# Patient Record
Sex: MALE | Race: WHITE | Employment: OTHER | URBAN - METROPOLITAN AREA
[De-identification: names, ages, dates, MRNs, and addresses within clinical notes are randomized per-mention and may not be internally consistent; named-entity substitution may affect disease eponyms.]

---

## 2017-03-27 ENCOUNTER — RECORDS - HEALTHEAST (OUTPATIENT)
Dept: LAB | Facility: CLINIC | Age: 82
End: 2017-03-27

## 2017-03-27 LAB — HBA1C MFR BLD: 7.9 % (ref 4.2–6.1)

## 2017-04-11 ENCOUNTER — RECORDS - HEALTHEAST (OUTPATIENT)
Dept: LAB | Facility: CLINIC | Age: 82
End: 2017-04-11

## 2017-04-11 LAB
CHOLEST SERPL-MCNC: 119 MG/DL
FASTING STATUS PATIENT QL REPORTED: ABNORMAL
HBA1C MFR BLD: 8.1 % (ref 4.2–6.1)
HDLC SERPL-MCNC: 29 MG/DL
LDLC SERPL CALC-MCNC: 54 MG/DL
TRIGL SERPL-MCNC: 179 MG/DL

## 2017-05-13 ENCOUNTER — APPOINTMENT (OUTPATIENT)
Dept: CT IMAGING | Facility: CLINIC | Age: 82
DRG: 357 | End: 2017-05-13
Attending: EMERGENCY MEDICINE
Payer: MEDICARE

## 2017-05-13 ENCOUNTER — HOSPITAL ENCOUNTER (INPATIENT)
Facility: CLINIC | Age: 82
LOS: 15 days | Discharge: NURSING, REHAB OR LONG-TERM CARE | DRG: 357 | End: 2017-05-28
Attending: EMERGENCY MEDICINE | Admitting: HOSPITALIST
Payer: MEDICARE

## 2017-05-13 DIAGNOSIS — K59.00 CONSTIPATION, UNSPECIFIED CONSTIPATION TYPE: ICD-10-CM

## 2017-05-13 DIAGNOSIS — I48.91 ATRIAL FIBRILLATION, UNSPECIFIED TYPE (H): ICD-10-CM

## 2017-05-13 DIAGNOSIS — E03.9 HYPOTHYROIDISM, UNSPECIFIED TYPE: ICD-10-CM

## 2017-05-13 DIAGNOSIS — L30.9 ECZEMA, UNSPECIFIED TYPE: ICD-10-CM

## 2017-05-13 DIAGNOSIS — E87.1 HYPONATREMIA: ICD-10-CM

## 2017-05-13 DIAGNOSIS — I89.0 LYMPHEDEMA: ICD-10-CM

## 2017-05-13 DIAGNOSIS — R10.84 ABDOMINAL PAIN, GENERALIZED: Primary | ICD-10-CM

## 2017-05-13 DIAGNOSIS — I25.10 CORONARY ARTERY DISEASE INVOLVING NATIVE HEART WITHOUT ANGINA PECTORIS, UNSPECIFIED VESSEL OR LESION TYPE: ICD-10-CM

## 2017-05-13 DIAGNOSIS — R33.9 URINARY RETENTION: ICD-10-CM

## 2017-05-13 DIAGNOSIS — K21.9 GASTROESOPHAGEAL REFLUX DISEASE WITHOUT ESOPHAGITIS: ICD-10-CM

## 2017-05-13 DIAGNOSIS — I87.8 VENOUS STASIS: ICD-10-CM

## 2017-05-13 DIAGNOSIS — E11.8 TYPE 2 DIABETES MELLITUS WITH COMPLICATION, WITHOUT LONG-TERM CURRENT USE OF INSULIN (H): ICD-10-CM

## 2017-05-13 DIAGNOSIS — E55.9 VITAMIN D DEFICIENCY: ICD-10-CM

## 2017-05-13 DIAGNOSIS — I10 ESSENTIAL HYPERTENSION: ICD-10-CM

## 2017-05-13 DIAGNOSIS — K56.609 SMALL BOWEL OBSTRUCTION (H): ICD-10-CM

## 2017-05-13 DIAGNOSIS — D50.9 IRON DEFICIENCY ANEMIA, UNSPECIFIED IRON DEFICIENCY ANEMIA TYPE: ICD-10-CM

## 2017-05-13 LAB
ALBUMIN SERPL-MCNC: 3.7 G/DL (ref 3.4–5)
ALBUMIN UR-MCNC: NEGATIVE MG/DL
ALP SERPL-CCNC: 78 U/L (ref 40–150)
ALT SERPL W P-5'-P-CCNC: 28 U/L (ref 0–70)
ANION GAP SERPL CALCULATED.3IONS-SCNC: 10 MMOL/L (ref 3–14)
APPEARANCE UR: ABNORMAL
AST SERPL W P-5'-P-CCNC: 24 U/L (ref 0–45)
BACTERIA #/AREA URNS HPF: ABNORMAL /HPF
BASOPHILS # BLD AUTO: 0 10E9/L (ref 0–0.2)
BASOPHILS NFR BLD AUTO: 0.1 %
BILIRUB SERPL-MCNC: 1.2 MG/DL (ref 0.2–1.3)
BILIRUB UR QL STRIP: NEGATIVE
BUN SERPL-MCNC: 36 MG/DL (ref 7–30)
CALCIUM SERPL-MCNC: 9.1 MG/DL (ref 8.5–10.1)
CHLORIDE SERPL-SCNC: 93 MMOL/L (ref 94–109)
CO2 SERPL-SCNC: 29 MMOL/L (ref 20–32)
COLOR UR AUTO: YELLOW
CREAT SERPL-MCNC: 1.54 MG/DL (ref 0.66–1.25)
DIFFERENTIAL METHOD BLD: ABNORMAL
EOSINOPHIL # BLD AUTO: 0 10E9/L (ref 0–0.7)
EOSINOPHIL NFR BLD AUTO: 0 %
ERYTHROCYTE [DISTWIDTH] IN BLOOD BY AUTOMATED COUNT: 17.2 % (ref 10–15)
GFR SERPL CREATININE-BSD FRML MDRD: 43 ML/MIN/1.7M2
GLUCOSE BLDC GLUCOMTR-MCNC: 95 MG/DL (ref 70–99)
GLUCOSE SERPL-MCNC: 177 MG/DL (ref 70–99)
GLUCOSE UR STRIP-MCNC: NEGATIVE MG/DL
HCT VFR BLD AUTO: 35.8 % (ref 40–53)
HGB BLD-MCNC: 10.5 G/DL (ref 13.3–17.7)
HGB UR QL STRIP: NEGATIVE
HYALINE CASTS #/AREA URNS LPF: 4 /LPF (ref 0–2)
IMM GRANULOCYTES # BLD: 0 10E9/L (ref 0–0.4)
IMM GRANULOCYTES NFR BLD: 0.3 %
INR PPP: 2.53 (ref 0.86–1.14)
KETONES UR STRIP-MCNC: NEGATIVE MG/DL
LACTATE BLD-SCNC: 3 MMOL/L (ref 0.7–2.1)
LACTATE SERPL-SCNC: 2.3 MMOL/L (ref 0.4–2)
LEUKOCYTE ESTERASE UR QL STRIP: ABNORMAL
LIPASE SERPL-CCNC: 358 U/L (ref 73–393)
LYMPHOCYTES # BLD AUTO: 0.6 10E9/L (ref 0.8–5.3)
LYMPHOCYTES NFR BLD AUTO: 8.9 %
MCH RBC QN AUTO: 23.3 PG (ref 26.5–33)
MCHC RBC AUTO-ENTMCNC: 29.3 G/DL (ref 31.5–36.5)
MCV RBC AUTO: 79 FL (ref 78–100)
MONOCYTES # BLD AUTO: 0.9 10E9/L (ref 0–1.3)
MONOCYTES NFR BLD AUTO: 12.7 %
MUCOUS THREADS #/AREA URNS LPF: PRESENT /LPF
NEUTROPHILS # BLD AUTO: 5.5 10E9/L (ref 1.6–8.3)
NEUTROPHILS NFR BLD AUTO: 78 %
NITRATE UR QL: NEGATIVE
NRBC # BLD AUTO: 0 10*3/UL
NRBC BLD AUTO-RTO: 0 /100
PH UR STRIP: 5 PH (ref 5–7)
PLATELET # BLD AUTO: 294 10E9/L (ref 150–450)
POTASSIUM SERPL-SCNC: 3.5 MMOL/L (ref 3.4–5.3)
PROT SERPL-MCNC: 7.7 G/DL (ref 6.8–8.8)
RBC # BLD AUTO: 4.51 10E12/L (ref 4.4–5.9)
RBC #/AREA URNS AUTO: 1 /HPF (ref 0–2)
SODIUM SERPL-SCNC: 132 MMOL/L (ref 133–144)
SP GR UR STRIP: 1.02 (ref 1–1.03)
URN SPEC COLLECT METH UR: ABNORMAL
UROBILINOGEN UR STRIP-MCNC: 0 MG/DL (ref 0–2)
WBC # BLD AUTO: 7 10E9/L (ref 4–11)
WBC #/AREA URNS AUTO: 15 /HPF (ref 0–2)

## 2017-05-13 PROCEDURE — 25500064 ZZH RX 255 OP 636: Performed by: EMERGENCY MEDICINE

## 2017-05-13 PROCEDURE — A9270 NON-COVERED ITEM OR SERVICE: HCPCS | Mod: GY | Performed by: HOSPITALIST

## 2017-05-13 PROCEDURE — 99223 1ST HOSP IP/OBS HIGH 75: CPT | Mod: AI | Performed by: HOSPITALIST

## 2017-05-13 PROCEDURE — 25800025 ZZH RX 258: Performed by: EMERGENCY MEDICINE

## 2017-05-13 PROCEDURE — 25000128 H RX IP 250 OP 636: Performed by: EMERGENCY MEDICINE

## 2017-05-13 PROCEDURE — 25000132 ZZH RX MED GY IP 250 OP 250 PS 637: Mod: GY | Performed by: HOSPITALIST

## 2017-05-13 PROCEDURE — 25000125 ZZHC RX 250: Performed by: EMERGENCY MEDICINE

## 2017-05-13 PROCEDURE — 96375 TX/PRO/DX INJ NEW DRUG ADDON: CPT

## 2017-05-13 PROCEDURE — 96367 TX/PROPH/DG ADDL SEQ IV INF: CPT

## 2017-05-13 PROCEDURE — 36415 COLL VENOUS BLD VENIPUNCTURE: CPT | Performed by: EMERGENCY MEDICINE

## 2017-05-13 PROCEDURE — 00000146 ZZHCL STATISTIC GLUCOSE BY METER IP

## 2017-05-13 PROCEDURE — 99285 EMERGENCY DEPT VISIT HI MDM: CPT | Mod: 25

## 2017-05-13 PROCEDURE — 85025 COMPLETE CBC W/AUTO DIFF WBC: CPT | Performed by: EMERGENCY MEDICINE

## 2017-05-13 PROCEDURE — 93005 ELECTROCARDIOGRAM TRACING: CPT

## 2017-05-13 PROCEDURE — 87086 URINE CULTURE/COLONY COUNT: CPT | Performed by: EMERGENCY MEDICINE

## 2017-05-13 PROCEDURE — 25000128 H RX IP 250 OP 636: Performed by: PHYSICIAN ASSISTANT

## 2017-05-13 PROCEDURE — 96366 THER/PROPH/DIAG IV INF ADDON: CPT

## 2017-05-13 PROCEDURE — 85610 PROTHROMBIN TIME: CPT | Performed by: EMERGENCY MEDICINE

## 2017-05-13 PROCEDURE — 81001 URINALYSIS AUTO W/SCOPE: CPT | Performed by: EMERGENCY MEDICINE

## 2017-05-13 PROCEDURE — 83690 ASSAY OF LIPASE: CPT | Performed by: EMERGENCY MEDICINE

## 2017-05-13 PROCEDURE — 96365 THER/PROPH/DIAG IV INF INIT: CPT

## 2017-05-13 PROCEDURE — 12000000 ZZH R&B MED SURG/OB

## 2017-05-13 PROCEDURE — 74177 CT ABD & PELVIS W/CONTRAST: CPT

## 2017-05-13 PROCEDURE — 80053 COMPREHEN METABOLIC PANEL: CPT | Performed by: EMERGENCY MEDICINE

## 2017-05-13 PROCEDURE — 83605 ASSAY OF LACTIC ACID: CPT | Performed by: EMERGENCY MEDICINE

## 2017-05-13 RX ORDER — MORPHINE SULFATE 4 MG/ML
4 INJECTION, SOLUTION INTRAMUSCULAR; INTRAVENOUS ONCE
Status: COMPLETED | OUTPATIENT
Start: 2017-05-13 | End: 2017-05-13

## 2017-05-13 RX ORDER — ONDANSETRON 2 MG/ML
4 INJECTION INTRAMUSCULAR; INTRAVENOUS EVERY 6 HOURS PRN
Status: DISCONTINUED | OUTPATIENT
Start: 2017-05-13 | End: 2017-05-28 | Stop reason: HOSPADM

## 2017-05-13 RX ORDER — NICOTINE POLACRILEX 4 MG
15-30 LOZENGE BUCCAL
Status: DISCONTINUED | OUTPATIENT
Start: 2017-05-13 | End: 2017-05-28 | Stop reason: HOSPADM

## 2017-05-13 RX ORDER — POTASSIUM CHLORIDE 7.45 MG/ML
10 INJECTION INTRAVENOUS
Status: DISCONTINUED | OUTPATIENT
Start: 2017-05-13 | End: 2017-05-28 | Stop reason: HOSPADM

## 2017-05-13 RX ORDER — POTASSIUM CHLORIDE 29.8 MG/ML
20 INJECTION INTRAVENOUS
Status: DISCONTINUED | OUTPATIENT
Start: 2017-05-13 | End: 2017-05-28 | Stop reason: HOSPADM

## 2017-05-13 RX ORDER — POTASSIUM CHLORIDE 1500 MG/1
20-40 TABLET, EXTENDED RELEASE ORAL
Status: DISCONTINUED | OUTPATIENT
Start: 2017-05-13 | End: 2017-05-28 | Stop reason: HOSPADM

## 2017-05-13 RX ORDER — DEXTROSE MONOHYDRATE 25 G/50ML
25-50 INJECTION, SOLUTION INTRAVENOUS
Status: DISCONTINUED | OUTPATIENT
Start: 2017-05-13 | End: 2017-05-28 | Stop reason: HOSPADM

## 2017-05-13 RX ORDER — PROCHLORPERAZINE MALEATE 5 MG
5 TABLET ORAL EVERY 6 HOURS PRN
Status: DISCONTINUED | OUTPATIENT
Start: 2017-05-13 | End: 2017-05-28 | Stop reason: HOSPADM

## 2017-05-13 RX ORDER — SODIUM CHLORIDE 9 MG/ML
INJECTION, SOLUTION INTRAVENOUS CONTINUOUS
Status: DISCONTINUED | OUTPATIENT
Start: 2017-05-13 | End: 2017-05-13

## 2017-05-13 RX ORDER — METOPROLOL TARTRATE 1 MG/ML
5 INJECTION, SOLUTION INTRAVENOUS EVERY 6 HOURS
Status: DISCONTINUED | OUTPATIENT
Start: 2017-05-13 | End: 2017-05-22

## 2017-05-13 RX ORDER — PROCHLORPERAZINE 25 MG
12.5 SUPPOSITORY, RECTAL RECTAL EVERY 12 HOURS PRN
Status: DISCONTINUED | OUTPATIENT
Start: 2017-05-13 | End: 2017-05-28 | Stop reason: HOSPADM

## 2017-05-13 RX ORDER — IOPAMIDOL 755 MG/ML
500 INJECTION, SOLUTION INTRAVASCULAR ONCE
Status: COMPLETED | OUTPATIENT
Start: 2017-05-13 | End: 2017-05-13

## 2017-05-13 RX ORDER — NALOXONE HYDROCHLORIDE 0.4 MG/ML
.1-.4 INJECTION, SOLUTION INTRAMUSCULAR; INTRAVENOUS; SUBCUTANEOUS
Status: DISCONTINUED | OUTPATIENT
Start: 2017-05-13 | End: 2017-05-28 | Stop reason: HOSPADM

## 2017-05-13 RX ORDER — HYDROMORPHONE HYDROCHLORIDE 1 MG/ML
.3-.5 INJECTION, SOLUTION INTRAMUSCULAR; INTRAVENOUS; SUBCUTANEOUS
Status: DISCONTINUED | OUTPATIENT
Start: 2017-05-13 | End: 2017-05-16

## 2017-05-13 RX ORDER — POTASSIUM CL/LIDO/0.9 % NACL 10MEQ/0.1L
10 INTRAVENOUS SOLUTION, PIGGYBACK (ML) INTRAVENOUS
Status: DISCONTINUED | OUTPATIENT
Start: 2017-05-13 | End: 2017-05-28 | Stop reason: HOSPADM

## 2017-05-13 RX ORDER — MAGNESIUM SULFATE HEPTAHYDRATE 40 MG/ML
4 INJECTION, SOLUTION INTRAVENOUS EVERY 4 HOURS PRN
Status: DISCONTINUED | OUTPATIENT
Start: 2017-05-13 | End: 2017-05-28 | Stop reason: HOSPADM

## 2017-05-13 RX ORDER — HEPARIN SODIUM 5000 [USP'U]/.5ML
5000 INJECTION, SOLUTION INTRAVENOUS; SUBCUTANEOUS EVERY 12 HOURS
Status: DISCONTINUED | OUTPATIENT
Start: 2017-05-14 | End: 2017-05-14

## 2017-05-13 RX ORDER — ONDANSETRON 4 MG/1
4 TABLET, ORALLY DISINTEGRATING ORAL EVERY 6 HOURS PRN
Status: DISCONTINUED | OUTPATIENT
Start: 2017-05-13 | End: 2017-05-28 | Stop reason: HOSPADM

## 2017-05-13 RX ORDER — POTASSIUM CHLORIDE 1.5 G/1.58G
20-40 POWDER, FOR SOLUTION ORAL
Status: DISCONTINUED | OUTPATIENT
Start: 2017-05-13 | End: 2017-05-28 | Stop reason: HOSPADM

## 2017-05-13 RX ORDER — HYDROMORPHONE HYDROCHLORIDE 1 MG/ML
0.5 SOLUTION ORAL ONCE
Status: COMPLETED | OUTPATIENT
Start: 2017-05-13 | End: 2017-05-13

## 2017-05-13 RX ADMIN — PHYTONADIONE 5 MG: 10 INJECTION, EMULSION INTRAMUSCULAR; INTRAVENOUS; SUBCUTANEOUS at 21:49

## 2017-05-13 RX ADMIN — SODIUM CHLORIDE, POTASSIUM CHLORIDE, SODIUM LACTATE AND CALCIUM CHLORIDE 1000 ML: 600; 310; 30; 20 INJECTION, SOLUTION INTRAVENOUS at 21:19

## 2017-05-13 RX ADMIN — MORPHINE SULFATE 4 MG: 4 INJECTION, SOLUTION INTRAMUSCULAR; INTRAVENOUS at 20:08

## 2017-05-13 RX ADMIN — SODIUM CHLORIDE 65 ML: 9 INJECTION, SOLUTION INTRAVENOUS at 20:24

## 2017-05-13 RX ADMIN — MORPHINE SULFATE 4 MG: 4 INJECTION, SOLUTION INTRAMUSCULAR; INTRAVENOUS at 21:52

## 2017-05-13 RX ADMIN — HYDROMORPHONE HYDROCHLORIDE 0.5 MG: 1 SOLUTION ORAL at 23:35

## 2017-05-13 RX ADMIN — SODIUM CHLORIDE, POTASSIUM CHLORIDE, SODIUM LACTATE AND CALCIUM CHLORIDE 1000 ML: 600; 310; 30; 20 INJECTION, SOLUTION INTRAVENOUS at 20:49

## 2017-05-13 RX ADMIN — TAZOBACTAM SODIUM AND PIPERACILLIN SODIUM 3.38 G: 375; 3 INJECTION, SOLUTION INTRAVENOUS at 21:18

## 2017-05-13 RX ADMIN — SODIUM CHLORIDE 500 ML: 9 INJECTION, SOLUTION INTRAVENOUS at 19:19

## 2017-05-13 RX ADMIN — IOPAMIDOL 100 ML: 755 INJECTION, SOLUTION INTRAVENOUS at 20:23

## 2017-05-13 RX ADMIN — Medication 0.5 MG: at 23:51

## 2017-05-13 ASSESSMENT — ENCOUNTER SYMPTOMS
VOMITING: 0
ABDOMINAL PAIN: 1
NAUSEA: 0

## 2017-05-13 NOTE — IP AVS SNAPSHOT
` `     [place] [address] MEDICAL SURGICAL: [phone]                 INTERAGENCY TRANSFER FORM - NOTES (H&P, Discharge Summary, Consults, Procedures, Therapies)   2017                    Hospital Admission Date: 2017  BRIANA HERNÁNDEZ   : 1927  Sex: Male        Patient PCP Information     Provider PCP Type    Jose Shell [facility]         History & Physicals      H&P by Micki Ackerman PA-C at 2017  9:47 PM     Author:  Micki Ackerman PA-C Service:  Hospitalist Author Type:  Physician Assistant - C    Filed:  2017 11:11 PM Date of Service:  2017  9:47 PM Creation Time:  2017  9:47 PM    Status:  Attested :  Micki Ackerman PA-C (Physician Assistant - C)    Cosigner:  Jenny Gonsalez MD at 2017 11:43 PM        Attestation signed by Jenny Gonsalez MD at 2017 11:43 PM        Physician Attestation   IJenny, saw and evaluated Briana Hernández as part of a shared visit.  I have reviewed and discussed with the advanced practice provider their history, physical and plan.    I personally reviewed the vital signs, medications, labs and imaging.    My key history or physical exam findings: Pt is a 89 yr old male with h/o A fib on coumadin, MI, HTN, dementia who presented with abd pain and was found to have SBO. He has significant pain on exam and absent bowel sound but no rebound tenderness. Surgery consulted from ER     Key management decisions made by me: will place NG for decompression, IV pain meds, prophylactic abx given elevated WBC and lactic acid, surgery consult in the AM    Jenny Gonsalez  Date of Service (when I saw the patient): 17                               History and Physical     Briana Hernández MRN# [id number]   YOB: 1927 Age: 89 year old      Date of Admission:  2017    Primary care provider: Jose Shell           Assessment and Plan:   Ton Bagley is a 89 year old male with a PMH significant for atrial fibrillation on warfarin, MI, HTN, memory loss and HLP who presents with abdominal pain.    ED sign out by Dr. Pepper and chart review performed: Vitals show temp of 97.8, pulse 92, and pressure 150/80 with spontaneous respirations and no hypoxia. Labs remarkable for Creatinine 1.54 (slightly above baseline of, abnormal electrolytes with sodium 132, initial lactic acid 3.0 and repeat 2.3, glucose 177, lipase 358. WBC 7.0, Hgb 10.5. INR 2.5. UA shows 15 WBC/moderate LE/negative nitrites. CT scan showed a distended small bowel consistent with obstruction with concern of closed loop obstruction. This was reviewed by Dr. Dias who does not think there is a closed loop obstruction and would like supportive care for now. He did request reversal of INR though so Vitamin K 5 mg IV was given in ED. Patient not nauseated or vomiting so NG tube was not placed.[NJ1.1]     1. Bowel obstruction  Describes abdominal pain that began 3 days ago. No passing gas or stool. No hx of bowel obstruction but does have hx of gallbladder surgery and prostate cancer. No recent colonoscopy. Concern on CT for closed loop obstruction but after review by Dr. Dias he does not agree and recommends conservative mgmt for now. NG tube not placed because patient does not have significant nausea or vomiting.  -NPO  -Fluid support  -IV pain and nausea meds  -Surgical consult  -Recheck lactic acid in AM, improved from initial presentation    2. Possible UTI  Patient denies urinary symptoms, but has elevated WBC and urine appears infected. Started on Zosyn in ED which is reasonable considering abdominal process going on. Will continue until culture is back.  -Continue Zosyn    3. Atrial fibrillation on warfarin  Pressure are soft and he takes Atenolol at home which we will be holding.  -Metoprolol 5 mg IV q 6 hrs with parameters  -Holding warfarin and  given Vit K in ED recheck INR in AM.    4. Hx of CAD   Hx of bypass grafting in 1994, no hx of heart failure but does have lower extremity edema. Last seen by cardiology in 10/2014 and was discharged from clinic because he was doing well.     5. DM II  -Added sliding scale for NPO status    6. HTN  Holding Lisinopril, Lasix and HCTZ. Will give Metoprolol IV as above    7. CKD  Creatinine slightly above baseline and received contrast in CT     8. Dementia  Holding meds    9. HLP  Holding statin    10. Hypothyroidism  Holding levothyroxine    11. Lower leg swelling  Chronic and uses compression stockings with Lasix.   -Holding lasix for now and suspect this may get worse.    12. Mild hyponatremia  Normal saline overnight and will recheck in AM.    Med rec: still needs to be completed[NJ1.2]  Social:[NJ1.1] Lives with walk, ambulates with cane[NJ1.3]  Code:[NJ1.1] DNR/DNI confirmed by POLST and patient[NJ1.2]  VTE prophylaxis:[NJ1.1] INR 2.5[NJ1.3] and starting reversal so placing on heparin SQ[NJ1.2]  Disposition:[NJ1.1] Inpatient[NJ1.3]              Chief Complaint:[NJ1.1]   Abdominal pain and found to have bowel obstruction[NJ1.3]         History of Present Illness:[NJ1.1]   History limited due to memory loss but son is present who give majority of hx.[NJ1.3]     Ton Bagley is a 89 year old male who presents with[NJ1.1] lower stomach pain x 3 days. He denies significant nausea and vomiting but hasn't eaten anything today. He denies passing gas[NJ1.3] and previous blood in stool[NJ1.2]. His son states he has never had a bowel obstruction before, has hx of gallbladder removal and has not had a colonoscopy in over 10 years. He has hx of prostate cancer. He denies urinary symptoms, chest pain, shortness of breath[NJ1.3], and cough. He has not taken his meds today. He drinks alcohol occasionally and denies smoking/illegal drug use. No other complaints at this time.[NJ1.2]              Past Medical History:      Past Medical History:   Diagnosis Date     Atrial fibrillation (H)     cardioversion 2007     CAD (coronary artery disease)     CABG, stent x2 2004     HTN (hypertension)      Hyperlipidemia                Past Surgical History:     Past Surgical History:   Procedure Laterality Date     BYPASS GRAFT ARTERY CORONARY  1994    LIMA to LAD and saphenous vein grafts to Diag 1 OM 1 PDA     CARDIOVERSION  2007     STENT, CORONARY, S660 15/18  2004    stent placement of SVG to RCA and OM2               Social History:     Social History     Social History     Marital status:      Spouse name: N/A     Number of children: N/A     Years of education: N/A     Occupational History     Not on file.     Social History Main Topics     Smoking status: Former Smoker     Smokeless tobacco: Not on file      Comment: 1973     Alcohol use Yes      Comment: wine daily     Drug use: Not on file     Sexual activity: Not on file     Other Topics Concern     Sleep Concern No     Special Diet No     Exercise No     Social History Narrative               Family History:[NJ1.1]   Unable to review due to memory issues[NJ1.2]         Allergies:    No Known Allergies            Medications:     Prior to Admission medications    Medication Sig Last Dose Taking? Auth Provider   gabapentin (NEURONTIN) 100 MG capsule Take 100 mg by mouth 2 times daily   Reported, Patient   memantine XR (NAMENDA XR) 28 MG capsule Take 28 mg by mouth daily   Reported, Patient   donepezil (ARICEPT) 10 MG tablet Take 10 mg by mouth At Bedtime   Reported, Patient   levothyroxine (SYNTHROID,LEVOTHROID) 100 MCG tablet Take 100 mcg by mouth daily   Reported, Patient   furosemide (LASIX) 20 MG tablet Take 20 mg by mouth daily   Reported, Patient   aspirin 81 MG tablet Take 81 mg by mouth daily   Reported, Patient   tamsulosin (FLOMAX) 0.4 MG 24 hr capsule Take 0.4 mg by mouth daily   Reported, Patient   glipiZIDE (GLUCOTROL) 5 MG tablet 2.5 mh tablet twice daily    Reported, Patient   alendronate (FOSAMAX) 70 MG tablet Take 70 mg by mouth every 7 days   Reported, Patient   Calcium Carb-Cholecalciferol (CALCIUM 600 + D PO)    Reported, Patient   Omega-3 Fatty Acids (OMEGA-3 FISH OIL PO) Take 1 g by mouth   Reported, Patient   Multiple Vitamins-Minerals (CENTRUM SILVER ADULT 50+ PO)    Reported, Patient   nitroglycerin (NITROSTAT) 0.4 MG SL tablet Place under the tongue every 5 minutes as needed for chest pain   Reported, Patient   ascorbic acid (VITAMIN C) 500 MG tablet Take 500 mg by mouth as needed    Reported, Patient   warfarin (COUMADIN) 2 MG tablet Take by mouth daily Take as directed   Reported, Patient   atenolol (TENORMIN) 50 MG tablet Take 1 tablet (50 mg) by mouth daily   Julio Conde MD   lisinopril (PRINIVIL,ZESTRIL) 20 MG tablet Take 1 tablet (20 mg) by mouth daily   Julio Conde MD   hydrochlorothiazide (HYDRODIURIL) 25 MG tablet Take 0.5 tablets (12.5 mg) by mouth daily   Julio Conde MD   simvastatin (ZOCOR) 20 MG tablet Take 1 tablet (20 mg) by mouth daily   Julio Conde MD              Review of Systems:   A Comprehensive greater than 10 system review of systems was carried out.  Pertinent positives and negatives are noted above.  Otherwise negative for contributory information.            Physical Exam:   Blood pressure 135/78, pulse 92, temperature 97.8  F (36.6  C), temperature source Oral, resp. rate 16, weight 104.3 kg (230 lb), SpO2 99 %.  Exam:  GENERAL:  Comfortable.  PSYCH: pleasant,[NJ1.1]  Not[NJ1.2] oriented, No acute distress.  HEENT:  PERRLA. Normal conjunctiva, normal hearing, nasal mucosa and Oropharynx are normal.  NECK:  Supple, no neck vein distention, adenopathy or bruits, normal thyroid.  HEART:  Normal S1, S2 with murmur[NJ1.1] heard on exam[NJ1.2], no pericardial rub, gallops or S3 or S4.  LUNGS:  Clear to auscultation, normal Respiratory effort. No wheezing, rales or ronchi.  ABDOMEN:[NJ1.1]  Abdomen appears distended,  inactive bowel sounds and tender lower quadrants.[NJ1.2]   EXTREMITIES:[NJ1.1]  1+ pitting edema of lower legs[NJ1.2], +2 pulses bilateral and equal.  SKIN:  Dry to touch, No rash, wound or ulcerations.  NEUROLOGIC:  CN 2-12[NJ1.1] grossly[NJ1.2] intact, sensation is intact with no focal deficits.               Data:[NJ1.1]       Recent Labs  Lab 05/13/17  1835   WBC 7.0   HGB 10.5*   HCT 35.8*   MCV 79          Recent Labs  Lab 05/13/17  1835   *   POTASSIUM 3.5   CHLORIDE 93*   CO2 29   ANIONGAP 10   *   BUN 36*   CR 1.54*   GFRESTIMATED 43*   GFRESTBLACK 52*   JANN 9.1   PROTTOTAL 7.7   ALBUMIN 3.7   BILITOTAL 1.2   ALKPHOS 78   AST 24   ALT 28       Recent Labs  Lab 05/13/17 2052 05/13/17  1835   LACT 2.3* 3.0*       Recent Labs  Lab 05/13/17 2043   COLOR Yellow   APPEARANCE Slightly Cloudy   URINEGLC Negative   URINEBILI Negative   URINEKETONE Negative   SG 1.020   UBLD Negative   URINEPH 5.0   PROTEIN Negative   NITRITE Negative   LEUKEST Moderate*   RBCU 1   WBCU 15*         Recent Results (from the past 24 hour(s))   CT Abdomen Pelvis w Contrast    Narrative    CT ABDOMEN PELVIS WITH CONTRAST 5/13/2017 8:27 PM    TECHNIQUE: Images from diaphragm to pubic symphysis. 100 mL Isovue-370  IV contrast. Radiation dose for this scan was reduced using automated  exposure control, adjustment of the mA and/or kV according to patient  size, or iterative reconstruction technique.    HISTORY: RLQ abdominal pain.    COMPARISON: None.    FINDINGS:   Abdomen and Pelvis: Multiple distended fluid-filled small bowel loops  compatible with obstruction. There are several centimeters of  decompressed distal ileum. Mesenteric edema with small amount of fluid  and fat stranding identified. The ascending colon is also mildly  distended and fluid-filled, gas within transverse colon with  transition at the splenic flexure to decompressed descending colon.  The descending and rectosigmoid colon are decompressed.  Small free  fluid. There is some swirling of mesenteric vessels and fat in the  midabdomen. Appendix not identified, clips near the cecum suggests  prior appendectomy.    Lung bases: Calcified right lower lobe granuloma, sternotomy wires,  coronary artery calcifications.     Cholecystectomy clips. Fatty liver. Indeterminate oval hypodense  lesion in the liver just superior to the cholecystectomy clips 2.2 cm  in diameter on image 23.    Normal-appearing spleen, pancreas, and adrenal glands. Multiple  bilateral renal cysts including 6.7 cm cyst upper left kidney.  Nonobstructing 0.4 cm left kidney stone.    Extensive vascular calcification. No periaortic or pelvic adenopathy  or free fluid.      Impression    IMPRESSION:  1.  Distended small bowel consistent with obstruction, several  centimeters of decompressed distal and terminal ileum. The ascending  colon is also fluid-filled with gas filled transverse colon. Question  of closed loop obstruction is raised. Discussed with Dr. Pepper.  2. Indeterminate 2.2 cm oval hypodense lesion in the liver just  cephalad to the cholecystectomy clips.  3. Small free fluid. Mesenteric stranding and fluid adjacent to the  distended bowel loops.    JAIR SOUZA MD[NJ1.3]         Micki Ackerman PA-C    This patient was seen and discussed with Dr. Gonsalez who agrees with the current plans as outlined above.[NJ1.1]        Revision History        User Key Date/Time User Provider Type Action    > NJ1.2 5/13/2017 11:11 PM Micki Ackerman PA-C Physician Assistant - C Sign     NJ1.3 5/13/2017 10:19 PM Micki Ackerman PA-C Physician Assistant Tobin GUERRIER      NJ1.1 5/13/2017  9:47 PM Micki Ackerman PA-C Physician Assistant Tobin GUERRIER                   Discharge Summaries     No notes of this type exist for this encounter.         Consult Notes      Consults by Geri Vital RN at 5/22/2017  1:51 PM     Author:  Geri Vital RN Service:  (none) Author Type:  Case  Manager    Filed:  5/22/2017  1:52 PM Date of Service:  5/22/2017  1:51 PM Creation Time:  5/22/2017  1:50 PM    Status:  Signed :  Geri Vital RN ()         I met with pt and spouse back on 5/15/17.  Therapies are currently recommending TCU.  Pt said he didn't know. His wife was already here today.   I did call pt's wife[RB1.1], but it just kept ringing.  Will try to connect with her tomorrow if she visits.  Ciera WASHINGTON CTS [id number][RB1.2]     Revision History        User Key Date/Time User Provider Type Action    > RB1.2 5/22/2017  1:52 PM Geri Vital RN Case Manager Sign     RB1.1 5/22/2017  1:50 PM Geri Vital RN Case Manager             Consults by Britni Perdomo RD, LD at 5/21/2017  8:40 AM     Author:  Britni Perdomo RD, LD Service:  Nutrition Author Type:  Registered Dietitian    Filed:  5/21/2017  8:40 AM Date of Service:  5/21/2017  8:40 AM Creation Time:  5/21/2017  8:10 AM    Status:  Signed :  Britni Perdomo RD, LD (Registered Dietitian)     Consult Orders:    1. Nutrition Services Adult IP Consult [[id number]] ordered by Shawn Garcia MD at 05/20/17 1601                CLINICAL NUTRITION SERVICES BRIEF NOTE    Consult received for Pharmacy/Nutrition to start & manage TPN and PPN.     See RD note from 5/19 for full nutrition assessment.     ASSESSED NUTRITION NEEDS (PER APPROVED PRACTICE GUIDELINES, Dosing weight: 76.5 kg AdjBW):  Estimated Energy Needs: 8608-7340 kcals (25-30 Kcal/Kg)  Justification: obese  Estimated Protein Needs:  grams protein (1.2-1.5 g pro/Kg)  Justification: post-op  Estimated Fluid Needs: >1 mL/Kcal  Justification: maintenance    New Findings:  - Ileus, NGT removed 5/18. Had large BM yesterday.  - Peripheral access available for PPN, started last evening Clinimix 4.25/5 @ 50 ml/hr w/ no lipids. Provides 413 kcal/day, 60 g pro (0.8 g/kg), 51 g CHO, 0% kcal from lipid.   - BL edema increasing. Wt 105.7 kg today.    Meds -   D5 IVF  off    PPN tolerance -   Phos 2.2 (L) - replacement protocol ordered for this level  K 4.0, Mg 2.1 - NL  BGs <150  Large BM yesterday, x1 this am    Recommendations/Interventions:  Next Bag: Increase PPN D4.25 AA5 to 125 mL/hr  (3000 mL total) + 250 mL 20% lipids daily  - This provides 1520 kcal/day, 128 g protein (1.7 g per kg), 150 g CHO and 33% kcal from fat.    Recommend obtain central access in next 1-2 days for ability to meet full nutrition needs.     RD off-site today, please page if needed. Progress towards goals will be monitored and evaluated per protocol and Practice Guidelines    Britni Perdomo RD, LD[AK1.1]             Revision History        User Key Date/Time User Provider Type Action    > AK1.1 5/21/2017  8:40 AM Britni Perdomo RD, AYALA Registered Dietitian Sign            Consults by Geri Vital RN at 5/15/2017 11:53 AM     Author:  Geri Vital RN Service:  (none) Author Type:      Filed:  5/16/2017 11:02 AM Date of Service:  5/15/2017 11:53 AM Creation Time:  5/15/2017 11:52 AM    Status:  Addendum :  Geri Vital RN ()     Consult Orders:    1. Care Coordinator IP Consult [[id number]] ordered by Rick Dempsey DO at 05/15/17 1149     2. Social Work IP Consult [[id number]] ordered by Micki Ackerman PA-C at 05/13/17 2304                Care Transition Initial Assessment -   Reason For Consult: discharge planning  Met with: Patient and wife.      Active Problems:    Small bowel obstruction (H)         DATA  Lives With: spouse  Living Arrangements: assisted living  Description of Support System: Supportive  Who is your support system?: Wife     Identified issues/concerns regarding health management: Pt lives at [facility] in [place][RB1.1] with his wife.  She is independent.  However, pt does receive help with his lymphedema wraps, blood pressure checks, medications and 3 meals a day.  H[RB1.2]owever, he does have a difficult time  getting down to the dining room d/t fatigue.  He is up independently with his cane.  They do have interim home care available in the building if needed.  I requested PT consult from MD.  Pt and wife are agreeable to home care PT support if recommended through interim.  [facility] Director Leticia [phone] would like dc orders faxed to [phone] and to be notified of dc.[RB1.3]  Return on the weekend may be difficult.  Call [phone] personal cell. Because they have limited coverage for RN support.[RB1.4]        ASSESSMENT  Cognitive Status:[RB1.1]  awake and alert[RB1.3]     PLAN  Patient/family is agreeable to the plan?[RB1.1]  Yes:[RB1.3]   Patient Goals and Preferences:[RB1.1] YES[RB1.3] .  Patient anticipates discharging to:[RB1.1]  Home to [facility] [place] with [facility] PT support if recommended[RB1.3] .[RB1.1]    Ciera WASHINGTON CTS [id number][RB1.3]     Revision History        User Key Date/Time User Provider Type Action    > RB1.4 5/16/2017 11:02 AM Geri Vital RN Case Manager Addend     RB1.3 5/15/2017 12:00 PM Geri Vital RN Case Manager Sign     RB1.2 5/15/2017 11:54 AM Geri Vital RN Case Manager      RB1.1 5/15/2017 11:52 AM Geri Vital RN Case Manager             Consults signed by Jorge Dias MD at 5/14/2017  4:54 PM      Author:  Jorge Dias MD Service:  Surgery Author Type:  Physician    Filed:  5/14/2017  4:54 PM Date of Service:  5/14/2017  8:16 AM Creation Time:  5/14/2017  9:54 AM    Status:  Signed :  Jorge Dias MD (Physician)         SURGERY CONSULTATION       PRIMARY CARE PHYSICIAN:  [facility].       REASON FOR CONSULTATION:  Small-bowel obstruction, question of closed loop.      HISTORY OF PRESENT ILLNESS:  Mr. Ton Bagley is an 89-year-old gentleman who has had a previous laparoscopic cholecystectomy and possibly an appendectomy, although this was not clear from speaking with him,  nor the records, who presented to the ER last evening with abdominal pain.  This has been ongoing for the preceding 3 days or so and because of its persistence and slight worsening, the patient decided to seek evaluation in the ER.  Here, he was found to have a mild elevation of his lactate which subsequently normalized, normal white blood cell count and subsequent CT which showed mechanical bowel obstruction with a possible closed loop.  He had an NG placed overnight with 400 mL of return; he pulled this out during some confusion this morning.  Today, he states he actually feels better and has no pain at rest.  He is uncertain as to whether he is passing flatus, according to the nursing notes he is, but he is not certain.  He denies any nausea or bloating this morning.      PAST MEDICAL AND SURGICAL HISTORY:  Notable for atrial fibrillation for which he is anticoagulated, he has history of coronary artery disease and has had previous CABG.  He has a history of hypertension, hyperlipidemia, previous cholecystectomy, possible appendectomy by radiographic reports, and previous tonsillectomy.  He has obstructive sleep apnea and wears CPAP at night.      CURRENT MEDICATIONS:  This includes gabapentin, Namenda, donepezil, levothyroxine, Lasix, aspirin, Flomax, glipizide, Fosamax, calcium and vitamin D, omega 3 fatty acids, Centrum Silver, nitroglycerin, vitamin C, Coumadin, atenolol, lisinopril, hydrochlorothiazide and simvastatin.      ALLERGIES:  The patient has no recognized drug allergies.      SOCIAL HISTORY:  The patient is  and lives nearby with his wife.  He quit smoking in the early 1970s.      PHYSICAL EXAMINATION:   VITAL SIGNS:  Mr. Bagley is afebrile, temperature this morning is 98.5, pulse is 89 with a blood pressure of 109/70, respiratory rate is 18 with 93% saturations on 2 liters.   IN GENERAL:  He is alert and nontoxic; he is not completely oriented to his situation and does repeat his  questions, though answered on several occasions.   HEART:  Sounds are irregularly irregular.   RESPIRATORY:  Breath sounds are without wheezes or crackles.   ABDOMEN:  Soft with mild distention, he has well-healed trocar sites consistent with a cholecystectomy.  He has some tenderness in the mid abdomen around the periumbilical region with some voluntary guarding.  His bowel tones are present, especially in the upper abdomen.      LABORATORY EXAMS:  Notable for white blood cell count of 7.0 thousand on admission, currently 9.3, hemoglobin was 10.3.  Lactate was 2.3 on admission, currently 1.8 with hydration.  Creatinine is 1.67.  His INR is now 1.6 after having vitamin K last evening.      IMAGING:  I reviewed the patient's CT in detail last evening and again this morning.  There is dilated small bowel proximally down to the level of the mid small bowel where there is decompressed ileum thereafter.  There is some edema in the mesentery off to the patient's right.  There is gas in the colon beyond this.  There does not seem to be 2 transition points, which would be the harbinger of a typical closed loop obstruction.  There are no other signs of bowel ischemia.  There are clips in the right upper quadrant and the gallbladder fossa as well as in the right lower quadrant at about the location of the anticipated mesoappendix, the appendix is not identified.      IMPRESSION AND RECOMMENDATIONS:  This is an 89-year-old gentleman with a cholecystectomy (laparoscopic and possible appendectomy, although there are no scars or harbingers of this otherwise and the patient was unclear as to whether this has occurred or nor) who presents with signs and symptoms consistent with a mechanical bowel obstruction.  Whether this is truly a closed loop phenomenon, this does not seem to be clearly supported.  He has had some improvement overnight, per his own report, although he does seem to be somewhat confused about his situation and I  am not certain how accurate this truly is.  He is tender in the mid abdomen at about the point where the abnormality is noted on computerized tomography.  Nevertheless, I see no acute indication for surgery at this juncture and have asked that the nasogastric tube be replaced and then he will remain n.p.o.  I will check a new set of plain films to see if the bowel obstruction seems persistent.  I suspect he has some degree of aerophagia given his need for continuous positive airway pressure and this may be contributing somewhat to his distention.        Thank you very much for this consultation.  We will follow along with you during the course of his hospitalization and anticipated recovery.  And again, we will reevaluate the patient today for improvement or lack thereof.         RAMA DIAS MD             D: 2017 08:16   T: 2017 09:54   MT: [id number]      Name:     BRIANA HERNÁNDEZ   MRN:      [id number]        Account:       [id number]   :      1927           Consult Date:  2017      Document: [id number]       cc: [facility]    [RO1.1]   Revision History        User Key Date/Time User Provider Type Action    > RO1.1 2017  4:54 PM Rama Dias MD Physician Sign                     Progress Notes - Physician (Notes from 17 through 17)      Progress Notes by Anna Chavez at 2017  9:52 AM     Author:  Anna Chavez Service:  (none) Author Type:      Filed:  2017  9:54 AM Date of Service:  2017  9:52 AM Creation Time:  2017  9:52 AM    Status:  Signed :  Anna Chavez ()         SWS:    Pt d/c today to [facility] with HE to transport by .  Writer will send O2 tank with pt for transportation.  HE will be here at 1:45.  [facility] to fax d/c orders.  [facility].[JD1.1]      Revision History        User Key Date/Time User Provider Type Action    > JD1.1 2017  9:54 AM Kathy  Anna  Sign            Progress Notes by Nirmal Serrano MD at 5/27/2017  2:30 PM     Author:  Nirmal Serrano MD Service:  Hospitalist Author Type:  Physician    Filed:  5/27/2017  2:40 PM Date of Service:  5/27/2017  2:30 PM Creation Time:  5/27/2017  2:30 PM    Status:  Signed :  Nirmal Serrano MD (Physician)         [facility]  Hospitalist Progress Note  Nirmal Serrano MD 05/27/17    Reason for Stay (Diagnosis): ileus         Assessment and Plan:      Summary of Stay: Ton Bagley is a 89 year old male w/ hx of HTN, HLD, DM2, afib, cad, hypothyroidism, and dementia admitted on 5/13/2017 with abd pain and CT findings concerning for possible SBO.  Underwent ex-lap on 5/14 and no SBO was found.  Now with persistent ileus that is slow to resolve.  Was on PPN, but weaned off as oral nutrition improving.  Has had some BMs.  Advanced to low fiber diet per surgery service, however his intake has not been very much.  His warfarin and other home medications have been resumed.  His hemoglobin is slowly drifting, but no sign of acute bleeding.  Added oral iron.  Added back glipizide as blood sugars rising.  Has been seen by PT and OT and he will need TCU upon discharge.  He is very edematous so providing IV diuresis, but bicarb going up so slowing down.  Off PPN now and tolerating low fiber diet.  Kelly now out and voiding.  Plan to d/c to TCU tomorrow morning.    Problem List/Assessment and Plan:   Ileus: S/P exploratory laparotomy by General Surgery on 5/14 for initial concerns about possible SBO; found to have ileus. NGT removed 5/18. Continues to have mild abd distention.  He is having BMs. Mild abdominal discomfort after eating 100% of his low fiber meal.  Stopped narcotics (had been receiving MSO4) to minimize worsening ileus. cdiff neg. Staples removed.  No specific follow up need for this per surgery service.    Type II DM:  Resumed glipizide at 5mg bid as  po intake has improved. Novolog SSI.    Chronic lymphedema:  3+ bilateral LE edema.  Chronically on lasix 60mg am and 30mg pm.  -transition to po lasix 60mg bid to help with significant LE edema.  Will monitor UOP closely to see if this is an adequate discharge dose  -strict I/O  -daily weight  -bmp in am    HTN:  Pta on atenolol 50mg daily, lasix 60mg am/ 30mg pm, lisinopril 10mg daily.  -changed metoprolol 50 mg p.o. Bid  -may restart lisinopril if not improving with diuresis and creatinine stable, however  now    Hyperlipidemia:  Cont Zocor    Hypothyroidism: Continue po synthroid    A fib:  Rate controlled pta on atenolol.  Changed to metoprolol here for improved BP control.  Resumed Coumadin per pharmacy with goal INR of 2-3. Will need to determine d/c dose for tomorrow.    CAD:  S/p cabg and stent x 2. No chest pain currently.  pta on warfarin, statin, ACEI, and BB.    Dementia: holding Namenda and Aricept for now until ileus improves, can resume on discharge    PRADEEP:  Present on admission, suspect pre-renal hypovolemia due to SBO.  Creatinine down to likely baseline of 0.8-0.9.    Bronchospasm:  Resolved. On Duonebs QID. Albuterol PRN.    Urinary retention: failed voiding trial 5/20 so kiran replaced due to retention.  Having some bladder spasms and mild blood from tip of penis so kiran removed 5/26.  Now voiding with only small PVR.  Passing a couple of clots.  -continue with bladder scan during day and PVR measurement    Acute on chronic anemia: previous hg in this system was 10 in 2016.  Presented with hg 10 here that has slowly drifted to 8.7 following surgery.  Likely nutrition component as well.  Borderline microcytic so check iron studies and he does appear to have some iron deficiency.   No sign of bleeding now.  Will need to follow Hg as he is on warfarin, no indication for transfusion now.  Optimize nutrition as able.  -started oral iron daily    DVT Prophylaxis: Warfarin.  Stop lovenox when  "therapeutic INR  Code Status: DNR / DNI  FEN:   low fiber diet  Discharge Dispo:  TCU  Estimated Disch Date / # of Days until Disch: plan to d/c to TCU tomorrow morning          Interval History (Subjective):      Suprapubic pain better after kiran removal yesterday.  Few clots passing in urine, but PVR small.  No fevers.  Ate 100% of low fiber diet and has a little non-specific abdominal discomfort after this.                  Physical Exam:      Last Vital Signs:  /67 (BP Location: Right arm)  Pulse 80  Temp 97  F (36.1  C) (Axillary)  Resp 16  Ht 1.702 m (5' 7.01\")  Wt 104.7 kg (230 lb 12.8 oz)  SpO2 95%  BMI 36.14 kg/m2    Intake/Output Summary (Last 24 hours) at 05/27/17 1430  Last data filed at 05/27/17 1230   Gross per 24 hour   Intake              840 ml   Output             1400 ml   Net             -560 ml     Constitutional: Awake, NAD  Eyes: sclera white   HEENT:  MMM  Respiratory:   lungs cta bilaterally, no crackles or wheeze  Cardiovascular: RRR.  Systolic murmur 1/6  GI: mildly distended, not really tender to palpation.  BS present.  Midline incision c/d/i  Genitourinary: kiran draining yellow urine  Skin: chronic venous stasis changes to bilateral LE's  Musculoskeletal/extremities: 3+ bilateral LE pitting edema, looser skin today  Neurologic: alert, moves all extremities  Psychiatric: calm          Medications:      All current medications were reviewed with changes reflected in problem list.         Data:      All new lab and imaging data was reviewed.   Labs:      Recent Labs  Lab 05/27/17 0659 05/26/17  0715 05/25/17  0815    135 137   POTASSIUM 4.4 4.4 4.5   CHLORIDE 97 95 97   CO2 36* 41* 36*   ANIONGAP 4 <1* 4   * 108* 181*   BUN 17 20 17   CR 0.85 0.97 0.80   GFRESTIMATED 85 73 >90Non  GFR Calc   GFRESTBLACK >90African American GFR Calc 88 >90African American GFR Calc   JANN 8.9 8.5 8.9       Recent Labs  Lab 05/27/17  0659 05/26/17  0715 " 05/25/17  0815   WBC 6.2 5.9 6.9   HGB 8.3* 8.0* 8.2*   HCT 29.6* 28.3* 30.1*   MCV 84 83 84    249 248       Recent Labs  Lab 05/27/17  0659 05/26/17  0715 05/25/17  0815   INR 1.81* 1.47* 1.35*      Imaging:   Recent Results (from the past 24 hour(s))   XR Chest Port 1 View    Narrative    XR CHEST PORT 1 VW 5/27/2017 9:27 AM    COMPARISON: 5/15/2017    HISTORY: Persistent hypoxia.      Impression    IMPRESSION: Enlarged cardiac silhouette is again seen and unchanged.  Median sternotomy wires appear intact. Minimal by basilar atelectasis.  No appreciable pulmonary edema. No pneumothorax.    TG Serrano MD[DE1.1]           Revision History        User Key Date/Time User Provider Type Action    > DE1.1 5/27/2017  2:40 PM Nirmal Serrano MD Physician Sign            Progress Notes by Anna Chavez at 5/27/2017  2:18 PM     Author:  Anna Chavez Service:  (none) Author Type:      Filed:  5/27/2017  2:39 PM Date of Service:  5/27/2017  2:18 PM Creation Time:  5/27/2017  2:18 PM    Status:  Signed :  Anna Chavez ()         SWS:    D:  Continue to assist with d/c planning    I/A:  Chart reviewed. Spoke with pt and his wife regarding the co-pay of 140.00 per day after 20 days of TCU.  Pt's wife is hoping no more than 20 days is needed.  Pt will also need transportation at time of d/c and is aware of the additional cost.      P:  [facility] with HE to transport    PAS completed  May 27th, 2017 at 02:36:13 PM CDT. The confirmation number is [id number][JD1.1]        Revision History        User Key Date/Time User Provider Type Action    > JD1.1 5/27/2017  2:39 PM Anna Chavez  Sign            Progress Notes by Kayode Christianson MD at 5/27/2017 10:33 AM     Author:  Kayode Christianson MD Service:  Surgery Author Type:  Physician    Filed:  5/27/2017 10:56 AM Date of Service:  5/27/2017 10:33 AM  "Creation Time:  5/27/2017 10:33 AM    Status:  Addendum :  Kayode Christianson MD (Physician)         [facility]   General Surgery Progress Note         Assessment and Plan:   Assessment:   POD#13 s/p ex lap for apparent closed loop SBO, negative findings.  H/o Afib  Ileus as expected-slowly resolving      Plan:   -Diet: low fiber diet   -Pain control:  PO Tylenol.  Add Simethicone for gas pains.  -GI prophylaxis:  Protonix    -VTE prophylaxis: PCDs, coumadin  -doing well from surgical perspective.  DC to TCU when ok with hospitalist.[JK1.1]  -no f/u required with surgery[JK1.2]         Interval History:   Up in chair, c/o gas pains. Tolerating diet, voiding, +BM X 2 yesterday per notes.         Physical Exam:   Blood pressure 122/61, pulse 80, temperature 97  F (36.1  C), temperature source Axillary, resp. rate 16, height 1.702 m (5' 7.01\"), weight 104.7 kg (230 lb 12.8 oz), SpO2 98 %.    I/O last 3 completed shifts:  In: 840 [P.O.:840]  Out: 2700 [Urine:2700]    Abdomen:   Soft, obese/distended, non-tender, + bowel sounds   Inc(s) - clean, dry, intact.  Staples removed and steri strips placed.          Data:       Recent Labs  Lab 05/27/17  0659 05/26/17  0715 05/25/17  0815   WBC 6.2 5.9 6.9   HGB 8.3* 8.0* 8.2*   HCT 29.6* 28.3* 30.1*   MCV 84 83 84    249 248         Adri Thrasher PA-C[JK1.1]     Seen and agree,    Kayode Christianson MD  [facility][DB1.1]       Revision History        User Key Date/Time User Provider Type Action    > DB1.1 5/27/2017 10:56 AM Kayode Christianson MD Physician Addend     [N/A] 5/27/2017 10:37 AM Adri Thrasher PA-C Physician Assistant Addend     JK1.2 5/27/2017 10:37 AM Adri Thrasher PA-C Physician Assistant Sign     JK1.1 5/27/2017 10:33 AM Adri Thrasher PA-C Physician Assistant             Progress Notes by White, Corinne C, LSW at 5/26/2017  1:45 PM     Author:  White, Corinne C, LSW Service:  (none) Author Type:  "     Filed:  5/26/2017  4:22 PM Date of Service:  5/26/2017  1:45 PM Creation Time:  5/26/2017  1:45 PM    Status:  Addendum :  White, Corinne C, LSW ()         CM: Called [facility] to update that pt should be ready to dc over the weekend  P: They will review him again today and contact sws about possible bed[CW1.1]    CM: PT has been accepted for TCU for the weekend.  Called wife to update and to let her know that after day 20 his will have a co-pay of $140 per day.[CW1.2] Was not able to leave a VM message[CW1.3] TCU facility does not accept his secondary insurance.[CW1.2]      Revision History        User Key Date/Time User Provider Type Action    > CW1.3 5/26/2017  4:22 PM White, Corinne C, LSW  Addend     CW1.2 5/26/2017  4:21 PM White, Corinne C, LSW  Addend     CW1.1 5/26/2017  1:48 PM White, Corinne C, LSW  Sign            Progress Notes by Nirmal Serrano MD at 5/26/2017  3:38 PM     Author:  Nirmal Serrano MD Service:  Hospitalist Author Type:  Physician    Filed:  5/26/2017  3:42 PM Date of Service:  5/26/2017  3:38 PM Creation Time:  5/26/2017  3:38 PM    Status:  Signed :  Nirmal Serrano MD (Physician)         [facility]  Hospitalist Progress Note  Nirmal Serrano MD 05/26/17    Reason for Stay (Diagnosis): ileus         Assessment and Plan:      Summary of Stay: Ton Bagley is a 89 year old male w/ hx of HTN, HLD, DM2, afib, cad, hypothyroidism, and dementia admitted on 5/13/2017 with abd pain and CT findings concerning for possible SBO.  Underwent ex-lap on 5/14 and no SBO was found.  Now with persistent ileus that is slow to resolve.  Was on PPN, but weaned off as oral nutrition improving.  Has had some BMs.  Advanced to low fiber diet per surgery service, however his intake has not been very much.  His warfarin and other home medications have been resumed.  His hemoglobin is slowly  drifting, but no sign of acute bleeding.  Added oral iron.  Added back glipizide as blood sugars rising.  Has been seen by PT and OT and he will need TCU upon discharge.  He is very edematous so providing IV diuresis, but bicarb going up so slowing down.  Off PPN now.  Suspect we can discharge to TCU tomorrow or next day pending labs.    Problem List/Assessment and Plan:   Ileus: S/P exploratory laparotomy by General Surgery on 5/14 for initial concerns about possible SBO; found to have ileus. NGT removed 5/18. Continues to have mild abd distention and pain with PO intake minimal on full liquid diet, but does seem to be slowly improving and he is having BMs. Stopped narcotics (had been receiving MSO4) to minimize worsening ileus. cdiff neg.  Try to have patient ambulate with PT.  Diet per surgery services. Advanced to low fiber diet.    Type II DM:  Resuming glipizide at 5mg bid as po intake has improved. Novolog SSI.    Chronic lymphedema:  3+ bilateral LE edema.  Chronically on lasix 60mg am and 30mg pm that has been on hold here.  Bicarb up again today so will slo diuresis.  -decreased to just lasix 40mg once today and monitor I/O closely as has kiran in place and get daily weights  -bmp in am    HTN:  more hypertensive past couple of days.  pta on atenolol 50mg dialy, lasix 60mg am/ 30mg pm, lisinopril 10mg daily.  -Increased p.o. metoprolol 50 mg p.o. Bid  -may restart lisinopril if not improving with diuresis and creatinine stable    Hyperlipidemia:  Cont Zocor    Hypothyroidism: Continue po synthroid    A fib:  Rate controlled pta on atenolol.  Changed to metoprolol here for improved BP control.  Resumed Coumadin per pharmacy with goal INR of 2-3.     CAD:  S/p cabg and stent x 2. No chest pain currently.  pta on warfarin, statin, ACEI, and BB.    Dementia: holding Namenda and Aricept for now until ileus improves, can resume on discharge    PRADEEP:  Present on admission, suspect pre-renal hypovolemia due to SBO.  " Creatinine down to likely baseline of 0.8-0.9.    Bronchospasm:  Resolved. On Duonebs QID. Albuterol PRN.    Urinary retention: failed voiding trial 5/20 so kiran replaced due to retention.  Having some bladder spasms.    -B&O suppository prn, seems to help  -continue kiran    Acute on chronic anemia: previous hg in this system was 10 in 2016.  Presented with hg 10 here that has slowly drifted to 8.7 following surgery.  Likely nutrition component as well.  Borderline microcytic so check iron studies and he does appear to have some iron deficiency.   No sign of bleeding now.  Will need to follow Hg as he is on warfarin, no indication for transfusion now.  Optimize nutrition as able.  -started oral iron daily    DVT Prophylaxis: Warfarin.  Stop lovenox when therapeutic INR  Code Status: DNR / DNI  FEN:   low fiber diet  Discharge Dispo:  TCU  Estimated Disch Date / # of Days until Disch: likely ready for discharge tomorrow or next day pending labs    Discussed care with spouse at bedside        Interval History (Subjective):      Ongoing intermittent suprapubic pain.  Tylenol an B&O suppository somewhat helpful.  No fevers.  Good UOP.                  Physical Exam:      Last Vital Signs:  /85 (BP Location: Right arm)  Pulse 92  Temp 97.6  F (36.4  C) (Oral)  Resp 16  Ht 1.702 m (5' 7.01\")  Wt 104.6 kg (230 lb 8 oz)  SpO2 97%  BMI 36.09 kg/m2    Intake/Output Summary (Last 24 hours) at 05/26/17 1538  Last data filed at 05/26/17 1356   Gross per 24 hour   Intake             1210 ml   Output             2950 ml   Net            -1740 ml       Constitutional: Awake, NAD  Eyes: sclera white   HEENT:  MMM  Respiratory:   lungs cta bilaterally, no crackles or wheeze  Cardiovascular: RRR.  Systolic murmur 1/6  GI: moderately distended, mild tenderness suprapubic tenderness, BS present  Genitourinary: kiran draining yellow urine  Skin: chronic venous stasis changes to bilateral LE's  Musculoskeletal/extremities: " 3+ bilateral LE pitting edema, looser skin today  Neurologic: alert, moves all extremities  Psychiatric: calm          Medications:      All current medications were reviewed with changes reflected in problem list.         Data:      All new lab and imaging data was reviewed.   Labs:      Recent Labs  Lab 05/26/17  0715 05/25/17  0815 05/24/17  0738    137 135   POTASSIUM 4.4 4.5 4.5   CHLORIDE 95 97 99   CO2 41* 36* 31   ANIONGAP <1* 4 5   * 181* 177*   BUN 20 17 13   CR 0.97 0.80 0.67   GFRESTIMATED 73 >90Non  GFR Calc >90Non  GFR Calc   GFRESTBLACK 88 >90African American GFR Calc >90African American GFR Calc   JANN 8.5 8.9 8.6       Recent Labs  Lab 05/26/17  0715 05/25/17  0815 05/24/17  0738   WBC 5.9 6.9 11.3*   HGB 8.0* 8.2* 8.7*   HCT 28.3* 30.1* 31.9*   MCV 83 84 84    248 268       Recent Labs  Lab 05/26/17  0715 05/25/17  0815 05/24/17  0738   INR 1.47* 1.35* 1.41*      Imaging:   None today      Nirmal Serrano MD[DE1.1]           Revision History        User Key Date/Time User Provider Type Action    > DE1.1 5/26/2017  3:42 PM Nirmal Serrano MD Physician Sign            Progress Notes by Jorge Dias MD at 5/26/2017  8:18 AM     Author:  Jorge Dias MD Service:  Surgery Author Type:  Physician    Filed:  5/26/2017 10:11 AM Date of Service:  5/26/2017  8:18 AM Creation Time:  5/26/2017  8:18 AM    Status:  Addendum :  Jorge Dias MD (Physician)         [facility]   General Surgery Progress Note         Assessment and Plan:   Assessment:   POD#12 s/p ex lap for apparent closed loop SBO, negative findings.  H/o Afib  Ileus as expected-slowly resolving      Plan:   -Diet: low fiber diet   -Pain control:  PO Tylenol  -GI prophylaxis:  Protonix    -VTE prophylaxis: PCDs, coumadin  -Continue diuresis and kiran per hospitalist. Kiran could be contributing to some of his lower abdominal  "discomfort.  -Staples can be removed soon         Interval History:   Up in chair, eating breakfast. Complains of low abdomen pain, states it is \"behind my penis.\"  Up walking with assistance.  Last BM was yesterday.  +kiran. Patient did not want staples out as he was currently eating.         Physical Exam:   Blood pressure 157/85, pulse 92, temperature 97.6  F (36.4  C), temperature source Oral, resp. rate 16, height 1.702 m (5' 7.01\"), weight 104.6 kg (230 lb 8 oz), SpO2 97 %.    I/O last 3 completed shifts:  In: 2121 [P.O.:1690]  Out: 3375 [Urine:3375]    Abdomen:   Soft, ?distended, +tenderness near incision, + bowel sounds   Inc(s) - clean, dry, intact.  + staples, no erythema          Data:     Recent Labs  Lab 05/26/17  0715 05/25/17  0815 05/24/17  0738   WBC 5.9 6.9 11.3*   HGB 8.0* 8.2* 8.7*   HCT 28.3* 30.1* 31.9*   MCV 83 84 84    248 268         Felix Suarez PA-C[ML1.1]     Seen and examined, agree with above  Appropriate for d/c from our perspective when otherwise medically appropriate.[RO1.1]     Revision History        User Key Date/Time User Provider Type Action    > RO1.1 5/26/2017 10:11 AM Jorge Dias MD Physician Addend     ML1.1 5/26/2017  8:28 AM Felix Suarez PA-C Physician Assistant Sign            Progress Notes by Nirmal Serrano MD at 5/25/2017  4:50 PM     Author:  Nirmal Serrano MD Service:  Hospitalist Author Type:  Physician    Filed:  5/25/2017  5:00 PM Date of Service:  5/25/2017  4:50 PM Creation Time:  5/25/2017  4:50 PM    Status:  Signed :  Nirmal Serrano MD (Physician)         [facility]  Hospitalist Progress Note  Nirmal Serrano MD 05/25/17    Reason for Stay (Diagnosis): ileus         Assessment and Plan:      Summary of Stay: Ton Bagley is a 89 year old male w/ hx of HTN, HLD, DM2, afib, cad, hypothyroidism, and dementia admitted on 5/13/2017 with abd pain and CT findings concerning " for possible SBO.  Underwent ex-lap on 5/14 and no SBO was found.  Now with persistent ileus that is slow to resolve.  Currently on PPN, but weaning this to off as oral nutrition improving.  Has had some BMs.  Advanced to low fiber diet per surgery service, however his intake has not been very much.  His warfarin and other home medications have been resumed.  His hemoglobin is slowly drifting, but no sign of acute bleeding.  Added oral iron.  Added back glipizide as blood sugars rising.  Has been seen by PT and OT and he will need TCU upon discharge.  He is very edematous so providing IV diuresis.    Problem List/Assessment and Plan:   Ileus: S/P exploratory laparotomy by General Surgery on 5/14 for initial concerns about possible SBO; found to have ileus. NGT removed 5/18. Continues to have mild abd distention and pain with PO intake minimal on full liquid diet, but does seem to be slowly improving and he is having BMs. Stopped narcotics (had been receiving MSO4) to minimize worsening ileus. cdiff neg.  Try to have patient ambulate with PT.  Diet per surgery services. Advanced to low fiber diet.    Type II DM:  Resuming glipizide at 5mg bid as po intake has improved. Novolog SSI.    Chronic lymphedema:  3+ bilateral LE edema.  Chronically on lasix 60mg am and 30mg pm that has been on hold here.  Currently only fluids are through PPN and what he is taking orally.  Bicarb up a little bit today.  -continue with lasix 40mg IV bid today and monitor I/O closely as has kiran in place and get daily weights  -bmp in am    HTN:  more hypertensive past couple of days.  pta on atenolol 50mg dialy, lasix 60mg am/ 30mg pm, lisinopril 10mg daily.  -Increased p.o. metoprolol 50 mg p.o. Bid  -continue lasix 40mg IV bid  -may restart lisinopril if not improving with diuresis and creatinine stable    Hyperlipidemia:  Cont Zocor    Hypothyroidism: Continue po synthroid    A fib:  Rate controlled pta on atenolol.  Changed to metoprolol  "here for improved BP control.  Resumed Coumadin per pharmacy with goal INR of 2-3.     CAD:  S/p cabg and stent x 2. No chest pain currently.  pta on warfarin, statin, ACEI, and BB.    Dementia: holding Namenda and Aricept for now until ileus improves    PRADEEP:  Present on admission, suspect pre-renal hypovolemia due to SBO.  Creatinine down to likely baseline of 0.8-0.9.    Bronchospasm:  Resolved. On Duonebs QID. Albuterol PRN.    Urinary retention: failed voiding trial 5/20 so kiran replaced due to retention.  Having some bladder spasms.    -B&O suppository prn, seems to help  -continue kiran    Acute on chronic anemia: previous hg in this system was 10 in 2016.  Presented with hg 10 here that has slowly drifted to 8.7 following surgery.  Likely nutrition component as well.  Borderline microcytic so check iron studies and he does appear to have some iron deficiency.   No sign of bleeding now.  Will need to follow Hg as he is on warfarin, no indication for transfusion now.  Optimize nutrition as able.  -start oral iron daily    DVT Prophylaxis: Warfarin.  Stop lovenox when therapeutic INR  Code Status: DNR / DNI  FEN: advanced to low fiber diet, weaning PPN per dietician, stopping this today    Discharge Dispo: suspect will need TCU  Estimated Disch Date / # of Days until Disch: unclear.  If getting adequate po nutrition may be able to discharge to TCU in 2-3 days    Discussed care with his daughter Felecia green phone today        Interval History (Subjective):      More agitated and did not sleep as well overnight requiring some haldol.  Now more sleepy this morning.  Having some intermittent lower abdominal pain.  Improved some with B&O suppository.  No fevers.                  Physical Exam:      Last Vital Signs:  /78 (BP Location: Right arm)  Pulse 106  Temp 96.8  F (36  C) (Axillary)  Resp 20  Ht 1.702 m (5' 7.01\")  Wt 105.9 kg (233 lb 8 oz)  SpO2 96%  BMI 36.56 kg/m2    Intake/Output Summary (Last " 24 hours) at 05/25/17 1650  Last data filed at 05/25/17 1456   Gross per 24 hour   Intake             2712 ml   Output             4550 ml   Net            -1838 ml     Constitutional: Awake, NAD  Eyes: sclera white   HEENT:  MMM  Respiratory: prolonged expiratory phase.  lungs cta bilaterally, no crackles or wheeze  Cardiovascular: RRR.  Systolic murmur 1/6  GI: moderately distended, mild tenderness suprapubic tenderness, BS present  Genitourinary: kiran draining yellow urine  Skin: chronic venous stasis changes to bilateral LE's  Musculoskeletal/extremities: 3+ bilateral LE pitting edema  Neurologic: more somnolent during my visit, not oriented to date or location  Psychiatric: calm          Medications:      All current medications were reviewed with changes reflected in problem list.         Data:      All new lab and imaging data was reviewed.   Labs:      Recent Labs  Lab 05/25/17  0815 05/24/17  0738 05/23/17  0628    135 136   POTASSIUM 4.5 4.5 3.8   CHLORIDE 97 99 103   CO2 36* 31 30   ANIONGAP 4 5 3   * 177* 185*   BUN 17 13 13   CR 0.80 0.67 0.73   GFRESTIMATED >90Non  GFR Calc >90Non  GFR Calc >90Non  GFR Calc   GFRESTBLACK >90African American GFR Calc >90African American GFR Calc >90African American GFR Calc   JANN 8.9 8.6 8.3*       Recent Labs  Lab 05/25/17  0815 05/24/17  0738 05/22/17  0646   WBC 6.9 11.3*  --    HGB 8.2* 8.7*  --    HCT 30.1* 31.9*  --    MCV 84 84  --     268 267       Recent Labs  Lab 05/25/17  0815 05/24/17  0738 05/23/17  0628   INR 1.35* 1.41* 1.56*      Imaging:   None today      Nirmal Serrano MD[DE1.1]           Revision History        User Key Date/Time User Provider Type Action    > DE1.1 5/25/2017  5:00 PM Nirmal Serrano MD Physician Sign            Progress Notes by Jroge Dias MD at 5/25/2017 10:18 AM     Author:  Jorge Dias MD Service:  Surgery Author Type:  Physician  "   Filed:  5/25/2017 11:59 AM Date of Service:  5/25/2017 10:18 AM Creation Time:  5/25/2017 10:18 AM    Status:  Addendum :  Jorge Dias MD (Physician)         [facility]   General Surgery Progress Note           Assessment and Plan:   Assessment:   POD#11 s/p ex lap for apparent closed loop SBO, negative findings.  H/o Afib  Ileus as expected  Afebrile      Plan:   -Diet:  wean PPN today per RD.  Advance to low fiber diet as tolerated  -Pain control:  PO Tylenol  -GI prophylaxis:  Protonix    -VTE prophylaxis: PCDs, coumadin  -continue diuresis and kiran per hospitalist         Interval History:   Sleepy in chair.  Still c/o low abdomen pain. Up walking with assistance.  Tolerating full liquid diet so far, with improved appetite.  Last BM was yesterday.  +kiran.         Physical Exam:   Blood pressure 164/76, pulse 106, temperature 97.2  F (36.2  C), temperature source Oral, resp. rate 20, height 1.702 m (5' 7.01\"), weight 105.9 kg (233 lb 8 oz), SpO2 90 %.    I/O last 3 completed shifts:  In: 2360 [P.O.:400]  Out: 4875 [Urine:4875]    Abdomen:   Firm,[JK1.1] obese +[JK1.2] distended, denies tenderness, hypoactive bowel sounds   Inc(s) - clean, dry, intact.  + staples.           Data:       Recent Labs  Lab 05/25/17  0815 05/24/17  0738 05/22/17  0646   WBC 6.9 11.3*  --    HGB 8.2* 8.7*  --    HCT 30.1* 31.9*  --    MCV 84 84  --     268 267       Recent Labs  Lab 05/25/17  0815 05/24/17  0738   HGB 8.2* 8.7*         Adri Thrasher PA-C[JK1.1]     Pt seen and examined, agree with above.  Suspect diuresis has helped with bowel edema[RO1.1]       Revision History        User Key Date/Time User Provider Type Action    > RO1.1 5/25/2017 11:59 AM Jorge Dias MD Physician Addend     JK1.2 5/25/2017 10:23 AM Adri Thrasher PA-C Physician Assistant Addend     JK1.1 5/25/2017 10:22 AM Adri Thrasher PA-C Physician Assistant Sign            Progress " Notes by Kenya Dominique RD, LD at 5/25/2017  7:56 AM     Author:  Kenya Dominique RD, LD Service:  Nutrition Author Type:  Registered Dietitian    Filed:  5/25/2017 10:03 AM Date of Service:  5/25/2017  7:56 AM Creation Time:  5/25/2017  7:56 AM    Status:  Signed :  Kenya Dominique RD, LD (Registered Dietitian)         CLINICAL NUTRITION SERVICES - REASSESSMENT NOTE      EVALUATION OF PROGRESS TOWARD GOALS   Diet Order, Food intake and Liquid meal replacement or supplement - Advancement and adequacy.   Update/Evaluation:   - Diet advanced to fulls 5/22 with further advancement to low-fiber yesterday afternoon.    - Continuation of Ensure supplement BID between meals and offerings of Magic Cup supplement with meals.    -[MS1.1] Per discussion with RN/LPN this AM, good appetite/intakes, consumed large breakfast tray and part of Ensure supplement.   - Remains on PPN regimen of the following since 5/22.:[MS1.2]    - Clinimix (D4.25 + AA5) @ goal rate of 90 ml/hr x 24 hrs (2160 ml)  - To provide 745 kcal (39% of needs), 92 g protein (1.2 g/kg - 100% of needs), 92 g CHO.   - Total IVFs = 125 ml/hr  - 76.5 kg DW (adjusted body weight)          ASSESSED NUTRITION NEEDS (PER APPROVED PRACTICE GUIDELINES, Dosing weight: 76.5 kg AdjBW):  Estimated Energy Needs: 6486-7344 kcals (25-30 Kcal/Kg)  Justification: obese  Estimated Protein Needs:  grams protein (1.2-1.5 g pro/Kg)  Justification: post-op  Estimated Fluid Needs: >1 mL/Kcal  Justification: maintenance      NEW FINDINGS:   - BM yesterday.  - Medications reviewed:    Lasix added   SSI  - Biochemical review:   BG ranging from 125-215   Na, K, mag and phos from yesterday WNL  -Current wt relatively consistent with admit wt, slight diuresis of 5# (2.1%) d/t diuretic as above:[MS1.1]  Vitals:    05/22/17 0549 05/23/17 0559 05/23/17 0638 05/24/17 0500   Weight: 106.7 kg (235 lb 4.8 oz) 107.2 kg (236 lb 4.8 oz) 107 kg (235 lb 14.4 oz) 108.1 kg (238  lb 4.8 oz)    05/25/17 0058   Weight: 105.9 kg (233 lb 8 oz)[MS1.3]       Previous Goals:   Diet advancement past fulls w/in 48 hrs.   Evaluation: Met  Patient to consume at least 50% of meals TID + 1 supplement daily.  Evaluation:[MS1.1] Met[MS1.2]    Previous Nutrition Diagnosis:   Inadequate oral intake related to altered GI function with slow advancing diet order as evidenced by meeting <25-50% needs orally x 9 days since admission.  Evaluation:[MS1.1] Completed, changed below[MS1.2]      MALNUTRITION: (5/19/2017)  % Weight Loss:Weight loss does not meet criteria for malnutrition   % Intake:</= 50% for >/= 5 days (severe malnutrition)  Subcutaneous Fat Loss:None observed  Muscle Loss: Mild, as noted above  Fluid Retention:Mild       Malnutrition Diagnosis: Non-Severe malnutrition  In Context of: Acute illness or injury at high risk for further decline    CURRENT NUTRITION DIAGNOSIS[MS1.1]  Predicted suboptimal nutrient intake (energy/protein)[MS1.2] related to[MS1.1] progressively improving appetite with potential for decline given confusion 2/2 dementia, abdominal distention with need for diuresis, and slow return of GI function post-op.[MS1.4]     INTERVENTIONS  Recommendations / Nutrition Prescription[MS1.1]  Continue diet per Surgery, currently low-fiber.     Continue supplements between meals and with meals.     D/c PPN.[MS1.2]      Implementation[MS1.1]  Collaboration and Referral of Nutrition care: Discussed POC with team during rounds.  Discussed d/c of PPN with PharmD who prefers wean at half rate for at least 1 hr.  Discussed with RN.[MS1.2]      Goals[MS1.1]  Pt to consume at least[MS1.2] 50-75% of meals TI D+ 1-2 supplements daily.[MS1.4]       MONITORING AND EVALUATION:[MS1.1]  Progress towards goals will be monitored and evaluated per protocol and Practice Guidelines[MS1.4]      Kenya Dominique RD, LD  Clinical Dietitian  3rd floor/ICU: [phone]  All other floors:  [phone]  Weekend/holiday: [phone][MS1.1]     Revision History        User Key Date/Time User Provider Type Action    > MS1.4 5/25/2017 10:03 AM Kenya Dominique RD, LD Registered Dietitian Sign     MS1.2 5/25/2017  9:50 AM Kenya Dominique RD, LD Registered Dietitian      MS1.3 5/25/2017  8:02 AM Kenya Dominique RD, LD Registered Dietitian      MS1.1 5/25/2017  7:56 AM Kenya Dominique RD, LD Registered Dietitian                   Procedure Notes     No notes of this type exist for this encounter.      Progress Notes - Therapies (Notes from 05/25/17 through 05/28/17)     No notes of this type exist for this encounter.

## 2017-05-13 NOTE — IP AVS SNAPSHOT
"    [place] [address] MEDICAL SURGICAL: [phone]                                              INTERAGENCY TRANSFER FORM - LAB / IMAGING / EKG / EMG RESULTS   2017                    Hospital Admission Date: 2017  BRIANA HERNÁNDEZ   : 1927  Sex: Male        Attending Provider: Jenny Gonsalez MD     Allergies:  No Known Allergies    Infection:  None   Service:  GENERAL MEDI    Ht:  1.702 m (5' 7.01\")   Wt:  99.3 kg (218 lb 14.4 oz)   Admission Wt:  104.3 kg (230 lb)    BMI:  34.28 kg/m 2   BSA:  2.17 m 2            Patient PCP Information     Provider PCP Type    [facility]         Lab Results - 3 Days      Basic metabolic panel [[id number]] (Abnormal)  Resulted: 17, Result status: Final result    Ordering provider: Nirmal Serrano MD  17 0000 Resulting lab: [facility] [place]    Specimen Information    Type Source Collected On   Blood  17          Components       Value Reference Range Flag Lab   Sodium 136 133 - 144 mmol/L  FrRdHs   Potassium 4.2 3.4 - 5.3 mmol/L  FrRdHs   Chloride 95 94 - 109 mmol/L  FrRdHs   Carbon Dioxide 37 20 - 32 mmol/L H FrRdHs   Anion Gap 4 3 - 14 mmol/L  FrRdHs   Glucose 128 70 - 99 mg/dL H FrRdHs   Urea Nitrogen 19 7 - 30 mg/dL  FrRdHs   Creatinine 0.98 0.66 - 1.25 mg/dL  FrRdHs   GFR Estimate 72 >60 mL/min/1.7m2  FrRdHs   Comment:  Non  GFR Calc   GFR Estimate If Black 87 >60 mL/min/1.7m2  FrRdHs   Comment:  African American GFR Calc   Calcium 8.7 8.5 - 10.1 mg/dL  FrRdHs            Magnesium [[id number]]  Resulted: 17, Result status: Final result    Ordering provider: Nrimal Serrano MD  17 0000 Resulting lab: [facility] [place]    Specimen Information    Type Source Collected On   Blood  17          Components       Value Reference Range Flag Lab   Magnesium 2.3 1.6 - 2.3 mg/dL  FrRdHs            INR [[id number]] (Abnormal)  Resulted: " 05/28/17 0726, Result status: Final result    Ordering provider: Lindsey Dao MD  05/28/17 0000 Resulting lab: [facility]    Specimen Information    Type Source Collected On   Blood  05/28/17 0633          Components       Value Reference Range Flag Lab   INR 2.01 0.86 - 1.14 H FrRd            Platelet count [[id number]]  Resulted: 05/28/17 0713, Result status: Final result    Ordering provider: Jenny Gonsalez MD  05/28/17 0000 Resulting lab: [facility]    Specimen Information    Type Source Collected On   Blood  05/28/17 0633          Components       Value Reference Range Flag Lab   Platelet Count 253 150 - 450 10e9/L  FrRd            Glucose by meter [[id number]] (Abnormal)  Resulted: 05/28/17 0211, Result status: Final result    Ordering provider: Jenny Gonsalez MD  05/28/17 0148 Resulting lab: [facility]ARE [facility], GLUCOSE    Specimen Information    Type Source Collected On     05/28/17 0148          Components       Value Reference Range Flag Lab   Glucose 210 70 - 99 mg/dL H 170            Glucose by meter [[id number]]  Resulted: 05/27/17 2240, Result status: Final result    Ordering provider: Jenny Gonsalez MD  05/27/17 2158 Resulting lab: [facility] CARE TEST, GLUCOSE    Specimen Information    Type Source Collected On     05/27/17 2158          Components       Value Reference Range Flag Lab   Glucose 96 70 - 99 mg/dL  170            Glucose by meter [[id number]] (Abnormal)  Resulted: 05/27/17 1920, Result status: Final result    Ordering provider: Jenny Gonsalez MD  05/27/17 1715 Resulting lab: [facility] CARE TEST, GLUCOSE    Specimen Information    Type Source Collected On     05/27/17 1715          Components       Value Reference Range Flag Lab   Glucose 108 70 - 99 mg/dL H 170            Glucose by meter [[id number]] (Abnormal)  Resulted: 05/27/17 1206, Result status: Final result    Ordering provider: Jenny Gonsalez MD  05/27/17 1151  Resulting lab: POINT OF CARE TEST, GLUCOSE    Specimen Information    Type Source Collected On     05/27/17 1151          Components       Value Reference Range Flag Lab   Glucose 157 70 - 99 mg/dL H 170            CBC with platelets [[id number]] (Abnormal)  Resulted: 05/27/17 0812, Result status: Final result    Ordering provider: Nirmal Serrano MD  05/27/17 0000 Resulting lab: [facility]    Specimen Information    Type Source Collected On   Blood  05/27/17 0659          Components       Value Reference Range Flag Lab   WBC 6.2 4.0 - 11.0 10e9/L  FrRdHs   RBC Count 3.53 4.4 - 5.9 10e12/L L FrRdHs   Hemoglobin 8.3 13.3 - 17.7 g/dL L FrRdHs   Hematocrit 29.6 40.0 - 53.0 % L FrRdHs   MCV 84 78 - 100 fl  FrRdHs   MCH 23.5 26.5 - 33.0 pg L FrRdHs   MCHC 28.0 31.5 - 36.5 g/dL L FrRdHs   RDW 18.3 10.0 - 15.0 % H FrRdHs   Platelet Count 283 150 - 450 10e9/L  FrRdHs            Glucose by meter [[id number]] (Abnormal)  Resulted: 05/27/17 0806, Result status: Final result    Ordering provider: Jenny Gonsalez MD  05/27/17 0734 Resulting lab: POINT OF CARE TEST, GLUCOSE    Specimen Information    Type Source Collected On     05/27/17 0734          Components       Value Reference Range Flag Lab   Glucose 105 70 - 99 mg/dL H 170            Basic metabolic panel [[id number]] (Abnormal)  Resulted: 05/27/17 0755, Result status: Final result    Ordering provider: Nirmal Serrano MD  05/27/17 0000 Resulting lab: [facility]    Specimen Information    Type Source Collected On   Blood  05/27/17 0659          Components       Value Reference Range Flag Lab   Sodium 137 133 - 144 mmol/L  FrRdHs   Potassium 4.4 3.4 - 5.3 mmol/L  FrRdHs   Chloride 97 94 - 109 mmol/L  FrRdHs   Carbon Dioxide 36 20 - 32 mmol/L H FrRdHs   Anion Gap 4 3 - 14 mmol/L  FrRdHs   Glucose 103 70 - 99 mg/dL H FrRdHs   Urea Nitrogen 17 7 - 30 mg/dL  FrRdHs   Creatinine 0.85 0.66 - 1.25 mg/dL  [facility]   GFR Estimate 85 >60  mL/min/1.7m2  [facility]   Comment:  Non  GFR Calc   GFR Estimate If Black -- >60 mL/min/1.7m2  Rd   Result:         >90   GFR Calc     Calcium 8.9 8.5 - 10.1 mg/dL  Rd   Result:              INR [[id number]] (Abnormal)  Resulted: 05/27/17 0750, Result status: Final result    Ordering provider: Lindsey Dao MD  05/27/17 0000 Resulting lab: [facility]    Specimen Information    Type Source Collected On   Blood  05/27/17 0659          Components       Value Reference Range Flag Lab   INR 1.81 0.86 - 1.14 H Rd            Glucose by meter [[id number]] (Abnormal)  Resulted: 05/27/17 0140, Result status: Final result    Ordering provider: Jenny Gonsalez MD  05/27/17 0129 Resulting lab: [facility] CARE TEST, GLUCOSE    Specimen Information    Type Source Collected On     05/27/17 0129          Components       Value Reference Range Flag Lab   Glucose 114 70 - 99 mg/dL H 170            Glucose by meter [[id number]] (Abnormal)  Resulted: 05/26/17 2111, Result status: Final result    Ordering provider: Jenny Gonsalez MD  05/26/17 2104 Resulting lab: [facility] CARE TEST, GLUCOSE    Specimen Information    Type Source Collected On     05/26/17 2104          Components       Value Reference Range Flag Lab   Glucose 143 70 - 99 mg/dL H 170            Glucose by meter [[id number]]  Resulted: 05/26/17 1731, Result status: Final result    Ordering provider: Jenny Gonsalez MD  05/26/17 1714 Resulting lab: [facility] CARE [facility], GLUCOSE    Specimen Information    Type Source Collected On     05/26/17 1714          Components       Value Reference Range Flag Lab   Glucose 96 70 - 99 mg/dL  170            Glucose by meter [[id number]] (Abnormal)  Resulted: 05/26/17 1206, Result status: Final result    Ordering provider: Jenny Gonsalez MD  05/26/17 1156 Resulting lab: PO[facility] CARE TEST, GLUCOSE    Specimen Information    Type Source Collected On     05/26/17  1156          Components       Value Reference Range Flag Lab   Glucose 136 70 - 99 mg/dL H 170            Glucose by meter [[id number]] (Abnormal)  Resulted: 05/26/17 0811, Result status: Final result    Ordering provider: Jenny Gonsalez MD  05/26/17 0743 Resulting lab: [facility] CARE TEST, GLUCOSE    Specimen Information    Type Source Collected On     05/26/17 0743          Components       Value Reference Range Flag Lab   Glucose 110 70 - 99 mg/dL H 170            CBC with platelets [[id number]] (Abnormal)  Resulted: 05/26/17 0757, Result status: Final result    Ordering provider: Nirmal Serrano MD  05/26/17 0000 Resulting lab: [facility]    Specimen Information    Type Source Collected On   Blood  05/26/17 0715          Components       Value Reference Range Flag Lab   WBC 5.9 4.0 - 11.0 10e9/L  FrRdHs   RBC Count 3.40 4.4 - 5.9 10e12/L L FrRdHs   Hemoglobin 8.0 13.3 - 17.7 g/dL L FrRdHs   Hematocrit 28.3 40.0 - 53.0 % L FrRdHs   MCV 83 78 - 100 fl  FrRdHs   MCH 23.5 26.5 - 33.0 pg L FrRdHs   MCHC 28.3 31.5 - 36.5 g/dL L FrRdHs   RDW 18.2 10.0 - 15.0 % H FrRdHs   Platelet Count 249 150 - 450 10e9/L  FrRdHs            Basic metabolic panel [[id number]] (Abnormal)  Resulted: 05/26/17 0756, Result status: Final result    Ordering provider: Shawn Garcia MD  05/26/17 0000 Resulting lab: [facility]    Specimen Information    Type Source Collected On   Blood  05/26/17 0715          Components       Value Reference Range Flag Lab   Sodium 135 133 - 144 mmol/L  FrRdHs   Potassium 4.4 3.4 - 5.3 mmol/L  FrRdHs   Chloride 95 94 - 109 mmol/L  FrRdHs   Carbon Dioxide 41 20 - 32 mmol/L H FrRdHs   Anion Gap <1 3 - 14 mmol/L L FrRdHs   Glucose 108 70 - 99 mg/dL H FrRdHs   Urea Nitrogen 20 7 - 30 mg/dL  FrRdHs   Creatinine 0.97 0.66 - 1.25 mg/dL  FrRdHs   GFR Estimate 73 >60 mL/min/1.7m2  [facility]   Comment:  Non  GFR Calc   GFR Estimate If Black 88 >60 mL/min/1.7m2   [facility]   Comment:  African American GFR Calc   Calcium 8.5 8.5 - 10.1 mg/dL  FrRdHs            Magnesium [[id number]] (Abnormal)  Resulted: 05/26/17 0756, Result status: Final result    Ordering provider: Shawn Garcia MD  05/26/17 0000 Resulting lab: [facility] [place]    Specimen Information    Type Source Collected On   Blood  05/26/17 0715          Components       Value Reference Range Flag Lab   Magnesium 2.4 1.6 - 2.3 mg/dL H FrRdHs            Phosphorus [[id number]]  Resulted: 05/26/17 0756, Result status: Final result    Ordering provider: Shawn Garcia MD  05/26/17 0000 Resulting lab: [facility] [place]    Specimen Information    Type Source Collected On   Blood  05/26/17 0715          Components       Value Reference Range Flag Lab   Phosphorus 3.9 2.5 - 4.5 mg/dL  FrRdHs            INR [[id number]] (Abnormal)  Resulted: 05/26/17 0752, Result status: Final result    Ordering provider: Rick Dempsey DO  05/26/17 0000 Resulting lab: [facility] [place]    Specimen Information    Type Source Collected On   Blood  05/26/17 0715          Components       Value Reference Range Flag Lab   INR 1.47 0.86 - 1.14 H FrRdHs            Glucose by meter [[id number]]  Resulted: 05/26/17 0115, Result status: Final result    Ordering provider: Jenny Gonsalez MD  05/26/17 0103 Resulting lab: [facility]ARE [facility], GLUCOSE    Specimen Information    Type Source Collected On     05/26/17 0103          Components       Value Reference Range Flag Lab   Glucose 99 70 - 99 mg/dL  170            Glucose by meter [[id number]] (Abnormal)  Resulted: 05/25/17 2111, Result status: Final result    Ordering provider: Jenny Gonsalez MD  05/25/17 2106 Resulting lab: [facility] CARE TEST, GLUCOSE    Specimen Information    Type Source Collected On     05/25/17 2106          Components       Value Reference Range Flag Lab   Glucose 125 70 - 99 mg/dL H 170            Glucose by meter  [[id number]] (Abnormal)  Resulted: 05/25/17 1826, Result status: Final result    Ordering provider: Jenny Gonsalez MD  05/25/17 1820 Resulting lab: POINT OF CARE TEST, GLUCOSE    Specimen Information    Type Source Collected On     05/25/17 1820          Components       Value Reference Range Flag Lab   Glucose 139 70 - 99 mg/dL H 170            Transferrin [[id number]]  Resulted: 05/25/17 1313, Result status: Final result    Ordering provider: Nirmal Serrano MD  05/25/17 0000 Resulting lab: [facility]    Specimen Information    Type Source Collected On   Blood  05/25/17 0815          Components       Value Reference Range Flag Lab   Transferrin 271 210 - 360 mg/dL  51            Glucose by meter [[id number]] (Abnormal)  Resulted: 05/25/17 1201, Result status: Final result    Ordering provider: Jenny Gonsalez MD  05/25/17 1146 Resulting lab: POINT OF CARE TEST, GLUCOSE    Specimen Information    Type Source Collected On     05/25/17 1146          Components       Value Reference Range Flag Lab   Glucose 204 70 - 99 mg/dL H 170            Ferritin [[id number]]  Resulted: 05/25/17 0959, Result status: Final result    Ordering provider: Nirmal Serrano MD  05/25/17 0000 Resulting lab: [facility]    Specimen Information    Type Source Collected On   Blood  05/25/17 0815          Components       Value Reference Range Flag Lab   Ferritin 40 26 - 388 ng/mL  FrRdHs            Iron [[id number]] (Abnormal)  Resulted: 05/25/17 0959, Result status: Final result    Ordering provider: Nirmal Serrano MD  05/25/17 0000 Resulting lab: [facility]    Specimen Information    Type Source Collected On   Blood  05/25/17 0815          Components       Value Reference Range Flag Lab   Iron 19 35 - 180 ug/dL L FrRdHs            Phosphorus [[id number]]  Resulted: 05/25/17 0917, Result status: Final result    Ordering provider: Shawn Garcia  MD Marcus  05/25/17 0000 Resulting lab: [facility]    Specimen Information    Type Source Collected On   Blood  05/25/17 0815          Components       Value Reference Range Flag Lab   Phosphorus 3.5 2.5 - 4.5 mg/dL  FrRdHs            CBC with platelets [[id number]] (Abnormal)  Resulted: 05/25/17 0917, Result status: Final result    Ordering provider: Nirmal Serrano MD  05/25/17 0000 Resulting lab: [facility]    Specimen Information    Type Source Collected On   Blood  05/25/17 0815          Components       Value Reference Range Flag Lab   WBC 6.9 4.0 - 11.0 10e9/L  FrRdHs   RBC Count 3.59 4.4 - 5.9 10e12/L L FrRdHs   Hemoglobin 8.2 13.3 - 17.7 g/dL L FrRdHs   Hematocrit 30.1 40.0 - 53.0 % L FrRdHs   MCV 84 78 - 100 fl  FrRdHs   MCH 22.8 26.5 - 33.0 pg L FrRdHs   MCHC 27.2 31.5 - 36.5 g/dL L FrRdHs   RDW 18.3 10.0 - 15.0 % H FrRdHs   Platelet Count 248 150 - 450 10e9/L  FrRdHs            Basic metabolic panel [[id number]] (Abnormal)  Resulted: 05/25/17 0916, Result status: Final result    Ordering provider: Shawn Garcia MD  05/25/17 0000 Resulting lab: [facility]    Specimen Information    Type Source Collected On   Blood  05/25/17 0815          Components       Value Reference Range Flag Lab   Sodium 137 133 - 144 mmol/L  FrRdHs   Potassium 4.5 3.4 - 5.3 mmol/L  FrRdHs   Chloride 97 94 - 109 mmol/L  FrRdHs   Carbon Dioxide 36 20 - 32 mmol/L H FrRdHs   Anion Gap 4 3 - 14 mmol/L  FrRdHs   Glucose 181 70 - 99 mg/dL H FrRdHs   Urea Nitrogen 17 7 - 30 mg/dL  FrRdHs   Creatinine 0.80 0.66 - 1.25 mg/dL  FrRdHs   GFR Estimate -- >60 mL/min/1.7m2  FrRdHs   Result:         >90  Non  GFR Calc     GFR Estimate If Black -- >60 mL/min/1.7m2  FrRdHs   Result:         >90   GFR Calc     Calcium 8.9 8.5 - 10.1 mg/dL  [facility]   Result:              Magnesium [[id number]] (Abnormal)  Resulted: 05/25/17 0916, Result status: Final result    Ordering  provider: Shawn Garcia MD  05/25/17 0000 Resulting lab: [facility]    Specimen Information    Type Source Collected On   Blood  05/25/17 0815          Components       Value Reference Range Flag Lab   Magnesium 2.6 1.6 - 2.3 mg/dL H [facility]            INR [[id number]] (Abnormal)  Resulted: 05/25/17 0908, Result status: Final result    Ordering provider: Rick Dempsey DO  05/25/17 0000 Resulting lab: [facility]    Specimen Information    Type Source Collected On   Blood  05/25/17 0815          Components       Value Reference Range Flag Lab   INR 1.35 0.86 - 1.14 H [facility]            Glucose by meter [[id number]] (Abnormal)  Resulted: 05/25/17 0851, Result status: Final result    Ordering provider: Jenny Gonsalez MD  05/25/17 0834 Resulting lab: POINT [facility] CARE TEST, GLUCOSE    Specimen Information    Type Source Collected On     05/25/17 0834          Components       Value Reference Range Flag Lab   Glucose 192 70 - 99 mg/dL H 170            Glucose by meter [[id number]] (Abnormal)  Resulted: 05/25/17 0111, Result status: Final result    Ordering provider: Jenny Gonsalez MD  05/25/17 0101 Resulting lab: POINT OF CARE TEST, GLUCOSE    Specimen Information    Type Source Collected On     05/25/17 0101          Components       Value Reference Range Flag Lab   Glucose 152 70 - 99 mg/dL H 170            Glucose by meter [[id number]] (Abnormal)  Resulted: 05/24/17 2356, Result status: Final result    Ordering provider: Jenny Gonsalez MD  05/24/17 2208 Resulting lab: POINT OF CARE TEST, GLUCOSE    Specimen Information    Type Source Collected On     05/24/17 2208          Components       Value Reference Range Flag Lab   Glucose 171 70 - 99 mg/dL H 170            Testing Performed By     Lab - Abbreviation Name Director Address Valid Date Range    12 - [facility] Unknown [address] [facility] [address] 05/08/15 1057 - Present     "51 - Unknown [facility] Unknown [address] [facility] [address] 12/31/14 1010 - Present    170 - Unknown POINT OF CARE TEST, GLUCOSE Unknown Unknown 10/31/11 1114 - Present            Unresulted Labs (24h ago through future)    Start       Ordered    05/24/17 0600  Magnesium  (EVERY WED/FRI  - AM Draw)  EVERY W,FR,   Routine      05/20/17 1720    05/24/17 0600  Phosphorus  (EVERY WED/FRI  - AM Draw)  EVERY W,FR,   Routine      05/20/17 1720    05/23/17 0600  INR  (warfarin (COUMADIN) Pharmacy Consult-INITIAL ORDER)  DAILY,   Routine      05/22/17 0918    05/22/17 0600  Magnesium  (Every Monday)  EVERY MONDAY,   Routine      05/20/17 1720    05/22/17 0600  Phosphorus  (Every Monday)  EVERY MONDAY,   Routine      05/20/17 1720    05/22/17 0600  Magnesium  (Every Monday)  EVERY MONDAY,   Routine      05/21/17 0841    05/22/17 0600  Phosphorus  (Every Monday)  EVERY MONDAY,   Routine      05/21/17 0841    05/19/17 0600  Platelet count  EVERY 72 HOURS,   Timed     Comments:  If no result is listed, this lab has not been done the past 365 days. LATEST LAB RESULT: Platelet Count (10e9/L)       Date                     Value                 05/16/2017               209              ----------    05/17/17 1554    Unscheduled  Potassium  (Potassium Replacement - \"Standard\" - For K levels less than 3.4 mmol/L - UU,UR,UA,RH,SH,PH,WY )  CONDITIONAL (SPECIFY),   Routine     Comments:  Obtain Potassium Level for these conditions:  *IF no potassium result within 24 hours before initiation of order set, draw potassium level with next lab collect.    *2 HOURS AFTER last IV potassium replacement dose and 4 hours after an oral replacement dose.  *Next morning after potassium dose.     Repeat Potassium Replacement if necessary.    05/13/17 2304    Unscheduled  Magnesium  (Magnesium Replacement -  Adult - \"Standard\" - Replacement for all levels less than 1.6 mg/dL )  CONDITIONAL (SPECIFY),   Routine  " "   Comments:  Obtain Magnesium Level for these conditions:  *IF no magnesium result within 24 hrs before initiation of order set, draw magnesium level with next lab collect.    *2 HOURS AFTER last magnesium replacement dose when magnesium replacement given for level less than 1.6   *Next morning after magnesium dose.     Repeat Magnesium Replacement if necessary.    05/13/17 2304    Unscheduled  Phosphorus  (or    SODIUM Phosphate - \"Standard\" - Replacement for levels less than or equal to 2.4 mg/dL )  CONDITIONAL (SPECIFY),   Routine     Comments:  *IF no phosphorus result within 24 hours before initiation of order set, draw phosphorus level with next lab collect.    *2 HOURS AFTER last phosphorus replacement dose for levels less than 2.0.  *Next morning after phosphorus dose.     Repeat Phosphorus Replacement if necessary.    05/20/17 1716         Imaging Results - 3 Days      XR Chest Port 1 View [[id number]]  Resulted: 05/27/17 0930, Result status: Final result    Ordering provider: Nirmal Serrano MD  05/27/17 0742 Resulted by: Riki Pitts MD    Performed: 05/27/17 0901 - 05/27/17 0927 Resulting lab: RADIOLOGY RESULTS    Narrative:       XR CHEST PORT 1 VW 5/27/2017 9:27 AM    COMPARISON: 5/15/2017    HISTORY: Persistent hypoxia.      Impression:       IMPRESSION: Enlarged cardiac silhouette is again seen and unchanged.  Median sternotomy wires appear intact. Minimal by basilar atelectasis.  No appreciable pulmonary edema. No pneumothorax.    RIKI PITTS      Testing Performed By     Lab - Abbreviation Name Director Address Valid Date Range    104 - [facility] RADIOLOGY RESULTS Unknown Unknown 02/16/05 1553 - Present            Encounter-Level Documents:     There are no encounter-level documents.      Order-Level Documents:     There are no order-level documents.      "

## 2017-05-13 NOTE — IP AVS SNAPSHOT
MRN:[id number]                      After Visit Summary   5/13/2017    Ton Bagley    MRN: [id number]           Thank you!     Thank you for choosing [facility] for your care. Our goal is always to provide you with excellent care. Hearing back from our patients is one way we can continue to improve our services. Please take a few minutes to complete the written survey that you may receive in the mail after you visit. If you would like to speak to someone directly about your visit please contact Patient Relations at [phone]. Thank you!          Patient Information     Date Of Birth          7/18/1927        Designated Caregiver       Most Recent Value    Caregiver    Will someone help with your care after discharge? yes    Name of designated caregiver Felecia Johnson [Daughter]    Phone number of caregiver [phone]    Caregiver address unknown      About your hospital stay     You were admitted on:  May 13, 2017 You last received care in the:  [place] [id number] [facility]    You were discharged on:  May 28, 2017        Reason for your hospital stay       You were hospitalized for a bowel obstruction and had an exploratory laparotomy.  No bowel needed to be removed.  After this you had an ileus where the gut was slow to move.  This has since resolved and you are tolerating a low fiber diet.  We also worked on fluid removal from your legs with lasix.  You will need on going PT and OT at the rehab facility.                  Who to Call     For medical emergencies, please call 911.  For non-urgent questions about your medical care, please call your primary care provider or clinic, [phone]  For questions related to your surgery, please call your surgery clinic        Attending Provider     Provider Mariia Brock MD Emergency Medicine    [facility] [place] [place] Internal Medicine       Primary Care Provider Office Phone # Fax #    [employer]  [facility] [phone]       [address] [place] [address], [address]  [facility] [address]        After Care Instructions     Activity - Up with nursing assistance           Advance Diet as Tolerated       Follow this diet upon discharge: Orders Placed This Encounter      Snacks/Supplements Adult: Ensure Plus (Adult); Between Meals      Room Service      Snacks/Supplements Adult: Magic Cup; With Meals      Low Fiber Diet            Bladder scan       Bid prn if not able to void            Daily weights       Call Provider for weight gain of more than 2 pounds per day or 5 pounds per week.            Fall precautions           General info for SNF       Length of Stay Estimate: Short Term Care: Estimated # of Days 31-90  Condition at Discharge: Stable  Level of care:skilled   Rehabilitation Potential: Fair  Admission H&P remains valid and up-to-date: Yes  Recent Chemotherapy: N/A  Use Nursing Home Standing Orders: Yes            Glucose monitor nursing POCT       Before meals and at bedtime            Incentive spirometry nursing       Continue incentive spirometry at TCU for atelectasis with goal to wean O2 to off            Intake and output       Every shift            Mantoux instructions       Give two-step Mantoux (PPD) Per Facility Policy Yes            Oxygen - Nasal cannula       1-2 Lpm by nasal cannula to keep O2 sats 92% or greater.  Wean as able.                  Follow-up Appointments     Follow Up and recommended labs and tests       Follow up with residential physician.  The following labs/tests are recommended: INR and a bmp in 2 days.                  Additional Services     Nutrition Services Adult IP Consult       Reason:  Recent SBO s/p ex lap.  Was on PPN after this, but now low fiber diet.  Monitor caloric intake to make sure meeting needs            Occupational Therapy Adult Consult       Evaluate and treat as clinically indicated.    Reason:  Deconditioning and dementia            Physical  Therapy Adult Consult       Evaluate and treat as clinically indicated.    Reason:  Deconditioning and dementia                  Further instructions from your care team       HOME CARE FOLLOWING ABDOMINAL SURGERY  LISANDRO Goode, MEGHA Mendez, LISANDRO Matthew, PETRA Douglas   Special instructions for Ton Bagley:  --no follow up required.  Call [phone] if questions/concerns.         INCISIONAL CARE:  Replace the bandage over your incision (or incisions) until all drainage stops, or if more comfortable to have in place.  If present, leave the steri-strips (white paper tapes) in place for 14 days after surgery.  If you have staples in your incision at the time of discharge, they will be removed at your follow-up appointment.  If Dermabond (a type of skin glue) is present, leave in place until it wears/flakes off.     BATHING:  Avoid baths for 1 week after surgery.  Showers are okay.  You may wash your hair at any time.  Gently pat your incision dry after bathing.    ACTIVITY:  Light Activity -- you may immediately be up and about as tolerated.  Driving -- you may drive when comfortable and off narcotic pain medications.  Light Work -- resume when comfortable off pain medications.  (If you can drive, you probably can work.)  Strenuous Work/Activity -- limit lifting to 20 pounds for 4 weeks.  Then, progressively increase with time.  Active Sports (running, biking, etc.) -- cautiously resume after 6 weeks.    DISCOMFORT:  Use pain medications as prescribed by your surgeon.  Take the pain medication with some food, when possible, to minimize side effects.  Expect gradual improvement.    DIET:  Return to diet you were on before surgery, unless you are given specific diet instructions.  Drink plenty of fluids.  While taking pain medications, increase dietary fiber or add a fiber supplementation like Metamucil or Citrucel to help prevent constipation - a possible side effect of pain  medications.    NAUSEA:  If nauseated from the anesthetic/pain meds; rest in bed, get up cautiously with assistance, and drink clear liquids (juice, tea, broth).       CONTACT US IF THE FOLLOWING DEVELOPS:   1. A fever that is above 101     2. If there is a large amount of drainage, bleeding, or swelling.   3. Severe pain that is not relieved by your prescription.   4. Drainage that is thick, cloudy, yellow, green or white.   5. Any other questions not answered by  Frequently Asked Questions  sheet.      FREQUENTLY ASKED QUESTIONS:    Q:  How should my incision look?    A:  Normally your incision will appear slightly swollen with light redness directly along the incision itself as it heals.  It may feel like a bump or ridge as the healing/scarring happens, and over time (3-4 months) this bump or ridge feeling should slowly go away.  In general, clear or pink watery drainage can be normal at first as your incision heals, but should decrease over time.    Q:  How do I know if my incision is infected?  A:  Look at your incision for signs of infection, like redness around the incision spreading to surrounding skin, or drainage of cloudy or foul-smelling drainage.  If you feel warm, check your temperature to see if you are running a fever.    **If any of these things occur, please notify the nurse at our office.  We may need you to come into the office for an incision check.      Q:  How do I take care of my incision?  A:  If you have a dressing in place - Starting the day after surgery, replace the dressing 1-2 times a day until there is no further drainage from the incision.  At that time, a dressing is no longer needed.  Try to minimize tape on the skin if irritation is occurring at the tape sites.  If you have significant irritation from tape on the skin, please call the office to discuss other method of dressing your incision.    Small pieces of tape called  steri-strips  may be present directly overlying your  incision; these may be removed 10 days after surgery unless otherwise specified by your surgeon.  If these tapes start to loosen at the ends, you may trim them back until they fall off or are removed.    A:  If you had  Dermabond  tissue glue used as a dressing (this causes your incision to look shiny with a clear covering over it) - This type of dressing wears off with time and does not require more dressings over the top unless it is draining around the glue as it wears off.  Do not apply ointments or lotions over the incisions until the glue has completely worn off.    Q:  There is a piece of tape or a sticky  lead  still on my skin.  Can I remove this?  A:  Sometimes the sticky  leads  used for monitoring during surgery or for evaluation in the emergency department are not all removed while you are in the hospital.  These sometimes have a tab or metal dot on them.  You can easily remove these on your own, like taking off a band-aid.  If there is a gel substance under the  lead , simply wipe/clean it off with a washcloth or paper towel.      Q:  What can I do to minimize constipation (very hard stools, or lack of stools)?  A:  Stay well hydrated.  Increase your dietary fiber intake or take a fiber supplement -with plenty of water.  Walk around frequently.  You may consider an over-the-counter stool-softener.  Your Pharmacist can assist you with choosing one that is stocked at your pharmacy.  Constipation is also one of the most common side effects of pain medication.  If you are using pain medication, be pro-active and try to PREVENT problems with constipation by taking the steps above BEFORE constipation becomes a problem.    Q:  What do I do if I need more pain medications?  A:  Call the office to receive refills.  Be aware that certain pain meds cannot be called into a pharmacy and actually require a paper prescription.  A change may be made in your pain med as you progress thru your recovery period or if you  have side effects to certain meds.    --Pain meds are NOT refilled after 5pm on weekdays, and NOT AT ALL on the weekends, so please look ahead to prevent problems.      Q:  Why am I having a hard time sleeping now that I am at home?  A:  Many medications you receive while you are in the hospital can impact your sleep for a number of days after your surgery/hospitalization.  Decreased level of activity and naps during the day may also make sleeping at night difficult.  Try to minimize day-time naps, and get up frequently during the day to walk around your home during your recovery time.  Sleep aides may be of some help, but are not recommended for long-term use.      Q:  I am having some back discomfort.  What should I do?  A:  This may be related to certain positioning that was required for your surgery, extended periods of time in bed, or other changes in your overall activity level.  You may try ice, heat, acetaminophen, or ibuprofen to treat this temporarily.  Note that many pain medications have acetaminophen in them and would state this on the prescription bottle.  Be sure not to exceed the maximum of 4000mg per day of acetaminophen.     **If the pain you are having does not resolve, is severe, or is a flare of back pain you have had on other occasions prior to surgery, please contact your primary physician for further recommendations or for an appointment to be examined at their office.    Q:  Why am I having headaches?  A:  Headaches can be caused by many things:  caffeine withdrawal, use of pain meds, dehydration, high blood pressure, lack of sleep, over-activity/exhaustion, flare-up of usual migraine headaches.  If you feel this is related to muscle tension (a band-like feeling around the head, or a pressure at the low-back of the head) you may try ice or heat to this area.  You may need to drink more fluids (try electrolyte drink like Gatorade), rest, or take your usual migraine medications.   **If your  headaches do not resolve, worsen, are accompanied by other symptoms, or if your blood pressure is high, please call your primary physician for recommendation and/or examination.    Q:  I am unable to urinate.  What do I do?  A:  A small percentage of people can have difficulty urinating initially after surgery.  This includes being able to urinate only a very small amount at a time and feeling discomfort or pressure in the very low abdomen.  This is called  urinary retention , and is actually an urgent situation.  Proceed to your nearest Emergency department for evaluation (not an Urgent Care Center).  Sometimes the bladder does not work correctly after certain medications you receive during surgery, or related to certain procedures.  You may need to have a catheter placed until your bladder recovers.  When planning to go to an Emergency department, it may help to call the ER to let them know you are coming in for this problem after a surgery.  This may help you get in quicker to be evaluated.  **If you have symptoms of a urinary tract infection, please contact your primary physician for the proper evaluation and treatment.          If you have other questions, please call the office Monday thru Friday between 8am and 5pm to discuss with the nurse or physician assistant.  #[phone]    There is a surgeon ON CALL on weekday evenings and over the weekend in case of urgent need only, and may be contacted at the same number.    If you are having an emergency, call 911 or proceed to your nearest emergency department.        Warfarin Instruction     You have started taking a medicine called warfarin. This is a blood-thinning medicine (anticoagulant). It helps prevent and treat blood clots.      Before leaving the hospital, make sure you know how much to take and how long to take it.      You will need regular blood tests to make sure your blood is clotting safely. It is very important to see your doctor for regular  "blood tests.    Talk to your doctor before taking any new medicine (this includes over-the-counter drugs and herbal products). Many medicines can interact with warfarin. This may cause more bleeding or too much clotting.     Eating a lot of vitamin K--found in green, leafy vegetables--can change the way warfarin works in your body. Do NOT avoid these foods. Instead, try to eat the same amount each day.     Bleeding is the most common side-effect of warfarin. You may notice bleeding gums, a bloody nose, bruises and bleeding longer when you cut yourself. See a doctor at once if:   o You cough up blood  o You find blood in your stool (poop)  o You have a deep cut, or a cut that bleeds longer than 10 minutes   o You have a bad cut, hard fall, accident or hit your head (go to urgent care or the emergency room).    For women who can get pregnant: This medicine can harm an unborn baby. Be very careful not to get pregnant while taking this medicine. If you think you might be pregnant, call your doctor right away.    For more information, read \"Guide to Warfarin Therapy,  the booklet you received in the hospital.        Pending Results     No orders found from 5/11/2017 to 5/14/2017.            Statement of Approval     Ordered          05/28/17 0820  I have reviewed and agree with all the recommendations and orders detailed in this document.  EFFECTIVE NOW     Approved and electronically signed by:  Nirmal Serrano MD             Admission Information     Date & Time Provider Department Dept. Phone    5/13/2017 Jenny Gonsalez MD [place] [address] [facility] [phone]      Your Vitals Were     Blood Pressure Pulse Temperature Respirations Height Weight    113/69 (BP Location: Right arm) 79 97.7  F (36.5  C) (Oral) 20 1.702 m (5' 7.01\") 99.3 kg (218 lb 14.4 oz)    Pulse Oximetry BMI (Body Mass Index)                96% 34.28 kg/m2          MyChart Information     PopUp lets you send messages to your " "doctor, view your test results, renew your prescriptions, schedule appointments and more. To sign up, go to [email or web address] . Click on \"Log in\" on the left side of the screen, which will take you to the Welcome page. Then click on \"Sign up Now\" on the right side of the page.     You will be asked to enter the access code listed below, as well as some personal information. Please follow the directions to create your username and password.     Your access code is: [email or web address]  Expires: 2017  8:57 AM     Your access code will  in 90 days. If you need help or a new code, please call your [facility] clinic or [phone].        Care EveryWhere ID     This is your Care EveryWhere ID. This could be used by other organizations to access your [facility] medical records  [id number]           Review of your medicines      START taking        Dose / Directions    acetaminophen 500 MG tablet   Commonly known as:  TYLENOL   Used for:  Abdominal pain, generalized        Dose:  1000 mg   Take 2 tablets (1,000 mg) by mouth every 6 hours as needed for mild pain or fever   Refills:  0       ferrous gluconate 324 (38 FE) MG tablet   Commonly known as:  FERGON   Used for:  Iron deficiency anemia, unspecified iron deficiency anemia type        Dose:  324 mg   Take 1 tablet (324 mg) by mouth daily (with breakfast)   Quantity:  100 tablet   Refills:  0       hydrocortisone 1 % cream   Commonly known as:  CORTAID   Used for:  Eczema, unspecified type        Apply to rash on back twice daily until resolved   Refills:  0       metoprolol 50 MG tablet   Commonly known as:  LOPRESSOR   Used for:  Essential hypertension        Dose:  50 mg   Take 1 tablet (50 mg) by mouth 2 times daily   Quantity:  60 tablet   Refills:  0       simethicone 80 MG chewable tablet   Commonly known as:  MYLICON   Used for:  Abdominal pain, generalized        Dose:  160 mg   Take 2 tablets (160 mg) by mouth every 6 hours as needed for flatulence " or cramping   Quantity:  180 tablet   Refills:  0         CONTINUE these medicines which may have CHANGED, or have new prescriptions. If we are uncertain of the size of tablets/capsules you have at home, strength may be listed as something that might have changed.        Dose / Directions    glipiZIDE 5 MG tablet   Commonly known as:  GLUCOTROL   This may have changed:  how much to take   Used for:  Type 2 diabetes mellitus with complication, without long-term current use of insulin (H)        Dose:  5 mg   Take 1 tablet (5 mg) by mouth 2 times daily (before meals)   Quantity:  30 tablet   Refills:  0       lisinopril 5 MG tablet   Commonly known as:  PRINIVIL/ZESTRIL   This may have changed:    - medication strength  - how much to take   Used for:  Essential hypertension        Dose:  5 mg   Take 1 tablet (5 mg) by mouth daily   Refills:  0       polyethylene glycol powder   Commonly known as:  MIRALAX/GLYCOLAX   This may have changed:    - when to take this  - reasons to take this   Used for:  Constipation, unspecified constipation type        Dose:  1 capful   Take 17 g (1 capful) by mouth daily as needed for constipation   Quantity:  119 g   Refills:  0       warfarin 4 MG tablet   Commonly known as:  COUMADIN   This may have changed:    - how much to take  - how to take this  - when to take this  - additional instructions   Used for:  Atrial fibrillation, unspecified type (H)        Alternate 4mg and 6mg daily starting with 4mg tonight, then 6mg tomorrow night   Quantity:  30 tablet   Refills:  0         CONTINUE these medicines which have NOT CHANGED        Dose / Directions    CALCIUM 500+D 500-200 MG-UNIT Tabs   Generic drug:  Calcium Carb-Cholecalciferol        Dose:  1 tablet   Take 1 tablet by mouth 2 times daily   Refills:  0       camphor-menthol 0.5-0.5 % Lotn   Commonly known as:  DERMASARRA        Apply topically daily To affected areas on both legs   Refills:  0       * furosemide 20 MG tablet    Commonly known as:  LASIX        Dose:  60 mg   Take 60 mg by mouth every morning   Refills:  0       * furosemide 20 MG tablet   Commonly known as:  LASIX        Dose:  30 mg   Take 30 mg by mouth daily (with lunch)   Refills:  0       gabapentin 100 MG capsule   Commonly known as:  NEURONTIN        Dose:  200 mg   Take 200 mg by mouth At Bedtime   Refills:  0       levothyroxine 100 MCG tablet   Commonly known as:  SYNTHROID/LEVOTHROID        Dose:  100 mcg   Take 100 mcg by mouth daily   Refills:  0       multivitamin Tabs per tablet        Dose:  1 tablet   Take 1 tablet by mouth daily   Refills:  0       nitroglycerin 0.4 MG sublingual tablet   Commonly known as:  NITROSTAT        Place under the tongue every 5 minutes as needed for chest pain   Refills:  0       omeprazole 20 MG CR capsule   Commonly known as:  priLOSEC        Dose:  20 mg   Take 20 mg by mouth daily   Refills:  0       simvastatin 10 MG tablet   Commonly known as:  ZOCOR        Dose:  10 mg   Take 10 mg by mouth At Bedtime   Refills:  0       tamsulosin 0.4 MG capsule   Commonly known as:  FLOMAX        Dose:  0.4 mg   Take 0.4 mg by mouth every evening   Refills:  0       * Notice:  This list has 2 medication(s) that are the same as other medications prescribed for you. Read the directions carefully, and ask your doctor or other care provider to review them with you.      STOP taking     atenolol 50 MG tablet   Commonly known as:  TENORMIN           senna-docusate 8.6-50 MG per tablet   Commonly known as:  SENOKOT-S;PERICOLACE                Where to get your medicines      Some of these will need a paper prescription and others can be bought over the counter. Ask your nurse if you have questions.     You don't need a prescription for these medications     acetaminophen 500 MG tablet    ferrous gluconate 324 (38 FE) MG tablet    glipiZIDE 5 MG tablet    hydrocortisone 1 % cream    lisinopril 5 MG tablet    metoprolol 50 MG tablet     polyethylene glycol powder    simethicone 80 MG chewable tablet    warfarin 4 MG tablet                Protect others around you: Learn how to safely use, store and throw away your medicines at www.disposemymeds.org.             Medication List: This is a list of all your medications and when to take them. Check marks below indicate your daily home schedule. Keep this list as a reference.      Medications           Morning Afternoon Evening Bedtime As Needed    acetaminophen 500 MG tablet   Commonly known as:  TYLENOL   Take 2 tablets (1,000 mg) by mouth every 6 hours as needed for mild pain or fever   Last time this was given:  1,000 mg on 5/27/2017 10:15 PM                                CALCIUM 500+D 500-200 MG-UNIT Tabs   Take 1 tablet by mouth 2 times daily   Generic drug:  Calcium Carb-Cholecalciferol                                camphor-menthol 0.5-0.5 % Lotn   Commonly known as:  DERMASARRA   Apply topically daily To affected areas on both legs                                ferrous gluconate 324 (38 FE) MG tablet   Commonly known as:  FERGON   Take 1 tablet (324 mg) by mouth daily (with breakfast)   Last time this was given:  324 mg on 5/28/2017  8:23 AM                                * furosemide 20 MG tablet   Commonly known as:  LASIX   Take 60 mg by mouth every morning   Last time this was given:  60 mg on 5/27/2017  3:05 PM                                * furosemide 20 MG tablet   Commonly known as:  LASIX   Take 30 mg by mouth daily (with lunch)   Last time this was given:  60 mg on 5/27/2017  3:05 PM                                gabapentin 100 MG capsule   Commonly known as:  NEURONTIN   Take 200 mg by mouth At Bedtime                                glipiZIDE 5 MG tablet   Commonly known as:  GLUCOTROL   Take 1 tablet (5 mg) by mouth 2 times daily (before meals)   Last time this was given:  5 mg on 5/28/2017  8:22 AM                                hydrocortisone 1 % cream   Commonly known as:   CORTAID   Apply to rash on back twice daily until resolved   Last time this was given:  5/28/2017  9:17 AM                                levothyroxine 100 MCG tablet   Commonly known as:  SYNTHROID/LEVOTHROID   Take 100 mcg by mouth daily   Last time this was given:  100 mcg on 5/28/2017  6:46 AM                                lisinopril 5 MG tablet   Commonly known as:  PRINIVIL/ZESTRIL   Take 1 tablet (5 mg) by mouth daily                                metoprolol 50 MG tablet   Commonly known as:  LOPRESSOR   Take 1 tablet (50 mg) by mouth 2 times daily   Last time this was given:  50 mg on 5/28/2017  8:24 AM                                multivitamin Tabs per tablet   Take 1 tablet by mouth daily                                nitroglycerin 0.4 MG sublingual tablet   Commonly known as:  NITROSTAT   Place under the tongue every 5 minutes as needed for chest pain                                omeprazole 20 MG CR capsule   Commonly known as:  priLOSEC   Take 20 mg by mouth daily                                polyethylene glycol powder   Commonly known as:  MIRALAX/GLYCOLAX   Take 17 g (1 capful) by mouth daily as needed for constipation                                simethicone 80 MG chewable tablet   Commonly known as:  MYLICON   Take 2 tablets (160 mg) by mouth every 6 hours as needed for flatulence or cramping   Last time this was given:  160 mg on 5/28/2017  8:22 AM                                simvastatin 10 MG tablet   Commonly known as:  ZOCOR   Take 10 mg by mouth At Bedtime   Last time this was given:  10 mg on 5/27/2017 10:02 PM                                tamsulosin 0.4 MG capsule   Commonly known as:  FLOMAX   Take 0.4 mg by mouth every evening   Last time this was given:  0.4 mg on 5/27/2017  8:19 PM                                warfarin 4 MG tablet   Commonly known as:  COUMADIN   Alternate 4mg and 6mg daily starting with 4mg tonight, then 6mg tomorrow night   Last time this was given:  6  mg on 5/27/2017  6:19 PM                                * Notice:  This list has 2 medication(s) that are the same as other medications prescribed for you. Read the directions carefully, and ask your doctor or other care provider to review them with you.

## 2017-05-13 NOTE — IP AVS SNAPSHOT
"` `           [place] [address] MEDICAL SURGICAL: [phone]                                              INTERAGENCY TRANSFER FORM - NURSING   2017                    Hospital Admission Date: 2017  BRIANA HERNÁNDEZ   : 1927  Sex: Male        Attending Provider: Jenny Gonsalez MD     Allergies:  No Known Allergies    Infection:  None   Service:  GENERAL MEDI    Ht:  1.702 m (5' 7.01\")   Wt:  99.3 kg (218 lb 14.4 oz)   Admission Wt:  104.3 kg (230 lb)    BMI:  34.28 kg/m 2   BSA:  2.17 m 2            Patient PCP Information     Provider PCP Type    [facility] General      Current Code Status     Date Active Code Status Order ID Comments User Context       Prior      Code Status History     Date Active Date Inactive Code Status Order ID Comments User Context    2017  8:20 AM  DNR/DNI [id number]  Nirmal Serrano MD Outpatient    2017 11:04 PM 2017  8:20 AM DNR/DNI [id number]  iMcki Ackerman PA-C Inpatient      Advance Directives        Does patient have a scanned Advance Directive/ACP document in EPIC?           No        Hospital Problems as of 2017              Priority Class Noted POA    Small bowel obstruction (H) Medium  2017 Yes      Non-Hospital Problems as of 2017              Priority Class Noted    Cortical senile cataract Medium  2009    Excess skin of eyelid Medium  2009    HTN (hypertension)   Unknown    CAD (coronary artery disease)   Unknown    Atrial fibrillation (H)   Unknown    Hyperlipidemia   Unknown    S/P CABG (coronary artery bypass graft)   10/23/2014      Immunizations     None         END      ASSESSMENT     Discharge Profile Flowsheet     EXPECTED DISCHARGE     FINAL RESOURCES      Expected Discharge Date  17 (OVER DRG 3.6> admit > wife [facility]) 17 1057   Resources List  Skilled Nursing Facility 17 1624    DISCHARGE NEEDS ASSESSMENT     Skilled Nursing " "Facility  [facility] [phone], Fax: [phone] 05/26/17 1624    Equipment Currently Used at Home  cane, straight 05/15/17 1507   SKIN      Transportation Available  family or friend will provide 05/15/17 1507   Inspection  Full 05/28/17 0830    # of Referrals Placed by CTS  Post Acute Facilities 05/23/17 0836   Procedural focused assessment (identify areas inspected)   -- (abdominal area, pelvis area, shin area) 05/14/17 1046    GASTROINTESTINAL (ADULT,PEDIATRIC,OB)     Skin WDL  ex 05/28/17 0830    GI WDL  ex 05/28/17 0830   Skin Color/Characteristics  bruised (ecchymotic) 05/28/17 0830    Abdominal Appearance  rounded 05/28/17 0830   Skin Temperature  warm 05/28/17 0830    All Quadrants Bowel Sounds  audible and active in all quadrants 05/28/17 0830   Skin Moisture  dry 05/28/17 0830    All Quadrants Palpation  firm 05/15/17 0840   Skin Elasticity  slow return to original state 05/27/17 0755    Last Bowel Movement  05/27/17 05/28/17 0830   Skin Integrity  bruise(s);incision(s) 05/28/17 0830    GI Signs/Symptoms  abdominal discomfort;abdominal pain 05/27/17 0021   SAFETY      Passing flatus  yes 05/28/17 0830   Safety WDL  WDL 05/28/17 0832    COMMUNICATION ASSESSMENT     Safety Factors  bed in low position;wheels locked;call light in reach;upper side rails raised x 2;ID band on 05/28/17 0832    Patient's communication style  spoken language (English or Bilingual) 05/19/17 2049                      Assessment WDL (Within Defined Limits) Definitions           Safety WDL     Effective: 09/28/15    Row Information: <b>WDL Definition:</b> Bed in low position, wheels locked; call light in reach; upper side rails up x 2; ID band on<br> <font color=\"gray\"><i>Item=AS safety wdl>>List=AS safety wdl>>Version=F14</i></font>      Skin WDL     Effective: 09/28/15    Row Information: <b>WDL Definition:</b> Warm; dry; intact; elastic; without discoloration; pressure points without " "redness<br> <font color=\"gray\"><i>Item=AS skin wdl>>List=AS skin wdl>>Version=F14</i></font>      Vitals     Vital Signs Flowsheet     QUICK ADDS     PAINAD Facial Expression  2-->facial grimacing 05/26/17 0405    Quick Adds  Comments 05/13/17 2249   PAINAD Body Language  1-->tense: distressed pacing, fidgeting 05/26/17 0405    COMMENTS     PAINAD Consolability  2-->unable to console, distract or reassure 05/26/17 0405    Comments  arrived to room from ER 05/13/17 2249   PAINAD Score  7 05/26/17 0405    VITAL SIGNS     Pain Location  Back 05/26/17 1554    Temp  97.7  F (36.5  C) 05/28/17 0813   Pain Orientation  Lower 05/26/17 0915    Temp src  Oral 05/28/17 0813   Pain Descriptors  Aching 05/26/17 0915    Resp  20 05/28/17 0813   Pain Management Interventions  analgesia administered 05/26/17 0405    Pulse  79 05/27/17 2333   Pain Intervention(s)  Cold applied;Medication (See eMAR) 05/27/17 0017    Heart Rate  91 05/28/17 0813   Response to Interventions  Decrease in pain 05/25/17 1629    Pulse/Heart Rate Source  Monitor 05/28/17 0813   ANALGESIA SIDE EFFECTS MONITORING      BP  113/69 05/28/17 0813   Side Effects Monitoring: Respiratory Quality  R 05/28/17 0043    BP Location  Right arm 05/28/17 0813   Side Effects Monitoring: Respiratory Depth  N 05/28/17 0043    POSITIONING     Side Effects Monitoring: Sedation Level  1 05/28/17 0043    Body Position  independently positioning 05/28/17 0832   HEIGHT AND WEIGHT      Head of Bed (HOB)  HOB at 30-45 degrees 05/28/17 0051   Height  1.702 m (5' 7.01\") 05/16/17 1901    Chair  Upright in chair 05/28/17 0832   Height Method  Stated 05/13/17 2139    Positioning/Transfer Devices  pillows 05/27/17 0740   Weight  99.3 kg (218 lb 14.4 oz) 05/28/17 0507    OXYGEN THERAPY     BSA (Calculated - sq m)  2.21 05/13/17 2303    SpO2  96 % 05/28/17 0813   BMI (Calculated)  35.91 05/13/17 2303    O2 Device  Nasal cannula with humidification 05/28/17 0813   DAILY CARE      FiO2 (%)  100 " % 05/14/17 1254   Activity Type  activity adjusted per tolerance;ambulated to bathroom;ambulated in room;ambulated in way 05/28/17 0832    Oxygen Delivery  1 LPM 05/28/17 0813   Activity Level of Assistance  assistance, 1 person 05/28/17 0832    PAIN/COMFORT     Activity Assistive Device  walker;gait belt 05/28/17 0832    Patient Currently in Pain  denies 05/28/17 0813   Additional Documentation  Activity Device Assistance (Row) 05/28/17 0832    Preferred Pain Scale  number (Numeric Rating Pain Scale) 05/27/17 1553   ECG      Patient's Stated Pain Goal  No pain 05/27/17 1553   ECG Rhythm  Sinus rhythm 05/14/17 1254    0-10 Pain Scale  6 05/27/17 0017   POINT OF CARE TESTING      Word Pain Scale  8 05/26/17 0405   Puncture Site  fingertip 05/27/17 2201    PAINAD Breathing  0-->normal 05/26/17 0405   Bedside Glucose (mg/dl )   96 mg/dl 05/27/17 2201    PAINAD Negative Vocalization  2-->repeated troubled calling out: loud moaning/groaning, crying 05/26/17 0405                 Patient Lines/Drains/Airways Status    Active LINES/DRAINS/AIRWAYS     Name: Placement date: Placement time: Site: Days: Last dressing change:    Peripheral IV 05/24/17 Right Hand 05/24/17   1744   Hand   3     Wound 05/14/17 Bilateral Groin Other (comment) RASH 05/14/17   0800   Groin   14     Wound 05/21/17 Penis Blister to underside of penis 05/21/17   1800   Penis   6     Rash 05/19/17 0000 back papule 05/19/17   0000    9     Incision/Surgical Site 05/14/17 Abdomen 05/14/17   1118    13             Patient Lines/Drains/Airways Status    Active PICC/CVC     None            Intake/Output Detail Report     Date Intake         Output       Net    Shift P.O. I.V. NG/GT IV Piggyback TPN Total Urine Emesis/NG output Other Blood Total       Noc 05/26/17 2300 - 05/27/17 0659 480 -- -- -- -- 480 600 -- -- -- 600 -120    Day 05/27/17 0700 - 05/27/17 1459 480 -- -- -- -- 480 450 -- -- -- 450 30    Tiffanie 05/27/17 1500 - 05/27/17 4474 400 -- -- -- --  400 614 -- -- -- 614 -214    Noc 05/27/17 2300 - 05/28/17 0659 -- -- -- -- -- -- 550 -- -- -- 550 -550    Day 05/28/17 0700 - 05/28/17 1459 -- -- -- -- -- -- -- -- -- -- -- 0      Last Void/BM       Most Recent Value    Urine Occurrence 1 at 05/27/2017 2202    Stool Occurrence 1 at 05/28/2017 0650      Case Management/Discharge Planning     Case Management/Discharge Planning Flowsheet     REFERRAL INFORMATION     Who is your support system?  Wife 05/15/17 1152    Did the Initial Social Work Assessment result in a Social Work Case?  Yes 05/15/17 1152   Description of Support System  Supportive 05/15/17 1152    Admission Type  inpatient 05/15/17 1152   COPING/STRESS      Arrived From  home or self-care 05/15/17 1152   Major Change/Loss/Stressor  none 05/22/17 1249    Referral Source  case finding 05/15/17 1152   EXPECTED DISCHARGE      New Steerage to  Clinics?  No 05/15/17 1152   Expected Discharge Date  05/29/17 (OVER DRG 3.6> admit 5/13> [facility]) 05/26/17 1057    # of Referrals Placed by CTS  Post Acute Facilities 05/23/17 0836   DISCHARGE PLANNING      Reason For Consult  discharge planning 05/15/17 1152   Transportation Available  family or friend will provide 05/15/17 1507    Record Reviewed  clinical discipline documentation;history and physical;medical record;patient profile;plan of care 05/15/17 1152   FINAL RESOURCES       Assigned to Miguelito Vang RN 05/15/17 1152   Equipment Currently Used at Home  canondina straight 05/15/17 1507    Primary Care Clinic Name  [facility] 05/15/17 1152   Resources List  Skilled Nursing Facility 05/26/17 1624    LIVING ENVIRONMENT     Skilled Nursing Facility  [facility]) [phone], Fax: [phone] 05/26/17 1622    Lives With  spouse 05/15/17 1507   ABUSE RISK SCREEN      Living Arrangements  assisted living 05/15/17 1507   QUESTION TO PATIENT:  Has a member of your family or a partner(now or in the past)  intimidated, hurt, manipulated, or controlled you in any way?  no 05/22/17 1244    Primary Care Provided By  spouse/significant other;other (see comments) ([facility]) 05/15/17 1152   QUESTION TO PATIENT: Do you feel safe going back to the place where you are living?  yes 05/22/17 1244    Able to Return to Prior Living Arrangements  yes 05/15/17 1152   OBSERVATION: Is there reason to believe there has been maltreatment of a vulnerable adult (ie. Physical/Sexual/Emotional abuse, self neglect, lack of adequate food, shelter, medical care, or financial exploitation)?  no 05/22/17 1244    ASSESSMENT OF FAMILY/SOCIAL SUPPORT     (R) MENTAL HEALTH SUICIDE RISK      Marital Status   05/15/17 1152   Are you depressed or being treated for depression?  No 05/22/17 1246

## 2017-05-13 NOTE — IP AVS SNAPSHOT
` ` Patient Information     Patient Name Sex     Ton Bagley ([id number]) Male 1927       Room Bed    [facility] 0528-01      Patient Demographics     Address Phone    [address]   [place] [address] [phone] (Home)  none (Work)  [phone] (Mobile)      Patient Ethnicity & Race     Ethnic Group Patient Race     White      Emergency Contact(s)     Name Relation Home Work Mobile    Kelli Bagley Spouse [phone]    KneerFelecia Daughter [phone] none [phone]    Luciano Bagley Son [phone] none [phone]      Documents on File        Status Date Received Description       Documents for the Patient    Privacy Notice - [place] Received 12     Insurance Card Received 12     External Medication Information Consent Accepted 10/28/14     Patient ID Received () 12     Consent for Services - Hospital/Clinic Received () 12     Consent for EHR Access  13 Copied from existing Consent for services - C/HOD collected on 2012    [facility] Specified Other       Patient ID Received 17 [place] Expires LIfetime    Insurance Card Received 10/28/14 HP    Consent to Communicate Received 10/28/14     Consent for Services - Hospital/Clinic Received () 10/28/14     Consent for Services/Privacy Notice - Hospital/Clinic Received 17     Insurance Card Received 17     Insurance Card  (Deleted)         Documents for the Encounter    CMS IM for Patient Signature Received 17 spoke with wife    Discharge Instructions  17 CONTRAST DISCHARGE INSTRUCTIONS    Assessment/Questionnaire  17 CONTRAST QUESTIONNAIRE    CMS IM for Patient Signature Received 17     CE Point of Care Auth Received        Admission Information     Attending Provider Admitting Provider Admission Type Admission Date/Time    Jenny Gonsalez MD Sajwani, Rubina Abuali, MD Emergency 17  4100     Discharge Date Hospital Service Auth/Cert Status Service Area     General Medicine Incomplete [facility]    Unit Room/Bed Admission Status        5 MEDICAL SURGICAL 0528/0528-01 Admission (Confirmed)       Admission     Complaint    Small bowel obstruction (H), bowel obstruction      Hospital Account     Name Acct ID Class Status Primary Coverage    Ton Bagley [id number] Inpatient Open MEDICARE - MEDICARE FOR HB SUPPLEMENT            Guarantor Account (for Hospital Account #[id number])     Name Relation to Pt Service Area Active? Acct Type    Ton Bagley  FCS Yes Personal/Family    Address Phone          [address] [place] [address] [phone](H)  none(O)              Coverage Information (for Hospital Account #[id number])     1. MEDICARE/MEDICARE FOR HB SUPPLEMENT     F/O Payor/Plan Precert #    MEDICARE/MEDICARE FOR HB SUPPLEMENT     Subscriber Subscriber #    Ton Bagley [id number]    Address Phone    [employer] CLAIM[employer]  [address]  [place] [address] [phone]          2. HE[employer] PART[employer]ERS/[employer] PLAN     F/O Payor/Plan Precert #    HE[employer] PARTNERS/[employer]ED[employer] PLAN     Subscriber Subscriber #    Ton Bagley [id number]    Address Phone    [address]  [place] [address] [phone]

## 2017-05-13 NOTE — IP AVS SNAPSHOT
` `     [place] [id number] MEDICAL SURGICAL: [phone]            Medication Administration Report for Ton Bagley as of 05/28/17 1351   Legend:    Given Hold Not Given Due Canceled Entry Other Actions    Time Time (Time) Time  Time-Action       Inactive    Active    Linked        Medications 05/22/17 05/23/17 05/24/17 05/25/17 05/26/17 05/27/17 05/28/17    acetaminophen (TYLENOL) tablet 1,000 mg  Dose: 1,000 mg Freq: EVERY 6 HOURS PRN Route: PO  PRN Reasons: mild pain,fever  Start: 05/22/17 1100   Admin Instructions: Use in place of IV if able to take oral  Maximum acetaminophen dose from all sources = 75 mg/kg/day not to exceed 4 gram     1911 (1,000 mg)-Given        0331 (1,000 mg)-Given       0926 (1,000 mg)-Given       1941 (1,000 mg)-Given        1137 (1,000 mg)-Given       1906 (1,000 mg)-Given        0056 (1,000 mg)-Given       0633 (1,000 mg)-Given       1443 (1,000 mg)-Given       2100 (1,000 mg)-Given        0328 (1,000 mg)-Given       0913 (1,000 mg)-Given       1552 (1,000 mg)-Given        0010 (1,000 mg)-Given       0611 (1,000 mg)-Given       2215 (1,000 mg)-Given            albuterol neb solution 2.5 mg  Dose: 2.5 mg Freq: EVERY 2 HOURS PRN Route: NEBULIZATION  PRN Reason: other  PRN Comment: dyspnea  Start: 05/16/17 1428              alum & mag hydroxide-simethicone (MYLANTA ES/MAALOX  ES) suspension 30 mL  Dose: 30 mL Freq: EVERY 4 HOURS PRN Route: PO  PRN Reason: indigestion  Start: 05/18/17 2016   Admin Instructions: Shake well.       0031 (30 mL)-Given       1023 (30 mL)-Given          2252 (30 mL)-Given            artificial saliva (BIOTENE MT) spray 1 spray  Dose: 1 spray Freq: EVERY 6 HOURS PRN Route: MT  PRN Reason: dry mouth  Start: 05/25/17 2257              dextrose 10 % 1,000 mL infusion  Freq: CONTINUOUS PRN Route: IV  PRN Comment:    Start: 05/20/17 0421   Admin Instructions: For Hypoglycemia Prevention for patients on long-acting subcutaneous basal insulin (Glargine, Detemir,  NPH) or continuous insulin infusion. Whenever nutrition support is held or interrupted:  1) Infuse IV D10W at nutrition support rate   2) Notify provider for further instructions               diphenhydrAMINE (BENADRYL) capsule 25 mg  Dose: 25 mg Freq: EVERY 6 HOURS PRN Route: PO  PRN Reason: itching  Start: 05/18/17 1537           0716 (25 mg)-Given       2147 (25 mg)-Given        0333 (25 mg)-Given        2155 (25 mg)-Given        0633 (25 mg)-Given       1642 (25 mg)-Given       2250 (25 mg)-Given        0651 (25 mg)-Given       1720 (25 mg)-Given        0010 (25 mg)-Given       0611 (25 mg)-Given       2215 (25 mg)-Given           Or  diphenhydrAMINE (BENADRYL) injection 25 mg  Dose: 25 mg Freq: EVERY 6 HOURS PRN Route: IV  PRN Reason: itching  Start: 05/18/17 1537    0002 (25 mg)-Given                                                                                                     enoxaparin (LOVENOX) injection 100 mg  Dose: 100 mg Freq: EVERY 12 HOURS Route: SC  Start: 05/23/17 1215   Admin Instructions: Ok to dose per pharmacy      1329 (100 mg)-Given        0045 (100 mg)-Given       1137 (100 mg)-Given        0056 (100 mg)-Given       1147 (100 mg)-Given        0102 (100 mg)-Given       1202 (100 mg)-Given        0011 (100 mg)-Given       1156 (100 mg)-Given       2331 (100 mg)-Given        1349 (100 mg)-Given           ferrous gluconate (FERGON) tablet 324 mg  Dose: 324 mg Freq: DAILY WITH BREAKFAST Route: PO  Start: 05/25/17 1015   Admin Instructions: DO NOT CRUSH. Absorbed best on an empty stomach. If stomach upset occurs, can take with meals.        1403 (324 mg)-Given        0800 (324 mg)-Given        0800 (324 mg)-Given        0823 (324 mg)-Given           glipiZIDE (GLUCOTROL) tablet 5 mg  Dose: 5 mg Freq: 2 TIMES DAILY BEFORE MEALS Route: PO  Start: 05/25/17 1130   Admin Instructions: Take before or with meals.        1403 (5 mg)-Given               0757 (5 mg)-Given       1720 (5 mg)-Given         0745 (5 mg)-Given       1554 (5 mg)-Given        0822 (5 mg)-Given       [ ] 1630           glucose 40 % gel 15-30 g  Dose: 15-30 g Freq: EVERY 15 MIN PRN Route: PO  PRN Reason: low blood sugar  Start: 05/13/17 2301   Admin Instructions: Give 15 g for BG 51 to 69 mg/dL IF patient is conscious and able to swallow. Give 30 g for BG less than or equal to 50 mg/dL IF patient is conscious and able to swallow. Do NOT give glucose gel via enteral tube.  IF patient has enteral tube: give apple juice 120 mL (4 oz or 15 g of CHO) via enteral tube for BG 51 to 69 mg/dL.  Give apple juice 240 mL (8 oz or 30 g of CHO) via enteral tube for BG less than or equal to 50 mg/dL.              Or  dextrose 50 % injection 25-50 mL  Dose: 25-50 mL Freq: EVERY 15 MIN PRN Route: IV  PRN Reason: low blood sugar  Start: 05/13/17 2301   Admin Instructions: Use if have IV access, BG less than 70 mg/dL and meet dose criteria below:  Dose if conscious and alert (or disorientated) and NPO = 25 mL  Dose if unconscious / not alert = 50 mL  Vesicant.              Or  glucagon injection 1 mg  Dose: 1 mg Freq: EVERY 15 MIN PRN Route: SC  PRN Reason: low blood sugar  PRN Comment: May repeat x 1 only  Start: 05/13/17 2301   Admin Instructions: May give SQ or IM. IF BG less than or equal to 50 mg/dL and no IV access.  ONLY use glucagon IF patient has NO IV access AND is UNABLE to swallow.               haloperidol lactate (HALDOL) injection 2 mg  Dose: 2 mg Freq: EVERY 6 HOURS PRN Route: IV/IM  PRN Reason: agitation  Start: 05/25/17 0324       0333 (2 mg)-Given              hydrocortisone (CORTAID) 1 % cream  Freq: 2 TIMES DAILY Route: Top  Start: 05/24/17 2100   Admin Instructions: Apply to heat rash on back       2201 ( )-Given        1116 ( )-Given       2015 ( )-Given        0942 ( )-Given       2025 ( )-Given        0800 ( )-Given       2018 ( )-Given        0917 ( )-Given       [ ] 2100           insulin aspart (NovoLOG) inj (RAPID ACTING)  Dose:  1-5 Units Freq: AT BEDTIME Route: SC  Start: 05/22/17 2200   Admin Instructions: MEDIUM INSULIN RESISTANCE DOSING    Do Not give Bedtime Correction Insulin if BG less than  200.   For  - 249 give 1 units.   For  - 299 give 2 units.   For  - 349 give 3 units.   For  -399 give 4 units.   For BG greater than or equal to 400 give 5 units.  Notify provider if glucose greater than or equal to 350 mg/dL after administration of correction dose.  If given at mealtime, must be administered 5 min before meal or immediately after.     (2149)-Not Given        (2108)-Not Given [C]        (2209)-Not Given [C]        (2107)-Not Given [C]        (2106)-Not Given        (2201)-Not Given [C]        [ ] 2200           insulin aspart (NovoLOG) inj (RAPID ACTING)  Dose: 1-7 Units Freq: 3 TIMES DAILY BEFORE MEALS Route: SC  Start: 05/22/17 0930   Admin Instructions: Correction Scale - MEDIUM INSULIN RESISTANCE DOSING     Do Not give Correction Insulin if Pre-Meal BG less than 140.   For Pre-Meal  - 189 give 1 unit.   For Pre-Meal  - 239 give 2 units.   For Pre-Meal  - 289 give 3 units.   For Pre-Meal  - 339 give 4 units.   For Pre-Meal - 399 give 5 units.   For Pre-Meal -449 give 6 units  For Pre-Meal BG greater than or equal to 450 give 7 units.   To be given with prandial insulin, and based on pre-meal blood glucose.    Notify provider if glucose greater than or equal to 350 mg/dL after administration of correction dose.  If given at mealtime, must be administered 5 min before meal or immediately after.     (1018)-Not Given [C]       (1152)-Not Given [C]       1749 (1 Units)-Given [C]        0947 (1 Units)-Given       1209 (2 Units)-Given       1714 (1 Units)-Given [C]        0834 (1 Units)-Given       1220 (2 Units)-Given [C]       (1725)-Not Given [C]        0843 (2 Units)-Given       1147 (2 Units)-Given [C]       (1822)-Not Given [C]        (0756)-Not Given [C]        (1158)-Not Given [C]       (1715)-Not Given        (0736)-Not Given [C]       1236 (1 Units)-Given [C]       (1717)-Not Given [C]        (0806)-Not Given [C]       1350 (1 Units)-Given       [ ] 1700           ipratropium - albuterol 0.5 mg/2.5 mg/3 mL (DUONEB) neb solution 3 mL  Dose: 3 mL Freq: EVERY 4 HOURS PRN Route: NEBULIZATION  PRN Reasons: wheezing,shortness of breath / dyspnea  Start: 05/24/17 1130              labetalol (NORMODYNE/TRANDATE) injection 10 mg  Dose: 10 mg Freq: EVERY 4 HOURS PRN Route: IV  PRN Reason: high blood pressure  PRN Comment: SBP > 180 or DBP > 105  Start: 05/23/17 2237              levothyroxine (SYNTHROID/LEVOTHROID) tablet 100 mcg  Dose: 100 mcg Freq: EVERY MORNING BEFORE BREAKFAST Route: PO  Start: 05/22/17 0930            0636 (100 mcg)-Given        0651 (100 mcg)-Given        0633 (100 mcg)-Given        0651 (100 mcg)-Given        0611 (100 mcg)-Given        0646 (100 mcg)-Given           magnesium sulfate 4 g in 100 mL sterile water (premade)  Dose: 4 g Freq: EVERY 4 HOURS PRN Route: IV  PRN Reason: magnesium supplementation  Start: 05/13/17 2301   Admin Instructions: For serum Mg++ less than 1.6 mg/dL  Give 4 g and recheck magnesium level 2 hours after dose, and next AM.               metoprolol (LOPRESSOR) tablet 50 mg  Dose: 50 mg Freq: 2 TIMES DAILY Route: PO  Start: 05/23/17 2100   Admin Instructions: Hold for sbp<110 or hr<55      2056 (50 mg)-Given        0837 (50 mg)-Given       2155 (50 mg)-Given        0855 (50 mg)-Given       2018 (50 mg)-Given        0800 (50 mg)-Given       2024 (50 mg)-Given        0800 (50 mg)-Given       2024 (50 mg)-Given        0824 (50 mg)-Given       [ ] 2100           miconazole (MICATIN; MICRO GUARD) 2 % powder  Freq: EVERY 1 HOUR PRN Route: Top  PRN Reason: other  PRN Comment: topical candidiasis  Start: 05/14/17 9248   Admin Instructions: Apply to affected area.                 naloxone (NARCAN) injection 0.1-0.4 mg  Dose: 0.1-0.4 mg  Freq: EVERY 2 MIN PRN Route: IV  PRN Reason: opioid reversal  Start: 05/13/17 2259   Admin Instructions: For respiratory rate LESS than or EQUAL to 8.  Partial reversal dose:  0.1 mg titrated q 2 minutes for Analgesia Side Effects Monitoring Sedation Level of 3 (frequently drowsy, arousable, drifts to sleep during conversation).Full reversal dose:  0.4 mg bolus for Analgesia Side Effects Monitoring Sedation Level of 4 (somnolent, minimal or no response to stimulation).               ondansetron (ZOFRAN-ODT) ODT tab 4 mg  Dose: 4 mg Freq: EVERY 6 HOURS PRN Route: PO  PRN Reason: nausea  Start: 05/13/17 2301   Admin Instructions: This is Step 1 of nausea and vomiting management.  If nausea not resolved in 15 minutes, go to Step 2 prochlorperazine (COMPAZINE). Do not push through foil backing. Peel back foil and gently remove. Place on tongue immediately. Administration with liquid unnecessary     1354 (4 mg)-Given         0030 (4 mg)-Given              Or  ondansetron (ZOFRAN) injection 4 mg  Dose: 4 mg Freq: EVERY 6 HOURS PRN Route: IV  PRN Reasons: nausea,vomiting  Start: 05/13/17 2301   Admin Instructions: This is Step 1 of nausea and vomiting management.  If nausea not resolved in 15 minutes, go to Step 2 prochlorperazine (COMPAZINE).  Irritant.                             opium-belladonna (B&O SUPPRETTES) 30-16.2 MG per suppository 1 suppository  Dose: 30 mg Freq: EVERY 8 HOURS PRN Route: RE  PRN Reasons: moderate pain,bladder spasms  Start: 05/23/17 1154     1329 (1 suppository)-Given         1116 (1 suppository)-Given       1858 (1 suppository)-Given        0328 (1 suppository)-Given        0132 (1 suppository)-Given            pantoprazole (PROTONIX) EC tablet 40 mg  Dose: 40 mg Freq: EVERY MORNING Route: PO  Start: 05/23/17 0900   Admin Instructions: DO NOT CRUSH.      0926 (40 mg)-Given        0837 (40 mg)-Given        0855 (40 mg)-Given        0800 (40 mg)-Given        0800 (40 mg)-Given        0823 (40  mg)-Given           phenol (CHLORASEPTIC) 1.4 % spray 1 mL  Dose: 1 spray Freq: EVERY 1 HOUR PRN Route: MT  PRN Reason: sore throat  Start: 05/14/17 0241              potassium chloride (KLOR-CON) Packet 20-40 mEq  Dose: 20-40 mEq Freq: EVERY 2 HOURS PRN Route: ORAL OR FEED  PRN Reason: potassium supplementation  Start: 05/13/17 2301   Admin Instructions: Use if unable to tolerate tablets.  If Serum K+ 3.0-3.3, dose = 60 mEq po total dose (40 mEq x1 followed in 2 hours by 20 mEq x1). Recheck K+ level 4 hours after dose and the next AM.  If Serum K+ 2.5-2.9, dose = 80 mEq po total dose (40 mEq Q2H x2). Recheck K+ level 4 hours after dose and the next AM.  If Serum K+ less than 2.5, See IV order.  Dissolve packet contents in 4-8 ounces of cold water or juice.               potassium chloride 10 mEq in 100 mL intermittent infusion  Dose: 10 mEq Freq: EVERY 1 HOUR PRN Route: IV  PRN Reason: potassium supplementation  Start: 05/13/17 2301   Admin Instructions: Infuse via PERIPHERAL LINE or CENTRAL LINE. Use for central line replacement if patient weight less than 65 kg, if patient is on TPN with high potassium content or if unit does not stock 20 mEq bags.   If Serum K+ 3.0-3.3, dose = 10 mEq/hr x4 doses (40 mEq IV total dose). Recheck K+ level 2 hours after dose and the next AM.   If Serum K+ less than 3.0, dose = 10 mEq/hr x6 doses (60 mEq IV total dose). Recheck K+ level 2 hours after dose and the next AM.               potassium chloride 10 mEq in 100 mL intermittent infusion with 10 mg lidocaine  Dose: 10 mEq Freq: EVERY 1 HOUR PRN Route: IV  PRN Reason: potassium supplementation  Start: 05/13/17 2301   Admin Instructions: Infuse via PERIPHERAL LINE. Use potassium with lidocaine for pain with peripheral administration.  If Serum K+ 3.0-3.3, dose = 10 mEq/hr x4 doses (40 mEq IV total dose). Recheck K+ level 2 hours after dose and the next AM.  If Serum K+ less than 3.0, dose = 10 mEq/hr x6 doses (60 mEq IV total  dose). Recheck K+ level 2 hours after dose and the next AM.               potassium chloride 20 mEq in 50 mL intermittent infusion  Dose: 20 mEq Freq: EVERY 1 HOUR PRN Route: IV  PRN Reason: potassium supplementation  Start: 05/13/17 2301   Admin Instructions: Infuse via CENTRAL LINE Only. May need EKG if less than 65 kg or on TPN - Max rate is 0.3 mEq/kg/hr for patients not on EKG monitoring.   If Serum K+ 3.0-3.3, dose = 20 mEq/hr x2 doses (40 mEq IV total dose). Recheck K+ level 2 hours after dose and the next AM.  If Serum K+ less than 3.0, dose = 20 mEq/hr x3 doses (60 mEq IV total dose). Recheck K+ level 2 hours after dose and the next AM.               potassium chloride SA (K-DUR/KLOR-CON M) CR tablet 20-40 mEq  Dose: 20-40 mEq Freq: EVERY 2 HOURS PRN Route: PO  PRN Reason: potassium supplementation  Start: 05/13/17 2301   Admin Instructions: Use if able to take PO.   If Serum K+ 3.0-3.3, dose = 60 mEq po total dose (40 mEq x1 followed in 2 hours by 20 mEq x1). Recheck K+ level 4 hours after dose and the next AM.  If Serum K+ 2.5-2.9, dose = 80 mEq po total dose (40 mEq Q2H x2). Recheck K+ level 4 hours after dose and the next AM.  If Serum K+ less than 2.5, See IV order.  DO NOT CRUSH     0849 (40 mEq)-Given [C]       1122 (20 mEq)-Given [C]                 prochlorperazine (COMPAZINE) injection 5 mg  Dose: 5 mg Freq: EVERY 6 HOURS PRN Route: IV  PRN Reasons: nausea,vomiting  Start: 05/13/17 2301   Admin Instructions: This is Step 2 of nausea and vomiting management.   If nausea not resolved in 15 minutes, give metoclopramide (REGLAN) if ordered (step 3 of nausea and vomiting management)              Or  prochlorperazine (COMPAZINE) tablet 5 mg  Dose: 5 mg Freq: EVERY 6 HOURS PRN Route: PO  PRN Reason: vomiting  Start: 05/13/17 2301   Admin Instructions: This is Step 2 of nausea and vomiting management.   If nausea not resolved in 15 minutes, give metoclopramide (REGLAN) if ordered (step 3 of nausea and  vomiting management)              Or  prochlorperazine (COMPAZINE) Suppository 12.5 mg  Dose: 12.5 mg Freq: EVERY 12 HOURS PRN Route: RE  PRN Reasons: nausea,vomiting  Start: 05/13/17 2301   Admin Instructions: This is Step 2 of nausea and vomiting management.   If nausea not resolved in 15 minutes, give metoclopramide (REGLAN) if ordered (step 3 of nausea and vomiting management)               simethicone (MYLICON) chewable tablet 160 mg  Dose: 160 mg Freq: 3 TIMES DAILY Route: PO  Start: 05/27/17 1115         1156 (160 mg)-Given       1554 (160 mg)-Given       2202 (160 mg)-Given        0822 (160 mg)-Given       [ ] 1600       [ ] 2200           simvastatin (ZOCOR) tablet 10 mg  Dose: 10 mg Freq: AT BEDTIME Route: PO  Start: 05/22/17 2200    2147 (10 mg)-Given        2107 (10 mg)-Given        2155 (10 mg)-Given        2100 (10 mg)-Given        2105 (10 mg)-Given        2202 (10 mg)-Given        [ ] 2200           sodium chloride (PF) 0.9% PF flush 3 mL  Dose: 3 mL Freq: EVERY 8 HOURS Route: IK  Start: 05/13/17 1827   Admin Instructions: And Q1H PRN, to lock peripheral IV dormant line.     (0706)-Not Given       (1706)-Not Given        (0029)-Not Given       0926 (3 mL)-Given       (1614)-Not Given       (2332)-Not Given        (0835)-Not Given       (1626)-Not Given        (0035)-Not Given       0856 (3 mL)-Given       1618 (3 mL)-Given        0102 (3 mL)-Given       0757 (3 mL)-Given       1552 (3 mL)-Given        0011 (3 mL)-Given       0746 (3 mL)-Given       1554 (3 mL)-Given       2331 (3 mL)-Given        0825 (3 mL)-Given       [ ] 1600           sodium chloride (PF) 0.9% PF flush 3 mL  Dose: 3 mL Freq: EVERY 1 HOUR PRN Route: IK  PRN Reason: line flush  PRN Comment: for peripheral IV flush post IV meds  Start: 05/13/17 1825              sodium phosphate 15 mmol in D5W intermittent infusion  Dose: 15 mmol Freq: DAILY PRN Route: IV  PRN Reason: phosphorous supplementation  Last Dose: 15 mmol (05/20/17  1934)  Start: 05/20/17 1716   Admin Instructions: For serum phosphorus level 2-2.4  Do not infuse Phosphorus in the same line as TPN.   Give 15 mmol and recheck phosphorus level next AM.     1200 (15 mmol)-New Bag                 sodium phosphate 20 mmol in D5W intermittent infusion  Dose: 20 mmol Freq: EVERY 6 HOURS PRN Route: IV  PRN Reason: phosphorous supplementation  Start: 05/20/17 1716   Admin Instructions: For serum phosphorus level 1.1-1.9  Do not infuse Phosphorus in the same line as TPN.   Give 20 mmol and recheck phosphorus level 2 hours after last dose and next AM.               sodium phosphate 25 mmol in D5W intermittent infusion  Dose: 25 mmol Freq: EVERY 8 HOURS PRN Route: IV  PRN Reason: phosphorous supplementation  Start: 05/20/17 1716   Admin Instructions: For serum phosphorus level less than 1.1  Do not infuse Phosphorus in the same line as TPN.   Give 25 mmol and recheck phosphorus level 2 hours after last dose and next AM.               tamsulosin (FLOMAX) capsule 0.4 mg  Dose: 0.4 mg Freq: EVERY EVENING Route: PO  Start: 05/22/17 2000   Admin Instructions: Administer 30 minutes after the same meal each day.  Capsules should be swallowed whole; do not crush chew or open.     1911 (0.4 mg)-Given        1941 (0.4 mg)-Given        1906 (0.4 mg)-Given        2018 (0.4 mg)-Given        2024 (0.4 mg)-Given        2019 (0.4 mg)-Given        [ ] 2000           Warfarin Therapy Reminder (Check START DATE - warfarin may be starting in the FUTURE)  Freq: CONTINUOUS PRN Route: XX  Start: 05/22/17 0918   Admin Instructions: *Note to reorder warfarin daily*  Pharmacy Warfarin Dosing Service  Patient is on Warfarin Therapy - check for daily order              Completed Medications  Medications 05/22/17 05/23/17 05/24/17 05/25/17 05/26/17 05/27/17 05/28/17         Dose: 6 mg Freq: ONCE AT 6PM Route: PO  Start: 05/27/17 1800   End: 05/27/17 1819         1819 (6 mg)-Given              Dose: 6 mg Freq: ONCE AT  6PM Route: PO  Start: 05/26/17 1800   End: 05/26/17 1720        1720 (6 mg)-Given               Dose: 6 mg Freq: ONCE AT 6PM Route: PO  Start: 05/25/17 1800   End: 05/25/17 1832       1832 (6 mg)-Given             Discontinued Medications  Medications 05/22/17 05/23/17 05/24/17 05/25/17 05/26/17 05/27/17 05/28/17         Dose: 40 mg Freq: 2 TIMES DAILY. Route: IV  Start: 05/24/17 1000   End: 05/26/17 0805      1007 (40 mg)-Given       1636 (40 mg)-Given        0855 (40 mg)-Given       1624 (40 mg)-Given        0753 (40 mg)-Given       0805-Med Discontinued           Dose: 40 mg Freq: DAILY Route: PO  Start: 05/26/17 0900   End: 05/27/17 0728        (0811)-Not Given [C]        0728-Med Discontinued          Dose: 60 mg Freq: EVERY 24 HOURS Route: PO  Start: 05/27/17 1500   End: 05/28/17 0708         1505 (60 mg)-Given        0708-Med Discontinued         Dose: 60 mg Freq: DAILY Route: PO  Start: 05/27/17 0900   End: 05/28/17 0708         0933 (60 mg)-Given        0708-Med Discontinued

## 2017-05-13 NOTE — ED NOTES
Pt c/o lower right quadrant abdominal pain that radiates across abdomen and into back and up into chest. Pt alert, has history of dementia. Denies SOB. ABCs intact.

## 2017-05-13 NOTE — IP AVS SNAPSHOT
"[place] [address] MEDICAL SURGICAL: [phone]                                              INTERAGENCY TRANSFER FORM - PHYSICIAN ORDERS   2017                    Hospital Admission Date: 2017  BRIANA HERNÁNDEZ   : 1927  Sex: Male        Attending Provider: (none)    Allergies:  No Known Allergies    Infection:  None   Service:  GENERAL MEDI    Ht:  1.702 m (5' 7.01\")   Wt:  99.3 kg (218 lb 14.4 oz)   Admission Wt:  104.3 kg (230 lb)    BMI:  34.28 kg/m 2   BSA:  2.17 m 2            Patient PCP Information     Provider PCP Type    [facility]      ED Clinical Impression     Diagnosis Description Comment Added By Time Added    Small bowel obstruction (H) [K56.69] Small bowel obstruction (H) [K56.69]  Mariia Navarrete MD 2017  9:16 PM    Hyponatremia [E87.1] Hyponatremia [E87.1]  Mariia Navarrete MD 2017  3:53 PM      Hospital Problems as of 2017              Priority Class Noted POA    Small bowel obstruction (H) Medium  2017 Yes      Non-Hospital Problems as of 2017              Priority Class Noted    Cortical senile cataract Medium  2009    Excess skin of eyelid Medium  2009    HTN (hypertension)   Unknown    CAD (coronary artery disease)   Unknown    Atrial fibrillation (H)   Unknown    Hyperlipidemia   Unknown    S/P CABG (coronary artery bypass graft)   10/23/2014      Code Status History     Date Active Date Inactive Code Status Order ID Comments User Context    2017  8:20 AM  DNR/DNI [id number]  Nirmal Serrano MD Outpatient    2017 11:04 PM 2017  8:20 AM DNR/DNI [id number]  Micki Ackerman PA-C Inpatient         Medication Review      START taking        Dose / Directions Comments    acetaminophen 500 MG tablet   Commonly known as:  TYLENOL   Used for:  Abdominal pain, generalized        Dose:  1000 mg   Take 2 tablets (1,000 mg) by mouth every 6 hours as needed for mild pain or fever "   Refills:  0        ferrous gluconate 324 (38 FE) MG tablet   Commonly known as:  FERGON   Used for:  Iron deficiency anemia, unspecified iron deficiency anemia type        Dose:  324 mg   Take 1 tablet (324 mg) by mouth daily (with breakfast)   Quantity:  100 tablet   Refills:  0        hydrocortisone 1 % cream   Commonly known as:  CORTAID   Used for:  Eczema, unspecified type        Apply to rash on back twice daily until resolved   Refills:  0        metoprolol 50 MG tablet   Commonly known as:  LOPRESSOR   Used for:  Essential hypertension        Dose:  50 mg   Take 1 tablet (50 mg) by mouth 2 times daily   Quantity:  60 tablet   Refills:  0        simethicone 80 MG chewable tablet   Commonly known as:  MYLICON   Used for:  Abdominal pain, generalized        Dose:  160 mg   Take 2 tablets (160 mg) by mouth every 6 hours as needed for flatulence or cramping   Quantity:  180 tablet   Refills:  0          CONTINUE these medications which may have CHANGED, or have new prescriptions. If we are uncertain of the size of tablets/capsules you have at home, strength may be listed as something that might have changed.        Dose / Directions Comments    camphor-menthol 0.5-0.5 % Lotn   Commonly known as:  DERMASARRA   This may have changed:    - how to take this  - when to take this  - additional instructions   Used for:  Venous stasis        To affected areas on both legs Apply topically daily To affected areas on both legs   Refills:  0        glipiZIDE 5 MG tablet   Commonly known as:  GLUCOTROL   This may have changed:  how much to take   Used for:  Type 2 diabetes mellitus with complication, without long-term current use of insulin (H)        Dose:  5 mg   Take 1 tablet (5 mg) by mouth 2 times daily (before meals)   Quantity:  30 tablet   Refills:  0        lisinopril 5 MG tablet   Commonly known as:  PRINIVIL/ZESTRIL   This may have changed:    - medication strength  - how much to take   Used for:  Essential  hypertension        Dose:  5 mg   Take 1 tablet (5 mg) by mouth daily   Refills:  0        nitroglycerin 0.4 MG sublingual tablet   Commonly known as:  NITROSTAT   This may have changed:  how much to take   Used for:  Coronary artery disease involving native heart without angina pectoris, unspecified vessel or lesion type        Dose:  0.4 mg   Place 1 tablet (0.4 mg) under the tongue every 5 minutes as needed for chest pain   Quantity:  25 tablet   Refills:  0        polyethylene glycol powder   Commonly known as:  MIRALAX/GLYCOLAX   This may have changed:    - when to take this  - reasons to take this   Used for:  Constipation, unspecified constipation type        Dose:  1 capful   Take 17 g (1 capful) by mouth daily as needed for constipation   Quantity:  119 g   Refills:  0        warfarin 4 MG tablet   Commonly known as:  COUMADIN   This may have changed:    - how much to take  - how to take this  - when to take this  - additional instructions   Used for:  Atrial fibrillation, unspecified type (H)        Alternate 4mg and 6mg daily starting with 4mg tonight, then 6mg tomorrow night   Quantity:  30 tablet   Refills:  0          CONTINUE these medications which have NOT CHANGED        Dose / Directions Comments    Calcium Carb-Cholecalciferol 500-200 MG-UNIT Tabs   Commonly known as:  CALCIUM 500+D   Used for:  Vitamin D deficiency        Dose:  1 tablet   Take 1 tablet by mouth 2 times daily   Refills:  0        * furosemide 20 MG tablet   Commonly known as:  LASIX   Used for:  Lymphedema        Dose:  60 mg   Take 3 tablets (60 mg) by mouth every morning   Quantity:  30 tablet   Refills:  0        * furosemide 20 MG tablet   Commonly known as:  LASIX   Used for:  Lymphedema        Dose:  30 mg   Take 1.5 tablets (30 mg) by mouth daily (with lunch)   Quantity:  30 tablet   Refills:  0        gabapentin 100 MG capsule   Commonly known as:  NEURONTIN   Used for:  Type 2 diabetes mellitus with complication, without  long-term current use of insulin (H)        Dose:  200 mg   Take 2 capsules (200 mg) by mouth At Bedtime   Quantity:  90 capsule   Refills:  0        levothyroxine 100 MCG tablet   Commonly known as:  SYNTHROID/LEVOTHROID   Used for:  Hypothyroidism, unspecified type        Dose:  100 mcg   Take 1 tablet (100 mcg) by mouth daily   Quantity:  30 tablet   Refills:  0        multivitamin Tabs per tablet   Used for:  Small bowel obstruction (H)        Dose:  1 tablet   Take 1 tablet by mouth daily   Quantity:  30 tablet   Refills:  0        omeprazole 20 MG CR capsule   Commonly known as:  priLOSEC   Used for:  Gastroesophageal reflux disease without esophagitis        Dose:  20 mg   Take 1 capsule (20 mg) by mouth daily   Quantity:  30 capsule   Refills:  0        simvastatin 10 MG tablet   Commonly known as:  ZOCOR   Used for:  Coronary artery disease involving native heart without angina pectoris, unspecified vessel or lesion type        Dose:  10 mg   Take 1 tablet (10 mg) by mouth At Bedtime   Quantity:  30 tablet   Refills:  0        tamsulosin 0.4 MG capsule   Commonly known as:  FLOMAX   Used for:  Urinary retention        Dose:  0.4 mg   Take 1 capsule (0.4 mg) by mouth every evening   Quantity:  60 capsule   Refills:  0        * Notice:  This list has 2 medication(s) that are the same as other medications prescribed for you. Read the directions carefully, and ask your doctor or other care provider to review them with you.      STOP taking     atenolol 50 MG tablet   Commonly known as:  TENORMIN           senna-docusate 8.6-50 MG per tablet   Commonly known as:  SENOKOT-S;PERICOLACE                     Further instructions from your care team       HOME CARE FOLLOWING ABDOMINAL SURGERY  LISANDRO Goode E. Gavin, N. Guttormson, D. Maurer, R. O Donnell, L. Thomas   Special instructions for Ton Bagley:  --no follow up required.  Call [phone] if questions/concerns.         INCISIONAL  CARE:  Replace the bandage over your incision (or incisions) until all drainage stops, or if more comfortable to have in place.  If present, leave the steri-strips (white paper tapes) in place for 14 days after surgery.  If you have staples in your incision at the time of discharge, they will be removed at your follow-up appointment.  If Dermabond (a type of skin glue) is present, leave in place until it wears/flakes off.     BATHING:  Avoid baths for 1 week after surgery.  Showers are okay.  You may wash your hair at any time.  Gently pat your incision dry after bathing.    ACTIVITY:  Light Activity -- you may immediately be up and about as tolerated.  Driving -- you may drive when comfortable and off narcotic pain medications.  Light Work -- resume when comfortable off pain medications.  (If you can drive, you probably can work.)  Strenuous Work/Activity -- limit lifting to 20 pounds for 4 weeks.  Then, progressively increase with time.  Active Sports (running, biking, etc.) -- cautiously resume after 6 weeks.    DISCOMFORT:  Use pain medications as prescribed by your surgeon.  Take the pain medication with some food, when possible, to minimize side effects.  Expect gradual improvement.    DIET:  Return to diet you were on before surgery, unless you are given specific diet instructions.  Drink plenty of fluids.  While taking pain medications, increase dietary fiber or add a fiber supplementation like Metamucil or Citrucel to help prevent constipation - a possible side effect of pain medications.    NAUSEA:  If nauseated from the anesthetic/pain meds; rest in bed, get up cautiously with assistance, and drink clear liquids (juice, tea, broth).       CONTACT US IF THE FOLLOWING DEVELOPS:   1. A fever that is above 101     2. If there is a large amount of drainage, bleeding, or swelling.   3. Severe pain that is not relieved by your prescription.   4. Drainage that is thick, cloudy, yellow, green or white.   5. Any  other questions not answered by  Frequently Asked Questions  sheet.      FREQUENTLY ASKED QUESTIONS:    Q:  How should my incision look?    A:  Normally your incision will appear slightly swollen with light redness directly along the incision itself as it heals.  It may feel like a bump or ridge as the healing/scarring happens, and over time (3-4 months) this bump or ridge feeling should slowly go away.  In general, clear or pink watery drainage can be normal at first as your incision heals, but should decrease over time.    Q:  How do I know if my incision is infected?  A:  Look at your incision for signs of infection, like redness around the incision spreading to surrounding skin, or drainage of cloudy or foul-smelling drainage.  If you feel warm, check your temperature to see if you are running a fever.    **If any of these things occur, please notify the nurse at our office.  We may need you to come into the office for an incision check.      Q:  How do I take care of my incision?  A:  If you have a dressing in place - Starting the day after surgery, replace the dressing 1-2 times a day until there is no further drainage from the incision.  At that time, a dressing is no longer needed.  Try to minimize tape on the skin if irritation is occurring at the tape sites.  If you have significant irritation from tape on the skin, please call the office to discuss other method of dressing your incision.    Small pieces of tape called  steri-strips  may be present directly overlying your incision; these may be removed 10 days after surgery unless otherwise specified by your surgeon.  If these tapes start to loosen at the ends, you may trim them back until they fall off or are removed.    A:  If you had  Dermabond  tissue glue used as a dressing (this causes your incision to look shiny with a clear covering over it) - This type of dressing wears off with time and does not require more dressings over the top unless it is  draining around the glue as it wears off.  Do not apply ointments or lotions over the incisions until the glue has completely worn off.    Q:  There is a piece of tape or a sticky  lead  still on my skin.  Can I remove this?  A:  Sometimes the sticky  leads  used for monitoring during surgery or for evaluation in the emergency department are not all removed while you are in the hospital.  These sometimes have a tab or metal dot on them.  You can easily remove these on your own, like taking off a band-aid.  If there is a gel substance under the  lead , simply wipe/clean it off with a washcloth or paper towel.      Q:  What can I do to minimize constipation (very hard stools, or lack of stools)?  A:  Stay well hydrated.  Increase your dietary fiber intake or take a fiber supplement -with plenty of water.  Walk around frequently.  You may consider an over-the-counter stool-softener.  Your Pharmacist can assist you with choosing one that is stocked at your pharmacy.  Constipation is also one of the most common side effects of pain medication.  If you are using pain medication, be pro-active and try to PREVENT problems with constipation by taking the steps above BEFORE constipation becomes a problem.    Q:  What do I do if I need more pain medications?  A:  Call the office to receive refills.  Be aware that certain pain meds cannot be called into a pharmacy and actually require a paper prescription.  A change may be made in your pain med as you progress thru your recovery period or if you have side effects to certain meds.    --Pain meds are NOT refilled after 5pm on weekdays, and NOT AT ALL on the weekends, so please look ahead to prevent problems.      Q:  Why am I having a hard time sleeping now that I am at home?  A:  Many medications you receive while you are in the hospital can impact your sleep for a number of days after your surgery/hospitalization.  Decreased level of activity and naps during the day may also  make sleeping at night difficult.  Try to minimize day-time naps, and get up frequently during the day to walk around your home during your recovery time.  Sleep aides may be of some help, but are not recommended for long-term use.      Q:  I am having some back discomfort.  What should I do?  A:  This may be related to certain positioning that was required for your surgery, extended periods of time in bed, or other changes in your overall activity level.  You may try ice, heat, acetaminophen, or ibuprofen to treat this temporarily.  Note that many pain medications have acetaminophen in them and would state this on the prescription bottle.  Be sure not to exceed the maximum of 4000mg per day of acetaminophen.     **If the pain you are having does not resolve, is severe, or is a flare of back pain you have had on other occasions prior to surgery, please contact your primary physician for further recommendations or for an appointment to be examined at their office.    Q:  Why am I having headaches?  A:  Headaches can be caused by many things:  caffeine withdrawal, use of pain meds, dehydration, high blood pressure, lack of sleep, over-activity/exhaustion, flare-up of usual migraine headaches.  If you feel this is related to muscle tension (a band-like feeling around the head, or a pressure at the low-back of the head) you may try ice or heat to this area.  You may need to drink more fluids (try electrolyte drink like Gatorade), rest, or take your usual migraine medications.   **If your headaches do not resolve, worsen, are accompanied by other symptoms, or if your blood pressure is high, please call your primary physician for recommendation and/or examination.    Q:  I am unable to urinate.  What do I do?  A:  A small percentage of people can have difficulty urinating initially after surgery.  This includes being able to urinate only a very small amount at a time and feeling discomfort or pressure in the very low  abdomen.  This is called  urinary retention , and is actually an urgent situation.  Proceed to your nearest Emergency department for evaluation (not an Urgent Care Center).  Sometimes the bladder does not work correctly after certain medications you receive during surgery, or related to certain procedures.  You may need to have a catheter placed until your bladder recovers.  When planning to go to an Emergency department, it may help to call the ER to let them know you are coming in for this problem after a surgery.  This may help you get in quicker to be evaluated.  **If you have symptoms of a urinary tract infection, please contact your primary physician for the proper evaluation and treatment.          If you have other questions, please call the office Monday thru Friday between 8am and 5pm to discuss with the nurse or physician assistant.  #[phone]    There is a surgeon ON CALL on weekday evenings and over the weekend in case of urgent need only, and may be contacted at the same number.    If you are having an emergency, call 911 or proceed to your nearest emergency department.        Summary of Visit     Reason for your hospital stay       You were hospitalized for a bowel obstruction and had an exploratory laparotomy.  No bowel needed to be removed.  After this you had an ileus where the gut was slow to move.  This has since resolved and you are tolerating a low fiber diet.  We also worked on fluid removal from your legs with lasix.  You will need on going PT and OT at the rehab facility.             After Care     Activity - Up with nursing assistance           Advance Diet as Tolerated       Follow this diet upon discharge: Orders Placed This Encounter      Snacks/Supplements Adult: Ensure Plus (Adult); Between Meals      Room Service      Snacks/Supplements Adult: Magic Cup; With Meals      Low Fiber Diet       Bladder scan       Bid prn if not able to void       Daily weights       Call Provider  for weight gain of more than 2 pounds per day or 5 pounds per week.       Fall precautions           General info for SNF       Length of Stay Estimate: Short Term Care: Estimated # of Days 31-90  Condition at Discharge: Stable  Level of care:skilled   Rehabilitation Potential: Fair  Admission H&P remains valid and up-to-date: Yes  Recent Chemotherapy: N/A  Use Nursing Home Standing Orders: Yes       Glucose monitor nursing POCT       Before meals and at bedtime       Incentive spirometry nursing       Continue incentive spirometry at TCU for atelectasis with goal to wean O2 to off       Intake and output       Every shift       Mantoux instructions       Give two-step Mantoux (PPD) Per Facility Policy Yes             Procedures     Oxygen - Nasal cannula       1-2 Lpm by nasal cannula to keep O2 sats 92% or greater.  Wean as able.             Referrals     Nutrition Services Adult IP Consult       Reason:  Recent SBO s/p ex lap.  Was on PPN after this, but now low fiber diet.  Monitor caloric intake to make sure meeting needs       Occupational Therapy Adult Consult       Evaluate and treat as clinically indicated.    Reason:  Deconditioning and dementia       Physical Therapy Adult Consult       Evaluate and treat as clinically indicated.    Reason:  Deconditioning and dementia             Follow-Up Appointment Instructions     Future Labs/Procedures    Follow Up and recommended labs and tests     Comments:    Follow up with assisted physician.  The following labs/tests are recommended: INR and a bmp in 2 days.      Follow-Up Appointment Instructions     Follow Up and recommended labs and tests       Follow up with assisted physician.  The following labs/tests are recommended: INR and a bmp in 2 days.             Statement of Approval     Ordered          05/28/17 0820  I have reviewed and agree with all the recommendations and orders detailed in this document.  EFFECTIVE NOW     Approved and  electronically signed by:  Nirmal Serrano MD

## 2017-05-13 NOTE — ED PROVIDER NOTES
History     Chief Complaint:  Abdominal pain    HPI   Ton Bagley is a 89 year old male with a complex medical history including atrial fibrillation on coumadin who presents with abdominal pain. The patient reports 3 days ago he developed upper abdominal pain which worsened acutely today. The pain seems to come and go, and is cramping. The patient feels his pain is similar to the pain he had prior to his cholecystectomy. Appendix still present. The patient denies nausea, vomiting, fever, or any associated symptoms. The patient believes he has been having normal BMs.    Allergies:  The patient has no known allergies to medications.      Medications:    Gabapentin (neurontin) 100 mg capsule  Memantine xr (namenda xr) 28 mg capsule  Donepezil (aricept) 10 mg tablet  Levothyroxine (synthroid,levothroid) 100 mcg tablet  Furosemide (lasix) 20 mg tablet  Aspirin 81 mg tablet  Tamsulosin (flomax) 0.4 mg 24 hr capsule  Glipizide (glucotrol) 5 mg tablet  Alendronate (fosamax) 70 mg tablet  Calcium carb-cholecalciferol (calcium 600 + d po)  Omega-3 fatty acids (omega-3 fish oil po)  Multiple vitamins-minerals (centrum silver adult 50+ po)  Nitroglycerin (nitrostat) 0.4 mg sl tablet  Ascorbic acid (vitamin c) 500 mg tablet  Warfarin (coumadin) 2 mg tablet  Atenolol (tenormin) 50 mg tablet  Lisinopril (prinivil,zestril) 20 mg tablet  Hydrochlorothiazide (hydrodiuril) 25 mg tablet  Simvastatin (zocor) 20 mg tablet    Past Medical History:    Atrial fibrillation (H)   CAD (coronary artery disease)   HTN (hypertension)   Hyperlipidemia     Past Surgical History:    Bypass, MONTALVO to LAD  Stent, SVG ro TCA and OM2  Cholecystectomy  Tonsillectomy    Family History:    The patient has no pertinent family history.,    Social History:  .  The patient is a former smoker, quit 1973.  The patient consumes alcohol, wine daily.     Review of Systems   Gastrointestinal: Positive for abdominal pain. Negative for nausea and  vomiting.   All other systems reviewed and are negative.    Physical Exam   First Vitals:  BP: 150/80  Pulse: 92  Heart Rate: 92  Temp: 97.8  F (36.6  C)  Resp: 16  SpO2: 94 %    Physical Exam   Gen: Pleasant, appears stated age.    Eye:   Pupils are equal, round, and reactive.     Sclera non-injected.    ENT:   Moist mucus membranes.     Normal tongue.    Oropharynx without lesions.    Cardiac:     Irregularly irregular, systolic ejection murmur 2/6   No gallops, or rubs.    Pulmonary:     Clear to auscultation bilaterally.    No wheezes, rales, or rhonchi.    Abdomen:     Normal active bowel sounds.     Abdomen is soft and distended.   Right upper quadrant and suprapubic tenderness    :    Penis: no lesions, no pus from urethral meatus   Scrotum: No hernia. No lesions. No scrotal tenderness to palpation.  Normal testicular lie. No erythema.    Lower abdomen: No hernias noted.     Musculoskeletal:     Normal movement of all extremities without evidence for deficit. Legs wrapped.    Extremities:    No edema.    Skin:   Warm and dry.    Neurologic:    Non-focal exam without asymmetric weakness or numbness.    Normal tone    Psychiatric:     Normal affect with appropriate interaction with examiner.      Emergency Department Course   ECG:  Completed at 1827.  Read at 1837.   Rate 87 bpm. MS interval *. QRS duration 88. QT/QTc 378/452. P-R-T axes * -4 108. atrial fibrillation, nonspecific ST and & wave abnormality, abnormal ECG     Imaging:  Radiographic findings were communicated with the patient who voiced understanding of the findings.    CT Abdomen Pelvis w Contrast:  1.  Distended small bowel consistent with obstruction, several  centimeters of decompressed distal and terminal ileum. The ascending  colon is also fluid-filled with gas filled transverse colon. Question  of closed loop obstruction is raised. Discussed with Dr. Pepper.  2. Indeterminate 2.2 cm oval hypodense lesion in the liver just  cephalad to the  cholecystectomy clips.  3. Small free fluid. Mesenteric stranding and fluid adjacent to the  distended bowel loops.  Results per radiology.     Laboratory:  CBC: HGB 10.5 (L), ow wnl (WBC 7.0, )  CMP:  (L), CL 93 (L),  (H), BUN 36 (H), Creat 1.54 (H), ow wnl   Lipase: 358  Lactic acid: 3.0 (H)   2052 Lactic acid: 2.3 (H)   INR: 2.53 (H)     Interventions:  lactated ringers BOLUS 1,000 mL (2,000 mLs Intravenous New Bag 5/13/17 2119)   piperacillin-tazobactam (ZOSYN) infusion 3.375 g (3.375 g Intravenous New Bag 5/13/17 2118)   phytonadione (AQUA-MEPHYTON) 5 mg in NaCl 0.9 % 50 mL intermittent infusion (not administered)   0.9% sodium chloride BOLUS (500 mLs Intravenous Stopped 5/13/17 2048)   morphine (PF) injection 4 mg (4 mg Intravenous Given 5/13/17 2008)      Emergency Department Course:  Nursing notes and vitals reviewed.  I performed an exam of the patient as documented above.     The work up above was undertaken.     The patient received the intervention(s) above.     2052: discussed the imaging with Dr. Franz of radiology who has concerns for closed loop bowel obstruction.     2058: Updated patient on result.  He appears comfortable, in no acute distress.    2117: Discussed the case with Dr. Keith of general surgery, who reviewed the CT.  He does not think the images or clinical presentation seem consistent with SBO.  He does recommend reversing the INR with vitamin K tonight.  Given patient seems comfortable, we agree that medical management for tonight is reasonable.     Findings and plan explained to the Patient who consents to admission. Discussed the patient with KIRA Clay, who will admit the patient for further monitoring, evaluation, and treatment.l    Impression & Plan    Medical Decision Making:  Ton Bagley is a 89 year old male with a history of atrial fibrillation currently on coumadin as well as previous cholecystectomy who presents today with abdominal  pain. On exam, the patient has a soft abdomen without obvious hernias. Labs were notable for mild hyponatremia which is new. He also has an elevated lactate. This is improved with fluids. He is appropriately anticoagulated. UA is negative for signs of infection. CT demonstrates multiple distended fluid filled loops of bowel. I reviewed findings both with the radiology and Dr. Keith. Dr. Keith is less convinced that the images demonstrate a closed loop bowel obstruction. Overall, the patient's clinical status is not consistent with this either. He has fairly mild pain and no peritoneal sign. He is making jokes in the room. At this point, we plan to treat him medically at least throughout the night. He would be a high risk surgical candidate given age, co morbidities, and anticoagulation. Family and patient were updated and agree with this plan.     Diagnosis:    ICD-10-CM    1. Small bowel obstruction (H) K56.69        Disposition:  Admitted.    Corey HEATON, am serving as a scribe at 8:41 PM on 5/13/2017 to document services personally performed by Dr. Navarrete, based on my observations and the provider's statements to me.    [facility] EMERGENCY DEPARTMENT       Mariia Navarrete MD  05/14/17 0040

## 2017-05-14 ENCOUNTER — ANESTHESIA EVENT (OUTPATIENT)
Dept: SURGERY | Facility: CLINIC | Age: 82
DRG: 357 | End: 2017-05-14
Payer: MEDICARE

## 2017-05-14 ENCOUNTER — ANESTHESIA (OUTPATIENT)
Dept: SURGERY | Facility: CLINIC | Age: 82
DRG: 357 | End: 2017-05-14
Payer: MEDICARE

## 2017-05-14 ENCOUNTER — APPOINTMENT (OUTPATIENT)
Dept: GENERAL RADIOLOGY | Facility: CLINIC | Age: 82
DRG: 357 | End: 2017-05-14
Attending: SURGERY
Payer: MEDICARE

## 2017-05-14 LAB
ANION GAP SERPL CALCULATED.3IONS-SCNC: 6 MMOL/L (ref 3–14)
BACTERIA SPEC CULT: NORMAL
BUN SERPL-MCNC: 33 MG/DL (ref 7–30)
CALCIUM SERPL-MCNC: 8.8 MG/DL (ref 8.5–10.1)
CHLORIDE SERPL-SCNC: 96 MMOL/L (ref 94–109)
CO2 SERPL-SCNC: 31 MMOL/L (ref 20–32)
CREAT SERPL-MCNC: 1.67 MG/DL (ref 0.66–1.25)
ERYTHROCYTE [DISTWIDTH] IN BLOOD BY AUTOMATED COUNT: 17.2 % (ref 10–15)
GFR SERPL CREATININE-BSD FRML MDRD: 39 ML/MIN/1.7M2
GLUCOSE BLDC GLUCOMTR-MCNC: 121 MG/DL (ref 70–99)
GLUCOSE BLDC GLUCOMTR-MCNC: 130 MG/DL (ref 70–99)
GLUCOSE BLDC GLUCOMTR-MCNC: 153 MG/DL (ref 70–99)
GLUCOSE BLDC GLUCOMTR-MCNC: 171 MG/DL (ref 70–99)
GLUCOSE BLDC GLUCOMTR-MCNC: 177 MG/DL (ref 70–99)
GLUCOSE SERPL-MCNC: 161 MG/DL (ref 70–99)
GRAM STN SPEC: NORMAL
HCT VFR BLD AUTO: 35.8 % (ref 40–53)
HGB BLD-MCNC: 10.3 G/DL (ref 13.3–17.7)
INR PPP: 1.63 (ref 0.86–1.14)
INTERPRETATION ECG - MUSE: NORMAL
LACTATE BLD-SCNC: 1.8 MMOL/L (ref 0.7–2.1)
Lab: NORMAL
MCH RBC QN AUTO: 23.5 PG (ref 26.5–33)
MCHC RBC AUTO-ENTMCNC: 28.8 G/DL (ref 31.5–36.5)
MCV RBC AUTO: 82 FL (ref 78–100)
MICRO REPORT STATUS: NORMAL
MICRO REPORT STATUS: NORMAL
PLATELET # BLD AUTO: 317 10E9/L (ref 150–450)
POTASSIUM SERPL-SCNC: 3.5 MMOL/L (ref 3.4–5.3)
RBC # BLD AUTO: 4.39 10E12/L (ref 4.4–5.9)
SODIUM SERPL-SCNC: 133 MMOL/L (ref 133–144)
SPECIMEN SOURCE: NORMAL
SPECIMEN SOURCE: NORMAL
WBC # BLD AUTO: 9.3 10E9/L (ref 4–11)

## 2017-05-14 PROCEDURE — 40000275 ZZH STATISTIC RCP TIME EA 10 MIN

## 2017-05-14 PROCEDURE — 49000 EXPLORATION OF ABDOMEN: CPT | Mod: AS | Performed by: PHYSICIAN ASSISTANT

## 2017-05-14 PROCEDURE — 25000566 ZZH SEVOFLURANE, EA 15 MIN: Performed by: SURGERY

## 2017-05-14 PROCEDURE — 25800025 ZZH RX 258: Performed by: ANESTHESIOLOGY

## 2017-05-14 PROCEDURE — 40000306 ZZH STATISTIC PRE PROC ASSESS II: Performed by: SURGERY

## 2017-05-14 PROCEDURE — 99221 1ST HOSP IP/OBS SF/LOW 40: CPT | Mod: 57 | Performed by: SURGERY

## 2017-05-14 PROCEDURE — 25000128 H RX IP 250 OP 636: Performed by: INTERNAL MEDICINE

## 2017-05-14 PROCEDURE — 85027 COMPLETE CBC AUTOMATED: CPT | Performed by: PHYSICIAN ASSISTANT

## 2017-05-14 PROCEDURE — 99232 SBSQ HOSP IP/OBS MODERATE 35: CPT | Performed by: INTERNAL MEDICINE

## 2017-05-14 PROCEDURE — 25800025 ZZH RX 258: Performed by: HOSPITALIST

## 2017-05-14 PROCEDURE — 3E1M38Z IRRIGATION OF PERITONEAL CAVITY USING IRRIGATING SUBSTANCE, PERCUTANEOUS APPROACH: ICD-10-PCS | Performed by: SURGERY

## 2017-05-14 PROCEDURE — 36415 COLL VENOUS BLD VENIPUNCTURE: CPT | Performed by: PHYSICIAN ASSISTANT

## 2017-05-14 PROCEDURE — 94660 CPAP INITIATION&MGMT: CPT

## 2017-05-14 PROCEDURE — 00000146 ZZHCL STATISTIC GLUCOSE BY METER IP

## 2017-05-14 PROCEDURE — 85610 PROTHROMBIN TIME: CPT | Performed by: PHYSICIAN ASSISTANT

## 2017-05-14 PROCEDURE — 25000125 ZZHC RX 250: Performed by: NURSE ANESTHETIST, CERTIFIED REGISTERED

## 2017-05-14 PROCEDURE — 25000125 ZZHC RX 250: Performed by: ANESTHESIOLOGY

## 2017-05-14 PROCEDURE — 37000009 ZZH ANESTHESIA TECHNICAL FEE, EACH ADDTL 15 MIN: Performed by: SURGERY

## 2017-05-14 PROCEDURE — 37000008 ZZH ANESTHESIA TECHNICAL FEE, 1ST 30 MIN: Performed by: SURGERY

## 2017-05-14 PROCEDURE — 49000 EXPLORATION OF ABDOMEN: CPT | Performed by: SURGERY

## 2017-05-14 PROCEDURE — 25000125 ZZHC RX 250: Performed by: SURGERY

## 2017-05-14 PROCEDURE — 71000015 ZZH RECOVERY PHASE 1 LEVEL 2 EA ADDTL HR: Performed by: SURGERY

## 2017-05-14 PROCEDURE — 0WJG0ZZ INSPECTION OF PERITONEAL CAVITY, OPEN APPROACH: ICD-10-PCS | Performed by: SURGERY

## 2017-05-14 PROCEDURE — 25000125 ZZHC RX 250: Performed by: PHYSICIAN ASSISTANT

## 2017-05-14 PROCEDURE — 25000132 ZZH RX MED GY IP 250 OP 250 PS 637: Mod: GY | Performed by: HOSPITALIST

## 2017-05-14 PROCEDURE — 40000915 ZZH STATISTIC SITTER, EVENING HOURS

## 2017-05-14 PROCEDURE — 25000132 ZZH RX MED GY IP 250 OP 250 PS 637: Mod: GY | Performed by: SURGERY

## 2017-05-14 PROCEDURE — S5010 5% DEXTROSE AND 0.45% SALINE: HCPCS | Performed by: HOSPITALIST

## 2017-05-14 PROCEDURE — 87205 SMEAR GRAM STAIN: CPT | Performed by: SURGERY

## 2017-05-14 PROCEDURE — A9270 NON-COVERED ITEM OR SERVICE: HCPCS | Mod: GY | Performed by: SURGERY

## 2017-05-14 PROCEDURE — 25000128 H RX IP 250 OP 636: Performed by: NURSE ANESTHETIST, CERTIFIED REGISTERED

## 2017-05-14 PROCEDURE — 27210995 ZZH RX 272: Performed by: SURGERY

## 2017-05-14 PROCEDURE — 25000128 H RX IP 250 OP 636: Performed by: SURGERY

## 2017-05-14 PROCEDURE — 25000125 ZZHC RX 250: Performed by: INTERNAL MEDICINE

## 2017-05-14 PROCEDURE — 87075 CULTR BACTERIA EXCEPT BLOOD: CPT | Performed by: SURGERY

## 2017-05-14 PROCEDURE — 80048 BASIC METABOLIC PNL TOTAL CA: CPT | Performed by: PHYSICIAN ASSISTANT

## 2017-05-14 PROCEDURE — 83605 ASSAY OF LACTIC ACID: CPT | Performed by: PHYSICIAN ASSISTANT

## 2017-05-14 PROCEDURE — 36000058 ZZH SURGERY LEVEL 3 EA 15 ADDTL MIN: Performed by: SURGERY

## 2017-05-14 PROCEDURE — 36000056 ZZH SURGERY LEVEL 3 1ST 30 MIN: Performed by: SURGERY

## 2017-05-14 PROCEDURE — 27210794 ZZH OR GENERAL SUPPLY STERILE: Performed by: SURGERY

## 2017-05-14 PROCEDURE — 40000914 ZZH STATISTIC SITTER, DAY HOURS

## 2017-05-14 PROCEDURE — 87070 CULTURE OTHR SPECIMN AEROBIC: CPT | Performed by: SURGERY

## 2017-05-14 PROCEDURE — 0WJP0ZZ INSPECTION OF GASTROINTESTINAL TRACT, OPEN APPROACH: ICD-10-PCS | Performed by: SURGERY

## 2017-05-14 PROCEDURE — 74020 XR ABDOMEN 2 VW: CPT

## 2017-05-14 PROCEDURE — 25000131 ZZH RX MED GY IP 250 OP 636 PS 637: Mod: GY | Performed by: PHYSICIAN ASSISTANT

## 2017-05-14 PROCEDURE — 71000014 ZZH RECOVERY PHASE 1 LEVEL 2 FIRST HR: Performed by: SURGERY

## 2017-05-14 PROCEDURE — S0020 INJECTION, BUPIVICAINE HYDRO: HCPCS | Performed by: SURGERY

## 2017-05-14 PROCEDURE — 25000128 H RX IP 250 OP 636: Performed by: PHYSICIAN ASSISTANT

## 2017-05-14 PROCEDURE — 12000007 ZZH R&B INTERMEDIATE

## 2017-05-14 RX ORDER — ONDANSETRON 2 MG/ML
INJECTION INTRAMUSCULAR; INTRAVENOUS PRN
Status: DISCONTINUED | OUTPATIENT
Start: 2017-05-14 | End: 2017-05-14

## 2017-05-14 RX ORDER — FENTANYL CITRATE 50 UG/ML
25-50 INJECTION, SOLUTION INTRAMUSCULAR; INTRAVENOUS
Status: DISCONTINUED | OUTPATIENT
Start: 2017-05-14 | End: 2017-05-14 | Stop reason: HOSPADM

## 2017-05-14 RX ORDER — HYDROMORPHONE HYDROCHLORIDE 1 MG/ML
0.5 INJECTION, SOLUTION INTRAMUSCULAR; INTRAVENOUS; SUBCUTANEOUS ONCE
Status: COMPLETED | OUTPATIENT
Start: 2017-05-14 | End: 2017-05-14

## 2017-05-14 RX ORDER — LABETALOL HYDROCHLORIDE 5 MG/ML
10 INJECTION, SOLUTION INTRAVENOUS
Status: DISCONTINUED | OUTPATIENT
Start: 2017-05-14 | End: 2017-05-14 | Stop reason: HOSPADM

## 2017-05-14 RX ORDER — FENTANYL CITRATE 50 UG/ML
INJECTION, SOLUTION INTRAMUSCULAR; INTRAVENOUS PRN
Status: DISCONTINUED | OUTPATIENT
Start: 2017-05-14 | End: 2017-05-14

## 2017-05-14 RX ORDER — GLYCOPYRROLATE 0.2 MG/ML
INJECTION, SOLUTION INTRAMUSCULAR; INTRAVENOUS PRN
Status: DISCONTINUED | OUTPATIENT
Start: 2017-05-14 | End: 2017-05-14

## 2017-05-14 RX ORDER — NICOTINE POLACRILEX 4 MG
15-30 LOZENGE BUCCAL
Status: DISCONTINUED | OUTPATIENT
Start: 2017-05-14 | End: 2017-05-14

## 2017-05-14 RX ORDER — DEXAMETHASONE SODIUM PHOSPHATE 4 MG/ML
INJECTION, SOLUTION INTRA-ARTICULAR; INTRALESIONAL; INTRAMUSCULAR; INTRAVENOUS; SOFT TISSUE PRN
Status: DISCONTINUED | OUTPATIENT
Start: 2017-05-14 | End: 2017-05-14

## 2017-05-14 RX ORDER — SODIUM CHLORIDE, SODIUM LACTATE, POTASSIUM CHLORIDE, CALCIUM CHLORIDE 600; 310; 30; 20 MG/100ML; MG/100ML; MG/100ML; MG/100ML
INJECTION, SOLUTION INTRAVENOUS CONTINUOUS
Status: DISCONTINUED | OUTPATIENT
Start: 2017-05-14 | End: 2017-05-14 | Stop reason: HOSPADM

## 2017-05-14 RX ORDER — HYDRALAZINE HYDROCHLORIDE 20 MG/ML
2.5-5 INJECTION INTRAMUSCULAR; INTRAVENOUS EVERY 10 MIN PRN
Status: DISCONTINUED | OUTPATIENT
Start: 2017-05-14 | End: 2017-05-14 | Stop reason: HOSPADM

## 2017-05-14 RX ORDER — MAGNESIUM HYDROXIDE 1200 MG/15ML
LIQUID ORAL PRN
Status: DISCONTINUED | OUTPATIENT
Start: 2017-05-14 | End: 2017-05-14

## 2017-05-14 RX ORDER — CEFTRIAXONE 1 G/1
1 INJECTION, POWDER, FOR SOLUTION INTRAMUSCULAR; INTRAVENOUS EVERY 24 HOURS
Status: DISCONTINUED | OUTPATIENT
Start: 2017-05-14 | End: 2017-05-15

## 2017-05-14 RX ORDER — BUPIVACAINE HYDROCHLORIDE 5 MG/ML
INJECTION, SOLUTION EPIDURAL; INTRACAUDAL PRN
Status: DISCONTINUED | OUTPATIENT
Start: 2017-05-14 | End: 2017-05-14

## 2017-05-14 RX ORDER — LIDOCAINE HYDROCHLORIDE 10 MG/ML
INJECTION, SOLUTION INFILTRATION; PERINEURAL PRN
Status: DISCONTINUED | OUTPATIENT
Start: 2017-05-14 | End: 2017-05-14

## 2017-05-14 RX ORDER — NEOSTIGMINE METHYLSULFATE 1 MG/ML
VIAL (ML) INJECTION PRN
Status: DISCONTINUED | OUTPATIENT
Start: 2017-05-14 | End: 2017-05-14

## 2017-05-14 RX ORDER — ONDANSETRON 4 MG/1
4 TABLET, ORALLY DISINTEGRATING ORAL EVERY 30 MIN PRN
Status: DISCONTINUED | OUTPATIENT
Start: 2017-05-14 | End: 2017-05-14 | Stop reason: HOSPADM

## 2017-05-14 RX ORDER — ACETAMINOPHEN 10 MG/ML
1000 INJECTION, SOLUTION INTRAVENOUS EVERY 6 HOURS PRN
Status: DISCONTINUED | OUTPATIENT
Start: 2017-05-14 | End: 2017-05-25

## 2017-05-14 RX ORDER — DEXTROSE MONOHYDRATE 25 G/50ML
25-50 INJECTION, SOLUTION INTRAVENOUS
Status: DISCONTINUED | OUTPATIENT
Start: 2017-05-14 | End: 2017-05-14

## 2017-05-14 RX ORDER — EPHEDRINE SULFATE 50 MG/ML
INJECTION, SOLUTION INTRAMUSCULAR; INTRAVENOUS; SUBCUTANEOUS PRN
Status: DISCONTINUED | OUTPATIENT
Start: 2017-05-14 | End: 2017-05-14

## 2017-05-14 RX ORDER — HYDROMORPHONE HYDROCHLORIDE 1 MG/ML
.3-.5 INJECTION, SOLUTION INTRAMUSCULAR; INTRAVENOUS; SUBCUTANEOUS EVERY 5 MIN PRN
Status: DISCONTINUED | OUTPATIENT
Start: 2017-05-14 | End: 2017-05-14 | Stop reason: HOSPADM

## 2017-05-14 RX ORDER — ONDANSETRON 2 MG/ML
4 INJECTION INTRAMUSCULAR; INTRAVENOUS EVERY 30 MIN PRN
Status: DISCONTINUED | OUTPATIENT
Start: 2017-05-14 | End: 2017-05-14 | Stop reason: HOSPADM

## 2017-05-14 RX ORDER — PROPOFOL 10 MG/ML
INJECTION, EMULSION INTRAVENOUS PRN
Status: DISCONTINUED | OUTPATIENT
Start: 2017-05-14 | End: 2017-05-14

## 2017-05-14 RX ORDER — PIPERACILLIN SODIUM, TAZOBACTAM SODIUM 3; .375 G/15ML; G/15ML
3.38 INJECTION, POWDER, LYOPHILIZED, FOR SOLUTION INTRAVENOUS
Status: COMPLETED | OUTPATIENT
Start: 2017-05-14 | End: 2017-05-14

## 2017-05-14 RX ADMIN — FENTANYL CITRATE 50 MCG: 50 INJECTION INTRAMUSCULAR; INTRAVENOUS at 12:27

## 2017-05-14 RX ADMIN — ROCURONIUM BROMIDE 20 MG: 10 INJECTION INTRAVENOUS at 10:36

## 2017-05-14 RX ADMIN — PHENYLEPHRINE HYDROCHLORIDE 150 MCG: 10 INJECTION, SOLUTION INTRAMUSCULAR; INTRAVENOUS; SUBCUTANEOUS at 10:59

## 2017-05-14 RX ADMIN — Medication 5 MG: at 11:15

## 2017-05-14 RX ADMIN — SUCCINYLCHOLINE CHLORIDE 100 MG: 20 INJECTION, SOLUTION INTRAMUSCULAR; INTRAVENOUS at 10:28

## 2017-05-14 RX ADMIN — TAZOBACTAM SODIUM AND PIPERACILLIN SODIUM 2.25 G: 250; 2 INJECTION, SOLUTION INTRAVENOUS at 03:06

## 2017-05-14 RX ADMIN — Medication 0.5 MG: at 08:07

## 2017-05-14 RX ADMIN — PHENOL 1 ML: 1.5 LIQUID ORAL at 20:39

## 2017-05-14 RX ADMIN — FENTANYL CITRATE 25 MCG: 50 INJECTION INTRAMUSCULAR; INTRAVENOUS at 12:02

## 2017-05-14 RX ADMIN — ROCURONIUM BROMIDE 10 MG: 10 INJECTION INTRAVENOUS at 10:28

## 2017-05-14 RX ADMIN — DEXAMETHASONE SODIUM PHOSPHATE 4 MG: 4 INJECTION, SOLUTION INTRA-ARTICULAR; INTRALESIONAL; INTRAMUSCULAR; INTRAVENOUS; SOFT TISSUE at 10:28

## 2017-05-14 RX ADMIN — PIPERACILLIN SODIUM AND TAZOBACTAM SODIUM 3.38 G: .375; 3 INJECTION, POWDER, LYOPHILIZED, FOR SOLUTION INTRAVENOUS at 10:14

## 2017-05-14 RX ADMIN — ACETAMINOPHEN 1000 MG: 10 INJECTION, SOLUTION INTRAVENOUS at 16:14

## 2017-05-14 RX ADMIN — HEPARIN SODIUM 5000 UNITS: 10000 INJECTION, SOLUTION INTRAVENOUS; SUBCUTANEOUS at 07:53

## 2017-05-14 RX ADMIN — PHENYLEPHRINE HYDROCHLORIDE 150 MCG: 10 INJECTION, SOLUTION INTRAMUSCULAR; INTRAVENOUS; SUBCUTANEOUS at 10:31

## 2017-05-14 RX ADMIN — METOPROLOL TARTRATE 5 MG: 5 INJECTION INTRAVENOUS at 00:53

## 2017-05-14 RX ADMIN — ONDANSETRON 4 MG: 2 INJECTION INTRAMUSCULAR; INTRAVENOUS at 11:00

## 2017-05-14 RX ADMIN — PROPOFOL 130 MG: 10 INJECTION, EMULSION INTRAVENOUS at 10:28

## 2017-05-14 RX ADMIN — SODIUM CHLORIDE, POTASSIUM CHLORIDE, SODIUM LACTATE AND CALCIUM CHLORIDE: 600; 310; 30; 20 INJECTION, SOLUTION INTRAVENOUS at 11:12

## 2017-05-14 RX ADMIN — Medication 0.5 MG: at 20:36

## 2017-05-14 RX ADMIN — ONDANSETRON 4 MG: 2 INJECTION INTRAMUSCULAR; INTRAVENOUS at 02:38

## 2017-05-14 RX ADMIN — PHENYLEPHRINE HYDROCHLORIDE 150 MCG: 10 INJECTION, SOLUTION INTRAMUSCULAR; INTRAVENOUS; SUBCUTANEOUS at 10:28

## 2017-05-14 RX ADMIN — PHENYLEPHRINE HYDROCHLORIDE 200 MCG: 10 INJECTION, SOLUTION INTRAMUSCULAR; INTRAVENOUS; SUBCUTANEOUS at 11:05

## 2017-05-14 RX ADMIN — PHENOL 1 ML: 1.5 LIQUID ORAL at 08:05

## 2017-05-14 RX ADMIN — PHENYLEPHRINE HYDROCHLORIDE 200 MCG: 10 INJECTION, SOLUTION INTRAMUSCULAR; INTRAVENOUS; SUBCUTANEOUS at 10:38

## 2017-05-14 RX ADMIN — LIDOCAINE HYDROCHLORIDE 25 MG: 10 INJECTION, SOLUTION INFILTRATION; PERINEURAL at 10:28

## 2017-05-14 RX ADMIN — Medication 3 MG: at 11:18

## 2017-05-14 RX ADMIN — Medication 0.5 MG: at 01:04

## 2017-05-14 RX ADMIN — GLYCOPYRROLATE 0.2 MG: 0.2 INJECTION, SOLUTION INTRAMUSCULAR; INTRAVENOUS at 10:28

## 2017-05-14 RX ADMIN — FENTANYL CITRATE 50 MCG: 50 INJECTION, SOLUTION INTRAMUSCULAR; INTRAVENOUS at 10:42

## 2017-05-14 RX ADMIN — GLYCOPYRROLATE 0.4 MG: 0.2 INJECTION, SOLUTION INTRAMUSCULAR; INTRAVENOUS at 11:18

## 2017-05-14 RX ADMIN — SODIUM CHLORIDE, POTASSIUM CHLORIDE, SODIUM LACTATE AND CALCIUM CHLORIDE: 600; 310; 30; 20 INJECTION, SOLUTION INTRAVENOUS at 10:14

## 2017-05-14 RX ADMIN — DEXTROSE AND SODIUM CHLORIDE: 5; 450 INJECTION, SOLUTION INTRAVENOUS at 14:40

## 2017-05-14 RX ADMIN — CEFTRIAXONE SODIUM 1 G: 1 INJECTION, POWDER, FOR SOLUTION INTRAMUSCULAR; INTRAVENOUS at 14:40

## 2017-05-14 RX ADMIN — PHENYLEPHRINE HYDROCHLORIDE 150 MCG: 10 INJECTION, SOLUTION INTRAMUSCULAR; INTRAVENOUS; SUBCUTANEOUS at 10:47

## 2017-05-14 RX ADMIN — DEXTROSE AND SODIUM CHLORIDE: 5; 450 INJECTION, SOLUTION INTRAVENOUS at 00:45

## 2017-05-14 RX ADMIN — INSULIN ASPART 1 UNITS: 100 INJECTION, SOLUTION INTRAVENOUS; SUBCUTANEOUS at 03:22

## 2017-05-14 RX ADMIN — METOPROLOL TARTRATE 5 MG: 5 INJECTION INTRAVENOUS at 06:01

## 2017-05-14 RX ADMIN — Medication 0.5 MG: at 13:50

## 2017-05-14 RX ADMIN — ATROPA BELLADONNA AND OPIUM 1 SUPPOSITORY: 16.2; 3 SUPPOSITORY RECTAL at 12:20

## 2017-05-14 RX ADMIN — METOPROLOL TARTRATE 5 MG: 5 INJECTION INTRAVENOUS at 22:04

## 2017-05-14 ASSESSMENT — PAIN DESCRIPTION - DESCRIPTORS
DESCRIPTORS: ACHING;CONSTANT
DESCRIPTORS: ACHING;CONSTANT

## 2017-05-14 ASSESSMENT — ENCOUNTER SYMPTOMS: DYSRHYTHMIAS: 1

## 2017-05-14 ASSESSMENT — LIFESTYLE VARIABLES: TOBACCO_USE: 1

## 2017-05-14 NOTE — CONSULTS
SURGERY CONSULTATION       PRIMARY CARE PHYSICIAN:  [facility].       REASON FOR CONSULTATION:  Small-bowel obstruction, question of closed loop.      HISTORY OF PRESENT ILLNESS:  Mr. oTn Bagley is an 89-year-old gentleman who has had a previous laparoscopic cholecystectomy and possibly an appendectomy, although this was not clear from speaking with him, nor the records, who presented to the ER last evening with abdominal pain.  This has been ongoing for the preceding 3 days or so and because of its persistence and slight worsening, the patient decided to seek evaluation in the ER.  Here, he was found to have a mild elevation of his lactate which subsequently normalized, normal white blood cell count and subsequent CT which showed mechanical bowel obstruction with a possible closed loop.  He had an NG placed overnight with 400 mL of return; he pulled this out during some confusion this morning.  Today, he states he actually feels better and has no pain at rest.  He is uncertain as to whether he is passing flatus, according to the nursing notes he is, but he is not certain.  He denies any nausea or bloating this morning.      PAST MEDICAL AND SURGICAL HISTORY:  Notable for atrial fibrillation for which he is anticoagulated, he has history of coronary artery disease and has had previous CABG.  He has a history of hypertension, hyperlipidemia, previous cholecystectomy, possible appendectomy by radiographic reports, and previous tonsillectomy.  He has obstructive sleep apnea and wears CPAP at night.      CURRENT MEDICATIONS:  This includes gabapentin, Namenda, donepezil, levothyroxine, Lasix, aspirin, Flomax, glipizide, Fosamax, calcium and vitamin D, omega 3 fatty acids, Centrum Silver, nitroglycerin, vitamin C, Coumadin, atenolol, lisinopril, hydrochlorothiazide and simvastatin.      ALLERGIES:  The patient has no recognized drug allergies.      SOCIAL HISTORY:  The patient is  and  lives nearby with his wife.  He quit smoking in the early 1970s.      PHYSICAL EXAMINATION:   VITAL SIGNS:  Mr. Bagley is afebrile, temperature this morning is 98.5, pulse is 89 with a blood pressure of 109/70, respiratory rate is 18 with 93% saturations on 2 liters.   IN GENERAL:  He is alert and nontoxic; he is not completely oriented to his situation and does repeat his questions, though answered on several occasions.   HEART:  Sounds are irregularly irregular.   RESPIRATORY:  Breath sounds are without wheezes or crackles.   ABDOMEN:  Soft with mild distention, he has well-healed trocar sites consistent with a cholecystectomy.  He has some tenderness in the mid abdomen around the periumbilical region with some voluntary guarding.  His bowel tones are present, especially in the upper abdomen.      LABORATORY EXAMS:  Notable for white blood cell count of 7.0 thousand on admission, currently 9.3, hemoglobin was 10.3.  Lactate was 2.3 on admission, currently 1.8 with hydration.  Creatinine is 1.67.  His INR is now 1.6 after having vitamin K last evening.      IMAGING:  I reviewed the patient's CT in detail last evening and again this morning.  There is dilated small bowel proximally down to the level of the mid small bowel where there is decompressed ileum thereafter.  There is some edema in the mesentery off to the patient's right.  There is gas in the colon beyond this.  There does not seem to be 2 transition points, which would be the harbinger of a typical closed loop obstruction.  There are no other signs of bowel ischemia.  There are clips in the right upper quadrant and the gallbladder fossa as well as in the right lower quadrant at about the location of the anticipated mesoappendix, the appendix is not identified.      IMPRESSION AND RECOMMENDATIONS:  This is an 89-year-old gentleman with a cholecystectomy (laparoscopic and possible appendectomy, although there are no scars or harbingers of this otherwise  and the patient was unclear as to whether this has occurred or nor) who presents with signs and symptoms consistent with a mechanical bowel obstruction.  Whether this is truly a closed loop phenomenon, this does not seem to be clearly supported.  He has had some improvement overnight, per his own report, although he does seem to be somewhat confused about his situation and I am not certain how accurate this truly is.  He is tender in the mid abdomen at about the point where the abnormality is noted on computerized tomography.  Nevertheless, I see no acute indication for surgery at this juncture and have asked that the nasogastric tube be replaced and then he will remain n.p.o.  I will check a new set of plain films to see if the bowel obstruction seems persistent.  I suspect he has some degree of aerophagia given his need for continuous positive airway pressure and this may be contributing somewhat to his distention.        Thank you very much for this consultation.  We will follow along with you during the course of his hospitalization and anticipated recovery.  And again, we will reevaluate the patient today for improvement or lack thereof.         RAMA MASTERSON MD             D: 2017 08:16   T: 2017 09:54   MT: [id number]      Name:     BRIANA HERNÁNDEZ   MRN:      [id number]        Account:       [id number]   :      1927           Consult Date:  2017      Document: [id number]       cc: [facility]

## 2017-05-14 NOTE — PLAN OF CARE
Problem: Goal Outcome Summary  Goal: Goal Outcome Summary  Outcome: No Change  From ED at 2235, confused but calm & cooperative.  NPO.  C/O RLQ abdominal pain, PRN dilaudid.  Up with A1 + cane to bathroom, voiding.  Oriented to room and call system, denies questions.  Bed alarm on, call light within reach, told pt not to get up without assistance.  Personal belongings in room.  Son (Didier) at bedside.

## 2017-05-14 NOTE — PROGRESS NOTES
"[facility]  Hospitalist Progress Note  Rick Dempsey, DO 05/14/2017    Reason for Stay (Diagnosis): SBO         Assessment and Plan:      Summary of Stay: Ton Bagley is a 89 year old male admitted on 5/13/2017 with SBO    Problem List:   1. Small bowel obstruction.  S/P exploratory laparotomy by General Surgery on 5/14.  Pain meds PRN.  NPO for now.  IV fluids.  Use incentive spirometry.  2. Possible UTI.  Stop Zosyn.  Start IV Ceftriaxone.  Await urine culture results.  3. Diabetes Mellitus.  Hold Glipizide while NPO.  Novolog SSI.  4. HTN.  IV Metoprolol while NPO.  5. Hyperlipidemia.  Hold Zocor while NPO.  6. Hypothyroidism.  Hold Synthroid while NPO.  7. A fib.  IV Metoprolol while NPO.  Warfarin on hold while NPO.  Restart Anticoagulation tomorrow if OK with Surgery.  8. CAD.  IV Metoprolol while NPO.  9. Dementia.  Hold Namenda and Aricept while NPO.  10. Acute on chronic kidney disease.  Avoid nephrotoxins as much as possible.  IV fluids.  DVT Prophylaxis: Pneumatic Compression Devices  Code Status: DNR / DNI  Discharge Dispo: TBD  Estimated Disch Date / # of Days until Disch: 3-5        Interval History (Subjective):      Having some abdominal pain.  No CP, SOB, F/C, N/V, or diarrhea.                  Physical Exam:      Last Vital Signs:  /79 (BP Location: Left arm)  Pulse 88  Temp 98.1  F (36.7  C) (Oral)  Resp 24  Ht 1.702 m (5' 7\")  Wt 103.8 kg (228 lb 12.8 oz)  SpO2 99%  BMI 35.84 kg/m2    Gen:  NAD, A&Ox1 to person.  Not oriented to place or time.  Eyes:  PERRL, sclera anicteric.  OP:  MMM, no lesions.  Neck:  Supple.  CV:  Irregular, +2/6 murmur.  Lung:  CTA b/l, normal effort.  Ab:  No BS at this time.  Skin:  Warm, dry to touch.  No rash.  Ext:  No pitting edema LE b/l.           Medications:      All current medications were reviewed with changes reflected in problem list.         Data:      All new lab and imaging data was reviewed.   Labs:    Recent " Labs  Lab 05/14/17  0633      POTASSIUM 3.5   CHLORIDE 96   CO2 31   ANIONGAP 6   *   BUN 33*   CR 1.67*   GFRESTIMATED 39*   GFRESTBLACK 47*   JANN 8.8       Recent Labs  Lab 05/14/17  0633   WBC 9.3   HGB 10.3*   HCT 35.8*   MCV 82         Imaging:   Recent Results (from the past 24 hour(s))   CT Abdomen Pelvis w Contrast    Narrative    CT ABDOMEN PELVIS WITH CONTRAST 5/13/2017 8:27 PM    TECHNIQUE: Images from diaphragm to pubic symphysis. 100 mL Isovue-370  IV contrast. Radiation dose for this scan was reduced using automated  exposure control, adjustment of the mA and/or kV according to patient  size, or iterative reconstruction technique.    HISTORY: RLQ abdominal pain.    COMPARISON: None.    FINDINGS:   Abdomen and Pelvis: Multiple distended fluid-filled small bowel loops  compatible with obstruction. There are several centimeters of  decompressed distal ileum. Mesenteric edema with small amount of fluid  and fat stranding identified. The ascending colon is also mildly  distended and fluid-filled, gas within transverse colon with  transition at the splenic flexure to decompressed descending colon.  The descending and rectosigmoid colon are decompressed. Small free  fluid. There is some swirling of mesenteric vessels and fat in the  midabdomen. Appendix not identified, clips near the cecum suggests  prior appendectomy.    Lung bases: Calcified right lower lobe granuloma, sternotomy wires,  coronary artery calcifications.     Cholecystectomy clips. Fatty liver. Indeterminate oval hypodense  lesion in the liver just superior to the cholecystectomy clips 2.2 cm  in diameter on image 23.    Normal-appearing spleen, pancreas, and adrenal glands. Multiple  bilateral renal cysts including 6.7 cm cyst upper left kidney.  Nonobstructing 0.4 cm left kidney stone.    Extensive vascular calcification. No periaortic or pelvic adenopathy  or free fluid.      Impression    IMPRESSION:  1.  Distended  small bowel consistent with obstruction, several  centimeters of decompressed distal and terminal ileum. The ascending  colon is also fluid-filled with gas filled transverse colon. Question  of closed loop obstruction is raised. Discussed with Dr. Pepper.  2. Indeterminate 2.2 cm oval hypodense lesion in the liver just  cephalad to the cholecystectomy clips.  3. Small free fluid. Mesenteric stranding and fluid adjacent to the  distended bowel loops.    JAIR SOUZA MD   XR Abdomen 2 Views    Narrative    ABDOMEN TWO VIEWS 5/14/2017 8:33 AM     HISTORY: Follow-up small bowel obstruction.    COMPARISON: CT scan yesterday.      Impression    IMPRESSION: There is marked dilatation of small bowel throughout the  abdomen and pelvis filled with gas. The degree of distention is  slightly worse than on the previous CT scan comparing with the  previous digital  radiograph. No free air. Nasogastric tube in  the stomach. Stool in the left colon. Contrast material in the urinary  bladder from the CT scan.    MARILUZ COUGHLIN MD

## 2017-05-14 NOTE — ANESTHESIA PREPROCEDURE EVALUATION
PAC NOTE:       ANESTHESIA PRE EVALUATION:  Anesthesia Evaluation     . Pt has had prior anesthetic.            ROS/MED HX    ENT/Pulmonary:     (+)tobacco use, Past use , . .    Neurologic:       Cardiovascular:     (+) Dyslipidemia, hypertension--CAD, -past MI,CABG-date: 1994, stent,2007  . : . . . :. dysrhythmias a-fib, .       METS/Exercise Tolerance:     Hematologic:         Musculoskeletal:         GI/Hepatic:         Renal/Genitourinary:         Endo:     (+) thyroid problem hypothyroidism, .      Psychiatric:         Infectious Disease:         Malignancy:         Other:                     Physical Exam  Normal systems: cardiovascular, pulmonary and dental    Airway   Mallampati: II  TM distance: >3 FB  Neck ROM: full    Dental     Cardiovascular       Pulmonary              Anesthesia Plan      History & Physical Review  History and physical reviewed and following examination; no interval change.    ASA Status:  3 .    NPO Status:  > 8 hours    Plan for General, RSI and ETT with Intravenous and Propofol induction. Maintenance will be Balanced.    PONV prophylaxis:  Ondansetron (or other 5HT-3) and Dexamethasone or Solumedrol  Pt is DNR/DNI. Discussed with wife and pt's son. They understand the need to intubate with GETA for the surgery, and that we will correct those sort of routine VS changes that occur with more routine anesthetic events. The say the pt would NOT want heroic measures should some untoward event occur perioperatively... i..e. No chest compressions, no shocking, no extraordinary medication and pt has expressed this to them before. The DNR will therefore stand during this periop period and we will not employ extraordinary means should some extreme event occur.      Postoperative Care  Postoperative pain management:  IV analgesics.      Consents  Anesthetic plan, risks, benefits and alternatives discussed with:  Patient.  Use of blood products discussed: Yes.   .                             .

## 2017-05-14 NOTE — PROVIDER NOTIFICATION
Page to MD- Pt noted to have fungal appearing rash to the bilat groin. Miconazole powder order set gives alert on interaction with Coumadin. Please advise

## 2017-05-14 NOTE — H&P
History and Physical     Ton Bagley MRN# [id number]   YOB: 1927 Age: 89 year old      Date of Admission:  5/13/2017    Primary care provider: Jose Shell          Assessment and Plan:   Ton Bagley is a 89 year old male with a PMH significant for atrial fibrillation on warfarin, MI, HTN, memory loss and HLP who presents with abdominal pain.    ED sign out by Dr. Pepper and chart review performed: Vitals show temp of 97.8, pulse 92, and pressure 150/80 with spontaneous respirations and no hypoxia. Labs remarkable for Creatinine 1.54 (slightly above baseline of, abnormal electrolytes with sodium 132, initial lactic acid 3.0 and repeat 2.3, glucose 177, lipase 358. WBC 7.0, Hgb 10.5. INR 2.5. UA shows 15 WBC/moderate LE/negative nitrites. CT scan showed a distended small bowel consistent with obstruction with concern of closed loop obstruction. This was reviewed by Dr. Dias who does not think there is a closed loop obstruction and would like supportive care for now. He did request reversal of INR though so Vitamin K 5 mg IV was given in ED. Patient not nauseated or vomiting so NG tube was not placed.     1. Bowel obstruction  Describes abdominal pain that began 3 days ago. No passing gas or stool. No hx of bowel obstruction but does have hx of gallbladder surgery and prostate cancer. No recent colonoscopy. Concern on CT for closed loop obstruction but after review by Dr. Dias he does not agree and recommends conservative mgmt for now. NG tube not placed because patient does not have significant nausea or vomiting.  -NPO  -Fluid support  -IV pain and nausea meds  -Surgical consult  -Recheck lactic acid in AM, improved from initial presentation    2. Possible UTI  Patient denies urinary symptoms, but has elevated WBC and urine appears infected. Started on Zosyn in ED which is reasonable considering abdominal process going on. Will continue until culture is  back.  -Continue Zosyn    3. Atrial fibrillation on warfarin  Pressure are soft and he takes Atenolol at home which we will be holding.  -Metoprolol 5 mg IV q 6 hrs with parameters  -Holding warfarin and given Vit K in ED recheck INR in AM.    4. Hx of CAD   Hx of bypass grafting in 1994, no hx of heart failure but does have lower extremity edema. Last seen by cardiology in 10/2014 and was discharged from clinic because he was doing well.     5. DM II  -Added sliding scale for NPO status    6. HTN  Holding Lisinopril, Lasix and HCTZ. Will give Metoprolol IV as above    7. CKD  Creatinine slightly above baseline and received contrast in CT     8. Dementia  Holding meds    9. HLP  Holding statin    10. Hypothyroidism  Holding levothyroxine    11. Lower leg swelling  Chronic and uses compression stockings with Lasix.   -Holding lasix for now and suspect this may get worse.    12. Mild hyponatremia  Normal saline overnight and will recheck in AM.    Med rec: still needs to be completed  Social: Lives with walk, ambulates with cane  Code: DNR/DNI confirmed by POLST and patient  VTE prophylaxis: INR 2.5 and starting reversal so placing on heparin SQ  Disposition: Inpatient              Chief Complaint:   Abdominal pain and found to have bowel obstruction         History of Present Illness:   History limited due to memory loss but son is present who give majority of hx.     Ton Bagley is a 89 year old male who presents with lower stomach pain x 3 days. He denies significant nausea and vomiting but hasn't eaten anything today. He denies passing gas and previous blood in stool. His son states he has never had a bowel obstruction before, has hx of gallbladder removal and has not had a colonoscopy in over 10 years. He has hx of prostate cancer. He denies urinary symptoms, chest pain, shortness of breath, and cough. He has not taken his meds today. He drinks alcohol occasionally and denies smoking/illegal drug use. No  other complaints at this time.              Past Medical History:     Past Medical History:   Diagnosis Date     Atrial fibrillation (H)     cardioversion 2007     CAD (coronary artery disease)     CABG, stent x2 2004     HTN (hypertension)      Hyperlipidemia                Past Surgical History:     Past Surgical History:   Procedure Laterality Date     BYPASS GRAFT ARTERY CORONARY  1994    LIMA to LAD and saphenous vein grafts to Diag 1 OM 1 PDA     CARDIOVERSION  2007     STENT, CORONARY, S660 15/18  2004    stent placement of SVG to RCA and OM2               Social History:     Social History     Social History     Marital status:      Spouse name: N/A     Number of children: N/A     Years of education: N/A     Occupational History     Not on file.     Social History Main Topics     Smoking status: Former Smoker     Smokeless tobacco: Not on file      Comment: 1973     Alcohol use Yes      Comment: wine daily     Drug use: Not on file     Sexual activity: Not on file     Other Topics Concern     Sleep Concern No     Special Diet No     Exercise No     Social History Narrative               Family History:   Unable to review due to memory issues         Allergies:    No Known Allergies            Medications:     Prior to Admission medications    Medication Sig Last Dose Taking? Auth Provider   gabapentin (NEURONTIN) 100 MG capsule Take 100 mg by mouth 2 times daily   Reported, Patient   memantine XR (NAMENDA XR) 28 MG capsule Take 28 mg by mouth daily   Reported, Patient   donepezil (ARICEPT) 10 MG tablet Take 10 mg by mouth At Bedtime   Reported, Patient   levothyroxine (SYNTHROID,LEVOTHROID) 100 MCG tablet Take 100 mcg by mouth daily   Reported, Patient   furosemide (LASIX) 20 MG tablet Take 20 mg by mouth daily   Reported, Patient   aspirin 81 MG tablet Take 81 mg by mouth daily   Reported, Patient   tamsulosin (FLOMAX) 0.4 MG 24 hr capsule Take 0.4 mg by mouth daily   Reported, Patient   glipiZIDE  (GLUCOTROL) 5 MG tablet 2.5 mh tablet twice daily   Reported, Patient   alendronate (FOSAMAX) 70 MG tablet Take 70 mg by mouth every 7 days   Reported, Patient   Calcium Carb-Cholecalciferol (CALCIUM 600 + D PO)    Reported, Patient   Omega-3 Fatty Acids (OMEGA-3 FISH OIL PO) Take 1 g by mouth   Reported, Patient   Multiple Vitamins-Minerals (CENTRUM SILVER ADULT 50+ PO)    Reported, Patient   nitroglycerin (NITROSTAT) 0.4 MG SL tablet Place under the tongue every 5 minutes as needed for chest pain   Reported, Patient   ascorbic acid (VITAMIN C) 500 MG tablet Take 500 mg by mouth as needed    Reported, Patient   warfarin (COUMADIN) 2 MG tablet Take by mouth daily Take as directed   Reported, Patient   atenolol (TENORMIN) 50 MG tablet Take 1 tablet (50 mg) by mouth daily   Julio Conde MD   lisinopril (PRINIVIL,ZESTRIL) 20 MG tablet Take 1 tablet (20 mg) by mouth daily   Julio Conde MD   hydrochlorothiazide (HYDRODIURIL) 25 MG tablet Take 0.5 tablets (12.5 mg) by mouth daily   Julio Conde MD   simvastatin (ZOCOR) 20 MG tablet Take 1 tablet (20 mg) by mouth daily   Julio Conde MD              Review of Systems:   A Comprehensive greater than 10 system review of systems was carried out.  Pertinent positives and negatives are noted above.  Otherwise negative for contributory information.            Physical Exam:   Blood pressure 135/78, pulse 92, temperature 97.8  F (36.6  C), temperature source Oral, resp. rate 16, weight 104.3 kg (230 lb), SpO2 99 %.  Exam:  GENERAL:  Comfortable.  PSYCH: pleasant,  Not oriented, No acute distress.  HEENT:  PERRLA. Normal conjunctiva, normal hearing, nasal mucosa and Oropharynx are normal.  NECK:  Supple, no neck vein distention, adenopathy or bruits, normal thyroid.  HEART:  Normal S1, S2 with murmur heard on exam, no pericardial rub, gallops or S3 or S4.  LUNGS:  Clear to auscultation, normal Respiratory effort. No wheezing, rales or ronchi.  ABDOMEN:  Abdomen  appears distended, inactive bowel sounds and tender lower quadrants.   EXTREMITIES:  1+ pitting edema of lower legs, +2 pulses bilateral and equal.  SKIN:  Dry to touch, No rash, wound or ulcerations.  NEUROLOGIC:  CN 2-12 grossly intact, sensation is intact with no focal deficits.               Data:       Recent Labs  Lab 05/13/17  1835   WBC 7.0   HGB 10.5*   HCT 35.8*   MCV 79          Recent Labs  Lab 05/13/17  1835   *   POTASSIUM 3.5   CHLORIDE 93*   CO2 29   ANIONGAP 10   *   BUN 36*   CR 1.54*   GFRESTIMATED 43*   GFRESTBLACK 52*   JANN 9.1   PROTTOTAL 7.7   ALBUMIN 3.7   BILITOTAL 1.2   ALKPHOS 78   AST 24   ALT 28       Recent Labs  Lab 05/13/17 2052 05/13/17  1835   LACT 2.3* 3.0*       Recent Labs  Lab 05/13/17 2043   COLOR Yellow   APPEARANCE Slightly Cloudy   URINEGLC Negative   URINEBILI Negative   URINEKETONE Negative   SG 1.020   UBLD Negative   URINEPH 5.0   PROTEIN Negative   NITRITE Negative   LEUKEST Moderate*   RBCU 1   WBCU 15*         Recent Results (from the past 24 hour(s))   CT Abdomen Pelvis w Contrast    Narrative    CT ABDOMEN PELVIS WITH CONTRAST 5/13/2017 8:27 PM    TECHNIQUE: Images from diaphragm to pubic symphysis. 100 mL Isovue-370  IV contrast. Radiation dose for this scan was reduced using automated  exposure control, adjustment of the mA and/or kV according to patient  size, or iterative reconstruction technique.    HISTORY: RLQ abdominal pain.    COMPARISON: None.    FINDINGS:   Abdomen and Pelvis: Multiple distended fluid-filled small bowel loops  compatible with obstruction. There are several centimeters of  decompressed distal ileum. Mesenteric edema with small amount of fluid  and fat stranding identified. The ascending colon is also mildly  distended and fluid-filled, gas within transverse colon with  transition at the splenic flexure to decompressed descending colon.  The descending and rectosigmoid colon are decompressed. Small free  fluid. There  is some swirling of mesenteric vessels and fat in the  midabdomen. Appendix not identified, clips near the cecum suggests  prior appendectomy.    Lung bases: Calcified right lower lobe granuloma, sternotomy wires,  coronary artery calcifications.     Cholecystectomy clips. Fatty liver. Indeterminate oval hypodense  lesion in the liver just superior to the cholecystectomy clips 2.2 cm  in diameter on image 23.    Normal-appearing spleen, pancreas, and adrenal glands. Multiple  bilateral renal cysts including 6.7 cm cyst upper left kidney.  Nonobstructing 0.4 cm left kidney stone.    Extensive vascular calcification. No periaortic or pelvic adenopathy  or free fluid.      Impression    IMPRESSION:  1.  Distended small bowel consistent with obstruction, several  centimeters of decompressed distal and terminal ileum. The ascending  colon is also fluid-filled with gas filled transverse colon. Question  of closed loop obstruction is raised. Discussed with Dr. Pepper.  2. Indeterminate 2.2 cm oval hypodense lesion in the liver just  cephalad to the cholecystectomy clips.  3. Small free fluid. Mesenteric stranding and fluid adjacent to the  distended bowel loops.    MD Micki SKINNER PA-C    This patient was seen and discussed with Dr. Gonsalez who agrees with the current plans as outlined above.

## 2017-05-14 NOTE — PROGRESS NOTES
X-cover    Called for hypoxia with sleeping-patient has DOE and uses CPAP-ordered CPAP and checked on patient afterwards and he is much more comfortable and no longer hypoxic

## 2017-05-14 NOTE — ANESTHESIA CARE TRANSFER NOTE
Patient: Ton Bagley    Procedure(s):  Exploratory laparotomy  - Wound Class: III-Contaminated    Diagnosis: bowel obstruction  Diagnosis Additional Information: No value filed.    Anesthesia Type:   General, RSI, ETT     Note:  Airway :Face Mask  Patient transferred to:PACU  Comments: VSS.      Vitals: (Last set prior to Anesthesia Care Transfer)    CRNA VITALS  5/14/2017 1101 - 5/14/2017 1137      5/14/2017             Pulse: 104    SpO2: 99 %    Resp Rate (observed): (!)  2                Electronically Signed By: MAYRA Barnes CRNA  May 14, 2017  11:37 AM

## 2017-05-14 NOTE — PROVIDER NOTIFICATION
"Provider paged following message. \"PT saturation as low as 74%. Put on 2 L NC. Still sat low 80s. States He wears \"mask\" at night. Pt forgetful and confused at baseline. Can you please come assess? Thank you.\"  "

## 2017-05-14 NOTE — ANESTHESIA POSTPROCEDURE EVALUATION
Patient: Ton Bagley    Procedure(s):  Exploratory laparotomy  - Wound Class: III-Contaminated    Diagnosis:bowel obstruction  Diagnosis Additional Information: Pre-operative diagnosis: bowel obstruction  Post-operative diagnosis Ileus  Procedure: Procedure(s):  Exploratory laparotomy - Wound Class: III-Contaminated  Surgeon(s): Surgeon(s) and Role:  * Jorge Dias MD - Primary  * Yusra Ricks Kaye, PA-C - Assisting  Estimated blood loss: 10 mL   Specimens:      ID Type Source Tests Collected by Time Destination  1 : peritoneum fluid                Anesthesia Type:  General, RSI, ETT    Note:  Anesthesia Post Evaluation    Patient location during evaluation: PACU  Patient participation: Able to fully participate in evaluation  Level of consciousness: awake  Pain management: adequate  Airway patency: patent  Cardiovascular status: acceptable  Respiratory status: acceptable  Hydration status: acceptable  PONV: none     Anesthetic complications: None          Last vitals:  Vitals:    05/14/17 0400 05/14/17 0405 05/14/17 0713   BP: 121/58  109/70   Pulse: 88     Resp: 16  18   Temp: 97.8  F (36.6  C)  98.5  F (36.9  C)   SpO2: (!) 78% 91% 93%         Electronically Signed By: Rob Pinto MD  May 14, 2017  11:58 AM

## 2017-05-14 NOTE — BRIEF OP NOTE
[facility] Brief Operative Note    Pre-operative diagnosis: bowel obstruction   Post-operative diagnosis Ileus   Procedure: Procedure(s):  Exploratory laparotomy  - Wound Class: III-Contaminated   Surgeon(s): Surgeon(s) and Role:     * Jorge Dias MD - Primary     * Yusra Ricks PA-C - Assisting   Estimated blood loss: 10 mL    Specimens:   ID Type Source Tests Collected by Time Destination   1 : peritoneum  fluid cultures Fluid Peritoneum ANAEROBIC BACTERIAL CULTURE, FLUID CULTURE AEROBIC BACTERIAL, GRAM STAIN Jorge Dias MD 5/14/2017 10:52 AM       Findings: Dilated small bowel without mechanical obstruction, some edema in mesentery and turbid ascites throughout.  Normal sigmoid colon and cecum, normal appendix.

## 2017-05-14 NOTE — PLAN OF CARE
Problem: Goal Outcome Summary  Goal: Goal Outcome Summary  Outcome: Improving  Orientation: Alert and oriented to person and place. Disoriented to time and situation. Forgetful. Repetitive conversations. Impulsive. Pulling at NG. VPM in place.   VSS. Initial 93% on RA. Recheck 78% on RA. CPAP ordered. 91% on CPAP. Afebrile. Tachycardic at times.   LS: coarse diminished lung sounds.   GI: Passing gas. No BM. Denies N/V. Zofran x1 with improvement in abdominal symptoms. NPO. NG tube to low intermittent suction. 400 ml clear brown.   : Adequate urine output. Clear yellow. Dribbling, frequency, hesitancy noted.   Skin: Old bruising noted throughout. Skin intact.   Activity: Assist of 1 with cane. Up to bathroom several times throughout night. Pt slept comfortably throughout shift.   Pain: 8/10 reported (5/10 FLACC) abdominal pain. Well controlled with IV medication. Dilaudid x3.   Plan: Continue with current cares.

## 2017-05-14 NOTE — ED NOTES
[facility]  ED Nurse Handoff Report    Ton Bagley is a 89 year old male   ED Chief complaint: No chief complaint on file.  . ED Diagnosis:   Final diagnoses:   Small bowel obstruction (H)     Allergies: No Known Allergies    Code Status: Full Code (Not on file)  Activity level - Baseline/Home:  Stand with Assist. Activity Level - Current:   Stand with Assist. Lift room needed: No. Bariatric: No   Needed: No   Isolation: No. Infection: Not Applicable.     Vital Signs:   Vitals:    05/13/17 1945 05/13/17 2000 05/13/17 2015 05/13/17 2045   BP: 130/69 145/80 134/66 131/73   Pulse:       Resp:       Temp:       TempSrc:       SpO2: 92% 94% 90% 95%     Cardiac Rhythm:  ,      Pain level:    Patient confused: Yes. Patient Falls Risk: Yes.   Elimination Status: Has voided   Patient Report - Initial Complaint: Pt c/o abdominal pain right lower quadrant that radiates across abdomen and into back, has been going on for past 3 days.  Focused Assessment: Bowel sounds present but hypoactive. Pt denies nausea/vomiting. Known history of atrial fibrillation, anticoagulated however will be administering phytonadione to lower INR prior to surgery. Pt history of dementia, is alert and oriented to person and place. Ambulates with assist of cane.   Tests Performed: Blood work, CT scan   Abnormal Results: Lactic 3.0, fluids given and second lactic down to 2.3.   Creat: 1.54.   Hgb: 10.4  Treatments provided: Morphine 4mg. NS bolus 500cc. LR bolus 2000cc. Zosyn. Will begin and administer as much phytonadione as possible before he leaves the ED.   Family Comments: Wife at bedside.   OBS brochure/video discussed/provided to patient:  N/A  ED Medications:   Medications   sodium chloride (PF) 0.9% PF flush 3 mL (not administered)   sodium chloride (PF) 0.9% PF flush 3 mL (not administered)   lactated ringers BOLUS 1,000 mL (1,000 mLs Intravenous New Bag 5/13/17 8079)   piperacillin-tazobactam (ZOSYN) infusion  3.375 g (3.375 g Intravenous New Bag 5/13/17 2118)   phytonadione (AQUA-MEPHYTON) 5 mg in NaCl 0.9 % 50 mL intermittent infusion (not administered)   0.9% sodium chloride BOLUS (0 mLs Intravenous Stopped 5/13/17 2048)   lactated ringers BOLUS 1,000 mL (0 mLs Intravenous Stopped 5/13/17 2119)   morphine (PF) injection 4 mg (4 mg Intravenous Given 5/13/17 2008)   0.9% sodium chloride BOLUS (0 mLs Intravenous Stopped 5/13/17 2026)   iopamidol (ISOVUE-370) solution 500 mL (100 mLs Intravenous Given 5/13/17 2023)     Drips infusing:  Yes     ED Nurse Name/Phone Number: Viviana Whitley,   9:25 PM  [facility]   RECEIVING UNIT ED HANDOFF REVIEW    Above ED Nurse Handoff Report was reviewed: YES  Reviewed by: Lis Khanna on May 13, 2017 at 10:28 PM

## 2017-05-14 NOTE — PROVIDER NOTIFICATION
On arrival to recovery, very restless and agitated.  C/O of bladder pain and irritation-kiran in place.  Attempting to get out of bed and pulling at Kiran and NGT.  Sitter called back to bedside.  Dr. Dias called, order received for B&O suppository to help with bladder irritation.  May d/c kiran if absolutly necessary, otherwise it should remain in place to avoid having to catheterize later today if unable to void post surgery.

## 2017-05-14 NOTE — PROVIDER NOTIFICATION
"MD paged following \"Pt is complaining of 8/10 abdominal pain. Received dilaudid 6118. can we get something else for pain. Thank you.\"  "

## 2017-05-15 ENCOUNTER — APPOINTMENT (OUTPATIENT)
Dept: PHYSICAL THERAPY | Facility: CLINIC | Age: 82
DRG: 357 | End: 2017-05-15
Attending: INTERNAL MEDICINE
Payer: MEDICARE

## 2017-05-15 ENCOUNTER — APPOINTMENT (OUTPATIENT)
Dept: GENERAL RADIOLOGY | Facility: CLINIC | Age: 82
DRG: 357 | End: 2017-05-15
Attending: INTERNAL MEDICINE
Payer: MEDICARE

## 2017-05-15 LAB
ANION GAP SERPL CALCULATED.3IONS-SCNC: 4 MMOL/L (ref 3–14)
BUN SERPL-MCNC: 23 MG/DL (ref 7–30)
CALCIUM SERPL-MCNC: 8.2 MG/DL (ref 8.5–10.1)
CHLORIDE SERPL-SCNC: 99 MMOL/L (ref 94–109)
CO2 SERPL-SCNC: 31 MMOL/L (ref 20–32)
CREAT SERPL-MCNC: 1.21 MG/DL (ref 0.66–1.25)
ERYTHROCYTE [DISTWIDTH] IN BLOOD BY AUTOMATED COUNT: 17 % (ref 10–15)
GFR SERPL CREATININE-BSD FRML MDRD: 56 ML/MIN/1.7M2
GLUCOSE BLDC GLUCOMTR-MCNC: 119 MG/DL (ref 70–99)
GLUCOSE BLDC GLUCOMTR-MCNC: 127 MG/DL (ref 70–99)
GLUCOSE BLDC GLUCOMTR-MCNC: 140 MG/DL (ref 70–99)
GLUCOSE BLDC GLUCOMTR-MCNC: 144 MG/DL (ref 70–99)
GLUCOSE BLDC GLUCOMTR-MCNC: 147 MG/DL (ref 70–99)
GLUCOSE BLDC GLUCOMTR-MCNC: 149 MG/DL (ref 70–99)
GLUCOSE SERPL-MCNC: 127 MG/DL (ref 70–99)
HCT VFR BLD AUTO: 32.3 % (ref 40–53)
HGB BLD-MCNC: 9.1 G/DL (ref 13.3–17.7)
INR PPP: 1.41 (ref 0.86–1.14)
MAGNESIUM SERPL-MCNC: 2 MG/DL (ref 1.6–2.3)
MCH RBC QN AUTO: 23.1 PG (ref 26.5–33)
MCHC RBC AUTO-ENTMCNC: 28.2 G/DL (ref 31.5–36.5)
MCV RBC AUTO: 82 FL (ref 78–100)
PLATELET # BLD AUTO: 255 10E9/L (ref 150–450)
POTASSIUM SERPL-SCNC: 3.5 MMOL/L (ref 3.4–5.3)
RBC # BLD AUTO: 3.94 10E12/L (ref 4.4–5.9)
SODIUM SERPL-SCNC: 134 MMOL/L (ref 133–144)
WBC # BLD AUTO: 6.9 10E9/L (ref 4–11)

## 2017-05-15 PROCEDURE — 25000128 H RX IP 250 OP 636: Performed by: PHYSICIAN ASSISTANT

## 2017-05-15 PROCEDURE — 85610 PROTHROMBIN TIME: CPT | Performed by: INTERNAL MEDICINE

## 2017-05-15 PROCEDURE — S5010 5% DEXTROSE AND 0.45% SALINE: HCPCS | Performed by: HOSPITALIST

## 2017-05-15 PROCEDURE — 25000125 ZZHC RX 250: Performed by: INTERNAL MEDICINE

## 2017-05-15 PROCEDURE — 80048 BASIC METABOLIC PNL TOTAL CA: CPT | Performed by: INTERNAL MEDICINE

## 2017-05-15 PROCEDURE — 85027 COMPLETE CBC AUTOMATED: CPT | Performed by: INTERNAL MEDICINE

## 2017-05-15 PROCEDURE — 97116 GAIT TRAINING THERAPY: CPT | Mod: GP | Performed by: PHYSICAL THERAPIST

## 2017-05-15 PROCEDURE — 40000916 ZZH STATISTIC SITTER, NIGHT HOURS

## 2017-05-15 PROCEDURE — 25800025 ZZH RX 258: Performed by: HOSPITALIST

## 2017-05-15 PROCEDURE — 25000125 ZZHC RX 250: Performed by: PHYSICIAN ASSISTANT

## 2017-05-15 PROCEDURE — 12000007 ZZH R&B INTERMEDIATE

## 2017-05-15 PROCEDURE — 83735 ASSAY OF MAGNESIUM: CPT | Performed by: INTERNAL MEDICINE

## 2017-05-15 PROCEDURE — 97530 THERAPEUTIC ACTIVITIES: CPT | Mod: GP | Performed by: PHYSICAL THERAPIST

## 2017-05-15 PROCEDURE — 97161 PT EVAL LOW COMPLEX 20 MIN: CPT | Mod: GP | Performed by: PHYSICAL THERAPIST

## 2017-05-15 PROCEDURE — 00000146 ZZHCL STATISTIC GLUCOSE BY METER IP

## 2017-05-15 PROCEDURE — 25000132 ZZH RX MED GY IP 250 OP 250 PS 637: Mod: GY

## 2017-05-15 PROCEDURE — 36415 COLL VENOUS BLD VENIPUNCTURE: CPT | Performed by: INTERNAL MEDICINE

## 2017-05-15 PROCEDURE — 25000132 ZZH RX MED GY IP 250 OP 250 PS 637: Mod: GY | Performed by: INTERNAL MEDICINE

## 2017-05-15 PROCEDURE — 71010 XR CHEST PORT 1 VW: CPT

## 2017-05-15 PROCEDURE — 99233 SBSQ HOSP IP/OBS HIGH 50: CPT | Performed by: INTERNAL MEDICINE

## 2017-05-15 PROCEDURE — 40000193 ZZH STATISTIC PT WARD VISIT: Performed by: PHYSICAL THERAPIST

## 2017-05-15 PROCEDURE — A9270 NON-COVERED ITEM OR SERVICE: HCPCS | Mod: GY

## 2017-05-15 PROCEDURE — 40000914 ZZH STATISTIC SITTER, DAY HOURS

## 2017-05-15 RX ORDER — SIMVASTATIN 10 MG
10 TABLET ORAL AT BEDTIME
COMMUNITY
End: 2017-05-28

## 2017-05-15 RX ORDER — GLIPIZIDE 5 MG/1
10 TABLET ORAL
Status: ON HOLD | COMMUNITY
End: 2017-05-28

## 2017-05-15 RX ORDER — LISINOPRIL 10 MG/1
10 TABLET ORAL DAILY
Status: ON HOLD | COMMUNITY
End: 2017-05-28

## 2017-05-15 RX ORDER — AMOXICILLIN 250 MG
2 CAPSULE ORAL 2 TIMES DAILY
Status: ON HOLD | COMMUNITY
End: 2017-05-28

## 2017-05-15 RX ORDER — FUROSEMIDE 20 MG
60 TABLET ORAL EVERY MORNING
COMMUNITY
End: 2017-05-28

## 2017-05-15 RX ORDER — I-VITE, TAB 1000-60-2MG (60/BT) 300MCG-200
1 TAB ORAL DAILY
COMMUNITY
End: 2017-05-28

## 2017-05-15 RX ORDER — FUROSEMIDE 20 MG
30 TABLET ORAL
COMMUNITY
End: 2017-05-28

## 2017-05-15 RX ORDER — WARFARIN SODIUM 3 MG/1
3 TABLET ORAL
Status: COMPLETED | OUTPATIENT
Start: 2017-05-15 | End: 2017-05-15

## 2017-05-15 RX ORDER — WARFARIN SODIUM 4 MG/1
4 TABLET ORAL EVERY EVENING
Status: ON HOLD | COMMUNITY
End: 2017-05-28

## 2017-05-15 RX ORDER — GABAPENTIN 100 MG/1
200 CAPSULE ORAL AT BEDTIME
COMMUNITY
End: 2017-05-28

## 2017-05-15 RX ORDER — POLYETHYLENE GLYCOL 3350 17 G/17G
1 POWDER, FOR SOLUTION ORAL DAILY
Status: ON HOLD | COMMUNITY
End: 2017-05-28

## 2017-05-15 RX ADMIN — METOPROLOL TARTRATE 5 MG: 5 INJECTION INTRAVENOUS at 22:49

## 2017-05-15 RX ADMIN — Medication 0.5 MG: at 11:37

## 2017-05-15 RX ADMIN — WARFARIN SODIUM 3 MG: 3 TABLET ORAL at 17:19

## 2017-05-15 RX ADMIN — Medication 0.5 MG: at 02:41

## 2017-05-15 RX ADMIN — PHENOL 1 ML: 1.5 LIQUID ORAL at 17:16

## 2017-05-15 RX ADMIN — PHENOL 1 ML: 1.5 LIQUID ORAL at 14:00

## 2017-05-15 RX ADMIN — PHENOL 1 ML: 1.5 LIQUID ORAL at 09:35

## 2017-05-15 RX ADMIN — ACETAMINOPHEN 1000 MG: 10 INJECTION, SOLUTION INTRAVENOUS at 14:21

## 2017-05-15 RX ADMIN — Medication 0.5 MG: at 08:22

## 2017-05-15 RX ADMIN — PHENOL 1 ML: 1.5 LIQUID ORAL at 04:07

## 2017-05-15 RX ADMIN — MICONAZOLE NITRATE: 2 POWDER TOPICAL at 18:00

## 2017-05-15 RX ADMIN — Medication 0.5 MG: at 17:29

## 2017-05-15 RX ADMIN — ACETAMINOPHEN 1000 MG: 10 INJECTION, SOLUTION INTRAVENOUS at 02:12

## 2017-05-15 RX ADMIN — METOPROLOL TARTRATE 5 MG: 5 INJECTION INTRAVENOUS at 11:36

## 2017-05-15 RX ADMIN — PHENOL 1 ML: 1.5 LIQUID ORAL at 02:22

## 2017-05-15 RX ADMIN — DEXTROSE AND SODIUM CHLORIDE: 5; 450 INJECTION, SOLUTION INTRAVENOUS at 11:24

## 2017-05-15 RX ADMIN — METOPROLOL TARTRATE 5 MG: 5 INJECTION INTRAVENOUS at 17:03

## 2017-05-15 ASSESSMENT — PAIN DESCRIPTION - DESCRIPTORS
DESCRIPTORS: SORE
DESCRIPTORS: SORE

## 2017-05-15 NOTE — PLAN OF CARE
Problem: Goal Outcome Summary  Goal: Goal Outcome Summary  PT: Orders received. Evaluation completed and treatment initiated. At baseline, pt lives in ROSA with spouse and receives assist for medication management, and lymphedema wrapping. Pt utilizes a cane, but is other indep with ADLs. Today, pt limited secondary to pain and deconditioning. Pt completes sit<>stand with Clarence and cues for safe hand placement. Pt ambulates short distance in room with minAx1 and use of cane and IV pole support. Issued walker to pt's room for use during stay. Rec pt discharge home with increased assist and HHPT versus TCU pending progress with PT.

## 2017-05-15 NOTE — PLAN OF CARE
Problem: Goal Outcome Summary  Goal: Goal Outcome Summary  Outcome: No Change  Pt remains hospitalized for: SBO   Ambulatory Status:  Pt up 2 assist.  Orientation: alert to self and place only   VS:  VSS on 2L O2.   Pain:  C/o abd pain and bladder pain, ofirmev and dilaudid given    Resp: LS clear.  GI:  denies nausea.  NPO. BS Hypo. Denies passing flatus.  NG with 0 output this shift.   :  Kelly patent.   Skin:  Bobby areas on favio lower ext.   Labs:   and 147            Consults:  Surgery, SW, PT   Disposition:  tbd

## 2017-05-15 NOTE — OP NOTE
DATE OF SERVICE:  05/14/2017      PREOPERATIVE DIAGNOSIS:  Small bowel obstruction.      POSTOPERATIVE DIAGNOSIS:  Ileus.      PROCEDURE:  Exploratory laparotomy with peritoneal washout.      ANESTHESIA:  General plus local.      SURGEON:  Jorge Keith MD      ASSISTANT:  Yusra Ricks PA-C, whose expertise in exposure, retraction, suturing, and identification of critical structures and decision making were paramount to the surgery.      SPECIMENS:  Peritoneal fluid submitted for routine culture and Gram stain.      COMPLICATIONS:  None.      INDICATIONS:  Mr. Ton Bagley is an 89-year-old gentleman with an abdominal surgical history significant for laparoscopic cholecystectomy, who came to the ER overnight with three days of worsening abdominal pain.  Findings at the time of his evaluation showed a normal white count, CT scan showed findings consistent with a possible closed loop obstruction, with swirling of the mesentery in the right julian-abdomen.  He stated he felt better this morning, but did have tenderness on exam, and had an abdominal plain film which showed worsening of his obstruction.  Because of his clinical course, I offered and recommended exploratory laparotomy today.  Risks of the procedure were discussed with his son in detail; these include infection, bleeding, harm to adjacent structures, need for bowel resection, possibility of anastomotic leak in that context, as well as the possibility of having postoperative medical complications such as myocardial infarction, pneumonia, ventilator dependence and the like.  The patient's son verbalized understanding of all the above, and gave consent on his behalf to proceed.      FINDINGS:  The small bowel was dilated from the ligament of Treitz to the ileocecal valve.  There was no site of obstruction mechanically.  There was some mild edema in the mesentery.  There was some turbid ascites present as well.  The visualized sigmoid colon and cecum as  well as appendix were all normal.      DESCRIPTION OF PROCEDURE:  With the patient under excellent general anesthesia in a supine position, Eklly catheter was placed, and the abdomen was clippered and prepped and draped out in the usual sterile surgical fashion.  A timeout was then performed, confirming the patient, procedure to be done, as well as drug allergies.  He did receive a dose of Invanz for prophylaxis in the event of a bowel resection.  We began by making an incision extending from just inferior to the umbilicus up to about 10 cm above the umbilicus.  Electrocautery dissection was carried out down to the level of the midline fascia, which was incised with electrocautery as well.  The peritoneum was identified and divided sharply with the Metzenbaum scissors.  The fascia and peritoneum were then opened the entire length of our skin incision with our fingers as a backstop.  We visualized some dilated small bowel deep to the incision immediately.  There was also some turbid ascites present; approximately 8 cc of this was aspirated and sent for routine culturing.  We placed an Sunny wound protector into the wound and eviscerated the small bowel.  It was moderately dilated with some lymphatic congestion on the serosa as well.  We ran the bowel in one direction which ended up being proximal, and we followed it up to the level of the ligament of Treitz without abnormality or transition point.  We followed the bowel in the opposite direction all the way to the ileocecal valve, and again though there was some decompression of the distal bowel, there was no abrupt transition point, nor evidence that there had been a pinch point in any of the observed bowel.  The cecum was grossly normal, and we were able to identify the appendix as well which was visually normal.  The sigmoid colon was palpated, visualized, and also found to be without inflammation.  We then ran the bowel from distal to proximal, this time milking  the fluid and gas back toward the NG tube with some moderate amount of returns.  We then copiously irrigated the peritoneum with more than 3 liters of warm saline until it was clear.  We removed the wound protector and closed the fascia with #1 PDS stitches x2.  We irrigated the subcutaneous space, and injected 30 cc of 0.5% Marcaine into the deeper tissues and subcutaneous space as well.  We closed the skin with interrupted staples, and applied a dry sterile dressing to this.  The patient tolerated the procedure well.  He was extubated and brought to recovery in excellent condition.  All sharps and sponge counts were correct at the conclusion of the case.         RAMA MASTERSON MD             D: 2017 11:36   T: 05/15/2017 06:13   MT: [id number]      Name:     BRIANA HERNÁNDEZ   MRN:      [id number]        Account:        [id number]   :      1927           Procedure Date: 2017      Document: [id number]       cc: [facility]

## 2017-05-15 NOTE — PROGRESS NOTES
"[facility]  Hospitalist Progress Note  Rick Dempsey, DO 05/15/2017    Reason for Stay (Diagnosis): SBO         Assessment and Plan:      Summary of Stay: Ton Bagley is a 89 year old male admitted on 5/13/2017 with SBO   Problem List:   1. Small bowel obstruction. S/P exploratory laparotomy by General Surgery on 5/14. Pain meds PRN. NPO for now. IV fluids. Use incentive spirometry.  Will discuss with surgery if patient needs continued antibiotics from their perspective.  2. Abnormal urine analysis. Urine culture with less than 10,000 colonies of urogenital conner.  Stop IV Ceftriaxone.   3. Diabetes Mellitus. Hold Glipizide while NPO. Novolog SSI.  4. HTN. IV Metoprolol while NPO.  5. Hyperlipidemia. Hold Zocor while NPO.  6. Hypothyroidism. Hold Synthroid while NPO.  7. A fib. IV Metoprolol while NPO. Restart Warfarin with Pharm D to dose.  8. CAD. IV Metoprolol while NPO.  9. Dementia. Hold Namenda and Aricept while NPO.  10. Acute on chronic kidney disease. Creatinine improved to 1.2.  Avoid nephrotoxins as much as possible. IV fluids.  DVT Prophylaxis: Pneumatic Compression Devices  Code Status: DNR / DNI  Discharge Dispo: TBD  Estimated Disch Date / # of Days until Disch: 3-4        Interval History (Subjective):      Having abdominal pain. Cough.  Denies CP, SOB, F/C, N/V, or diarrhea.                  Physical Exam:      Last Vital Signs:  /66 (BP Location: Left arm)  Pulse 88  Temp 98.2  F (36.8  C) (Axillary)  Resp 16  Ht 1.702 m (5' 7\")  Wt 103.8 kg (228 lb 12.8 oz)  SpO2 96%  BMI 35.84 kg/m2    Gen:  NAD, A&Ox1 to self.  Not oriented to place or time.  Eyes:  PERRL, sclera anicteric.  OP:  MMM, no lesions.  Neck:  Supple.  CV:  Mildly irregular, +2/6 murmur.  Lung:  CTA b/l, normal effort.  Ab:  No bowel sounds noted.  Skin:  Warm, dry to touch.  No rash.  Ext:  No pitting edema LE b/l.           Medications:      All current medications were reviewed with changes " reflected in problem list.         Data:      All new lab and imaging data was reviewed.   Labs:    Recent Labs  Lab 05/15/17  0722      POTASSIUM 3.5   CHLORIDE 99   CO2 31   ANIONGAP 4   *   BUN 23   CR 1.21   GFRESTIMATED 56*   GFRESTBLACK 68   JANN 8.2*       Recent Labs  Lab 05/15/17  0722   WBC 6.9   HGB 9.1*   HCT 32.3*   MCV 82         Imaging:   No results found for this or any previous visit (from the past 24 hour(s)).

## 2017-05-15 NOTE — PROGRESS NOTES
"[facility]   General Surgery Progress Note           Assessment and Plan:   Assessment:   POD#1 s/p Procedure(s):  Exploratory laparotomy with peritoneal washout.  - Wound Class: III-Contaminated  Ileus as expected      Plan:   -NPO/NGT  -pain control:  Ofirmev, Dilaudid  -prophylaxis:  Protonix, PCDs.  Hold anticoagulation today due to bleed risk   -increase activity, add abd binder           Interval History:   Pt with dementia, appears comfortable in bed but c/o abd pain.  Not OOB yet.  No flatus.  + kiran.  Tolerating NGT.         Physical Exam:   Blood pressure 124/62, pulse 88, temperature 97.8  F (36.6  C), temperature source Axillary, resp. rate 18, height 1.702 m (5' 7\"), weight 103.8 kg (228 lb 12.8 oz), SpO2 98 %.    I/O last 3 completed shifts:  In: 2203 [I.V.:2203]  Out: 1785 [Urine:1000; Emesis/NG output:575; Other:200; Blood:10]    Abdomen:   firm, distended, tenderness noted diffusely and absent bowel sounds   Inc(s) - clean, dry, intact.  + staples.  New dressing placed          Data:     Recent Labs   Lab Test  05/15/17   0722  05/14/17   0633  05/13/17   1835   HGB  9.1*  10.3*  10.5*   WBC  6.9  9.3  7.0       Adri Thrasher PA-C     Patient seen and examined. Agree with above. From my perspective may restart anti-coagulation today.    Hector Balderas MD  [phone] (c)  [phone] (p)     This note was created using voice recognition software. Undetected word substitutions or other errors may have occurred.    "

## 2017-05-15 NOTE — CONSULTS
Care Transition Initial Assessment -   Reason For Consult: discharge planning  Met with: Patient and wife.      Active Problems:    Small bowel obstruction (H)         DATA  Lives With: spouse  Living Arrangements: assisted living  Description of Support System: Supportive  Who is your support system?: Wife     Identified issues/concerns regarding health management: Pt lives at [facility] in [place] with his wife.  She is independent.  However, pt does receive help with his lymphedema wraps, blood pressure checks, medications and 3 meals a day.  However, he does have a difficult time getting down to the dining room d/t fatigue.  He is up independently with his cane.  They do have interim home care available in the building if needed.  I requested PT consult from MD.  Pt and wife are agreeable to home care PT support if recommended through interim.  [employer] Director Leticia [phone] would like dc orders faxed to [phone] and to be notified of dc.  Return on the weekend may be difficult.  Call [phone] personal cell. Because they have limited coverage for RN support.        ASSESSMENT  Cognitive Status:  awake and alert     PLAN  Patient/family is agreeable to the plan?  Yes:   Patient Goals and Preferences: YES .  Patient anticipates discharging to:  Home to [facility] with Inter[facility] PT support if recommended .    Ciera RN CTS [phone]

## 2017-05-15 NOTE — PLAN OF CARE
"Problem: Goal Outcome Summary  Goal: Goal Outcome Summary  Outcome: No Change  /61 (BP Location: Left arm)  Pulse 88  Temp 98.3  F (36.8  C) (Oral)  Resp 20  Ht 1.702 m (5' 7\")  Wt 103.8 kg (228 lb 12.8 oz)  SpO2 99%  BMI 35.84 kg/m2  Neuro: Alert to self and place, confused and forgetful, calm and lethargic this evening, able to redirect behaviors and confusion  Pain: c/o pain in stomach and discomfort from kiran, controlled with IV tylenol and IV dilaudid x1  Resp: LS diminished, on 2L O2 via oxymask, uses CPAP overnight  Cardiac: WDL, scheduled IV metoprolol,   GI/: bowel sounds hypoactive, no gas noted, kiran patent, denies nausea, NG tube on low-intermitant sxn, green/ brown drainage  Diet: NPO  Skin/mobility: brown discoloration to legs, redness to groin, miconazole powder applied, midline incision, some additional drainage noted on dressing. Tolerated standing with Ax1 and cane.  Tx:  D5 1/2 NS continuous, IV rocephin, NG sxn  Will continue to monitor and provide supportive care.             "

## 2017-05-15 NOTE — PROGRESS NOTES
05/15/17 1505   Quick Adds   Type of Visit Initial PT Evaluation   Living Environment   Lives With spouse   Living Arrangements assisted living   Home Accessibility no concerns   Number of Stairs to Enter Home 0   Number of Stairs Within Home 0   Stair Railings at Home none   Transportation Available family or friend will provide   Self-Care   Dominant Hand right   Usual Activity Tolerance good   Current Activity Tolerance fair   Regular Exercise no   Equipment Currently Used at Home cane, straight   Functional Level Prior   Ambulation 1-->assistive equipment   Transferring 1-->assistive equipment   Toileting 0-->independent   Bathing 0-->independent   Dressing 0-->independent   Eating 0-->independent   Communication 0-->understands/communicates without difficulty   Swallowing 0-->swallows foods/liquids without difficulty   Cognition 1 - attention or memory deficits   Fall history within last six months no   Which of the above functional risks had a recent onset or change? ambulation;transferring   Prior Functional Level Comment Pt receives assist for cleaning, medication management, lymphedema wraps   General Information   Onset of Illness/Injury or Date of Surgery - Date 05/13/17   Referring Physician Rick Dempsey, DO   Patient/Family Goals Statement None stated   Pertinent History of Current Problem (include personal factors and/or comorbidities that impact the POC) Ton Bagley is a 89 year old male admitted on 5/13/2017 with SBO. PMH significant for: CAD, HTN, AFib, and dementia   Precautions/Limitations abdominal precautions   Weight-Bearing Status - LLE full weight-bearing   Weight-Bearing Status - RLE full weight-bearing   General Observations Pt sitting in chair at bedside. Initially agreeable to session.    Cognitive Status Examination   Orientation person   Level of Consciousness alert   Follows Commands and Answers Questions 100% of the time   Personal Safety and Judgment intact   Memory  "impaired   Pain Assessment   Patient Currently in Pain Yes, see Vital Sign flowsheet   Integumentary/Edema   Integumentary/Edema no deficits were identifed   Posture    Posture Forward head position   Range of Motion (ROM)   ROM Comment WNL   Strength   Strength Comments Able to SLR B   Bed Mobility   Bed Mobility Comments Not assessed   Transfer Skills   Transfer Comments sit<>stand with Clarence   Gait   Gait Comments Ambulates with cane and Clarence   Sensory Examination   Sensory Perception no deficits were identified   Coordination   Coordination no deficits were identified   Muscle Tone   Muscle Tone no deficits were identified   General Therapy Interventions   Planned Therapy Interventions bed mobility training;gait training;ROM;strengthening;stretching;transfer training;home program guidelines;progressive activity/exercise   Clinical Impression   Criteria for Skilled Therapeutic Intervention yes, treatment indicated   PT Diagnosis impaired mobility   Influenced by the following impairments Pain, deconditioning   Functional limitations due to impairments Difficulty with bed mobility, transfers, ambulation   Clinical Presentation Stable/Uncomplicated   Clinical Presentation Rationale medically stable   Clinical Decision Making (Complexity) Low complexity   Therapy Frequency` daily   Predicted Duration of Therapy Intervention (days/wks) 5 days   Anticipated Equipment Needs at Discharge walker   Anticipated Discharge Disposition Home with Home Therapy;Transitional Care Facility   Risk & Benefits of therapy have been explained Yes   Patient, Family & other staff in agreement with plan of care Yes   [facility] mention TM \"6 Clicks\"   2016, Trustees of [facility], under license to [employer].  All rights reserved.   6 Clicks Short Forms Basic Mobility Inpatient Short Form   [facility] [employer]  \"6 Clicks\" V.2 Basic Mobility Inpatient Short Form   1. Turning from your back to your side while in a flat " bed without using bedrails? 3 - A Little   2. Moving from lying on your back to sitting on the side of a flat bed without using bedrails? 2 - A Lot   3. Moving to and from a bed to a chair (including a wheelchair)? 3 - A Little   4. Standing up from a chair using your arms (e.g., wheelchair, or bedside chair)? 3 - A Little   5. To walk in hospital room? 3 - A Little   6. Climbing 3-5 steps with a railing? 2 - A Lot   Basic Mobility Raw Score (Score out of 24.Lower scores equate to lower levels of function) 16   Total Evaluation Time   Total Evaluation Time (Minutes) 10

## 2017-05-15 NOTE — PLAN OF CARE
Problem: Goal Outcome Summary  Goal: Goal Outcome Summary  Outcome: No Change  Pain managed with IV tylenol and dilaudid, abd dressing with increased drainage, remarked. Kelly patent, NG draining yellow/brown drainage. Bowel sounds absent, no passing flatus.

## 2017-05-15 NOTE — PHARMACY-ADMISSION MEDICATION HISTORY
Admission medication history interview status for this patient is complete. See [facility] admission navigator for allergy information, prior to admission medications and immunization status.     Medication history interview source(s):Patient is a resident at [facility] (nursing staff administered medications)  Medication history resources (including written lists, pill bottles, clinic record): Med list from [facility], spoke w/ nurse  Primary pharmacy: n/a    Changes made to PTA medication list:  Added: sarna lotion, senna-docusate, PEG  Deleted: alendronate, ascorbic acid, aspirin, donepezil, hydrochlorothiazide, memantine, fish oil  Changed: added dose to calcium, furosemide 20mg daily --> 60mg qam & 30mg qnoon, gabapentin 100mg BID --> 200mg QHS, glipizide 2.5mg BID --> 10mg BID, lisinopril 20mg --> 10mg, MVI --> IVITE, simvastatin 20mg --> 10mg, warfarin dose    Actions taken by pharmacist (provider contacted, etc): Alerted MD to changes     Additional medication history information:    **Warfarin 4mg daily, next INR check was scheduled for 5/23      Medication reconciliation/reorder completed by provider prior to medication history? Yes        For patients on insulin therapy: No  Lantus/levemir/NPH/Mix 70/30 dose:  _____   in AM/PM  or twice daily   Sliding scale Novolog Y/N  If Yes, do you have a baseline novolog pre-meal dose:  ______units with meals   Patients eat three meals a day:   Y/N     Any Barriers to therapy:  cost of medications/comfortable with giving injections (if applicable)/ comfortable and confident with current diabetes regimen       Prior to Admission medications    Medication Sig Last Dose Taking? Auth Provider   furosemide (LASIX) 20 MG tablet Take 60 mg by mouth every morning Past Week at Unknown time Yes Unknown, Entered By History   furosemide (LASIX) 20 MG tablet Take 30 mg by mouth daily (with lunch) Past Week at Unknown time Yes Unknown, Entered By History   glipiZIDE (GLUCOTROL)  5 MG tablet Take 10 mg by mouth 2 times daily (before meals) Past Week at Unknown time Yes Unknown, Entered By History   multivitamin (I-REBEL) TABS per tablet Take 1 tablet by mouth daily Past Week at Unknown time Yes Unknown, Entered By History   omeprazole (PRILOSEC) 20 MG CR capsule Take 20 mg by mouth daily Past Week at Unknown time Yes Unknown, Entered By History   Calcium Carb-Cholecalciferol (CALCIUM 500+D) 500-200 MG-UNIT TABS Take 1 tablet by mouth 2 times daily Past Week at Unknown time Yes Unknown, Entered By History   polyethylene glycol (MIRALAX/GLYCOLAX) powder Take 1 capful by mouth daily Past Week at Unknown time Yes Unknown, Entered By History   camphor-menthol (DERMASARRA) 0.5-0.5 % LOTN Apply topically daily To affected areas on both legs Past Week at Unknown time Yes Unknown, Entered By History   senna-docusate (SENOKOT-S;PERICOLACE) 8.6-50 MG per tablet Take 2 tablets by mouth 2 times daily Past Week at Unknown time Yes Unknown, Entered By History   lisinopril (PRINIVIL/ZESTRIL) 10 MG tablet Take 10 mg by mouth daily Past Week at Unknown time Yes Unknown, Entered By History   gabapentin (NEURONTIN) 100 MG capsule Take 200 mg by mouth At Bedtime Past Week at Unknown time Yes Unknown, Entered By History   simvastatin (ZOCOR) 10 MG tablet Take 10 mg by mouth At Bedtime Past Week at Unknown time Yes Unknown, Entered By History   warfarin (COUMADIN) 4 MG tablet Take 4 mg by mouth every evening Past Week at Unknown time Yes Unknown, Entered By History   levothyroxine (SYNTHROID,LEVOTHROID) 100 MCG tablet Take 100 mcg by mouth daily Past Week at Unknown time Yes Reported, Patient   tamsulosin (FLOMAX) 0.4 MG 24 hr capsule Take 0.4 mg by mouth every evening  Past Week at Unknown time Yes Reported, Patient   atenolol (TENORMIN) 50 MG tablet Take 1 tablet (50 mg) by mouth daily Past Week at Unknown time Yes Julio Conde MD   nitroglycerin (NITROSTAT) 0.4 MG SL tablet Place under the tongue every 5  minutes as needed for chest pain Unknown at Unknown time  Reported, Patient

## 2017-05-16 ENCOUNTER — APPOINTMENT (OUTPATIENT)
Dept: PHYSICAL THERAPY | Facility: CLINIC | Age: 82
DRG: 357 | End: 2017-05-16
Attending: INTERNAL MEDICINE
Payer: MEDICARE

## 2017-05-16 LAB
ANION GAP SERPL CALCULATED.3IONS-SCNC: 5 MMOL/L (ref 3–14)
BUN SERPL-MCNC: 12 MG/DL (ref 7–30)
CALCIUM SERPL-MCNC: 8.3 MG/DL (ref 8.5–10.1)
CHLORIDE SERPL-SCNC: 100 MMOL/L (ref 94–109)
CO2 SERPL-SCNC: 31 MMOL/L (ref 20–32)
CREAT SERPL-MCNC: 0.95 MG/DL (ref 0.66–1.25)
GFR SERPL CREATININE-BSD FRML MDRD: 75 ML/MIN/1.7M2
GLUCOSE BLDC GLUCOMTR-MCNC: 123 MG/DL (ref 70–99)
GLUCOSE BLDC GLUCOMTR-MCNC: 124 MG/DL (ref 70–99)
GLUCOSE BLDC GLUCOMTR-MCNC: 133 MG/DL (ref 70–99)
GLUCOSE BLDC GLUCOMTR-MCNC: 134 MG/DL (ref 70–99)
GLUCOSE BLDC GLUCOMTR-MCNC: 146 MG/DL (ref 70–99)
GLUCOSE BLDC GLUCOMTR-MCNC: 148 MG/DL (ref 70–99)
GLUCOSE SERPL-MCNC: 127 MG/DL (ref 70–99)
INR PPP: 1.43 (ref 0.86–1.14)
PLATELET # BLD AUTO: 209 10E9/L (ref 150–450)
POTASSIUM SERPL-SCNC: 3.4 MMOL/L (ref 3.4–5.3)
SODIUM SERPL-SCNC: 136 MMOL/L (ref 133–144)

## 2017-05-16 PROCEDURE — 40000274 ZZH STATISTIC RCP CONSULT EA 30 MIN

## 2017-05-16 PROCEDURE — 94640 AIRWAY INHALATION TREATMENT: CPT

## 2017-05-16 PROCEDURE — 97530 THERAPEUTIC ACTIVITIES: CPT | Mod: GP | Performed by: PHYSICAL THERAPIST

## 2017-05-16 PROCEDURE — 40000915 ZZH STATISTIC SITTER, EVENING HOURS

## 2017-05-16 PROCEDURE — A9270 NON-COVERED ITEM OR SERVICE: HCPCS | Mod: GY | Performed by: HOSPITALIST

## 2017-05-16 PROCEDURE — 25800025 ZZH RX 258: Performed by: HOSPITALIST

## 2017-05-16 PROCEDURE — 12000007 ZZH R&B INTERMEDIATE

## 2017-05-16 PROCEDURE — 80048 BASIC METABOLIC PNL TOTAL CA: CPT | Performed by: SURGERY

## 2017-05-16 PROCEDURE — 00000146 ZZHCL STATISTIC GLUCOSE BY METER IP

## 2017-05-16 PROCEDURE — 40000914 ZZH STATISTIC SITTER, DAY HOURS

## 2017-05-16 PROCEDURE — 94640 AIRWAY INHALATION TREATMENT: CPT | Mod: 76

## 2017-05-16 PROCEDURE — S5010 5% DEXTROSE AND 0.45% SALINE: HCPCS | Performed by: HOSPITALIST

## 2017-05-16 PROCEDURE — 40000275 ZZH STATISTIC RCP TIME EA 10 MIN

## 2017-05-16 PROCEDURE — 97110 THERAPEUTIC EXERCISES: CPT | Mod: GP | Performed by: PHYSICAL THERAPIST

## 2017-05-16 PROCEDURE — 85049 AUTOMATED PLATELET COUNT: CPT | Performed by: SURGERY

## 2017-05-16 PROCEDURE — 25000132 ZZH RX MED GY IP 250 OP 250 PS 637: Mod: GY | Performed by: HOSPITALIST

## 2017-05-16 PROCEDURE — 25000125 ZZHC RX 250: Performed by: PHYSICIAN ASSISTANT

## 2017-05-16 PROCEDURE — 99233 SBSQ HOSP IP/OBS HIGH 50: CPT | Performed by: INTERNAL MEDICINE

## 2017-05-16 PROCEDURE — 40000193 ZZH STATISTIC PT WARD VISIT: Performed by: PHYSICAL THERAPIST

## 2017-05-16 PROCEDURE — 25000128 H RX IP 250 OP 636: Performed by: PHYSICIAN ASSISTANT

## 2017-05-16 PROCEDURE — 25000128 H RX IP 250 OP 636: Performed by: INTERNAL MEDICINE

## 2017-05-16 PROCEDURE — 25000125 ZZHC RX 250: Performed by: INTERNAL MEDICINE

## 2017-05-16 PROCEDURE — 85610 PROTHROMBIN TIME: CPT | Performed by: SURGERY

## 2017-05-16 PROCEDURE — 36415 COLL VENOUS BLD VENIPUNCTURE: CPT | Performed by: SURGERY

## 2017-05-16 RX ORDER — WARFARIN SODIUM 3 MG/1
3 TABLET ORAL
Status: COMPLETED | OUTPATIENT
Start: 2017-05-16 | End: 2017-05-16

## 2017-05-16 RX ORDER — MORPHINE SULFATE 2 MG/ML
2-4 INJECTION, SOLUTION INTRAMUSCULAR; INTRAVENOUS
Status: DISCONTINUED | OUTPATIENT
Start: 2017-05-16 | End: 2017-05-20

## 2017-05-16 RX ORDER — IPRATROPIUM BROMIDE AND ALBUTEROL SULFATE 2.5; .5 MG/3ML; MG/3ML
3 SOLUTION RESPIRATORY (INHALATION)
Status: DISCONTINUED | OUTPATIENT
Start: 2017-05-16 | End: 2017-05-24

## 2017-05-16 RX ORDER — ALBUTEROL SULFATE 0.83 MG/ML
2.5 SOLUTION RESPIRATORY (INHALATION)
Status: DISCONTINUED | OUTPATIENT
Start: 2017-05-16 | End: 2017-05-28 | Stop reason: HOSPADM

## 2017-05-16 RX ADMIN — METOPROLOL TARTRATE 5 MG: 5 INJECTION INTRAVENOUS at 10:49

## 2017-05-16 RX ADMIN — MORPHINE SULFATE 2 MG: 2 INJECTION, SOLUTION INTRAMUSCULAR; INTRAVENOUS at 05:32

## 2017-05-16 RX ADMIN — DEXTROSE AND SODIUM CHLORIDE: 5; 450 INJECTION, SOLUTION INTRAVENOUS at 00:02

## 2017-05-16 RX ADMIN — METOPROLOL TARTRATE 5 MG: 5 INJECTION INTRAVENOUS at 18:29

## 2017-05-16 RX ADMIN — ACETAMINOPHEN 1000 MG: 10 INJECTION, SOLUTION INTRAVENOUS at 05:01

## 2017-05-16 RX ADMIN — MICONAZOLE NITRATE: 2 POWDER TOPICAL at 11:01

## 2017-05-16 RX ADMIN — METOPROLOL TARTRATE 5 MG: 5 INJECTION INTRAVENOUS at 03:31

## 2017-05-16 RX ADMIN — PHENOL 1 ML: 1.5 LIQUID ORAL at 18:34

## 2017-05-16 RX ADMIN — PANTOPRAZOLE SODIUM 40 MG: 40 INJECTION, POWDER, FOR SOLUTION INTRAVENOUS at 06:19

## 2017-05-16 RX ADMIN — DEXTROSE AND SODIUM CHLORIDE: 5; 450 INJECTION, SOLUTION INTRAVENOUS at 14:22

## 2017-05-16 RX ADMIN — Medication 0.5 MG: at 00:02

## 2017-05-16 RX ADMIN — MORPHINE SULFATE 2 MG: 2 INJECTION, SOLUTION INTRAMUSCULAR; INTRAVENOUS at 00:55

## 2017-05-16 RX ADMIN — WARFARIN SODIUM 3 MG: 3 TABLET ORAL at 18:02

## 2017-05-16 RX ADMIN — ACETAMINOPHEN 1000 MG: 10 INJECTION, SOLUTION INTRAVENOUS at 22:42

## 2017-05-16 RX ADMIN — MORPHINE SULFATE 2 MG: 2 INJECTION, SOLUTION INTRAMUSCULAR; INTRAVENOUS at 03:20

## 2017-05-16 RX ADMIN — IPRATROPIUM BROMIDE AND ALBUTEROL SULFATE 3 ML: .5; 3 SOLUTION RESPIRATORY (INHALATION) at 19:14

## 2017-05-16 RX ADMIN — MICONAZOLE NITRATE: 2 POWDER TOPICAL at 18:35

## 2017-05-16 RX ADMIN — MORPHINE SULFATE 2 MG: 2 INJECTION, SOLUTION INTRAMUSCULAR; INTRAVENOUS at 08:31

## 2017-05-16 RX ADMIN — IPRATROPIUM BROMIDE AND ALBUTEROL SULFATE 3 ML: .5; 3 SOLUTION RESPIRATORY (INHALATION) at 16:33

## 2017-05-16 NOTE — PLAN OF CARE
Problem: Goal Outcome Summary  Goal: Goal Outcome Summary  PT: pt with increased pain and needing assist to stand from the recliner and to lean forward. Abdominal binder on, ambulated in room with walker, unsteady at times and leans to the L side, low endurance currently. Will see how pt progresses but as of today he would need physical assist for all mobility at the [facility], so would recommend TCU.

## 2017-05-16 NOTE — PROGRESS NOTES
"[facility]  Hospitalist Progress Note  Rick Dempsey,  05/16/2017    Reason for Stay (Diagnosis): SBO         Assessment and Plan:      Summary of Stay: Ton Bagley is a 89 year old male admitted on 5/13/2017 with SBO   Problem List:   1. Small bowel obstruction. S/P exploratory laparotomy by General Surgery on 5/14. Pain meds PRN. NPO for now. IV fluids. Use incentive spirometry.   2. Abnormal urine analysis. Urine culture with less than 10,000 colonies of urogenital conner.  IV Ceftriaxone discontinued 5/15.   3. Diabetes Mellitus. Hold Glipizide while NPO. Novolog SSI.  4. HTN. IV Metoprolol while NPO.  5. Hyperlipidemia. Hold Zocor while NPO.  6. Hypothyroidism. Hold Synthroid while NPO.  7. A fib. IV Metoprolol while NPO.  Warfarin with Pharm D to dose.  8. CAD. IV Metoprolol while NPO.  9. Dementia. Hold Namenda and Aricept while NPO.  10. Acute on chronic kidney disease. Creatinine improved to 0.95. Avoid nephrotoxins as much as possible. IV fluids.  11. Bronchospasm.  Start Duonebs QID.  Albuterol PRN.  DVT Prophylaxis: Pneumatic Compression Devices  Code Status: DNR / DNI  Discharge Dispo: TBD  Estimated Disch Date / # of Days until Disch: 2-3        Interval History (Subjective):      Having some abdominal pain.  Short of breath at times.  No CP, F/C, N/V, or diarrhea.                  Physical Exam:      Last Vital Signs:  /72  Pulse 91  Temp 97.3  F (36.3  C) (Oral)  Resp 16  Ht 1.702 m (5' 7\")  Wt 103.8 kg (228 lb 12.8 oz)  SpO2 (!) 88%  BMI 35.84 kg/m2    Gen:  NAD, A&Ox2 to person and place.  Trouble with time.  Eyes:  PERRL, sclera anicteric.  OP:  MMM, no lesions.  Neck:  Supple.  CV:  Regular, no murmurs.  Lung:  CTA b/l, normal effort.  Ab:  No bowel sounds noted.  Moderate distension.  Skin:  Warm, dry to touch.  No rash.  Ext:  No pitting edema LE b/l.           Medications:      All current medications were reviewed with changes reflected in problem " list.         Data:      All new lab and imaging data was reviewed.   Labs:    Recent Labs  Lab 05/16/17  0716      POTASSIUM 3.4   CHLORIDE 100   CO2 31   ANIONGAP 5   *   BUN 12   CR 0.95   GFRESTIMATED 75   GFRESTBLACK >90African American GFR Calc   JANN 8.3*       Recent Labs  Lab 05/16/17  0716 05/15/17  0722   WBC  --  6.9   HGB  --  9.1*   HCT  --  32.3*   MCV  --  82    255      Imaging:   No results found for this or any previous visit (from the past 24 hour(s)).

## 2017-05-16 NOTE — PLAN OF CARE
Problem: Goal Outcome Summary  Goal: Goal Outcome Summary  Outcome: No Change  Pt VSS  IV lopressor  Pain to the throat and nose, IV dilaudid and throat spray  NG tube in place with 150 out  Dressing intact to the abdomen with minimal drainage  Sitter at bedside  Walked in the halls  Kelly with adequate urine output  Complains of being hungry  Frequently re-orientation

## 2017-05-16 NOTE — PROGRESS NOTES
"[facility]   General Surgery Progress Note           Assessment and Plan:   Assessment:   POD#2 s/p Procedure(s):  Exploratory laparotomy with peritoneal washout.  - Wound Class: III-Contaminated  Ileus as expected  Afebrile      Plan:   -NPO/NGT, OK for ice chips in small amount  -Pain control:  Ofirmev, Morphine  -GI prophylaxis:  Protonix, PCDs.    -VTE prophylaxis: will discuss anticoagulation plan with Dr. Keith today  -Activity as tolerated, recommend abdominal binder when up out of bed.   -D/C kiran when able         Interval History:   Up seated in chair. Sitter in room, reports pain when transferring from bed to chair. Primary complaint is hunger. +kiran. -flatus, -BM.          Physical Exam:   Blood pressure 125/65, pulse 89, temperature 97.3  F (36.3  C), temperature source Oral, resp. rate 16, height 1.702 m (5' 7\"), weight 103.8 kg (228 lb 12.8 oz), SpO2 98 %.    I/O last 3 completed shifts:  In: 1339 [I.V.:1339]  Out: 1900 [Urine:1000; Emesis/NG output:150; Other:750]    Abdomen:   firm, distended, denies tenderness, absent bowel sounds   Inc(s) - clean, dry, intact.  + staples.  New island dressing placed          Data:     Recent Labs   Lab Test  05/15/17   0722  05/14/17   0633  05/13/17   1835   HGB  9.1*  10.3*  10.5*   WBC  6.9  9.3  7.0       Nae Barker PA-C     Pt seen and examined, agree with above.  Good bowel tones but no flatus yet and still quite distended: continue NG for now.  Okay to restart coumadin dosing at any point.  "

## 2017-05-16 NOTE — PLAN OF CARE
Problem: Goal Outcome Summary  Goal: Goal Outcome Summary  Outcome: No Change  Patient very restless this evening. Complaints of abdominal pain 10/10. Dilaudid was ineffective, morphine ordered with some relief. Ice applied and tylenol administered. Patient alert to self and place, but not time or situation. Abdominal incision is covered with dressing, CDI. NPO. NG with green/brown output. Vital signs stable. 1 LPM oxymask. IVF infusing. Blood glucose stable. Kelly is patent with clear yellow urine. Continue POC.

## 2017-05-17 LAB
GLUCOSE BLDC GLUCOMTR-MCNC: 124 MG/DL (ref 70–99)
GLUCOSE BLDC GLUCOMTR-MCNC: 126 MG/DL (ref 70–99)
GLUCOSE BLDC GLUCOMTR-MCNC: 131 MG/DL (ref 70–99)
GLUCOSE BLDC GLUCOMTR-MCNC: 139 MG/DL (ref 70–99)
GLUCOSE BLDC GLUCOMTR-MCNC: 141 MG/DL (ref 70–99)
GLUCOSE BLDC GLUCOMTR-MCNC: 150 MG/DL (ref 70–99)
INR PPP: 1.36 (ref 0.86–1.14)
MAGNESIUM SERPL-MCNC: 2.1 MG/DL (ref 1.6–2.3)
POTASSIUM SERPL-SCNC: 3.6 MMOL/L (ref 3.4–5.3)

## 2017-05-17 PROCEDURE — 83735 ASSAY OF MAGNESIUM: CPT | Performed by: INTERNAL MEDICINE

## 2017-05-17 PROCEDURE — 36415 COLL VENOUS BLD VENIPUNCTURE: CPT | Performed by: INTERNAL MEDICINE

## 2017-05-17 PROCEDURE — 84132 ASSAY OF SERUM POTASSIUM: CPT | Performed by: INTERNAL MEDICINE

## 2017-05-17 PROCEDURE — 00000146 ZZHCL STATISTIC GLUCOSE BY METER IP

## 2017-05-17 PROCEDURE — 94640 AIRWAY INHALATION TREATMENT: CPT | Mod: 76

## 2017-05-17 PROCEDURE — S5010 5% DEXTROSE AND 0.45% SALINE: HCPCS | Performed by: HOSPITALIST

## 2017-05-17 PROCEDURE — 40000141 ZZH STATISTIC PERIPHERAL IV START W/O US GUIDANCE

## 2017-05-17 PROCEDURE — 25000132 ZZH RX MED GY IP 250 OP 250 PS 637: Mod: GY | Performed by: HOSPITALIST

## 2017-05-17 PROCEDURE — 85610 PROTHROMBIN TIME: CPT | Performed by: INTERNAL MEDICINE

## 2017-05-17 PROCEDURE — 40000275 ZZH STATISTIC RCP TIME EA 10 MIN

## 2017-05-17 PROCEDURE — 94640 AIRWAY INHALATION TREATMENT: CPT

## 2017-05-17 PROCEDURE — 25000128 H RX IP 250 OP 636: Performed by: INTERNAL MEDICINE

## 2017-05-17 PROCEDURE — 25800025 ZZH RX 258: Performed by: HOSPITALIST

## 2017-05-17 PROCEDURE — 99232 SBSQ HOSP IP/OBS MODERATE 35: CPT | Performed by: INTERNAL MEDICINE

## 2017-05-17 PROCEDURE — 25000125 ZZHC RX 250: Performed by: PHYSICIAN ASSISTANT

## 2017-05-17 PROCEDURE — A9270 NON-COVERED ITEM OR SERVICE: HCPCS | Mod: GY | Performed by: HOSPITALIST

## 2017-05-17 PROCEDURE — 12000007 ZZH R&B INTERMEDIATE

## 2017-05-17 PROCEDURE — 25000125 ZZHC RX 250: Performed by: INTERNAL MEDICINE

## 2017-05-17 RX ORDER — WARFARIN SODIUM 5 MG/1
5 TABLET ORAL
Status: COMPLETED | OUTPATIENT
Start: 2017-05-17 | End: 2017-05-17

## 2017-05-17 RX ADMIN — IPRATROPIUM BROMIDE AND ALBUTEROL SULFATE 3 ML: .5; 3 SOLUTION RESPIRATORY (INHALATION) at 19:23

## 2017-05-17 RX ADMIN — METOPROLOL TARTRATE 5 MG: 5 INJECTION INTRAVENOUS at 16:26

## 2017-05-17 RX ADMIN — METOPROLOL TARTRATE 5 MG: 5 INJECTION INTRAVENOUS at 22:24

## 2017-05-17 RX ADMIN — ENOXAPARIN SODIUM 100 MG: 100 INJECTION SUBCUTANEOUS at 17:47

## 2017-05-17 RX ADMIN — ACETAMINOPHEN 1000 MG: 10 INJECTION, SOLUTION INTRAVENOUS at 06:04

## 2017-05-17 RX ADMIN — ACETAMINOPHEN 1000 MG: 10 INJECTION, SOLUTION INTRAVENOUS at 20:12

## 2017-05-17 RX ADMIN — IPRATROPIUM BROMIDE AND ALBUTEROL SULFATE 3 ML: .5; 3 SOLUTION RESPIRATORY (INHALATION) at 07:44

## 2017-05-17 RX ADMIN — IPRATROPIUM BROMIDE AND ALBUTEROL SULFATE 3 ML: .5; 3 SOLUTION RESPIRATORY (INHALATION) at 11:45

## 2017-05-17 RX ADMIN — METOPROLOL TARTRATE 5 MG: 5 INJECTION INTRAVENOUS at 12:47

## 2017-05-17 RX ADMIN — DEXTROSE AND SODIUM CHLORIDE: 5; 450 INJECTION, SOLUTION INTRAVENOUS at 12:47

## 2017-05-17 RX ADMIN — WARFARIN SODIUM 5 MG: 5 TABLET ORAL at 19:12

## 2017-05-17 RX ADMIN — MORPHINE SULFATE 2 MG: 2 INJECTION, SOLUTION INTRAMUSCULAR; INTRAVENOUS at 12:47

## 2017-05-17 RX ADMIN — MORPHINE SULFATE 2 MG: 2 INJECTION, SOLUTION INTRAMUSCULAR; INTRAVENOUS at 00:08

## 2017-05-17 RX ADMIN — MORPHINE SULFATE 2 MG: 2 INJECTION, SOLUTION INTRAMUSCULAR; INTRAVENOUS at 06:04

## 2017-05-17 RX ADMIN — MORPHINE SULFATE 2 MG: 2 INJECTION, SOLUTION INTRAMUSCULAR; INTRAVENOUS at 03:43

## 2017-05-17 RX ADMIN — PHENOL 1 ML: 1.5 LIQUID ORAL at 06:07

## 2017-05-17 RX ADMIN — IPRATROPIUM BROMIDE AND ALBUTEROL SULFATE 3 ML: .5; 3 SOLUTION RESPIRATORY (INHALATION) at 15:56

## 2017-05-17 RX ADMIN — MICONAZOLE NITRATE: 2 POWDER TOPICAL at 13:16

## 2017-05-17 RX ADMIN — PANTOPRAZOLE SODIUM 40 MG: 40 INJECTION, POWDER, FOR SOLUTION INTRAVENOUS at 08:57

## 2017-05-17 RX ADMIN — DEXTROSE AND SODIUM CHLORIDE: 5; 450 INJECTION, SOLUTION INTRAVENOUS at 03:43

## 2017-05-17 RX ADMIN — METOPROLOL TARTRATE 5 MG: 5 INJECTION INTRAVENOUS at 06:04

## 2017-05-17 RX ADMIN — METOPROLOL TARTRATE 5 MG: 5 INJECTION INTRAVENOUS at 00:09

## 2017-05-17 NOTE — PROGRESS NOTES
"[facility]   General Surgery Progress Note           Assessment and Plan:   Assessment:   POD#3 s/p Procedure(s):  Exploratory laparotomy with peritoneal washout.  - Wound Class: III-Contaminated   H/o Afib  Ileus as expected  Afebrile      Plan:   -NPO/NGT, OK for ice chips in small amounts  -Pain control:  Ofirmev, Morphine  -GI prophylaxis:  Protonix    -VTE prophylaxis: PCDs, coumadin  -Activity as tolerated, recommend abdominal binder when up out of bed.   -D/C kiran when able         Interval History:   Sleepy today.  Pain controlled.  Up walking with assistance.  Denies nausea.  + hungry.  No flatus yet.         Physical Exam:   Blood pressure 117/68, pulse 94, temperature 98  F (36.7  C), temperature source Oral, resp. rate 16, height 1.702 m (5' 7.01\"), weight 103.8 kg (228 lb 12.8 oz), SpO2 97 %.    I/O last 3 completed shifts:  In: 2502.5 [P.O.:30; I.V.:2472.5]  Out: 850 [Urine:600; Emesis/NG output:250]    Abdomen:   firm, distended, denies tenderness, hypoactive bowel sounds   Inc(s) - clean, dry, intact.  + staples.           Data:       Recent Labs  Lab 05/16/17  0716 05/15/17  0722 05/14/17  0633 05/13/17  1835   WBC  --  6.9 9.3 7.0   HGB  --  9.1* 10.3* 10.5*   HCT  --  32.3* 35.8* 35.8*   MCV  --  82 82 79    255 317 294       Recent Labs  Lab 05/15/17  0722 05/14/17  0633 05/13/17  1835   HGB 9.1* 10.3* 10.5*         Adri Thrasher PA-C     Pt sound asleep, did not wake  Still quite distended on exam, rare bowel tones.  Continue NG until ileus clinically resolving.  "

## 2017-05-17 NOTE — PLAN OF CARE
Problem: Goal Outcome Summary  Goal: Goal Outcome Summary  Outcome: No Change  Patient doing well. Minimal reports of pain. Throat irritation from NG. NG to LIS. Bowel sounds hypoactive. Negative flatus. Vital signs stable. 1 LPM oxymask. Blood glucose stable at 146. Continues NPO. Urinating without difficulty. Dressing has some marked drainage. Ambulated to bed with assist of 2 and a walker. IVF infusing. Continue POC.

## 2017-05-17 NOTE — PROGRESS NOTES
[facility]    Hospitalist Progress Note    Date of Service (when I saw the patient): 05/17/2017    Assessment & Plan   Ton Bagley is a 89 year old male who was admitted on 5/13/2017. He was admitted with abdominal pain and a CT and exam that looked like an obstruction, but no obstruction found    Summary:  Abdominal pain, maybe better.  Dementia, making the history difficult  Afib, was on warfarin, not now, will start Lovenox  Hypertension, treated in the past, NPO now.  History of TIIDM, glucose controlled so far  Renal function is normal  Hyperlipidemia, not eating  Hypothyroidism, no medicines since 4 days ago, will restart IV Levothyroxine  Chronic leg edema, likely due to his abdominal obesity  I'm concerned about his fluid status    What I did today: reviewed the chart, ordered IV levothyroxine and started Lovenox.  Did a history that is limited due to the dementia and an exam. Ordered a weight, BMP and BNP  -    -    -      DVT Prophylaxis: Pneumatic Compression Devices  Code Status: DNR/DNI    Disposition: Expected discharge in when his ileus resolves.    Sergio Rosales MD    Interval History   He says he has some abdominal pain but can't tell me if it is better or worse.  Says he doesn't feel well. Denies head or chest pain. Legs hurt, not clear if it is due to the pneumoboots or not.  Denies dyspnea.    -Data reviewed today: I reviewed all new labs and imaging results over the last 24 hours. I personally reviewed no images or EKG's today.    Physical Exam   Temp: 98  F (36.7  C) Temp src: Oral BP: 143/74 Pulse: 101 Heart Rate: 80 Resp: 20 SpO2: 99 % O2 Device: Nasal cannula Oxygen Delivery: 2 LPM  Vitals:    05/13/17 2138 05/13/17 2254   Weight: 104.3 kg (230 lb) 103.8 kg (228 lb 12.8 oz)     Vital Signs with Ranges  Temp:  [98  F (36.7  C)-99.5  F (37.5  C)] 98  F (36.7  C)  Pulse:  [] 101  Heart Rate:  [] 80  Resp:  [16-20] 20  BP: (117-172)/(61-88) 143/74  SpO2:  [88  %-99 %] 99 %  I/O last 3 completed shifts:  In: 2517.5 [P.O.:60; I.V.:2457.5]  Out: 600 [Urine:400; Emesis/NG output:200]    Constitutional: Lethargic, sleeping, awoken but history is not reliable  Respiratory: Hard to hear due to his obesity, poor air movement  Cardiovascular: irregularly irregular rhythm, variable S1 and normal S2, 2/6 midsystolic murmurs, trace edema and venous stasis changes  GI: rotund, good bowel sounds, not tender  Skin/Integumen: no rashes  Other:      Medications     Warfarin Therapy Reminder       dextrose 5% and 0.45% NaCl 75 mL/hr at 05/17/17 1247       warfarin  5 mg Oral ONCE at 18:00     enoxaparin  1 mg/kg Subcutaneous Q24H     ipratropium - albuterol 0.5 mg/2.5 mg/3 mL  3 mL Nebulization 4x daily     pantoprazole  40 mg Intravenous QAM AC     insulin aspart  1-4 Units Subcutaneous Q4H     sodium chloride (PF)  3 mL Intracatheter Q8H     metoprolol  5 mg Intravenous Q6H       Data     Recent Labs  Lab 05/17/17  0710 05/16/17  0716 05/15/17  0722 05/14/17  0633 05/13/17  1835   WBC  --   --  6.9 9.3 7.0   HGB  --   --  9.1* 10.3* 10.5*   MCV  --   --  82 82 79   PLT  --  209 255 317 294   INR 1.36* 1.43* 1.41* 1.63* 2.53*   NA  --  136 134 133 132*   POTASSIUM 3.6 3.4 3.5 3.5 3.5   CHLORIDE  --  100 99 96 93*   CO2  --  31 31 31 29   BUN  --  12 23 33* 36*   CR  --  0.95 1.21 1.67* 1.54*   ANIONGAP  --  5 4 6 10   JANN  --  8.3* 8.2* 8.8 9.1   GLC  --  127* 127* 161* 177*   ALBUMIN  --   --   --   --  3.7   PROTTOTAL  --   --   --   --  7.7   BILITOTAL  --   --   --   --  1.2   ALKPHOS  --   --   --   --  78   ALT  --   --   --   --  28   AST  --   --   --   --  24   LIPASE  --   --   --   --  358       Imaging:  No results found for this or any previous visit (from the past 24 hour(s)).

## 2017-05-17 NOTE — PLAN OF CARE
Problem: Goal Outcome Summary  Goal: Goal Outcome Summary  PT: Pt attempted for session this AM. Per nursing, Pt receiving IV at this time. Will check back this PM as time/schedule allows. Nursing in agreement.

## 2017-05-17 NOTE — PLAN OF CARE
Problem: Goal Outcome Summary  Goal: Goal Outcome Summary  PT: Pt approached for session this PM. Pt declining PT at this time. Nursing updated.

## 2017-05-17 NOTE — PLAN OF CARE
Problem: Perioperative Period (Adult)  Goal: Signs and Symptoms of Listed Potential Problems Will be Absent or Manageable (Perioperative Period)  Signs and symptoms of listed potential problems will be absent or manageable by discharge/transition of care (reference Perioperative Period (Adult) CPG).  Outcome: Improving  POD3. Pt reports moderate to severe pain, controlled with IV Morphine x3, IV Tylenol x1, and ice pack application. Midline drsg with dried drainage. NGT to LIS, scant output. NPO + ice chips, BS faint & hypoactive, pt denies passing gas. Oriented to self & place, no short term memory, requires frequent re-orientation. But calm & cooperative, not pulling on lines, sitter pulled. Lung sounds diminished with fine crackles LLL, on 1.5 L NC. Pt reports infrequent, congested productive cough. Pt up with assist of 1 & walker, alarm on,  voiding fine, PVR 31 ml.

## 2017-05-17 NOTE — PLAN OF CARE
Problem: Goal Outcome Summary  Goal: Goal Outcome Summary  Outcome: No Change  Orientation:Alert and oriented to self only.  Vss afebrile. Iv metoprolol given as ordered  02: 98% 2lnc.  LS:clear. occ cough  GI:bs+, no gas yet. ngtube with minimal output  : voided in toilet. prv 11-30cc on bladder scanner  Skin: drsg changed after pt took off drsg. [place] intact. Red  Periarea, powder applied.  Activity: up to chair, pt ambulated to br with assist of 1 and walker/gait belt  Pain: morphine given x1 pt c/o abd pain.    Plan: iv restarted after pt removed the first one. Ngt, ivf, await bowel function to return.

## 2017-05-18 ENCOUNTER — APPOINTMENT (OUTPATIENT)
Dept: PHYSICAL THERAPY | Facility: CLINIC | Age: 82
DRG: 357 | End: 2017-05-18
Payer: MEDICARE

## 2017-05-18 LAB
ANION GAP SERPL CALCULATED.3IONS-SCNC: 4 MMOL/L (ref 3–14)
BUN SERPL-MCNC: 6 MG/DL (ref 7–30)
CALCIUM SERPL-MCNC: 8.2 MG/DL (ref 8.5–10.1)
CHLORIDE SERPL-SCNC: 99 MMOL/L (ref 94–109)
CO2 SERPL-SCNC: 34 MMOL/L (ref 20–32)
CREAT SERPL-MCNC: 0.91 MG/DL (ref 0.66–1.25)
GFR SERPL CREATININE-BSD FRML MDRD: 79 ML/MIN/1.7M2
GLUCOSE BLDC GLUCOMTR-MCNC: 128 MG/DL (ref 70–99)
GLUCOSE BLDC GLUCOMTR-MCNC: 136 MG/DL (ref 70–99)
GLUCOSE BLDC GLUCOMTR-MCNC: 139 MG/DL (ref 70–99)
GLUCOSE BLDC GLUCOMTR-MCNC: 142 MG/DL (ref 70–99)
GLUCOSE BLDC GLUCOMTR-MCNC: 152 MG/DL (ref 70–99)
GLUCOSE SERPL-MCNC: 136 MG/DL (ref 70–99)
INR PPP: 1.16 (ref 0.86–1.14)
NT-PROBNP SERPL-MCNC: 2573 PG/ML (ref 0–1800)
POTASSIUM SERPL-SCNC: 3.4 MMOL/L (ref 3.4–5.3)
SODIUM SERPL-SCNC: 137 MMOL/L (ref 133–144)

## 2017-05-18 PROCEDURE — 25000132 ZZH RX MED GY IP 250 OP 250 PS 637: Mod: GY | Performed by: HOSPITALIST

## 2017-05-18 PROCEDURE — 25000125 ZZHC RX 250: Performed by: INTERNAL MEDICINE

## 2017-05-18 PROCEDURE — 25000128 H RX IP 250 OP 636: Performed by: INTERNAL MEDICINE

## 2017-05-18 PROCEDURE — 85610 PROTHROMBIN TIME: CPT | Performed by: INTERNAL MEDICINE

## 2017-05-18 PROCEDURE — 25800025 ZZH RX 258: Performed by: HOSPITALIST

## 2017-05-18 PROCEDURE — S5010 5% DEXTROSE AND 0.45% SALINE: HCPCS | Performed by: HOSPITALIST

## 2017-05-18 PROCEDURE — 40000275 ZZH STATISTIC RCP TIME EA 10 MIN

## 2017-05-18 PROCEDURE — 83880 ASSAY OF NATRIURETIC PEPTIDE: CPT | Performed by: INTERNAL MEDICINE

## 2017-05-18 PROCEDURE — 40000193 ZZH STATISTIC PT WARD VISIT: Performed by: PHYSICAL THERAPY ASSISTANT

## 2017-05-18 PROCEDURE — 94640 AIRWAY INHALATION TREATMENT: CPT | Mod: 76

## 2017-05-18 PROCEDURE — 97530 THERAPEUTIC ACTIVITIES: CPT | Mod: GP | Performed by: PHYSICAL THERAPY ASSISTANT

## 2017-05-18 PROCEDURE — 97116 GAIT TRAINING THERAPY: CPT | Mod: GP | Performed by: PHYSICAL THERAPY ASSISTANT

## 2017-05-18 PROCEDURE — A9270 NON-COVERED ITEM OR SERVICE: HCPCS | Mod: GY | Performed by: HOSPITALIST

## 2017-05-18 PROCEDURE — 00000146 ZZHCL STATISTIC GLUCOSE BY METER IP

## 2017-05-18 PROCEDURE — 36415 COLL VENOUS BLD VENIPUNCTURE: CPT | Performed by: INTERNAL MEDICINE

## 2017-05-18 PROCEDURE — 25000132 ZZH RX MED GY IP 250 OP 250 PS 637: Mod: GY | Performed by: INTERNAL MEDICINE

## 2017-05-18 PROCEDURE — 80048 BASIC METABOLIC PNL TOTAL CA: CPT | Performed by: INTERNAL MEDICINE

## 2017-05-18 PROCEDURE — A9270 NON-COVERED ITEM OR SERVICE: HCPCS | Mod: GY | Performed by: INTERNAL MEDICINE

## 2017-05-18 PROCEDURE — 12000000 ZZH R&B MED SURG/OB

## 2017-05-18 PROCEDURE — 99233 SBSQ HOSP IP/OBS HIGH 50: CPT | Performed by: INTERNAL MEDICINE

## 2017-05-18 PROCEDURE — 94640 AIRWAY INHALATION TREATMENT: CPT

## 2017-05-18 PROCEDURE — 25000125 ZZHC RX 250: Performed by: PHYSICIAN ASSISTANT

## 2017-05-18 RX ORDER — DIPHENHYDRAMINE HYDROCHLORIDE 50 MG/ML
25 INJECTION INTRAMUSCULAR; INTRAVENOUS EVERY 6 HOURS PRN
Status: DISCONTINUED | OUTPATIENT
Start: 2017-05-18 | End: 2017-05-28 | Stop reason: HOSPADM

## 2017-05-18 RX ORDER — ALUMINA, MAGNESIA, AND SIMETHICONE 2400; 2400; 240 MG/30ML; MG/30ML; MG/30ML
30 SUSPENSION ORAL EVERY 4 HOURS PRN
Status: DISCONTINUED | OUTPATIENT
Start: 2017-05-18 | End: 2017-05-28 | Stop reason: HOSPADM

## 2017-05-18 RX ORDER — DIPHENHYDRAMINE HCL 25 MG
25 CAPSULE ORAL EVERY 6 HOURS PRN
Status: DISCONTINUED | OUTPATIENT
Start: 2017-05-18 | End: 2017-05-28 | Stop reason: HOSPADM

## 2017-05-18 RX ORDER — WARFARIN SODIUM 5 MG/1
5 TABLET ORAL
Status: COMPLETED | OUTPATIENT
Start: 2017-05-18 | End: 2017-05-18

## 2017-05-18 RX ORDER — FUROSEMIDE 10 MG/ML
20 INJECTION INTRAMUSCULAR; INTRAVENOUS ONCE
Status: COMPLETED | OUTPATIENT
Start: 2017-05-18 | End: 2017-05-18

## 2017-05-18 RX ADMIN — MORPHINE SULFATE 4 MG: 2 INJECTION, SOLUTION INTRAMUSCULAR; INTRAVENOUS at 20:19

## 2017-05-18 RX ADMIN — WARFARIN SODIUM 5 MG: 5 TABLET ORAL at 17:43

## 2017-05-18 RX ADMIN — DEXTROSE AND SODIUM CHLORIDE: 5; 450 INJECTION, SOLUTION INTRAVENOUS at 03:31

## 2017-05-18 RX ADMIN — ACETAMINOPHEN 1000 MG: 10 INJECTION, SOLUTION INTRAVENOUS at 06:12

## 2017-05-18 RX ADMIN — PHENOL 1 ML: 1.5 LIQUID ORAL at 06:31

## 2017-05-18 RX ADMIN — FUROSEMIDE 20 MG: 10 INJECTION, SOLUTION INTRAVENOUS at 16:42

## 2017-05-18 RX ADMIN — IPRATROPIUM BROMIDE AND ALBUTEROL SULFATE 3 ML: .5; 3 SOLUTION RESPIRATORY (INHALATION) at 12:16

## 2017-05-18 RX ADMIN — ACETAMINOPHEN 1000 MG: 10 INJECTION, SOLUTION INTRAVENOUS at 22:19

## 2017-05-18 RX ADMIN — MICONAZOLE NITRATE: 2 POWDER TOPICAL at 06:31

## 2017-05-18 RX ADMIN — METOPROLOL TARTRATE 5 MG: 5 INJECTION INTRAVENOUS at 22:06

## 2017-05-18 RX ADMIN — IPRATROPIUM BROMIDE AND ALBUTEROL SULFATE 3 ML: .5; 3 SOLUTION RESPIRATORY (INHALATION) at 19:03

## 2017-05-18 RX ADMIN — PANTOPRAZOLE SODIUM 40 MG: 40 INJECTION, POWDER, FOR SOLUTION INTRAVENOUS at 08:06

## 2017-05-18 RX ADMIN — DEXTROSE AND SODIUM CHLORIDE: 5; 450 INJECTION, SOLUTION INTRAVENOUS at 17:39

## 2017-05-18 RX ADMIN — MORPHINE SULFATE 2 MG: 2 INJECTION, SOLUTION INTRAMUSCULAR; INTRAVENOUS at 09:54

## 2017-05-18 RX ADMIN — PHENOL 1 ML: 1.5 LIQUID ORAL at 00:45

## 2017-05-18 RX ADMIN — MORPHINE SULFATE 2 MG: 2 INJECTION, SOLUTION INTRAMUSCULAR; INTRAVENOUS at 08:25

## 2017-05-18 RX ADMIN — MORPHINE SULFATE 4 MG: 2 INJECTION, SOLUTION INTRAMUSCULAR; INTRAVENOUS at 15:16

## 2017-05-18 RX ADMIN — PHENOL 1 ML: 1.5 LIQUID ORAL at 16:42

## 2017-05-18 RX ADMIN — MORPHINE SULFATE 2 MG: 2 INJECTION, SOLUTION INTRAMUSCULAR; INTRAVENOUS at 00:45

## 2017-05-18 RX ADMIN — METOPROLOL TARTRATE 5 MG: 5 INJECTION INTRAVENOUS at 16:42

## 2017-05-18 RX ADMIN — METOPROLOL TARTRATE 5 MG: 5 INJECTION INTRAVENOUS at 09:54

## 2017-05-18 RX ADMIN — IPRATROPIUM BROMIDE AND ALBUTEROL SULFATE 3 ML: .5; 3 SOLUTION RESPIRATORY (INHALATION) at 07:57

## 2017-05-18 RX ADMIN — ACETAMINOPHEN 1000 MG: 10 INJECTION, SOLUTION INTRAVENOUS at 12:59

## 2017-05-18 RX ADMIN — IPRATROPIUM BROMIDE AND ALBUTEROL SULFATE 3 ML: .5; 3 SOLUTION RESPIRATORY (INHALATION) at 15:46

## 2017-05-18 RX ADMIN — METOPROLOL TARTRATE 5 MG: 5 INJECTION INTRAVENOUS at 03:43

## 2017-05-18 RX ADMIN — DIPHENHYDRAMINE HYDROCHLORIDE 25 MG: 50 INJECTION, SOLUTION INTRAMUSCULAR; INTRAVENOUS at 22:05

## 2017-05-18 RX ADMIN — ENOXAPARIN SODIUM 100 MG: 100 INJECTION SUBCUTANEOUS at 16:43

## 2017-05-18 RX ADMIN — PHENOL 1 ML: 1.5 LIQUID ORAL at 20:32

## 2017-05-18 RX ADMIN — ALUMINUM HYDROXIDE, MAGNESIUM HYDROXIDE, AND DIMETHICONE 30 ML: 400; 400; 40 SUSPENSION ORAL at 22:05

## 2017-05-18 NOTE — PROGRESS NOTES
"  [facility]  Hospitalist Progress Note  Shawn Garcia MD 05/18/2017    Reason for Stay (Diagnosis): ileus         Assessment and Plan:      Summary of Stay: Ton Bagley is a 89 year old male admitted on 5/13/2017 with abd pain and CT findings concerning for possible SBO; however exp lap demonstrated ileus with no obstruction present.    Problem List:   1. Ileus. S/P exploratory laparotomy by General Surgery on 5/14 for initial concerns about possible SBO; found to have ileus. NGT being removed today per surgery  2. Diabetes Mellitus. Hold Glipizide while NPO. Novolog SSI.  3. HTN. IV Metoprolol while NPO.  4. Hyperlipidemia. Hold Zocor while NPO.  5. Hypothyroidism.  If diet (and PO meds) not resumed soon would start IV synthroid  6. A fib. IV Metoprolol while NPO. Warfarin with Pharm D to dose when again able to take PO.  For now on Lovenox  7. CAD. IV Metoprolol while NPO.  8. Dementia. Hold Namenda and Aricept while NPO.  9. Acute on chronic kidney disease. resolved  10. Bronchospasm. Resolved.  On Duonebs QID. Albuterol PRN.    DVT Prophylaxis: Lovenox  Code Status: DNR / DNI  Discharge Dispo: TBD  Estimated Disch Date / # of Days until Disch: 2-3           Interval History (Subjective):      C/o abd pain.  Did not recall that he had surgery this admit and that he was having incisional pain.  Did not remember why he was in the hospital                  Physical Exam:      Last Vital Signs:  /68 (BP Location: Right arm)  Pulse 101  Temp 97  F (36.1  C) (Oral)  Resp 18  Ht 1.702 m (5' 7.01\")  Wt 105.7 kg (233 lb)  SpO2 92%  BMI 36.48 kg/m2      Intake/Output Summary (Last 24 hours) at 05/18/17 1246  Last data filed at 05/18/17 0830   Gross per 24 hour   Intake           2229.5 ml   Output              875 ml   Net           1354.5 ml       Constitutional: Awake, alert, cooperative, no apparent distress   Respiratory: Clear to auscultation bilaterally, no crackles or " wheezing   Cardiovascular: Regular rate and rhythm, normal S1 and S2, and no murmur noted   Abdomen: hypoactive bowel sounds, soft, non-distended, mild TTP   Skin: No rashes, no cyanosis, dry to touch   Neuro: Alert and confused c/w dementia hx, no weakness, numbness, ++ memory loss   Extremities: No edema, normal range of motion   Other(s):        All other systems: Negative          Medications:      All current medications were reviewed with changes reflected in problem list.         Data:      All new lab and imaging data was reviewed.   Labs:    Recent Labs  Lab 05/16/17  0716 05/15/17  0722 05/14/17  0633 05/13/17  1835   WBC  --  6.9 9.3 7.0   HGB  --  9.1* 10.3* 10.5*   HCT  --  32.3* 35.8* 35.8*   MCV  --  82 82 79    255 317 294      Imaging:   No results found for this or any previous visit (from the past 24 hour(s)).

## 2017-05-18 NOTE — PLAN OF CARE
Problem: Goal Outcome Summary  Goal: Goal Outcome Summary  Outcome: No Change  Pt oriented to self only. Needs frequent reminders and reassurance. NG taken out per surgeon order. Midline abdominal dressing WDL. Continues to alternate morphine and tylenol for abdominal pain, ice pack also used. Up with assist of one and walker ambulating in way x2 this shift, tolerated well. BG's 136 & 139. Pt continues with little urine output, 200cc measured output. 1 unrecorded void. Had shower. Using call light for any assistance.

## 2017-05-18 NOTE — PLAN OF CARE
Problem: Goal Outcome Summary  Goal: Goal Outcome Summary  Outcome: No Change  Pt remains hospitalized for SBO. POD3 exp lap w/ washout. Dressing small amount serosanguinous drainage-marked. Pt c/o abdominal pain-Ofirmev given x1. BP's elevated-scheduled IV metoprolol given. All other vitals stable. PIV-D5 1/2NS@75ml/hr infusing. Voiding w/o difficutly, incontinent urine x1. (+) BS (-) gas. NPO w/ice chips. LS coarse, L bases w/ crackles. Plan: TBD, will continue to monitor and provide supportive care.

## 2017-05-18 NOTE — PLAN OF CARE
Problem: Goal Outcome Summary  Goal: Goal Outcome Summary  PT- continue to plan for TCU unless current facility is able to meet his needs. Is 1 person assist for all transfers and gait with RW to 100 feet.

## 2017-05-18 NOTE — PLAN OF CARE
Problem: Perioperative Period (Adult)  Goal: Signs and Symptoms of Listed Potential Problems Will be Absent or Manageable (Perioperative Period)  Signs and symptoms of listed potential problems will be absent or manageable by discharge/transition of care (reference Perioperative Period (Adult) CPG).   Outcome: No Change  POD4. VSS. Pt reports moderate-severe back & abdominal pain, controlled with IV Morphine x1, IV Tylenol x1, ice pack application, & back rub. Midline drsg with dried drainage. NGT to LIS, 100 ml out. NPO + ice chips, BS active x4, pt reports more crampy-gas discomfort, but not passing gas yet. Oriented to self & place, no short term memory, requires frequent re-orientation. Lung sounds diminished, on 1.5 L NC, 84% ORA. Pt up with assist of 1 & walker, alarm on, spent 3 hrs in the chair, needs encouragement for activity. Adequate urine output, but some hesitancy & feeling full, bladder scanned for 280 PVR, but then subsequently was incontinent. Will eventually need TCU @ D/C.

## 2017-05-18 NOTE — PROGRESS NOTES
[facility]-[place]  POD#4 s/p ex lap and washout for ileus  Has incisional pain, no subjective bloating, no nausea, minimal from NG, not sure if having flatus, poor short term recollection  Afebrile/P 101  100 from NG last shift  NAD, up in bed  Abd soft, rotund, incision clean, bowel tones present throughout  Assessment somewhat difficult given poor memory, but with good bowel tones and minimal NG, will remove now and see how he does.  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

## 2017-05-19 ENCOUNTER — APPOINTMENT (OUTPATIENT)
Dept: PHYSICAL THERAPY | Facility: CLINIC | Age: 82
DRG: 357 | End: 2017-05-19
Payer: MEDICARE

## 2017-05-19 LAB
ANION GAP SERPL CALCULATED.3IONS-SCNC: 4 MMOL/L (ref 3–14)
BACTERIA SPEC CULT: NO GROWTH
BUN SERPL-MCNC: 8 MG/DL (ref 7–30)
CALCIUM SERPL-MCNC: 8.2 MG/DL (ref 8.5–10.1)
CHLORIDE SERPL-SCNC: 98 MMOL/L (ref 94–109)
CO2 SERPL-SCNC: 34 MMOL/L (ref 20–32)
CREAT SERPL-MCNC: 1 MG/DL (ref 0.66–1.25)
GFR SERPL CREATININE-BSD FRML MDRD: 71 ML/MIN/1.7M2
GLUCOSE BLDC GLUCOMTR-MCNC: 128 MG/DL (ref 70–99)
GLUCOSE BLDC GLUCOMTR-MCNC: 129 MG/DL (ref 70–99)
GLUCOSE BLDC GLUCOMTR-MCNC: 131 MG/DL (ref 70–99)
GLUCOSE BLDC GLUCOMTR-MCNC: 142 MG/DL (ref 70–99)
GLUCOSE BLDC GLUCOMTR-MCNC: 157 MG/DL (ref 70–99)
GLUCOSE BLDC GLUCOMTR-MCNC: 164 MG/DL (ref 70–99)
GLUCOSE SERPL-MCNC: 141 MG/DL (ref 70–99)
INR PPP: 1.39 (ref 0.86–1.14)
MICRO REPORT STATUS: NORMAL
PLATELET # BLD AUTO: 287 10E9/L (ref 150–450)
POTASSIUM SERPL-SCNC: 3.4 MMOL/L (ref 3.4–5.3)
SODIUM SERPL-SCNC: 136 MMOL/L (ref 133–144)
SPECIMEN SOURCE: NORMAL

## 2017-05-19 PROCEDURE — 99207 ZZC CDG-MDM COMPONENT: MEETS MODERATE - DOWN CODED: CPT | Performed by: INTERNAL MEDICINE

## 2017-05-19 PROCEDURE — 25000132 ZZH RX MED GY IP 250 OP 250 PS 637: Mod: GY | Performed by: HOSPITALIST

## 2017-05-19 PROCEDURE — S5010 5% DEXTROSE AND 0.45% SALINE: HCPCS | Performed by: HOSPITALIST

## 2017-05-19 PROCEDURE — 85610 PROTHROMBIN TIME: CPT | Performed by: INTERNAL MEDICINE

## 2017-05-19 PROCEDURE — 80048 BASIC METABOLIC PNL TOTAL CA: CPT | Performed by: INTERNAL MEDICINE

## 2017-05-19 PROCEDURE — 25800025 ZZH RX 258: Performed by: HOSPITALIST

## 2017-05-19 PROCEDURE — 25000125 ZZHC RX 250: Performed by: PHYSICIAN ASSISTANT

## 2017-05-19 PROCEDURE — 85049 AUTOMATED PLATELET COUNT: CPT | Performed by: INTERNAL MEDICINE

## 2017-05-19 PROCEDURE — 94640 AIRWAY INHALATION TREATMENT: CPT | Mod: 76

## 2017-05-19 PROCEDURE — 25000125 ZZHC RX 250: Performed by: INTERNAL MEDICINE

## 2017-05-19 PROCEDURE — 97116 GAIT TRAINING THERAPY: CPT | Mod: GP | Performed by: PHYSICAL THERAPY ASSISTANT

## 2017-05-19 PROCEDURE — 40000274 ZZH STATISTIC RCP CONSULT EA 30 MIN

## 2017-05-19 PROCEDURE — 94640 AIRWAY INHALATION TREATMENT: CPT

## 2017-05-19 PROCEDURE — 40000275 ZZH STATISTIC RCP TIME EA 10 MIN

## 2017-05-19 PROCEDURE — 25000128 H RX IP 250 OP 636: Performed by: PHYSICIAN ASSISTANT

## 2017-05-19 PROCEDURE — 12000000 ZZH R&B MED SURG/OB

## 2017-05-19 PROCEDURE — 36415 COLL VENOUS BLD VENIPUNCTURE: CPT | Performed by: INTERNAL MEDICINE

## 2017-05-19 PROCEDURE — 99232 SBSQ HOSP IP/OBS MODERATE 35: CPT | Performed by: INTERNAL MEDICINE

## 2017-05-19 PROCEDURE — 25000128 H RX IP 250 OP 636: Performed by: INTERNAL MEDICINE

## 2017-05-19 PROCEDURE — 40000193 ZZH STATISTIC PT WARD VISIT: Performed by: PHYSICAL THERAPY ASSISTANT

## 2017-05-19 PROCEDURE — 00000146 ZZHCL STATISTIC GLUCOSE BY METER IP

## 2017-05-19 PROCEDURE — A9270 NON-COVERED ITEM OR SERVICE: HCPCS | Mod: GY | Performed by: HOSPITALIST

## 2017-05-19 RX ORDER — WARFARIN SODIUM 4 MG/1
4 TABLET ORAL
Status: COMPLETED | OUTPATIENT
Start: 2017-05-19 | End: 2017-05-19

## 2017-05-19 RX ORDER — LEVOTHYROXINE SODIUM ANHYDROUS 100 UG/5ML
50 INJECTION, POWDER, LYOPHILIZED, FOR SOLUTION INTRAVENOUS DAILY
Status: DISCONTINUED | OUTPATIENT
Start: 2017-05-19 | End: 2017-05-22

## 2017-05-19 RX ADMIN — METOPROLOL TARTRATE 5 MG: 5 INJECTION INTRAVENOUS at 22:54

## 2017-05-19 RX ADMIN — IPRATROPIUM BROMIDE AND ALBUTEROL SULFATE 3 ML: .5; 3 SOLUTION RESPIRATORY (INHALATION) at 19:57

## 2017-05-19 RX ADMIN — ACETAMINOPHEN 1000 MG: 10 INJECTION, SOLUTION INTRAVENOUS at 19:16

## 2017-05-19 RX ADMIN — ENOXAPARIN SODIUM 100 MG: 100 INJECTION SUBCUTANEOUS at 16:35

## 2017-05-19 RX ADMIN — ACETAMINOPHEN 1000 MG: 10 INJECTION, SOLUTION INTRAVENOUS at 11:15

## 2017-05-19 RX ADMIN — MORPHINE SULFATE 2 MG: 2 INJECTION, SOLUTION INTRAMUSCULAR; INTRAVENOUS at 08:20

## 2017-05-19 RX ADMIN — WARFARIN SODIUM 4 MG: 4 TABLET ORAL at 17:48

## 2017-05-19 RX ADMIN — METOPROLOL TARTRATE 5 MG: 5 INJECTION INTRAVENOUS at 05:22

## 2017-05-19 RX ADMIN — MORPHINE SULFATE 2 MG: 2 INJECTION, SOLUTION INTRAMUSCULAR; INTRAVENOUS at 16:32

## 2017-05-19 RX ADMIN — DEXTROSE AND SODIUM CHLORIDE: 5; 450 INJECTION, SOLUTION INTRAVENOUS at 18:45

## 2017-05-19 RX ADMIN — MORPHINE SULFATE 2 MG: 2 INJECTION, SOLUTION INTRAMUSCULAR; INTRAVENOUS at 17:56

## 2017-05-19 RX ADMIN — PANTOPRAZOLE SODIUM 40 MG: 40 INJECTION, POWDER, FOR SOLUTION INTRAVENOUS at 08:12

## 2017-05-19 RX ADMIN — IPRATROPIUM BROMIDE AND ALBUTEROL SULFATE 3 ML: .5; 3 SOLUTION RESPIRATORY (INHALATION) at 11:39

## 2017-05-19 RX ADMIN — DIPHENHYDRAMINE HYDROCHLORIDE 25 MG: 50 INJECTION, SOLUTION INTRAMUSCULAR; INTRAVENOUS at 05:23

## 2017-05-19 RX ADMIN — LEVOTHYROXINE SODIUM ANHYDROUS 50 MCG: 100 INJECTION, POWDER, LYOPHILIZED, FOR SOLUTION INTRAVENOUS at 11:02

## 2017-05-19 RX ADMIN — IPRATROPIUM BROMIDE AND ALBUTEROL SULFATE 3 ML: .5; 3 SOLUTION RESPIRATORY (INHALATION) at 07:57

## 2017-05-19 RX ADMIN — MORPHINE SULFATE 2 MG: 2 INJECTION, SOLUTION INTRAMUSCULAR; INTRAVENOUS at 01:40

## 2017-05-19 RX ADMIN — MICONAZOLE NITRATE: 2 POWDER TOPICAL at 23:12

## 2017-05-19 RX ADMIN — METOPROLOL TARTRATE 5 MG: 5 INJECTION INTRAVENOUS at 16:34

## 2017-05-19 RX ADMIN — ONDANSETRON 4 MG: 2 INJECTION INTRAMUSCULAR; INTRAVENOUS at 17:56

## 2017-05-19 RX ADMIN — METOPROLOL TARTRATE 5 MG: 5 INJECTION INTRAVENOUS at 11:06

## 2017-05-19 RX ADMIN — IPRATROPIUM BROMIDE AND ALBUTEROL SULFATE 3 ML: .5; 3 SOLUTION RESPIRATORY (INHALATION) at 15:35

## 2017-05-19 ASSESSMENT — PAIN DESCRIPTION - DESCRIPTORS: DESCRIPTORS: CRAMPING

## 2017-05-19 NOTE — PROGRESS NOTES
"[facility] RCAT     Date: 05/19/17  Admission Dx: Abdominal Pain/SBO  Pulmonary History: None  Home Nebulizer/MDI Use: None  Home Oxygen: None  Acuity Level (RCAT flow sheet): 3  Aerosol Therapy initiated: Albuterol Q2 prn, Duoneb QID      Pulmonary Hygiene initiated: Coughing Techniques      Volume Expansion initiated: Incentive Spirometry      Current Oxygen Requirements: 2 LPM nasal cannula  Current SpO2: 99%    Re-evaluation date: 05/22/17    Patient Education: Discussed use of respiratory medications and oxygen use.      See \"RT Assessments\" flow sheet for patient assessment scoring and Acuity Level Details.             "

## 2017-05-19 NOTE — PROGRESS NOTES
"CLINICAL NUTRITION SERVICES  -  ASSESSMENT NOTE      Future/Additional Recommendations:  If no bowel function and diet advancement >/= full liquids in next 48-72 hours, consider parenteral nutrition. If no central access available, recommend bridging with PPN via peripheral to bridge for 2-3 days to determine if TPN via central line is required.   Malnutrition:     Non - Severe malnutrition at high risk for further decline     REASON FOR ASSESSMENT  Ton Bagley is a 89 year old male seen by the dietitian for LOS    NUTRITION HISTORY  - Information obtained from wife at bedside, patient with memory defecits/dementia  - Patient is on a regular diet at home - wife reports minimal PO intake x 2 days PTA with progressive decline of intake over last five months.  - Supplements at home: none  - Food allergies/intolerances: NKFA    CURRENT NUTRITION ORDERS  - Diet: NPO x 6 days  - Factors affecting nutrition intake include: post op ileus awaiting return of bowel function    PHYSICAL FINDINGS  Observed  Rounded abdomen, suspect low LBM with sarcopenic obesity. Mild hollowing of temporal region. Lower extremities with edema.  Obtained from Chart/Interdisciplinary Team  BM: 5/19; abdominal pain/cramping  Confused, required sitter previously  NG removed 5/18  Fluid status: +1-2 edema    ANTHROPOMETRICS  Height: 5' 7.008\"  Weight: 105.7 kg   Body mass index is 36.48 kg/(m^2).  Weight Status:  Obesity Grade II BMI 35-39.9  IBW: 66.8 kg +/- 10%  % IBW:  158%  Weight History:  Weight has trended up since admit by ~2 kg  Wt Readings from Last 10 Encounters:   05/17/17 105.7 kg (233 lb)   10/28/14 104.6 kg (230 lb 11.2 oz)       LABS  Labs reviewed  K and Mg WNL, no PO4 since admit  BG <150    MEDICATIONS  Medications reviewed, D5 IVF at 75 mL per hour provides 306 kcal, 90 gm CHO    PROCEDURES WITH NUTRITIONAL IMPLICATIONS  5/16: Exploratory laparotomy with peritoneal washout.     ASSESSED NUTRITION NEEDS (PER APPROVED " PRACTICE GUIDELINES, Dosing weight: 76.5 kg AdjBW):  Estimated Energy Needs: 3941-3024 kcals (25-30 Kcal/Kg)  Justification: obese  Estimated Protein Needs:  grams protein (1.2-1.5 g pro/Kg)  Justification: post-op  Estimated Fluid Needs: >1 mL/Kcal  Justification: maintenance    MALNUTRITION:  % Weight Loss:Weight loss does not meet criteria for malnutrition   % Intake:</= 50% for >/= 5 days (severe malnutrition)  Subcutaneous Fat Loss:None observed  Muscle Loss: Mild, as noted above  Fluid Retention:Mild     Malnutrition Diagnosis: Non-Severe malnutrition  In Context of:  Acute illness or injury at high risk for further decline    NUTRITION DIAGNOSIS:  Inadequate oral intake related to altered GI function as evidenced by post op ileus, NPO x 6 days meeting <25% of estimated needs    INTERVENTIONS  Recommendations / Nutrition Prescription  Diet advancement per MD discretion    If no bowel function and diet advancement >/= full liquids in next 48-72 hours, consider parenteral nutrition. If no central access available, recommend bridging with PPN via peripheral to bridge for 2-3 days to determine if TPN via central line is required.    Implementation  Nutrition education: Reviewed NPO with wife; typical diet progression at her request    Collaboration and Referral of care: Discussed patient during interdisciplinary care rounds this morning    Goals  Diet >/=FL within 48-72 hours versus need for nutrition support    MONITORING AND EVALUATION:  Diet order: advancement, POC  Progress towards goals will be monitored and evaluated per protocol and Practice Guidelines      YARELY Garza  3rd floor/ICU: [phone]  All other floors: [phone]  Weekend/holiday: [phone]  Office: [phone]

## 2017-05-19 NOTE — PROGRESS NOTES
"[facility]    Patient Name: Ton Bagley  [id number]  Services received on: 5/19/2017    MEDICAL MUSIC THERAPY PROGRESS NOTE  INITIAL ASSESSMENT    Referral made by: Ila Betts RN  Referred for: Reality orientation; increase comfort post surgery    Session interventions & outcomes: Pt was lying in bed in alert, anxious state when approached by therapist for music tx services. Pt reported dry mouth and requested for candy to have. Pt verbally accepted music tx at this time. Therapist provided pt preferred and familiar live music to improve reality orientation and increase comfortable state post surgery.  Pt demonstrated active participation and frequently engaged during music interventions AEB pt initially closed eyes during soft therapist singing/guitar playing and able to verbally identify the song therapist was singing, tapped hands along side on bed and moved toes/feet to the beat of the music, danced and moved shoulders and head to familiar music, and independently sung parts of \"[place] Zeke Zeke.\"  Pt frequently interacted with therapist during the session AEB pt shared preferred music (Caleb Odonnell, Tyra Brothers) and personal memories (e.g. Used to go dancing around [place] area when he was younger, has a son who plays music at hospitals). During and post session, pt appeared to transition into a positive affect with coherent and relevant verbalizations in regards to preferred music AEB pt smiled, laughed, danced, and independently conversed with therapist through  conversation while remaining on topic.    Post session, RN reported pt declined pain medication post music therapy intervention as per pt request due to no experience of pain at this time.    Follow up: Therapist will follow up with pt on Monday if pt remains hospitalized. Therapist onsite on [address] on Mondays ([phone]) and Fridays ([phone]).    Karely Khalil MA, [facility]  Medical Music Therapy " Services  [email or web address]

## 2017-05-19 NOTE — PROVIDER NOTIFICATION
MD paged re: pt c/o tenderness and discomfort over bladder/pelvis area. Void 200cc-PVR 288cc. Awaiting call back/order.

## 2017-05-19 NOTE — PROGRESS NOTES
"[facility] RCAT     Date: 05/16/17    Admission Dx: Small bowel obstruction    Pulmonary History: Previous smoker     Home Nebulizer/MDI Use: None    Home Oxygen: None    Acuity Level (RCAT flow sheet): Acuity level three    Aerosol Therapy initiated: Duoneb QID and Albuterol Q2 prn    Pulmonary Hygiene initiated: Cough and deep breathe TID    Volume Expansion initiated: IS TID    Current Oxygen Requirements: 1 LPM oxymask     Current SpO2: 97%    Re-evaluation date: 05/19/17     Patient Education: Education performed with patient in regards to indications/benefits and dosing of bronchodilators. Will continue to do education with patient.    See \"RT Assessments\" flow sheet for patient assessment scoring and Acuity Level Details.    Micki Marquez, RT  5/18/2017 10:48 PM                      "

## 2017-05-19 NOTE — PLAN OF CARE
Problem: Perioperative Period (Adult)  Goal: Signs and Symptoms of Listed Potential Problems Will be Absent or Manageable (Perioperative Period)  Signs and symptoms of listed potential problems will be absent or manageable by discharge/transition of care (reference Perioperative Period (Adult) CPG).   Outcome: No Change  BPmax 170/90, marino IV Lopressor x1. HR tachy & irregular at times. All other VSS. Pt reports moderate-severe abdominal pain, controlled with IV Morphine x1, IV Tylenol x1, & ice pack application. Midline inc, redness to the left, applied new drsg as pt picking at staples. Pt seems to be c/o more crampy gas pain, is passing gas though, BS hypoactive. Pt belching at times, but denies nausea. Oriented to self & place, no short term memory, requires frequent re-orientation. Pt reports WILLIAM, lung sounds coarse/diminished, on 1.5 L NC. Diffuse itchy rash to back, IV Benadryl x1. Pt up with assist of 1 & walker, alarm on, incontinent x2, 280 ml PVR.Will eventually need TCU @ D/C.

## 2017-05-19 NOTE — PROVIDER NOTIFICATION
MD paged re: pt c/o increase in pain, cramping. Requested intervention for cramping and gas pains. Pt passing flatus, no BM yet. Awaiting reply.

## 2017-05-19 NOTE — PLAN OF CARE
Problem: Goal Outcome Summary  Goal: Goal Outcome Summary  Outcome: No Change  Pt remains hospitalized for SBO. POD4 exp lap w/ peritoneal washout. Dressing small amount serosanguinous drainage-marked. Pt c/o abdominal pain-Ofirmev given x1. BP's elevated-scheduled IV metoprolol given. All other vitals stable. PIV-D5 1/2NS@75ml/hr infusing. IV Lasix x1, low UOP, bladder scanned fpr 288 PVR, pt c/o bladder being full, straight cathed for 325cc. (+)BS, passing flatus. NPO w/ice chips. LS coarse, L bases w/ crackles. Plan: 2-3 more days before d/c, plan is TCU. Will continue to monitor and provide supportive care.

## 2017-05-19 NOTE — PLAN OF CARE
Problem: Goal Outcome Summary  Goal: Goal Outcome Summary  Outcome: No Change  VSS.  Pt has dementia, poor short term memory.  C/o of abdominal pain such as cramping.  Abdomen distended, bowel sounds active.  Incision covered with dressing.  Belching and passing some gas, had small liquid stool.  Ambulated in way assist of one x2.  Given ofirmev and morphine for pain.  Gets some dyspnea with exertion, on 2 L oxygen.  Voiding small amount, bladder scanned and straight cath'd for 400 mls.  Rash to back improved.  Surg following.

## 2017-05-19 NOTE — PLAN OF CARE
Problem: Goal Outcome Summary  Goal: Goal Outcome Summary  PT- continue to plan for TCU if facility is not able to met his needs. Still needs 1 person assist for all mobility and transfers.

## 2017-05-19 NOTE — PROGRESS NOTES
"[facility]   General Surgery Progress Note           Assessment and Plan:   Assessment:   POD#5 s/p Ex lap and washout for ileus  H/o Afib  Ileus as expected  Afebrile      Plan:   -continue NPO except ice chips  -Pain control:  Ofirmev, Morphine  -GI prophylaxis:  Protonix    -VTE prophylaxis: PCDs, coumadin  -Activity as tolerated, recommend abdominal binder when up out of bed.          Interval History:   C/o abd cramping, denies flatus today.  + tiny BM this am.  + appetite.  Up walking with assistance.  Voiding ok          Physical Exam:   Blood pressure 164/86, pulse 97, temperature 97.4  F (36.3  C), temperature source Axillary, resp. rate 20, height 1.702 m (5' 7.01\"), weight 105.7 kg (233 lb), SpO2 90 %.    I/O last 3 completed shifts:  In: 1930 [P.O.:135; I.V.:1795]  Out: 725 [Urine:725]    Abdomen:   firm, distended, denies tenderness, hypoactive bowel sounds   Inc(s) - clean, dry, intact.  + staples.           Data:       Recent Labs  Lab 05/19/17  0700 05/16/17  0716 05/15/17  0722 05/14/17  0633 05/13/17  1835   WBC  --   --  6.9 9.3 7.0   HGB  --   --  9.1* 10.3* 10.5*   HCT  --   --  32.3* 35.8* 35.8*   MCV  --   --  82 82 79    209 255 317 294       Recent Labs  Lab 05/15/17  0722 05/14/17  0633 05/13/17  1835   HGB 9.1* 10.3* 10.5*         Adri Thrasher PA-C     Pt seen and examined  Remains somewhat difficult to assess due to poor short term memory.  Good bowel tones, no nausea, RN report him having flatus on walks.  Still remains distended.  Incision okay.  Will trial clears cautiously to see how he does.  "

## 2017-05-19 NOTE — PROGRESS NOTES
"    [facility]  Hospitalist Progress Note  Shawn Garcia MD 05/19/2017    Reason for Stay (Diagnosis): ileus         Assessment and Plan:      Summary of Stay: Ton Bagley is a 89 year old male admitted on 5/13/2017 with abd pain and CT findings concerning for possible SBO; however exp lap demonstrated ileus with no obstruction present.   Problem List:   1. Ileus. S/P exploratory laparotomy by General Surgery on 5/14 for initial concerns about possible SBO; found to have ileus. NGT removed 5/18.  abd appears more distended today; remains NPO.  Nurse reports flatus overnight but no BM.  Plan to ambulate today  2. Diabetes Mellitus. Hold Glipizide while NPO. Novolog SSI.  3. HTN. IV Metoprolol while NPO.  4. Hyperlipidemia. Hold Zocor while NPO.  5. Hypothyroidism. Will start IV synthroid  6. A fib. IV Metoprolol while NPO. Warfarin with Pharm D to dose when again able to take PO. For now on Lovenox  7. CAD. IV Metoprolol while NPO.  8. Dementia. Hold Namenda and Aricept while NPO.  9. Acute on chronic kidney disease. resolved  10. Bronchospasm. Resolved. On Duonebs QID. Albuterol PRN.  11. Nutrition:  If unable to take PO will need to consider TPN soon     DVT Prophylaxis: Lovenox  Code Status: DNR / DNI  Discharge Dispo: TBD  Estimated Disch Date / # of Days until Disch: unknown; await return of bowel ftn        Interval History (Subjective):      C/o abd pain                  Physical Exam:      Last Vital Signs:  /86 (BP Location: Right arm)  Pulse 97  Temp 97.4  F (36.3  C) (Axillary)  Resp 20  Ht 1.702 m (5' 7.01\")  Wt 105.7 kg (233 lb)  SpO2 90%  BMI 36.48 kg/m2      Intake/Output Summary (Last 24 hours) at 05/19/17 0891  Last data filed at 05/19/17 0540   Gross per 24 hour   Intake             1930 ml   Output              675 ml   Net             1255 ml       Constitutional: Awake, alert, cooperative, no apparent distress   Respiratory: Clear to auscultation " bilaterally, no crackles or wheezing   Cardiovascular: Regular rate and rhythm, normal S1 and S2, and no murmur noted   Abdomen: High pitched BS.  abd distended   Skin: No rashes, no cyanosis, dry to touch   Neuro: Alert and awake, no weakness, numbness, ++ memory loss   Extremities: No edema, normal range of motion   Other(s):        All other systems: Negative          Medications:      All current medications were reviewed with changes reflected in problem list.         Data:      All new lab and imaging data was reviewed.   Labs:    Recent Labs  Lab 05/19/17  0700  05/15/17  0722   WBC  --   --  6.9   HGB  --   --  9.1*   HCT  --   --  32.3*   MCV  --   --  82     < > 255   < > = values in this interval not displayed.   Imaging:   No results found for this or any previous visit (from the past 24 hour(s)).

## 2017-05-20 LAB
GLUCOSE BLDC GLUCOMTR-MCNC: 114 MG/DL (ref 70–99)
GLUCOSE BLDC GLUCOMTR-MCNC: 119 MG/DL (ref 70–99)
GLUCOSE BLDC GLUCOMTR-MCNC: 130 MG/DL (ref 70–99)
GLUCOSE BLDC GLUCOMTR-MCNC: 130 MG/DL (ref 70–99)
GLUCOSE BLDC GLUCOMTR-MCNC: 138 MG/DL (ref 70–99)
GLUCOSE BLDC GLUCOMTR-MCNC: 142 MG/DL (ref 70–99)
INR PPP: 2.51 (ref 0.86–1.14)
LACTATE BLD-SCNC: 0.9 MMOL/L (ref 0.7–2.1)
MAGNESIUM SERPL-MCNC: 2.1 MG/DL (ref 1.6–2.3)
PHOSPHATE SERPL-MCNC: 2.1 MG/DL (ref 2.5–4.5)
POTASSIUM SERPL-SCNC: 3.3 MMOL/L (ref 3.4–5.3)

## 2017-05-20 PROCEDURE — 84132 ASSAY OF SERUM POTASSIUM: CPT | Performed by: INTERNAL MEDICINE

## 2017-05-20 PROCEDURE — 36415 COLL VENOUS BLD VENIPUNCTURE: CPT | Performed by: INTERNAL MEDICINE

## 2017-05-20 PROCEDURE — 12000000 ZZH R&B MED SURG/OB

## 2017-05-20 PROCEDURE — 83605 ASSAY OF LACTIC ACID: CPT | Performed by: INTERNAL MEDICINE

## 2017-05-20 PROCEDURE — S5010 5% DEXTROSE AND 0.45% SALINE: HCPCS | Performed by: HOSPITALIST

## 2017-05-20 PROCEDURE — 85610 PROTHROMBIN TIME: CPT | Performed by: INTERNAL MEDICINE

## 2017-05-20 PROCEDURE — 25000125 ZZHC RX 250: Performed by: INTERNAL MEDICINE

## 2017-05-20 PROCEDURE — 00000146 ZZHCL STATISTIC GLUCOSE BY METER IP

## 2017-05-20 PROCEDURE — 84100 ASSAY OF PHOSPHORUS: CPT | Performed by: INTERNAL MEDICINE

## 2017-05-20 PROCEDURE — 40000275 ZZH STATISTIC RCP TIME EA 10 MIN

## 2017-05-20 PROCEDURE — 25000128 H RX IP 250 OP 636: Performed by: INTERNAL MEDICINE

## 2017-05-20 PROCEDURE — 99232 SBSQ HOSP IP/OBS MODERATE 35: CPT | Performed by: INTERNAL MEDICINE

## 2017-05-20 PROCEDURE — 25800025 ZZH RX 258: Performed by: HOSPITALIST

## 2017-05-20 PROCEDURE — 25000125 ZZHC RX 250: Performed by: PHYSICIAN ASSISTANT

## 2017-05-20 PROCEDURE — 99207 ZZC CDG-MDM COMPONENT: MEETS MODERATE - DOWN CODED: CPT | Performed by: INTERNAL MEDICINE

## 2017-05-20 PROCEDURE — 94640 AIRWAY INHALATION TREATMENT: CPT | Mod: 76

## 2017-05-20 PROCEDURE — 83735 ASSAY OF MAGNESIUM: CPT | Performed by: INTERNAL MEDICINE

## 2017-05-20 PROCEDURE — 94640 AIRWAY INHALATION TREATMENT: CPT

## 2017-05-20 PROCEDURE — 25000128 H RX IP 250 OP 636: Performed by: PHYSICIAN ASSISTANT

## 2017-05-20 PROCEDURE — 87493 C DIFF AMPLIFIED PROBE: CPT | Performed by: INTERNAL MEDICINE

## 2017-05-20 RX ORDER — WARFARIN SODIUM 3 MG/1
3 TABLET ORAL
Status: DISCONTINUED | OUTPATIENT
Start: 2017-05-20 | End: 2017-05-20

## 2017-05-20 RX ADMIN — POTASSIUM CHLORIDE 10 MEQ: 14.9 INJECTION, SOLUTION, CONCENTRATE PARENTERAL at 16:50

## 2017-05-20 RX ADMIN — METOPROLOL TARTRATE 5 MG: 5 INJECTION INTRAVENOUS at 06:04

## 2017-05-20 RX ADMIN — SODIUM PHOSPHATE, MONOBASIC, MONOHYDRATE AND SODIUM PHOSPHATE, DIBASIC, ANHYDROUS 15 MMOL: 276; 142 INJECTION, SOLUTION INTRAVENOUS at 19:34

## 2017-05-20 RX ADMIN — METOPROLOL TARTRATE 5 MG: 5 INJECTION INTRAVENOUS at 12:03

## 2017-05-20 RX ADMIN — IPRATROPIUM BROMIDE AND ALBUTEROL SULFATE 3 ML: .5; 3 SOLUTION RESPIRATORY (INHALATION) at 20:30

## 2017-05-20 RX ADMIN — ASCORBIC ACID, VITAMIN A PALMITATE, CHOLECALCIFEROL, THIAMINE HYDROCHLORIDE, RIBOFLAVIN-5 PHOSPHATE SODIUM, PYRIDOXINE HYDROCHLORIDE, NIACINAMIDE, DEXPANTHENOL, ALPHA-TOCOPHEROL ACETATE, VITAMIN K1, FOLIC ACID, BIOTIN, CYANOCOBALAMIN: 200; 3300; 200; 6; 3.6; 6; 40; 15; 10; 150; 600; 60; 5 INJECTION, SOLUTION INTRAVENOUS at 23:50

## 2017-05-20 RX ADMIN — POTASSIUM CHLORIDE 10 MEQ: 14.9 INJECTION, SOLUTION, CONCENTRATE PARENTERAL at 15:39

## 2017-05-20 RX ADMIN — MICONAZOLE NITRATE: 2 POWDER TOPICAL at 20:07

## 2017-05-20 RX ADMIN — DIPHENHYDRAMINE HYDROCHLORIDE 25 MG: 50 INJECTION, SOLUTION INTRAMUSCULAR; INTRAVENOUS at 03:07

## 2017-05-20 RX ADMIN — IPRATROPIUM BROMIDE AND ALBUTEROL SULFATE 3 ML: .5; 3 SOLUTION RESPIRATORY (INHALATION) at 07:18

## 2017-05-20 RX ADMIN — LEVOTHYROXINE SODIUM ANHYDROUS 50 MCG: 100 INJECTION, POWDER, LYOPHILIZED, FOR SOLUTION INTRAVENOUS at 10:22

## 2017-05-20 RX ADMIN — ACETAMINOPHEN 1000 MG: 10 INJECTION, SOLUTION INTRAVENOUS at 12:05

## 2017-05-20 RX ADMIN — DEXTROSE AND SODIUM CHLORIDE: 5; 450 INJECTION, SOLUTION INTRAVENOUS at 08:04

## 2017-05-20 RX ADMIN — POTASSIUM CHLORIDE 10 MEQ: 14.9 INJECTION, SOLUTION, CONCENTRATE PARENTERAL at 17:58

## 2017-05-20 RX ADMIN — ONDANSETRON 4 MG: 2 INJECTION INTRAMUSCULAR; INTRAVENOUS at 13:33

## 2017-05-20 RX ADMIN — METOPROLOL TARTRATE 5 MG: 5 INJECTION INTRAVENOUS at 18:08

## 2017-05-20 RX ADMIN — PROCHLORPERAZINE EDISYLATE 5 MG: 5 INJECTION INTRAMUSCULAR; INTRAVENOUS at 03:02

## 2017-05-20 RX ADMIN — PANTOPRAZOLE SODIUM 40 MG: 40 INJECTION, POWDER, FOR SOLUTION INTRAVENOUS at 08:07

## 2017-05-20 RX ADMIN — MORPHINE SULFATE 2 MG: 2 INJECTION, SOLUTION INTRAMUSCULAR; INTRAVENOUS at 08:05

## 2017-05-20 RX ADMIN — IPRATROPIUM BROMIDE AND ALBUTEROL SULFATE 3 ML: .5; 3 SOLUTION RESPIRATORY (INHALATION) at 11:48

## 2017-05-20 RX ADMIN — ONDANSETRON 4 MG: 2 INJECTION INTRAMUSCULAR; INTRAVENOUS at 01:16

## 2017-05-20 RX ADMIN — POTASSIUM CHLORIDE 10 MEQ: 14.9 INJECTION, SOLUTION, CONCENTRATE PARENTERAL at 14:18

## 2017-05-20 RX ADMIN — IPRATROPIUM BROMIDE AND ALBUTEROL SULFATE 3 ML: .5; 3 SOLUTION RESPIRATORY (INHALATION) at 15:29

## 2017-05-20 RX ADMIN — ACETAMINOPHEN 1000 MG: 10 INJECTION, SOLUTION INTRAVENOUS at 19:06

## 2017-05-20 RX ADMIN — MORPHINE SULFATE 4 MG: 2 INJECTION, SOLUTION INTRAMUSCULAR; INTRAVENOUS at 01:16

## 2017-05-20 ASSESSMENT — PAIN DESCRIPTION - DESCRIPTORS
DESCRIPTORS: DISCOMFORT
DESCRIPTORS: DISCOMFORT

## 2017-05-20 NOTE — PLAN OF CARE
Problem: Goal Outcome Summary  Goal: Goal Outcome Summary  Outcome: No Change  Pt up with two assist and gait belt. Refusing to walk in hallway tonight but did walk to bathroom and has been sitting up in chair for about an hour so far. Pt reporting significant abdominal pain tonight, morphine 2 mg administered x2 with little relief from pain. Seemed to have more pain relief from ofirmev. Belching infrequently. Bladder scanned at 1752 for 146 ml, will recheck shortly when pt lays back down in bed. Rash to back, area cleansed with soap and water and sween cream applied.  and 128.

## 2017-05-20 NOTE — PROGRESS NOTES
Patient having difficulty voiding tried standing at bedside as well as BSC. Will be 3rd straight cath so left Kelly in . Drained 400 cc upon insertion.

## 2017-05-20 NOTE — PROGRESS NOTES
"[facility]   General Surgery Progress Note           Assessment and Plan:   Assessment:   POD#6 s/p Procedure(s):  Exploratory laparotomy with peritoneal washout.  - Wound Class: III-Contaminated        Plan:   -IV fluids continued  -stress ulcer prophylaxis and prophylaxis against venous thromboembolism  -minimal clear liquids for oral comfort  ? Consider prokinetic medication         Interval History:   2 large BM s yesterday, urinary retention - kiran back in   Denies abdominal pain, no appetite         Physical Exam:   Blood pressure 143/74, pulse 97, temperature 98.4  F (36.9  C), temperature source Oral, resp. rate 18, height 1.702 m (5' 7.01\"), weight 105.7 kg (233 lb), SpO2 98 %.    I/O last 3 completed shifts:  In: 2069 [I.V.:2069]  Out: 750 [Urine:750]    Abdomen:   firm, distended,  Minimal tenderness noted at incision and no masses palpated   Inc(s) - clean, dry, intact                Data:     Recent Labs   Lab Test  05/15/17   0722  05/14/17   0633  05/13/17   1835   HGB  9.1*  10.3*  10.5*   WBC  6.9  9.3  7.0       Sergio Marcus MD     "

## 2017-05-20 NOTE — PLAN OF CARE
Problem: Goal Outcome Summary  Goal: Goal Outcome Summary  Outcome: No Change  Pt had very lrg liquid stool this am. Placed on enteric precautions but need stool spec . Still. Medicated x 1 with morphine, morphine dc'd IV tylenol x 1 and zofran. Very sleepy most of shift, confused and repeats questions about why he is here. Very poor intact. MD updated , possible midline iv placement and TPN. Up to chair with great encouragement.

## 2017-05-20 NOTE — PLAN OF CARE
Problem: Goal Outcome Summary  Goal: Goal Outcome Summary  Outcome: Improving  Complains of pain and nausea during night. Morphine, Zofran and Compazine given. Benadryl given for itching. Has rash on back. Patient unable to void. Dressing to abd CDI. Had large BM previous shift but none overnight. Passing flatus. Confused. Bed alarm on.

## 2017-05-20 NOTE — PROGRESS NOTES
Paged weekend dietician pager at 1620 to clarify if PPN can be started today, no response. Will encourage oral intake tonight and clarify with nutrition tomorrow.

## 2017-05-20 NOTE — PLAN OF CARE
Problem: Goal Outcome Summary  Goal: Goal Outcome Summary  PT - PT cx'd today d/t not sleeping a NOC and having loose stool.  Will see pt tomorrow for PT.

## 2017-05-20 NOTE — PROGRESS NOTES
"  [facility]  Hospitalist Progress Note  Shawn Garcia MD 05/20/2017    Reason for Stay (Diagnosis): ileus         Assessment and Plan:      Summary of Stay: Ton Bagley is a 89 year old male admitted on 5/13/2017 with abd pain and CT findings concerning for possible SBO; however exp lap demonstrated ileus with no obstruction present.   Problem List:   1. Ileus. S/P exploratory laparotomy by General Surgery on 5/14 for initial concerns about possible SBO; found to have ileus. NGT removed 5/18. Continues to have abd distention and pain with PO intake minimal. Had large BM today.   Will d/c narcotics (had been receiving MSO4) to minimize worsening ileus.  Check cdiff  2. Diabetes Mellitus. Hold Glipizide while NPO. Novolog SSI.  3. HTN. IV Metoprolol while NPO.  4. Hyperlipidemia. Hold Zocor while NPO.  5. Hypothyroidism. Will start IV synthroid  6. A fib. Will hold coumadin for now in setting of ileus and ? Gut absorption, and in case additional procedures needed from complications related to ileus.  INR therapeutic; will hold off on further warfarin dosing  7. CAD. IV Metoprolol while NPO.  8. Dementia. Hold Namenda and Aricept while NPO.  9. Acute on chronic kidney disease. resolved  10. Bronchospasm. Resolved. On Duonebs QID. Albuterol PRN.  11. Nutrition: appreciate nutrition reccs; will start PPN; consider PICC and TPN soon if ileus persists      DVT Prophylaxis: coumadin; resume Lovenox when INR<2  Code Status: DNR / DNI  Discharge Dispo: TBD  Estimated Disch Date / # of Days until Disch: unknown; await return of bowel ftn        Interval History (Subjective):      C/o abd pain                  Physical Exam:      Last Vital Signs:  /73 (BP Location: Right arm)  Pulse 97  Temp 98.4  F (36.9  C) (Oral)  Resp 18  Ht 1.702 m (5' 7.01\")  Wt 105.7 kg (233 lb)  SpO2 94%  BMI 36.48 kg/m2      Intake/Output Summary (Last 24 hours) at 05/20/17 4180  Last data filed at 05/20/17 " 1500   Gross per 24 hour   Intake             2059 ml   Output             1050 ml   Net             1009 ml       Constitutional: Awake, alert, cooperative, no apparent distress   Respiratory: Clear to auscultation bilaterally, no crackles or wheezing   Cardiovascular: Regular rate and rhythm, normal S1 and S2, and no murmur noted   Abdomen: Hypoactive high pitched BS, mild TTP, distended   Skin: No rashes, no cyanosis, dry to touch   Neuro: Awake and confused c/w dementia hx, no weakness, numbness, +memory loss   Extremities: No edema, normal range of motion   Other(s):        All other systems: Negative          Medications:      All current medications were reviewed with changes reflected in problem list.         Data:      All new lab and imaging data was reviewed.   Labs:    Recent Labs  Lab 05/19/17  0700 05/16/17  0716 05/15/17  0722 05/14/17  0633 05/13/17  1835   WBC  --   --  6.9 9.3 7.0   HGB  --   --  9.1* 10.3* 10.5*   HCT  --   --  32.3* 35.8* 35.8*   MCV  --   --  82 82 79    209 255 317 294      Imaging:   No results found for this or any previous visit (from the past 24 hour(s)).

## 2017-05-21 ENCOUNTER — APPOINTMENT (OUTPATIENT)
Dept: PHYSICAL THERAPY | Facility: CLINIC | Age: 82
DRG: 357 | End: 2017-05-21
Payer: MEDICARE

## 2017-05-21 LAB
ALBUMIN SERPL-MCNC: 2.6 G/DL (ref 3.4–5)
ALP SERPL-CCNC: 64 U/L (ref 40–150)
ALT SERPL W P-5'-P-CCNC: 19 U/L (ref 0–70)
ANION GAP SERPL CALCULATED.3IONS-SCNC: 4 MMOL/L (ref 3–14)
AST SERPL W P-5'-P-CCNC: 23 U/L (ref 0–45)
BACTERIA SPEC CULT: NORMAL
BILIRUB SERPL-MCNC: 0.5 MG/DL (ref 0.2–1.3)
BUN SERPL-MCNC: 9 MG/DL (ref 7–30)
C DIFF TOX B STL QL: NORMAL
CALCIUM SERPL-MCNC: 7.7 MG/DL (ref 8.5–10.1)
CHLORIDE SERPL-SCNC: 106 MMOL/L (ref 94–109)
CO2 SERPL-SCNC: 30 MMOL/L (ref 20–32)
CREAT SERPL-MCNC: 0.91 MG/DL (ref 0.66–1.25)
GFR SERPL CREATININE-BSD FRML MDRD: 79 ML/MIN/1.7M2
GLUCOSE BLDC GLUCOMTR-MCNC: 129 MG/DL (ref 70–99)
GLUCOSE BLDC GLUCOMTR-MCNC: 133 MG/DL (ref 70–99)
GLUCOSE BLDC GLUCOMTR-MCNC: 134 MG/DL (ref 70–99)
GLUCOSE BLDC GLUCOMTR-MCNC: 138 MG/DL (ref 70–99)
GLUCOSE BLDC GLUCOMTR-MCNC: 142 MG/DL (ref 70–99)
GLUCOSE BLDC GLUCOMTR-MCNC: 149 MG/DL (ref 70–99)
GLUCOSE SERPL-MCNC: 135 MG/DL (ref 70–99)
INR PPP: 2.82 (ref 0.86–1.14)
Lab: NORMAL
MAGNESIUM SERPL-MCNC: 2.1 MG/DL (ref 1.6–2.3)
MICRO REPORT STATUS: NORMAL
PHOSPHATE SERPL-MCNC: 2.2 MG/DL (ref 2.5–4.5)
POTASSIUM SERPL-SCNC: 3.3 MMOL/L (ref 3.4–5.3)
POTASSIUM SERPL-SCNC: 4 MMOL/L (ref 3.4–5.3)
PREALB SERPL IA-MCNC: 11 MG/DL (ref 15–45)
PROT SERPL-MCNC: 5.8 G/DL (ref 6.8–8.8)
SODIUM SERPL-SCNC: 140 MMOL/L (ref 133–144)
SPECIMEN SOURCE: NORMAL
SPECIMEN SOURCE: NORMAL

## 2017-05-21 PROCEDURE — 97530 THERAPEUTIC ACTIVITIES: CPT | Mod: GP | Performed by: PHYSICAL THERAPY ASSISTANT

## 2017-05-21 PROCEDURE — 12000000 ZZH R&B MED SURG/OB

## 2017-05-21 PROCEDURE — 99207 ZZC CDG-MDM COMPONENT: MEETS MODERATE - DOWN CODED: CPT | Performed by: INTERNAL MEDICINE

## 2017-05-21 PROCEDURE — 00000146 ZZHCL STATISTIC GLUCOSE BY METER IP

## 2017-05-21 PROCEDURE — 36415 COLL VENOUS BLD VENIPUNCTURE: CPT | Performed by: INTERNAL MEDICINE

## 2017-05-21 PROCEDURE — 84100 ASSAY OF PHOSPHORUS: CPT | Performed by: INTERNAL MEDICINE

## 2017-05-21 PROCEDURE — 25000125 ZZHC RX 250: Performed by: PHYSICIAN ASSISTANT

## 2017-05-21 PROCEDURE — 40000193 ZZH STATISTIC PT WARD VISIT: Performed by: PHYSICAL THERAPY ASSISTANT

## 2017-05-21 PROCEDURE — 25000128 H RX IP 250 OP 636: Performed by: INTERNAL MEDICINE

## 2017-05-21 PROCEDURE — 94640 AIRWAY INHALATION TREATMENT: CPT

## 2017-05-21 PROCEDURE — 40000275 ZZH STATISTIC RCP TIME EA 10 MIN

## 2017-05-21 PROCEDURE — 25000125 ZZHC RX 250: Performed by: INTERNAL MEDICINE

## 2017-05-21 PROCEDURE — 25000125 ZZHC RX 250: Performed by: HOSPITALIST

## 2017-05-21 PROCEDURE — 84134 ASSAY OF PREALBUMIN: CPT | Performed by: INTERNAL MEDICINE

## 2017-05-21 PROCEDURE — 85610 PROTHROMBIN TIME: CPT | Performed by: INTERNAL MEDICINE

## 2017-05-21 PROCEDURE — 83735 ASSAY OF MAGNESIUM: CPT | Performed by: INTERNAL MEDICINE

## 2017-05-21 PROCEDURE — 94640 AIRWAY INHALATION TREATMENT: CPT | Mod: 76

## 2017-05-21 PROCEDURE — 99233 SBSQ HOSP IP/OBS HIGH 50: CPT | Performed by: INTERNAL MEDICINE

## 2017-05-21 PROCEDURE — 84132 ASSAY OF SERUM POTASSIUM: CPT | Performed by: INTERNAL MEDICINE

## 2017-05-21 PROCEDURE — 80053 COMPREHEN METABOLIC PANEL: CPT | Performed by: INTERNAL MEDICINE

## 2017-05-21 RX ADMIN — METOPROLOL TARTRATE 5 MG: 5 INJECTION INTRAVENOUS at 00:05

## 2017-05-21 RX ADMIN — IPRATROPIUM BROMIDE AND ALBUTEROL SULFATE 3 ML: .5; 3 SOLUTION RESPIRATORY (INHALATION) at 15:17

## 2017-05-21 RX ADMIN — ACETAMINOPHEN 1000 MG: 10 INJECTION, SOLUTION INTRAVENOUS at 10:12

## 2017-05-21 RX ADMIN — MICONAZOLE NITRATE: 2 POWDER TOPICAL at 14:14

## 2017-05-21 RX ADMIN — ACETAMINOPHEN 1000 MG: 10 INJECTION, SOLUTION INTRAVENOUS at 18:55

## 2017-05-21 RX ADMIN — POTASSIUM CHLORIDE 10 MEQ: 14.9 INJECTION, SOLUTION, CONCENTRATE PARENTERAL at 02:17

## 2017-05-21 RX ADMIN — METOPROLOL TARTRATE 5 MG: 5 INJECTION INTRAVENOUS at 13:19

## 2017-05-21 RX ADMIN — SODIUM PHOSPHATE, MONOBASIC, MONOHYDRATE AND SODIUM PHOSPHATE, DIBASIC, ANHYDROUS 15 MMOL: 276; 142 INJECTION, SOLUTION INTRAVENOUS at 11:01

## 2017-05-21 RX ADMIN — IPRATROPIUM BROMIDE AND ALBUTEROL SULFATE 3 ML: .5; 3 SOLUTION RESPIRATORY (INHALATION) at 19:30

## 2017-05-21 RX ADMIN — POTASSIUM CHLORIDE 10 MEQ: 14.9 INJECTION, SOLUTION, CONCENTRATE PARENTERAL at 05:45

## 2017-05-21 RX ADMIN — DIPHENHYDRAMINE HYDROCHLORIDE 25 MG: 50 INJECTION, SOLUTION INTRAMUSCULAR; INTRAVENOUS at 03:28

## 2017-05-21 RX ADMIN — POTASSIUM CHLORIDE 10 MEQ: 14.9 INJECTION, SOLUTION, CONCENTRATE PARENTERAL at 03:21

## 2017-05-21 RX ADMIN — IPRATROPIUM BROMIDE AND ALBUTEROL SULFATE 3 ML: .5; 3 SOLUTION RESPIRATORY (INHALATION) at 11:07

## 2017-05-21 RX ADMIN — IPRATROPIUM BROMIDE AND ALBUTEROL SULFATE 3 ML: .5; 3 SOLUTION RESPIRATORY (INHALATION) at 07:46

## 2017-05-21 RX ADMIN — ACETAMINOPHEN 1000 MG: 10 INJECTION, SOLUTION INTRAVENOUS at 01:47

## 2017-05-21 RX ADMIN — MICONAZOLE NITRATE: 2 POWDER TOPICAL at 17:00

## 2017-05-21 RX ADMIN — METOPROLOL TARTRATE 5 MG: 5 INJECTION INTRAVENOUS at 20:04

## 2017-05-21 RX ADMIN — PANTOPRAZOLE SODIUM 40 MG: 40 INJECTION, POWDER, FOR SOLUTION INTRAVENOUS at 07:59

## 2017-05-21 RX ADMIN — I.V. FAT EMULSION 250 ML: 20 EMULSION INTRAVENOUS at 20:10

## 2017-05-21 RX ADMIN — LEVOTHYROXINE SODIUM ANHYDROUS 50 MCG: 100 INJECTION, POWDER, LYOPHILIZED, FOR SOLUTION INTRAVENOUS at 08:00

## 2017-05-21 RX ADMIN — POTASSIUM CHLORIDE 10 MEQ: 14.9 INJECTION, SOLUTION, CONCENTRATE PARENTERAL at 04:27

## 2017-05-21 RX ADMIN — METOPROLOL TARTRATE 5 MG: 5 INJECTION INTRAVENOUS at 05:40

## 2017-05-21 NOTE — PLAN OF CARE
Problem: Goal Outcome Summary  Goal: Goal Outcome Summary  Outcome: No Change  Pt up with two assist, walker and gait belt. PPN initiated tonight after sodium phosphate completed infusing.  and 119. States he has abdominal pain, unable to rate but states it is mild to severe. IV ofirmev administered x1. Abdomen continues to be very distended. One extra large liquid BM tonight. Edema increased in feet and hands since yesterday.

## 2017-05-21 NOTE — PROGRESS NOTES
"[facility]   General Surgery Progress Note           Assessment and Plan:   Assessment:   POD#7 s/p Procedure(s):  Exploratory laparotomy with peritoneal washout.  - Wound Class: III-Contaminated        Plan:   -diet as tolerated as ileus resolves  Beginning PPN         Interval History:   Multiple BMs         Physical Exam:   Blood pressure 156/79, pulse 97, temperature 97.6  F (36.4  C), temperature source Axillary, resp. rate 16, height 1.702 m (5' 7.01\"), weight 105.7 kg (233 lb), SpO2 97 %.    I/O last 3 completed shifts:  In: 1505 [I.V.:1231]  Out: 1325 [Urine:1325]    Abdomen:   soft, rounded and obese, minimal  tenderness noted in the right upper quadrant and no masses palpated   Inc(s) - clean, dry, intact                Data:     Recent Labs   Lab Test  05/15/17   0722  05/14/17   0633  05/13/17   1835   HGB  9.1*  10.3*  10.5*   WBC  6.9  9.3  7.0       Sergio Marcus MD     "

## 2017-05-21 NOTE — PLAN OF CARE
Problem: Goal Outcome Summary  Goal: Goal Outcome Summary  Outcome: Improving  Pt improved alertness and decreased discomfort. IV tylenol x 1. Up with 2 and walker. Up in chair x 1 and ambulated in room 3 loose stools.  Tolerated  Small amount of clears.

## 2017-05-21 NOTE — PROGRESS NOTES
"  [facility]  Hospitalist Progress Note  Shawn Garcia MD 05/21/2017    Reason for Stay (Diagnosis): ileus         Assessment and Plan:      Summary of Stay: Ton Bagley is a 89 year old male admitted on 5/13/2017 with abd pain and CT findings concerning for possible SBO; however exp lap demonstrated ileus with no obstruction present. Ileus persists and is slow to resolve  Problem List:   1. Ileus. S/P exploratory laparotomy by General Surgery on 5/14 for initial concerns about possible SBO; found to have ileus. NGT removed 5/18. Continues to have abd distention and pain with PO intake minimal, but does seem to be slowly improving the past 24 hours and he is having BM. Stopped narcotics (had been receiving MSO4) to minimize worsening ileus. cdiff neg  2. Diabetes Mellitus. Hold Glipizide until diet improves. Novolog SSI.  3. HTN. IV Metoprolol until PO improves.  4. Hyperlipidemia. Hold Zocor while NPO.  5. Hypothyroidism. Continue IV synthroid until ileus improved  6. A fib. Will hold coumadin for now in setting of ileus and ? Gut absorption, and in case additional procedures needed from complications related to ileus. Resume when PO bowel ftn improves  7. CAD. IV Metoprolol while NPO.  8. Dementia. Hold Namenda and Aricept while NPO.  9. Acute on chronic kidney disease. resolved  10. Bronchospasm. Resolved. On Duonebs QID. Albuterol PRN.  11. Nutrition: appreciate nutrition reccs; started PPN; consider PICC and TPN if ileus persists and PO not adequate      DVT Prophylaxis: coumadin (on hold for now but INR remains therapeutic)  Code Status: DNR / DNI  Discharge Dispo: TBD  Estimated Disch Date / # of Days until Disch: unknown; await return of bowel ftn        Interval History (Subjective):      C/o abd pain                  Physical Exam:      Last Vital Signs:  /69 (BP Location: Left arm)  Pulse 97  Temp 97.6  F (36.4  C) (Axillary)  Resp 16  Ht 1.702 m (5' 7.01\")  Wt 105.7 " kg (233 lb)  SpO2 97%  BMI 36.48 kg/m2      Intake/Output Summary (Last 24 hours) at 05/21/17 1403  Last data filed at 05/21/17 1200   Gross per 24 hour   Intake             1508 ml   Output              925 ml   Net              583 ml       Constitutional: Awake, alert, cooperative, no apparent distress   Respiratory: Clear to auscultation bilaterally, no crackles or wheezing   Cardiovascular: Regular rate and rhythm, normal S1 and S2, and no murmur noted   Abdomen: hypoactive bowel sounds, soft, distended, mild TTP - seems improved today   Skin: No rashes, no cyanosis, dry to touch   Neuro: Awake, confused c/w dementia hx, no weakness, numbness, +memory loss   Extremities: No edema, normal range of motion   Other(s):        All other systems: Negative          Medications:      All current medications were reviewed with changes reflected in problem list.         Data:      All new lab and imaging data was reviewed.   Labs:    Recent Labs  Lab 05/19/17  0700 05/16/17  0716 05/15/17  0722   WBC  --   --  6.9   HGB  --   --  9.1*   HCT  --   --  32.3*   MCV  --   --  82    209 255      Imaging:   No results found for this or any previous visit (from the past 24 hour(s)).

## 2017-05-21 NOTE — PLAN OF CARE
Problem: Goal Outcome Summary  Goal: Goal Outcome Summary  PT - Pt amb 3' & 18' with ww with min assist with vcs for pt to amb closer to ww for safety and good posture.  Pt transfers supine to sidelying with vcs along facilitation. Pt required mod assist with rolling to his side and mod assist - min assist of 2 with sidelying to sit. Pt transfers sit to./from stand with min assist. Pt transfers bed to commode to chair with ww with min assist of 1 and CGA of 1. Pt required assist for pericares which nursing provided and assist with donning attends.  Continue to recommend TCU.

## 2017-05-21 NOTE — CONSULTS
CLINICAL NUTRITION SERVICES BRIEF NOTE    Consult received for Pharmacy/Nutrition to start & manage TPN and PPN.     See RD note from 5/19 for full nutrition assessment.     ASSESSED NUTRITION NEEDS (PER APPROVED PRACTICE GUIDELINES, Dosing weight: 76.5 kg AdjBW):  Estimated Energy Needs: 6174-0986 kcals (25-30 Kcal/Kg)  Justification: obese  Estimated Protein Needs:  grams protein (1.2-1.5 g pro/Kg)  Justification: post-op  Estimated Fluid Needs: >1 mL/Kcal  Justification: maintenance    New Findings:  - Ileus, NGT removed 5/18. Had large BM yesterday.  - Peripheral access available for PPN, started last evening Clinimix 4.25/5 @ 50 ml/hr w/ no lipids. Provides 413 kcal/day, 60 g pro (0.8 g/kg), 51 g CHO, 0% kcal from lipid.   - BL edema increasing. Wt 105.7 kg today.    Meds -   D5 IVF off    PPN tolerance -   Phos 2.2 (L) - replacement protocol ordered for this level  K 4.0, Mg 2.1 - NL  BGs <150  Large BM yesterday, x1 this am    Recommendations/Interventions:  Next Bag: Increase PPN D4.25 AA5 to 125 mL/hr  (3000 mL total) + 250 mL 20% lipids daily  - This provides 1520 kcal/day, 128 g protein (1.7 g per kg), 150 g CHO and 33% kcal from fat.    Recommend obtain central access in next 1-2 days for ability to meet full nutrition needs.     RD off-site today, please page if needed. Progress towards goals will be monitored and evaluated per protocol and Practice Guidelines    Britni Perdomo, CASA, LD

## 2017-05-21 NOTE — PLAN OF CARE
Problem: Goal Outcome Summary  Goal: Goal Outcome Summary  Outcome: No Change  Continues to complain of abdominal discomfort. PPN infusing. K+ replacement done during night. Will recheck in AM. Liquid BM X 1. C-diff negative. Benadryl X 1 for back itching.

## 2017-05-22 ENCOUNTER — APPOINTMENT (OUTPATIENT)
Dept: PHYSICAL THERAPY | Facility: CLINIC | Age: 82
DRG: 357 | End: 2017-05-22
Payer: MEDICARE

## 2017-05-22 LAB
ALBUMIN SERPL-MCNC: 2.7 G/DL (ref 3.4–5)
ALP SERPL-CCNC: 66 U/L (ref 40–150)
ALT SERPL W P-5'-P-CCNC: 25 U/L (ref 0–70)
ANION GAP SERPL CALCULATED.3IONS-SCNC: 6 MMOL/L (ref 3–14)
AST SERPL W P-5'-P-CCNC: 28 U/L (ref 0–45)
BILIRUB SERPL-MCNC: 0.4 MG/DL (ref 0.2–1.3)
BUN SERPL-MCNC: 12 MG/DL (ref 7–30)
CALCIUM SERPL-MCNC: 8.1 MG/DL (ref 8.5–10.1)
CHLORIDE SERPL-SCNC: 104 MMOL/L (ref 94–109)
CO2 SERPL-SCNC: 30 MMOL/L (ref 20–32)
CREAT SERPL-MCNC: 0.84 MG/DL (ref 0.66–1.25)
GFR SERPL CREATININE-BSD FRML MDRD: 86 ML/MIN/1.7M2
GLUCOSE BLDC GLUCOMTR-MCNC: 125 MG/DL (ref 70–99)
GLUCOSE BLDC GLUCOMTR-MCNC: 152 MG/DL (ref 70–99)
GLUCOSE BLDC GLUCOMTR-MCNC: 159 MG/DL (ref 70–99)
GLUCOSE BLDC GLUCOMTR-MCNC: 161 MG/DL (ref 70–99)
GLUCOSE BLDC GLUCOMTR-MCNC: 165 MG/DL (ref 70–99)
GLUCOSE SERPL-MCNC: 143 MG/DL (ref 70–99)
INR PPP: 2.18 (ref 0.86–1.14)
MAGNESIUM SERPL-MCNC: 2.3 MG/DL (ref 1.6–2.3)
PHOSPHATE SERPL-MCNC: 2.3 MG/DL (ref 2.5–4.5)
PLATELET # BLD AUTO: 267 10E9/L (ref 150–450)
POTASSIUM SERPL-SCNC: 3.2 MMOL/L (ref 3.4–5.3)
POTASSIUM SERPL-SCNC: 3.7 MMOL/L (ref 3.4–5.3)
PREALB SERPL IA-MCNC: 14 MG/DL (ref 15–45)
PROT SERPL-MCNC: 5.9 G/DL (ref 6.8–8.8)
SODIUM SERPL-SCNC: 140 MMOL/L (ref 133–144)
TRIGL SERPL-MCNC: 219 MG/DL

## 2017-05-22 PROCEDURE — 40000275 ZZH STATISTIC RCP TIME EA 10 MIN

## 2017-05-22 PROCEDURE — 40000193 ZZH STATISTIC PT WARD VISIT: Performed by: PHYSICAL THERAPY ASSISTANT

## 2017-05-22 PROCEDURE — 84478 ASSAY OF TRIGLYCERIDES: CPT | Performed by: HOSPITALIST

## 2017-05-22 PROCEDURE — 84134 ASSAY OF PREALBUMIN: CPT | Performed by: HOSPITALIST

## 2017-05-22 PROCEDURE — 40000274 ZZH STATISTIC RCP CONSULT EA 30 MIN

## 2017-05-22 PROCEDURE — 25000132 ZZH RX MED GY IP 250 OP 250 PS 637: Mod: GY | Performed by: HOSPITALIST

## 2017-05-22 PROCEDURE — A9270 NON-COVERED ITEM OR SERVICE: HCPCS | Mod: GY | Performed by: PHYSICIAN ASSISTANT

## 2017-05-22 PROCEDURE — 25000128 H RX IP 250 OP 636: Performed by: INTERNAL MEDICINE

## 2017-05-22 PROCEDURE — 85049 AUTOMATED PLATELET COUNT: CPT | Performed by: HOSPITALIST

## 2017-05-22 PROCEDURE — 83735 ASSAY OF MAGNESIUM: CPT | Performed by: HOSPITALIST

## 2017-05-22 PROCEDURE — 25000132 ZZH RX MED GY IP 250 OP 250 PS 637: Mod: GY | Performed by: INTERNAL MEDICINE

## 2017-05-22 PROCEDURE — 85610 PROTHROMBIN TIME: CPT | Performed by: HOSPITALIST

## 2017-05-22 PROCEDURE — 25000125 ZZHC RX 250: Performed by: INTERNAL MEDICINE

## 2017-05-22 PROCEDURE — 94640 AIRWAY INHALATION TREATMENT: CPT

## 2017-05-22 PROCEDURE — 25000125 ZZHC RX 250: Performed by: HOSPITALIST

## 2017-05-22 PROCEDURE — 25000132 ZZH RX MED GY IP 250 OP 250 PS 637: Mod: GY | Performed by: PHYSICIAN ASSISTANT

## 2017-05-22 PROCEDURE — A9270 NON-COVERED ITEM OR SERVICE: HCPCS | Mod: GY | Performed by: INTERNAL MEDICINE

## 2017-05-22 PROCEDURE — 36415 COLL VENOUS BLD VENIPUNCTURE: CPT | Performed by: INTERNAL MEDICINE

## 2017-05-22 PROCEDURE — 97530 THERAPEUTIC ACTIVITIES: CPT | Mod: GP | Performed by: PHYSICAL THERAPY ASSISTANT

## 2017-05-22 PROCEDURE — 36415 COLL VENOUS BLD VENIPUNCTURE: CPT | Performed by: HOSPITALIST

## 2017-05-22 PROCEDURE — 25000125 ZZHC RX 250: Performed by: PHYSICIAN ASSISTANT

## 2017-05-22 PROCEDURE — A9270 NON-COVERED ITEM OR SERVICE: HCPCS | Mod: GY | Performed by: HOSPITALIST

## 2017-05-22 PROCEDURE — 12000000 ZZH R&B MED SURG/OB

## 2017-05-22 PROCEDURE — 80053 COMPREHEN METABOLIC PANEL: CPT | Performed by: HOSPITALIST

## 2017-05-22 PROCEDURE — 84100 ASSAY OF PHOSPHORUS: CPT | Performed by: HOSPITALIST

## 2017-05-22 PROCEDURE — 94640 AIRWAY INHALATION TREATMENT: CPT | Mod: 76

## 2017-05-22 PROCEDURE — 97116 GAIT TRAINING THERAPY: CPT | Mod: GP | Performed by: PHYSICAL THERAPY ASSISTANT

## 2017-05-22 PROCEDURE — 99233 SBSQ HOSP IP/OBS HIGH 50: CPT | Performed by: INTERNAL MEDICINE

## 2017-05-22 PROCEDURE — 84132 ASSAY OF SERUM POTASSIUM: CPT | Performed by: INTERNAL MEDICINE

## 2017-05-22 PROCEDURE — 00000146 ZZHCL STATISTIC GLUCOSE BY METER IP

## 2017-05-22 RX ORDER — SIMVASTATIN 10 MG
10 TABLET ORAL AT BEDTIME
Status: DISCONTINUED | OUTPATIENT
Start: 2017-05-22 | End: 2017-05-28 | Stop reason: HOSPADM

## 2017-05-22 RX ORDER — METOPROLOL TARTRATE 25 MG/1
25 TABLET, FILM COATED ORAL 2 TIMES DAILY
Status: DISCONTINUED | OUTPATIENT
Start: 2017-05-22 | End: 2017-05-23

## 2017-05-22 RX ORDER — LEVOTHYROXINE SODIUM 100 UG/1
100 TABLET ORAL
Status: DISCONTINUED | OUTPATIENT
Start: 2017-05-22 | End: 2017-05-28 | Stop reason: HOSPADM

## 2017-05-22 RX ORDER — PANTOPRAZOLE SODIUM 40 MG/1
40 TABLET, DELAYED RELEASE ORAL EVERY MORNING
Status: DISCONTINUED | OUTPATIENT
Start: 2017-05-23 | End: 2017-05-28 | Stop reason: HOSPADM

## 2017-05-22 RX ORDER — WARFARIN SODIUM 2 MG/1
2 TABLET ORAL
Status: COMPLETED | OUTPATIENT
Start: 2017-05-22 | End: 2017-05-22

## 2017-05-22 RX ORDER — NICOTINE POLACRILEX 4 MG
15-30 LOZENGE BUCCAL
Status: DISCONTINUED | OUTPATIENT
Start: 2017-05-22 | End: 2017-05-22

## 2017-05-22 RX ORDER — DEXTROSE MONOHYDRATE 25 G/50ML
25-50 INJECTION, SOLUTION INTRAVENOUS
Status: DISCONTINUED | OUTPATIENT
Start: 2017-05-22 | End: 2017-05-22

## 2017-05-22 RX ORDER — TAMSULOSIN HYDROCHLORIDE 0.4 MG/1
0.4 CAPSULE ORAL EVERY EVENING
Status: DISCONTINUED | OUTPATIENT
Start: 2017-05-22 | End: 2017-05-28 | Stop reason: HOSPADM

## 2017-05-22 RX ORDER — ACETAMINOPHEN 500 MG
1000 TABLET ORAL EVERY 6 HOURS PRN
Status: DISCONTINUED | OUTPATIENT
Start: 2017-05-22 | End: 2017-05-28 | Stop reason: HOSPADM

## 2017-05-22 RX ADMIN — METOPROLOL TARTRATE 25 MG: 25 TABLET ORAL at 21:46

## 2017-05-22 RX ADMIN — ONDANSETRON 4 MG: 4 TABLET, ORALLY DISINTEGRATING ORAL at 13:54

## 2017-05-22 RX ADMIN — ASCORBIC ACID, VITAMIN A PALMITATE, CHOLECALCIFEROL, THIAMINE HYDROCHLORIDE, RIBOFLAVIN-5 PHOSPHATE SODIUM, PYRIDOXINE HYDROCHLORIDE, NIACINAMIDE, DEXPANTHENOL, ALPHA-TOCOPHEROL ACETATE, VITAMIN K1, FOLIC ACID, BIOTIN, CYANOCOBALAMIN: 200; 3300; 200; 6; 3.6; 6; 40; 15; 10; 150; 600; 60; 5 INJECTION, SOLUTION INTRAVENOUS at 12:52

## 2017-05-22 RX ADMIN — METOPROLOL TARTRATE 5 MG: 5 INJECTION INTRAVENOUS at 09:02

## 2017-05-22 RX ADMIN — SIMVASTATIN 10 MG: 10 TABLET, FILM COATED ORAL at 21:47

## 2017-05-22 RX ADMIN — ACETAMINOPHEN 1000 MG: 500 TABLET, FILM COATED ORAL at 19:11

## 2017-05-22 RX ADMIN — DIPHENHYDRAMINE HYDROCHLORIDE 25 MG: 25 CAPSULE ORAL at 07:16

## 2017-05-22 RX ADMIN — TAMSULOSIN HYDROCHLORIDE 0.4 MG: 0.4 CAPSULE ORAL at 19:11

## 2017-05-22 RX ADMIN — POTASSIUM CHLORIDE 20 MEQ: 1500 TABLET, EXTENDED RELEASE ORAL at 11:22

## 2017-05-22 RX ADMIN — ASCORBIC ACID, VITAMIN A PALMITATE, CHOLECALCIFEROL, THIAMINE HYDROCHLORIDE, RIBOFLAVIN-5 PHOSPHATE SODIUM, PYRIDOXINE HYDROCHLORIDE, NIACINAMIDE, DEXPANTHENOL, ALPHA-TOCOPHEROL ACETATE, VITAMIN K1, FOLIC ACID, BIOTIN, CYANOCOBALAMIN: 200; 3300; 200; 6; 3.6; 6; 40; 15; 10; 150; 600; 60; 5 INJECTION, SOLUTION INTRAVENOUS at 02:57

## 2017-05-22 RX ADMIN — IPRATROPIUM BROMIDE AND ALBUTEROL SULFATE 3 ML: .5; 3 SOLUTION RESPIRATORY (INHALATION) at 15:30

## 2017-05-22 RX ADMIN — SODIUM PHOSPHATE, MONOBASIC, MONOHYDRATE AND SODIUM PHOSPHATE, DIBASIC, ANHYDROUS 15 MMOL: 276; 142 INJECTION, SOLUTION INTRAVENOUS at 12:00

## 2017-05-22 RX ADMIN — POTASSIUM CHLORIDE 40 MEQ: 1500 TABLET, EXTENDED RELEASE ORAL at 08:49

## 2017-05-22 RX ADMIN — ACETAMINOPHEN 1000 MG: 10 INJECTION, SOLUTION INTRAVENOUS at 04:03

## 2017-05-22 RX ADMIN — WARFARIN SODIUM 2 MG: 2 TABLET ORAL at 17:58

## 2017-05-22 RX ADMIN — DIPHENHYDRAMINE HYDROCHLORIDE 25 MG: 25 CAPSULE ORAL at 21:47

## 2017-05-22 RX ADMIN — METOPROLOL TARTRATE 5 MG: 5 INJECTION INTRAVENOUS at 02:02

## 2017-05-22 RX ADMIN — IPRATROPIUM BROMIDE AND ALBUTEROL SULFATE 3 ML: .5; 3 SOLUTION RESPIRATORY (INHALATION) at 07:30

## 2017-05-22 RX ADMIN — LEVOTHYROXINE SODIUM ANHYDROUS 50 MCG: 100 INJECTION, POWDER, LYOPHILIZED, FOR SOLUTION INTRAVENOUS at 09:05

## 2017-05-22 RX ADMIN — IPRATROPIUM BROMIDE AND ALBUTEROL SULFATE 3 ML: .5; 3 SOLUTION RESPIRATORY (INHALATION) at 19:41

## 2017-05-22 RX ADMIN — PANTOPRAZOLE SODIUM 40 MG: 40 INJECTION, POWDER, FOR SOLUTION INTRAVENOUS at 08:50

## 2017-05-22 RX ADMIN — METOPROLOL TARTRATE 25 MG: 25 TABLET ORAL at 13:54

## 2017-05-22 RX ADMIN — DIPHENHYDRAMINE HYDROCHLORIDE 25 MG: 50 INJECTION, SOLUTION INTRAMUSCULAR; INTRAVENOUS at 00:02

## 2017-05-22 ASSESSMENT — PAIN DESCRIPTION - DESCRIPTORS: DESCRIPTORS: CRAMPING

## 2017-05-22 NOTE — PLAN OF CARE
Problem: Goal Outcome Summary  Goal: Goal Outcome Summary  Outcome: No Change  Pt up with gait belt, walker, and two assist. Attempted to walk in hallway but pt has such urgency with BMs that we did not make it far in hallway. Pt had two lg loose BMs, one incontinent and one continent. Bowel sounds active x4, distension slightly improved since yesterday.

## 2017-05-22 NOTE — PROGRESS NOTES
"  [facility]  Hospitalist Progress Note  Lindsey Dao MD, MD 05/22/2017    Reason for Stay (Diagnosis): ileus         Assessment and Plan:      Summary of Stay: Ton Bagley is a 89 year old male admitted on 5/13/2017 with abd pain and CT findings concerning for possible SBO; however exp lap demonstrated ileus with no obstruction present. Ileus persists and is slow to resolve    Problem List:   1. Ileus. S/P exploratory laparotomy by General Surgery on 5/14 for initial concerns about possible SBO; found to have ileus. NGT removed 5/18. Continues to have abd distention and pain with PO intake minimal, but does seem to be slowly improving the past 24 hours and he is having BM. Stopped narcotics (had been receiving MSO4) to minimize worsening ileus. cdiff neg. Encouraged ambulation  2. Diabetes Mellitus. Hold Glipizide until diet improves. Novolog SSI.  3. HTN.  Transition to p.o. metoprolol 25 mg p.o. Daily  4. Hyperlipidemia.  Okay to restart Zocor  5. Hypothyroidism. Continue IV synthroid until ileus improved  6. A fib.  Rate controlled.  Resume Coumadin per pharmacy with goal INR of 2-3.  7. CAD. IV Metoprolol while NPO.  8. Dementia. Hold Namenda and Aricept while NPO.  9. Acute on chronic kidney disease. Resolved  10. Bronchospasm. Resolved. On Duonebs QID. Albuterol PRN.  11. Nutrition: appreciate nutrition recs; cont PPN; consider PICC and TPN if ileus persists and PO not adequate      DVT Prophylaxis: coumadin per pharmacy  Code Status: DNR / DNI  Discharge Dispo: TBD  Estimated Disch Date / # of Days until Disch: unknown; await return of bowel ftn        Interval History (Subjective):      No spec c/o; reports some flatus but can't recall last one                  Physical Exam:      Last Vital Signs:  /69  Pulse 97  Temp 98.7  F (37.1  C) (Oral)  Resp 16  Ht 1.702 m (5' 7.01\")  Wt 106.7 kg (235 lb 4.8 oz)  SpO2 97%  BMI 36.84 kg/m2      Intake/Output Summary (Last 24 hours) at " 05/21/17 1403  Last data filed at 05/21/17 1200   Gross per 24 hour   Intake             1508 ml   Output              925 ml   Net              583 ml       Constitutional: Awake, alert, cooperative, no apparent distress   Respiratory: Clear to auscultation bilaterally, no crackles or wheezing   Cardiovascular: Regular rate and rhythm, normal S1 and S2, and no murmur noted   Abdomen: hypoactive bowel sounds, soft, distended, mild TTP - seems improved today   Skin: No rashes, no cyanosis, dry to touch   Neuro: Awake, confused c/w dementia hx, no weakness, numbness, +memory loss   Extremities: No edema, normal range of motion   Other(s):        All other systems: Negative          Medications:      All current medications were reviewed with changes reflected in problem list.         Data:      All new lab and imaging data was reviewed.   Labs:    Recent Labs  Lab 05/22/17  0646 05/19/17  0700 05/16/17  0716    287 209      Imaging:   No results found for this or any previous visit (from the past 24 hour(s)).

## 2017-05-22 NOTE — PROGRESS NOTES
"[facility] RCAT     Date: 5/22/17  Admission Dx: SBO  Pulmonary History: None  Home Nebulizer/MDI Use: None  Home Oxygen: None  Acuity Level (RCAT flow sheet): 3  Aerosol Therapy initiated: Duoneb QID, Alb Q2 prn      Pulmonary Hygiene initiated: Coughing Techniques      Volume Expansion initiated: Incentive Spirometry      Current Oxygen Requirements: 2L NC  Current SpO2: 94%    Re-evaluation date: 5/25/17    Patient Education: Talked with patient about RCAT protocol and medication benefits as well as side effects. Patient verbalizes understanding in regards to medication.    See \"RT Assessments\" flow sheet for patient assessment scoring and Acuity Level Details.             "

## 2017-05-22 NOTE — CONSULTS
I met with pt and spouse back on 5/15/17.  Therapies are currently recommending TCU.  Pt said he didn't know. His wife was already here today.   I did call pt's wife, but it just kept ringing.  Will try to connect with her tomorrow if she visits.  Ciera WASHINGTON CTS [id number]

## 2017-05-22 NOTE — PROGRESS NOTES
"CLINICAL NUTRITION SERVICES - REASSESSMENT NOTE      EVALUATION OF PROGRESS TOWARD GOALS   Diet order: advancement, POC  Update/Evaluation: PPN (Clinimix) + 250 ml 20% lipids daily initiated the evening of 5/20 at 50 ml/hr, increased to goal rate of 125 ml/hr yesterday evening.  Goal regimen of the following:    - D4.25, AA5 @ 125 mL/hr x 24 hrs + 250 mL 20% lipids daily  - 3000 ml provides 1534 kcal/day (20 kcal/kg - 81% of needs), 150 g protein (2.0 g/kg - >100% needs), 128 g CHO and 33% kcal from fat.    Diet advanced to clears 5/19 with full liquids as of this AM.  Also with orders for Ensure clear offered with meal and high protein jello scheduled BID between meals.  Patient slowly consuming full liquid tray this AM but c/o having \"no brown sugar\" for his hot cereal.  Wife in room.        ASSESSED NUTRITION NEEDS (PER APPROVED PRACTICE GUIDELINES, Dosing weight: 76.5 kg AdjBW):  Estimated Energy Needs: 6224-2339 kcals (25-30 Kcal/Kg)  Justification: obese  Estimated Protein Needs:  grams protein (1.2-1.5 g pro/Kg)  Justification: post-op  Estimated Fluid Needs: >1 mL/Kcal  Justification: maintenance      NEW FINDINGS:   - BM x2 5/19, x1 5/20, x1 5/21, x1 this AM, C. diff negative.  - Medications reviewed:   SSI  - Labs reviewed:   BG <150   TG elevated this AM though no baseline since 2014  - Wt relatively stable since admission:  Vitals:    05/13/17 2138 05/13/17 2254 05/17/17 1612 05/22/17 0549   Weight: 104.3 kg (230 lb) 103.8 kg (228 lb 12.8 oz) 105.7 kg (233 lb) 106.7 kg (235 lb 4.8 oz)       Previous Goals:   Diet >/=FL within 48-72 hours versus need for nutrition support  Evaluation: Met    Previous Nutrition Diagnosis:   Inadequate oral intake related to altered GI function as evidenced by post op ileus, NPO x 6 days meeting <25% of estimated needs  Evaluation: No change, continued below      MALNUTRITION: (5/19/2017)  % Weight Loss:Weight loss does not meet criteria for malnutrition   % " Intake:</= 50% for >/= 5 days (severe malnutrition)  Subcutaneous Fat Loss:None observed  Muscle Loss: Mild, as noted above  Fluid Retention:Mild      Malnutrition Diagnosis: Non-Severe malnutrition  In Context of: Acute illness or injury at high risk for further decline    CURRENT NUTRITION DIAGNOSIS  Inadequate oral intake related to altered GI function with slow advancing diet order as evidenced by meeting <25-50% needs orally x 9 days since admission.    INTERVENTIONS  Recommendations / Nutrition Prescription  Diet per Surgery.    Change to Ensure supplement BID between meals.  D/c all other supplements.    Ok to continue PPN despite diet advancement given post-op protein needs.  Decrease rate/total volume of PPN to the following goal regimen and d/c lipids:   - Clinimix (D4.25 + AA5) @ goal rate of 90 ml/hr x 24 hrs (2160 ml)  - To provide 745 kcal (39% of needs), 92 g protein (1.2 g/kg - 100% of needs), 92 g CHO.    - Total IVFs = 125 ml/hr  - 76.5 kg DW (adjusted body weight)    Anticipate will be able to d/c PPN 5/23 vs 5/24 (when diet advanced to low-fiber per Surgery).      Implementation  PN Composition, Medical Food Supplement: As above.     Collaboration and Referral of Nutrition care: Discussed POC with team during rounds, oral intakes with LPN, and decrease in PPN with PharmD.     Nutrition Education: Discussed nutrition-related POC with wife in room.    Goals  Diet advancement past fulls w/in 48 hrs.   Patient to consume at least 50% of meals TID + 1 supplement daily.      MONITORING AND EVALUATION:  Diet Order, Food intake and Liquid meal replacement or supplement - Advancement and adequacy.        Kenya Dominique RD, LD  Clinical Dietitian  3rd floor/ICU: [phone]  All other floors: [phone]  Weekend/holiday: [phone]

## 2017-05-22 NOTE — PROGRESS NOTES
[facility]    Patient Name: Ton Bagley  [id number]  Services received on: 5/22/2017    MEDICAL MUSIC THERAPY PROGRESS NOTE  FOLLOW UP SESSION    Original referral made by: See Initial Music Therapy Assessment (in Progress Notes)  Reason for referral: See Initial Music Therapy Assessment (in Progress Notes)    Session interventions & outcomes: Pt unavailable during time of therapist's attempted visits (2x). 1st attempt: pt was being visited by team involved in conversation; 2nd attempt: pt in sleep state in chair.    Musical Preferences: Tyra Wesley    Follow up: Therapist will follow up with pt on Friday if pt remains hospitalized. Therapist onsite on [address] on Mondays ([phone]) and Fridays ([phone]).    Karely Khalil MA, MT-BC  Medical Music Therapy Services  [email or web address]

## 2017-05-22 NOTE — PLAN OF CARE
Problem: Goal Outcome Summary  Goal: Goal Outcome Summary  PT - Pt just recv'd breakfast and wanting to eat.  Will attempt back later for PT.

## 2017-05-22 NOTE — PLAN OF CARE
Problem: Goal Outcome Summary  Goal: Goal Outcome Summary  Outcome: No Change  Post op day 8 from exploratory lap. Pt only orientated to self. Continues on PPN, 90ml/hr. Diet advanced to full liquid. Replaced for k+ 3.2 and phos 2.3. Receiving 1.5L via nasal cannula. Denies abdominal pain but c/o fullness. Continues to have multiple, small, loose BM's. Ambulating with assist of one and walker.

## 2017-05-22 NOTE — PROGRESS NOTES
"Surgery-[place]  POD#8 s/p ex lap for apparent closed loop SBO, negative findings.  Feels \"icky\" but can't specify  No nausea, is having flatus and stools  Afebrile/VSS (p102)  350po/1700uop  NAD, alert, still not completely oriented to situation  Abd soft, slight distention but improved, slight tenderness in epigastrium and right side  Incision clean and dry, bowel tones present throughout  Slow but positive progress  Agree with PPN  Changed diet to full liquids  Okay for coumadin  PT/OT/mobilize    "

## 2017-05-22 NOTE — PLAN OF CARE
Problem: Goal Outcome Summary  Goal: Goal Outcome Summary  Pt amb 12' & 15' with ww with min assist with vcs for pt to amb closer to ww. Note pt to amb with flexed posture. Pt transfers supine to sidelying to sit with min assist with vcs for pt to reach for railing. Pt transfers sit to/from stand wth min assist with vcs for hand placement for safety. Pt transfers bed to bathroom to chair with ww with min assist. Pt required assist with pericares and doffing and donning attends. Continue to recommend TCU.

## 2017-05-23 LAB
ANION GAP SERPL CALCULATED.3IONS-SCNC: 3 MMOL/L (ref 3–14)
BUN SERPL-MCNC: 13 MG/DL (ref 7–30)
CALCIUM SERPL-MCNC: 8.3 MG/DL (ref 8.5–10.1)
CHLORIDE SERPL-SCNC: 103 MMOL/L (ref 94–109)
CO2 SERPL-SCNC: 30 MMOL/L (ref 20–32)
CREAT SERPL-MCNC: 0.73 MG/DL (ref 0.66–1.25)
GFR SERPL CREATININE-BSD FRML MDRD: ABNORMAL ML/MIN/1.7M2
GLUCOSE BLDC GLUCOMTR-MCNC: 148 MG/DL (ref 70–99)
GLUCOSE BLDC GLUCOMTR-MCNC: 168 MG/DL (ref 70–99)
GLUCOSE BLDC GLUCOMTR-MCNC: 171 MG/DL (ref 70–99)
GLUCOSE BLDC GLUCOMTR-MCNC: 194 MG/DL (ref 70–99)
GLUCOSE BLDC GLUCOMTR-MCNC: 205 MG/DL (ref 70–99)
GLUCOSE SERPL-MCNC: 185 MG/DL (ref 70–99)
INR PPP: 1.56 (ref 0.86–1.14)
MAGNESIUM SERPL-MCNC: 2.1 MG/DL (ref 1.6–2.3)
PHOSPHATE SERPL-MCNC: 2.7 MG/DL (ref 2.5–4.5)
POTASSIUM SERPL-SCNC: 3.8 MMOL/L (ref 3.4–5.3)
SODIUM SERPL-SCNC: 136 MMOL/L (ref 133–144)

## 2017-05-23 PROCEDURE — 85610 PROTHROMBIN TIME: CPT | Performed by: INTERNAL MEDICINE

## 2017-05-23 PROCEDURE — 12000000 ZZH R&B MED SURG/OB

## 2017-05-23 PROCEDURE — 25000125 ZZHC RX 250: Performed by: INTERNAL MEDICINE

## 2017-05-23 PROCEDURE — 83735 ASSAY OF MAGNESIUM: CPT | Performed by: INTERNAL MEDICINE

## 2017-05-23 PROCEDURE — 25000132 ZZH RX MED GY IP 250 OP 250 PS 637: Mod: GY | Performed by: INTERNAL MEDICINE

## 2017-05-23 PROCEDURE — 84100 ASSAY OF PHOSPHORUS: CPT | Performed by: INTERNAL MEDICINE

## 2017-05-23 PROCEDURE — 25000132 ZZH RX MED GY IP 250 OP 250 PS 637: Mod: GY | Performed by: HOSPITALIST

## 2017-05-23 PROCEDURE — 99233 SBSQ HOSP IP/OBS HIGH 50: CPT | Performed by: INTERNAL MEDICINE

## 2017-05-23 PROCEDURE — 40000275 ZZH STATISTIC RCP TIME EA 10 MIN

## 2017-05-23 PROCEDURE — 00000146 ZZHCL STATISTIC GLUCOSE BY METER IP

## 2017-05-23 PROCEDURE — A9270 NON-COVERED ITEM OR SERVICE: HCPCS | Mod: GY | Performed by: INTERNAL MEDICINE

## 2017-05-23 PROCEDURE — 25000128 H RX IP 250 OP 636: Performed by: INTERNAL MEDICINE

## 2017-05-23 PROCEDURE — A9270 NON-COVERED ITEM OR SERVICE: HCPCS | Mod: GY | Performed by: HOSPITALIST

## 2017-05-23 PROCEDURE — 94640 AIRWAY INHALATION TREATMENT: CPT

## 2017-05-23 PROCEDURE — 80048 BASIC METABOLIC PNL TOTAL CA: CPT | Performed by: INTERNAL MEDICINE

## 2017-05-23 PROCEDURE — 36415 COLL VENOUS BLD VENIPUNCTURE: CPT | Performed by: INTERNAL MEDICINE

## 2017-05-23 PROCEDURE — 94640 AIRWAY INHALATION TREATMENT: CPT | Mod: 76

## 2017-05-23 RX ORDER — WARFARIN SODIUM 4 MG/1
4 TABLET ORAL
Status: COMPLETED | OUTPATIENT
Start: 2017-05-23 | End: 2017-05-23

## 2017-05-23 RX ORDER — LABETALOL HYDROCHLORIDE 5 MG/ML
10 INJECTION, SOLUTION INTRAVENOUS EVERY 4 HOURS PRN
Status: DISCONTINUED | OUTPATIENT
Start: 2017-05-23 | End: 2017-05-28 | Stop reason: HOSPADM

## 2017-05-23 RX ORDER — METOPROLOL TARTRATE 50 MG
50 TABLET ORAL 2 TIMES DAILY
Status: DISCONTINUED | OUTPATIENT
Start: 2017-05-23 | End: 2017-05-28 | Stop reason: HOSPADM

## 2017-05-23 RX ADMIN — IPRATROPIUM BROMIDE AND ALBUTEROL SULFATE 3 ML: .5; 3 SOLUTION RESPIRATORY (INHALATION) at 19:23

## 2017-05-23 RX ADMIN — ACETAMINOPHEN 1000 MG: 500 TABLET, FILM COATED ORAL at 03:31

## 2017-05-23 RX ADMIN — ATROPA BELLADONNA AND OPIUM 1 SUPPOSITORY: 16.2; 3 SUPPOSITORY RECTAL at 13:29

## 2017-05-23 RX ADMIN — METOPROLOL TARTRATE 25 MG: 25 TABLET ORAL at 09:26

## 2017-05-23 RX ADMIN — WARFARIN SODIUM 4 MG: 4 TABLET ORAL at 17:43

## 2017-05-23 RX ADMIN — PANTOPRAZOLE SODIUM 40 MG: 40 TABLET, DELAYED RELEASE ORAL at 09:26

## 2017-05-23 RX ADMIN — IPRATROPIUM BROMIDE AND ALBUTEROL SULFATE 3 ML: .5; 3 SOLUTION RESPIRATORY (INHALATION) at 15:33

## 2017-05-23 RX ADMIN — IPRATROPIUM BROMIDE AND ALBUTEROL SULFATE 3 ML: .5; 3 SOLUTION RESPIRATORY (INHALATION) at 07:40

## 2017-05-23 RX ADMIN — METOPROLOL TARTRATE 50 MG: 50 TABLET, FILM COATED ORAL at 20:56

## 2017-05-23 RX ADMIN — IPRATROPIUM BROMIDE AND ALBUTEROL SULFATE 3 ML: .5; 3 SOLUTION RESPIRATORY (INHALATION) at 11:36

## 2017-05-23 RX ADMIN — TAMSULOSIN HYDROCHLORIDE 0.4 MG: 0.4 CAPSULE ORAL at 19:41

## 2017-05-23 RX ADMIN — SIMVASTATIN 10 MG: 10 TABLET, FILM COATED ORAL at 21:07

## 2017-05-23 RX ADMIN — ACETAMINOPHEN 1000 MG: 500 TABLET, FILM COATED ORAL at 19:41

## 2017-05-23 RX ADMIN — LEVOTHYROXINE SODIUM 100 MCG: 100 TABLET ORAL at 06:36

## 2017-05-23 RX ADMIN — ENOXAPARIN SODIUM 100 MG: 100 INJECTION SUBCUTANEOUS at 13:29

## 2017-05-23 RX ADMIN — DIPHENHYDRAMINE HYDROCHLORIDE 25 MG: 25 CAPSULE ORAL at 03:33

## 2017-05-23 RX ADMIN — ACETAMINOPHEN 1000 MG: 500 TABLET, FILM COATED ORAL at 09:26

## 2017-05-23 NOTE — PLAN OF CARE
Problem: Goal Outcome Summary  Goal: Goal Outcome Summary  Outcome: No Change  Patient A/ox1, VSS, up with assist of 1 and walker.  Patient continually reporting pain in penis, rectum, and at times in abdomen.  Patient to avoid narcotics, given tylenol and ice with some relief.  Patent POD 9 for exploratory lap, tolerating full liquids, patient also has PPN infusing at 90ml/hr.  Patient with LE edema and scrotal edema.

## 2017-05-23 NOTE — PLAN OF CARE
Problem: Goal Outcome Summary  Goal: Goal Outcome Summary  PT- attempted to see, refused all attempts, needs lots of encouragement for mobility. Was able to walk 100 feet on Friday now down to 20 feet or less. Continue to plan for TCU as not Ind or safe with mobility currently

## 2017-05-23 NOTE — PROGRESS NOTES
SPIRITUAL HEALTH SERVICES Progress Note  [facility] Med. Surg. 5    Attempted to see pt Henry per an admission request.  Upon each attempt Henry was either receiving cares or sleeping.  A  will follow up tomorrow.    Patrick Delacruz M.Div., [facility]  Staff   Pager [phone]

## 2017-05-23 NOTE — PLAN OF CARE
Problem: Goal Outcome Summary  Goal: Goal Outcome Summary  Outcome: Improving  Pt tolerating full liquids, up with 2 and walker. Stool loose x 1 with small amount of blood on stool and with wiping. Barrier applied. Kelly patent. Tylenol x 1 for pain , benedryl for rash on back itching. Up in chair and ambulated x 2

## 2017-05-23 NOTE — PROGRESS NOTES
[facility]  Hospitalist Progress Note  Lindsey Dao MD, MD 05/23/2017    Reason for Stay (Diagnosis): ileus         Assessment and Plan:      Summary of Stay: Ton Bagley is a 89 year old male admitted on 5/13/2017 with abd pain and CT findings concerning for possible SBO; however exp lap demonstrated ileus with no obstruction present. Ileus persists and is slow to resolve. Currently on PPN for nutrition    Problem List:   1. Ileus. S/P exploratory laparotomy by General Surgery on 5/14 for initial concerns about possible SBO; found to have ileus. NGT removed 5/18. Continues to have mild abd distention and pain with PO intake minimal, but does seem to be slowly improving and he is having BM. Stopped narcotics (had been receiving MSO4) to minimize worsening ileus. cdiff neg. Encouraged ambulation  2. Diabetes Mellitus. Hold Glipizide until diet improves. Novolog SSI.  3. HTN.  Increase p.o. metoprolol 50 mg p.o. bid  4. Hyperlipidemia.  Cont Zocor  5. Hypothyroidism. Continue po synthroid  6. A fib.  Rate controlled.  Resume Coumadin per pharmacy with goal INR of 2-3.  7. CAD.   8. Dementia. Hold Namenda and Aricept for now until ileus improves  9. Acute on chronic kidney disease. Resolved  10. Bronchospasm. Resolved. On Duonebs QID. Albuterol PRN.  11. Nutrition: appreciate nutrition recs; cont PPN; consider PICC and TPN if ileus persists and PO not adequate  12. Suprapubic discomfort: suspect ileus; prn B&O suppository      DVT Prophylaxis: coumadin per pharmacy  Code Status: DNR / DNI  Discharge Dispo: TBD  Estimated Disch Date / # of Days until Disch: unknown; await return of bowel ftn    Time spent: >35 minutes        Interval History (Subjective):      No spec c/o; reports some flatus but can't recall last bmp; c/o suprapubic discomfort intermittently                  Physical Exam:      Last Vital Signs:  /82 (BP Location: Right arm)  Pulse 112  Temp 98.4  F (36.9  C) (Oral)  Resp 20  " Ht 1.702 m (5' 7.01\")  Wt 107 kg (235 lb 14.4 oz)  SpO2 (!) 87%  BMI 36.94 kg/m2      Intake/Output Summary (Last 24 hours) at 05/21/17 1403  Last data filed at 05/21/17 1200   Gross per 24 hour   Intake             1508 ml   Output              925 ml   Net              583 ml       Constitutional: Awake, alert, cooperative, no apparent distress   Respiratory: Clear to auscultation bilaterally, no crackles or wheezing   Cardiovascular: Regular rate and rhythm, normal S1 and S2, and no murmur noted   Abdomen: hypoactive bowel sounds, soft, distended, mild TTP - seems improved today   Skin: No rashes, no cyanosis, dry to touch   Neuro: Awake, confused c/w dementia hx, no weakness, numbness, +memory loss   Extremities: No edema, normal range of motion   Other(s):        All other systems: Negative          Medications:      All current medications were reviewed with changes reflected in problem list.         Data:      All new lab and imaging data was reviewed.   Labs:    Recent Labs  Lab 05/22/17  0646 05/19/17  0700    287      Imaging:   No results found for this or any previous visit (from the past 24 hour(s)).   "

## 2017-05-23 NOTE — PROGRESS NOTES
I talked with pt's wife Kenna on phone to discuss dc planning.  She said she will be at the hospital today around 1300.  She is hoping to connect with a Dr today for an update on her .  She said she knows her  wants to go home.  We talked about the barriers with him returning home since he is an assist of 1-2 depending on how he is feeling.  She wondered about home care support through [employer].  We discussed how they wouldn't always be around to help 24/7 with his mobility.  She said she had been to [facility] TCU in the past and had a good experience.  I will meet with her and pt in person at 1300 today in room to further discuss a safe dc plan.  Ciera RN CTS [phone]    I talked with pt and wife about TCU.  They are agreeable to [facility] double room.  Referral sent.  Pt doesn't remember saying he wouldn't work with PT.  Pt is having bladder spasms.  He is agreeable to work with PT once they pass.  I will page PT to notify them.

## 2017-05-23 NOTE — PROGRESS NOTES
CLINICAL NUTRITION SERVICES - BRIEF NOTE    - Discussed POC with team during rounds and with Surgery PA.  No diet advancement past fulls today and per Nursing Staff, poor oral intakes on full liquid diet.   - Patient already with Ensure supplement scheduled BID between meals.  Ok for offer Magic Cup supplement with meals.   - Continue PPN today (discussed with Surgery PA and with PharmD).      - Clinimix (D4.25 + AA5) @ goal rate of 90 ml/hr x 24 hrs (2160 ml)  - To provide 745 kcal (39% of needs), 92 g protein (1.2 g/kg - 100% of needs), 92 g CHO.   - Total IVFs = 125 ml/hr  - 76.5 kg DW (adjusted body weight)    - Continue diet per Surgery with strong oral intake push.  Anticipate will be able to d/c PPN w/in the next 24-48 hrs with further diet advancement.    - Will continue following.     Kenya Dominique RD, LD  Clinical Dietitian  3rd floor/ICU: [phone]  All other floors: [phone]  Weekend/holiday: [phone]

## 2017-05-24 LAB
ANION GAP SERPL CALCULATED.3IONS-SCNC: 5 MMOL/L (ref 3–14)
BUN SERPL-MCNC: 13 MG/DL (ref 7–30)
CALCIUM SERPL-MCNC: 8.6 MG/DL (ref 8.5–10.1)
CHLORIDE SERPL-SCNC: 99 MMOL/L (ref 94–109)
CO2 SERPL-SCNC: 31 MMOL/L (ref 20–32)
CREAT SERPL-MCNC: 0.67 MG/DL (ref 0.66–1.25)
ERYTHROCYTE [DISTWIDTH] IN BLOOD BY AUTOMATED COUNT: 18.3 % (ref 10–15)
GFR SERPL CREATININE-BSD FRML MDRD: ABNORMAL ML/MIN/1.7M2
GLUCOSE BLDC GLUCOMTR-MCNC: 125 MG/DL (ref 70–99)
GLUCOSE BLDC GLUCOMTR-MCNC: 151 MG/DL (ref 70–99)
GLUCOSE BLDC GLUCOMTR-MCNC: 170 MG/DL (ref 70–99)
GLUCOSE BLDC GLUCOMTR-MCNC: 171 MG/DL (ref 70–99)
GLUCOSE BLDC GLUCOMTR-MCNC: 215 MG/DL (ref 70–99)
GLUCOSE SERPL-MCNC: 177 MG/DL (ref 70–99)
HCT VFR BLD AUTO: 31.9 % (ref 40–53)
HGB BLD-MCNC: 8.7 G/DL (ref 13.3–17.7)
INR PPP: 1.41 (ref 0.86–1.14)
MAGNESIUM SERPL-MCNC: 2.3 MG/DL (ref 1.6–2.3)
MCH RBC QN AUTO: 22.8 PG (ref 26.5–33)
MCHC RBC AUTO-ENTMCNC: 27.3 G/DL (ref 31.5–36.5)
MCV RBC AUTO: 84 FL (ref 78–100)
PHOSPHATE SERPL-MCNC: 2.8 MG/DL (ref 2.5–4.5)
PLATELET # BLD AUTO: 268 10E9/L (ref 150–450)
POTASSIUM SERPL-SCNC: 4.5 MMOL/L (ref 3.4–5.3)
RBC # BLD AUTO: 3.81 10E12/L (ref 4.4–5.9)
SODIUM SERPL-SCNC: 135 MMOL/L (ref 133–144)
WBC # BLD AUTO: 11.3 10E9/L (ref 4–11)

## 2017-05-24 PROCEDURE — A9270 NON-COVERED ITEM OR SERVICE: HCPCS | Mod: GY | Performed by: INTERNAL MEDICINE

## 2017-05-24 PROCEDURE — 25000125 ZZHC RX 250

## 2017-05-24 PROCEDURE — 25000132 ZZH RX MED GY IP 250 OP 250 PS 637: Mod: GY | Performed by: HOSPITALIST

## 2017-05-24 PROCEDURE — 36415 COLL VENOUS BLD VENIPUNCTURE: CPT | Performed by: INTERNAL MEDICINE

## 2017-05-24 PROCEDURE — A9270 NON-COVERED ITEM OR SERVICE: HCPCS | Mod: GY | Performed by: HOSPITALIST

## 2017-05-24 PROCEDURE — 40000275 ZZH STATISTIC RCP TIME EA 10 MIN

## 2017-05-24 PROCEDURE — 85027 COMPLETE CBC AUTOMATED: CPT | Performed by: INTERNAL MEDICINE

## 2017-05-24 PROCEDURE — 94640 AIRWAY INHALATION TREATMENT: CPT | Mod: 76

## 2017-05-24 PROCEDURE — 85610 PROTHROMBIN TIME: CPT | Performed by: INTERNAL MEDICINE

## 2017-05-24 PROCEDURE — 25000125 ZZHC RX 250: Performed by: INTERNAL MEDICINE

## 2017-05-24 PROCEDURE — 80048 BASIC METABOLIC PNL TOTAL CA: CPT | Performed by: INTERNAL MEDICINE

## 2017-05-24 PROCEDURE — 84100 ASSAY OF PHOSPHORUS: CPT | Performed by: INTERNAL MEDICINE

## 2017-05-24 PROCEDURE — 25000132 ZZH RX MED GY IP 250 OP 250 PS 637: Mod: GY | Performed by: INTERNAL MEDICINE

## 2017-05-24 PROCEDURE — 94640 AIRWAY INHALATION TREATMENT: CPT

## 2017-05-24 PROCEDURE — 83735 ASSAY OF MAGNESIUM: CPT | Performed by: INTERNAL MEDICINE

## 2017-05-24 PROCEDURE — 00000146 ZZHCL STATISTIC GLUCOSE BY METER IP

## 2017-05-24 PROCEDURE — 25000128 H RX IP 250 OP 636: Performed by: INTERNAL MEDICINE

## 2017-05-24 PROCEDURE — 25000125 ZZHC RX 250: Performed by: HOSPITALIST

## 2017-05-24 PROCEDURE — 12000000 ZZH R&B MED SURG/OB

## 2017-05-24 PROCEDURE — 25000125 ZZHC RX 250: Performed by: PHYSICIAN ASSISTANT

## 2017-05-24 PROCEDURE — 99233 SBSQ HOSP IP/OBS HIGH 50: CPT | Performed by: INTERNAL MEDICINE

## 2017-05-24 RX ORDER — IPRATROPIUM BROMIDE AND ALBUTEROL SULFATE 2.5; .5 MG/3ML; MG/3ML
3 SOLUTION RESPIRATORY (INHALATION) EVERY 4 HOURS PRN
Status: DISCONTINUED | OUTPATIENT
Start: 2017-05-24 | End: 2017-05-28 | Stop reason: HOSPADM

## 2017-05-24 RX ORDER — BENZOCAINE/MENTHOL 6 MG-10 MG
LOZENGE MUCOUS MEMBRANE 2 TIMES DAILY
Status: DISCONTINUED | OUTPATIENT
Start: 2017-05-24 | End: 2017-05-28 | Stop reason: HOSPADM

## 2017-05-24 RX ORDER — WARFARIN SODIUM 4 MG/1
4 TABLET ORAL
Status: COMPLETED | OUTPATIENT
Start: 2017-05-24 | End: 2017-05-24

## 2017-05-24 RX ORDER — FUROSEMIDE 10 MG/ML
40 INJECTION INTRAMUSCULAR; INTRAVENOUS
Status: DISCONTINUED | OUTPATIENT
Start: 2017-05-24 | End: 2017-05-26

## 2017-05-24 RX ADMIN — ENOXAPARIN SODIUM 100 MG: 100 INJECTION SUBCUTANEOUS at 11:37

## 2017-05-24 RX ADMIN — IPRATROPIUM BROMIDE AND ALBUTEROL SULFATE 3 ML: .5; 3 SOLUTION RESPIRATORY (INHALATION) at 11:32

## 2017-05-24 RX ADMIN — METOPROLOL TARTRATE 50 MG: 50 TABLET, FILM COATED ORAL at 21:55

## 2017-05-24 RX ADMIN — ACETAMINOPHEN 1000 MG: 500 TABLET, FILM COATED ORAL at 19:06

## 2017-05-24 RX ADMIN — SIMVASTATIN 10 MG: 10 TABLET, FILM COATED ORAL at 21:55

## 2017-05-24 RX ADMIN — HYDROCORTISONE: 1 CREAM TOPICAL at 22:01

## 2017-05-24 RX ADMIN — IPRATROPIUM BROMIDE AND ALBUTEROL SULFATE 3 ML: .5; 3 SOLUTION RESPIRATORY (INHALATION) at 08:17

## 2017-05-24 RX ADMIN — FUROSEMIDE 40 MG: 10 INJECTION, SOLUTION INTRAVENOUS at 10:07

## 2017-05-24 RX ADMIN — ASCORBIC ACID, VITAMIN A PALMITATE, CHOLECALCIFEROL, THIAMINE HYDROCHLORIDE, RIBOFLAVIN-5 PHOSPHATE SODIUM, PYRIDOXINE HYDROCHLORIDE, NIACINAMIDE, DEXPANTHENOL, ALPHA-TOCOPHEROL ACETATE, VITAMIN K1, FOLIC ACID, BIOTIN, CYANOCOBALAMIN: 200; 3300; 200; 6; 3.6; 6; 40; 15; 10; 150; 600; 60; 5 INJECTION, SOLUTION INTRAVENOUS at 08:20

## 2017-05-24 RX ADMIN — METOPROLOL TARTRATE 50 MG: 50 TABLET, FILM COATED ORAL at 08:37

## 2017-05-24 RX ADMIN — LEVOTHYROXINE SODIUM 100 MCG: 100 TABLET ORAL at 06:51

## 2017-05-24 RX ADMIN — ONDANSETRON 4 MG: 4 TABLET, ORALLY DISINTEGRATING ORAL at 00:30

## 2017-05-24 RX ADMIN — ENOXAPARIN SODIUM 100 MG: 100 INJECTION SUBCUTANEOUS at 00:45

## 2017-05-24 RX ADMIN — ACETAMINOPHEN 1000 MG: 500 TABLET, FILM COATED ORAL at 11:37

## 2017-05-24 RX ADMIN — TAMSULOSIN HYDROCHLORIDE 0.4 MG: 0.4 CAPSULE ORAL at 19:06

## 2017-05-24 RX ADMIN — LIDOCAINE HYDROCHLORIDE 10 ML: 20 JELLY TOPICAL at 20:07

## 2017-05-24 RX ADMIN — FUROSEMIDE 40 MG: 10 INJECTION, SOLUTION INTRAVENOUS at 16:36

## 2017-05-24 RX ADMIN — DIPHENHYDRAMINE HYDROCHLORIDE 25 MG: 25 CAPSULE ORAL at 21:55

## 2017-05-24 RX ADMIN — WARFARIN SODIUM 4 MG: 4 TABLET ORAL at 17:41

## 2017-05-24 RX ADMIN — ALUMINUM HYDROXIDE, MAGNESIUM HYDROXIDE, AND DIMETHICONE 30 ML: 400; 400; 40 SUSPENSION ORAL at 00:31

## 2017-05-24 RX ADMIN — PANTOPRAZOLE SODIUM 40 MG: 40 TABLET, DELAYED RELEASE ORAL at 08:37

## 2017-05-24 RX ADMIN — ALUMINUM HYDROXIDE, MAGNESIUM HYDROXIDE, AND DIMETHICONE 30 ML: 400; 400; 40 SUSPENSION ORAL at 10:23

## 2017-05-24 NOTE — PROGRESS NOTES
CLINICAL NUTRITION SERVICES - BRIEF NOTE    - Discussed POC with Surgery PA, remains distended, will not be advancing past fulls.    - As a result, recommend continuing with the following PPN regimen to meet >100% protein needs given post-op:     - Clinimix (D4.25 + AA5) @ goal rate of 90 ml/hr x 24 hrs (2160 ml)  - To provide 745 kcal (39% of needs), 92 g protein (1.2 g/kg - 100% of needs), 92 g CHO.   - Total IVFs = 125 ml/hr  - 76.5 kg DW (adjusted body weight)    - Continue strong oral intake push with use of supplements.  Patient will not require change to TPN.  Anticipate will be able to d/c PPN w/in 24-48 hrs.    - Labs reviewed: BG >150 at times.  - Meds reviewed: Continuation of SSI.  - Wt reviewed: Planning for diuresis today.   - Will continue following.  Discussed POC with team during rounds and with PharmD.     Addendum: Noted diet advanced to low-fiber.  Anticipate will be able to d/c PPN tomorrow AM following trial of low-fiber meals.      Kenya Dominique RD, LD  Clinical Dietitian  3rd floor/ICU: [phone]  All other floors: [phone]  Weekend/holiday: [phone]

## 2017-05-24 NOTE — PLAN OF CARE
Problem: Goal Outcome Summary  Goal: Goal Outcome Summary  Outcome: No Change  Alert with confusion. Awake all shift. Complains of feeling full and nauseated maalox and zofran given. Abdomin distended. Bowel sounds present. Incontinent of large loose stool X 2. SBP in 170's. Edema present. 3 lb wt gain today. Kelly patent.

## 2017-05-24 NOTE — PROGRESS NOTES
"SPIRITUAL HEALTH SERVICES  SPIRITUAL ASSESSMENT Progress Note  [facility] 5th floor Med/Surg    PRIMARY FOCUS:     Goals of care    Symptom/pain management    Emotional/spiritual/Islam distress    Support for coping    ILLNESS CIRCUMSTANCES:   Reviewed documentation. Reflective conversation shared with pt (\"Henry\") and spouse (\"Kenna\") which integrated elements of illness and family narratives.     Context of Serious Illness/Symptom(s) - Pt underwent exploratory laparotomy by general surgery on  for initial concerns about possible SBO and was found to have ileus. Pt is now 10 days post-op. Pt shares that he is having abdominal pain \"since eating breakfast.\" As visit continued pt's discomfort increased and nursing notified. Pt also has history of diabetes and dementia. Pt and wife live at [facility].     Resources for Support - Pt's spouse is supportive and they will have been  66 years on . Pt/wife describe supportive community at [facility] that keeps them involved in various activities but pt's wife notes that \"most of the men Henry's age have  and he doesn't like to sit with the girls.\" They have five adult children, four of whom live in the area and visit.     DISTRESS:     Emotional/Existential/Relational Distress - Both pt and wife describe decreased community, more so for pt. Pt's wife notes that pt's dementia has \"been challenging.\"     Spiritual/Spiritism Distress - None expressed.    Social/Cultural/Economic Distress - None expressed.    SPIRITUAL/Anabaptism COPING:     Latter day/Jessica - Pt/wife describe themselves as Rastafarian. They have attended [employer] in the past but \"don't like the music.\"     Spiritual Practice(s) - Bible study, both enjoy hymns, prayer.    Emotional/Existential/Relational Connections - Good family support and connections per pt's wife. Wife has support of community where they live more than pt receives.   GOALS OF CARE:    Goals of Care " "- Pt notes that he \"will likely have to go to rehab\" before being able to return home. Pt hopes to be able to eat \"real food without having so much pain soon.\"     Meaning/Sense-Making - Pt/wife lovingly support one another and find meaning in their relationship and the relationships with their children. Pt enjoys sharing stories of how he met his wife, time in the [employer], and reflecting on his life in the [employer] industry. Jessica practices are integrated into the pt's wife rhythms of the day more so than the patient's.     PLAN: Will follow up early next week for ongoing support. SH remains available per pt/family need or request.      Sergio Gutiérrez M.Div.  Staff   Pager [phone]  "

## 2017-05-24 NOTE — PLAN OF CARE
Problem: Goal Outcome Summary  Goal: Goal Outcome Summary  Outcome: No Change  Patient alert to self, disoriented x3.  PPN infusing.  A-1 w/ walker.  BM today.  Kelly intact. +3 edema on lower extremities.  Started Lasix.  Cont on 1L NC.  Advanced to low fiber diet.  c/o abdominal pain after eating.   and 215.   Will continue to monitor.

## 2017-05-24 NOTE — PLAN OF CARE
Problem: Goal Outcome Summary  Goal: Goal Outcome Summary  Outcome: No Change  Patient remains hospitalized following exp lap, currently POD #9. Pain controlled with Tylenol. Main complaint of pain is regarding Kelly catheter - in place for retention - good UOP. Bowel sounds hypoactive. Passing gas. Continues to have frequent, loose BMs (tested c.diff neg 5/20). Tolerating full liquid diet, though with early satiety. Denies nausea. Continues on PPN. Midline incision CDI. Continues to require 1 L of O2 to keep sats >90%. On room air, 87%. BGs in high 100s. Ambulating with assist of one and walker. PT and SW following for discharge plans.

## 2017-05-24 NOTE — DOWNTIME EVENT NOTE
The EMR was down for 3 hours on 5/24/2017.    Trice Ga was responsible for completing the paper charting during this time period.     The following information was re-entered into the system by Trice Ga: N/A    The following information will remain in the paper chart: N/A    Trice Ga  5/24/2017

## 2017-05-24 NOTE — PROVIDER NOTIFICATION
MD paged due to elevated BP in 180s/90s and HR in 110s. Has Hx of afib. Received PO metoprolol around 2100. Paged for further intervention.

## 2017-05-24 NOTE — PROGRESS NOTES
[facility]  Hospitalist Progress Note  Nirmal Serrano MD 05/24/17    Reason for Stay (Diagnosis): ileus         Assessment and Plan:      Summary of Stay: Ton Bagley is a 89 year old male w/ hx of HTN, HLD, DM2, afib, cad, hypothyroidism, and dementia admitted on 5/13/2017 with abd pain and CT findings concerning for possible SBO.  Underwent ex-lap on 5/14 and no SBO was found.  Now with persistent ileus that is slow to resolve.  Currently on TPN.  Has had some BMs and is allowed a full liquid diet per surgery service, however his intake has not been very much.  His warfarin and other home medications have been resumed.  His hemoglobin is slowly drifting, but no sign of acute bleeding.  Has been seen by PT and OT and he will need TCU upon discharge.  He is very edematous so starting to diurese as able.    Problem List/Assessment and Plan:   Ileus: S/P exploratory laparotomy by General Surgery on 5/14 for initial concerns about possible SBO; found to have ileus. NGT removed 5/18. Continues to have mild abd distention and pain with PO intake minimal on full liquid diet, but does seem to be slowly improving and he is having BMs. Stopped narcotics (had been receiving MSO4) to minimize worsening ileus. cdiff neg.  Try to have patient ambulate with PT.  Diet per surgery services.    Type II DM:   Hold Glipizide until diet improves. Novolog SSI.    Chronic lymphedema:  3+ bilateral LE edema.  Chronically on lasix 60mg am and 30mg pm that has been on hold here.  Currently only fluids are through TPN and what he is taking orally.  Bmp ok today.  -resume lasix.  Will start with 40mg IV bid today and monitor I/O closely as has magno in place and get daily weights  -bmp in am    HTN:  more hypertensive past couple of days.  pta on atenolol 50mg dialy, lasix 60mg am/ 30mg pm, lisinopril 10mg daily.  -Increased p.o. metoprolol 50 mg p.o. Bid  -diuresing with lasix 40mg IV bid  -may restart lisinopril if not  "improving with diuresis and creatinine stable    Hyperlipidemia:  Cont Zocor    Hypothyroidism: Continue po synthroid    A fib:  Rate controlled pta on atenolol.  Changed to metoprolol here for improved BP control.  Resumed Coumadin per pharmacy with goal INR of 2-3.     CAD:  S/p cabg and stent x 2. No chest pain currently.  pta on warfarin, statin, ACEI, and BB.    Dementia: holding Namenda and Aricept for now until ileus improves    PRADEEP:  Present on admission, suspect pre-renal hypovolemia due to SBO.  Creatinine down to likely baseline of 0.8-0.9.    Bronchospasm:  Resolved. On Duonebs QID. Albuterol PRN.    Urinary retention: failed voiding trial 5/20 so kiran replaced due to retention.  Having some bladder spasms.    -B&O suppository prn.  -continue kiran    Acute on  Chronic anemia: previous hg in this system was 10 in 2016.  Presented with hg 10 here that has slowly drifted to 8.7 following surgery.  Likely nutrition component as well.  Borderline microcytic so perhaps iron deficiency.   No sign of bleeding now.  Will need to follow Hg as he is on warfarin, no indication for transfusion now.  Optimize nutrition as able.    DVT Prophylaxis: Warfarin.  Stop lovenox when therapeutic INR  Code Status: DNR / DNI  FEN: full liquid diet and TPN.  Has PICC.  Wean TPN as able as po intake improves  Discharge Dispo: suspect will need TCU  Estimated Disch Date / # of Days until Disch: unclear.  Would like to diurese patient as able and also wean completely off TPN as bowel function returns.  Perhaps in next couple of days.        Interval History (Subjective):      2 large loose bms this morning.  Feels quite a bit of diffuse abdominal pain after eating 30-40% of his breakfast.  No nausea or vomiting.                  Physical Exam:      Last Vital Signs:  /73 (BP Location: Right arm)  Pulse 112  Temp 98.4  F (36.9  C) (Oral)  Resp 20  Ht 1.702 m (5' 7.01\")  Wt 107 kg (235 lb 14.4 oz)  SpO2 95%  BMI 36.94 " kg/m2      Intake/Output Summary (Last 24 hours) at 05/24/17 1105  Last data filed at 05/24/17 0855   Gross per 24 hour   Intake             2986 ml   Output             1850 ml   Net             1136 ml       Constitutional: Awake, NAD  Eyes: sclera white   HEENT:  MMM  Respiratory: no respiratory distress, lungs cta bilaterally, no crackles or wheeze  Cardiovascular: RRR.  No murmur, S3, or S4  GI: moderately distended, mild tenderness diffusely but no guarding, BS present  Genitourinary: kiran draining yellow urine  Skin: chronic venous stasis changes to bilateral LE's  Musculoskeletal/extremities: 3+ bilateral LE pitting edema  Neurologic: Alert, inappropriate statements to conversation at times  Psychiatric: calm          Medications:      All current medications were reviewed with changes reflected in problem list.         Data:      All new lab and imaging data was reviewed.   Labs:      Recent Labs  Lab 05/24/17  0738 05/23/17  0628 05/22/17  1530 05/22/17  0646    136  --  140   POTASSIUM 4.5 3.8 3.7 3.2*   CHLORIDE 99 103  --  104   CO2 31 30  --  30   ANIONGAP 5 3  --  6   * 185*  --  143*   BUN 13 13  --  12   CR 0.67 0.73  --  0.84   GFRESTIMATED >90Non  GFR Calc >90Non  GFR Calc  --  86   GFRESTBLACK >90African American GFR Calc >90African American GFR Calc  --  >90African American GFR Calc   JANN 8.6 8.3*  --  8.1*       Recent Labs  Lab 05/24/17  0738 05/22/17  0646 05/19/17  0700   WBC 11.3*  --   --    HGB 8.7*  --   --    HCT 31.9*  --   --    MCV 84  --   --     267 287       Recent Labs  Lab 05/24/17  0738 05/23/17  0628 05/22/17  0646   INR 1.41* 1.56* 2.18*      Imaging:   None today      Nirmal Serrano MD

## 2017-05-24 NOTE — PROGRESS NOTES
"[facility]   General Surgery Progress Note           Assessment and Plan:   Assessment:   POD#10 s/p ex lap for apparent closed loop SBO, negative findings.  H/o Afib  Ileus as expected  Afebrile      Plan:   -Diet:  continue PPN, full liquid diet  -Pain control:  PO Tylenol  -GI prophylaxis:  Protonix    -VTE prophylaxis: PCDs, coumadin  -await resolution of ileus         Interval History:   C/o low abdomen pain. Up walking with assistance.  Tolerating full liquid diet.  + having BMs.  +kiran, started on Lasix         Physical Exam:   Blood pressure 167/73, pulse 112, temperature 98.4  F (36.9  C), temperature source Oral, resp. rate 20, height 1.702 m (5' 7.01\"), weight 107 kg (235 lb 14.4 oz), SpO2 95 %.    I/O last 3 completed shifts:  In: 2886 [P.O.:900]  Out: 2400 [Urine:2400]    Abdomen:   firm, distended, denies tenderness, hypoactive bowel sounds   Inc(s) - clean, dry, intact.  + staples.           Data:       Recent Labs  Lab 05/24/17  0738 05/22/17  0646 05/19/17  0700   WBC 11.3*  --   --    HGB 8.7*  --   --    HCT 31.9*  --   --    MCV 84  --   --     267 287       Recent Labs  Lab 05/24/17  0738   HGB 8.7*         Adri Thrasher PA-C       Seen and examined, okay to trial low res diet in absence of nausea, emesis.  Suspect lasix may aid in resolving ileus (may have some degree of bowel edema as well).  "

## 2017-05-25 ENCOUNTER — APPOINTMENT (OUTPATIENT)
Dept: PHYSICAL THERAPY | Facility: CLINIC | Age: 82
DRG: 357 | End: 2017-05-25
Payer: MEDICARE

## 2017-05-25 LAB
ANION GAP SERPL CALCULATED.3IONS-SCNC: 4 MMOL/L (ref 3–14)
BUN SERPL-MCNC: 17 MG/DL (ref 7–30)
CALCIUM SERPL-MCNC: 8.9 MG/DL (ref 8.5–10.1)
CHLORIDE SERPL-SCNC: 97 MMOL/L (ref 94–109)
CO2 SERPL-SCNC: 36 MMOL/L (ref 20–32)
CREAT SERPL-MCNC: 0.8 MG/DL (ref 0.66–1.25)
ERYTHROCYTE [DISTWIDTH] IN BLOOD BY AUTOMATED COUNT: 18.3 % (ref 10–15)
FERRITIN SERPL-MCNC: 40 NG/ML (ref 26–388)
GFR SERPL CREATININE-BSD FRML MDRD: ABNORMAL ML/MIN/1.7M2
GLUCOSE BLDC GLUCOMTR-MCNC: 125 MG/DL (ref 70–99)
GLUCOSE BLDC GLUCOMTR-MCNC: 139 MG/DL (ref 70–99)
GLUCOSE BLDC GLUCOMTR-MCNC: 152 MG/DL (ref 70–99)
GLUCOSE BLDC GLUCOMTR-MCNC: 192 MG/DL (ref 70–99)
GLUCOSE BLDC GLUCOMTR-MCNC: 204 MG/DL (ref 70–99)
GLUCOSE SERPL-MCNC: 181 MG/DL (ref 70–99)
HCT VFR BLD AUTO: 30.1 % (ref 40–53)
HGB BLD-MCNC: 8.2 G/DL (ref 13.3–17.7)
INR PPP: 1.35 (ref 0.86–1.14)
IRON SERPL-MCNC: 19 UG/DL (ref 35–180)
MAGNESIUM SERPL-MCNC: 2.6 MG/DL (ref 1.6–2.3)
MCH RBC QN AUTO: 22.8 PG (ref 26.5–33)
MCHC RBC AUTO-ENTMCNC: 27.2 G/DL (ref 31.5–36.5)
MCV RBC AUTO: 84 FL (ref 78–100)
PHOSPHATE SERPL-MCNC: 3.5 MG/DL (ref 2.5–4.5)
PLATELET # BLD AUTO: 248 10E9/L (ref 150–450)
POTASSIUM SERPL-SCNC: 4.5 MMOL/L (ref 3.4–5.3)
RBC # BLD AUTO: 3.59 10E12/L (ref 4.4–5.9)
SODIUM SERPL-SCNC: 137 MMOL/L (ref 133–144)
TRANSFERRIN SERPL-MCNC: 271 MG/DL (ref 210–360)
WBC # BLD AUTO: 6.9 10E9/L (ref 4–11)

## 2017-05-25 PROCEDURE — 99233 SBSQ HOSP IP/OBS HIGH 50: CPT | Performed by: INTERNAL MEDICINE

## 2017-05-25 PROCEDURE — 36415 COLL VENOUS BLD VENIPUNCTURE: CPT | Performed by: INTERNAL MEDICINE

## 2017-05-25 PROCEDURE — 80048 BASIC METABOLIC PNL TOTAL CA: CPT | Performed by: INTERNAL MEDICINE

## 2017-05-25 PROCEDURE — 12000000 ZZH R&B MED SURG/OB

## 2017-05-25 PROCEDURE — 84100 ASSAY OF PHOSPHORUS: CPT | Performed by: INTERNAL MEDICINE

## 2017-05-25 PROCEDURE — 25000128 H RX IP 250 OP 636: Performed by: INTERNAL MEDICINE

## 2017-05-25 PROCEDURE — 97530 THERAPEUTIC ACTIVITIES: CPT | Mod: GP | Performed by: PHYSICAL THERAPY ASSISTANT

## 2017-05-25 PROCEDURE — A9270 NON-COVERED ITEM OR SERVICE: HCPCS | Mod: GY

## 2017-05-25 PROCEDURE — 25000125 ZZHC RX 250: Performed by: HOSPITALIST

## 2017-05-25 PROCEDURE — 82728 ASSAY OF FERRITIN: CPT | Performed by: INTERNAL MEDICINE

## 2017-05-25 PROCEDURE — 25000132 ZZH RX MED GY IP 250 OP 250 PS 637: Mod: GY | Performed by: HOSPITALIST

## 2017-05-25 PROCEDURE — 85027 COMPLETE CBC AUTOMATED: CPT | Performed by: INTERNAL MEDICINE

## 2017-05-25 PROCEDURE — A9270 NON-COVERED ITEM OR SERVICE: HCPCS | Mod: GY | Performed by: HOSPITALIST

## 2017-05-25 PROCEDURE — 83540 ASSAY OF IRON: CPT | Performed by: INTERNAL MEDICINE

## 2017-05-25 PROCEDURE — 85610 PROTHROMBIN TIME: CPT | Performed by: INTERNAL MEDICINE

## 2017-05-25 PROCEDURE — 83735 ASSAY OF MAGNESIUM: CPT | Performed by: INTERNAL MEDICINE

## 2017-05-25 PROCEDURE — 25000132 ZZH RX MED GY IP 250 OP 250 PS 637: Mod: GY

## 2017-05-25 PROCEDURE — 25000132 ZZH RX MED GY IP 250 OP 250 PS 637: Mod: GY | Performed by: INTERNAL MEDICINE

## 2017-05-25 PROCEDURE — 00000146 ZZHCL STATISTIC GLUCOSE BY METER IP

## 2017-05-25 PROCEDURE — 84466 ASSAY OF TRANSFERRIN: CPT | Performed by: INTERNAL MEDICINE

## 2017-05-25 PROCEDURE — A9270 NON-COVERED ITEM OR SERVICE: HCPCS | Mod: GY | Performed by: INTERNAL MEDICINE

## 2017-05-25 PROCEDURE — 40000193 ZZH STATISTIC PT WARD VISIT: Performed by: PHYSICAL THERAPY ASSISTANT

## 2017-05-25 RX ORDER — HALOPERIDOL 5 MG/ML
2 INJECTION INTRAMUSCULAR EVERY 6 HOURS PRN
Status: DISCONTINUED | OUTPATIENT
Start: 2017-05-25 | End: 2017-05-28 | Stop reason: HOSPADM

## 2017-05-25 RX ORDER — WARFARIN SODIUM 3 MG/1
6 TABLET ORAL
Status: COMPLETED | OUTPATIENT
Start: 2017-05-25 | End: 2017-05-25

## 2017-05-25 RX ORDER — FERROUS GLUCONATE 324(38)MG
324 TABLET ORAL
Status: DISCONTINUED | OUTPATIENT
Start: 2017-05-25 | End: 2017-05-28 | Stop reason: HOSPADM

## 2017-05-25 RX ORDER — GLIPIZIDE 5 MG/1
5 TABLET ORAL
Status: DISCONTINUED | OUTPATIENT
Start: 2017-05-25 | End: 2017-05-28 | Stop reason: HOSPADM

## 2017-05-25 RX ADMIN — GLIPIZIDE 5 MG: 5 TABLET ORAL at 14:03

## 2017-05-25 RX ADMIN — ASCORBIC ACID, VITAMIN A PALMITATE, CHOLECALCIFEROL, THIAMINE HYDROCHLORIDE, RIBOFLAVIN-5 PHOSPHATE SODIUM, PYRIDOXINE HYDROCHLORIDE, NIACINAMIDE, DEXPANTHENOL, ALPHA-TOCOPHEROL ACETATE, VITAMIN K1, FOLIC ACID, BIOTIN, CYANOCOBALAMIN: 200; 3300; 200; 6; 3.6; 6; 40; 15; 10; 150; 600; 60; 5 INJECTION, SOLUTION INTRAVENOUS at 08:11

## 2017-05-25 RX ADMIN — PANTOPRAZOLE SODIUM 40 MG: 40 TABLET, DELAYED RELEASE ORAL at 08:55

## 2017-05-25 RX ADMIN — DIPHENHYDRAMINE HYDROCHLORIDE 25 MG: 25 CAPSULE ORAL at 22:50

## 2017-05-25 RX ADMIN — DIPHENHYDRAMINE HYDROCHLORIDE 25 MG: 25 CAPSULE ORAL at 06:33

## 2017-05-25 RX ADMIN — HALOPERIDOL LACTATE 2 MG: 5 INJECTION, SOLUTION INTRAMUSCULAR at 03:33

## 2017-05-25 RX ADMIN — ATROPA BELLADONNA AND OPIUM 1 SUPPOSITORY: 16.2; 3 SUPPOSITORY RECTAL at 18:58

## 2017-05-25 RX ADMIN — FUROSEMIDE 40 MG: 10 INJECTION, SOLUTION INTRAVENOUS at 16:24

## 2017-05-25 RX ADMIN — FERROUS GLUCONATE 324 MG: 324 TABLET ORAL at 14:03

## 2017-05-25 RX ADMIN — FUROSEMIDE 40 MG: 10 INJECTION, SOLUTION INTRAVENOUS at 08:55

## 2017-05-25 RX ADMIN — HYDROCORTISONE: 1 CREAM TOPICAL at 11:16

## 2017-05-25 RX ADMIN — LEVOTHYROXINE SODIUM 100 MCG: 100 TABLET ORAL at 06:33

## 2017-05-25 RX ADMIN — ACETAMINOPHEN 1000 MG: 500 TABLET, FILM COATED ORAL at 14:43

## 2017-05-25 RX ADMIN — METOPROLOL TARTRATE 50 MG: 50 TABLET, FILM COATED ORAL at 20:18

## 2017-05-25 RX ADMIN — WARFARIN SODIUM 6 MG: 3 TABLET ORAL at 18:32

## 2017-05-25 RX ADMIN — ACETAMINOPHEN 1000 MG: 500 TABLET, FILM COATED ORAL at 00:56

## 2017-05-25 RX ADMIN — TAMSULOSIN HYDROCHLORIDE 0.4 MG: 0.4 CAPSULE ORAL at 20:18

## 2017-05-25 RX ADMIN — ENOXAPARIN SODIUM 100 MG: 100 INJECTION SUBCUTANEOUS at 00:56

## 2017-05-25 RX ADMIN — METOPROLOL TARTRATE 50 MG: 50 TABLET, FILM COATED ORAL at 08:55

## 2017-05-25 RX ADMIN — HYDROCORTISONE: 1 CREAM TOPICAL at 20:15

## 2017-05-25 RX ADMIN — ENOXAPARIN SODIUM 100 MG: 100 INJECTION SUBCUTANEOUS at 11:47

## 2017-05-25 RX ADMIN — ACETAMINOPHEN 1000 MG: 500 TABLET, FILM COATED ORAL at 21:00

## 2017-05-25 RX ADMIN — DIPHENHYDRAMINE HYDROCHLORIDE 25 MG: 25 CAPSULE ORAL at 16:42

## 2017-05-25 RX ADMIN — ACETAMINOPHEN 1000 MG: 500 TABLET, FILM COATED ORAL at 06:33

## 2017-05-25 RX ADMIN — ATROPA BELLADONNA AND OPIUM 1 SUPPOSITORY: 16.2; 3 SUPPOSITORY RECTAL at 11:16

## 2017-05-25 RX ADMIN — SIMVASTATIN 10 MG: 10 TABLET, FILM COATED ORAL at 21:00

## 2017-05-25 ASSESSMENT — PAIN DESCRIPTION - DESCRIPTORS: DESCRIPTORS: CONSTANT

## 2017-05-25 NOTE — PROGRESS NOTES
[facility]  Hospitalist Progress Note  Nirmal Serrano MD 05/25/17    Reason for Stay (Diagnosis): ileus         Assessment and Plan:      Summary of Stay: Ton Bagley is a 89 year old male w/ hx of HTN, HLD, DM2, afib, cad, hypothyroidism, and dementia admitted on 5/13/2017 with abd pain and CT findings concerning for possible SBO.  Underwent ex-lap on 5/14 and no SBO was found.  Now with persistent ileus that is slow to resolve.  Currently on PPN, but weaning this to off as oral nutrition improving.  Has had some BMs.  Advanced to low fiber diet per surgery service, however his intake has not been very much.  His warfarin and other home medications have been resumed.  His hemoglobin is slowly drifting, but no sign of acute bleeding.  Added oral iron.  Added back glipizide as blood sugars rising.  Has been seen by PT and OT and he will need TCU upon discharge.  He is very edematous so providing IV diuresis.    Problem List/Assessment and Plan:   Ileus: S/P exploratory laparotomy by General Surgery on 5/14 for initial concerns about possible SBO; found to have ileus. NGT removed 5/18. Continues to have mild abd distention and pain with PO intake minimal on full liquid diet, but does seem to be slowly improving and he is having BMs. Stopped narcotics (had been receiving MSO4) to minimize worsening ileus. cdiff neg.  Try to have patient ambulate with PT.  Diet per surgery services. Advanced to low fiber diet.    Type II DM:  Resuming glipizide at 5mg bid as po intake has improved. Novolog SSI.    Chronic lymphedema:  3+ bilateral LE edema.  Chronically on lasix 60mg am and 30mg pm that has been on hold here.  Currently only fluids are through PPN and what he is taking orally.  Bicarb up a little bit today.  -continue with lasix 40mg IV bid today and monitor I/O closely as has magno in place and get daily weights  -bmp in am    HTN:  more hypertensive past couple of days.  pta on atenolol 50mg  dialy, lasix 60mg am/ 30mg pm, lisinopril 10mg daily.  -Increased p.o. metoprolol 50 mg p.o. Bid  -continue lasix 40mg IV bid  -may restart lisinopril if not improving with diuresis and creatinine stable    Hyperlipidemia:  Cont Zocor    Hypothyroidism: Continue po synthroid    A fib:  Rate controlled pta on atenolol.  Changed to metoprolol here for improved BP control.  Resumed Coumadin per pharmacy with goal INR of 2-3.     CAD:  S/p cabg and stent x 2. No chest pain currently.  pta on warfarin, statin, ACEI, and BB.    Dementia: holding Namenda and Aricept for now until ileus improves    PRADEEP:  Present on admission, suspect pre-renal hypovolemia due to SBO.  Creatinine down to likely baseline of 0.8-0.9.    Bronchospasm:  Resolved. On Duonebs QID. Albuterol PRN.    Urinary retention: failed voiding trial 5/20 so kiran replaced due to retention.  Having some bladder spasms.    -B&O suppository prn, seems to help  -continue kiran    Acute on chronic anemia: previous hg in this system was 10 in 2016.  Presented with hg 10 here that has slowly drifted to 8.7 following surgery.  Likely nutrition component as well.  Borderline microcytic so check iron studies and he does appear to have some iron deficiency.   No sign of bleeding now.  Will need to follow Hg as he is on warfarin, no indication for transfusion now.  Optimize nutrition as able.  -start oral iron daily    DVT Prophylaxis: Warfarin.  Stop lovenox when therapeutic INR  Code Status: DNR / DNI  FEN: advanced to low fiber diet, weaning PPN per dietician, stopping this today    Discharge Dispo: suspect will need TCU  Estimated Disch Date / # of Days until Disch: unclear.  If getting adequate po nutrition may be able to discharge to TCU in 2-3 days    Discussed care with his daughter Felecia green phone today        Interval History (Subjective):      More agitated and did not sleep as well overnight requiring some haldol.  Now more sleepy this morning.  Having some  "intermittent lower abdominal pain.  Improved some with B&O suppository.  No fevers.                  Physical Exam:      Last Vital Signs:  /78 (BP Location: Right arm)  Pulse 106  Temp 96.8  F (36  C) (Axillary)  Resp 20  Ht 1.702 m (5' 7.01\")  Wt 105.9 kg (233 lb 8 oz)  SpO2 96%  BMI 36.56 kg/m2    Intake/Output Summary (Last 24 hours) at 05/25/17 1650  Last data filed at 05/25/17 1456   Gross per 24 hour   Intake             2712 ml   Output             4550 ml   Net            -1838 ml     Constitutional: Awake, NAD  Eyes: sclera white   HEENT:  MMM  Respiratory: prolonged expiratory phase.  lungs cta bilaterally, no crackles or wheeze  Cardiovascular: RRR.  Systolic murmur 1/6  GI: moderately distended, mild tenderness suprapubic tenderness, BS present  Genitourinary: kiran draining yellow urine  Skin: chronic venous stasis changes to bilateral LE's  Musculoskeletal/extremities: 3+ bilateral LE pitting edema  Neurologic: more somnolent during my visit, not oriented to date or location  Psychiatric: calm          Medications:      All current medications were reviewed with changes reflected in problem list.         Data:      All new lab and imaging data was reviewed.   Labs:      Recent Labs  Lab 05/25/17  0815 05/24/17  0738 05/23/17  0628    135 136   POTASSIUM 4.5 4.5 3.8   CHLORIDE 97 99 103   CO2 36* 31 30   ANIONGAP 4 5 3   * 177* 185*   BUN 17 13 13   CR 0.80 0.67 0.73   GFRESTIMATED >90Non  GFR Calc >90Non  GFR Calc >90Non  GFR Calc   GFRESTBLACK >90African American GFR Calc >90African American GFR Calc >90African American GFR Calc   JANN 8.9 8.6 8.3*       Recent Labs  Lab 05/25/17  0815 05/24/17  0738 05/22/17  0646   WBC 6.9 11.3*  --    HGB 8.2* 8.7*  --    HCT 30.1* 31.9*  --    MCV 84 84  --     268 267       Recent Labs  Lab 05/25/17  0815 05/24/17  0738 05/23/17  0628   INR 1.35* 1.41* 1.56*      Imaging:   None " today      Nirmal Serrano MD

## 2017-05-25 NOTE — PLAN OF CARE
Problem: Goal Outcome Summary  Goal: Goal Outcome Summary  Vitals stable, afebrile. Tylenol given for generalized pain which did not seem helpful. Patient was anxious and reporting multiple areas of pain. Had up in chair for awhile. Haldol given per new order and patient was finally able to sleep a bit. Kelly catheter also is causing patient discomfort. PPN infusing. Up with heavy assist of 1 and a walker.

## 2017-05-25 NOTE — PROGRESS NOTES
CLINICAL NUTRITION SERVICES - REASSESSMENT NOTE      EVALUATION OF PROGRESS TOWARD GOALS   Diet Order, Food intake and Liquid meal replacement or supplement - Advancement and adequacy.   Update/Evaluation:   - Diet advanced to fulls 5/22 with further advancement to low-fiber yesterday afternoon.    - Continuation of Ensure supplement BID between meals and offerings of Magic Cup supplement with meals.    - Per discussion with RN/LPN this AM, good appetite/intakes, consumed large breakfast tray and part of Ensure supplement.   - Remains on PPN regimen of the following since 5/22.:    - Clinimix (D4.25 + AA5) @ goal rate of 90 ml/hr x 24 hrs (2160 ml)  - To provide 745 kcal (39% of needs), 92 g protein (1.2 g/kg - 100% of needs), 92 g CHO.   - Total IVFs = 125 ml/hr  - 76.5 kg DW (adjusted body weight)          ASSESSED NUTRITION NEEDS (PER APPROVED PRACTICE GUIDELINES, Dosing weight: 76.5 kg AdjBW):  Estimated Energy Needs: 8728-0868 kcals (25-30 Kcal/Kg)  Justification: obese  Estimated Protein Needs:  grams protein (1.2-1.5 g pro/Kg)  Justification: post-op  Estimated Fluid Needs: >1 mL/Kcal  Justification: maintenance      NEW FINDINGS:   - BM yesterday.  - Medications reviewed:    Lasix added   SSI  - Biochemical review:   BG ranging from 125-215   Na, K, mag and phos from yesterday WNL  -Current wt relatively consistent with admit wt, slight diuresis of 5# (2.1%) d/t diuretic as above:  Vitals:    05/22/17 0549 05/23/17 0559 05/23/17 0638 05/24/17 0500   Weight: 106.7 kg (235 lb 4.8 oz) 107.2 kg (236 lb 4.8 oz) 107 kg (235 lb 14.4 oz) 108.1 kg (238 lb 4.8 oz)    05/25/17 0058   Weight: 105.9 kg (233 lb 8 oz)       Previous Goals:   Diet advancement past fulls w/in 48 hrs.   Evaluation: Met  Patient to consume at least 50% of meals TID + 1 supplement daily.  Evaluation: Met    Previous Nutrition Diagnosis:   Inadequate oral intake related to altered GI function with slow advancing diet order as evidenced by  meeting <25-50% needs orally x 9 days since admission.  Evaluation: Completed, changed below      MALNUTRITION: (5/19/2017)  % Weight Loss:Weight loss does not meet criteria for malnutrition   % Intake:</= 50% for >/= 5 days (severe malnutrition)  Subcutaneous Fat Loss:None observed  Muscle Loss: Mild, as noted above  Fluid Retention:Mild       Malnutrition Diagnosis: Non-Severe malnutrition  In Context of: Acute illness or injury at high risk for further decline    CURRENT NUTRITION DIAGNOSIS  Predicted suboptimal nutrient intake (energy/protein) related to progressively improving appetite with potential for decline given confusion 2/2 dementia, abdominal distention with need for diuresis, and slow return of GI function post-op.     INTERVENTIONS  Recommendations / Nutrition Prescription  Continue diet per Surgery, currently low-fiber.     Continue supplements between meals and with meals.     D/c PPN.      Implementation  Collaboration and Referral of Nutrition care: Discussed POC with team during rounds.  Discussed d/c of PPN with PharmD who prefers wean at half rate for at least 1 hr.  Discussed with RN.      Goals  Pt to consume at least 50-75% of meals TI D+ 1-2 supplements daily.       MONITORING AND EVALUATION:  Progress towards goals will be monitored and evaluated per protocol and Practice Guidelines      Kenya Dominique RD, LD  Clinical Dietitian  3rd floor/ICU: [phone]  All other floors: [phone]  Weekend/holiday: [phone]

## 2017-05-25 NOTE — PLAN OF CARE
Problem: Goal Outcome Summary  Goal: Goal Outcome Summary  Outcome: Improving  Appetite fair but needs much encouragement and gets full quickly, weaned off PPN today, continues on IV lasix with good output per kiran catheter, c/o pain but has hard time explaining it's location d/t his dementia, oriented to self only and needs constant reorientation, up with assist x1 and walker, PT following, x1 incontinent stool, drainage around penis from kiran with blister noted under penis, relocated stabilizer dressing so catheter wouldn't be pulling, B&O suppository seemed to help with pain complaints and they want to avoid narcotics as patient get increased confusion with them, up in chair for meals and walked x1 in way with staff, staples on midline c/d/i but need to find out from surgery when they want us to remove these.

## 2017-05-25 NOTE — PLAN OF CARE
Problem: Goal Outcome Summary  Goal: Goal Outcome Summary  Outcome: No Change  Patient remains hospitalized following exp lap, currently POD #10. Pain controlled with Tylenol. Main complaint of pain is regarding Kelly catheter - in place for retention - good UOP. IV Lasix initiated today. Note left for rounding MD to address need for Kelly cathter. Bowel sounds active. Passing gas. Continues to have frequent, loose BMs (tested c.diff neg 5/20). Tolerating low fiber diet, though appetite minimal. Denies nausea. Continues on PPN. Midline incision CDI. Continues to require 1 L of O2 to keep sats >90%. BGs WDL. Heat rash on back - complains of itching - Cortisone cream ordered, and Benadryl given. Ambulating with assist of one and walker. PT and SW following for discharge plans.

## 2017-05-25 NOTE — PROVIDER NOTIFICATION
"Text-paged MD re: \"patient is complaining of pain. He is confused at baseline, but generally not feeling well. Had Tylenol about 2 hours ago. Pain in leg, arm, at kiran catheter site, everywhere. Just helped up into chair. Maybe something for anxiety?\"  "

## 2017-05-25 NOTE — PROGRESS NOTES
"[facility]   General Surgery Progress Note           Assessment and Plan:   Assessment:   POD#11 s/p ex lap for apparent closed loop SBO, negative findings.  H/o Afib  Ileus as expected  Afebrile      Plan:   -Diet:  wean PPN today per RD.  Advance to low fiber diet as tolerated  -Pain control:  PO Tylenol  -GI prophylaxis:  Protonix    -VTE prophylaxis: PCDs, coumadin  -continue diuresis and kiran per hospitalist         Interval History:   Sleepy in chair.  Still c/o low abdomen pain. Up walking with assistance.  Tolerating full liquid diet so far, with improved appetite.  Last BM was yesterday.  +kiran.         Physical Exam:   Blood pressure 164/76, pulse 106, temperature 97.2  F (36.2  C), temperature source Oral, resp. rate 20, height 1.702 m (5' 7.01\"), weight 105.9 kg (233 lb 8 oz), SpO2 90 %.    I/O last 3 completed shifts:  In: 2360 [P.O.:400]  Out: 4875 [Urine:4875]    Abdomen:   Firm, obese + distended, denies tenderness, hypoactive bowel sounds   Inc(s) - clean, dry, intact.  + staples.           Data:       Recent Labs  Lab 05/25/17  0815 05/24/17  0738 05/22/17  0646   WBC 6.9 11.3*  --    HGB 8.2* 8.7*  --    HCT 30.1* 31.9*  --    MCV 84 84  --     268 267       Recent Labs  Lab 05/25/17  0815 05/24/17  0738   HGB 8.2* 8.7*         Adri Thrasher PA-C     Pt seen and examined, agree with above.  Suspect diuresis has helped with bowel edema    "

## 2017-05-25 NOTE — PLAN OF CARE
Problem: Goal Outcome Summary  Goal: Goal Outcome Summary  DC Planner PT   Patient plan for D/C: TCU  Current status: Pt amb 15' x 2 with ww with min assist. Vcs to for direction. Pt transfers sit to stand with mod - min assist of 2 and min assist of 2 for stand to sit. Pt transfers chair to bathroom to bed with ww with min assist.Pt transfer sit to sidelying to supine with max - mod assist of 2. Note increase swelling of both Les.   Barriers to return to prior living situation:  Decrease mobility   Recommendations for D/C: TCU  Rationale for D/C recommendations: Decrease mobility with max  - mod assist required for transfers and min assist for amb with wheeled walker       Entered by: Belle Aj 05/25/2017 11:13 AM

## 2017-05-26 LAB
ANION GAP SERPL CALCULATED.3IONS-SCNC: <1 MMOL/L (ref 3–14)
BUN SERPL-MCNC: 20 MG/DL (ref 7–30)
CALCIUM SERPL-MCNC: 8.5 MG/DL (ref 8.5–10.1)
CHLORIDE SERPL-SCNC: 95 MMOL/L (ref 94–109)
CO2 SERPL-SCNC: 41 MMOL/L (ref 20–32)
CREAT SERPL-MCNC: 0.97 MG/DL (ref 0.66–1.25)
ERYTHROCYTE [DISTWIDTH] IN BLOOD BY AUTOMATED COUNT: 18.2 % (ref 10–15)
GFR SERPL CREATININE-BSD FRML MDRD: 73 ML/MIN/1.7M2
GLUCOSE BLDC GLUCOMTR-MCNC: 110 MG/DL (ref 70–99)
GLUCOSE BLDC GLUCOMTR-MCNC: 136 MG/DL (ref 70–99)
GLUCOSE BLDC GLUCOMTR-MCNC: 143 MG/DL (ref 70–99)
GLUCOSE BLDC GLUCOMTR-MCNC: 96 MG/DL (ref 70–99)
GLUCOSE BLDC GLUCOMTR-MCNC: 99 MG/DL (ref 70–99)
GLUCOSE SERPL-MCNC: 108 MG/DL (ref 70–99)
HCT VFR BLD AUTO: 28.3 % (ref 40–53)
HGB BLD-MCNC: 8 G/DL (ref 13.3–17.7)
INR PPP: 1.47 (ref 0.86–1.14)
MAGNESIUM SERPL-MCNC: 2.4 MG/DL (ref 1.6–2.3)
MCH RBC QN AUTO: 23.5 PG (ref 26.5–33)
MCHC RBC AUTO-ENTMCNC: 28.3 G/DL (ref 31.5–36.5)
MCV RBC AUTO: 83 FL (ref 78–100)
PHOSPHATE SERPL-MCNC: 3.9 MG/DL (ref 2.5–4.5)
PLATELET # BLD AUTO: 249 10E9/L (ref 150–450)
POTASSIUM SERPL-SCNC: 4.4 MMOL/L (ref 3.4–5.3)
RBC # BLD AUTO: 3.4 10E12/L (ref 4.4–5.9)
SODIUM SERPL-SCNC: 135 MMOL/L (ref 133–144)
WBC # BLD AUTO: 5.9 10E9/L (ref 4–11)

## 2017-05-26 PROCEDURE — 25000128 H RX IP 250 OP 636: Performed by: INTERNAL MEDICINE

## 2017-05-26 PROCEDURE — A9270 NON-COVERED ITEM OR SERVICE: HCPCS | Mod: GY | Performed by: HOSPITALIST

## 2017-05-26 PROCEDURE — 85610 PROTHROMBIN TIME: CPT | Performed by: INTERNAL MEDICINE

## 2017-05-26 PROCEDURE — A9270 NON-COVERED ITEM OR SERVICE: HCPCS | Mod: GY | Performed by: INTERNAL MEDICINE

## 2017-05-26 PROCEDURE — 83735 ASSAY OF MAGNESIUM: CPT | Performed by: INTERNAL MEDICINE

## 2017-05-26 PROCEDURE — 25000132 ZZH RX MED GY IP 250 OP 250 PS 637: Mod: GY | Performed by: INTERNAL MEDICINE

## 2017-05-26 PROCEDURE — 84100 ASSAY OF PHOSPHORUS: CPT | Performed by: INTERNAL MEDICINE

## 2017-05-26 PROCEDURE — 80048 BASIC METABOLIC PNL TOTAL CA: CPT | Performed by: INTERNAL MEDICINE

## 2017-05-26 PROCEDURE — 85027 COMPLETE CBC AUTOMATED: CPT | Performed by: INTERNAL MEDICINE

## 2017-05-26 PROCEDURE — 36415 COLL VENOUS BLD VENIPUNCTURE: CPT | Performed by: INTERNAL MEDICINE

## 2017-05-26 PROCEDURE — 99233 SBSQ HOSP IP/OBS HIGH 50: CPT | Performed by: INTERNAL MEDICINE

## 2017-05-26 PROCEDURE — 25000132 ZZH RX MED GY IP 250 OP 250 PS 637: Mod: GY | Performed by: HOSPITALIST

## 2017-05-26 PROCEDURE — 12000000 ZZH R&B MED SURG/OB

## 2017-05-26 PROCEDURE — 00000146 ZZHCL STATISTIC GLUCOSE BY METER IP

## 2017-05-26 RX ORDER — WARFARIN SODIUM 3 MG/1
6 TABLET ORAL
Status: COMPLETED | OUTPATIENT
Start: 2017-05-26 | End: 2017-05-26

## 2017-05-26 RX ORDER — FUROSEMIDE 40 MG
40 TABLET ORAL DAILY
Status: DISCONTINUED | OUTPATIENT
Start: 2017-05-26 | End: 2017-05-27

## 2017-05-26 RX ADMIN — ACETAMINOPHEN 1000 MG: 500 TABLET, FILM COATED ORAL at 03:28

## 2017-05-26 RX ADMIN — FERROUS GLUCONATE 324 MG: 324 TABLET ORAL at 08:00

## 2017-05-26 RX ADMIN — ACETAMINOPHEN 1000 MG: 500 TABLET, FILM COATED ORAL at 09:13

## 2017-05-26 RX ADMIN — HYDROCORTISONE: 1 CREAM TOPICAL at 20:25

## 2017-05-26 RX ADMIN — ACETAMINOPHEN 1000 MG: 500 TABLET, FILM COATED ORAL at 15:52

## 2017-05-26 RX ADMIN — LEVOTHYROXINE SODIUM 100 MCG: 100 TABLET ORAL at 06:51

## 2017-05-26 RX ADMIN — GLIPIZIDE 5 MG: 5 TABLET ORAL at 17:20

## 2017-05-26 RX ADMIN — HYDROCORTISONE: 1 CREAM TOPICAL at 09:42

## 2017-05-26 RX ADMIN — FUROSEMIDE 40 MG: 10 INJECTION, SOLUTION INTRAVENOUS at 07:53

## 2017-05-26 RX ADMIN — DIPHENHYDRAMINE HYDROCHLORIDE 25 MG: 25 CAPSULE ORAL at 06:51

## 2017-05-26 RX ADMIN — ATROPA BELLADONNA AND OPIUM 1 SUPPOSITORY: 16.2; 3 SUPPOSITORY RECTAL at 03:28

## 2017-05-26 RX ADMIN — SIMVASTATIN 10 MG: 10 TABLET, FILM COATED ORAL at 21:05

## 2017-05-26 RX ADMIN — METOPROLOL TARTRATE 50 MG: 50 TABLET, FILM COATED ORAL at 08:00

## 2017-05-26 RX ADMIN — WARFARIN SODIUM 6 MG: 3 TABLET ORAL at 17:20

## 2017-05-26 RX ADMIN — GLIPIZIDE 5 MG: 5 TABLET ORAL at 07:57

## 2017-05-26 RX ADMIN — DIPHENHYDRAMINE HYDROCHLORIDE 25 MG: 25 CAPSULE ORAL at 17:20

## 2017-05-26 RX ADMIN — METOPROLOL TARTRATE 50 MG: 50 TABLET, FILM COATED ORAL at 20:24

## 2017-05-26 RX ADMIN — ENOXAPARIN SODIUM 100 MG: 100 INJECTION SUBCUTANEOUS at 01:02

## 2017-05-26 RX ADMIN — PANTOPRAZOLE SODIUM 40 MG: 40 TABLET, DELAYED RELEASE ORAL at 08:00

## 2017-05-26 RX ADMIN — TAMSULOSIN HYDROCHLORIDE 0.4 MG: 0.4 CAPSULE ORAL at 20:24

## 2017-05-26 RX ADMIN — ENOXAPARIN SODIUM 100 MG: 100 INJECTION SUBCUTANEOUS at 12:02

## 2017-05-26 ASSESSMENT — PAIN DESCRIPTION - DESCRIPTORS: DESCRIPTORS: ACHING;DISCOMFORT;PRESSURE

## 2017-05-26 NOTE — PLAN OF CARE
Problem: Goal Outcome Summary  Goal: Goal Outcome Summary  Outcome: Improving  Patient alert to self only.  VSS.  A-1 w/ walker.  Edwina low fiber diet, ate 30%.  D/c IV Lasix and switched to oral.  Kelly patent and in place.  Weaned down to 1L NC.   and 136.   Continues to be confused, re-oriented.  Plan to d/c to TCU in 1-2 days.

## 2017-05-26 NOTE — PROGRESS NOTES
CM: Called [facility] to update that pt should be ready to dc over the weekend  P: They will review him again today and contact [facility] about possible bed    CM: PT has been accepted for TCU for the weekend.  Called wife to update and to let her know that after day 20 his will have a co-pay of $140 per day. Was not able to leave a  message TCU facility does not accept his secondary insurance.

## 2017-05-26 NOTE — PROGRESS NOTES
"[facility]   General Surgery Progress Note         Assessment and Plan:   Assessment:   POD#12 s/p ex lap for apparent closed loop SBO, negative findings.  H/o Afib  Ileus as expected-slowly resolving      Plan:   -Diet: low fiber diet   -Pain control:  PO Tylenol  -GI prophylaxis:  Protonix    -VTE prophylaxis: PCDs, coumadin  -Continue diuresis and kiran per hospitalist. Kiran could be contributing to some of his lower abdominal discomfort.  -Staples can be removed soon         Interval History:   Up in chair, eating breakfast. Complains of low abdomen pain, states it is \"behind my penis.\"  Up walking with assistance.  Last BM was yesterday.  +kiran. Patient did not want staples out as he was currently eating.         Physical Exam:   Blood pressure 157/85, pulse 92, temperature 97.6  F (36.4  C), temperature source Oral, resp. rate 16, height 1.702 m (5' 7.01\"), weight 104.6 kg (230 lb 8 oz), SpO2 97 %.    I/O last 3 completed shifts:  In: 2121 [P.O.:1690]  Out: 3375 [Urine:3375]    Abdomen:   Soft, ?distended, +tenderness near incision, + bowel sounds   Inc(s) - clean, dry, intact.  + staples, no erythema          Data:     Recent Labs  Lab 05/26/17  0715 05/25/17  0815 05/24/17  0738   WBC 5.9 6.9 11.3*   HGB 8.0* 8.2* 8.7*   HCT 28.3* 30.1* 31.9*   MCV 83 84 84    248 268         KIRA Saeed-C     Seen and examined, agree with above  Appropriate for d/c from our perspective when otherwise medically appropriate.  "

## 2017-05-26 NOTE — PROGRESS NOTES
[facility]  Hospitalist Progress Note  Nirmal Serrano MD 05/26/17    Reason for Stay (Diagnosis): ileus         Assessment and Plan:      Summary of Stay: Ton Bagley is a 89 year old male w/ hx of HTN, HLD, DM2, afib, cad, hypothyroidism, and dementia admitted on 5/13/2017 with abd pain and CT findings concerning for possible SBO.  Underwent ex-lap on 5/14 and no SBO was found.  Now with persistent ileus that is slow to resolve.  Was on PPN, but weaned off as oral nutrition improving.  Has had some BMs.  Advanced to low fiber diet per surgery service, however his intake has not been very much.  His warfarin and other home medications have been resumed.  His hemoglobin is slowly drifting, but no sign of acute bleeding.  Added oral iron.  Added back glipizide as blood sugars rising.  Has been seen by PT and OT and he will need TCU upon discharge.  He is very edematous so providing IV diuresis, but bicarb going up so slowing down.  Off PPN now.  Suspect we can discharge to TCU tomorrow or next day pending labs.    Problem List/Assessment and Plan:   Ileus: S/P exploratory laparotomy by General Surgery on 5/14 for initial concerns about possible SBO; found to have ileus. NGT removed 5/18. Continues to have mild abd distention and pain with PO intake minimal on full liquid diet, but does seem to be slowly improving and he is having BMs. Stopped narcotics (had been receiving MSO4) to minimize worsening ileus. cdiff neg.  Try to have patient ambulate with PT.  Diet per surgery services. Advanced to low fiber diet.    Type II DM:  Resuming glipizide at 5mg bid as po intake has improved. Novolog SSI.    Chronic lymphedema:  3+ bilateral LE edema.  Chronically on lasix 60mg am and 30mg pm that has been on hold here.  Bicarb up again today so will slo diuresis.  -decreased to just lasix 40mg once today and monitor I/O closely as has magno in place and get daily weights  -bmp in am    HTN:  more  hypertensive past couple of days.  pta on atenolol 50mg dialy, lasix 60mg am/ 30mg pm, lisinopril 10mg daily.  -Increased p.o. metoprolol 50 mg p.o. Bid  -may restart lisinopril if not improving with diuresis and creatinine stable    Hyperlipidemia:  Cont Zocor    Hypothyroidism: Continue po synthroid    A fib:  Rate controlled pta on atenolol.  Changed to metoprolol here for improved BP control.  Resumed Coumadin per pharmacy with goal INR of 2-3.     CAD:  S/p cabg and stent x 2. No chest pain currently.  pta on warfarin, statin, ACEI, and BB.    Dementia: holding Namenda and Aricept for now until ileus improves, can resume on discharge    PRADEEP:  Present on admission, suspect pre-renal hypovolemia due to SBO.  Creatinine down to likely baseline of 0.8-0.9.    Bronchospasm:  Resolved. On Duonebs QID. Albuterol PRN.    Urinary retention: failed voiding trial 5/20 so kiran replaced due to retention.  Having some bladder spasms.    -B&O suppository prn, seems to help  -continue kiran    Acute on chronic anemia: previous hg in this system was 10 in 2016.  Presented with hg 10 here that has slowly drifted to 8.7 following surgery.  Likely nutrition component as well.  Borderline microcytic so check iron studies and he does appear to have some iron deficiency.   No sign of bleeding now.  Will need to follow Hg as he is on warfarin, no indication for transfusion now.  Optimize nutrition as able.  -started oral iron daily    DVT Prophylaxis: Warfarin.  Stop lovenox when therapeutic INR  Code Status: DNR / DNI  FEN:   low fiber diet  Discharge Dispo:  TCU  Estimated Disch Date / # of Days until Disch: likely ready for discharge tomorrow or next day pending labs    Discussed care with spouse at bedside        Interval History (Subjective):      Ongoing intermittent suprapubic pain.  Tylenol an B&O suppository somewhat helpful.  No fevers.  Good UOP.                  Physical Exam:      Last Vital Signs:  /85 (BP  "Location: Right arm)  Pulse 92  Temp 97.6  F (36.4  C) (Oral)  Resp 16  Ht 1.702 m (5' 7.01\")  Wt 104.6 kg (230 lb 8 oz)  SpO2 97%  BMI 36.09 kg/m2    Intake/Output Summary (Last 24 hours) at 05/26/17 1538  Last data filed at 05/26/17 1356   Gross per 24 hour   Intake             1210 ml   Output             2950 ml   Net            -1740 ml       Constitutional: Awake, NAD  Eyes: sclera white   HEENT:  MMM  Respiratory:   lungs cta bilaterally, no crackles or wheeze  Cardiovascular: RRR.  Systolic murmur 1/6  GI: moderately distended, mild tenderness suprapubic tenderness, BS present  Genitourinary: kiran draining yellow urine  Skin: chronic venous stasis changes to bilateral LE's  Musculoskeletal/extremities: 3+ bilateral LE pitting edema, looser skin today  Neurologic: alert, moves all extremities  Psychiatric: calm          Medications:      All current medications were reviewed with changes reflected in problem list.         Data:      All new lab and imaging data was reviewed.   Labs:      Recent Labs  Lab 05/26/17  0715 05/25/17  0815 05/24/17  0738    137 135   POTASSIUM 4.4 4.5 4.5   CHLORIDE 95 97 99   CO2 41* 36* 31   ANIONGAP <1* 4 5   * 181* 177*   BUN 20 17 13   CR 0.97 0.80 0.67   GFRESTIMATED 73 >90Non  GFR Calc >90Non  GFR Calc   GFRESTBLACK 88 >90African American GFR Calc >90African American GFR Calc   JANN 8.5 8.9 8.6       Recent Labs  Lab 05/26/17  0715 05/25/17  0815 05/24/17  0738   WBC 5.9 6.9 11.3*   HGB 8.0* 8.2* 8.7*   HCT 28.3* 30.1* 31.9*   MCV 83 84 84    248 268       Recent Labs  Lab 05/26/17  0715 05/25/17  0815 05/24/17  0738   INR 1.47* 1.35* 1.41*      Imaging:   None today      Nirmal Serrano MD        "

## 2017-05-26 NOTE — PLAN OF CARE
Problem: Bowel Obstruction (Adult)  Goal: Signs and Symptoms of Listed Potential Problems Will be Absent or Manageable (Bowel Obstruction)  Signs and symptoms of listed potential problems will be absent or manageable by discharge/transition of care (reference Bowel Obstruction (Adult) CPG).   Outcome: No Change  Pt POD 11 from bowel surgery.  Pt continues to be confused,has repeated requests.  C/o of bladder pain. Kelly having good output.  Given flomax, B&O supp, and tylenol with little relief.  Changed position and applied heating pad to lower abdomen.   Ambulated in way, assist of one x1.  Given benadryl for rash on back.  On 2 L oxygen,sats mid to high 90's.  TCU at discharge.

## 2017-05-27 ENCOUNTER — APPOINTMENT (OUTPATIENT)
Dept: GENERAL RADIOLOGY | Facility: CLINIC | Age: 82
DRG: 357 | End: 2017-05-27
Attending: INTERNAL MEDICINE
Payer: MEDICARE

## 2017-05-27 LAB
ANION GAP SERPL CALCULATED.3IONS-SCNC: 4 MMOL/L (ref 3–14)
BUN SERPL-MCNC: 17 MG/DL (ref 7–30)
CALCIUM SERPL-MCNC: 8.9 MG/DL (ref 8.5–10.1)
CHLORIDE SERPL-SCNC: 97 MMOL/L (ref 94–109)
CO2 SERPL-SCNC: 36 MMOL/L (ref 20–32)
CREAT SERPL-MCNC: 0.85 MG/DL (ref 0.66–1.25)
ERYTHROCYTE [DISTWIDTH] IN BLOOD BY AUTOMATED COUNT: 18.3 % (ref 10–15)
GFR SERPL CREATININE-BSD FRML MDRD: 85 ML/MIN/1.7M2
GLUCOSE BLDC GLUCOMTR-MCNC: 105 MG/DL (ref 70–99)
GLUCOSE BLDC GLUCOMTR-MCNC: 108 MG/DL (ref 70–99)
GLUCOSE BLDC GLUCOMTR-MCNC: 114 MG/DL (ref 70–99)
GLUCOSE BLDC GLUCOMTR-MCNC: 157 MG/DL (ref 70–99)
GLUCOSE BLDC GLUCOMTR-MCNC: 96 MG/DL (ref 70–99)
GLUCOSE SERPL-MCNC: 103 MG/DL (ref 70–99)
HCT VFR BLD AUTO: 29.6 % (ref 40–53)
HGB BLD-MCNC: 8.3 G/DL (ref 13.3–17.7)
INR PPP: 1.81 (ref 0.86–1.14)
MCH RBC QN AUTO: 23.5 PG (ref 26.5–33)
MCHC RBC AUTO-ENTMCNC: 28 G/DL (ref 31.5–36.5)
MCV RBC AUTO: 84 FL (ref 78–100)
PLATELET # BLD AUTO: 283 10E9/L (ref 150–450)
POTASSIUM SERPL-SCNC: 4.4 MMOL/L (ref 3.4–5.3)
RBC # BLD AUTO: 3.53 10E12/L (ref 4.4–5.9)
SODIUM SERPL-SCNC: 137 MMOL/L (ref 133–144)
WBC # BLD AUTO: 6.2 10E9/L (ref 4–11)

## 2017-05-27 PROCEDURE — A9270 NON-COVERED ITEM OR SERVICE: HCPCS | Mod: GY | Performed by: INTERNAL MEDICINE

## 2017-05-27 PROCEDURE — 25000132 ZZH RX MED GY IP 250 OP 250 PS 637: Mod: GY | Performed by: INTERNAL MEDICINE

## 2017-05-27 PROCEDURE — 80048 BASIC METABOLIC PNL TOTAL CA: CPT | Performed by: INTERNAL MEDICINE

## 2017-05-27 PROCEDURE — A9270 NON-COVERED ITEM OR SERVICE: HCPCS | Mod: GY | Performed by: HOSPITALIST

## 2017-05-27 PROCEDURE — 25000128 H RX IP 250 OP 636: Performed by: INTERNAL MEDICINE

## 2017-05-27 PROCEDURE — 99233 SBSQ HOSP IP/OBS HIGH 50: CPT | Performed by: INTERNAL MEDICINE

## 2017-05-27 PROCEDURE — 71010 XR CHEST PORT 1 VW: CPT

## 2017-05-27 PROCEDURE — 00000146 ZZHCL STATISTIC GLUCOSE BY METER IP

## 2017-05-27 PROCEDURE — 85610 PROTHROMBIN TIME: CPT | Performed by: INTERNAL MEDICINE

## 2017-05-27 PROCEDURE — 25000132 ZZH RX MED GY IP 250 OP 250 PS 637: Mod: GY | Performed by: PHYSICIAN ASSISTANT

## 2017-05-27 PROCEDURE — 25000132 ZZH RX MED GY IP 250 OP 250 PS 637: Mod: GY | Performed by: HOSPITALIST

## 2017-05-27 PROCEDURE — 85027 COMPLETE CBC AUTOMATED: CPT | Performed by: INTERNAL MEDICINE

## 2017-05-27 PROCEDURE — A9270 NON-COVERED ITEM OR SERVICE: HCPCS | Mod: GY | Performed by: PHYSICIAN ASSISTANT

## 2017-05-27 PROCEDURE — 12000000 ZZH R&B MED SURG/OB

## 2017-05-27 PROCEDURE — 36415 COLL VENOUS BLD VENIPUNCTURE: CPT | Performed by: INTERNAL MEDICINE

## 2017-05-27 RX ORDER — SIMETHICONE 80 MG
160 TABLET,CHEWABLE ORAL 3 TIMES DAILY
Status: DISCONTINUED | OUTPATIENT
Start: 2017-05-27 | End: 2017-05-28 | Stop reason: HOSPADM

## 2017-05-27 RX ORDER — WARFARIN SODIUM 3 MG/1
6 TABLET ORAL
Status: COMPLETED | OUTPATIENT
Start: 2017-05-27 | End: 2017-05-27

## 2017-05-27 RX ADMIN — SIMETHICONE CHEW TAB 80 MG 160 MG: 80 TABLET ORAL at 15:54

## 2017-05-27 RX ADMIN — ACETAMINOPHEN 1000 MG: 500 TABLET, FILM COATED ORAL at 22:15

## 2017-05-27 RX ADMIN — ACETAMINOPHEN 1000 MG: 500 TABLET, FILM COATED ORAL at 00:10

## 2017-05-27 RX ADMIN — FUROSEMIDE 60 MG: 40 TABLET ORAL at 15:05

## 2017-05-27 RX ADMIN — DIPHENHYDRAMINE HYDROCHLORIDE 25 MG: 25 CAPSULE ORAL at 22:15

## 2017-05-27 RX ADMIN — TAMSULOSIN HYDROCHLORIDE 0.4 MG: 0.4 CAPSULE ORAL at 20:19

## 2017-05-27 RX ADMIN — SIMVASTATIN 10 MG: 10 TABLET, FILM COATED ORAL at 22:02

## 2017-05-27 RX ADMIN — FUROSEMIDE 60 MG: 40 TABLET ORAL at 09:33

## 2017-05-27 RX ADMIN — GLIPIZIDE 5 MG: 5 TABLET ORAL at 07:45

## 2017-05-27 RX ADMIN — METOPROLOL TARTRATE 50 MG: 50 TABLET, FILM COATED ORAL at 08:00

## 2017-05-27 RX ADMIN — DIPHENHYDRAMINE HYDROCHLORIDE 25 MG: 25 CAPSULE ORAL at 06:11

## 2017-05-27 RX ADMIN — ACETAMINOPHEN 1000 MG: 500 TABLET, FILM COATED ORAL at 06:11

## 2017-05-27 RX ADMIN — LEVOTHYROXINE SODIUM 100 MCG: 100 TABLET ORAL at 06:11

## 2017-05-27 RX ADMIN — ENOXAPARIN SODIUM 100 MG: 100 INJECTION SUBCUTANEOUS at 11:56

## 2017-05-27 RX ADMIN — DIPHENHYDRAMINE HYDROCHLORIDE 25 MG: 25 CAPSULE ORAL at 00:10

## 2017-05-27 RX ADMIN — SIMETHICONE CHEW TAB 80 MG 160 MG: 80 TABLET ORAL at 22:02

## 2017-05-27 RX ADMIN — WARFARIN SODIUM 6 MG: 3 TABLET ORAL at 18:19

## 2017-05-27 RX ADMIN — FERROUS GLUCONATE 324 MG: 324 TABLET ORAL at 08:00

## 2017-05-27 RX ADMIN — ENOXAPARIN SODIUM 100 MG: 100 INJECTION SUBCUTANEOUS at 23:31

## 2017-05-27 RX ADMIN — PANTOPRAZOLE SODIUM 40 MG: 40 TABLET, DELAYED RELEASE ORAL at 08:00

## 2017-05-27 RX ADMIN — ATROPA BELLADONNA AND OPIUM 1 SUPPOSITORY: 16.2; 3 SUPPOSITORY RECTAL at 01:32

## 2017-05-27 RX ADMIN — ALUMINUM HYDROXIDE, MAGNESIUM HYDROXIDE, AND DIMETHICONE 30 ML: 400; 400; 40 SUSPENSION ORAL at 22:52

## 2017-05-27 RX ADMIN — ENOXAPARIN SODIUM 100 MG: 100 INJECTION SUBCUTANEOUS at 00:11

## 2017-05-27 RX ADMIN — GLIPIZIDE 5 MG: 5 TABLET ORAL at 15:54

## 2017-05-27 RX ADMIN — METOPROLOL TARTRATE 50 MG: 50 TABLET, FILM COATED ORAL at 20:24

## 2017-05-27 RX ADMIN — HYDROCORTISONE: 1 CREAM TOPICAL at 20:18

## 2017-05-27 RX ADMIN — SIMETHICONE CHEW TAB 80 MG 160 MG: 80 TABLET ORAL at 11:56

## 2017-05-27 RX ADMIN — HYDROCORTISONE: 1 CREAM TOPICAL at 08:00

## 2017-05-27 NOTE — DISCHARGE INSTRUCTIONS
HOME CARE FOLLOWING ABDOMINAL SURGERY  LISANDRO Goode E. Gavin, N. Guttormson, D. Maurer, PETRA Douglas   Special instructions for Ton Bagley:  --no follow up required.  Call [phone] if questions/concerns.         INCISIONAL CARE:  Replace the bandage over your incision (or incisions) until all drainage stops, or if more comfortable to have in place.  If present, leave the steri-strips (white paper tapes) in place for 14 days after surgery.  If you have staples in your incision at the time of discharge, they will be removed at your follow-up appointment.  If Dermabond (a type of skin glue) is present, leave in place until it wears/flakes off.     BATHING:  Avoid baths for 1 week after surgery.  Showers are okay.  You may wash your hair at any time.  Gently pat your incision dry after bathing.    ACTIVITY:  Light Activity -- you may immediately be up and about as tolerated.  Driving -- you may drive when comfortable and off narcotic pain medications.  Light Work -- resume when comfortable off pain medications.  (If you can drive, you probably can work.)  Strenuous Work/Activity -- limit lifting to 20 pounds for 4 weeks.  Then, progressively increase with time.  Active Sports (running, biking, etc.) -- cautiously resume after 6 weeks.    DISCOMFORT:  Use pain medications as prescribed by your surgeon.  Take the pain medication with some food, when possible, to minimize side effects.  Expect gradual improvement.    DIET:  Return to diet you were on before surgery, unless you are given specific diet instructions.  Drink plenty of fluids.  While taking pain medications, increase dietary fiber or add a fiber supplementation like Metamucil or Citrucel to help prevent constipation - a possible side effect of pain medications.    NAUSEA:  If nauseated from the anesthetic/pain meds; rest in bed, get up cautiously with assistance, and drink clear liquids (juice, tea, broth).       CONTACT  US IF THE FOLLOWING DEVELOPS:   1. A fever that is above 101     2. If there is a large amount of drainage, bleeding, or swelling.   3. Severe pain that is not relieved by your prescription.   4. Drainage that is thick, cloudy, yellow, green or white.   5. Any other questions not answered by  Frequently Asked Questions  sheet.      FREQUENTLY ASKED QUESTIONS:    Q:  How should my incision look?    A:  Normally your incision will appear slightly swollen with light redness directly along the incision itself as it heals.  It may feel like a bump or ridge as the healing/scarring happens, and over time (3-4 months) this bump or ridge feeling should slowly go away.  In general, clear or pink watery drainage can be normal at first as your incision heals, but should decrease over time.    Q:  How do I know if my incision is infected?  A:  Look at your incision for signs of infection, like redness around the incision spreading to surrounding skin, or drainage of cloudy or foul-smelling drainage.  If you feel warm, check your temperature to see if you are running a fever.    **If any of these things occur, please notify the nurse at our office.  We may need you to come into the office for an incision check.      Q:  How do I take care of my incision?  A:  If you have a dressing in place - Starting the day after surgery, replace the dressing 1-2 times a day until there is no further drainage from the incision.  At that time, a dressing is no longer needed.  Try to minimize tape on the skin if irritation is occurring at the tape sites.  If you have significant irritation from tape on the skin, please call the office to discuss other method of dressing your incision.    Small pieces of tape called  steri-strips  may be present directly overlying your incision; these may be removed 10 days after surgery unless otherwise specified by your surgeon.  If these tapes start to loosen at the ends, you may trim them back until they fall  off or are removed.    A:  If you had  Dermabond  tissue glue used as a dressing (this causes your incision to look shiny with a clear covering over it) - This type of dressing wears off with time and does not require more dressings over the top unless it is draining around the glue as it wears off.  Do not apply ointments or lotions over the incisions until the glue has completely worn off.    Q:  There is a piece of tape or a sticky  lead  still on my skin.  Can I remove this?  A:  Sometimes the sticky  leads  used for monitoring during surgery or for evaluation in the emergency department are not all removed while you are in the hospital.  These sometimes have a tab or metal dot on them.  You can easily remove these on your own, like taking off a band-aid.  If there is a gel substance under the  lead , simply wipe/clean it off with a washcloth or paper towel.      Q:  What can I do to minimize constipation (very hard stools, or lack of stools)?  A:  Stay well hydrated.  Increase your dietary fiber intake or take a fiber supplement -with plenty of water.  Walk around frequently.  You may consider an over-the-counter stool-softener.  Your Pharmacist can assist you with choosing one that is stocked at your pharmacy.  Constipation is also one of the most common side effects of pain medication.  If you are using pain medication, be pro-active and try to PREVENT problems with constipation by taking the steps above BEFORE constipation becomes a problem.    Q:  What do I do if I need more pain medications?  A:  Call the office to receive refills.  Be aware that certain pain meds cannot be called into a pharmacy and actually require a paper prescription.  A change may be made in your pain med as you progress thru your recovery period or if you have side effects to certain meds.    --Pain meds are NOT refilled after 5pm on weekdays, and NOT AT ALL on the weekends, so please look ahead to prevent problems.      Q:  Why am I  having a hard time sleeping now that I am at home?  A:  Many medications you receive while you are in the hospital can impact your sleep for a number of days after your surgery/hospitalization.  Decreased level of activity and naps during the day may also make sleeping at night difficult.  Try to minimize day-time naps, and get up frequently during the day to walk around your home during your recovery time.  Sleep aides may be of some help, but are not recommended for long-term use.      Q:  I am having some back discomfort.  What should I do?  A:  This may be related to certain positioning that was required for your surgery, extended periods of time in bed, or other changes in your overall activity level.  You may try ice, heat, acetaminophen, or ibuprofen to treat this temporarily.  Note that many pain medications have acetaminophen in them and would state this on the prescription bottle.  Be sure not to exceed the maximum of 4000mg per day of acetaminophen.     **If the pain you are having does not resolve, is severe, or is a flare of back pain you have had on other occasions prior to surgery, please contact your primary physician for further recommendations or for an appointment to be examined at their office.    Q:  Why am I having headaches?  A:  Headaches can be caused by many things:  caffeine withdrawal, use of pain meds, dehydration, high blood pressure, lack of sleep, over-activity/exhaustion, flare-up of usual migraine headaches.  If you feel this is related to muscle tension (a band-like feeling around the head, or a pressure at the low-back of the head) you may try ice or heat to this area.  You may need to drink more fluids (try electrolyte drink like Gatorade), rest, or take your usual migraine medications.   **If your headaches do not resolve, worsen, are accompanied by other symptoms, or if your blood pressure is high, please call your primary physician for recommendation and/or examination.    Q:   I am unable to urinate.  What do I do?  A:  A small percentage of people can have difficulty urinating initially after surgery.  This includes being able to urinate only a very small amount at a time and feeling discomfort or pressure in the very low abdomen.  This is called  urinary retention , and is actually an urgent situation.  Proceed to your nearest Emergency department for evaluation (not an Urgent Care Center).  Sometimes the bladder does not work correctly after certain medications you receive during surgery, or related to certain procedures.  You may need to have a catheter placed until your bladder recovers.  When planning to go to an Emergency department, it may help to call the ER to let them know you are coming in for this problem after a surgery.  This may help you get in quicker to be evaluated.  **If you have symptoms of a urinary tract infection, please contact your primary physician for the proper evaluation and treatment.          If you have other questions, please call the office Monday thru Friday between 8am and 5pm to discuss with the nurse or physician assistant.  #[phone]    There is a surgeon ON CALL on weekday evenings and over the weekend in case of urgent need only, and may be contacted at the same number.    If you are having an emergency, call 911 or proceed to your nearest emergency department.

## 2017-05-27 NOTE — PLAN OF CARE
Problem: Goal Outcome Summary  Goal: Goal Outcome Summary  Outcome: Improving  Patient alert to self only.  VSS.  A-1 w/ walker to bathroom.  Voiding well, incont of urine.  IV saline locked.  Staples from surgery removed and steri strips placed.   and 157.  Edwina low fiber diet and ate 100%.  Denies nausea.  Cont on 2L NC.  Plans to d/c tomorrow morning to TCU.

## 2017-05-27 NOTE — PROGRESS NOTES
[facility]  Hospitalist Progress Note  Nirmal Serrano MD 05/27/17    Reason for Stay (Diagnosis): ileus         Assessment and Plan:      Summary of Stay: Ton Bagley is a 89 year old male w/ hx of HTN, HLD, DM2, afib, cad, hypothyroidism, and dementia admitted on 5/13/2017 with abd pain and CT findings concerning for possible SBO.  Underwent ex-lap on 5/14 and no SBO was found.  Now with persistent ileus that is slow to resolve.  Was on PPN, but weaned off as oral nutrition improving.  Has had some BMs.  Advanced to low fiber diet per surgery service, however his intake has not been very much.  His warfarin and other home medications have been resumed.  His hemoglobin is slowly drifting, but no sign of acute bleeding.  Added oral iron.  Added back glipizide as blood sugars rising.  Has been seen by PT and OT and he will need TCU upon discharge.  He is very edematous so providing IV diuresis, but bicarb going up so slowing down.  Off PPN now and tolerating low fiber diet.  Kelly now out and voiding.  Plan to d/c to TCU tomorrow morning.    Problem List/Assessment and Plan:   Ileus: S/P exploratory laparotomy by General Surgery on 5/14 for initial concerns about possible SBO; found to have ileus. NGT removed 5/18. Continues to have mild abd distention.  He is having BMs. Mild abdominal discomfort after eating 100% of his low fiber meal.  Stopped narcotics (had been receiving MSO4) to minimize worsening ileus. cdiff neg. Staples removed.  No specific follow up need for this per surgery service.    Type II DM:  Resumed glipizide at 5mg bid as po intake has improved. Novolog SSI.    Chronic lymphedema:  3+ bilateral LE edema.  Chronically on lasix 60mg am and 30mg pm.  -transition to po lasix 60mg bid to help with significant LE edema.  Will monitor UOP closely to see if this is an adequate discharge dose  -strict I/O  -daily weight  -bmp in am    HTN:  Pta on atenolol 50mg daily, lasix 60mg am/  30mg pm, lisinopril 10mg daily.  -changed metoprolol 50 mg p.o. Bid  -may restart lisinopril if not improving with diuresis and creatinine stable, however  now    Hyperlipidemia:  Cont Zocor    Hypothyroidism: Continue po synthroid    A fib:  Rate controlled pta on atenolol.  Changed to metoprolol here for improved BP control.  Resumed Coumadin per pharmacy with goal INR of 2-3. Will need to determine d/c dose for tomorrow.    CAD:  S/p cabg and stent x 2. No chest pain currently.  pta on warfarin, statin, ACEI, and BB.    Dementia: holding Namenda and Aricept for now until ileus improves, can resume on discharge    PRADEEP:  Present on admission, suspect pre-renal hypovolemia due to SBO.  Creatinine down to likely baseline of 0.8-0.9.    Bronchospasm:  Resolved. On Duonebs QID. Albuterol PRN.    Urinary retention: failed voiding trial 5/20 so kiran replaced due to retention.  Having some bladder spasms and mild blood from tip of penis so kiran removed 5/26.  Now voiding with only small PVR.  Passing a couple of clots.  -continue with bladder scan during day and PVR measurement    Acute on chronic anemia: previous hg in this system was 10 in 2016.  Presented with hg 10 here that has slowly drifted to 8.7 following surgery.  Likely nutrition component as well.  Borderline microcytic so check iron studies and he does appear to have some iron deficiency.   No sign of bleeding now.  Will need to follow Hg as he is on warfarin, no indication for transfusion now.  Optimize nutrition as able.  -started oral iron daily    DVT Prophylaxis: Warfarin.  Stop lovenox when therapeutic INR  Code Status: DNR / DNI  FEN:   low fiber diet  Discharge Dispo:  TCU  Estimated Disch Date / # of Days until Disch: plan to d/c to TCU tomorrow morning          Interval History (Subjective):      Suprapubic pain better after kiran removal yesterday.  Few clots passing in urine, but PVR small.  No fevers.  Ate 100% of low fiber diet and has  "a little non-specific abdominal discomfort after this.                  Physical Exam:      Last Vital Signs:  /67 (BP Location: Right arm)  Pulse 80  Temp 97  F (36.1  C) (Axillary)  Resp 16  Ht 1.702 m (5' 7.01\")  Wt 104.7 kg (230 lb 12.8 oz)  SpO2 95%  BMI 36.14 kg/m2    Intake/Output Summary (Last 24 hours) at 05/27/17 1430  Last data filed at 05/27/17 1230   Gross per 24 hour   Intake              840 ml   Output             1400 ml   Net             -560 ml     Constitutional: Awake, NAD  Eyes: sclera white   HEENT:  MMM  Respiratory:   lungs cta bilaterally, no crackles or wheeze  Cardiovascular: RRR.  Systolic murmur 1/6  GI: mildly distended, not really tender to palpation.  BS present.  Midline incision c/d/i  Genitourinary: kiran draining yellow urine  Skin: chronic venous stasis changes to bilateral LE's  Musculoskeletal/extremities: 3+ bilateral LE pitting edema, looser skin today  Neurologic: alert, moves all extremities  Psychiatric: calm          Medications:      All current medications were reviewed with changes reflected in problem list.         Data:      All new lab and imaging data was reviewed.   Labs:      Recent Labs  Lab 05/27/17  0659 05/26/17  0715 05/25/17  0815    135 137   POTASSIUM 4.4 4.4 4.5   CHLORIDE 97 95 97   CO2 36* 41* 36*   ANIONGAP 4 <1* 4   * 108* 181*   BUN 17 20 17   CR 0.85 0.97 0.80   GFRESTIMATED 85 73 >90Non  GFR Calc   GFRESTBLACK >90African American GFR Calc 88 >90African American GFR Calc   JANN 8.9 8.5 8.9       Recent Labs  Lab 05/27/17  0659 05/26/17  0715 05/25/17  0815   WBC 6.2 5.9 6.9   HGB 8.3* 8.0* 8.2*   HCT 29.6* 28.3* 30.1*   MCV 84 83 84    249 248       Recent Labs  Lab 05/27/17  0659 05/26/17  0715 05/25/17  0815   INR 1.81* 1.47* 1.35*      Imaging:   Recent Results (from the past 24 hour(s))   XR Chest Port 1 View    Narrative    XR CHEST PORT 1 VW 5/27/2017 9:27 AM    COMPARISON: " 5/15/2017    HISTORY: Persistent hypoxia.      Impression    IMPRESSION: Enlarged cardiac silhouette is again seen and unchanged.  Median sternotomy wires appear intact. Minimal by basilar atelectasis.  No appreciable pulmonary edema. No pneumothorax.    TG Serrano MD

## 2017-05-27 NOTE — PROGRESS NOTES
"[facility]   General Surgery Progress Note         Assessment and Plan:   Assessment:   POD#13 s/p ex lap for apparent closed loop SBO, negative findings.  H/o Afib  Ileus as expected-slowly resolving      Plan:   -Diet: low fiber diet   -Pain control:  PO Tylenol.  Add Simethicone for gas pains.  -GI prophylaxis:  Protonix    -VTE prophylaxis: PCDs, coumadin  -doing well from surgical perspective.  DC to TCU when ok with hospitalist.  -no f/u required with surgery         Interval History:   Up in chair, c/o gas pains. Tolerating diet, voiding, +BM X 2 yesterday per notes.         Physical Exam:   Blood pressure 122/61, pulse 80, temperature 97  F (36.1  C), temperature source Axillary, resp. rate 16, height 1.702 m (5' 7.01\"), weight 104.7 kg (230 lb 12.8 oz), SpO2 98 %.    I/O last 3 completed shifts:  In: 840 [P.O.:840]  Out: 2700 [Urine:2700]    Abdomen:   Soft, obese/distended, non-tender, + bowel sounds   Inc(s) - clean, dry, intact.  Staples removed and steri strips placed.          Data:       Recent Labs  Lab 05/27/17  0659 05/26/17  0715 05/25/17  0815   WBC 6.2 5.9 6.9   HGB 8.3* 8.0* 8.2*   HCT 29.6* 28.3* 30.1*   MCV 84 83 84    249 248         Adri Thrasher PA-C     Seen and agree,    Kayode Christianson MD  [facility]    "

## 2017-05-27 NOTE — PROGRESS NOTES
SWS:    D:  Continue to assist with d/c planning    I/A:  Chart reviewed. Spoke with pt and his wife regarding the co-pay of 140.00 per day after 20 days of TCU.  Pt's wife is hoping no more than 20 days is needed.  Pt will also need transportation at time of d/c and is aware of the additional cost.      P:  [facility]    PAS completed  May 27th, 2017 at 02:36:13 PM CDT. The confirmation number is [id number]

## 2017-05-27 NOTE — PLAN OF CARE
Problem: Goal Outcome Summary  Goal: Goal Outcome Summary  Outcome: Improving  Patient remains hospitalized following exp lap, currently POD #12. Pain controlled with Tylenol. Main complaint of pain was regarding Kelly catheter - removed, and patient has been able to void independently with small PVR. PO Lasix continues. Bowel sounds hypoactive. Passing gas. Had a couple BMs this shift. Tolerating low fiber diet, though appetite minimal. Denies nausea. Midline incision CDI. Continues to require 1-2 L of O2 to keep sats >90%. BGs WDL. Heat rash on back - complains of itching - Cortisone cream ordered, and Benadryl given with minimal relief. Ambulating with assist of one and walker. PT and SW following for discharge plans.

## 2017-05-27 NOTE — PHARMACY-ANTICOAGULATION SERVICE
05/27/17.  Pharmacy to dose warfarin for atrial fibrillation.  Goal INR 2-3.  Home regimen 4mg po qday.   INR (5/27/17) = 1.81; Warfarin dose = 6mg PO X1 dose. Ananth.

## 2017-05-27 NOTE — PLAN OF CARE
Problem: Goal Outcome Summary  Goal: Goal Outcome Summary  Outcome: No Change  Postop day 13 for exp lap. Up with assist x 1 with walker. PIV saline locked. Vital signs stable on 1-2 L NC. . Voiding without difficulty, incont x 1, PVR 0. Tylenol x 2 and Benadryl x 2 this shift. BS hypo, midline incision JACKY. Currently up in chair.

## 2017-05-28 VITALS
OXYGEN SATURATION: 96 % | BODY MASS INDEX: 34.36 KG/M2 | DIASTOLIC BLOOD PRESSURE: 69 MMHG | HEIGHT: 67 IN | RESPIRATION RATE: 20 BRPM | SYSTOLIC BLOOD PRESSURE: 113 MMHG | HEART RATE: 79 BPM | WEIGHT: 218.9 LBS | TEMPERATURE: 97.7 F

## 2017-05-28 LAB
ANION GAP SERPL CALCULATED.3IONS-SCNC: 4 MMOL/L (ref 3–14)
BUN SERPL-MCNC: 19 MG/DL (ref 7–30)
CALCIUM SERPL-MCNC: 8.7 MG/DL (ref 8.5–10.1)
CHLORIDE SERPL-SCNC: 95 MMOL/L (ref 94–109)
CO2 SERPL-SCNC: 37 MMOL/L (ref 20–32)
CREAT SERPL-MCNC: 0.98 MG/DL (ref 0.66–1.25)
GFR SERPL CREATININE-BSD FRML MDRD: 72 ML/MIN/1.7M2
GLUCOSE BLDC GLUCOMTR-MCNC: 162 MG/DL (ref 70–99)
GLUCOSE BLDC GLUCOMTR-MCNC: 210 MG/DL (ref 70–99)
GLUCOSE SERPL-MCNC: 128 MG/DL (ref 70–99)
INR PPP: 2.01 (ref 0.86–1.14)
MAGNESIUM SERPL-MCNC: 2.3 MG/DL (ref 1.6–2.3)
PLATELET # BLD AUTO: 253 10E9/L (ref 150–450)
POTASSIUM SERPL-SCNC: 4.2 MMOL/L (ref 3.4–5.3)
SODIUM SERPL-SCNC: 136 MMOL/L (ref 133–144)

## 2017-05-28 PROCEDURE — 25000128 H RX IP 250 OP 636: Performed by: INTERNAL MEDICINE

## 2017-05-28 PROCEDURE — 25000132 ZZH RX MED GY IP 250 OP 250 PS 637: Mod: GY | Performed by: INTERNAL MEDICINE

## 2017-05-28 PROCEDURE — 36415 COLL VENOUS BLD VENIPUNCTURE: CPT | Performed by: HOSPITALIST

## 2017-05-28 PROCEDURE — 85049 AUTOMATED PLATELET COUNT: CPT | Performed by: HOSPITALIST

## 2017-05-28 PROCEDURE — 00000146 ZZHCL STATISTIC GLUCOSE BY METER IP

## 2017-05-28 PROCEDURE — 80048 BASIC METABOLIC PNL TOTAL CA: CPT | Performed by: HOSPITALIST

## 2017-05-28 PROCEDURE — A9270 NON-COVERED ITEM OR SERVICE: HCPCS | Mod: GY | Performed by: INTERNAL MEDICINE

## 2017-05-28 PROCEDURE — 83735 ASSAY OF MAGNESIUM: CPT | Performed by: HOSPITALIST

## 2017-05-28 PROCEDURE — 99239 HOSP IP/OBS DSCHRG MGMT >30: CPT | Performed by: INTERNAL MEDICINE

## 2017-05-28 PROCEDURE — 85610 PROTHROMBIN TIME: CPT | Performed by: HOSPITALIST

## 2017-05-28 PROCEDURE — A9270 NON-COVERED ITEM OR SERVICE: HCPCS | Mod: GY | Performed by: PHYSICIAN ASSISTANT

## 2017-05-28 PROCEDURE — 25000132 ZZH RX MED GY IP 250 OP 250 PS 637: Mod: GY | Performed by: PHYSICIAN ASSISTANT

## 2017-05-28 RX ORDER — LEVOTHYROXINE SODIUM 100 UG/1
100 TABLET ORAL DAILY
Qty: 30 TABLET | DISCHARGE
Start: 2017-05-28

## 2017-05-28 RX ORDER — FUROSEMIDE 20 MG
60 TABLET ORAL EVERY MORNING
Qty: 30 TABLET | Status: ON HOLD | DISCHARGE
Start: 2017-05-28 | End: 2018-08-01

## 2017-05-28 RX ORDER — LISINOPRIL 5 MG/1
5 TABLET ORAL DAILY
Status: ON HOLD | DISCHARGE
Start: 2017-05-28 | End: 2018-09-26

## 2017-05-28 RX ORDER — SIMVASTATIN 10 MG
10 TABLET ORAL AT BEDTIME
Qty: 30 TABLET | Status: ON HOLD | DISCHARGE
Start: 2017-05-28 | End: 2018-08-01

## 2017-05-28 RX ORDER — FERROUS GLUCONATE 324(38)MG
324 TABLET ORAL
Qty: 100 TABLET | DISCHARGE
Start: 2017-05-28 | End: 2019-01-01

## 2017-05-28 RX ORDER — FUROSEMIDE 20 MG
30 TABLET ORAL
Qty: 30 TABLET | Status: ON HOLD | DISCHARGE
Start: 2017-05-28 | End: 2018-04-14

## 2017-05-28 RX ORDER — BENZOCAINE/MENTHOL 6 MG-10 MG
LOZENGE MUCOUS MEMBRANE
DISCHARGE
Start: 2017-05-28 | End: 2018-04-03

## 2017-05-28 RX ORDER — GLIPIZIDE 5 MG/1
5 TABLET ORAL
Qty: 30 TABLET | DISCHARGE
Start: 2017-05-28 | End: 2019-01-01

## 2017-05-28 RX ORDER — SIMETHICONE 80 MG
160 TABLET,CHEWABLE ORAL EVERY 6 HOURS PRN
Qty: 180 TABLET | DISCHARGE
Start: 2017-05-28

## 2017-05-28 RX ORDER — WARFARIN SODIUM 4 MG/1
6 TABLET ORAL EVERY EVENING
Qty: 30 TABLET | DISCHARGE
Start: 2017-05-28 | End: 2017-05-28

## 2017-05-28 RX ORDER — TAMSULOSIN HYDROCHLORIDE 0.4 MG/1
0.4 CAPSULE ORAL EVERY EVENING
Qty: 60 CAPSULE | Status: ON HOLD | DISCHARGE
Start: 2017-05-28 | End: 2018-08-01

## 2017-05-28 RX ORDER — NITROGLYCERIN 0.4 MG/1
0.4 TABLET SUBLINGUAL EVERY 5 MIN PRN
Qty: 25 TABLET | Status: ON HOLD | DISCHARGE
Start: 2017-05-28 | End: 2018-08-01

## 2017-05-28 RX ORDER — WARFARIN SODIUM 4 MG/1
TABLET ORAL
Qty: 30 TABLET | DISCHARGE
Start: 2017-05-28 | End: 2018-04-03

## 2017-05-28 RX ORDER — GABAPENTIN 100 MG/1
200 CAPSULE ORAL AT BEDTIME
Qty: 90 CAPSULE | DISCHARGE
Start: 2017-05-28 | End: 2018-04-03

## 2017-05-28 RX ORDER — METOPROLOL TARTRATE 50 MG
50 TABLET ORAL 2 TIMES DAILY
Qty: 60 TABLET | DISCHARGE
Start: 2017-05-28 | End: 2018-04-03

## 2017-05-28 RX ORDER — I-VITE, TAB 1000-60-2MG (60/BT) 300MCG-200
1 TAB ORAL DAILY
Qty: 30 TABLET | Refills: 0 | Status: ON HOLD | DISCHARGE
Start: 2017-05-28 | End: 2018-08-01

## 2017-05-28 RX ORDER — ACETAMINOPHEN 500 MG
1000 TABLET ORAL EVERY 6 HOURS PRN
DISCHARGE
Start: 2017-05-28 | End: 2018-04-03

## 2017-05-28 RX ORDER — POLYETHYLENE GLYCOL 3350 17 G/17G
1 POWDER, FOR SOLUTION ORAL DAILY PRN
Qty: 119 G | DISCHARGE
Start: 2017-05-28 | End: 2018-04-03

## 2017-05-28 RX ADMIN — ENOXAPARIN SODIUM 100 MG: 100 INJECTION SUBCUTANEOUS at 13:49

## 2017-05-28 RX ADMIN — METOPROLOL TARTRATE 50 MG: 50 TABLET, FILM COATED ORAL at 08:24

## 2017-05-28 RX ADMIN — FERROUS GLUCONATE 324 MG: 324 TABLET ORAL at 08:23

## 2017-05-28 RX ADMIN — HYDROCORTISONE: 1 CREAM TOPICAL at 09:17

## 2017-05-28 RX ADMIN — LEVOTHYROXINE SODIUM 100 MCG: 100 TABLET ORAL at 06:46

## 2017-05-28 RX ADMIN — PANTOPRAZOLE SODIUM 40 MG: 40 TABLET, DELAYED RELEASE ORAL at 08:23

## 2017-05-28 RX ADMIN — SIMETHICONE CHEW TAB 80 MG 160 MG: 80 TABLET ORAL at 08:22

## 2017-05-28 RX ADMIN — GLIPIZIDE 5 MG: 5 TABLET ORAL at 08:22

## 2017-05-28 NOTE — PLAN OF CARE
Problem: Goal Outcome Summary  Goal: Goal Outcome Summary  DC Planner PT   Patient plan for D/C: TCU                     Current status: Pt amb 15' x 2 with ww with min assist. Vcs to for direction.  Not met  Pt transfers sit to stand with mod - min assist of 2 and min assist of 2 for stand to sit. Pt transfers chair to bathroom to bed with ww with min assist. not met Pt transfer sit to sidelying to supine with max - mod assist of 2. Not met  Barriers to return to prior living situation:  None - TCU  Recommendations for D/C:  TCU  Rationale for D/C recommendations:  Decrease mobility with max  - mod assist required for transfers and min assist for amb with wheeled walker     PT - Pt discharging to TCU today with goals not met.  Please refer to discharge summary.              Entered by: Belle Aj 05/28/2017 1:19 PM

## 2017-05-28 NOTE — PHARMACY-ANTICOAGULATION SERVICE
.Clinical Pharmacy- Warfarin Discharge Note  This patient is currently on warfarin and bridging with enoxaparin until INR Goal= 2-3 is achieved.  Patient has hx of AF with presently controlled HR.    Expected length of therapy lifetime.    Anticoagulation Dose History     Recent Dosing and Labs Latest Ref Rng & Units 5/22/2017 5/23/2017 5/24/2017 5/25/2017 5/26/2017 5/27/2017 5/28/2017    Warfarin 2 mg - 2 mg - - - - - -    Warfarin 3 mg - - - - 6 mg 6 mg 6 mg -    Warfarin 4 mg - - 4 mg 4 mg - - - -    INR 0.86 - 1.14 2.18(H) 1.56(H) 1.41(H) 1.35(H) 1.47(H) 1.81(H) 2.01(H)          Patient was stabilized prior to hospitalization at 4mg daily.  Warfarin was held during hospitalization and Vitamin K given approx 2 weeks ago.  Patient is now achieving therapeutic INRs.  Would recommend alternating 4mg/6mg every other day as to avoid overshooting INR goal.  Tonight a 4mg dose would be appropriate.   Should not be necessary to discharge patient on enoxaparin as he is therapeutic with his INR and is displaying no signs of active AF.   Recommend discharging the patient on a warfarin regimen of 4mg/6mg alternating every other day with follow up INR draw in a few days.

## 2017-05-28 NOTE — PLAN OF CARE
Problem: Goal Outcome Summary  Goal: Goal Outcome Summary  Outcome: Adequate for Discharge Date Met:  05/28/17  Patient alert to self only.  A-1 w/ gait belt and walker.  Voiding well.  Ate 75% breakfast.  Cont on 2 L NC.  Steri stips CDI.  [facility] to transport patient to TCU at 1330.  Discussed d/c plans w/ patient and verbalized understanding.

## 2017-05-28 NOTE — PROGRESS NOTES
SWS:    Pt d/c today to [facility] with [facility] to transport by .  Writer will send O2 tank with pt for transportation.  [facility] will be here at 1:45.  [facility] to fax d/c orders.  Charge RN and AVC updated.

## 2017-05-28 NOTE — PLAN OF CARE
Problem: Goal Outcome Summary  Goal: Goal Outcome Summary  Outcome: Improving  Postop day 14 exp lap. Vital signs stable, continues on 2L NC, . Voiding okay, incont at times. Loose stool x 2. Midline incision intact with steri strips. Rash on back. Up with assist x 1 with walker. PIV saline locked. Wt decrease of 12 lbs since yesterday, passed along to oncoming RN. Possible d/c today to TCU.

## 2017-05-28 NOTE — DISCHARGE SUMMARY
[facility]  Discharge Summary  Name: Ton Bagley    MRN: [id number]  YOB: 1927    Age: 89 year old  Date of Discharge:  5/28/2017  Date of Admission: 5/13/2017  Primary Care Provider: Jose Shell  Discharge Physician:  Nirmal Serrano MD  Discharging Service:  Hospitalist      Discharge Diagnoses:  SBO and ileus  Chronic lymphedema  Type II DM  Dementia  Physical deconditioning  CAD w/ hx of cabg and stent  Afib  HTN  HLD  Hypothyroidism  Urinary retention  Acute on chronic anemia     Hospital Course:  Summary of Stay: Ton Bagley is a 89 year old male w/ hx of HTN, HLD, DM2, afib, cad, hypothyroidism, and dementia admitted on 5/13/2017 with abd pain and CT findings concerning for possible SBO.  Underwent ex-lap on 5/14 and no SBO was found.  Now with persistent ileus that is slow to resolve.  Was on PPN, but weaned off as oral nutrition improving.  Has had some BMs.  Advanced to low fiber diet per surgery service, however his intake has not been very much.  His warfarin and other home medications have been resumed.  His hemoglobin is slowly drifting, but no sign of acute bleeding.  Added oral iron.  Added back glipizide as blood sugars rising.  Has been seen by PT and OT and he will need TCU upon discharge.  He is very edematous so provided IV diuresis.  Off PPN now and tolerating low fiber diet with improving nutrition.  Kelly now out and voiding without retention.  Transitioned to oral diuretic.  INR therapeutic today on warfarin.  D/c to TCU today.     Problem List/Assessment and Plan:   Ileus: S/P exploratory laparotomy by General Surgery on 5/14 for initial concerns about possible SBO; found to have ileus. NGT removed 5/18. Continues to have mild abd distention.  He is having BMs. Mild abdominal discomfort after eating 100% of his low fiber meal.  Stopped narcotics (had been receiving MSO4) to minimize worsening ileus. cdiff neg. Staples removed.  No  specific follow up need for this per surgery service.  Recommend dietician consult.     Type II DM:  Resumed glipizide at 5mg bid as po intake has improved. May need titration or additional agent as nutrition improves.     Chronic lymphedema:  2-3+ bilateral LE edema.  Chronically on lasix 60mg am and 30mg pm.  Used IV diuresis here and weight down 12 pounds from admit to 218.  Clearly still has quite a bit of edema to improve on and will continue lasix 60mg am and 30mg afternoon.  Should have BMP in 2 days.     HTN:  Pta on atenolol 50mg daily, lasix 60mg am/ 30mg pm, lisinopril 10mg daily.  Stopped atenolol in favor of metoprolol 50 mg p.o.  Restarting lisinopril at 5mg daily.     Hyperlipidemia:  Continue Zocor     Hypothyroidism: Continue po synthroid     A fib:  Rate controlled pta on atenolol.  Changed to metoprolol here for improved BP control.  Resumed Coumadin per pharmacy with goal INR of 2-3. INR 2.1 on day of discharge.  Will recommend starting with alternating 4mg and 6mg daily with 4mg tonight.  Should have INR in 2 days.       CAD:  S/p cabg and stent x 2. No chest pain currently.  pta on warfarin, statin, ACEI, and BB.     Dementia: needs frequent reorientation.  Will need fall precautions.    Physical deconditioning:  Prolonged recovery from ex lap.  Will need ongoing PT and OT.     PRADEEP:  Present on admission, suspect pre-renal hypovolemia due to SBO.  Creatinine down to likely baseline of 0.8-1.     Bronchospasm:  Resolved.       Urinary retention: failed voiding trial 5/20 so kiran replaced due to retention.  Having some bladder spasms and mild blood from tip of penis so kiran removed 5/26.  Now voiding with only small PVR.  Passing a couple of clots, which is not unexpected.  Recommend bladder scans prn for now and PVR measurement.     Acute on chronic anemia: previous hg in this system was 10 in 2016.  Presented with hg 10 here that has slowly drifted to 8.7 following surgery.  Likely nutrition  component as well.  Borderline microcytic so check iron studies and he does appear to have some iron deficiency.   No sign of bleeding now.  Will need to follow Hg as he is on warfarin, no indication for transfusion now.  Optimize nutrition as able.  Started oral iron daily.     Discharge Disposition:  Discharged to rehabilitation facility     Allergies:  No Known Allergies     Discharge Medications:   Current Discharge Medication List      START taking these medications    Details   acetaminophen (TYLENOL) 500 MG tablet Take 2 tablets (1,000 mg) by mouth every 6 hours as needed for mild pain or fever    Associated Diagnoses: Abdominal pain, generalized      metoprolol (LOPRESSOR) 50 MG tablet Take 1 tablet (50 mg) by mouth 2 times daily  Qty: 60 tablet    Associated Diagnoses: Essential hypertension      hydrocortisone (CORTAID) 1 % cream Apply to rash on back twice daily until resolved    Associated Diagnoses: Eczema, unspecified type      ferrous gluconate (FERGON) 324 (38 FE) MG tablet Take 1 tablet (324 mg) by mouth daily (with breakfast)  Qty: 100 tablet    Associated Diagnoses: Iron deficiency anemia, unspecified iron deficiency anemia type      simethicone (MYLICON) 80 MG chewable tablet Take 2 tablets (160 mg) by mouth every 6 hours as needed for flatulence or cramping  Qty: 180 tablet    Associated Diagnoses: Abdominal pain, generalized         CONTINUE these medications which have CHANGED    Details   glipiZIDE (GLUCOTROL) 5 MG tablet Take 1 tablet (5 mg) by mouth 2 times daily (before meals)  Qty: 30 tablet    Associated Diagnoses: Type 2 diabetes mellitus with complication, without long-term current use of insulin (H)      lisinopril (PRINIVIL/ZESTRIL) 5 MG tablet Take 1 tablet (5 mg) by mouth daily    Associated Diagnoses: Essential hypertension      polyethylene glycol (MIRALAX/GLYCOLAX) powder Take 17 g (1 capful) by mouth daily as needed for constipation  Qty: 119 g    Associated Diagnoses:  "Constipation, unspecified constipation type      warfarin (COUMADIN) 4 MG tablet Alternate 4mg and 6mg daily starting with 4mg tonight, then 6mg tomorrow night  Qty: 30 tablet    Associated Diagnoses: Atrial fibrillation, unspecified type (H)         CONTINUE these medications which have NOT CHANGED    Details   !! furosemide (LASIX) 20 MG tablet Take 60 mg by mouth every morning      !! furosemide (LASIX) 20 MG tablet Take 30 mg by mouth daily (with lunch)      multivitamin (I-REBEL) TABS per tablet Take 1 tablet by mouth daily      omeprazole (PRILOSEC) 20 MG CR capsule Take 20 mg by mouth daily      Calcium Carb-Cholecalciferol (CALCIUM 500+D) 500-200 MG-UNIT TABS Take 1 tablet by mouth 2 times daily      camphor-menthol (DERMASARRA) 0.5-0.5 % LOTN Apply topically daily To affected areas on both legs      gabapentin (NEURONTIN) 100 MG capsule Take 200 mg by mouth At Bedtime      simvastatin (ZOCOR) 10 MG tablet Take 10 mg by mouth At Bedtime      levothyroxine (SYNTHROID,LEVOTHROID) 100 MCG tablet Take 100 mcg by mouth daily      tamsulosin (FLOMAX) 0.4 MG 24 hr capsule Take 0.4 mg by mouth every evening       nitroglycerin (NITROSTAT) 0.4 MG SL tablet Place under the tongue every 5 minutes as needed for chest pain       !! - Potential duplicate medications found. Please discuss with provider.      STOP taking these medications       senna-docusate (SENOKOT-S;PERICOLACE) 8.6-50 MG per tablet Comments:   Reason for Stopping:         atenolol (TENORMIN) 50 MG tablet Comments:   Reason for Stopping:                Condition on Discharge:  Discharge condition: Stable   Discharge vitals: Blood pressure 113/69, pulse 79, temperature 97.7  F (36.5  C), temperature source Oral, resp. rate 20, height 1.702 m (5' 7.01\"), weight 99.3 kg (218 lb 14.4 oz), SpO2 96 %.   Code status on discharge: DNR / DNI     History of Illness:  See detailed admission note for full details.    Physical Exam:  Blood pressure 113/69, pulse 79, " "temperature 97.7  F (36.5  C), temperature source Oral, resp. rate 20, height 1.702 m (5' 7.01\"), weight 99.3 kg (218 lb 14.4 oz), SpO2 96 %.  Wt Readings from Last 1 Encounters:   05/28/17 99.3 kg (218 lb 14.4 oz)     Constitutional: Awake, NAD  Eyes: sclera white   HEENT:  MMM  Respiratory: no respiratory distress, lungs cta bilaterally, no crackles or wheeze  Cardiovascular: RRR.  Systolic murmur 1/6  GI: non-tender, mildly distended, non-tender to palpation, bowel sounds present  Skin: chronic venous stasis changes LE bilat  Musculoskeletal/extremities: 2+ bilateral LE edema  Neurologic: Alert, oriented to being in a hospital  Psychiatric: calm, cooperative     Procedures other than Imaging:  Exploratory laparotomy      Imaging:  Results for orders placed or performed during the hospital encounter of 05/13/17   CT Abdomen Pelvis w Contrast    Narrative    CT ABDOMEN PELVIS WITH CONTRAST 5/13/2017 8:27 PM    TECHNIQUE: Images from diaphragm to pubic symphysis. 100 mL Isovue-370  IV contrast. Radiation dose for this scan was reduced using automated  exposure control, adjustment of the mA and/or kV according to patient  size, or iterative reconstruction technique.    HISTORY: RLQ abdominal pain.    COMPARISON: None.    FINDINGS:   Abdomen and Pelvis: Multiple distended fluid-filled small bowel loops  compatible with obstruction. There are several centimeters of  decompressed distal ileum. Mesenteric edema with small amount of fluid  and fat stranding identified. The ascending colon is also mildly  distended and fluid-filled, gas within transverse colon with  transition at the splenic flexure to decompressed descending colon.  The descending and rectosigmoid colon are decompressed. Small free  fluid. There is some swirling of mesenteric vessels and fat in the  midabdomen. Appendix not identified, clips near the cecum suggests  prior appendectomy.    Lung bases: Calcified right lower lobe granuloma, sternotomy " wires,  coronary artery calcifications.     Cholecystectomy clips. Fatty liver. Indeterminate oval hypodense  lesion in the liver just superior to the cholecystectomy clips 2.2 cm  in diameter on image 23.    Normal-appearing spleen, pancreas, and adrenal glands. Multiple  bilateral renal cysts including 6.7 cm cyst upper left kidney.  Nonobstructing 0.4 cm left kidney stone.    Extensive vascular calcification. No periaortic or pelvic adenopathy  or free fluid.      Impression    IMPRESSION:  1.  Distended small bowel consistent with obstruction, several  centimeters of decompressed distal and terminal ileum. The ascending  colon is also fluid-filled with gas filled transverse colon. Question  of closed loop obstruction is raised. Discussed with Dr. Pepper.  2. Indeterminate 2.2 cm oval hypodense lesion in the liver just  cephalad to the cholecystectomy clips.  3. Small free fluid. Mesenteric stranding and fluid adjacent to the  distended bowel loops.    JAIR SOUZA MD   XR Abdomen 2 Views    Narrative    ABDOMEN TWO VIEWS 5/14/2017 8:33 AM     HISTORY: Follow-up small bowel obstruction.    COMPARISON: CT scan yesterday.      Impression    IMPRESSION: There is marked dilatation of small bowel throughout the  abdomen and pelvis filled with gas. The degree of distention is  slightly worse than on the previous CT scan comparing with the  previous digital  radiograph. No free air. Nasogastric tube in  the stomach. Stool in the left colon. Contrast material in the urinary  bladder from the CT scan.    MARILUZ COUGHLIN MD   XR Chest Port 1 View    Narrative    XR CHEST PORT 1 VW 5/15/2017 12:58 PM    HISTORY: Hypoxia.    COMPARISON: 8/22/2007    FINDINGS: Low lung volume. No lobar consolidation, chavez pleural  effusion or pneumothorax. Chronic moderate cardiomegaly. Mediastinal  hardware appears stable.      Impression    IMPRESSION: Low lung volume without acute pulmonary abnormality.    MARILEE ESPINOZA MD   XR  Chest Port 1 View    Narrative    XR CHEST PORT 1 VW 5/27/2017 9:27 AM    COMPARISON: 5/15/2017    HISTORY: Persistent hypoxia.      Impression    IMPRESSION: Enlarged cardiac silhouette is again seen and unchanged.  Median sternotomy wires appear intact. Minimal by basilar atelectasis.  No appreciable pulmonary edema. No pneumothorax.    TG MEHTA        Consultations:  Consultation during this admission received from general surgery, PT, OT, dietician.       Recent Lab Results:    Recent Labs  Lab 05/28/17  0633 05/27/17  0659 05/26/17  0715 05/25/17  0815   WBC  --  6.2 5.9 6.9   HGB  --  8.3* 8.0* 8.2*   HCT  --  29.6* 28.3* 30.1*   MCV  --  84 83 84    283 249 248     No results for input(s): CULT in the last 168 hours.    Recent Labs  Lab 05/28/17  0633 05/27/17  0659 05/26/17  0715 05/25/17  0815 05/24/17  0738  05/22/17  0646    137 135 137 135  < > 140   POTASSIUM 4.2 4.4 4.4 4.5 4.5  < > 3.2*   CHLORIDE 95 97 95 97 99  < > 104   CO2 37* 36* 41* 36* 31  < > 30   ANIONGAP 4 4 <1* 4 5  < > 6   * 103* 108* 181* 177*  < > 143*   BUN 19 17 20 17 13  < > 12   CR 0.98 0.85 0.97 0.80 0.67  < > 0.84   GFRESTIMATED 72 85 73 >90Non  GFR Calc >90Non  GFR Calc  < > 86   GFRESTBLACK 87 >90African American GFR Calc 88 >90African American GFR Calc >90African American GFR Calc  < > >90African American GFR Calc   JANN 8.7 8.9 8.5 8.9 8.6  < > 8.1*   MAG 2.3  --  2.4* 2.6* 2.3  < > 2.3   PHOS  --   --  3.9 3.5 2.8  < > 2.3*   PROTTOTAL  --   --   --   --   --   --  5.9*   ALBUMIN  --   --   --   --   --   --  2.7*   BILITOTAL  --   --   --   --   --   --  0.4   ALKPHOS  --   --   --   --   --   --  66   AST  --   --   --   --   --   --  28   ALT  --   --   --   --   --   --  25   < > = values in this interval not displayed.    Recent Labs  Lab 05/28/17  0633 05/27/17  0659 05/26/17  0715   INR 2.01* 1.81* 1.47*          Pending Results:    Unresulted Labs Ordered in the  Past 30 Days of this Admission     No orders found from 3/14/2017 to 5/14/2017.         These results will be followed up by patient's primary care provider.    Discharge Instructions and Follow-Up:     Discharge Procedure Orders  General info for SNF   Order Comments: Length of Stay Estimate: Short Term Care: Estimated # of Days 31-90  Condition at Discharge: Stable  Level of care:skilled   Rehabilitation Potential: Fair  Admission H&P remains valid and up-to-date: Yes  Recent Chemotherapy: N/A  Use Nursing Home Standing Orders: Yes     Mantoux instructions   Order Comments: Give two-step Mantoux (PPD) Per Facility Policy Yes     Reason for your hospital stay   Order Comments: You were hospitalized for a bowel obstruction and had an exploratory laparotomy.  No bowel needed to be removed.  After this you had an ileus where the gut was slow to move.  This has since resolved and you are tolerating a low fiber diet.  We also worked on fluid removal from your legs with lasix.  You will need on going PT and OT at the rehab facility.     Bladder scan   Order Comments: Bid prn if not able to void     Daily weights   Order Comments: Call Provider for weight gain of more than 2 pounds per day or 5 pounds per week.     Intake and output   Order Comments: Every shift     Glucose monitor nursing POCT   Order Comments: Before meals and at bedtime     Follow Up and recommended labs and tests   Order Comments: Follow up with intermediate physician.  The following labs/tests are recommended: INR and a bmp in 2 days.     Activity - Up with nursing assistance   Order Specific Question Answer Comments   Is discharge order? Yes      Incentive spirometry nursing   Order Comments: Continue incentive spirometry at TCU for atelectasis with goal to wean O2 to off     DNR/DNI     Physical Therapy Adult Consult   Order Comments: Evaluate and treat as clinically indicated.    Reason:  Deconditioning and dementia     Occupational Therapy Adult  Consult   Order Comments: Evaluate and treat as clinically indicated.    Reason:  Deconditioning and dementia     Nutrition Services Adult IP Consult   Order Comments: Reason:  Recent SBO s/p ex lap.  Was on PPN after this, but now low fiber diet.  Monitor caloric intake to make sure meeting needs     Oxygen - Nasal cannula   Order Comments: 1-2 Lpm by nasal cannula to keep O2 sats 92% or greater.  Wean as able.     Fall precautions     Advance Diet as Tolerated   Order Comments: Follow this diet upon discharge: Orders Placed This Encounter     Snacks/Supplements Adult: Ensure Plus (Adult); Between Meals     Room Service     Snacks/Supplements Adult: Magic Cup; With Meals     Low Fiber Diet   Order Specific Question Answer Comments   Is discharge order? Yes          Recommendations:  -lasix 60mg am, 30mg afternoon  -I/Os and daily weight  -bmp in 2 days along with INR  -PT/OT  -low fiber diet and dietician consult recommended    Nirmal HEATON, personally saw the patient today and spent greater than 30 minutes discharging this patient.    Nirmal Serrano MD

## 2018-01-01 ENCOUNTER — RECORDS - HEALTHEAST (OUTPATIENT)
Dept: LAB | Facility: CLINIC | Age: 83
End: 2018-01-01

## 2018-01-01 LAB — INR PPP: 2.37 (ref 0.9–1.1)

## 2018-01-06 ENCOUNTER — RECORDS - HEALTHEAST (OUTPATIENT)
Dept: LAB | Facility: CLINIC | Age: 83
End: 2018-01-06

## 2018-01-08 ENCOUNTER — RECORDS - HEALTHEAST (OUTPATIENT)
Dept: LAB | Facility: CLINIC | Age: 83
End: 2018-01-08

## 2018-01-08 LAB
CREAT SERPL-MCNC: 1.04 MG/DL (ref 0.7–1.3)
GFR SERPL CREATININE-BSD FRML MDRD: >60 ML/MIN/1.73M2
INR PPP: 3.54 (ref 0.9–1.1)

## 2018-01-09 ENCOUNTER — RECORDS - HEALTHEAST (OUTPATIENT)
Dept: LAB | Facility: CLINIC | Age: 83
End: 2018-01-09

## 2018-01-09 LAB — INR PPP: 2.15 (ref 0.9–1.1)

## 2018-01-15 ENCOUNTER — RECORDS - HEALTHEAST (OUTPATIENT)
Dept: LAB | Facility: CLINIC | Age: 83
End: 2018-01-15

## 2018-01-16 LAB — INR PPP: 1.76 (ref 0.9–1.1)

## 2018-01-18 ENCOUNTER — RECORDS - HEALTHEAST (OUTPATIENT)
Dept: LAB | Facility: CLINIC | Age: 83
End: 2018-01-18

## 2018-01-18 LAB — INR PPP: 1.87 (ref 0.9–1.1)

## 2018-01-23 ENCOUNTER — RECORDS - HEALTHEAST (OUTPATIENT)
Dept: LAB | Facility: CLINIC | Age: 83
End: 2018-01-23

## 2018-01-23 LAB — INR PPP: 2.2 (ref 0.9–1.1)

## 2018-03-06 ENCOUNTER — RECORDS - HEALTHEAST (OUTPATIENT)
Dept: LAB | Facility: CLINIC | Age: 83
End: 2018-03-06

## 2018-03-06 LAB — INR PPP: 1.67 (ref 0.9–1.1)

## 2018-03-12 ENCOUNTER — RECORDS - HEALTHEAST (OUTPATIENT)
Dept: LAB | Facility: CLINIC | Age: 83
End: 2018-03-12

## 2018-03-12 LAB — INR PPP: 1.83 (ref 0.9–1.1)

## 2018-03-16 ENCOUNTER — RECORDS - HEALTHEAST (OUTPATIENT)
Dept: LAB | Facility: CLINIC | Age: 83
End: 2018-03-16

## 2018-03-19 LAB — INR PPP: 2.52 (ref 0.9–1.1)

## 2018-04-02 ENCOUNTER — RECORDS - HEALTHEAST (OUTPATIENT)
Dept: LAB | Facility: CLINIC | Age: 83
End: 2018-04-02

## 2018-04-02 LAB — INR PPP: 2.37 (ref 0.9–1.1)

## 2018-04-03 ENCOUNTER — APPOINTMENT (OUTPATIENT)
Dept: ULTRASOUND IMAGING | Facility: CLINIC | Age: 83
DRG: 871 | End: 2018-04-03
Attending: EMERGENCY MEDICINE
Payer: MEDICARE

## 2018-04-03 ENCOUNTER — APPOINTMENT (OUTPATIENT)
Dept: GENERAL RADIOLOGY | Facility: CLINIC | Age: 83
DRG: 871 | End: 2018-04-03
Attending: PHYSICIAN ASSISTANT
Payer: MEDICARE

## 2018-04-03 ENCOUNTER — APPOINTMENT (OUTPATIENT)
Dept: CT IMAGING | Facility: CLINIC | Age: 83
DRG: 871 | End: 2018-04-03
Attending: EMERGENCY MEDICINE
Payer: MEDICARE

## 2018-04-03 ENCOUNTER — HOSPITAL ENCOUNTER (INPATIENT)
Facility: CLINIC | Age: 83
LOS: 12 days | Discharge: NURSING, REHAB OR LONG-TERM CARE | DRG: 871 | End: 2018-04-15
Attending: EMERGENCY MEDICINE | Admitting: INTERNAL MEDICINE
Payer: MEDICARE

## 2018-04-03 DIAGNOSIS — A41.9 SEVERE SEPSIS (H): ICD-10-CM

## 2018-04-03 DIAGNOSIS — R78.81 GRAM-POSITIVE BACTEREMIA: Primary | ICD-10-CM

## 2018-04-03 DIAGNOSIS — R65.20 SEVERE SEPSIS (H): ICD-10-CM

## 2018-04-03 DIAGNOSIS — K29.00 OTHER ACUTE GASTRITIS WITHOUT HEMORRHAGE: ICD-10-CM

## 2018-04-03 DIAGNOSIS — I89.0 LYMPHEDEMA: ICD-10-CM

## 2018-04-03 LAB
ALBUMIN SERPL-MCNC: 3.3 G/DL (ref 3.4–5)
ALBUMIN UR-MCNC: 100 MG/DL
ALP SERPL-CCNC: 76 U/L (ref 40–150)
ALT SERPL W P-5'-P-CCNC: 30 U/L (ref 0–70)
ANION GAP SERPL CALCULATED.3IONS-SCNC: 6 MMOL/L (ref 3–14)
APPEARANCE UR: ABNORMAL
AST SERPL W P-5'-P-CCNC: 30 U/L (ref 0–45)
BACTERIA #/AREA URNS HPF: ABNORMAL /HPF
BASOPHILS # BLD AUTO: 0 10E9/L (ref 0–0.2)
BASOPHILS NFR BLD AUTO: 0.2 %
BILIRUB SERPL-MCNC: 1.7 MG/DL (ref 0.2–1.3)
BILIRUB UR QL STRIP: NEGATIVE
BUN SERPL-MCNC: 27 MG/DL (ref 7–30)
CALCIUM SERPL-MCNC: 9 MG/DL (ref 8.5–10.1)
CHLORIDE SERPL-SCNC: 102 MMOL/L (ref 94–109)
CO2 SERPL-SCNC: 31 MMOL/L (ref 20–32)
COLOR UR AUTO: YELLOW
CREAT BLD-MCNC: 1.7 MG/DL (ref 0.66–1.25)
CREAT SERPL-MCNC: 1.56 MG/DL (ref 0.66–1.25)
CRP SERPL-MCNC: 126 MG/L (ref 0–8)
DIFFERENTIAL METHOD BLD: ABNORMAL
EOSINOPHIL # BLD AUTO: 0 10E9/L (ref 0–0.7)
EOSINOPHIL NFR BLD AUTO: 0 %
ERYTHROCYTE [DISTWIDTH] IN BLOOD BY AUTOMATED COUNT: 13.2 % (ref 10–15)
GFR SERPL CREATININE-BSD FRML MDRD: 38 ML/MIN/1.7M2
GFR SERPL CREATININE-BSD FRML MDRD: 42 ML/MIN/1.7M2
GLUCOSE BLDC GLUCOMTR-MCNC: 135 MG/DL (ref 70–99)
GLUCOSE BLDC GLUCOMTR-MCNC: 175 MG/DL (ref 70–99)
GLUCOSE SERPL-MCNC: 165 MG/DL (ref 70–99)
GLUCOSE UR STRIP-MCNC: NEGATIVE MG/DL
HCT VFR BLD AUTO: 41.8 % (ref 40–53)
HGB BLD-MCNC: 13.3 G/DL (ref 13.3–17.7)
HGB UR QL STRIP: NEGATIVE
HYALINE CASTS #/AREA URNS LPF: 31 /LPF (ref 0–2)
IMM GRANULOCYTES # BLD: 0.3 10E9/L (ref 0–0.4)
IMM GRANULOCYTES NFR BLD: 1.1 %
INR PPP: 2.32 (ref 0.86–1.14)
INTERPRETATION ECG - MUSE: NORMAL
KETONES UR STRIP-MCNC: NEGATIVE MG/DL
LACTATE BLD-SCNC: 1.3 MMOL/L (ref 0.7–2)
LACTATE BLD-SCNC: 1.9 MMOL/L (ref 0.7–2)
LACTATE BLD-SCNC: 2.6 MMOL/L (ref 0.7–2)
LACTATE BLD-SCNC: 3.1 MMOL/L (ref 0.7–2)
LACTATE BLD-SCNC: 4.2 MMOL/L (ref 0.7–2)
LEUKOCYTE ESTERASE UR QL STRIP: ABNORMAL
LIPASE SERPL-CCNC: 66 U/L (ref 73–393)
LYMPHOCYTES # BLD AUTO: 0.6 10E9/L (ref 0.8–5.3)
LYMPHOCYTES NFR BLD AUTO: 2.8 %
MCH RBC QN AUTO: 32.1 PG (ref 26.5–33)
MCHC RBC AUTO-ENTMCNC: 31.8 G/DL (ref 31.5–36.5)
MCV RBC AUTO: 101 FL (ref 78–100)
MONOCYTES # BLD AUTO: 1 10E9/L (ref 0–1.3)
MONOCYTES NFR BLD AUTO: 4.6 %
MUCOUS THREADS #/AREA URNS LPF: PRESENT /LPF
NEUTROPHILS # BLD AUTO: 20.6 10E9/L (ref 1.6–8.3)
NEUTROPHILS NFR BLD AUTO: 91.3 %
NITRATE UR QL: NEGATIVE
NRBC # BLD AUTO: 0 10*3/UL
NRBC BLD AUTO-RTO: 0 /100
PH UR STRIP: 5 PH (ref 5–7)
PLATELET # BLD AUTO: 157 10E9/L (ref 150–450)
PLATELET # BLD AUTO: 179 10E9/L (ref 150–450)
POTASSIUM SERPL-SCNC: 3.9 MMOL/L (ref 3.4–5.3)
PROT SERPL-MCNC: 7.2 G/DL (ref 6.8–8.8)
RBC # BLD AUTO: 4.14 10E12/L (ref 4.4–5.9)
RBC #/AREA URNS AUTO: 4 /HPF (ref 0–2)
SODIUM SERPL-SCNC: 139 MMOL/L (ref 133–144)
SOURCE: ABNORMAL
SP GR UR STRIP: 1.02 (ref 1–1.03)
SQUAMOUS #/AREA URNS AUTO: <1 /HPF (ref 0–1)
TROPONIN I SERPL-MCNC: <0.015 UG/L (ref 0–0.04)
TROPONIN I SERPL-MCNC: <0.015 UG/L (ref 0–0.04)
UROBILINOGEN UR STRIP-MCNC: 0 MG/DL (ref 0–2)
WBC # BLD AUTO: 22.5 10E9/L (ref 4–11)
WBC #/AREA URNS AUTO: 11 /HPF (ref 0–5)

## 2018-04-03 PROCEDURE — 99285 EMERGENCY DEPT VISIT HI MDM: CPT | Mod: 25

## 2018-04-03 PROCEDURE — 87186 SC STD MICRODIL/AGAR DIL: CPT | Performed by: EMERGENCY MEDICINE

## 2018-04-03 PROCEDURE — A9270 NON-COVERED ITEM OR SERVICE: HCPCS | Mod: GY | Performed by: INTERNAL MEDICINE

## 2018-04-03 PROCEDURE — 71250 CT THORAX DX C-: CPT

## 2018-04-03 PROCEDURE — 25000128 H RX IP 250 OP 636: Performed by: INTERNAL MEDICINE

## 2018-04-03 PROCEDURE — 81001 URINALYSIS AUTO W/SCOPE: CPT | Performed by: EMERGENCY MEDICINE

## 2018-04-03 PROCEDURE — 96374 THER/PROPH/DIAG INJ IV PUSH: CPT | Mod: 59

## 2018-04-03 PROCEDURE — A9270 NON-COVERED ITEM OR SERVICE: HCPCS | Mod: GY | Performed by: PHYSICIAN ASSISTANT

## 2018-04-03 PROCEDURE — 84484 ASSAY OF TROPONIN QUANT: CPT | Performed by: PHYSICIAN ASSISTANT

## 2018-04-03 PROCEDURE — 96361 HYDRATE IV INFUSION ADD-ON: CPT

## 2018-04-03 PROCEDURE — 36415 COLL VENOUS BLD VENIPUNCTURE: CPT | Performed by: INTERNAL MEDICINE

## 2018-04-03 PROCEDURE — 00000146 ZZHCL STATISTIC GLUCOSE BY METER IP

## 2018-04-03 PROCEDURE — 83605 ASSAY OF LACTIC ACID: CPT | Performed by: INTERNAL MEDICINE

## 2018-04-03 PROCEDURE — 83605 ASSAY OF LACTIC ACID: CPT | Performed by: PHYSICIAN ASSISTANT

## 2018-04-03 PROCEDURE — 85049 AUTOMATED PLATELET COUNT: CPT | Performed by: PHYSICIAN ASSISTANT

## 2018-04-03 PROCEDURE — 12000007 ZZH R&B INTERMEDIATE

## 2018-04-03 PROCEDURE — 36415 COLL VENOUS BLD VENIPUNCTURE: CPT | Performed by: EMERGENCY MEDICINE

## 2018-04-03 PROCEDURE — 83605 ASSAY OF LACTIC ACID: CPT | Performed by: EMERGENCY MEDICINE

## 2018-04-03 PROCEDURE — 96375 TX/PRO/DX INJ NEW DRUG ADDON: CPT

## 2018-04-03 PROCEDURE — 87040 BLOOD CULTURE FOR BACTERIA: CPT | Performed by: EMERGENCY MEDICINE

## 2018-04-03 PROCEDURE — 25000132 ZZH RX MED GY IP 250 OP 250 PS 637: Mod: GY | Performed by: INTERNAL MEDICINE

## 2018-04-03 PROCEDURE — 25000132 ZZH RX MED GY IP 250 OP 250 PS 637: Mod: GY | Performed by: PHYSICIAN ASSISTANT

## 2018-04-03 PROCEDURE — 71046 X-RAY EXAM CHEST 2 VIEWS: CPT

## 2018-04-03 PROCEDURE — 93005 ELECTROCARDIOGRAM TRACING: CPT

## 2018-04-03 PROCEDURE — 99207 ZZC APP CREDIT; MD BILLING SHARED VISIT: CPT | Performed by: PHYSICIAN ASSISTANT

## 2018-04-03 PROCEDURE — 87077 CULTURE AEROBIC IDENTIFY: CPT | Performed by: EMERGENCY MEDICINE

## 2018-04-03 PROCEDURE — 93971 EXTREMITY STUDY: CPT | Mod: LT

## 2018-04-03 PROCEDURE — 70450 CT HEAD/BRAIN W/O DYE: CPT

## 2018-04-03 PROCEDURE — 82565 ASSAY OF CREATININE: CPT

## 2018-04-03 PROCEDURE — 25000128 H RX IP 250 OP 636: Performed by: EMERGENCY MEDICINE

## 2018-04-03 PROCEDURE — 25000128 H RX IP 250 OP 636: Performed by: PHYSICIAN ASSISTANT

## 2018-04-03 PROCEDURE — 87800 DETECT AGNT MULT DNA DIREC: CPT | Performed by: EMERGENCY MEDICINE

## 2018-04-03 PROCEDURE — 99223 1ST HOSP IP/OBS HIGH 75: CPT | Mod: AI | Performed by: INTERNAL MEDICINE

## 2018-04-03 PROCEDURE — 36415 COLL VENOUS BLD VENIPUNCTURE: CPT | Performed by: PHYSICIAN ASSISTANT

## 2018-04-03 RX ORDER — POLYETHYLENE GLYCOL 3350 17 G/17G
17 POWDER, FOR SOLUTION ORAL DAILY
Status: DISCONTINUED | OUTPATIENT
Start: 2018-04-04 | End: 2018-04-15 | Stop reason: HOSPADM

## 2018-04-03 RX ORDER — GLIPIZIDE 5 MG/1
5 TABLET ORAL
Status: DISCONTINUED | OUTPATIENT
Start: 2018-04-03 | End: 2018-04-15 | Stop reason: HOSPADM

## 2018-04-03 RX ORDER — GABAPENTIN 300 MG/1
300 CAPSULE ORAL 2 TIMES DAILY
Status: DISCONTINUED | OUTPATIENT
Start: 2018-04-03 | End: 2018-04-15 | Stop reason: HOSPADM

## 2018-04-03 RX ORDER — ONDANSETRON 2 MG/ML
4 INJECTION INTRAMUSCULAR; INTRAVENOUS EVERY 6 HOURS PRN
Status: DISCONTINUED | OUTPATIENT
Start: 2018-04-03 | End: 2018-04-15 | Stop reason: HOSPADM

## 2018-04-03 RX ORDER — TRIAMCINOLONE ACETONIDE 5 MG/G
CREAM TOPICAL
Status: ON HOLD | COMMUNITY
End: 2018-08-01

## 2018-04-03 RX ORDER — ATENOLOL 50 MG/1
50 TABLET ORAL DAILY
Status: DISCONTINUED | OUTPATIENT
Start: 2018-04-04 | End: 2018-04-15 | Stop reason: HOSPADM

## 2018-04-03 RX ORDER — NALOXONE HYDROCHLORIDE 0.4 MG/ML
.1-.4 INJECTION, SOLUTION INTRAMUSCULAR; INTRAVENOUS; SUBCUTANEOUS
Status: DISCONTINUED | OUTPATIENT
Start: 2018-04-03 | End: 2018-04-15 | Stop reason: HOSPADM

## 2018-04-03 RX ORDER — CEFEPIME 1 G/50ML
1 INJECTION, SOLUTION INTRAVENOUS ONCE
Status: COMPLETED | OUTPATIENT
Start: 2018-04-03 | End: 2018-04-03

## 2018-04-03 RX ORDER — TAMSULOSIN HYDROCHLORIDE 0.4 MG/1
0.4 CAPSULE ORAL EVERY EVENING
Status: DISCONTINUED | OUTPATIENT
Start: 2018-04-03 | End: 2018-04-15 | Stop reason: HOSPADM

## 2018-04-03 RX ORDER — SIMVASTATIN 10 MG
10 TABLET ORAL AT BEDTIME
Status: DISCONTINUED | OUTPATIENT
Start: 2018-04-03 | End: 2018-04-15 | Stop reason: HOSPADM

## 2018-04-03 RX ORDER — VANCOMYCIN HYDROCHLORIDE 1 G/200ML
1000 INJECTION, SOLUTION INTRAVENOUS ONCE
Status: DISCONTINUED | OUTPATIENT
Start: 2018-04-03 | End: 2018-04-03 | Stop reason: DRUGHIGH

## 2018-04-03 RX ORDER — NICOTINE POLACRILEX 4 MG
15-30 LOZENGE BUCCAL
Status: DISCONTINUED | OUTPATIENT
Start: 2018-04-03 | End: 2018-04-15 | Stop reason: HOSPADM

## 2018-04-03 RX ORDER — ONDANSETRON 4 MG/1
4 TABLET, ORALLY DISINTEGRATING ORAL EVERY 6 HOURS PRN
Status: DISCONTINUED | OUTPATIENT
Start: 2018-04-03 | End: 2018-04-15 | Stop reason: HOSPADM

## 2018-04-03 RX ORDER — POTASSIUM CHLORIDE 1.5 G/1.58G
20-40 POWDER, FOR SOLUTION ORAL
Status: DISCONTINUED | OUTPATIENT
Start: 2018-04-03 | End: 2018-04-15 | Stop reason: HOSPADM

## 2018-04-03 RX ORDER — AMOXICILLIN 250 MG
2 CAPSULE ORAL 2 TIMES DAILY
COMMUNITY

## 2018-04-03 RX ORDER — SODIUM CHLORIDE 9 MG/ML
INJECTION, SOLUTION INTRAVENOUS CONTINUOUS
Status: CANCELLED | OUTPATIENT
Start: 2018-04-03 | End: 2018-04-04

## 2018-04-03 RX ORDER — OXYCODONE HYDROCHLORIDE 5 MG/1
5 TABLET ORAL
Status: DISCONTINUED | OUTPATIENT
Start: 2018-04-03 | End: 2018-04-15 | Stop reason: HOSPADM

## 2018-04-03 RX ORDER — DEXTROSE MONOHYDRATE 25 G/50ML
25-50 INJECTION, SOLUTION INTRAVENOUS
Status: DISCONTINUED | OUTPATIENT
Start: 2018-04-03 | End: 2018-04-15 | Stop reason: HOSPADM

## 2018-04-03 RX ORDER — HEPARIN SODIUM 5000 [USP'U]/.5ML
5000 INJECTION, SOLUTION INTRAVENOUS; SUBCUTANEOUS EVERY 12 HOURS
Status: DISCONTINUED | OUTPATIENT
Start: 2018-04-03 | End: 2018-04-04

## 2018-04-03 RX ORDER — POTASSIUM CHLORIDE 7.45 MG/ML
10 INJECTION INTRAVENOUS
Status: DISCONTINUED | OUTPATIENT
Start: 2018-04-03 | End: 2018-04-15 | Stop reason: HOSPADM

## 2018-04-03 RX ORDER — AMOXICILLIN 250 MG
2 CAPSULE ORAL 2 TIMES DAILY
Status: DISCONTINUED | OUTPATIENT
Start: 2018-04-03 | End: 2018-04-15 | Stop reason: HOSPADM

## 2018-04-03 RX ORDER — POTASSIUM CHLORIDE 29.8 MG/ML
20 INJECTION INTRAVENOUS
Status: DISCONTINUED | OUTPATIENT
Start: 2018-04-03 | End: 2018-04-15 | Stop reason: HOSPADM

## 2018-04-03 RX ORDER — MAGNESIUM SULFATE HEPTAHYDRATE 40 MG/ML
4 INJECTION, SOLUTION INTRAVENOUS EVERY 4 HOURS PRN
Status: DISCONTINUED | OUTPATIENT
Start: 2018-04-03 | End: 2018-04-15 | Stop reason: HOSPADM

## 2018-04-03 RX ORDER — ATENOLOL 50 MG/1
50 TABLET ORAL DAILY
Status: ON HOLD | COMMUNITY
End: 2018-08-01

## 2018-04-03 RX ORDER — POLYETHYLENE GLYCOL 3350 17 G/17G
17 POWDER, FOR SOLUTION ORAL DAILY
COMMUNITY

## 2018-04-03 RX ORDER — CEFEPIME 1 G/50ML
1 INJECTION, SOLUTION INTRAVENOUS EVERY 12 HOURS
Status: DISCONTINUED | OUTPATIENT
Start: 2018-04-04 | End: 2018-04-04

## 2018-04-03 RX ORDER — POTASSIUM CHLORIDE 1500 MG/1
20-40 TABLET, EXTENDED RELEASE ORAL
Status: DISCONTINUED | OUTPATIENT
Start: 2018-04-03 | End: 2018-04-15 | Stop reason: HOSPADM

## 2018-04-03 RX ORDER — WARFARIN SODIUM 4 MG/1
4 TABLET ORAL
Status: COMPLETED | OUTPATIENT
Start: 2018-04-03 | End: 2018-04-03

## 2018-04-03 RX ORDER — POTASSIUM CL/LIDO/0.9 % NACL 10MEQ/0.1L
10 INTRAVENOUS SOLUTION, PIGGYBACK (ML) INTRAVENOUS
Status: DISCONTINUED | OUTPATIENT
Start: 2018-04-03 | End: 2018-04-15 | Stop reason: HOSPADM

## 2018-04-03 RX ORDER — ACETAMINOPHEN 325 MG/1
650 TABLET ORAL EVERY 4 HOURS PRN
Status: DISCONTINUED | OUTPATIENT
Start: 2018-04-03 | End: 2018-04-15 | Stop reason: HOSPADM

## 2018-04-03 RX ORDER — LEVOTHYROXINE SODIUM 100 UG/1
100 TABLET ORAL DAILY
Status: DISCONTINUED | OUTPATIENT
Start: 2018-04-04 | End: 2018-04-15 | Stop reason: HOSPADM

## 2018-04-03 RX ORDER — SIMETHICONE 80 MG
160 TABLET,CHEWABLE ORAL EVERY 6 HOURS PRN
Status: DISCONTINUED | OUTPATIENT
Start: 2018-04-03 | End: 2018-04-15 | Stop reason: HOSPADM

## 2018-04-03 RX ADMIN — HEPARIN SODIUM 5000 UNITS: 5000 INJECTION, SOLUTION INTRAVENOUS; SUBCUTANEOUS at 20:05

## 2018-04-03 RX ADMIN — GABAPENTIN 300 MG: 300 CAPSULE ORAL at 20:06

## 2018-04-03 RX ADMIN — TAMSULOSIN HYDROCHLORIDE 0.4 MG: 0.4 CAPSULE ORAL at 20:06

## 2018-04-03 RX ADMIN — SODIUM CHLORIDE 1000 ML: 9 INJECTION, SOLUTION INTRAVENOUS at 14:03

## 2018-04-03 RX ADMIN — VANCOMYCIN HYDROCHLORIDE 2000 MG: 1 INJECTION, POWDER, LYOPHILIZED, FOR SOLUTION INTRAVENOUS at 14:24

## 2018-04-03 RX ADMIN — CEFEPIME 1 G: 1 INJECTION, SOLUTION INTRAVENOUS at 13:37

## 2018-04-03 RX ADMIN — SENNOSIDES AND DOCUSATE SODIUM 2 TABLET: 8.6; 5 TABLET ORAL at 20:06

## 2018-04-03 RX ADMIN — SODIUM CHLORIDE 1000 ML: 9 INJECTION, SOLUTION INTRAVENOUS at 18:16

## 2018-04-03 RX ADMIN — SODIUM CHLORIDE 500 ML: 9 INJECTION, SOLUTION INTRAVENOUS at 12:10

## 2018-04-03 RX ADMIN — WARFARIN SODIUM 4 MG: 4 TABLET ORAL at 20:06

## 2018-04-03 RX ADMIN — SODIUM CHLORIDE 500 ML: 9 INJECTION, SOLUTION INTRAVENOUS at 11:15

## 2018-04-03 RX ADMIN — SIMVASTATIN 10 MG: 10 TABLET, FILM COATED ORAL at 20:06

## 2018-04-03 RX ADMIN — ACETAMINOPHEN 650 MG: 325 TABLET ORAL at 20:05

## 2018-04-03 ASSESSMENT — ENCOUNTER SYMPTOMS
SHORTNESS OF BREATH: 0
COLOR CHANGE: 1
ABDOMINAL PAIN: 1
HEADACHES: 0

## 2018-04-03 ASSESSMENT — ACTIVITIES OF DAILY LIVING (ADL): ADLS_ACUITY_SCORE: 16

## 2018-04-03 NOTE — H&P
History and Physical     Ton Bagley MRN# [id number]   YOB: 1927 Age: 89 year old      Date of Admission:  4/3/2018    Primary care provider: Jose Shell          Assessment and Plan:   Ton Bagley is a 89 year old male with a PMH significant for atrial fibrillation on warfarin, likely lymphdedema/venous stasis, MI, HTN, memory loss and HLP who presents after a fall.     Patient was discussed with Dr. Gonzalez, who was provider in ED. Chart review of ED work up was performed and is as follows: Vitals show temp of 98, pulse 82, and pressure 124/64 with spontaneous respirations and no hypoxia. Labs remarkable for Creatinine 1.56, BUN 27,  normal electrolytes, T bili 1.7 with other LFTS normal, , Lactic acid elevated at 3.1 and lipase low at 66. WBC elevated at 22.5, Hgb 13.3. UA shows 11 WBC, trace LE and negative nitrites will send for culture, blood cultures ordered x 2, CT chest/abdomen/pelvis does not show evidence of fracture or infection but there is a prominent left groin lymph node (same side as cellulitis) that is likely reactive.  Chart review of Care Everywhere, previous visits and admissions were also reviewed.    Before leaving ED patient started to become hypoxic and lactic acid checked at 1:30 had increased to 4.2. ED ordered another bolus of fluids. He is now on 3 litres satting in the low 90s. DVT of lower leg negative and INR is therapeutic so unlikely PE. NO echo on record since 2007 so will treat like fluid overload and hold further fluids with stat CXR. Echo ordered for AM    1. Sepsis due to left lower leg cellulitis  Appears to be involving only soft tissue as patient has pain with movement of ankle. Noted to have elevated WBC and lactic acid with labile blood pressure (reports hx of low blood pressure). He is not febrile, but reports 2 recent falls and unclear as to why. Blood cultures were drawn prior to initiation of Vancomycin and Cefepime.    -Continue Vancomcyin and Cefepime while blood cultures are pending  -Doppler ultrasound of lower left leg pending  -Instructed ED nurse to yunior off redness in ED  -Repeat lactic acid this evening and in AM      2. Hx of A fib on warfarin  -Continue Atenolol on parameters  -Pharmacy consult for Warfarin  -Consider discussion with family about risk/benefit of warfarin with recent falls    3. DM II  -Continue Glipizide  -Will place on sliding scale  -Controlled carbohydrate diet    4. Low back pain  Patient present with low back pain after fall. No imaging done in ED due to sepsis work up. If pain continues after admission would pursue imaging with x ray first and consideration to non contrast CT or MRI. He does not report radicular symptoms.    5. HTN  Has been on Lisinopril and Lasix but pressures are soft so will hold    6. Dementia     7. HLP  Continue statin    8. Hypothyroidism  Continue Levothyroxine    9. GERD  Continue Omeprazole    10. Social  Lives in assisted living with wife and ambulates with cane. At least 2 falls recently and may need increased services.  -PT eval  -Social work      Social: Lives in assisted living and ambulates with cane. Reports of more frequent falls.  Code: Discussed with patient with daughter Meredith present and they have chosen DNR/DNI  VTE prophylaxis: On warfarin with therapeutic INR. Will continue for now until discussion can be had about risk/benefit  Disposition: Inpatient                    Chief Complaint:   Fall but found to have sepsis with cellulitis         History of Present Illness:   History limited due to memory issues. His daughter, Meredith is present, but can not provide much history so history obtained from ED provider.    Ton SOLOMON Kevyn is a 89 year old male who presents after a fall. He reports that he was ambulating to the bathroom and went to reach for the toilet. He missed the toilet with his and hand and went to sit down of which he also missed the  toilet and fell to the ground. Reportedly, staff helped him up and put him to bed. ED staff noted he said he hit his head but did not lose consciousness but he told me he is not sure if he lost consciousness. He is also saying he fell a couple of other times. His daughter notes his right leg looks more red than usual and he reports it is painful and swollen. He also reports low back pain and stomach pain. He says his stool is always runny and he denies fever, cough, shortness of breath, urinary symptoms and chest pain. His wife is also in the ER with leg swelling.              Past Medical History:     Past Medical History:   Diagnosis Date     Atrial fibrillation (H)     cardioversion 2007     CAD (coronary artery disease)     CABG, stent x2 2004     HTN (hypertension)      Hyperlipidemia                Past Surgical History:     Past Surgical History:   Procedure Laterality Date     BYPASS GRAFT ARTERY CORONARY  1994    LIMA to LAD and saphenous vein grafts to Diag 1 OM 1 PDA     CARDIOVERSION  2007     LAPAROTOMY EXPLORATORY N/A 5/14/2017    Procedure: LAPAROTOMY EXPLORATORY;  Exploratory laparotomy with peritoneal washout. ;  Surgeon: Jorge Dias MD;  Location: [facility] OR     STENT, CORONARY, S660 15/18  2004    stent placement of SVG to RCA and OM2               Social History:     Social History     Social History     Marital status:      Spouse name: N/A     Number of children: N/A     Years of education: N/A     Occupational History     Not on file.     Social History Main Topics     Smoking status: Former Smoker     Smokeless tobacco: Not on file      Comment: 1973     Alcohol use Yes      Comment: wine daily     Drug use: Not on file     Sexual activity: Not on file     Other Topics Concern     Sleep Concern No     Special Diet No     Exercise No     Social History Narrative               Family History:   Family history reviewed and is non contributory         Allergies:    No Known  Allergies            Medications:     Prior to Admission medications    Medication Sig Last Dose Taking? Auth Provider   glipiZIDE (GLUCOTROL) 5 MG tablet Take 1 tablet (5 mg) by mouth 2 times daily (before meals)   Nirmal Serrano MD   lisinopril (PRINIVIL/ZESTRIL) 5 MG tablet Take 1 tablet (5 mg) by mouth daily   Nirmal Serrano MD   metoprolol (LOPRESSOR) 50 MG tablet Take 1 tablet (50 mg) by mouth 2 times daily   Nirmal Serrano MD   ferrous gluconate (FERGON) 324 (38 FE) MG tablet Take 1 tablet (324 mg) by mouth daily (with breakfast)   Nirmal Serrano MD   polyethylene glycol (MIRALAX/GLYCOLAX) powder Take 17 g (1 capful) by mouth daily as needed for constipation   Nirmal Serrano MD   simethicone (MYLICON) 80 MG chewable tablet Take 2 tablets (160 mg) by mouth every 6 hours as needed for flatulence or cramping   Nirmal Serrano MD   warfarin (COUMADIN) 4 MG tablet Alternate 4mg and 6mg daily starting with 4mg tonight, then 6mg tomorrow night   Nirmal Serrano MD   nitroglycerin (NITROSTAT) 0.4 MG sublingual tablet Place 1 tablet (0.4 mg) under the tongue every 5 minutes as needed for chest pain   Nirmal Serrano MD   simvastatin (ZOCOR) 10 MG tablet Take 1 tablet (10 mg) by mouth At Bedtime   Nirmal Serrano MD   camphor-menthol (DERMASARRA) 0.5-0.5 % LOTN To affected areas on both legs Apply topically daily To affected areas on both legs   Nirmal Serrano MD   furosemide (LASIX) 20 MG tablet Take 3 tablets (60 mg) by mouth every morning   Nirmal Serrano MD   furosemide (LASIX) 20 MG tablet Take 1.5 tablets (30 mg) by mouth daily (with lunch)   Nirmal Serrano MD   Calcium Carb-Cholecalciferol (CALCIUM 500+D) 500-200 MG-UNIT TABS Take 1 tablet by mouth 2 times daily   Nirmal Serrano MD   tamsulosin (FLOMAX) 0.4 MG capsule Take 1 capsule (0.4 mg) by mouth every evening   Nirmal Serrano MD   multivitamin  (I-REBEL) TABS per tablet Take 1 tablet by mouth daily   Nrimal Serrano MD   levothyroxine (SYNTHROID/LEVOTHROID) 100 MCG tablet Take 1 tablet (100 mcg) by mouth daily   Nirmal Serrano MD   omeprazole (PRILOSEC) 20 MG CR capsule Take 1 capsule (20 mg) by mouth daily   Nirmal Serrano MD              Review of Systems:   A Comprehensive greater than 10 system review of systems was carried out.  Pertinent positives and negatives are noted above.  Otherwise negative for contributory information.            Physical Exam:   Blood pressure 102/54, temperature 98  F (36.7  C), temperature source Oral, resp. rate 16, SpO2 93 %.  Exam:  GENERAL:  Comfortable.  PSYCH: pleasant, oriented to place but not to history or recent events, No acute distress.  HEENT:   Abrasion noted on left side of scalp, PERRLA. Normal conjunctiva, hard of hearing, nasal mucosa and Oropharynx are normal.  NECK:  Supple, no neck vein distention, adenopathy or bruits, normal thyroid.  HEART:  Normal S1, S2 with no murmur, no pericardial rub, gallops or S3 or S4. Bruising noted on chest wall.  LUNGS:  Clear to auscultation, normal Respiratory effort. No wheezing, rales or ronchi.  BACK: Attempted to look at low back where pain is. No redness or bruising noted.  ABDOMEN:  Soft, no hepatosplenomegaly, normal bowel sounds. Non-tender, non distended.   EXTREMITIES:  Bilateral lymphedema present with greater swelling on left. Purplish discoloration of both anterior calves. There is noted increased redness of left calf extending into ankle and up into medial thigh. No crepitus noted. Leg is warm but not significantly tender where I am touching. Ankle movement does not create significant pain.  SKIN:  Dry to touch, No rash, wound or ulcerations.  NEUROLOGIC:  CN 2-12 grossly intact, sensation is intact with no focal deficits.               Data:       Recent Labs  Lab 04/03/18  1042   WBC 22.5*   HGB 13.3   HCT 41.8   *           Recent Labs  Lab 04/03/18  1044 04/03/18  1042   NA  --  139   POTASSIUM  --  3.9   CHLORIDE  --  102   CO2  --  31   ANIONGAP  --  6   GLC  --  165*   BUN  --  27   CR  --  1.56*   GFRESTIMATED 38* 42*   GFRESTBLACK 46* 51*   JANN  --  9.0   PROTTOTAL  --  7.2   ALBUMIN  --  3.3*   BILITOTAL  --  1.7*   ALKPHOS  --  76   AST  --  30   ALT  --  30       Recent Labs  Lab 04/03/18  1042   LACT 3.1*         Recent Results (from the past 24 hour(s))   CT Chest Abdomen Pelvis w/o Contrast    Narrative    CT CHEST/ABDOMEN/PELVIS WITHOUT CONTRAST April 3, 2018 11:44 AM     HISTORY: Trauma, on blood thinners, left rib pain.      TECHNIQUE: Axial images from the thoracic inlet to the symphysis are  performed with additional coronal reformatted images. No contrast is  utilized. Radiation dose for this scan was reduced using automated  exposure control, adjustment of the mA and/or kV according to patient  size, or iterative reconstruction technique.    FINDINGS:     Chest: Calcified granuloma is noted in the right lower lobe on series  6, image 120. Lungs are otherwise clear without infiltrate,  consolidation or mass. No pleural or pericardial fluid. Heart is  enlarged. Prior CABG. Coronary artery calcifications are present.  Esophagus is unremarkable. No enlarged lymph nodes in the chest or  axillas.    Abdomen: Prior cholecystectomy changes. The liver, spleen, pancreas  and adrenal glands are unremarkable. Bilateral simple renal cysts are  present. 0.4 cm nonobstructing stone is present in the left renal  collecting system on series 2, image 57. There is no hydronephrosis or  additional urinary tract calculi. Aorta demonstrates calcified plaque  without aneurysm or retroperitoneal hemorrhage. Small left periaortic  lymph nodes are present but none are significantly enlarged by CT  criteria. The bowel is normal in caliber without obstruction or  diverticulitis. Appendix is normal.    Pelvis: The bladder, prostate and  rectum are unremarkable. No enlarged  pelvic lymph nodes are appreciated. A few prominent possibly reactive  lymph nodes are noted in the left inguinal region on series 2, image  115. The largest measures approximately 2.4 x 1.4 cm. Degenerative  spine changes are present. No evidence of rib or spine fracture.      Impression    IMPRESSION:  1. No acute changes in the chest, abdomen or pelvis. No evidence of  fractures. Bilateral renal cortical cysts are noted with a  nonobstructing stone left renal collecting system. Prior  cholecystectomy changes. Evidence of old granulomatous disease.  2. Prominent left groin lymph nodes of uncertain etiology. Reactive  adenopathy is suspected.  3. Postoperative changes from coronary artery bypass graft. Calcified  plaque is noted throughout the aorta without evidence of aneurysm.    PRISCILA TINEO MD   CT Head w/o Contrast    Narrative    CT SCAN OF THE HEAD WITHOUT CONTRAST   4/3/2018 11:45 AM     HISTORY: fall;     TECHNIQUE:  Axial images of the head and coronal reformations without  IV contrast material. Radiation dose for this scan was reduced using  automated exposure control, adjustment of the mA and/or kV according  to patient size, or iterative reconstruction technique.    COMPARISON: None.    FINDINGS:  There is generalized atrophy of the brain.  White matter  changes are present in the cerebral hemispheres that are consistent  with small vessel ischemic disease in this age patient. There is no  evidence of intracranial hemorrhage, mass, acute infarct or anomaly.  The visualized portions of the sinuses and mastoids appear normal. No  intracranial hemorrhage or skull fractures are identified.      Impression    IMPRESSION: No bleed or fractures are identified.      MD Micki CONN PA-C    This patient was seen and discussed with Dr. Harvey who agrees with the current plans as outlined above.

## 2018-04-03 NOTE — IP AVS SNAPSHOT
` `     [place] [address] MEDICAL SURGICAL: [phone]            Medication Administration Report for Ton Bagley as of 04/15/18 1258   Legend:    Given Hold Not Given Due Canceled Entry Other Actions    Time Time (Time) Time  Time-Action       Inactive    Active    Linked        Medications 04/09/18 04/10/18 04/11/18 04/12/18 04/13/18 04/14/18 04/15/18    acetaminophen (TYLENOL) tablet 650 mg  Dose: 650 mg  Freq: EVERY 4 HOURS PRN Route: PO  PRN Reason: mild pain  Start: 04/03/18 1714   Admin Instructions: Alternate ibuprofen (if ordered) with acetaminophen.  Maximum acetaminophen dose from all sources = 75 mg/kg/day not to exceed 4 grams/day.    Admin. Amount: 2 tablet (2 × 325 mg tablet)  Last Admin: 04/14/18 2339  Dispense Loc:  ADS MS[facility]     0114 (650 mg)-Given       0620 (650 mg)-Given       1356 (650 mg)-Given        1150 (650 mg)-Given       2240 (650 mg)-Given           2339 (650 mg)-Given            atenolol (TENORMIN) tablet 50 mg  Dose: 50 mg  Freq: DAILY Route: PO  Start: 04/04/18 0900   Admin Instructions: Hold if systolic blood pressure <110 or heart rate <60    Admin. Amount: 1 tablet (1 × 50 mg tablet)  Last Admin: 04/15/18 1210  Dispense Loc:  [facility]     1006 (50 mg)-Given        0821 (50 mg)-Given        0920 (50 mg)-Given        0922 (50 mg)-Given        0851 (50 mg)-Given        0805 (50 mg)-Given        1210 (50 mg)-Given           ceFAZolin (ANCEF) intermittent infusion 2 g in 100 mL dextrose PRE-MIX  Dose: 2 g  Freq: EVERY 8 HOURS Route: IV  Indications of Use: SEPSIS  Start: 04/06/18 2200   Admin Instructions: Pharmacy adjusted frequency per renal dosing protocol for est crcl 59 ml/min.    Admin. Amount: 2 g = 100 mL Conc: 2 g/100 mL  Last Admin: 04/15/18 0618  Dispense Loc:  Main Pharmacy  Infused Over: 30 Minutes  Volume: 100 mL   Current Line: PICC Single Lumen 04/10/18 Right Basilic    0555 (2 g)-Given       1404 (2 g)-Given       2133 (2 g)-Given        0613 (2  g)-Given       1411 (2 g)-Given       2227 (2 g)-Given        0501 (2 g)-Given       1322 (2 g)-Given       2125 (2 g)-Given        0531 (2 g)-Given       1356 (2 g)-Given       2139 (2 g)-Given        0517 (2 g)-Given       1443 (2 g)-Given       2136 (2 g)-Given        0613 (2 g)-Given       1517 (2 g)-Given       2157 (2 g)-Given        0618 (2 g)-Given       [ ] 1400       [ ] 2200           furosemide (LASIX) tablet 60 mg  Dose: 60 mg  Freq: 2 TIMES DAILY Route: PO  Start: 04/12/18 1400   Admin Instructions: Timed for 8am and 2pm    Admin. Amount: 1 tablet (1 × 20 mg tablet) + 1 tablet (1 × 40 mg tablet)  Last Admin: 04/15/18 0809  Dispense Loc:  [facility]   Mixture Administration Information:   Medication Type Amount   furosemide 20 MG TABS Medications 20 mg   furosemide 40 MG TABS Medications 40 mg                1358 (60 mg)-Given        0850 (60 mg)-Given       1443 (60 mg)-Given        0756 (60 mg)-Given       1517 (60 mg)-Given        0809 (60 mg)-Given       [ ] 1400           gabapentin (NEURONTIN) capsule 300 mg  Dose: 300 mg  Freq: 2 TIMES DAILY Route: PO  Start: 04/03/18 2100   Admin. Amount: 1 capsule (1 × 300 mg capsule)  Last Admin: 04/15/18 0810  Dispense Loc:  [facility]     1006 (300 mg)-Given       2133 (300 mg)-Given        0821 (300 mg)-Given       2049 (300 mg)-Given        0920 (300 mg)-Given       2126 (300 mg)-Given        0922 (300 mg)-Given       2139 (300 mg)-Given        0851 (300 mg)-Given       2137 (300 mg)-Given        0807 (300 mg)-Given       2157 (300 mg)-Given        0810 (300 mg)-Given       [ ] 2100           glipiZIDE (GLUCOTROL) tablet 5 mg  Dose: 5 mg  Freq: 2 TIMES DAILY BEFORE MEALS Route: PO  Start: 04/03/18 1715   Admin Instructions: Take before or with meals.    Admin. Amount: 1 tablet (1 × 5 mg tablet)  Last Admin: 04/15/18 0810  Dispense Loc:  [facility]     1006 (5 mg)-Given       1755 (5 mg)-Given        0820 (5 mg)-Given       1904 (5 mg)-Given        0920 (5  mg)-Given       1545 (5 mg)-Given        0922 (5 mg)-Given       1736 (5 mg)-Given        0850 (5 mg)-Given       1700 (5 mg)-Given        0756 (5 mg)-Given       1632 (5 mg)-Given        0810 (5 mg)-Given       [ ] 1630           glucose 40 % gel 15-30 g  Dose: 15-30 g  Freq: EVERY 15 MIN PRN Route: PO  PRN Reason: low blood sugar  Start: 04/03/18 1714   Admin Instructions: Give 15 g for BG 51 to 69 mg/dL IF patient is conscious and able to swallow. Give 30 g for BG less than or equal to 50 mg/dL IF patient is conscious and able to swallow. Do NOT give glucose gel via enteral tube.  IF patient has enteral tube: give apple juice 120 mL (4 oz or 15 g of CHO) via enteral tube for BG 51 to 69 mg/dL.  Give apple juice 240 mL (8 oz or 30 g of CHO) via enteral tube for BG less than or equal to 50 mg/dL.    ~Oral gel is preferable for conscious and able to swallow patient.   ~IF gel unavailable or patient refuses may provide apple juice 120 mL (4 oz or 15 g of CHO). Document juice on I and O flowsheet.    Admin. Amount: 15-30 g  Dispense Loc: [facility]  Volume: 93.75 mL              Or  dextrose 50 % injection 25-50 mL  Dose: 25-50 mL  Freq: EVERY 15 MIN PRN Route: IV  PRN Reason: low blood sugar  Start: 04/03/18 1714   Admin Instructions: Use if have IV access, BG less than 70 mg/dL and meet dose criteria below:  Dose if conscious and alert (or disorientated) and NPO = 25 mL  Dose if unconscious / not alert = 50 mL  Vesicant. For ordered doses up to 25 g, give IV Push undiluted. Give each 5g over 1 minute.    Admin. Amount: 25-50 mL  Dispense Loc: [facility]  Infused Over: 1-5 Minutes  Volume: 50 mL              Or  glucagon injection 1 mg  Dose: 1 mg  Freq: EVERY 15 MIN PRN Route: SC  PRN Reason: low blood sugar  PRN Comment: May repeat x 1 only  Start: 04/03/18 1714   Admin Instructions: May give SQ or IM. ONLY use glucagon IF patient has NO IV access AND is UNABLE to swallow AND blood glucose is LESS than or EQUAL  to 50 mg/dL.  Give IV Push over 1 minute. Reconstitute with 1mL sterile water.    Admin. Amount: 1 mg  Dispense Loc: [facility]               heparin lock flush 10 UNIT/ML injection 2-5 mL  Dose: 2-5 mL  Freq: ONCE PRN Route: IK  PRN Reason: line flush  PRN Comment: for locking each dormant lumen with line placement  Start: 04/10/18 1301   Admin Instructions: May repeat x 1    Admin. Amount: 2-5 mL  Dispense Loc: [facility]  Administrations Remainin  Volume: 5 mL               hydrocortisone (CORTAID) 1 % cream  Freq: 3 TIMES DAILY PRN Route: Top  PRN Reason: rash  Start: 18 1259   Admin Instructions: Apply to area of itchy rash on back three times daily as needed    Dispense Loc:  Main Pharmacy               levothyroxine (SYNTHROID/LEVOTHROID) tablet 100 mcg  Dose: 100 mcg  Freq: DAILY Route: PO  Start: 18 0900   Admin Instructions: Separate oral administration of iron- or calcium-containing products and levothyroxine by at least 4 hours.    Admin. Amount: 1 tablet (1 × 100 mcg tablet)  Last Admin: 04/15/18 0810  Dispense Loc: [facility]     1006 (100 mcg)-Given        0821 (100 mcg)-Given        0920 (100 mcg)-Given        0922 (100 mcg)-Given        0851 (100 mcg)-Given        0805 (100 mcg)-Given        0810 (100 mcg)-Given           lidocaine (LMX4) kit  Freq: ONCE PRN Route: Top  PRN Reason: moderate pain  PRN Comment: for local anesthetic during PICC insertion  Start: 04/10/18 1301   Admin Instructions: Apply to PICC Insertion Site. Apply 30 minutes prior to procedure. MAX Dose: 2.5 gm  (1/2 of 5 gm tube)      Dispense Loc:  [facility]  Administrations Remainin               lidocaine 1 % 0.5-5 mL  Dose: 0.5-5 mL  Freq: ONCE PRN Route: OTHER  PRN Comment: mild pain For local anesthetic during PICC insertion.  Start: 04/10/18 1301   Admin Instructions: Give Sub-Q/Intradermal.  Give in divided doses as needed.    Admin. Amount: 0.5-5 mL  Dispense Loc: [facility] Floor Stock  Administrations  Remainin  Volume: 5 mL               lisinopril (PRINIVIL/ZESTRIL) tablet 5 mg  Dose: 5 mg  Freq: DAILY Route: PO  Start: 18 1530   Admin. Amount: 1 tablet (1 × 5 mg tablet)  Last Admin: 04/15/18 0810  Dispense Loc:  [facility]     1005 (5 mg)-Given        0821 (5 mg)-Given        0920 (5 mg)-Given        0922 (5 mg)-Given        0851 (5 mg)-Given        0804 (5 mg)-Given        0810 (5 mg)-Given           magnesium sulfate 4 g in 100 mL sterile water (premade)  Dose: 4 g  Freq: EVERY 4 HOURS PRN Route: IV  PRN Reason: magnesium supplementation  Start: 18   Admin Instructions: For serum Mg++ less than 1.6 mg/dL  Give 4 g and recheck magnesium level 2 hours after dose, and next AM.    Admin. Amount: 4 g = 100 mL Conc: 4 g/100 mL  Dispense Loc:  Main Pharmacy  Infused Over: 120 Minutes  Volume: 100 mL               melatonin tablet 1 mg  Dose: 1 mg  Freq: AT BEDTIME PRN Route: PO  PRN Reason: sleep  Start: 18   Admin Instructions: Do not give unless at least 6 hours of uninterrupted sleep is expected.    Admin. Amount: 1 tablet (1 × 1 mg tablet)  Last Admin: 18  Dispense Loc:  ADS [facility]     0114 (1 mg)-Given        0031 (1 mg)-Given        2241 (1 mg)-Given          2339 (1 mg)-Given            miconazole (MICATIN; MICRO GUARD) 2 % powder  Freq: EVERY 1 HOUR PRN Route: Top  PRN Reason: other  PRN Comment: topical candidiasis  Start: 18   Admin Instructions: Apply to affected area.      Last Admin: 18  Dispense Loc:  Main Pharmacy      0819 ( )-Given        323 ( )-Given         ( )-Given         ( )-Given        408 ( )-Given            naloxone (NARCAN) injection 0.1-0.4 mg  Dose: 0.1-0.4 mg  Freq: EVERY 2 MIN PRN Route: IV  PRN Reason: opioid reversal  Start: 18   Admin Instructions: For respiratory rate LESS than or EQUAL to 8.  Partial reversal dose:  0.1 mg titrated q 2 minutes for Analgesia Side Effects Monitoring Sedation  Level of 3 (frequently drowsy, arousable, drifts to sleep during conversation).Full reversal dose:  0.4 mg bolus for Analgesia Side Effects Monitoring Sedation Level of 4 (somnolent, minimal or no response to stimulation).  For ordered doses up to 2mg give IVP. Give each 0.4mg over 15 seconds in emergency situations. For non-emergent situations further dilute in 9mL of NS to facilitate titration of response.    Admin. Amount: 0.1-0.4 mg = 0.25-1 mL Conc: 0.4 mg/mL  Dispense Loc: [facility]S [facility]  Volume: 1 mL               omeprazole (priLOSEC) CR capsule 20 mg  Dose: 20 mg  Freq: DAILY Route: PO  Start: 04/04/18 0900   Admin. Amount: 1 capsule (1 × 20 mg capsule)  Last Admin: 04/15/18 0811  Dispense Loc: [facility]S [facility]     1006 (20 mg)-Given        0820 (20 mg)-Given        0920 (20 mg)-Given        0922 (20 mg)-Given        0851 (20 mg)-Given        0805 (20 mg)-Given        0811 (20 mg)-Given           ondansetron (ZOFRAN-ODT) ODT tab 4 mg  Dose: 4 mg  Freq: EVERY 6 HOURS PRN Route: PO  PRN Reasons: nausea,vomiting  Start: 04/03/18 1714   Admin Instructions: This is Step 1 of nausea and vomiting management.  If nausea not resolved in 15 minutes, go to Step 2 prochlorperazine (COMPAZINE). Do not push through foil backing. Peel back foil and gently remove. Place on tongue immediately. Administration with liquid unnecessary  With dry hands, peel back foil backing and gently remove tablet; do not push oral disintegrating tablet through foil backing; administer immediately on tongue and oral disintegrating tablet dissolves in seconds; then swallow with saliva; liquid not required.    Admin. Amount: 1 tablet (1 × 4 mg tablet)  Dispense Loc: [facility] ADS MS3[facility]              Or  ondansetron (ZOFRAN) injection 4 mg  Dose: 4 mg  Freq: EVERY 6 HOURS PRN Route: IV  PRN Reasons: nausea,vomiting  Start: 04/03/18 1714   Admin Instructions: This is Step 1 of nausea and vomiting management.  If nausea not resolved in 15 minutes, go to Step 2  prochlorperazine (COMPAZINE).  Irritant. For ordered doses up to 4 mg, give IV Push undiluted over 2-5 minutes.    Admin. Amount: 4 mg = 2 mL Conc: 4 mg/2 mL  Dispense Loc: [facility]  Infused Over: 2-5 Minutes  Volume: 2 mL               oxyCODONE IR (ROXICODONE) tablet 5 mg  Dose: 5 mg  Freq: EVERY 3 HOURS PRN Route: PO  PRN Reason: other  PRN Comment: pain control or improvement in physical function. Hold dose for analgesic side effects.  Start: 04/03/18 1714   Admin Instructions: Start with the lowest dose.  May adjust dose by 5 mg every 3 hours as needed. Notify provider to assess for uncontrolled pain or analgesic side effects. Hold while on PCA or with regular IV opioid dosing.    Admin. Amount: 1 tablet (1 × 5 mg tablet)  Last Admin: 04/11/18 2334  Dispense Loc: [facility] ADS [facility]       2334 (5 mg)-Given               polyethylene glycol (MIRALAX/GLYCOLAX) Packet 17 g  Dose: 17 g  Freq: DAILY Route: PO  Start: 04/04/18 0900   Admin Instructions: 1 Packet = 17 grams. Mixed prescribed dose in 8 ounces of water. Follow with 8 oz. of water.    Admin. Amount: 17 g  Last Admin: 04/15/18 0811  Dispense Loc: [facility] ADS MS3[facility]     1007 (17 g)-Given        (0943)-Not Given [C]        (0948)-Not Given        (0923)-Not Given        (0856)-Not Given [C]        0804 (17 g)-Given        0811 (17 g)-Given           potassium chloride (KLOR-CON) Packet 20-40 mEq  Dose: 20-40 mEq  Freq: EVERY 2 HOURS PRN Route: ORAL OR FEED  PRN Reason: potassium supplementation  Start: 04/03/18 1714   Admin Instructions: Use if unable to tolerate tablets.  If Serum K+ 3.0-3.3, dose = 60 mEq po total dose (40 mEq x1 followed in 2 hours by 20 mEq x1). Recheck K+ level 4 hours after dose and the next AM.  If Serum K+ 2.5-2.9, dose = 80 mEq po total dose (40 mEq Q2H x2). Recheck K+ level 4 hours after dose and the next AM.  If Serum K+ less than 2.5, See IV order.  Dissolve packet contents in 4-8 ounces of cold water or juice.    Admin. Amount: 20-40  mEq  Dispense Loc:  ADS [facility]               potassium chloride 10 mEq in 100 mL intermittent infusion with 10 mg lidocaine  Dose: 10 mEq  Freq: EVERY 1 HOUR PRN Route: IV  PRN Reason: potassium supplementation  Start: 04/03/18 1714   Admin Instructions: Infuse via PERIPHERAL LINE. Use potassium with lidocaine for pain with peripheral administration.  If Serum K+ 3.0-3.3, dose = 10 mEq/hr x4 doses (40 mEq IV total dose). Recheck K+ level 2 hours after dose and the next AM.  If Serum K+ less than 3.0, dose = 10 mEq/hr x6 doses (60 mEq IV total dose). Recheck K+ level 2 hours after dose and the next AM.    Admin. Amount: 10 mEq = 100 mL Conc: 10 mEq/100 mL  Dispense Loc:  Main Pharmacy  Infused Over: 1 Hours  Volume: 100 mL               potassium chloride 10 mEq in 100 mL sterile water intermittent infusion (premix)  Dose: 10 mEq  Freq: EVERY 1 HOUR PRN Route: IV  PRN Reason: potassium supplementation  Start: 04/03/18 1714   Admin Instructions: Infuse via PERIPHERAL LINE or CENTRAL LINE. Use for central line replacement if patient weight less than 65 kg, if patient is on TPN with high potassium content or if unit does not stock 20 mEq bags.   If Serum K+ 3.0-3.3, dose = 10 mEq/hr x4 doses (40 mEq IV total dose). Recheck K+ level 2 hours after dose and the next AM.   If Serum K+ less than 3.0, dose = 10 mEq/hr x6 doses (60 mEq IV total dose). Recheck K+ level 2 hours after dose and the next AM.    Admin. Amount: 10 mEq = 100 mL Conc: 10 mEq/100 mL  Dispense Loc:  Main Pharmacy  Infused Over: 60 Minutes  Volume: 100 mL               potassium chloride 20 mEq in 50 mL intermittent infusion  Dose: 20 mEq  Freq: EVERY 1 HOUR PRN Route: IV  PRN Reason: potassium supplementation  Start: 04/03/18 1714   Admin Instructions: Infuse via CENTRAL LINE Only. May need EKG if less than 65 kg or on TPN - Max rate is 0.3 mEq/kg/hr for patients not on EKG monitoring.   If Serum K+ 3.0-3.3, dose = 20 mEq/hr x2 doses (40 mEq IV total  dose). Recheck K+ level 2 hours after dose and the next AM.  If Serum K+ less than 3.0, dose = 20 mEq/hr x3 doses (60 mEq IV total dose). Recheck K+ level 2 hours after dose and the next AM.    Admin. Amount: 20 mEq = 50 mL Conc: 20 mEq/50 mL  Dispense Loc:  Main Pharmacy  Volume: 50 mL               potassium chloride SA (K-DUR/KLOR-CON M) CR tablet 20-40 mEq  Dose: 20-40 mEq  Freq: EVERY 2 HOURS PRN Route: PO  PRN Reason: potassium supplementation  Start: 04/03/18 1714   Admin Instructions: Use if able to take PO.   If Serum K+ 3.0-3.3, dose = 60 mEq po total dose (40 mEq x1 followed in 2 hours by 20 mEq x1). Recheck K+ level 4 hours after dose and the next AM.  If Serum K+ 2.5-2.9, dose = 80 mEq po total dose (40 mEq Q2H x2). Recheck K+ level 4 hours after dose and the next AM.  If Serum K+ less than 2.5, See IV order.  DO NOT CRUSH    Admin. Amount: 1-2 tablet (1-2 × 20 mEq tablet)  Last Admin: 04/12/18 1159  Dispense Loc:  ADS MS3[facility]        0931 (40 mEq)-Given       1159 (20 mEq)-Given              senna-docusate (SENOKOT-S;PERICOLACE) 8.6-50 MG per tablet 2 tablet  Dose: 2 tablet  Freq: 2 TIMES DAILY Route: PO  Start: 04/03/18 2100   Admin. Amount: 2 tablet  Last Admin: 04/15/18 0811  Dispense Loc:  ADS MS3S     1009 (2 tablet)-Given       2133 (2 tablet)-Given        0820 (2 tablet)-Given       2049 (2 tablet)-Given        0920 (2 tablet)-Given       2126 (2 tablet)-Given        (0924)-Not Given       (2139)-Not Given [C]       2155 (2 tablet)-Given [C]        (0856)-Not Given [C]       (2136)-Not Given [C]        0804 (2 tablet)-Given       (2154)-Not Given        0811 (2 tablet)-Given       [ ] 2100           simethicone (MYLICON) chewable tablet 160 mg  Dose: 160 mg  Freq: EVERY 6 HOURS PRN Route: PO  PRN Reasons: flatulence,cramping  Start: 04/03/18 1714   Admin Instructions: Max dose of 500mg/24 hr    Admin. Amount: 2 tablet (2 × 80 mg tablet)  Last Admin: 04/10/18 2048  Dispense Loc:  ADS MS3[facility]       0033 (160 mg)-Given       1046 (160 mg)-Given       2048 (160 mg)-Given                simvastatin (ZOCOR) tablet 10 mg  Dose: 10 mg  Freq: AT BEDTIME Route: PO  Start: 04/03/18 2200   Admin. Amount: 1 tablet (1 × 10 mg tablet)  Last Admin: 04/14/18 2158  Dispense Loc:  ADS MS3[facility]     2133 (10 mg)-Given        2217 (10 mg)-Given        2126 (10 mg)-Given        2139 (10 mg)-Given        2137 (10 mg)-Given        2158 (10 mg)-Given        [ ] 2200           sodium chloride (PF) 0.9% PF flush 10 mL  Dose: 10 mL  Freq: EVERY 7 DAYS Route: IK  Start: 04/06/18 1730   Admin Instructions: And Q1H PRN, to lock each CVC - Valved (Tunneled and Non-Tunneled) dormant lumen.    Admin. Amount: 10 mL  Last Admin: 04/06/18 1751  Dispense Loc: [facility] Floor Stock  Volume: 10 mL                      sodium chloride (PF) 0.9% PF flush 10-20 mL  Dose: 10-20 mL  Freq: EVERY 1 HOUR PRN Route: IK  PRN Reasons: line flush,post meds or blood draw  Start: 04/06/18 1723   Admin Instructions: to flush CVC - Valved (Tunneled and Non-Tunneled).   10 mL post IV meds; 20 mL post blood draw.    Admin. Amount: 10-20 mL  Last Admin: 04/09/18 1441  Dispense Loc: [facility] Floor Stock  Volume: 20 mL     1014 (10 mL)-Given       1405 (10 mL)-Given       1441 (20 mL)-Given                 sodium chloride (PF) 0.9% PF flush 3 mL  Dose: 3 mL  Freq: EVERY 8 HOURS Route: IK  Start: 04/06/18 0800   Admin Instructions: And Q1H PRN, to lock peripheral IV dormant line.    Admin. Amount: 3 mL  Last Admin: 04/15/18 0811  Dispense Loc: [facility] Floor Stock  Volume: 4 mL     0114 (3 mL)-Given              1016 (3 mL)-Given       1756 (3 mL)-Given        0032 (3 mL)-Given       0913 (3 mL)-Given       1747 (3 mL)-Given        0048 (3 mL)-Given       1031 (3 mL)-Given       1544 (3 mL)-Given        0127 (3 mL)-Given       0923 (3 mL)-Given       1736 (3 mL)-Given       2344 (3 mL)-Given        0857 (3 mL)-Given       1701 (3 mL)-Given        (0018)-Not Given [C]       0757 (3  mL)-Given       1518 (3 mL)-Given       2356 (3 mL)-Given        0811 (3 mL)-Given       [ ] 1600           sodium chloride (PF) 0.9% PF flush 3 mL  Dose: 3 mL  Freq: EVERY 1 HOUR PRN Route: IK  PRN Reason: line flush  Start: 18 0638   Admin Instructions: for peripheral IV flush post IV meds    Admin. Amount: 3 mL  Last Admin: 18 1330  Dispense Loc: [facility] Floor Stock  Volume: 4 mL               sodium chloride (PF) 0.9% PF flush 5-50 mL  Dose: 5-50 mL  Freq: ONCE PRN Route: IK  PRN Reason: line flush  PRN Comment: to flush each lumen with line placement  Start: 04/10/18 1301   Admin Instructions: May repeat x 1    Admin. Amount: 5-50 mL  Dispense Loc: [facility] Floor Stock  Administrations Remainin  Volume: 50 mL               sucralfate (CARAFATE) tablet 1 g  Dose: 1 g  Freq: 4 TIMES DAILY BEFORE MEALS & NIGHTLY Route: PO  Start: 18 1130   Admin Instructions: Recommended to take before meals.    Admin. Amount: 1 tablet (1 × 1 g tablet)  Last Admin: 04/15/18 1211  Dispense Loc:  ADS MS3S     1006 (1 g)-Given       1356 (1 g)-Given       1755 (1 g)-Given       2133 (1 g)-Given        0821 (1 g)-Given       1150 (1 g)-Given       1904 (1 g)-Given       2217 (1 g)-Given        0610 (1 g)-Given       1219 (1 g)-Given       1545 (1 g)-Given       2126 (1 g)-Given        0531 (1 g)-Given       1101 (1 g)-Given       1736 (1 g)-Given       2138 (1 g)-Given        0517 (1 g)-Given       1158 (1 g)-Given       1700 (1 g)-Given       2137 (1 g)-Given        0604 (1 g)-Given       1217 (1 g)-Given       1632 (1 g)-Given       2157 (1 g)-Given        0606 (1 g)-Given       1211 (1 g)-Given       [ ] 1630       [ ] 2200           tamsulosin (FLOMAX) capsule 0.4 mg  Dose: 0.4 mg  Freq: EVERY EVENING Route: PO  Start: 18   Admin Instructions: Administer 30 minutes after the same meal each day.  Capsules should be swallowed whole; do not crush chew or open.    Admin. Amount: 1 capsule (1 × 0.4 mg  capsule)  Last Admin: 18  Dispense Loc:  ADS MS3S     2133 (0.4 mg)-Given        1944 (0.4 mg)-Given        1934 (0.4 mg)-Given        213 (0.4 mg)-Given        2137 (0.4 mg)-Given        2157 (0.4 mg)-Given        [ ] 2200           Warfarin Therapy Reminder (Check START DATE - warfarin may be starting in the FUTURE)  Dose: 1 each  Freq: CONTINUOUS PRN Route: XX  Start: 18   Admin Instructions: *Note to reorder warfarin daily*  Pharmacy Warfarin Dosing Service  Patient is on Warfarin Therapy - check for daily order    Admin. Amount: 1 each  Dispense Loc:  Main Pharmacy              Future Medications  Medications 04/09/18 04/10/18 04/11/18 04/12/18 04/13/18 04/14/18 04/15/18       warfarin (COUMADIN) tablet 4 mg  Dose: 4 mg  Freq: ONCE AT 6PM Route: PO  Start: 04/15/18 1800   Admin. Amount: 1 tablet (1 × 4 mg tablet)  Dispense Loc:  ADS MS3S  Administrations Remainin           [ ] 1800          Completed Medications  Medications 04/09/18 04/10/18 04/11/18 04/12/18 04/13/18 04/14/18 04/15/18         Dose: 3.5 mg  Freq: ONCE AT 6PM Route: PO  Start: 18 1800   End: 18 172   Admin. Amount: 1 half-tab (1 × 0.5 mg half-tab) + 1 tablet (1 × 3 mg tablet)  Last Admin: 18  Dispense Loc:  Main Pharmacy  Administrations Remainin   Mixture Administration Information:   Medication Type Amount   warfarin 0.5 mg TABS Medications 0.5 mg   warfarin 3 MG TABS Medications 3 mg                  1720 (3.5 mg)-Given              Dose: 3.5 mg  Freq: ONCE AT 6PM Route: PO  Start: 18 1800   End: 18 1700   Admin. Amount: 1 half-tab (1 × 0.5 mg half-tab) + 1 tablet (1 × 3 mg tablet)  Last Admin: 18 170  Dispense Loc:  Main Pharmacy  Administrations Remainin   Mixture Administration Information:   Medication Type Amount   warfarin 0.5 mg TABS Medications 0.5 mg   warfarin 3 MG TABS Medications 3 mg                 1700 (3.5 mg)-Given               Dose: 3  mg  Freq: ONCE AT 6PM Route: PO  Start: 18 1800   End: 18   Admin. Amount: 1 tablet (1 × 3 mg tablet)  Last Admin: 18  Dispense Loc:  ADS MS3S  Administrations Remainin         (3 mg)-Given

## 2018-04-03 NOTE — IP AVS SNAPSHOT
"[place] [address] MEDICAL SURGICAL: [phone]                                              INTERAGENCY TRANSFER FORM - PHYSICIAN ORDERS   4/3/2018                    Hospital Admission Date: 4/3/2018  BRIANA HERNÁNDEZ   : 1927  Sex: Male        Attending Provider: Lanie Harvey MD     Allergies:  No Known Allergies    Infection:  None   Service:  GENERAL MEDI    Ht:  1.676 m (5' 6\")   Wt:  101.2 kg (223 lb)   Admission Wt:  104.5 kg (230 lb 6.4 oz)    BMI:  35.99 kg/m 2   BSA:  2.17 m 2            Patient PCP Information     Provider PCP Type    [facility]      ED Clinical Impression     Diagnosis Description Comment Added By Time Added    Severe sepsis (H) [A41.9, R65.20] Severe sepsis (H) [A41.9, R65.20]  Sita Pena 4/3/2018 12:43 PM      Hospital Problems as of 4/15/2018              Priority Class Noted POA    Sepsis (H) Medium  4/3/2018 Yes      Non-Hospital Problems as of 4/15/2018              Priority Class Noted    Cortical senile cataract Medium  2009    Excess skin of eyelid Medium  2009    HTN (hypertension) Medium  Unknown    CAD (coronary artery disease) Medium  Unknown    Atrial fibrillation (H) Medium  Unknown    Hyperlipidemia Medium  Unknown    S/P CABG (coronary artery bypass graft) Medium  10/23/2014    Small bowel obstruction Medium  2017      Code Status History     Date Active Date Inactive Code Status Order ID Comments User Context    4/15/2018 12:52 PM  DNR/DNI [id number]  Lanie Harvey MD Outpatient    4/3/2018  5:14 PM 4/15/2018 12:52 PM DNR/DNI [id number]  Micki Ackerman PA-C Inpatient    2017  8:20 AM 4/3/2018  5:14 PM DNR/DNI [id number]  Nirmal Serrano MD Outpatient    2017 11:04 PM 2017  8:20 AM DNR/DNI [id number]  Micki Ackerman PA-C Inpatient         Medication Review      START taking        Dose / Directions Comments    ceFAZolin 1 GM vial   Commonly known as:  ANCEF "   Used for:  Gram-positive bacteremia        Dose:  2 g   Inject 2 g into the vein every 8 hours for 24 days CBC with differential, creatinine, SGOT weekly while on this medication to be faxed to Dr. Chavez office.   Quantity:  1 each   Refills:  0        sucralfate 1 GM tablet   Commonly known as:  CARAFATE   Used for:  Other acute gastritis without hemorrhage        Dose:  1 g   Take 1 tablet (1 g) by mouth 4 times daily (before meals and nightly)   Quantity:  120 tablet   Refills:  0          CONTINUE these medications which may have CHANGED, or have new prescriptions. If we are uncertain of the size of tablets/capsules you have at home, strength may be listed as something that might have changed.        Dose / Directions Comments    ACETAMINOPHEN PO   This may have changed:  Another medication with the same name was removed. Continue taking this medication, and follow the directions you see here.        Dose:  650 mg   Take 650 mg by mouth daily as needed for pain   Refills:  0        * furosemide 20 MG tablet   Commonly known as:  LASIX   This may have changed:  Another medication with the same name was changed. Make sure you understand how and when to take each.   Used for:  Lymphedema        Dose:  60 mg   Take 3 tablets (60 mg) by mouth every morning   Quantity:  30 tablet   Refills:  0        * furosemide 20 MG tablet   Commonly known as:  LASIX   This may have changed:  how much to take   Used for:  Lymphedema        Dose:  60 mg   Take 3 tablets (60 mg) by mouth daily (with lunch)   Quantity:  30 tablet   Refills:  0        * Notice:  This list has 2 medication(s) that are the same as other medications prescribed for you. Read the directions carefully, and ask your doctor or other care provider to review them with you.      CONTINUE these medications which have NOT CHANGED        Dose / Directions Comments    atenolol 50 MG tablet   Commonly known as:  TENORMIN        Dose:  50 mg   Take 50 mg by mouth  daily   Refills:  0        Calcium Carb-Cholecalciferol 500-200 MG-UNIT Tabs   Commonly known as:  CALCIUM 500+D   Used for:  Vitamin D deficiency        Dose:  1 tablet   Take 1 tablet by mouth 2 times daily   Refills:  0        camphor-menthol 0.5-0.5 % Lotn   Commonly known as:  DERMASARRA   Used for:  Venous stasis        To affected areas on both legs Apply topically daily To affected areas on both legs   Refills:  0        ferrous gluconate 324 (38 Fe) MG tablet   Commonly known as:  FERGON   Used for:  Iron deficiency anemia, unspecified iron deficiency anemia type        Dose:  324 mg   Take 1 tablet (324 mg) by mouth daily (with breakfast)   Quantity:  100 tablet   Refills:  0        GABAPENTIN PO        Dose:  300 mg   Take 300 mg by mouth 2 times daily   Refills:  0        glipiZIDE 5 MG tablet   Commonly known as:  GLUCOTROL   Used for:  Type 2 diabetes mellitus with complication, without long-term current use of insulin (H)        Dose:  5 mg   Take 1 tablet (5 mg) by mouth 2 times daily (before meals)   Quantity:  30 tablet   Refills:  0        levothyroxine 100 MCG tablet   Commonly known as:  SYNTHROID/LEVOTHROID   Used for:  Hypothyroidism, unspecified type        Dose:  100 mcg   Take 1 tablet (100 mcg) by mouth daily   Quantity:  30 tablet   Refills:  0        lisinopril 5 MG tablet   Commonly known as:  PRINIVIL/ZESTRIL   Used for:  Essential hypertension        Dose:  5 mg   Take 1 tablet (5 mg) by mouth daily   Refills:  0        multivitamin Tabs per tablet   Used for:  Small bowel obstruction        Dose:  1 tablet   Take 1 tablet by mouth daily   Quantity:  30 tablet   Refills:  0        nitroGLYcerin 0.4 MG sublingual tablet   Commonly known as:  NITROSTAT   Used for:  Coronary artery disease involving native heart without angina pectoris, unspecified vessel or lesion type        Dose:  0.4 mg   Place 1 tablet (0.4 mg) under the tongue every 5 minutes as needed for chest pain   Quantity:   25 tablet   Refills:  0        omeprazole 20 MG CR capsule   Commonly known as:  priLOSEC   Used for:  Gastroesophageal reflux disease without esophagitis        Dose:  20 mg   Take 1 capsule (20 mg) by mouth daily   Quantity:  30 capsule   Refills:  0        polyethylene glycol Packet   Commonly known as:  MIRALAX/GLYCOLAX        Dose:  17 g   Take 17 g by mouth daily   Refills:  0        senna-docusate 8.6-50 MG per tablet   Commonly known as:  SENOKOT-S;PERICOLACE        Dose:  2 tablet   Take 2 tablets by mouth 2 times daily   Refills:  0        simethicone 80 MG chewable tablet   Commonly known as:  MYLICON   Used for:  Abdominal pain, generalized        Dose:  160 mg   Take 2 tablets (160 mg) by mouth every 6 hours as needed for flatulence or cramping   Quantity:  180 tablet   Refills:  0        simvastatin 10 MG tablet   Commonly known as:  ZOCOR   Used for:  Coronary artery disease involving native heart without angina pectoris, unspecified vessel or lesion type        Dose:  10 mg   Take 1 tablet (10 mg) by mouth At Bedtime   Quantity:  30 tablet   Refills:  0        tamsulosin 0.4 MG capsule   Commonly known as:  FLOMAX   Used for:  Urinary retention        Dose:  0.4 mg   Take 1 capsule (0.4 mg) by mouth every evening   Quantity:  60 capsule   Refills:  0        triamcinolone 0.5 % cream   Commonly known as:  KENALOG        Apply topically three times a week Mon/Wed /Fri   Refills:  0        WARFARIN SODIUM PO        Take by mouth daily 3 mg   MWF, 4 mg Tues,Thur, Sat,Sun   Refills:  0                Summary of Visit     Reason for your hospital stay       Please refer to discharge summary             After Care     Activity - Up with nursing assistance           Advance Diet as Tolerated       Follow this diet upon discharge: Orders Placed This Encounter      Room Service      Combination Diet [id number] Calories: Moderate Consistent CHO (4-6 CHO units/meal); 2 gm NA Diet       Daily weights       Call  Provider for weight gain of more than 2 pounds per day or 5 pounds per week.       Fall precautions           General info for SNF       Length of Stay Estimate: Short Term Care: Estimated # of Days <30  Condition at Discharge: Improving  Level of care:skilled   Rehabilitation Potential: Fair  Admission H&P remains valid and up-to-date: Yes  Recent Chemotherapy: N/A  Use Nursing Home Standing Orders: Yes       IV access       PICC.  Will need IV antibiotics as ordered by infectious disease       Intake and output       Every shift       Mantoux instructions       Give two-step Mantoux (PPD) Per Facility Policy Yes             Procedures     Oxygen - Nasal cannula       1-2 Lpm by nasal cannula to keep O2 sats 92% or greater.  Wean as able  Continue incentive spirometry             Referrals     Occupational Therapy Adult Consult       Evaluate and treat as clinically indicated.    Reason: Deconditioned       Physical Therapy Adult Consult       Evaluate and treat as clinically indicated.    Reason: Deconditioned             Follow-Up Appointment Instructions     Future Labs/Procedures    Follow Up and recommended labs and tests     Comments:    Follow up with snf physician.  The following labs/tests are recommended: Basic metabolic panel and INR in 2-3 days.    Follow-up with infectious disease after completion of IV antibiotics as directed      Follow-Up Appointment Instructions     Follow Up and recommended labs and tests       Follow up with snf physician.  The following labs/tests are recommended: Basic metabolic panel and INR in 2-3 days.    Follow-up with infectious disease after completion of IV antibiotics as directed             Statement of Approval     Ordered          04/15/18 7216  I have reviewed and agree with all the recommendations and orders detailed in this document.  EFFECTIVE NOW     Approved and electronically signed by:  Lanie Harvey MD           04/14/18 1435  FLORINA  have reviewed and agree with all the recommendations and orders detailed in this document.  EFFECTIVE NOW     Approved and electronically signed by:  Lanie Harvey MD           04/06/18 6535  I have reviewed and agree with all the recommendations and orders detailed in this document.     Approved and electronically signed by:  Lane Chavez MD

## 2018-04-03 NOTE — PROGRESS NOTES
Prior to transfer to floor patient did develop hypoxia in the emergency room.  --Chest x-ray shows basilar atelectasis versus pneumonia  --Sats more than 90% on 3 L nasal cannula  --Continue treatment with IV antibiotics  --We will follow blood culture  --We will monitor on telemetry  --We will get echocardiogram in a.m.  --Supplemental O2 for tonight  --Monitor I's and O's and daily weights    Frequent falls and syncope  --Serial troponin with history of syncope and falls  --PT consult  --Monitor on telemetry

## 2018-04-03 NOTE — ED NOTES
Bed: ED24  Expected date: 4/3/18  Expected time: 9:51 AM  Means of arrival: Ambulance  Comments:  CHAKA

## 2018-04-03 NOTE — ED NOTES
"[facility]  ED Nurse Handoff Report    Ton Bagley is a 89 year old male   ED Chief complaint: No chief complaint on file.  . ED Diagnosis:   Final diagnoses:   Severe sepsis (H)     Allergies: No Known Allergies    Code Status: DNR / DNI  Activity level - Baseline/Home:  Independent. Activity Level - Current:   Stand with Assist. Lift room needed: No. Bariatric: No   Needed: No   Isolation: No. Infection: Not Applicable.     Vital Signs:   Vitals:    04/03/18 1015 04/03/18 1045 04/03/18 1100 04/03/18 1115   BP: 92/57 100/61 93/51 102/54   Resp:       Temp:       TempSrc:       SpO2:           Cardiac Rhythm:  ,      Pain level:    Patient confused: No Patient Falls Risk: Yes. Fell at nursing home this morning. Per nursing home, patient is normally steady on feet and not apt to fall.   Elimination Status: Patient had difficulty voiding while in bed with urinal. Patient straight cathed for urine sample and bladder emptied at that time.   Patient Report - Initial Complaint: fall. Focused Assessment: Patient had unwitnessed fall around 03:00 at nursing home. Patient alert and oriented. Sleeping between cares. Easily arousable. Right leg red and warm to touch. Patient reports \"it is normally like this\". Localized swelling and bruising on head. Left lower chest bruising with some yellowing around bruise. Blood drawn and patient noted to be septic per MD. Left leg redness marked. Patient noted to have some chills and shivering at times.    Tests Performed: labs, ct. Abnormal Results:   Labs Ordered and Resulted from Time of ED Arrival Up to the Time of Departure from the ED   CBC WITH PLATELETS DIFFERENTIAL - Abnormal; Notable for the following:        Result Value    WBC 22.5 (*)     RBC Count 4.14 (*)      (*)     Absolute Neutrophil 20.6 (*)     Absolute Lymphocytes 0.6 (*)     All other components within normal limits   INR - Abnormal; Notable for the following:     INR 2.32 (*)  "    All other components within normal limits   LACTIC ACID WHOLE BLOOD - Abnormal; Notable for the following:     Lactic Acid 3.1 (*)     All other components within normal limits   COMPREHENSIVE METABOLIC PANEL - Abnormal; Notable for the following:     Glucose 165 (*)     Creatinine 1.56 (*)     GFR Estimate 42 (*)     GFR Estimate If Black 51 (*)     Bilirubin Total 1.7 (*)     Albumin 3.3 (*)     All other components within normal limits   LIPASE - Abnormal; Notable for the following:     Lipase 66 (*)     All other components within normal limits   CRP INFLAMMATION - Abnormal; Notable for the following:     CRP Inflammation 126.0 (*)     All other components within normal limits   CREATININE POCT - Abnormal; Notable for the following:     Creatinine 1.7 (*)     GFR Estimate 38 (*)     GFR Estimate If Black 46 (*)     All other components within normal limits   TROPONIN I   ROUTINE UA WITH MICROSCOPIC   BLOOD CULTURE   BLOOD CULTURE     CT Chest Abdomen Pelvis w/o Contrast   Final Result   IMPRESSION:   1. No acute changes in the chest, abdomen or pelvis. No evidence of   fractures. Bilateral renal cortical cysts are noted with a   nonobstructing stone left renal collecting system. Prior   cholecystectomy changes. Evidence of old granulomatous disease.   2. Prominent left groin lymph nodes of uncertain etiology. Reactive   adenopathy is suspected.   3. Postoperative changes from coronary artery bypass graft. Calcified   plaque is noted throughout the aorta without evidence of aneurysm.      PRISCILA TINEO MD      CT Head w/o Contrast   Final Result   IMPRESSION: No bleed or fractures are identified.         LIONEL VEE MD        Treatments provided: iv fluids, Ct scan, antibiotics to be started when 2nd blood culture drawn. Difficult to obtain venous access.   Family Comments: son just arrived and is present at bedside.   OBS brochure/video discussed/provided to patient:  N/A  ED Medications:   Medications    ceFEPIme (MAXIPIME) intermittent infusion 1 g (not administered)   vancomycin (VANCOCIN) 2,000 mg in sodium chloride 0.9 % 500 mL intermittent infusion (not administered)   0.9% sodium chloride BOLUS (not administered)     Drips infusing:  Yes  For the majority of the shift, the patient's behavior Green. Interventions performed were NA.     Severe Sepsis OR Septic Shock Diagnosis Present:   Yes    Per the ED Provider, Time Zero for severe sepsis or septic shock is:  10:42    3 Hour Severe Sepsis Bundle Completion:  1. Initial Lactic Acid Result:   Recent Labs   Lab Test  04/03/18   1042  05/20/17   1820  05/14/17   0633   LACT  3.1*  0.9  1.8     2. Blood Cultures before Antibiotics: Yes  3. Broad Spectrum Antibiotics Administered:     Anti-infectives (Future)    Start     Dose/Rate Route Frequency Ordered Stop    04/03/18 1111  ceFEPIme (MAXIPIME) intermittent infusion 1 g      1 g  over 30 Minutes Intravenous ONCE 04/03/18 1058      04/03/18 1109  vancomycin (VANCOCIN) 2,000 mg in sodium chloride 0.9 % 500 mL intermittent infusion      2,000 mg  over 2 Hours Intravenous ONCE 04/03/18 1109          4. 1000 ml of IV fluids have been given so far      6 Hour Severe Sepsis Bundle Completion:    1. Repeat Lactic Acid Level: Not drawn  2. Patient currently on Vasopressors =  No      ED Nurse Name/Phone Number: Hayley Delgado,   12:52 PM    RECEIVING UNIT ED HANDOFF REVIEW    Above ED Nurse Handoff Report was reviewed: Yes  Reviewed by: PORSCHE MATHEW on April 3, 2018 at 4:10 PM

## 2018-04-03 NOTE — IP AVS SNAPSHOT
"` `           [place] [address] MEDICAL SURGICAL: [phone]                                              INTERAGENCY TRANSFER FORM - NURSING   4/3/2018                    Hospital Admission Date: 4/3/2018  BRIANA HERNÁNDEZ   : 1927  Sex: Male        Attending Provider: Lanie Harvey MD     Allergies:  No Known Allergies    Infection:  None   Service:  GENERAL MEDI    Ht:  1.676 m (5' 6\")   Wt:  101.2 kg (223 lb)   Admission Wt:  104.5 kg (230 lb 6.4 oz)    BMI:  35.99 kg/m 2   BSA:  2.17 m 2            Patient PCP Information     Provider PCP Type    [facility]      Current Code Status     Date Active Code Status Order ID Comments User Context       Prior      Code Status History     Date Active Date Inactive Code Status Order ID Comments User Context    4/15/2018 12:52 PM  DNR/DNI [id number]  Lanie Harvey MD Outpatient    4/3/2018  5:14 PM 4/15/2018 12:52 PM DNR/DNI [id number]  Micki Ackerman PA-C Inpatient    2017  8:20 AM 4/3/2018  5:14 PM DNR/DNI [id number]  Nirmal Serrano MD Outpatient    2017 11:04 PM 2017  8:20 AM DNR/DNI [id number]  Micki Ackerman PA-C Inpatient      Advance Directives        Scanned docmt in ACP Activity?           No scanned doc        Hospital Problems as of 4/15/2018              Priority Class Noted POA    Sepsis (H) Medium  4/3/2018 Yes      Non-Hospital Problems as of 4/15/2018              Priority Class Noted    Cortical senile cataract Medium  2009    Excess skin of eyelid Medium  2009    HTN (hypertension) Medium  Unknown    CAD (coronary artery disease) Medium  Unknown    Atrial fibrillation (H) Medium  Unknown    Hyperlipidemia Medium  Unknown    S/P CABG (coronary artery bypass graft) Medium  10/23/2014    Small bowel obstruction Medium  2017      Immunizations     None         END      ASSESSMENT     Discharge Profile Flowsheet     EXPECTED DISCHARGE     Resources List  " "Skilled Nursing Facility 05/26/17 1624    Expected Discharge Date  04/11/18 (Kobe Ibrahim) 04/09/18 1424   PAS Number  [id number] 04/13/18 1132    DISCHARGE NEEDS ASSESSMENT     SKIN      Equipment Currently Used at Home  cane, straight 04/05/18 1451   Inspection of bony prominences  Full 04/15/18 0954    Transportation Available  family or friend will provide 04/05/18 1451   Inspection under devices  Full 04/15/18 0207    # of Referrals Placed by CTS  Post Acute Facilities 05/23/17 0836   Skin WDL  ex 04/15/18 0954    GASTROINTESTINAL (ADULT,PEDIATRIC,OB)     Skin Color/Characteristics  pale 04/15/18 0954    GI WDL  ex 04/15/18 0954   Skin Temperature  warm 04/15/18 0954    Abdominal Appearance  obese 04/15/18 0954   Skin Moisture  dry 04/15/18 0954    All Quadrants Bowel Sounds  audible and normoactive 04/15/18 0954   Skin Elasticity  quick return to original state 04/15/18 0954    Last Bowel Movement  04/14/18 04/15/18 0954   Skin Integrity  bruise(s);rash(es);scar(s) 04/15/18 0954    GI Signs/Symptoms  constipation 04/12/18 2355   SAFETY      Passing flatus  yes 04/15/18 0954   Safety WDL  WDL;safety factors 04/15/18 0954    COMMUNICATION ASSESSMENT     All Alarms  alarm(s) activated and audible 04/15/18 0954    Patient's communication style  spoken language (English or Bilingual) 05/19/17 2049   Safety Factors  bed in low position;wheels locked;call light in reach;upper side rails raised x 2;ID band on 04/15/18 0954    FINAL RESOURCES                        Assessment WDL (Within Defined Limits) Definitions           Safety WDL     Effective: 09/28/15    Row Information: <b>WDL Definition:</b> Bed in low position, wheels locked; call light in reach; upper side rails up x 2; ID band on<br> <font color=\"gray\"><i>Item=AS safety wdl>>List=AS safety wdl>>Version=F14</i></font>      Skin WDL     Effective: 09/28/15    Row Information: <b>WDL Definition:</b> Warm; dry; intact; elastic; without discoloration; " "pressure points without redness<br> <font color=\"gray\"><i>Item=AS skin wdl>>List=AS skin wdl>>Version=F14</i></font>      Vitals     Vital Signs Flowsheet     VITAL SIGNS     ANALGESIA SIDE EFFECTS MONITORING      Temp  97.3  F (36.3  C) 04/15/18 0728   Side Effects Monitoring: Respiratory Quality  R 04/15/18 0251    Temp src  Oral 04/15/18 0728   Side Effects Monitoring: Respiratory Depth  N 04/15/18 0251    Resp  16 04/15/18 0728   Side Effects Monitoring: Sedation Level  S 04/15/18 0251    Pulse  63 04/15/18 0728   HEIGHT AND WEIGHT      Heart Rate  59 04/14/18 2257   Height  1.676 m (5' 6\") 04/09/18 0413    Pulse/Heart Rate Source  Monitor 04/15/18 0728   Height Method  Stated 04/09/18 0413    BP  123/57 04/15/18 0728   Weight  101.2 kg (223 lb) 04/15/18 0604    BP Location  Left arm 04/15/18 0728   Weight Method  Standing scale 04/15/18 0604    OXYGEN THERAPY     Bed Scale  Standard (fitted sheet, draw sheet/ pad, cover/flat sheet, blanket, two pillows);Pillow (add comment for number) (2 extra pillows) 04/10/18 0613    SpO2  91 % 04/15/18 0728   BSA (Calculated - sq m)  2.2 04/09/18 0413    O2 Device  Nasal cannula 04/15/18 0728   BMI (Calculated)  37.04 04/09/18 0413    Oxygen Delivery  1/2 LPM 04/15/18 0728   DAILY CARE      PAIN/COMFORT     Activity Management  activity adjusted per tolerance 04/15/18 0954    Patient Currently in Pain  denies 04/15/18 0250   Activity Assistance Provided  assistance, 1 person 04/15/18 0954    Preferred Pain Scale  number (Numeric Rating Pain Scale) 04/14/18 0249   Assistive Device Utilized  walker 04/15/18 0954    Patient's Stated Pain Goal  No pain 04/14/18 0249   POSITIONING      0-10 Pain Scale  0 04/14/18 0249   Body Position  supine, legs elevated;supine, head elevated 04/14/18 0046    Word Pain Scale  4 04/11/18 2332   Head of Bed (HOB)  HOB at 15 degrees 04/14/18 0046    Pain Location  Rib cage 04/11/18 2331   Chair  Upright in chair 04/15/18 0954    Pain " Orientation  Left;Right 04/11/18 2331   Positioning/Transfer Devices  pillows 04/15/18 0954    Pain Descriptors  Aching 04/11/18 2331   POINT OF CARE TESTING      Pain Intervention(s)  Medication (See eMAR) 04/11/18 2331   Puncture Site  fingertip 04/07/18 0850    Response to Interventions  Absence of nonverbal indicators of pain 04/15/18 0250   Bedside Glucose (mg/dl )   148 mg/dl 04/07/18 0850            Patient Lines/Drains/Airways Status    Active LINES/DRAINS/AIRWAYS     Name: Placement date: Placement time: Site: Days: Last dressing change:    Wound 05/14/17 Bilateral Groin Other (comment) RASH 05/14/17   0800   Groin   336     Wound 05/21/17 Penis Blister to underside of penis 05/21/17   1800   Penis   328     Wound 04/10/18 Left;Right Groin Other (comment) Nonblanchable redness to bilateral groin folds and scrotum area 04/10/18   0246   Groin   5     Rash 05/19/17 0000 back papule 05/19/17   0000    331     Rash 04/12/18 0415 other (see comments) macular;nodule 04/12/18   0415    3     Incision/Surgical Site 05/14/17 Abdomen 05/14/17   1118    336             Patient Lines/Drains/Airways Status    Active PICC/CVC     Name: Placement date: Placement time: Site: Days: Additional Info Last dressing change:    PICC Single Lumen 04/10/18 Right Basilic 04/10/18   1421   Basilic   4 External Cath Length (cm): 7 cm   04/10/18 1600 (116.97 hrs)         Size (Fr): 4 Fr            Orientation: Right            Extremity Circumference (cm): 34 cm            Catheter Brand: BArd Solo            Dressing Intervention: Chlorhexidine patch;New dressing;Securing device            Description: Valved;Power PICC            Total Catheter Length (cm) Trimmed: 49 cm            Site Prep: Chlorhexidine            Local Anesthetic: Injectable            Inserted by: Sammie Gutierrez RN,BSN [facility]            Insertion attempts with ultrasound: 2            Patient Tolerance: Tolerated well            Placement Verification:  Xray            Difficulty with threading line: Yes             Tip location: SVC            Full barrier precautions done: Yes            Consent Signed: Yes            Time Out performed: Yes               Intake/Output Detail Report     Date Intake     Output Net    Shift P.O. I.V. IV Piggyback Total Urine Total       Noc 04/13/18 2300 - 04/14/18 0659 120 -- -- 120 -- -- 120    Day 04/14/18 0700 - 04/14/18 1459 200 -- -- 200 50 50 150    Tiffanie 04/14/18 1500 - 04/14/18 2259 -- -- -- -- 50 50 -50    Noc 04/14/18 2300 - 04/15/18 0659 -- -- -- -- -- -- 0    Day 04/15/18 0700 - 04/15/18 1459 120 -- -- 120 250 250 -130      Last Void/BM       Most Recent Value    Urine Occurrence 1 at 04/14/2018 0141    Stool Occurrence 1 at 04/13/2018 1356      Case Management/Discharge Planning     Case Management/Discharge Planning Flowsheet     REFERRAL INFORMATION     Support Assessment  Adequate family and caregiver support 04/07/18 1413    Did the Initial Social Work Assessment result in a Social Work Case?  Yes 04/07/18 1413   Quality of Family Relationships  supportive;involved 04/07/18 1413    Admission Type  inpatient 04/07/18 1413   COPING/STRESS      Arrived From  admitted as an inpatient 04/07/18 1413   Major Change/Loss/Stressor  hospitalization 04/10/18 1424    Referral Source  interdisciplinary rounds 04/07/18 1413   EXPECTED DISCHARGE      # of Referrals Placed by CTS  Post Acute Facilities 05/23/17 0836   Expected Discharge Date  04/11/18 (Kobe Ibrahim) 04/09/18 1424    Reason For Consult  discharge planning 04/07/18 1413   DISCHARGE PLANNING      Record Reviewed  history and physical;medical record 04/07/18 1413   Transportation Available  family or friend will provide 04/05/18 1451    CTS Assigned to Case  MICHELL Raymond 04/07/18 1413   FINAL RESOURCES      Primary Care Clinic Name  [facility] [phone] 04/07/18 1413   Equipment Currently Used at Home  cane, straight 04/05/18 1451    LIVING  ENVIRONMENT     Resources [facility] 05/26/17 1624    Lives With  spouse 04/05/18 1423   PAS Number  [id number] 04/13/18 1132    Living Arrangements  assisted living 04/05/18 1446   ABUSE RISK SCREEN      Quality Of Family Relationships  supportive;helpful;involved 04/07/18 1413   QUESTION TO PATIENT:  Has a member of your family or a partner(now or in the past) intimidated, hurt, manipulated, or controlled you in any way?  no 04/03/18 1015    Able to Return to Prior Living Arrangements  yes 04/07/18 1413   QUESTION TO PATIENT: Do you feel safe going back to the place where you are living?  yes 04/03/18 1015    ASSESSMENT OF FAMILY/SOCIAL SUPPORT     OBSERVATION: Is there reason to believe there has been maltreatment of a vulnerable adult (ie. Physical/Sexual/Emotional abuse, self neglect, lack of adequate food, shelter, medical care, or financial exploitation)?  no 04/03/18 1015    Marital Status   04/07/18 1413   OTHER      Who is your support system?  Wife;Children 04/07/18 1413   Are you depressed or being treated for depression?  No 04/10/18 1423    Spouse's Name  Kelli 04/07/18 1413   HOMICIDE RISK      Description of Support System  Supportive;Involved 04/07/18 1413   Feels Like Hurting Others  no 04/03/18 1015

## 2018-04-03 NOTE — ED NOTES
"States \"I got up to go to the bathroom and that's all I remember\". Fall last night; states he does not know how many times he fell. Notes he needs to void; medics reported he had the same request in route but was not able to void. \"I have low blood pressure. I don't think it's serious.\" Patient has bilateral lower extremity edema. When asked if he has lymph edema states \"I don't know what that is\". Denies neck pain. Denies back pain. There is a bruise to the upper abdomen. Patient is not sure what caused the area of bruising, but area is dark purple with yellow border. Patient notes that the area is tender. Patient also notes that his left lower leg is sore; leg is slightly warm to the touch and red in color.   "

## 2018-04-03 NOTE — IP AVS SNAPSHOT
` ` Patient Information     Patient Name Sex     Ton Bagley ([id number]) Male 1927       Room Bed    0327 0327-01      Patient Demographics     Address Phone    [address]   [place] [address] [phone] (Home)  none (Work)  [phone] (Mobile)      Patient Ethnicity & Race     Ethnic Group Patient Race     White      Emergency Contact(s)     Name Relation Home Work Mobile    Kelli Bagley Spouse [phone]    KneerFelecia Daughter [phone] none [phone]    Luciano Bagley Son [phone] none [phone]      Documents on File        Status Date Received Description       Documents for the Patient    Privacy Notice - Ernul Received 12     Insurance Card Received 12     External Medication Information Consent Accepted 10/28/14     Patient ID Received () 12     Consent for Services - Hospital/Clinic Received () 12     Consent for EHR Access  13 Copied from existing Consent for services - C/HOD collected on 2012    [facility] Specified Other       Patient ID Received 17 [place] Expires LIfetime    Insurance Card Received 10/28/14 HP    Consent to Communicate Received 10/28/14     Consent for Services - Hospital/Clinic Received () 10/28/14     Consent for Services/Privacy Notice - Hospital/Clinic Received 17     Insurance Card Received 17     Care Everywhere Prospective Auth Received 18     Consent for EHR Access       Insurance Card  (Deleted)         Documents for the Encounter    CMS IM for Patient Signature Received 18     CMS IM for Patient Signature Received 18 2nd      Admission Information     Attending Provider Admitting Provider Admission Type Admission Date/Time    Lanie Harvey MD Mithani, Salima Aziz, MD Emergency 18  1006    Discharge Date Hospital Service Auth/Cert Status Service Area     General Medicine [facility]  HEALTH SERVICES    Unit Room/Bed Admission Status        3 MEDICAL SURGICAL [id number] Admission (Confirmed)       Admission     Complaint    Sepsis (H)      Hospital Account     Name Acct ID Class Status Primary Coverage    Ton Bagley [id number] Inpatient Open MEDICARE - MEDICARE FOR HB SUPPLEMENT            Guarantor Account (for Hospital Account #[id number])     Name Relation to Pt Service Area Active? Acct Type    Ton Bagley  FCS Yes Personal/Family    Address Phone          [address] [place] [address] [phone](H)  none(O)              Coverage Information (for Hospital Account #[id number])     1. MEDICARE/MEDICARE FOR HB SUPPLEMENT     F/O Payor/Plan Precert #    MEDICARE/MEDICARE FOR HB SUPPLEMENT     Subscriber Subscriber #    Ton Bagley [id number]    Address Phone    [employer] CLAIMS  [address]  [place] [address] [phone]          2. [employer] PLAN     F/O Payor/Plan Precert #    [employer]ED[employer] PLAN     Subscriber Subscriber #    Ton Bagley [id number]    Address Phone    [address]  [place] [address] [phone]

## 2018-04-03 NOTE — IP AVS SNAPSHOT
"    [place] [address] MEDICAL SURGICAL: [phone]                                              INTERAGENCY TRANSFER FORM - LAB / IMAGING / EKG / EMG RESULTS   4/3/2018                    Hospital Admission Date: 4/3/2018  BRIANA HERNÁNDEZ   : 1927  Sex: Male        Attending Provider: Lanie Harvey MD     Allergies:  No Known Allergies    Infection:  None   Service:  GENERAL [facility]    Ht:  1.676 m (5' 6\")   Wt:  101.2 kg (223 lb)   Admission Wt:  104.5 kg (230 lb 6.4 oz)    BMI:  35.99 kg/m 2   BSA:  2.17 m 2            Patient PCP Information     Provider PCP Type    [facility] General         Lab Results - 3 Days      INR [[id number]] (Abnormal)  Resulted: 04/15/18 0638, Result status: Final result    Ordering provider: Micki Ackerman PA-C  04/15/18 0000 Resulting lab: [facility]    Specimen Information    Type Source Collected On   Blood  04/15/18 0610          Components       Value Reference Range Flag Lab   INR 2.57 0.86 - 1.14 H FrRdHs            Platelet count [[id number]]  Resulted: 04/15/18 0629, Result status: Final result    Ordering provider: Micki Ackerman PA-C  04/15/18 0000 Resulting lab: [facility]    Specimen Information    Type Source Collected On   Blood  04/15/18 0610          Components       Value Reference Range Flag Lab   Platelet Count 214 150 - 450 10e9/L  FrRdHs            Basic metabolic panel [[id number]] (Abnormal)  Resulted: 18 0649, Result status: Final result    Ordering provider: Lanie Harvey MD  18 0000 Resulting lab: [facility]    Specimen Information    Type Source Collected On   Blood  18 0615          Components       Value Reference Range Flag Lab   Sodium 138 133 - 144 mmol/L  FrRdHs   Potassium 3.7 3.4 - 5.3 mmol/L  FrRdHs   Chloride 97 94 - 109 mmol/L  FrRdHs   Carbon Dioxide 40 20 - 32 mmol/L H FrRdHs   Anion Gap 1 3 - 14 mmol/L L FrRdHs   Glucose 140 " 70 - 99 mg/dL H FrRdHs   Urea Nitrogen 21 7 - 30 mg/dL  FrRdHs   Creatinine 0.98 0.66 - 1.25 mg/dL  FrRdHs   GFR Estimate 72 >60 mL/min/1.7m2  FrRdHs   Comment:  Non  GFR Calc   GFR Estimate If Black 87 >60 mL/min/1.7m2  FrRdHs   Comment:  African American GFR Calc   Calcium 8.8 8.5 - 10.1 mg/dL  FrRdHs            INR [[id number]] (Abnormal)  Resulted: 04/14/18 0641, Result status: Final result    Ordering provider: Micki Ackerman PA-C  04/14/18 0000 Resulting lab: [facility]    Specimen Information    Type Source Collected On   Blood  04/14/18 0615          Components       Value Reference Range Flag Lab   INR 2.57 0.86 - 1.14 H FrRdHs            Magnesium [[id number]]  Resulted: 04/13/18 1352, Result status: Final result    Ordering provider: Micki Ackerman PA-C  04/13/18 0515 Resulting lab: [facility]    Specimen Information    Type Source Collected On     04/13/18 0515          Components       Value Reference Range Flag Lab   Magnesium 1.9 1.6 - 2.3 mg/dL  FrRdHs            Basic metabolic panel [[id number]] (Abnormal)  Resulted: 04/13/18 0552, Result status: Final result    Ordering provider: Nirmal Serrano MD  04/13/18 0000 Resulting lab: [facility]    Specimen Information    Type Source Collected On   Blood  04/13/18 0515          Components       Value Reference Range Flag Lab   Sodium 137 133 - 144 mmol/L  FrRdHs   Potassium 3.7 3.4 - 5.3 mmol/L  FrRdHs   Chloride 97 94 - 109 mmol/L  FrRdHs   Carbon Dioxide 38 20 - 32 mmol/L H FrRdHs   Anion Gap 2 3 - 14 mmol/L L FrRdHs   Glucose 145 70 - 99 mg/dL H FrRdHs   Urea Nitrogen 20 7 - 30 mg/dL  FrRdHs   Creatinine 0.95 0.66 - 1.25 mg/dL  FrRdHs   GFR Estimate 74 >60 mL/min/1.7m2  FrRdHs   Comment:  Non  GFR Calc   GFR Estimate If Black 90 >60 mL/min/1.7m2  FrRdHs   Comment:  African American GFR Calc   Calcium 8.7 8.5 - 10.1 mg/dL  [facility]            INR [[id number]]  (Abnormal)  Resulted: 04/13/18 0543, Result status: Final result    Ordering provider: Micki Ackerman PA-C  04/13/18 0000 Resulting lab: [facility]    Specimen Information    Type Source Collected On   Blood  04/13/18 0515          Components       Value Reference Range Flag Lab   INR 2.64 0.86 - 1.14 H FrRdHs            Potassium [[id number]]  Resulted: 04/12/18 1429, Result status: Final result    Ordering provider: Nirmal Serrano MD  04/12/18 1346 Resulting lab: [facility]    Specimen Information    Type Source Collected On   Blood  04/12/18 1350          Components       Value Reference Range Flag Lab   Potassium 3.9 3.4 - 5.3 mmol/L  FrRdHs            Blood culture [[id number]]  Resulted: 04/12/18 0653, Result status: Final result    Ordering provider: Lane Chavez MD  04/06/18 0000 Resulting lab: [facility]    Specimen Information    Type Source Collected On   Blood  04/06/18 0721   Comment:  Right Arm          Components       Value Reference Range Flag Lab   Specimen Description Blood Right Arm      Culture Micro No growth   75            Basic metabolic panel [[id number]] (Abnormal)  Resulted: 04/12/18 0607, Result status: Final result    Ordering provider: Nirmal Serrano MD  04/12/18 0000 Resulting lab: [facility]    Specimen Information    Type Source Collected On   Blood  04/12/18 0540          Components       Value Reference Range Flag Lab   Sodium 138 133 - 144 mmol/L  FrRdHs   Potassium 3.3 3.4 - 5.3 mmol/L L FrRdHs   Chloride 99 94 - 109 mmol/L  FrRdHs   Carbon Dioxide 37 20 - 32 mmol/L H FrRdHs   Anion Gap 2 3 - 14 mmol/L L FrRdHs   Glucose 138 70 - 99 mg/dL H FrRdHs   Urea Nitrogen 20 7 - 30 mg/dL  FrRdHs   Creatinine 0.92 0.66 - 1.25 mg/dL  FrRdHs   GFR Estimate 77 >60 mL/min/1.7m2  FrRdHs   Comment:  Non  GFR Calc   GFR Estimate If Black >90 >60 mL/min/1.7m2  FrRdHs   Comment:   "African American GFR Calc   Calcium 8.2 8.5 - 10.1 mg/dL L FrRdHs            INR [[id number]] (Abnormal)  Resulted: 04/12/18 0600, Result status: Final result    Ordering provider: Micki Ackerman PA-C  04/12/18 0000 Resulting lab: [facility]    Specimen Information    Type Source Collected On   Blood  04/12/18 0540          Components       Value Reference Range Flag Lab   INR 2.98 0.86 - 1.14 H FrRd            Platelet count [[id number]]  Resulted: 04/12/18 0552, Result status: Final result    Ordering provider: Micki Ackerman PA-C  04/12/18 0540 Resulting lab: [facility]    Specimen Information    Type Source Collected On     04/12/18 0540          Components       Value Reference Range Flag Lab   Platelet Count 202 150 - 450 10e9/L  FrRd            Testing Performed By     Lab - Abbreviation Name Director Address Valid Date Range    [facility] [address]  [facility] [address] 05/08/15 1057 - Present    75 - Unknown [facility] Unknown [address] [facility] [address] 01/15/15 1019 - Present            Unresulted Labs (24h ago through future)    Start       Ordered    04/06/18 0600  Platelet count  (Pharmacological Prophylaxis- heparin (If CrCl less than 30 mL/min))  EVERY THREE DAYS,   Routine     Comments:  Repeat every 3 days while on VTE prophylaxis. If no result is listed, this lab has not been done the past 365 days. LATEST LAB RESULT: Platelet Count (10e9/L)       Date                     Value                 04/03/2018               179              ----------      04/03/18 1714    04/04/18 0600  INR  (warfarin (COUMADIN) Pharmacy Consult-INITIAL ORDER)  DAILY,   Routine      04/03/18 1714    Unscheduled  Potassium  (Potassium Replacement - \"Standard\" - For K levels less than 3.4 mmol/L - UU,UR,UA,RH,SH,PH,WY )  CONDITIONAL (SPECIFY),   Routine     Comments:  Obtain Potassium Level " "for these conditions:  *IF no potassium result within 24 hours before initiation of order set, draw potassium level with next lab collect.    *2 HOURS AFTER last IV potassium replacement dose and 4 hours after an oral replacement dose.  *Next morning after potassium dose.     Repeat Potassium Replacement if necessary.    18 1714    Unscheduled  Magnesium  (Magnesium Replacement -  Adult - \"Standard\" - Replacement for all levels less than 1.6 mg/dL )  CONDITIONAL (SPECIFY),   Routine     Comments:  Obtain Magnesium Level for these conditions:  *IF no magnesium result within 24 hrs before initiation of order set, draw magnesium level with next lab collect.    *2 HOURS AFTER last magnesium replacement dose when magnesium replacement given for level less than 1.6   *Next morning after magnesium dose.     Repeat Magnesium Replacement if necessary.    18 1714         ECG/EMG Results      ECHO COMPLETE WITH OPTISON [[id number]]  Resulted: 18, Result status: Edited Result - FINAL    Ordering provider: Whitley Olivares PA-C  18 Resulted by: Lane Sandoval MD    Performed: 18 - 18 Resulting lab: RADIOLOGY RESULTS    Narrative:       [id number]  [id number]  [id number]  [id number]^MARSHALL^WHITLEY^DONNA           [facility]  Echocardiography Laboratory  [address] [place] [address]        Name: BRIANA HERNÁNDEZ  MRN: [id number]  : 1927  Study Date: 2018 09:29 AM  Age: 89 yrs  Gender: Male  Patient Location: [facility]  Reason For Study: SOB  Ordering Physician: WHITLEY OLIAVRES  Referring Physician: STACEY, HEALTHPARTEDWAR DEVINE  Performed By: Sammie Callejas     BSA: 2.1 m2  Height: 66 in  Weight: 230 lb  HR: 93  BP: 126/73 mmHg  _____________________________________________________________________________  __        Procedure  Complete Portable Echo Adult. Contrast " "Optison.  _____________________________________________________________________________  __        Interpretation Summary     There is mild concentric left ventricular hypertrophy.  Proximal septal thickening is noted.  Echo findings are not consistent with left ventricular outflow obstruction.  Left ventricular systolic function is normal.  Limited image quality, no obvious wall motion abnormalities  The right ventricle is mildly dilated.  The right ventricular systolic function is mild to moderately reduced.  There is mild right ventricular hypertrophy.  The right ventricle is not well visualized.  There is mod-severe biatrial enlargement.  There is mild (1+) tricuspid regurgitation.  Right ventricular systolic pressure is elevated, consistent with mild to  moderate pulmonary hypertension.  Normal IVC (1.5-2.5cm) with <50% respiratory collapse; right atrial pressure  is estimated at 10-15mmHg.  There is a marked disparity in the data regarding aortic stenosis. The valve  area suggests severe and the gradient suggest mild. The stroke volume index  reported is markedly low. There is abnormal RVEF which could lead to a low  stroke volume. This AS data could represent measurement error or \"low flow-low  gradient\" aortic stenosis. Consider cardiology consult with possible  dobutamine stress echo, or CT calcium score of aortic valve etc.  _____________________________________________________________________________  __        Left Ventricle  The left ventricle is normal in size. There is mild concentric left  ventricular hypertrophy. Proximal septal thickening is noted. Echo findings  are not consistent with left ventricular outflow obstruction. Left ventricular  systolic function is normal. The visual ejection fraction is estimated at 60-  65%. Diastolic function not assessed due to atrial fibrillation. Diastolic  Doppler findings (E/E' ratio and/or other parameters) suggest left ventricular  filling pressures are " "indeterminate. Normal left ventricular wall motion.  Limited image quality, no obvious wall motion abnormalities.     Right Ventricle  The right ventricle is mildly dilated. There is mild right ventricular  hypertrophy. The right ventricular systolic function is mild to moderately  reduced. The right ventricle is not well visualized.     Atria  There is mod-severe biatrial enlargement. There is no color Doppler evidence  of an atrial shunt.     Mitral Valve  There is moderate mitral annular calcification. There is mild (1+) mitral  regurgitation.        Tricuspid Valve  There is mild (1+) tricuspid regurgitation. The right ventricular systolic  pressure is approximated at 40.0 mmHg plus the right atrial pressure. Right  ventricular systolic pressure is elevated, consistent with mild to moderate  pulmonary hypertension. Normal IVC (1.5-2.5cm) with <50% respiratory collapse;  right atrial pressure is estimated at 10-15mmHg.     Aortic Valve  The aortic valve is trileaflet. There is trace aortic regurgitation. There is  a marked disparity in the data regarding aortic stenosis. The valve area  suggests severe and the gradient suggest mild. The stroke volume index  reported is markedly low. There is abnormal RVEF which could lead to a low  stroke volume. This AS data could represent measurement error or \"low flow-low  gradient\" aortic stenosis. Consider cardiology consult with possible  dobutamine stress echo, or CT calcium score of aortic valve etc.     Pulmonic Valve  The pulmonic valve is not well seen, but is grossly normal.     Vessels  Normal size aorta.     Pericardium  The pericardium appears normal.        Rhythm  The rhythm was atrial fibrillation with controlled ventricular rate at rest.  _____________________________________________________________________________  __  MMode/2D Measurements & Calculations  RVDd: 4.4 cm  IVSd: 1.4 cm     LVIDd: 4.1 cm  LVIDs: 2.8 cm  LVPWd: 1.1 cm  FS: 32.8 %  LV mass(C)d: " 193.3 grams  LV mass(C)dI: 91.0 grams/m2  Ao root diam: 3.4 cm  LA dimension: 4.8 cm  asc Aorta Diam: 3.4 cm  LA/Ao: 1.4  LVOT diam: 2.0 cm  LVOT area: 3.0 cm2  LA Volume (BP): 92.3 ml  LA Volume Index (BP): 43.5 ml/m2  RWT: 0.56           Doppler Measurements & Calculations  MV E max bola: 112.0 cm/sec  MV A max bola: 20.9 cm/sec  MV E/A: 5.4  MV dec time: 0.14 sec  Ao V2 max: 232.1 cm/sec  Ao max P.0 mmHg  ROMAIN(V,D): 0.85 cm2  LV V1 max P.7 mmHg  LV V1 max: 66.0 cm/sec  LV V1 VTI: 14.0 cm  SV(LVOT): 41.7 ml  SI(LVOT): 19.7 ml/m2  TR max bola: 316.3 cm/sec  TR max P.0 mmHg  AV Bola Ratio (DI): 0.28  E/E' av.9  Lateral E/e': 10.2  Medial E/e': 13.6              _____________________________________________________________________________  __        Report approved by: Latrice Soto 2018 12:09 PM       1    Type Source Collected On     18          View Image (below)        Echocardiogram Complete [[id number]]  Resulted: 18, Result status: In process    Ordering provider: Micki Ackerman PA-C  186 Performed: 18 - 18    Resulting lab: [facility]                   Encounter-Level Documents:     There are no encounter-level documents.      Order-Level Documents:     There are no order-level documents.

## 2018-04-03 NOTE — ED PROVIDER NOTES
"  History     Chief Complaint:  Fall    HPI   Ton Bagley is an anticoagulated 89 year old male on warfarin with a history of HTN, CAD, atrial fibrillation, and hyperlipidemia who presents to the emergency department via EMS for evaluation of a fall. Yesterday night, the patient reports taking a couple of falls when he was getting up to go to the bathroom. He reports he landed on the ground and hit his head but denies loss of consciousness. He states these falls \"were not serious\" but notes he has a couple of bruises. He states he was told he should come in for xray's. This prompted him to seek evaluation here in the emergency department via EMS. In route, EMS reports the patient tried to urinate but was unable to.    Here, he states he also had abdominal pain. He also reports left lower leg pain and leg swelling. He states he does not remember if he was dizzy when he fell. He denies recent illness. He denies shortness of breath. Of note, he reports a history of low blood pressure.     Allergies:  NKDA     Medications:    glipizide   lisinopril   metoprolol   hydrocortisone   ferrous gluconate   polyethylene glycol   simethicone   warfarin   gabapentin  nitroglycerin   simvastatin   camphor-menthol   furosemide  Calcium Carb-Cholecalciferol  amylopsin   multivitamin   levothyroxine   omeprazole    Past Medical History:    Atrial fibrillation  CAD  HTN  Hyperlipidemia    Past Surgical History:    Coronary artery bypass  Cardioversion  Exploratory laparotomy with peritoneal washout  Stent placement of SVG to RCA and OM2    Family History:    No past pertinent family history.    Social History:  Former smoker  Positive for alcohol use: wine daily  Patient resides in a nursing home  Marital Status:        Review of Systems   Respiratory: Negative for shortness of breath.    Cardiovascular: Positive for leg swelling.   Gastrointestinal: Positive for abdominal pain.   Skin: Positive for color change. "   Neurological: Negative for headaches.   All other systems reviewed and are negative.    Physical Exam   First Vitals:  BP: (!) 88/63  Heart Rate: 75  Temp: 98  F (36.7  C)  Resp: 16  SpO2: 93 %    Physical Exam  General: Patient is alert and interactive when I enter the room  Head:  The scalp, face, and head appear normal  Eyes:  Conjunctivae are normal  ENT:    The nose is normal    Pinnae are normal    External acoustic canals are normal  Neck:  Trachea midline, no midline tenderness to palpation   CV:  Pulses are normal, RRR    Resp:  No respiratory distress, CTAB   Abdomen:      Soft, non-tender, non-distended  Musc:  Normal muscular tone    No major joint effusions    Ecchmchosis to left anterior chest with mild anterior tenderness   Skin:  Diffuse erythema extended up to mid thigh with peripheral edema   Neuro: Speech is normal and fluent. Face is symmetric.     Moving all extremities well.   Psych: Awake. Alert.  Normal affect.  Appropriate interactions.  Emergency Department Course   ECG:  Indication: falls  Time: 1018  Vent. Rate 72 bpm. AR interval *. QRS duration 86. QT/QTc 406/444. P-R-T axis * 4 86. Atrial fibrillation. Nonspecific T wave abnormality. Abnormal ECG. Read time: 1025.    Imaging:  Radiographic findings were communicated with the patient who voiced understanding of the findings.    CT Chest Abdomen Pelvis w/o contrast:  1. No acute changes in the chest, abdomen or pelvis. No evidence of  fractures. Bilateral renal cortical cysts are noted with a  nonobstructing stone left renal collecting system. Prior  cholecystectomy changes. Evidence of old granulomatous disease.  2. Prominent left groin lymph nodes of uncertain etiology. Reactive  adenopathy is suspected.  3. Postoperative changes from coronary artery bypass graft. Calcified  plaque is noted throughout the aorta without evidence of aneurysm. As per radiology.     CT Head without contrast:   No bleed or fractures are identified. As per  radiology.    US Lower Extremity Venous Duplex:  No DVT demonstrated. As per radiology.     XR Chest 2 views:   Mild left basilar atelectasis or pneumonia. As per radiology.     Laboratory:  Creatinine POCT: 1.7 (H), GFR 38 (L)  CBC: WBC: 22.5 (H), HGB: 13.3, PLT: 179  INR: 2.32 (H)  1042 Lactic acid whole blood: 3.1 (H)  1330 Repeat Lactic acid whole blood: 4.2 (HH)  CMP: Glucose 165 (H), Creatinine 1.56 (H), GFR 42 (L), Bilirubin total 1.7 (H), Albumin 3.3 (L), o/w WNL  Lipase: 66 (L)  1042 Troponin: <0.015  CRP inflammation: 126.0 (H)  Blood cultures: No growth after 2 hours  UA with micro: Leukocyte Esterase trace, bacteria few, protein albumin 100, WBC 11 (H), RBC 4 (H), mucous present, hyaline casts 31 (H), o/w negative  1330 Repeat Lactic acid whole blood: 4.2 (HH)    Interventions:  1115 NS 0.5L IV  1210 NS 0.5L IV  1337 Maxipen infusion 1 g IV  1403 Vancomycin 2000 mg IV  1424 Vancomycin 2000 mg IV    Emergency Department Course:  1015 Nursing notes and vitals reviewed.  I performed an exam of the patient as documented above.     IV inserted. Medicine administered as documented above. Blood drawn. This was sent to the lab for further testing, results above.  EKG obtained in the ED, see results above.     1110 I rechecked the patient and discussed the results of their workup thus far.     The patient provided a urine sample here in the emergency department. This was sent for laboratory testing, findings above.     The patient was sent for a head CT and chest/abdomen/pelvis CT while in the emergency department, findings above.     1230 I rechecked the patient and discussed the results of his workup thus far.     1232  I consulted with Dr. Ackerman of the hospitalist services. They are in agreement to accept the patient for admission.    The patient was sent for a lower extremity ultrasound, findings above.    1646 I consulted with Dr. Ackerman who would like a STAT chest xray.     The patient was sent for a  chest xray.    1654 I consulted with Dr. Ackerman regarding the results the patient's chest xray. She agreed to have the patient brought to the floor now.    Findings and plan explained to the Patient who consents to admission. Discussed the patient with Dr. Ackerman, who will admit the patient to a telemetry bed for further monitoring, evaluation, and treatment.    Impression & Plan    CMS Diagnoses:   The patient has signs of Severe Sepsis evidenced by:    1. 2 SIRS criteria, AND  2. Suspected infection, AND   3. Organ dysfunction: Lactic Acid > 1.9    Time severe sepsis diagnosis confirmed = 1057 as this was the time when Lactate resulted, and the level was > 1.9    3 Hour Severe Sepsis Bundle Completion:  1. Initial Lactic Acid Result:   Recent Labs   Lab Test  04/03/18   1330  04/03/18   1042  05/20/17   1820   LACT  4.2*  3.1*  0.9     2. Blood Cultures before Antibiotics: Yes  3. Broad Spectrum Antibiotics Administered: Yes     Anti-infectives (Future)    Start     Dose/Rate Route Frequency Ordered Stop    04/03/18 1109  vancomycin (VANCOCIN) 2,000 mg in sodium chloride 0.9 % 500 mL intermittent infusion      2,000 mg  over 2 Hours Intravenous ONCE 04/03/18 1109          4. 2000 ml of IV fluids.  Patient weight not recorded  Severe Sepsis reassessment:  1. Repeat Lactic Acid Level: 4.2  2. MAP>65 after initial IVF bolus, will continue to monitor fluid status and vital signs      Medical Decision Making:  Ton Bagley is a 89 year old male who presents with falls and bruising. Vitals revealed mildly soft pressures. The rest of his vitals were unremarkable. Exam revealed left lower leg erythema extending up to his left hip. He also had bruising to his left chest. His belly was mildly tender but otherwise unremarkable. He did have dry mucous membranes. An IV was started and his white count returned at 22, which is suspicious of sepsis as the etiology of his falls and I suspect the source is his left leg.  Being on coumadin and abrasion to his left head and left chest, I did a CT head and CT chest/abdomen/pelvis. This was unremarkable. Patient was given a liter of fluids as lactic acid was at 3.1. Patient was give vancomycin and cefepime. INR was therapeutic at 2.32. ECG showed atrial fibrillation. He does have an PRADEEP with his creatinine above baseline at 1.7. Patient has severe sepsis in the setting of likely lower extremity cellulitis.We did recheck his lactate after a liter and this did go up so we will be more aggressive with his fluids. He did become hypoxic after the fluids so CXR obtained which revealed left atelectasis. Patient remained normal tensive after initial arrival. Patient was admitted to cardiac tele in good condition.     Critical Care time:  none    Diagnosis:    ICD-10-CM    1. Severe sepsis (H) A41.9 UA with Microscopic    R65.20 Blood culture       Disposition:  Admitted to Dr. Tyron Horowitz Disclosure:   I, Dawna Gan, am serving as a scribe on 4/3/2018 at 10:43 AM to personally document services performed by Jaz Gonzalez MD based on my observations and the provider's statements to me.       Dawna Gan  4/3/2018   [facility] EMERGENCY DEPARTMENT       Jaz Gonzalez MD  04/03/18 6858

## 2018-04-03 NOTE — IP AVS SNAPSHOT
[place] [address] [facility]    [address]    [facility] [address]    Phone:  [phone]    Fax:  [phone]                                       After Visit Summary   4/3/2018    Ton Bagley    MRN: [id number]           After Visit Summary Signature Page     I have received my discharge instructions, and my questions have been answered. I have discussed any challenges I see with this plan with the nurse or doctor.    ..........................................................................................................................................  Patient/Patient Representative Signature      ..........................................................................................................................................  Patient Representative Print Name and Relationship to Patient    ..................................................               ................................................  Date                                            Time    ..........................................................................................................................................  Reviewed by Signature/Title    ...................................................              ..............................................  Date                                                            Time

## 2018-04-03 NOTE — IP AVS SNAPSHOT
MRN:[id number]                      After Visit Summary   4/3/2018    Ton Bagley    MRN: [id number]           Thank you!     Thank you for choosing [facility] for your care. Our goal is always to provide you with excellent care. Hearing back from our patients is one way we can continue to improve our services. Please take a few minutes to complete the written survey that you may receive in the mail after you visit. If you would like to speak to someone directly about your visit please contact Patient Relations at [phone]. Thank you!          Patient Information     Date Of Birth          7/18/1927        Designated Caregiver       Most Recent Value    Caregiver    Will someone help with your care after discharge? no      About your hospital stay     You were admitted on:  April 3, 2018 You last received care in the:  [place] [address] [facility]    You were discharged on:  April 15, 2018        Reason for your hospital stay       Please refer to discharge summary                  Who to Call     For medical emergencies, please call 911.  For non-urgent questions about your medical care, please call your primary care provider or clinic, [phone]          Attending Provider     Provider Specialty    Jaz Gonzalez MD Emergency Medicine    Lanie Harvey MD Internal Medicine       Primary Care Provider Office Phone # Fax #    [facility] [phone]      After Care Instructions     Activity - Up with nursing assistance           Advance Diet as Tolerated       Follow this diet upon discharge: Orders Placed This Encounter      Room Service      Combination Diet 5667-6130 Calories: Moderate Consistent CHO (4-6 CHO units/meal); 2 gm NA Diet            Daily weights       Call Provider for weight gain of more than 2 pounds per day or 5 pounds per week.            Fall precautions           General info for SNF       Length of Stay  Estimate: Short Term Care: Estimated # of Days <30  Condition at Discharge: Improving  Level of care:skilled   Rehabilitation Potential: Fair  Admission H&P remains valid and up-to-date: Yes  Recent Chemotherapy: N/A  Use Nursing Home Standing Orders: Yes            IV access       PICC.  Will need IV antibiotics as ordered by infectious disease            Intake and output       Every shift            Mantoux instructions       Give two-step Mantoux (PPD) Per Facility Policy Yes            Oxygen - Nasal cannula       1-2 Lpm by nasal cannula to keep O2 sats 92% or greater.  Wean as able  Continue incentive spirometry                  Follow-up Appointments     Follow Up and recommended labs and tests       Follow up with retirement physician.  The following labs/tests are recommended: Basic metabolic panel and INR in 2-3 days.    Follow-up with infectious disease after completion of IV antibiotics as directed                  Additional Services     Occupational Therapy Adult Consult       Evaluate and treat as clinically indicated.    Reason: Deconditioned            Physical Therapy Adult Consult       Evaluate and treat as clinically indicated.    Reason: Deconditioned                  Warfarin Instruction     You have started taking a medicine called warfarin. This is a blood-thinning medicine (anticoagulant). It helps prevent and treat blood clots.      Before leaving the hospital, make sure you know how much to take and how long to take it.      You will need regular blood tests to make sure your blood is clotting safely. It is very important to see your doctor for regular blood tests.    Talk to your doctor before taking any new medicine (this includes over-the-counter drugs and herbal products). Many medicines can interact with warfarin. This may cause more bleeding or too much clotting.     Eating a lot of vitamin K--found in green, leafy vegetables--can change the way warfarin works in your body. Do  "NOT avoid these foods. Instead, try to eat the same amount each day.     Bleeding is the most common side-effect of warfarin. You may notice bleeding gums, a bloody nose, bruises and bleeding longer when you cut yourself. See a doctor at once if:   o You cough up blood  o You find blood in your stool (poop)  o You have a deep cut, or a cut that bleeds longer than 10 minutes   o You have a bad cut, hard fall, accident or hit your head (go to urgent care or the emergency room).    For women who can get pregnant: This medicine can harm an unborn baby. Be very careful not to get pregnant while taking this medicine. If you think you might be pregnant, call your doctor right away.    For more information, read \"Guide to Warfarin Therapy,  the booklet you received in the hospital.        Pending Results     No orders found from 4/1/2018 to 4/4/2018.            Statement of Approval     Ordered          04/15/18 1057  I have reviewed and agree with all the recommendations and orders detailed in this document.  EFFECTIVE NOW     Approved and electronically signed by:  Lanie Harvey MD           04/14/18 1135  I have reviewed and agree with all the recommendations and orders detailed in this document.  EFFECTIVE NOW     Approved and electronically signed by:  Lanie Harvey MD           04/06/18 7541  I have reviewed and agree with all the recommendations and orders detailed in this document.     Approved and electronically signed by:  Lane Chavez MD             Admission Information     Date & Time Provider Department Dept. Phone    4/3/2018 Lanie Harvey MD [place] [address] [facility] [phone]      Your Vitals Were     Blood Pressure Pulse Temperature Respirations Height Weight    123/57 (BP Location: Left arm) 63 97.3  F (36.3  C) (Oral) 16 1.676 m (5' 6\") 101.2 kg (223 lb)    Pulse Oximetry BMI (Body Mass Index)                91% 35.99 kg/m2          MyChart Information     MyChart " "lets you send messages to your doctor, view your test results, renew your prescriptions, schedule appointments and more. To sign up, go to [email or web address] . Click on \"Log in\" on the left side of the screen, which will take you to the Welcome page. Then click on \"Sign up Now\" on the right side of the page.     You will be asked to enter the access code listed below, as well as some personal information. Please follow the directions to create your username and password.     Your access code is: [email or web address]  Expires: 2018 11:44 AM     Your access code will  in 90 days. If you need help or a new code, please call your [place] clinic or [phone].        Care EveryWhere ID     This is your Care EveryWhere ID. This could be used by other organizations to access your [place] medical records  [id number]        Equal Access to Services     [facility] : Carmne Jordan, waaxjacob turpin, qaybkari kaalmada evert, ursula pandya . So [facility] [phone].    ATENCIÓN: Si habla español, tiene a redman disposición servicios gratuitos de asistencia lingüística. Lltraci al [phone].    We comply with applicable federal civil rights laws and [place] laws. We do not discriminate on the basis of race, color, national origin, age, disability, sex, sexual orientation, or gender identity.               Review of your medicines      START taking        Dose / Directions    ceFAZolin 1 GM vial   Commonly known as:  ANCEF   Used for:  Gram-positive bacteremia        Dose:  2 g   Inject 2 g into the vein every 8 hours for 24 days CBC with differential, creatinine, SGOT weekly while on this medication to be faxed to Dr. Chavez office.   Quantity:  1 each   Refills:  0       sucralfate 1 GM tablet   Commonly known as:  CARAFATE   Used for:  Other acute gastritis without hemorrhage        Dose:  1 g   Take 1 tablet (1 g) by mouth 4 times daily (before meals and nightly) "   Quantity:  120 tablet   Refills:  0         CONTINUE these medicines which may have CHANGED, or have new prescriptions. If we are uncertain of the size of tablets/capsules you have at home, strength may be listed as something that might have changed.        Dose / Directions    ACETAMINOPHEN PO   This may have changed:  Another medication with the same name was removed. Continue taking this medication, and follow the directions you see here.        Dose:  650 mg   Take 650 mg by mouth daily as needed for pain   Refills:  0       * furosemide 20 MG tablet   Commonly known as:  LASIX   This may have changed:  Another medication with the same name was changed. Make sure you understand how and when to take each.   Used for:  Lymphedema        Dose:  60 mg   Take 3 tablets (60 mg) by mouth every morning   Quantity:  30 tablet   Refills:  0       * furosemide 20 MG tablet   Commonly known as:  LASIX   This may have changed:  how much to take   Used for:  Lymphedema        Dose:  60 mg   Take 3 tablets (60 mg) by mouth daily (with lunch)   Quantity:  30 tablet   Refills:  0       * Notice:  This list has 2 medication(s) that are the same as other medications prescribed for you. Read the directions carefully, and ask your doctor or other care provider to review them with you.      CONTINUE these medicines which have NOT CHANGED        Dose / Directions    atenolol 50 MG tablet   Commonly known as:  TENORMIN        Dose:  50 mg   Take 50 mg by mouth daily   Refills:  0       Calcium Carb-Cholecalciferol 500-200 MG-UNIT Tabs   Commonly known as:  CALCIUM 500+D   Used for:  Vitamin D deficiency        Dose:  1 tablet   Take 1 tablet by mouth 2 times daily   Refills:  0       camphor-menthol 0.5-0.5 % Lotn   Commonly known as:  DERMASARRA   Used for:  Venous stasis        To affected areas on both legs Apply topically daily To affected areas on both legs   Refills:  0       ferrous gluconate 324 (38 Fe) MG tablet   Commonly  known as:  FERGON   Used for:  Iron deficiency anemia, unspecified iron deficiency anemia type        Dose:  324 mg   Take 1 tablet (324 mg) by mouth daily (with breakfast)   Quantity:  100 tablet   Refills:  0       GABAPENTIN PO        Dose:  300 mg   Take 300 mg by mouth 2 times daily   Refills:  0       glipiZIDE 5 MG tablet   Commonly known as:  GLUCOTROL   Used for:  Type 2 diabetes mellitus with complication, without long-term current use of insulin (H)        Dose:  5 mg   Take 1 tablet (5 mg) by mouth 2 times daily (before meals)   Quantity:  30 tablet   Refills:  0       levothyroxine 100 MCG tablet   Commonly known as:  SYNTHROID/LEVOTHROID   Used for:  Hypothyroidism, unspecified type        Dose:  100 mcg   Take 1 tablet (100 mcg) by mouth daily   Quantity:  30 tablet   Refills:  0       lisinopril 5 MG tablet   Commonly known as:  PRINIVIL/ZESTRIL   Used for:  Essential hypertension        Dose:  5 mg   Take 1 tablet (5 mg) by mouth daily   Refills:  0       multivitamin Tabs per tablet   Used for:  Small bowel obstruction        Dose:  1 tablet   Take 1 tablet by mouth daily   Quantity:  30 tablet   Refills:  0       nitroGLYcerin 0.4 MG sublingual tablet   Commonly known as:  NITROSTAT   Used for:  Coronary artery disease involving native heart without angina pectoris, unspecified vessel or lesion type        Dose:  0.4 mg   Place 1 tablet (0.4 mg) under the tongue every 5 minutes as needed for chest pain   Quantity:  25 tablet   Refills:  0       omeprazole 20 MG CR capsule   Commonly known as:  priLOSEC   Used for:  Gastroesophageal reflux disease without esophagitis        Dose:  20 mg   Take 1 capsule (20 mg) by mouth daily   Quantity:  30 capsule   Refills:  0       polyethylene glycol Packet   Commonly known as:  MIRALAX/GLYCOLAX        Dose:  17 g   Take 17 g by mouth daily   Refills:  0       senna-docusate 8.6-50 MG per tablet   Commonly known as:  SENOKOT-S;PERICOLACE        Dose:  2 tablet    Take 2 tablets by mouth 2 times daily   Refills:  0       simethicone 80 MG chewable tablet   Commonly known as:  MYLICON   Used for:  Abdominal pain, generalized        Dose:  160 mg   Take 2 tablets (160 mg) by mouth every 6 hours as needed for flatulence or cramping   Quantity:  180 tablet   Refills:  0       simvastatin 10 MG tablet   Commonly known as:  ZOCOR   Used for:  Coronary artery disease involving native heart without angina pectoris, unspecified vessel or lesion type        Dose:  10 mg   Take 1 tablet (10 mg) by mouth At Bedtime   Quantity:  30 tablet   Refills:  0       tamsulosin 0.4 MG capsule   Commonly known as:  FLOMAX   Used for:  Urinary retention        Dose:  0.4 mg   Take 1 capsule (0.4 mg) by mouth every evening   Quantity:  60 capsule   Refills:  0       triamcinolone 0.5 % cream   Commonly known as:  KENALOG        Apply topically three times a week Mon/Wed /Fri   Refills:  0       WARFARIN SODIUM PO        Take by mouth daily 3 mg   MWF, 4 mg Tues,Thur, Sat,Sun   Refills:  0            Where to get your medicines      Some of these will need a paper prescription and others can be bought over the counter. Ask your nurse if you have questions.     Bring a paper prescription for each of these medications     sucralfate 1 GM tablet       You don't need a prescription for these medications     ceFAZolin 1 GM vial    furosemide 20 MG tablet                Protect others around you: Learn how to safely use, store and throw away your medicines at www.disposemymeds.org.        ANTIBIOTIC INSTRUCTION     You've Been Prescribed an Antibiotic - Now What?  Your healthcare team thinks that you or your loved one might have an infection. Some infections can be treated with antibiotics, which are powerful, life-saving drugs. Like all medications, antibiotics have side effects and should only be used when necessary. There are some important things you should know about your antibiotic treatment.       Your healthcare team may run tests before you start taking an antibiotic.    Your team may take samples (e.g., from your blood, urine or other areas) to run tests to look for bacteria. These test can be important to determine if you need an antibiotic at all and, if you do, which antibiotic will work best.      Within a few days, your healthcare team might change or even stop your antibiotic.    Your team may start you on an antibiotic while they are working to find out what is making you sick.    Your team might change your antibiotic because test results show that a different antibiotic would be better to treat your infection.    In some cases, once your team has more information, they learn that you do not need an antibiotic at all. They may find out that you don't have an infection, or that the antibiotic you're taking won't work against your infection. For example, an infection caused by a virus can't be treated with antibiotics. Staying on an antibiotic when you don't need it is more likely to be harmful than helpful.      You may experience side effects from your antibiotic.    Like all medications, antibiotics have side effects. Some of these can be serious.    Let you healthcare team know if you have any known allergies when you are admitted to the hospital.    One significant side effect of nearly all antibiotics is the risk of severe and sometimes deadly diarrhea caused by Clostridium difficile (C. Difficile). This occurs when a person takes antibiotics because some good germs are destroyed. Antibiotic use allows C. diificile to take over, putting patients at high risk for this serious infection.    As a patient or caregiver, it is important to understand your or your loved one's antibiotic treatment. It is especially important for caregivers to speak up when patients can't speak for themselves. Here are some important questions to ask your healthcare team.    What infection is this antibiotic treating  and how do you know I have that infection?    What side effects might occur from this antibiotic?    How long will I need to take this antibiotic?    Is it safe to take this antibiotic with other medications or supplements (e.g., vitamins) that I am taking?     Are there any special directions I need to know about taking this antibiotic? For example, should I take it with food?    How will I be monitored to know whether my infection is responding to the antibiotic?    What tests may help to make sure the right antibiotic is prescribed for me?      Information provided by:  www.cdc.gov/getsmart  U.S. Department of Health and Human Services  Centers for disease Control and Prevention  National Center for Emerging and Zoonotic Infectious Diseases  Division of Healthcare Quality Promotion             Medication List: This is a list of all your medications and when to take them. Check marks below indicate your daily home schedule. Keep this list as a reference.      Medications           Morning Afternoon Evening Bedtime As Needed    ACETAMINOPHEN PO   Take 650 mg by mouth daily as needed for pain   Last time this was given:  650 mg on 4/14/2018 11:39 PM                                atenolol 50 MG tablet   Commonly known as:  TENORMIN   Take 50 mg by mouth daily   Last time this was given:  50 mg on 4/15/2018 12:10 PM                                Calcium Carb-Cholecalciferol 500-200 MG-UNIT Tabs   Commonly known as:  CALCIUM 500+D   Take 1 tablet by mouth 2 times daily                                camphor-menthol 0.5-0.5 % Lotn   Commonly known as:  DERMASARRA   To affected areas on both legs Apply topically daily To affected areas on both legs                                ceFAZolin 1 GM vial   Commonly known as:  ANCEF   Inject 2 g into the vein every 8 hours for 24 days CBC with differential, creatinine, SGOT weekly while on this medication to be faxed to Dr. Chavez office.                                 ferrous gluconate 324 (38 Fe) MG tablet   Commonly known as:  FERGON   Take 1 tablet (324 mg) by mouth daily (with breakfast)                                * furosemide 20 MG tablet   Commonly known as:  LASIX   Take 3 tablets (60 mg) by mouth every morning   Last time this was given:  60 mg on 4/15/2018  8:09 AM                                * furosemide 20 MG tablet   Commonly known as:  LASIX   Take 3 tablets (60 mg) by mouth daily (with lunch)   Last time this was given:  60 mg on 4/15/2018  8:09 AM                                GABAPENTIN PO   Take 300 mg by mouth 2 times daily   Last time this was given:  300 mg on 4/15/2018  8:10 AM                                glipiZIDE 5 MG tablet   Commonly known as:  GLUCOTROL   Take 1 tablet (5 mg) by mouth 2 times daily (before meals)   Last time this was given:  5 mg on 4/15/2018  8:10 AM                                levothyroxine 100 MCG tablet   Commonly known as:  SYNTHROID/LEVOTHROID   Take 1 tablet (100 mcg) by mouth daily   Last time this was given:  100 mcg on 4/15/2018  8:10 AM                                lisinopril 5 MG tablet   Commonly known as:  PRINIVIL/ZESTRIL   Take 1 tablet (5 mg) by mouth daily   Last time this was given:  5 mg on 4/15/2018  8:10 AM                                multivitamin Tabs per tablet   Take 1 tablet by mouth daily                                nitroGLYcerin 0.4 MG sublingual tablet   Commonly known as:  NITROSTAT   Place 1 tablet (0.4 mg) under the tongue every 5 minutes as needed for chest pain                                omeprazole 20 MG CR capsule   Commonly known as:  priLOSEC   Take 1 capsule (20 mg) by mouth daily   Last time this was given:  20 mg on 4/15/2018  8:11 AM                                polyethylene glycol Packet   Commonly known as:  MIRALAX/GLYCOLAX   Take 17 g by mouth daily   Last time this was given:  17 g on 4/15/2018  8:11 AM                                senna-docusate 8.6-50 MG per  tablet   Commonly known as:  SENOKOT-S;PERICOLACE   Take 2 tablets by mouth 2 times daily   Last time this was given:  2 tablets on 4/15/2018  8:11 AM                                simethicone 80 MG chewable tablet   Commonly known as:  MYLICON   Take 2 tablets (160 mg) by mouth every 6 hours as needed for flatulence or cramping   Last time this was given:  160 mg on 4/10/2018  8:48 PM                                simvastatin 10 MG tablet   Commonly known as:  ZOCOR   Take 1 tablet (10 mg) by mouth At Bedtime   Last time this was given:  10 mg on 4/14/2018  9:58 PM                                sucralfate 1 GM tablet   Commonly known as:  CARAFATE   Take 1 tablet (1 g) by mouth 4 times daily (before meals and nightly)   Last time this was given:  1 g on 4/15/2018 12:11 PM                                tamsulosin 0.4 MG capsule   Commonly known as:  FLOMAX   Take 1 capsule (0.4 mg) by mouth every evening   Last time this was given:  0.4 mg on 4/14/2018  9:57 PM                                triamcinolone 0.5 % cream   Commonly known as:  KENALOG   Apply topically three times a week Mon/Wed /Fri                                WARFARIN SODIUM PO   Take by mouth daily 3 mg   MWF, 4 mg Tues,Thur, Sat,Sun   Last time this was given:  3.5 mg on 4/14/2018  5:20 PM                                * Notice:  This list has 2 medication(s) that are the same as other medications prescribed for you. Read the directions carefully, and ask your doctor or other care provider to review them with you.

## 2018-04-03 NOTE — IP AVS SNAPSHOT
` `     [place] [address] MEDICAL SURGICAL: [phone]                 INTERAGENCY TRANSFER FORM - NOTES (H&P, Discharge Summary, Consults, Procedures, Therapies)   4/3/2018                    Hospital Admission Date: 4/3/2018  BRIANA BAGLEY   : 1927  Sex: Male        Patient PCP Information     Provider PCP Type    [facility]         History & Physicals      H&P by Micki Ackerman PA-C at 4/3/2018  1:43 PM     Author:  Micki Ackerman PA-C Service:  Hospitalist Author Type:  Physician Assistant - FAERED    Filed:  4/3/2018  4:59 PM Date of Service:  4/3/2018  1:43 PM Creation Time:  4/3/2018  1:43 PM    Status:  Attested :  Micki Ackerman PA-C (Physician Assistant - FAREED)    Cosigner:  Lanie Harvey MD at 4/3/2018  5:18 PM        Attestation signed by Lanie Harvey MD at 4/3/2018  5:18 PM        Physician Attestation   ILanie, saw and evaluated Briana Bagley as part of a shared visit.  I have reviewed and discussed with the advanced practice provider their history, physical and plan.    I personally reviewed the vital signs, medications, labs and imaging.    My key history or physical exam findings: 19-year-old with past medical history significant for hypertension, coronary artery disease, atrial fibrillation on Coumadin, hyperlipidemia presented to the emergency room with concerns for frequent falls.  Evaluation in the emergency room was concerning for possible sepsis from left lower extremity cellulitis.  He had elevated CRP significant lactic acidosis and leukocytosis.  Admitted for sepsis possibly from cellulitis.  Cannot exclude pneumonia patient was hypoxic in the emergency room requiring supplemental O2.  Chest x-ray with questionable pneumonia versus atelectasis.  Blood cultures sent started on IV vancomycin and Ancef, IV hydration  Narrow antibiotics based on blood cultures.  Monitor on telemetry for  further evaluation of syncope and falls.      Key management decisions made by me: Management and plan    Lanie Harvey  Date of Service (when I saw the patient): 04/03/18                               History and Physical     Ton Bagley MRN# [id number]   YOB: 1927 Age: 89 year old      Date of Admission:  4/3/2018    Primary care provider: Jose Shell          Assessment and Plan:   Ton Bagley is a 89 year old male with a PMH significant for atrial fibrillation on warfarin, likely lymphdedema/venous stasis, MI, HTN, memory loss and HLP who presents after a fall.     Patient was discussed with Dr. Gonzalez, who was provider in ED. Chart review of ED work up was performed and is as follows: Vitals show temp of 98, pulse 82, and pressure 124/64 with spontaneous respirations and no hypoxia. Labs remarkable for Creatinine 1.56, BUN 27,  normal electrolytes, T bili 1.7 with other LFTS normal, , Lactic acid elevated at 3.1 and lipase low at 66. WBC elevated at 22.5, Hgb 13.3. UA shows 11 WBC, trace LE and negative nitrites will send for culture, blood cultures ordered x 2, CT chest/abdomen/pelvis does not show evidence of fracture or infection but there is a prominent left groin lymph node (same side as cellulitis) that is likely reactive.  Chart review of Care Everywhere, previous visits and admissions were also reviewed.[NJ1.1]    Before leaving ED patient started to become hypoxic and lactic acid checked at 1:30 had increased to 4.2. ED ordered another bolus of fluids. He is now on 3 litres satting in the low 90s. DVT of lower leg negative and INR is therapeutic so unlikely PE. NO echo on record since 2007 so will treat like fluid overload and hold further fluids with stat CXR.[NJ1.2] Echo ordered for AM[NJ1.3]    1. Sepsis due to left lower leg cellulitis  Appears to be involving only soft tissue as patient has pain with movement of ankle. Noted to have  elevated WBC and lactic acid with labile blood pressure (reports hx of low blood pressure). He is not febrile, but reports 2 recent falls and unclear as to why. Blood cultures were drawn prior to initiation of Vancomycin and Cefepime.   -Continue Vancomcyin and Cefepime while blood cultures are pending  -Doppler ultrasound of lower left leg pending  -Instructed ED nurse to yunior off redness in ED  -Repeat lactic acid this evening and in AM      2. Hx of A fib on warfarin  -Continue Atenolol on parameters  -Pharmacy consult for Warfarin  -Consider discussion with family about risk/benefit of warfarin with recent falls    3. DM II  -Continue Glipizide  -Will place on sliding scale  -Controlled carbohydrate diet    4. Low back pain  Patient present with low back pain after fall. No imaging done in ED due to sepsis work up. If pain continues after admission would pursue imaging with x ray first and consideration to non contrast CT or MRI. He does not report radicular symptoms.    5. HTN  Has been on Lisinopril and Lasix but pressures are soft so will hold    6. Dementia     7. HLP  Continue statin    8. Hypothyroidism  Continue Levothyroxine    9. GERD  Continue Omeprazole    10. Social  Lives in assisted living with wife and ambulates with cane. At least 2 falls recently and may need increased services.  -PT eval  -Social work[NJ1.4]      Social: Lives in assisted living and ambulates with cane. Reports of more frequent falls.  Code: Discussed with patient with daughter Meredith present and they have chosen DNR/DNI  VTE prophylaxis: On warfarin with therapeutic INR. Will continue for now until discussion can be had about risk/benefit  Disposition: Inpatient                    Chief Complaint:   Fall[NJ1.1] but found to have sepsis with cellulitis[NJ1.4]         History of Present Illness:[NJ1.1]   History limited due to memory issues. His daughter, Meredith is present, but can not provide much history so history obtained  from ED provider.[NJ1.4]    Ton Bagley is a 89 year old male who presents[NJ1.1] after a fall. He reports that he was ambulating to the bathroom and went to reach for the toilet. He missed the toilet with his and hand and went to sit down of which he also missed the toilet and fell to the ground. Reportedly, staff helped him up and put him to bed. ED staff noted he said he hit his head but did not lose consciousness but he told me he is not sure if he lost consciousness. He is also saying he fell a couple of other times. His daughter notes his right leg looks more red than usual and he reports it is painful and swollen. He also reports low back pain and stomach pain. He says his stool is always runny and he denies fever, cough, shortness of breath, urinary symptoms and chest pain. His wife is also in the ER with leg swelling.[NJ1.4]              Past Medical History:     Past Medical History:   Diagnosis Date     Atrial fibrillation (H)     cardioversion 2007     CAD (coronary artery disease)     CABG, stent x2 2004     HTN (hypertension)      Hyperlipidemia                Past Surgical History:     Past Surgical History:   Procedure Laterality Date     BYPASS GRAFT ARTERY CORONARY  1994    LIMA to LAD and saphenous vein grafts to Diag 1 OM 1 PDA     CARDIOVERSION  2007     LAPAROTOMY EXPLORATORY N/A 5/14/2017    Procedure: LAPAROTOMY EXPLORATORY;  Exploratory laparotomy with peritoneal washout. ;  Surgeon: Jorge Dias MD;  Location: [facility] OR     STENT, CORONARY, S660 15/18  2004    stent placement of SVG to RCA and OM2               Social History:     Social History     Social History     Marital status:      Spouse name: N/A     Number of children: N/A     Years of education: N/A     Occupational History     Not on file.     Social History Main Topics     Smoking status: Former Smoker     Smokeless tobacco: Not on file      Comment: 1973     Alcohol use Yes      Comment: wine daily     Drug  use: Not on file     Sexual activity: Not on file     Other Topics Concern     Sleep Concern No     Special Diet No     Exercise No     Social History Narrative               Family History:   Family history reviewed and is non contributory         Allergies:    No Known Allergies            Medications:     Prior to Admission medications    Medication Sig Last Dose Taking? Auth Provider   glipiZIDE (GLUCOTROL) 5 MG tablet Take 1 tablet (5 mg) by mouth 2 times daily (before meals)   Nirmal Serrano MD   lisinopril (PRINIVIL/ZESTRIL) 5 MG tablet Take 1 tablet (5 mg) by mouth daily   Nirmal Serrano MD   metoprolol (LOPRESSOR) 50 MG tablet Take 1 tablet (50 mg) by mouth 2 times daily   Nirmal Serrano MD   ferrous gluconate (FERGON) 324 (38 FE) MG tablet Take 1 tablet (324 mg) by mouth daily (with breakfast)   Nirmal Serrano MD   polyethylene glycol (MIRALAX/GLYCOLAX) powder Take 17 g (1 capful) by mouth daily as needed for constipation   Nirmal Serrano MD   simethicone (MYLICON) 80 MG chewable tablet Take 2 tablets (160 mg) by mouth every 6 hours as needed for flatulence or cramping   Nirmal Serrano MD   warfarin (COUMADIN) 4 MG tablet Alternate 4mg and 6mg daily starting with 4mg tonight, then 6mg tomorrow night   Nirmal Serrano MD   nitroglycerin (NITROSTAT) 0.4 MG sublingual tablet Place 1 tablet (0.4 mg) under the tongue every 5 minutes as needed for chest pain   Nirmal Serrano MD   simvastatin (ZOCOR) 10 MG tablet Take 1 tablet (10 mg) by mouth At Bedtime   Nirmal Serrano MD   camphor-menthol (DERMASARRA) 0.5-0.5 % LOTN To affected areas on both legs Apply topically daily To affected areas on both legs   Nirmal Serrano MD   furosemide (LASIX) 20 MG tablet Take 3 tablets (60 mg) by mouth every morning   Nirmal Serrano MD   furosemide (LASIX) 20 MG tablet Take 1.5 tablets (30 mg) by mouth daily (with lunch)   Nirmal Serrano  MD Mati   Calcium Carb-Cholecalciferol (CALCIUM 500+D) 500-200 MG-UNIT TABS Take 1 tablet by mouth 2 times daily   Nirmal Serrano MD   tamsulosin (FLOMAX) 0.4 MG capsule Take 1 capsule (0.4 mg) by mouth every evening   Nirmal Serrano MD   multivitamin (I-REBEL) TABS per tablet Take 1 tablet by mouth daily   Nirmal Serrano MD   levothyroxine (SYNTHROID/LEVOTHROID) 100 MCG tablet Take 1 tablet (100 mcg) by mouth daily   Nirmal Serrano MD   omeprazole (PRILOSEC) 20 MG CR capsule Take 1 capsule (20 mg) by mouth daily   Nirmal Serrano MD              Review of Systems:   A Comprehensive greater than 10 system review of systems was carried out.  Pertinent positives and negatives are noted above.  Otherwise negative for contributory information.            Physical Exam:   Blood pressure 102/54, temperature 98  F (36.7  C), temperature source Oral, resp. rate 16, SpO2 93 %.  Exam:  GENERAL:  Comfortable.  PSYCH: pleasant, oriented[NJ1.1] to place but not to history or recent events[NJ1.4], No acute distress.  HEENT:[NJ1.1]   Abrasion noted on left side of scalp,[NJ1.4] PERRLA. Normal conjunctiva,[NJ1.1] hard of[NJ1.4] hearing, nasal mucosa and Oropharynx are normal.  NECK:  Supple, no neck vein distention, adenopathy or bruits, normal thyroid.  HEART:  Normal S1, S2 with no murmur, no pericardial rub, gallops or S3 or S4.[NJ1.1] Bruising noted on chest wall.[NJ1.4]  LUNGS:  Clear to auscultation, normal Respiratory effort. No wheezing, rales or ronchi.[NJ1.1]  BACK: Attempted to look at low back where pain is. No redness or bruising noted.[NJ1.4]  ABDOMEN:  Soft, no hepatosplenomegaly, normal bowel sounds. Non-tender, non distended.   EXTREMITIES:[NJ1.1]  Bilateral lymphedema present with greater swelling on left. Purplish discoloration of both anterior calves. There is noted increased redness of left calf extending into ankle and up into medial thigh. No crepitus noted. Leg is  warm but not significantly tender where I am touching. Ankle movement does not create significant pain.[NJ1.4]  SKIN:  Dry to touch, No rash, wound or ulcerations.  NEUROLOGIC:  CN 2-12[NJ1.1] grossly[NJ1.4] intact, sensation is intact with no focal deficits.               Data:[NJ1.1]       Recent Labs  Lab 04/03/18  1042   WBC 22.5*   HGB 13.3   HCT 41.8   *          Recent Labs  Lab 04/03/18  1044 04/03/18  1042   NA  --  139   POTASSIUM  --  3.9   CHLORIDE  --  102   CO2  --  31   ANIONGAP  --  6   GLC  --  165*   BUN  --  27   CR  --  1.56*   GFRESTIMATED 38* 42*   GFRESTBLACK 46* 51*   JANN  --  9.0   PROTTOTAL  --  7.2   ALBUMIN  --  3.3*   BILITOTAL  --  1.7*   ALKPHOS  --  76   AST  --  30   ALT  --  30       Recent Labs  Lab 04/03/18  1042   LACT 3.1*         Recent Results (from the past 24 hour(s))   CT Chest Abdomen Pelvis w/o Contrast    Narrative    CT CHEST/ABDOMEN/PELVIS WITHOUT CONTRAST April 3, 2018 11:44 AM     HISTORY: Trauma, on blood thinners, left rib pain.      TECHNIQUE: Axial images from the thoracic inlet to the symphysis are  performed with additional coronal reformatted images. No contrast is  utilized. Radiation dose for this scan was reduced using automated  exposure control, adjustment of the mA and/or kV according to patient  size, or iterative reconstruction technique.    FINDINGS:     Chest: Calcified granuloma is noted in the right lower lobe on series  6, image 120. Lungs are otherwise clear without infiltrate,  consolidation or mass. No pleural or pericardial fluid. Heart is  enlarged. Prior CABG. Coronary artery calcifications are present.  Esophagus is unremarkable. No enlarged lymph nodes in the chest or  axillas.    Abdomen: Prior cholecystectomy changes. The liver, spleen, pancreas  and adrenal glands are unremarkable. Bilateral simple renal cysts are  present. 0.4 cm nonobstructing stone is present in the left renal  collecting system on series 2, image 57.  There is no hydronephrosis or  additional urinary tract calculi. Aorta demonstrates calcified plaque  without aneurysm or retroperitoneal hemorrhage. Small left periaortic  lymph nodes are present but none are significantly enlarged by CT  criteria. The bowel is normal in caliber without obstruction or  diverticulitis. Appendix is normal.    Pelvis: The bladder, prostate and rectum are unremarkable. No enlarged  pelvic lymph nodes are appreciated. A few prominent possibly reactive  lymph nodes are noted in the left inguinal region on series 2, image  115. The largest measures approximately 2.4 x 1.4 cm. Degenerative  spine changes are present. No evidence of rib or spine fracture.      Impression    IMPRESSION:  1. No acute changes in the chest, abdomen or pelvis. No evidence of  fractures. Bilateral renal cortical cysts are noted with a  nonobstructing stone left renal collecting system. Prior  cholecystectomy changes. Evidence of old granulomatous disease.  2. Prominent left groin lymph nodes of uncertain etiology. Reactive  adenopathy is suspected.  3. Postoperative changes from coronary artery bypass graft. Calcified  plaque is noted throughout the aorta without evidence of aneurysm.    PRISCILA TINEO MD   CT Head w/o Contrast    Narrative    CT SCAN OF THE HEAD WITHOUT CONTRAST   4/3/2018 11:45 AM     HISTORY: fall;     TECHNIQUE:  Axial images of the head and coronal reformations without  IV contrast material. Radiation dose for this scan was reduced using  automated exposure control, adjustment of the mA and/or kV according  to patient size, or iterative reconstruction technique.    COMPARISON: None.    FINDINGS:  There is generalized atrophy of the brain.  White matter  changes are present in the cerebral hemispheres that are consistent  with small vessel ischemic disease in this age patient. There is no  evidence of intracranial hemorrhage, mass, acute infarct or anomaly.  The visualized portions of the  sinuses and mastoids appear normal. No  intracranial hemorrhage or skull fractures are identified.      Impression    IMPRESSION: No bleed or fractures are identified.      LIONEL VEE MD[NJ1.5]         Micki Ackerman PA-C    This patient was seen and discussed with Dr. Harvey who agrees with the current plans as outlined above.[NJ1.1]        Revision History        User Key Date/Time User Provider Type Action    > NJ1.3 4/3/2018  4:59 PM Micki Ackerman PA-C Physician Assistant - FAREED Sign     NJ1.2 4/3/2018  4:29 PM Micki Ackerman PA-C Physician Assistant Tobin GUERRIER Sign     NJ1.4 4/3/2018  2:39 PM Micki Ackerman PA-C Physician Assistant Tobin GUERRIER      NJ1.5 4/3/2018  1:45 PM Micki Ackerman PA-C Physician Assistant - C      NJ1.1 4/3/2018  1:43 PM Micki Ackerman PA-C Physician Assistant - FAREED                      Discharge Summaries      Discharge Summaries by Lanie Harvey MD at 4/15/2018 10:09 AM     Author:  Lanie Harvey MD Service:  Hospitalist Author Type:  Physician    Filed:  4/15/2018 10:09 AM Date of Service:  4/15/2018 10:09 AM Creation Time:  4/15/2018 10:07 AM    Status:  Signed :  Lanie Harvey MD (Physician)             [facility]  Discharge Summary  Hospitalist      Date of Admission:  4/3/2018  Date of Discharge:  4/15/2018  Provider:  Lanie Harvey MD  Date of Service (when I last saw the patient): 04/14/18      Primary Provider: [facility]          Discharge Diagnosis:   Discharge Diagnoses   Sepsis secondary to MSSA bacteremia and strep mitis  Left lower extremity cellulitis  Acute hypoxemic respiratory failure  Papular rash.  Suspect  Dermatitis  History of falls  Chronic atrial fibrillation  Aortic stenosis  Lymphedema  Acute kidney injury      Other medical issues:  Past Medical History:   Diagnosis Date     Atrial fibrillation (H)     cardioversion 2007     CAD (coronary artery disease)     CABG,  stent x2 2004     HTN (hypertension)      Hyperlipidemia           History of Present Illness   Ton Bagley is an 89 year old male who presented to ER after a fall.  Please see the admission history and physical for full details.    Hospital Course     Ton Bagley was admitted on 4/3/2018.  He is a 89-year-old male with pmhx of A. fib on warfarin, lymphedema, CAD, HTN, DMII, dementia, and HLD admitted on 4/3/2018 after a fall.  Head CT negative for acute bleed.  Labs notable for an PRADEEP and leukocytosis.  He was hypotensive initially and elevated lactate.  He was given fluid boluses and developed some hypoxia in the ED.  He was started on vancomycin and cefepime for sepsis.  Blood cultures grew MSSA and strep mitis.  Antibiotics narrowed to cefazolin per ID who were consulted.  PT and OT consulted due to falls.  Leukocytosis and fevers resolved.  TTE showing aortic stenosis.  He had persistent hypoxia and weight was up requiring IV diuresis on 4/8.    Subsequently had signs of contraction alkalosis.  Prior to discharge diuretics were adjusted to dry weight of 222  pounds.  Patient will need IV antibiotics for a total of 4 weeks, he will need PT and OT therapies, will need to closely monitor his weights input intake and output and will need to wean supplemental O2.  He is being admitted to transitional care unit for ongoing cares and rehab.  Clinically patient is stable for discharge vitals are stable weight is stable on current diuretics is afebrile.  Oxygen is down to 1-2 L.  During hospitalization he developed rash on back, not felt to be due to antibiotics per ID so continuing Ancef.     The following problems were addressed during his hospitalization:    Sepsis due to MSSA bacteremia and strep mitis (cx from 4/3) and LLE cellulitis:   --Positive blood cultures for MSSA and strep mitis on 4/3.    --Portal of entry for bacteria is not completely clear but likely skin related.  He has severe venous  stasis of his lower legs bilaterally.  His left leg was erythematous so likely cellulitis.    --Leukocytosis and fever resolved.  Did have elevated lactic acid that has since resolved.  --Continue on cefazolin for 4 weeks total.  Infectious disease team consulting and put in discharge order for patient.  --Patient pulled PICC line 4/10 but was replaced same day  --Repeat blood cultures from 4/4 and 4/5 did not grow anything     Rash: Papular rash on back that is itchy.  Has been on Ancef for some time so doubt that all of a sudden reaction to this would occur, ID in agreement.  No change to antibiotics.  --On topical hydrocortisone 1% cream 3 times daily as needed for rash on back      Fall: Fell when walking to the bathroom.  He denies loss of consciousness.  Has had some other falls.  He is chronically on anticoagulant agent for A. Fib.  -PT and OT consultation     Acute hypoxemic respiratory failure:   Developed hypoxia in the ED following fluid boluses.  Chest x-ray showing some atelectasis versus possible left lower lobe pneumonia.  Suspect this is more volume related as he has significant peripheral edema on exam.  Have been able to wean oxygen with diuresis.  -Wean oxygen, intermittently needing 1 L while here  -Still using supplemental O2  -Wean as able      Chronic A. Fib: Continue PTA warfarin, pharmacy to dose.  Atenolol 50 mg daily for rate control.   --Follow INR     Aortic stenosis: TTE showing a significantly decreased area of the aortic valve, the flow gradient is only mild.  Does not sound like his falls are syncopal in origin.  If he does have syncope or light and is walking around would consider cardiology consultation to see if this could be contributing to his falls.  Does not appear to be outflow obstruction per the echocardiogram read.  --Has remained stable since     Lymphedema: Chronically on Lasix 60 mg morning and 30 mg afternoon.   -- Peak weight while here 237 pounds.  2-3+ tense lower  extremity edema bilaterally at worst.  Weight down to 222 pounds with diuresis.  Tends to get a contraction alkalosis when diuresed too quickly.  -Restart diuretics at 60 mg twice daily p.o. Lasix  -Daily weights and strict intake output  -sodium restriction      Type II DM: PTA on glipizide 5 mg twice daily.  Blood glucose well controlled here.  -Continue PTA glipizide  -Can stop frequent blood sugar checks and sliding scale insulin as blood sugars have been 100-150 while here      HTN: PTA on lisinopril 5 mg daily, atenolol 50 mg daily, and Lasix 60 mg morning and 30 mg afternoon.  Lisinopril and Lasix initially held due to PRADEEP.  -continue atenolol and lisinopril   -Lasix restarted 60 mg twice daily  -Ideal dry weight is 222 pounds     Abdominal pain:   -Improved with trial of Carafate 4 times daily,       Back pain:  Low back pain after fall.    -Improved      Dementia: Unclear severity, but lives at home with spouse.  Memory seems quite poor here and needs frequent reorientation.  -Avoid Haldol and other meds if possible, reorientation usually works      PRADEEP: Creatinine peak of 1.7.  Baseline appears to be 0.9-1.  Suspect secondary to bacteremia and Lasix/lisinopril use.  Resolved with IV fluids and holding lisinopril and diuretic initially.  -Follow BMP with diuresis      HLD  Continue statin      Hypothyroidism  Continue levothyroxin      GERD  Continue omeprazole  --added carafate as helped during hospitalization       Significant Results and Procedures   As noted    Pending Results   Unresulted Labs Ordered in the Past 30 Days of this Admission     No orders found from 2/2/2018 to 4/4/2018.          Code Status   DNR / DNI       Primary Care Physician   [facility]    Physical Exam   Temp: 97.3  F (36.3  C) Temp src: Oral BP: 123/57 Pulse: 63 Heart Rate: 59 Resp: 16 SpO2: 91 % O2 Device: Nasal cannula Oxygen Delivery: 1/2 LPM  Vitals:    04/13/18 1356 04/14/18 0500 04/15/18 0602   Weight:  101.4 kg (223 lb 9.6 oz) 101.1 kg (222 lb 14.4 oz) 101.2 kg (223 lb)     Vital Signs with Ranges  Temp:  [96.9  F (36.1  C)-98.6  F (37  C)] 97.3  F (36.3  C)  Pulse:  [63] 63  Heart Rate:  [59-66] 59  Resp:  [16-20] 16  BP: (116-150)/(57-65) 123/57  SpO2:  [91 %-95 %] 91 %  I/O last 3 completed shifts:  In: 200 [P.O.:200]  Out: 100 [Urine:100]    Constitutional: Awake, NAD   HEENT:   MMM  Respiratory: No crackles.  No wheeze  Cardiovascular: Irregularly, irregular rhythm.  Regular rate.  2/6 systolic murmur  GI: Nontender to palpation,  not distended, bowel sounds present  Skin: Chronic venous stasis changes/purple/redness of lower extremities bilaterally   Musculoskeletal/extremities: 1-2+ edema right leg, 2-3+ edema left leg  Neurologic: Alert, confused but conversational, not oriented to place or date.  Psychiatric: calm, cooperative with cares    Discharge Disposition   Discharged to short-term care facility    Consultations This Hospital Stay   PHARMACY TO DOSE VANCO  PHARMACY TO DOSE WARFARIN  PHYSICAL THERAPY ADULT IP CONSULT  OCCUPATIONAL THERAPY ADULT IP CONSULT  INFECTIOUS DISEASES IP CONSULT  VASCULAR ACCESS ADULT IP CONSULT  SOCIAL WORK IP CONSULT  SOCIAL WORK IP CONSULT  VASCULAR ACCESS ADULT IP CONSULT  PHYSICAL THERAPY ADULT IP CONSULT  OCCUPATIONAL THERAPY ADULT IP CONSULT    Time Spent on this Encounter   I, Lanie Harvey, personally saw the patient today and spent greater than 30 minutes discharging this patient.    Discharge Orders     General info for SNF   Length of Stay Estimate: Short Term Care: Estimated # of Days <30  Condition at Discharge: Improving  Level of care:skilled   Rehabilitation Potential: Fair  Admission H&P remains valid and up-to-date: Yes  Recent Chemotherapy: N/A  Use Nursing Home Standing Orders: Yes     Mantoux instructions   Give two-step Mantoux (PPD) Per Facility Policy Yes     Reason for your hospital stay   Please refer to discharge summary     Intake and  output   Every shift     Daily weights   Call Provider for weight gain of more than 2 pounds per day or 5 pounds per week.     Follow Up and recommended labs and tests   Follow up with [facility].  The following labs/tests are recommended: Basic metabolic panel and INR in 2-3 days.    Follow-up with infectious disease after completion of IV antibiotics as directed     Activity - Up with nursing assistance     IV access   PICC.  Will need IV antibiotics as ordered by infectious disease     Physical Therapy Adult Consult   Evaluate and treat as clinically indicated.    Reason: Deconditioned     Occupational Therapy Adult Consult   Evaluate and treat as clinically indicated.    Reason: Deconditioned     Oxygen - Nasal cannula   1-2 Lpm by nasal cannula to keep O2 sats 92% or greater.  Wean as able  Continue incentive spirometry     Fall precautions     Advance Diet as Tolerated   Follow this diet upon discharge: Orders Placed This Encounter     Room Service     Combination Diet [id number] Calories: Moderate Consistent CHO (4-6 CHO units/meal); 2 gm NA Diet       Discharge Medications   Current Discharge Medication List      START taking these medications    Details   sucralfate (CARAFATE) 1 GM tablet Take 1 tablet (1 g) by mouth 4 times daily (before meals and nightly)  Qty: 120 tablet, Refills: 0    Associated Diagnoses: Other acute gastritis without hemorrhage      ceFAZolin (ANCEF) 1 GM vial Inject 2 g into the vein every 8 hours for 24 days CBC with differential, creatinine, SGOT weekly while on this medication to be faxed to Dr. Chavez office.  Qty: 1 each    Associated Diagnoses: Gram-positive bacteremia         CONTINUE these medications which have CHANGED    Details   !! furosemide (LASIX) 20 MG tablet Take 3 tablets (60 mg) by mouth daily (with lunch)  Qty: 30 tablet    Associated Diagnoses: Lymphedema       !! - Potential duplicate medications found. Please discuss with provider.      CONTINUE  these medications which have NOT CHANGED    Details   atenolol (TENORMIN) 50 MG tablet Take 50 mg by mouth daily      GABAPENTIN PO Take 300 mg by mouth 2 times daily      polyethylene glycol (MIRALAX/GLYCOLAX) Packet Take 17 g by mouth daily      senna-docusate (SENOKOT-S;PERICOLACE) 8.6-50 MG per tablet Take 2 tablets by mouth 2 times daily      triamcinolone (KENALOG) 0.5 % cream Apply topically three times a week Mon/Wed /Fri      WARFARIN SODIUM PO Take by mouth daily 3 mg   MWF, 4 mg Tues,Thur, Sat,Sun      glipiZIDE (GLUCOTROL) 5 MG tablet Take 1 tablet (5 mg) by mouth 2 times daily (before meals)  Qty: 30 tablet    Associated Diagnoses: Type 2 diabetes mellitus with complication, without long-term current use of insulin (H)      lisinopril (PRINIVIL/ZESTRIL) 5 MG tablet Take 1 tablet (5 mg) by mouth daily    Associated Diagnoses: Essential hypertension      ferrous gluconate (FERGON) 324 (38 FE) MG tablet Take 1 tablet (324 mg) by mouth daily (with breakfast)  Qty: 100 tablet    Associated Diagnoses: Iron deficiency anemia, unspecified iron deficiency anemia type      simethicone (MYLICON) 80 MG chewable tablet Take 2 tablets (160 mg) by mouth every 6 hours as needed for flatulence or cramping  Qty: 180 tablet    Associated Diagnoses: Abdominal pain, generalized      nitroglycerin (NITROSTAT) 0.4 MG sublingual tablet Place 1 tablet (0.4 mg) under the tongue every 5 minutes as needed for chest pain  Qty: 25 tablet    Associated Diagnoses: Coronary artery disease involving native heart without angina pectoris, unspecified vessel or lesion type      simvastatin (ZOCOR) 10 MG tablet Take 1 tablet (10 mg) by mouth At Bedtime  Qty: 30 tablet    Associated Diagnoses: Coronary artery disease involving native heart without angina pectoris, unspecified vessel or lesion type      camphor-menthol (DERMASARRA) 0.5-0.5 % LOTN To affected areas on both legs Apply topically daily To affected areas on both legs    Associated  Diagnoses: Venous stasis      !! furosemide (LASIX) 20 MG tablet Take 3 tablets (60 mg) by mouth every morning  Qty: 30 tablet    Associated Diagnoses: Lymphedema      Calcium Carb-Cholecalciferol (CALCIUM 500+D) 500-200 MG-UNIT TABS Take 1 tablet by mouth 2 times daily    Associated Diagnoses: Vitamin D deficiency      tamsulosin (FLOMAX) 0.4 MG capsule Take 1 capsule (0.4 mg) by mouth every evening  Qty: 60 capsule    Associated Diagnoses: Urinary retention      multivitamin (I-REBEL) TABS per tablet Take 1 tablet by mouth daily  Qty: 30 tablet, Refills: 0    Associated Diagnoses: Small bowel obstruction      levothyroxine (SYNTHROID/LEVOTHROID) 100 MCG tablet Take 1 tablet (100 mcg) by mouth daily  Qty: 30 tablet    Associated Diagnoses: Hypothyroidism, unspecified type      omeprazole (PRILOSEC) 20 MG CR capsule Take 1 capsule (20 mg) by mouth daily  Qty: 30 capsule    Associated Diagnoses: Gastroesophageal reflux disease without esophagitis      ACETAMINOPHEN PO Take 650 mg by mouth daily as needed for pain       !! - Potential duplicate medications found. Please discuss with provider.        Allergies   No Known Allergies  Data   Most Recent 3 CBC's:  Recent Labs   Lab Test  04/15/18   0610  04/12/18   0540  04/08/18   0945  04/06/18   0722  04/05/18   0651   WBC   --    --   8.0  8.7  8.9   HGB   --    --   12.2*  12.5*  11.6*   MCV   --    --   101*  100  104*   PLT  214  202  187  120*  120*      Most Recent 3 BMP's:  Recent Labs   Lab Test  04/14/18   0615  04/13/18   0515  04/12/18   1350  04/12/18   0540   NA  138  137   --   138   POTASSIUM  3.7  3.7  3.9  3.3*   CHLORIDE  97  97   --   99   CO2  40*  38*   --   37*   BUN  21  20   --   20   CR  0.98  0.95   --   0.92   ANIONGAP  1*  2*   --   2*   JANN  8.8  8.7   --   8.2*   GLC  140*  145*   --   138*     Most Recent 2 LFT's:  Recent Labs   Lab Test  04/03/18   1042  05/22/17   0646   AST  30  28   ALT  30  25   ALKPHOS  76  66   BILITOTAL  1.7*  0.4      Most Recent INR's and Anticoagulation Dosing History:  Anticoagulation Dose History     Recent Dosing and Labs Latest Ref Rng & Units 4/8/2018 4/9/2018 4/10/2018 4/11/2018 4/12/2018 4/13/2018 4/14/2018    CHELA IMS TEMPLATE - - - - - - 3.5 mg -    Warfarin 3 mg - - 3 mg - 3 mg 3 mg - -    Warfarin 4 mg - - - 4 mg - - - -    Warfarin 5 mg - 5 mg - - - - - -    INR 0.86 - 1.14 2.46(H) 2.68(H) 2.40(H) 2.66(H) 2.98(H) 2.64(H) 2.57(H)        Most Recent 3 Troponin's:  Recent Labs   Lab Test  04/04/18   0740  04/04/18   0100  04/03/18   1849   TROPI  <0.015  <0.015  <0.015     Most Recent Cholesterol Panel:  Recent Labs   Lab Test  05/22/17   0646 07/24/14   CHOL   --   126   LDL   --   53   HDL   --   36   TRIG  219*  186     Most Recent 6 Bacteria Isolates From Any Culture (See EPIC Reports for Culture Details):  Recent Labs   Lab Test  04/06/18   0721  04/05/18   0650  04/04/18   1604  04/03/18   1252  04/03/18   1204  05/14/17   1052   CULT  No growth  No growth  No growth  Cultured on the 1st day of incubation:  Staphylococcus aureus  *  Critical Value/Significant Value, preliminary result only, called to and read back by  Nell Valerio RN at 0952 4/4/18 [id number]    Cultured on the 1st day of incubation:  Streptococcus mitis group  *  Critical Value/Significant Value called to and read back by  Tamanna Evans RN at 1015 on [id number]    (Note)  POSITIVE for STAPHYLOCOCCUS AUREUS and NEGATIVE for the mecA gene  (not MRSA) by [employer] multiplex nucleic acid test. The mecA gene was  not detected. Final identification and antimicrobial susceptibility  testing will be verified by standard methods.    Specimen tested with [employer] multiplex, gram-positive blood culture  nucleic acid test for the following targets: Staph aureus, Staph  epidermidis, Staph lugdunensis, other Staph species, Enterococcus  faecalis, Enterococcus faecium, Streptococcus species, S. agalactiae,  S. anginosus grp., S. pneumoniae, S. pyogenes,  Listeria sp., mecA  (methicillin resistance) and Renetta/B (vancomycin resistance).    Critical Value/Significant Value called to and read back by  Komal Mckeon RN @2570 04/04/18 gd    No growth  No anaerobes isolated  No growth     Most Recent TSH, T4 and A1c Labs:  Recent Labs   Lab Test  11/21/12   1450   TSH  8.52*     Results for orders placed or performed during the hospital encounter of 04/03/18   CT Head w/o Contrast    Narrative    CT SCAN OF THE HEAD WITHOUT CONTRAST   4/3/2018 11:45 AM     HISTORY: fall;     TECHNIQUE:  Axial images of the head and coronal reformations without  IV contrast material. Radiation dose for this scan was reduced using  automated exposure control, adjustment of the mA and/or kV according  to patient size, or iterative reconstruction technique.    COMPARISON: None.    FINDINGS:  There is generalized atrophy of the brain.  White matter  changes are present in the cerebral hemispheres that are consistent  with small vessel ischemic disease in this age patient. There is no  evidence of intracranial hemorrhage, mass, acute infarct or anomaly.  The visualized portions of the sinuses and mastoids appear normal. No  intracranial hemorrhage or skull fractures are identified.      Impression    IMPRESSION: No bleed or fractures are identified.      LIONEL VEE MD   CT Chest Abdomen Pelvis w/o Contrast    Narrative    CT CHEST/ABDOMEN/PELVIS WITHOUT CONTRAST April 3, 2018 11:44 AM     HISTORY: Trauma, on blood thinners, left rib pain.      TECHNIQUE: Axial images from the thoracic inlet to the symphysis are  performed with additional coronal reformatted images. No contrast is  utilized. Radiation dose for this scan was reduced using automated  exposure control, adjustment of the mA and/or kV according to patient  size, or iterative reconstruction technique.    FINDINGS:     Chest: Calcified granuloma is noted in the right lower lobe on series  6, image 120. Lungs are otherwise clear  without infiltrate,  consolidation or mass. No pleural or pericardial fluid. Heart is  enlarged. Prior CABG. Coronary artery calcifications are present.  Esophagus is unremarkable. No enlarged lymph nodes in the chest or  axillas.    Abdomen: Prior cholecystectomy changes. The liver, spleen, pancreas  and adrenal glands are unremarkable. Bilateral simple renal cysts are  present. 0.4 cm nonobstructing stone is present in the left renal  collecting system on series 2, image 57. There is no hydronephrosis or  additional urinary tract calculi. Aorta demonstrates calcified plaque  without aneurysm or retroperitoneal hemorrhage. Small left periaortic  lymph nodes are present but none are significantly enlarged by CT  criteria. The bowel is normal in caliber without obstruction or  diverticulitis. Appendix is normal.    Pelvis: The bladder, prostate and rectum are unremarkable. No enlarged  pelvic lymph nodes are appreciated. A few prominent possibly reactive  lymph nodes are noted in the left inguinal region on series 2, image  115. The largest measures approximately 2.4 x 1.4 cm. Degenerative  spine changes are present. No evidence of rib or spine fracture.      Impression    IMPRESSION:  1. No acute changes in the chest, abdomen or pelvis. No evidence of  fractures. Bilateral renal cortical cysts are noted with a  nonobstructing stone left renal collecting system. Prior  cholecystectomy changes. Evidence of old granulomatous disease.  2. Prominent left groin lymph nodes of uncertain etiology. Reactive  adenopathy is suspected.  3. Postoperative changes from coronary artery bypass graft. Calcified  plaque is noted throughout the aorta without evidence of aneurysm.    PRISCILA TINEO MD   US Lower Extremity Venous Duplex Left    Narrative    ULTRASOUND VENOUS LEFT LOWER EXTREMITY  4/3/2018 3:15 PM     HISTORY:  Pain, swelling.     COMPARISON: None.    TECHNIQUE: Ultrasound gray scale, Color Doppler flow, and  spectral  Doppler waveform analysis performed.    FINDINGS:  The left common femoral, superficial femoral, popliteal and  posterior tibial veins are patent and fully compressible and  demonstrate normal venous Doppler flow. The visualized greater  saphenous vein is negative for thrombus.       Impression    IMPRESSION: No DVT demonstrated.    SHAGUFTA FLORES MD   XR Chest 2 Views    Narrative    CHEST TWO VIEWS    4/3/2018 4:38 PM     HISTORY: New hypoxia.     COMPARISON: 5/27/2017.    FINDINGS: Sternal wires and mediastinal clips. No pneumothorax. The  heart is enlarged without pulmonary edema. There is a mild left  basilar infiltrate. Lungs are otherwise clear. No pleural effusion.      Impression    IMPRESSION: Mild left basilar atelectasis or pneumonia.    CARL MITCHELL MD   XR Chest Port 1 View    Narrative    PORTABLE CHEST ONE VIEW   4/6/2018 5:41 PM     HISTORY: RN placed PICC, verify tip.    COMPARISON: 4/3/2018.    FINDINGS: Upright portable chest. A right PICC has been placed. The  tip is difficult to see but is probably in the mid SVC in good  position. No pneumothorax. Sternal wires in place. The heart is  enlarged without pulmonary edema. There is mild bibasilar infiltrate.      Impression    IMPRESSION: Right PICC with tip probably in the mid SVC.    CARL MITCHELL MD   XR Chest Port 1 View    Narrative    CHEST ONE VIEW PORTABLE  4/10/2018 2:21 PM     HISTORY: RN placed PICC - verify tip placement.     COMPARISON: Chest x-rays dated 4/8/2018.      Impression    IMPRESSION:  1. Hypoaeration, sternal cerclage wires, coronary artery markers and  mediastinal clips, cardiomegaly and mild vascular congestion are  essentially stable since the prior study.  2. Right-sided PICC line is again noted. The tip of the PICC line is  difficult to see but may be projected over the upper right atrium.  Contrast injection into the PICC line and repeat imaging may be  helpful to better evaluate the tip of the  tube.    RAMSEY NUÑEZ MD   XR Chest Port 1 View    Narrative    CHEST ONE VIEW PORTABLE  4/10/2018 3:23 PM     HISTORY:  RN placed PICC - verify tip placement.    COMPARISON: Chest x-rays dated 4/10/2018 at 2:15 PM.      Impression    IMPRESSION:  1. Right PICC line is again noted. Tip is still difficult to see on  this study due to underpenetration of the mediastinum, but appears to  be in the upper right atrium just distal to the right atrial/superior  vena caval junction.  2. No other changes.    RAMSEY NUÑEZ MD   XR Chest Port 1 View    Narrative    CHEST ONE VIEW PORTABLE 4/10/2018 4:33 PM     HISTORY: PICC line retracted 2 cm, verify tip.    COMPARISON: 4/10/2018 at 1516.      Impression    IMPRESSION: No change in position of right-sided PICC line. Prior  median sternotomy again noted. Moderate vascular congestion has  slightly increased since the prior exam. Interval development of a  focus of platelike atelectasis or early infiltrate in the left lung  base. Heart size is unchanged.    JA MCCLURE MD           Disclaimer: This note consists of symbols derived from keyboarding, dictation and/or voice recognition software. As a result, there may be errors in the script that have gone undetected. Please consider this when interpreting information found in this chart.[SM1.1]     Revision History        User Key Date/Time User Provider Type Action    > SM1.1 4/15/2018 10:09 AM Lanie Harvey MD Physician Sign            Discharge Summaries by Lanie Harvey MD at 4/14/2018 11:45 AM     Author:  Lanie Harvey MD Service:  Hospitalist Author Type:  Physician    Filed:  4/14/2018 11:45 AM Date of Service:  4/14/2018 11:45 AM Creation Time:  4/14/2018 11:32 AM    Status:  Signed :  Lanie Harvey MD (Physician)             [facility]  Discharge Summary  Hospitalist      Date of Admission:  4/3/2018  Date of Discharge:  4/14/2018  Provider:  Lanie Harvey  MD  Date of Service (when I last saw the patient): 04/14/18      Primary Provider: Jose Shell          Discharge Diagnosis:   Discharge Diagnoses   Sepsis secondary to MSSA bacteremia and strep mitis  Left lower extremity cellulitis  Acute hypoxemic respiratory failure  Papular rash.  Suspect  Dermatitis  History of falls  Chronic atrial fibrillation  Aortic stenosis  Lymphedema  Acute kidney injury      Other medical issues:  Past Medical History:   Diagnosis Date     Atrial fibrillation (H)     cardioversion 2007     CAD (coronary artery disease)     CABG, stent x2 2004     HTN (hypertension)      Hyperlipidemia           History of Present Illness   Ton Bagley is an 89 year old male who presented to ER after a fall.  Please see the admission history and physical for full details.    Hospital Course     Ton Bagley was admitted on 4/3/2018.  He is a 89-year-old male with pmhx of A. fib on warfarin, lymphedema, CAD, HTN, DMII, dementia, and HLD admitted on 4/3/2018 after a fall.  Head CT negative for acute bleed.  Labs notable for an PRADEEP and leukocytosis.  He was hypotensive initially and elevated lactate.  He was given fluid boluses and developed some hypoxia in the ED.  He was started on vancomycin and cefepime for sepsis.  Blood cultures grew MSSA and strep mitis.  Antibiotics narrowed to cefazolin per ID who were consulted.  PT and OT consulted due to falls.  Leukocytosis and fevers resolved.  TTE showing aortic stenosis.  He had persistent hypoxia and weight was up requiring IV diuresis on 4/8.    Subsequently had signs of contraction alkalosis.  Prior to discharge diuretics were adjusted to dry weight of 222  pounds.  Patient will need IV antibiotics for a total of 4 weeks, he will need PT and OT therapies, will need to closely monitor his weights input intake and output and will need to wean supplemental O2.  He is being admitted to transitional care unit for ongoing cares and  rehab.  Clinically patient is stable for discharge vitals are stable weight is stable on current diuretics is afebrile.  Oxygen is down to 1-2 L.  During hospitalization he developed rash on back, not felt to be due to antibiotics per ID so continuing Ancef.     The following problems were addressed during his hospitalization:    Sepsis due to MSSA bacteremia and strep mitis (cx from 4/3) and LLE cellulitis:   --Positive blood cultures for MSSA and strep mitis on 4/3.    --Portal of entry for bacteria is not completely clear but likely skin related.  He has severe venous stasis of his lower legs bilaterally.  His left leg was erythematous so likely cellulitis.    --Leukocytosis and fever resolved.  Did have elevated lactic acid that has since resolved.  --Continue on cefazolin for 4 weeks total.  Infectious disease team consulting and put in discharge order for patient.  --Patient pulled PICC line 4/10 but was replaced same day  --Repeat blood cultures from 4/4 and 4/5 did not grow anything     Rash: Papular rash on back that is itchy.  Has been on Ancef for some time so doubt that all of a sudden reaction to this would occur, ID in agreement.  No change to antibiotics.  --On topical hydrocortisone 1% cream 3 times daily as needed for rash on back      Fall: Fell when walking to the bathroom.  He denies loss of consciousness.  Has had some other falls.  He is chronically on anticoagulant agent for A. Fib.  -PT and OT consultation     Acute hypoxemic respiratory failure:   Developed hypoxia in the ED following fluid boluses.  Chest x-ray showing some atelectasis versus possible left lower lobe pneumonia.  Suspect this is more volume related as he has significant peripheral edema on exam.  Have been able to wean oxygen with diuresis.  -Wean oxygen, intermittently needing 1 L while here  -Still using supplemental O2  -Wean as able      Chronic A. Fib: Continue PTA warfarin, pharmacy to dose.  Atenolol 50 mg daily for  rate control.   --Follow INR     Aortic stenosis: TTE showing a significantly decreased area of the aortic valve, the flow gradient is only mild.  Does not sound like his falls are syncopal in origin.  If he does have syncope or light and is walking around would consider cardiology consultation to see if this could be contributing to his falls.  Does not appear to be outflow obstruction per the echocardiogram read.  --Has remained stable since     Lymphedema: Chronically on Lasix 60 mg morning and 30 mg afternoon.   -- Peak weight while here 237 pounds.  2-3+ tense lower extremity edema bilaterally at worst.  Weight down to 222 pounds with diuresis.  Tends to get a contraction alkalosis when diuresed too quickly.  -Restart diuretics at 60 mg twice daily p.o. Lasix  -Daily weights and strict intake output  -sodium restriction      Type II DM: PTA on glipizide 5 mg twice daily.  Blood glucose well controlled here.  -Continue PTA glipizide  -Can stop frequent blood sugar checks and sliding scale insulin as blood sugars have been 100-150 while here      HTN: PTA on lisinopril 5 mg daily, atenolol 50 mg daily, and Lasix 60 mg morning and 30 mg afternoon.  Lisinopril and Lasix initially held due to PRADEEP.  -continue atenolol and lisinopril   -Lasix restarted 60 mg twice daily  -Ideal dry weight is 222 pounds     Abdominal pain:   -Improved with trial of Carafate 4 times daily,       Back pain:  Low back pain after fall.    -Improved      Dementia: Unclear severity, but lives at home with spouse.  Memory seems quite poor here and needs frequent reorientation.  -Avoid Haldol and other meds if possible, reorientation usually works      PRADEEP: Creatinine peak of 1.7.  Baseline appears to be 0.9-1.  Suspect secondary to bacteremia and Lasix/lisinopril use.  Resolved with IV fluids and holding lisinopril and diuretic initially.  -Follow BMP with diuresis      HLD  Continue statin      Hypothyroidism  Continue  levothyroxin      GERD  Continue omeprazole       Significant Results and Procedures   As noted    Pending Results   Unresulted Labs Ordered in the Past 30 Days of this Admission     No orders found from 2/2/2018 to 4/4/2018.          Code Status   DNR / DNI       Primary Care Physician   [facility]    Physical Exam   Temp: 95.5  F (35.3  C) Temp src: Axillary BP: 156/75   Heart Rate: 71 Resp: 29 SpO2: (!) 87 % O2 Device: None (Room air) Oxygen Delivery: 3/4 LPM  Vitals:    04/11/18 0614 04/13/18 1356 04/14/18 0500   Weight: 100.8 kg (222 lb 3.2 oz) 101.4 kg (223 lb 9.6 oz) 101.1 kg (222 lb 14.4 oz)     Vital Signs with Ranges  Temp:  [95.5  F (35.3  C)-98  F (36.7  C)] 95.5  F (35.3  C)  Heart Rate:  [62-72] 71  Resp:  [18-29] 29  BP: (142-156)/(68-75) 156/75  SpO2:  [87 %-97 %] 87 %  I/O last 3 completed shifts:  In: 823 [P.O.:820; I.V.:3]  Out: 250 [Urine:250]    Constitutional: Awake, NAD   HEENT:   MMM  Respiratory: No crackles.  No wheeze  Cardiovascular: Irregularly, irregular rhythm.  Regular rate.  2/6 systolic murmur  GI: Nontender to palpation,  not distended, bowel sounds present  Skin: Chronic venous stasis changes/purple/redness of lower extremities bilaterally   Musculoskeletal/extremities: 1-2+ edema right leg, 2-3+ edema left leg  Neurologic: Alert, confused but conversational, not oriented to place or date.  Psychiatric: calm, cooperative with cares    Discharge Disposition   Discharged to short-term care facility    Consultations This Hospital Stay   PHARMACY TO DOSE VANCO  PHARMACY TO DOSE WARFARIN  PHYSICAL THERAPY ADULT IP CONSULT  OCCUPATIONAL THERAPY ADULT IP CONSULT  INFECTIOUS DISEASES IP CONSULT  VASCULAR ACCESS ADULT IP CONSULT  SOCIAL WORK IP CONSULT  SOCIAL WORK IP CONSULT  VASCULAR ACCESS ADULT IP CONSULT  PHYSICAL THERAPY ADULT IP CONSULT  OCCUPATIONAL THERAPY ADULT IP CONSULT    Time Spent on this Encounter   ILanie, personally saw the patient today  and spent greater than 30 minutes discharging this patient.    Discharge Orders     General info for SNF   Length of Stay Estimate: Short Term Care: Estimated # of Days <30  Condition at Discharge: Improving  Level of care:skilled   Rehabilitation Potential: Fair  Admission H&P remains valid and up-to-date: Yes  Recent Chemotherapy: N/A  Use Nursing Home Standing Orders: Yes     Mantoux instructions   Give two-step Mantoux (PPD) Per Facility Policy Yes     Reason for your hospital stay   Please refer to discharge summary     Intake and output   Every shift     Daily weights   Call Provider for weight gain of more than 2 pounds per day or 5 pounds per week.     Follow Up and recommended labs and tests   Follow up with [facility] physician.  The following labs/tests are recommended: Basic metabolic panel and INR in 2-3 days.    Follow-up with infectious disease after completion of IV antibiotics as directed     Activity - Up with nursing assistance     IV access   PICC.  Will need IV antibiotics as ordered by infectious disease     Physical Therapy Adult Consult   Evaluate and treat as clinically indicated.    Reason: Deconditioned     Occupational Therapy Adult Consult   Evaluate and treat as clinically indicated.    Reason: Deconditioned     Oxygen - Nasal cannula   1-2 Lpm by nasal cannula to keep O2 sats 92% or greater.  Wean as able  Continue incentive spirometry     Fall precautions     Advance Diet as Tolerated   Follow this diet upon discharge: Orders Placed This Encounter     Room Service     Combination Diet [id number] Calories: Moderate Consistent CHO (4-6 CHO units/meal); 2 gm NA Diet       Discharge Medications   Current Discharge Medication List      START taking these medications    Details   ceFAZolin (ANCEF) 1 GM vial Inject 2 g into the vein every 8 hours for 24 days CBC with differential, creatinine, SGOT weekly while on this medication to be faxed to Dr. Chavez office.  Qty: 1 each    Associated  Diagnoses: Gram-positive bacteremia         CONTINUE these medications which have CHANGED    Details   !! furosemide (LASIX) 20 MG tablet Take 3 tablets (60 mg) by mouth daily (with lunch)  Qty: 30 tablet    Associated Diagnoses: Lymphedema       !! - Potential duplicate medications found. Please discuss with provider.      CONTINUE these medications which have NOT CHANGED    Details   atenolol (TENORMIN) 50 MG tablet Take 50 mg by mouth daily      GABAPENTIN PO Take 300 mg by mouth 2 times daily      polyethylene glycol (MIRALAX/GLYCOLAX) Packet Take 17 g by mouth daily      senna-docusate (SENOKOT-S;PERICOLACE) 8.6-50 MG per tablet Take 2 tablets by mouth 2 times daily      triamcinolone (KENALOG) 0.5 % cream Apply topically three times a week Mon/Wed /Fri      WARFARIN SODIUM PO Take by mouth daily 3 mg   MWF, 4 mg Tues,Thur, Sat,Sun      glipiZIDE (GLUCOTROL) 5 MG tablet Take 1 tablet (5 mg) by mouth 2 times daily (before meals)  Qty: 30 tablet    Associated Diagnoses: Type 2 diabetes mellitus with complication, without long-term current use of insulin (H)      lisinopril (PRINIVIL/ZESTRIL) 5 MG tablet Take 1 tablet (5 mg) by mouth daily    Associated Diagnoses: Essential hypertension      ferrous gluconate (FERGON) 324 (38 FE) MG tablet Take 1 tablet (324 mg) by mouth daily (with breakfast)  Qty: 100 tablet    Associated Diagnoses: Iron deficiency anemia, unspecified iron deficiency anemia type      simethicone (MYLICON) 80 MG chewable tablet Take 2 tablets (160 mg) by mouth every 6 hours as needed for flatulence or cramping  Qty: 180 tablet    Associated Diagnoses: Abdominal pain, generalized      nitroglycerin (NITROSTAT) 0.4 MG sublingual tablet Place 1 tablet (0.4 mg) under the tongue every 5 minutes as needed for chest pain  Qty: 25 tablet    Associated Diagnoses: Coronary artery disease involving native heart without angina pectoris, unspecified vessel or lesion type      simvastatin (ZOCOR) 10 MG tablet  Take 1 tablet (10 mg) by mouth At Bedtime  Qty: 30 tablet    Associated Diagnoses: Coronary artery disease involving native heart without angina pectoris, unspecified vessel or lesion type      camphor-menthol (DERMASARRA) 0.5-0.5 % LOTN To affected areas on both legs Apply topically daily To affected areas on both legs    Associated Diagnoses: Venous stasis      !! furosemide (LASIX) 20 MG tablet Take 3 tablets (60 mg) by mouth every morning  Qty: 30 tablet    Associated Diagnoses: Lymphedema      Calcium Carb-Cholecalciferol (CALCIUM 500+D) 500-200 MG-UNIT TABS Take 1 tablet by mouth 2 times daily    Associated Diagnoses: Vitamin D deficiency      tamsulosin (FLOMAX) 0.4 MG capsule Take 1 capsule (0.4 mg) by mouth every evening  Qty: 60 capsule    Associated Diagnoses: Urinary retention      multivitamin (I-REBEL) TABS per tablet Take 1 tablet by mouth daily  Qty: 30 tablet, Refills: 0    Associated Diagnoses: Small bowel obstruction      levothyroxine (SYNTHROID/LEVOTHROID) 100 MCG tablet Take 1 tablet (100 mcg) by mouth daily  Qty: 30 tablet    Associated Diagnoses: Hypothyroidism, unspecified type      omeprazole (PRILOSEC) 20 MG CR capsule Take 1 capsule (20 mg) by mouth daily  Qty: 30 capsule    Associated Diagnoses: Gastroesophageal reflux disease without esophagitis      ACETAMINOPHEN PO Take 650 mg by mouth daily as needed for pain       !! - Potential duplicate medications found. Please discuss with provider.        Allergies   No Known Allergies  Data   Most Recent 3 CBC's:[SM1.1]  Recent Labs   Lab Test  04/12/18   0540  04/08/18   0945  04/06/18   0722  04/05/18   0651   WBC   --   8.0  8.7  8.9   HGB   --   12.2*  12.5*  11.6*   MCV   --   101*  100  104*   PLT  202  187  120*  120*[SM1.2]      Most Recent 3 BMP's:[SM1.1]  Recent Labs   Lab Test  04/14/18   0615  04/13/18   0515  04/12/18   1350  04/12/18   0540   NA  138  137   --   138   POTASSIUM  3.7  3.7  3.9  3.3*   CHLORIDE  97  97   --   99    CO2  40*  38*   --   37*   BUN  21  20   --   20   CR  0.98  0.95   --   0.92   ANIONGAP  1*  2*   --   2*   JANN  8.8  8.7   --   8.2*   GLC  140*  145*   --   138*[SM1.2]     Most Recent 2 LFT's:[SM1.1]  Recent Labs   Lab Test  04/03/18   1042  05/22/17   0646   AST  30  28   ALT  30  25   ALKPHOS  76  66   BILITOTAL  1.7*  0.4[SM1.2]     Most Recent INR's and Anticoagulation Dosing History:  Anticoagulation Dose History     Recent Dosing and Labs Latest Ref Rng & Units 4/8/2018 4/9/2018 4/10/2018 4/11/2018 4/12/2018 4/13/2018 4/14/2018    ZZ IMS TEMPLATE - - - - - - 3.5 mg -    Warfarin 3 mg - - 3 mg - 3 mg 3 mg - -    Warfarin 4 mg - - - 4 mg - - - -    Warfarin 5 mg - 5 mg - - - - - -    INR 0.86 - 1.14 2.46(H) 2.68(H) 2.40(H) 2.66(H) 2.98(H) 2.64(H) 2.57(H)        Most Recent 3 Troponin's:[SM1.1]  Recent Labs   Lab Test  04/04/18   0740  04/04/18   0100  04/03/18   1849   TROPI  <0.015  <0.015  <0.015[SM1.2]     Most Recent Cholesterol Panel:[SM1.1]  Recent Labs   Lab Test  05/22/17   0646 07/24/14   CHOL   --   126   LDL   --   53   HDL   --   36   TRIG  219*  186[SM1.2]     Most Recent 6 Bacteria Isolates From Any Culture (See EPIC Reports for Culture Details):[SM1.1]  Recent Labs   Lab Test  04/06/18   0721  04/05/18   0650  04/04/18   1604  04/03/18   1252  04/03/18   1204  05/14/17   1052   CULT  No growth  No growth  No growth  Cultured on the 1st day of incubation:  Staphylococcus aureus  *  Critical Value/Significant Value, preliminary result only, called to and read back by  Nell Valerio RN at 0952 4/4/18 [id number]    Cultured on the 1st day of incubation:  Streptococcus mitis group  *  Critical Value/Significant Value called to and read back by  Tamanna Evans RN at 1015 on [id number]    (Note)  POSITIVE for STAPHYLOCOCCUS AUREUS and NEGATIVE for the mecA gene  (not MRSA) by Sendoriigene multiplex nucleic acid test. The mecA gene was  not detected. Final identification and antimicrobial  susceptibility  testing will be verified by standard methods.    Specimen tested with Verigene multiplex, gram-positive blood culture  nucleic acid test for the following targets: Staph aureus, Staph  epidermidis, Staph lugdunensis, other Staph species, Enterococcus  faecalis, Enterococcus faecium, Streptococcus species, S. agalactiae,  S. anginosus grp., S. pneumoniae, S. pyogenes, Listeria sp., mecA  (methicillin resistance) and Renetta/B (vancomycin resistance).    Critical Value/Significant Value called to and read back by  Komal Mckeon RN @1240 04/04/18 gd    No growth  No anaerobes isolated  No growth[SM1.2]     Most Recent TSH, T4 and A1c Labs:[SM1.1]  Recent Labs   Lab Test  11/21/12   1450   TSH  8.52*     Results for orders placed or performed during the hospital encounter of 04/03/18   CT Head w/o Contrast    Narrative    CT SCAN OF THE HEAD WITHOUT CONTRAST   4/3/2018 11:45 AM     HISTORY: fall;     TECHNIQUE:  Axial images of the head and coronal reformations without  IV contrast material. Radiation dose for this scan was reduced using  automated exposure control, adjustment of the mA and/or kV according  to patient size, or iterative reconstruction technique.    COMPARISON: None.    FINDINGS:  There is generalized atrophy of the brain.  White matter  changes are present in the cerebral hemispheres that are consistent  with small vessel ischemic disease in this age patient. There is no  evidence of intracranial hemorrhage, mass, acute infarct or anomaly.  The visualized portions of the sinuses and mastoids appear normal. No  intracranial hemorrhage or skull fractures are identified.      Impression    IMPRESSION: No bleed or fractures are identified.      LIONEL VEE MD   CT Chest Abdomen Pelvis w/o Contrast    Narrative    CT CHEST/ABDOMEN/PELVIS WITHOUT CONTRAST April 3, 2018 11:44 AM     HISTORY: Trauma, on blood thinners, left rib pain.      TECHNIQUE: Axial images from the thoracic inlet to the  symphysis are  performed with additional coronal reformatted images. No contrast is  utilized. Radiation dose for this scan was reduced using automated  exposure control, adjustment of the mA and/or kV according to patient  size, or iterative reconstruction technique.    FINDINGS:     Chest: Calcified granuloma is noted in the right lower lobe on series  6, image 120. Lungs are otherwise clear without infiltrate,  consolidation or mass. No pleural or pericardial fluid. Heart is  enlarged. Prior CABG. Coronary artery calcifications are present.  Esophagus is unremarkable. No enlarged lymph nodes in the chest or  axillas.    Abdomen: Prior cholecystectomy changes. The liver, spleen, pancreas  and adrenal glands are unremarkable. Bilateral simple renal cysts are  present. 0.4 cm nonobstructing stone is present in the left renal  collecting system on series 2, image 57. There is no hydronephrosis or  additional urinary tract calculi. Aorta demonstrates calcified plaque  without aneurysm or retroperitoneal hemorrhage. Small left periaortic  lymph nodes are present but none are significantly enlarged by CT  criteria. The bowel is normal in caliber without obstruction or  diverticulitis. Appendix is normal.    Pelvis: The bladder, prostate and rectum are unremarkable. No enlarged  pelvic lymph nodes are appreciated. A few prominent possibly reactive  lymph nodes are noted in the left inguinal region on series 2, image  115. The largest measures approximately 2.4 x 1.4 cm. Degenerative  spine changes are present. No evidence of rib or spine fracture.      Impression    IMPRESSION:  1. No acute changes in the chest, abdomen or pelvis. No evidence of  fractures. Bilateral renal cortical cysts are noted with a  nonobstructing stone left renal collecting system. Prior  cholecystectomy changes. Evidence of old granulomatous disease.  2. Prominent left groin lymph nodes of uncertain etiology. Reactive  adenopathy is  suspected.  3. Postoperative changes from coronary artery bypass graft. Calcified  plaque is noted throughout the aorta without evidence of aneurysm.    PRISCILA TINEO MD   US Lower Extremity Venous Duplex Left    Narrative    ULTRASOUND VENOUS LEFT LOWER EXTREMITY  4/3/2018 3:15 PM     HISTORY:  Pain, swelling.     COMPARISON: None.    TECHNIQUE: Ultrasound gray scale, Color Doppler flow, and spectral  Doppler waveform analysis performed.    FINDINGS:  The left common femoral, superficial femoral, popliteal and  posterior tibial veins are patent and fully compressible and  demonstrate normal venous Doppler flow. The visualized greater  saphenous vein is negative for thrombus.       Impression    IMPRESSION: No DVT demonstrated.    SHAGUFTA FLORES MD   XR Chest 2 Views    Narrative    CHEST TWO VIEWS    4/3/2018 4:38 PM     HISTORY: New hypoxia.     COMPARISON: 5/27/2017.    FINDINGS: Sternal wires and mediastinal clips. No pneumothorax. The  heart is enlarged without pulmonary edema. There is a mild left  basilar infiltrate. Lungs are otherwise clear. No pleural effusion.      Impression    IMPRESSION: Mild left basilar atelectasis or pneumonia.    CARL MITCHELL MD   XR Chest Port 1 View    Narrative    PORTABLE CHEST ONE VIEW   4/6/2018 5:41 PM     HISTORY: RN placed PICC, verify tip.    COMPARISON: 4/3/2018.    FINDINGS: Upright portable chest. A right PICC has been placed. The  tip is difficult to see but is probably in the mid SVC in good  position. No pneumothorax. Sternal wires in place. The heart is  enlarged without pulmonary edema. There is mild bibasilar infiltrate.      Impression    IMPRESSION: Right PICC with tip probably in the mid SVC.    CARL MITCHELL MD   XR Chest Port 1 View    Narrative    CHEST ONE VIEW PORTABLE  4/10/2018 2:21 PM     HISTORY: RN placed PICC - verify tip placement.     COMPARISON: Chest x-rays dated 4/8/2018.      Impression    IMPRESSION:  1. Hypoaeration, sternal cerclage  wires, coronary artery markers and  mediastinal clips, cardiomegaly and mild vascular congestion are  essentially stable since the prior study.  2. Right-sided PICC line is again noted. The tip of the PICC line is  difficult to see but may be projected over the upper right atrium.  Contrast injection into the PICC line and repeat imaging may be  helpful to better evaluate the tip of the tube.    RAMSEY NUÑEZ MD   XR Chest Port 1 View    Narrative    CHEST ONE VIEW PORTABLE  4/10/2018 3:23 PM     HISTORY:  RN placed PICC - verify tip placement.    COMPARISON: Chest x-rays dated 4/10/2018 at 2:15 PM.      Impression    IMPRESSION:  1. Right PICC line is again noted. Tip is still difficult to see on  this study due to underpenetration of the mediastinum, but appears to  be in the upper right atrium just distal to the right atrial/superior  vena caval junction.  2. No other changes.    RAMSEY NUÑEZ MD   XR Chest Port 1 View    Narrative    CHEST ONE VIEW PORTABLE 4/10/2018 4:33 PM     HISTORY: PICC line retracted 2 cm, verify tip.    COMPARISON: 4/10/2018 at 1516.      Impression    IMPRESSION: No change in position of right-sided PICC line. Prior  median sternotomy again noted. Moderate vascular congestion has  slightly increased since the prior exam. Interval development of a  focus of platelike atelectasis or early infiltrate in the left lung  base. Heart size is unchanged.    JA MCCLURE MD[SM1.2]           Disclaimer: This note consists of symbols derived from keyboarding, dictation and/or voice recognition software. As a result, there may be errors in the script that have gone undetected. Please consider this when interpreting information found in this chart.[SM1.1]     Revision History        User Key Date/Time User Provider Type Action    > SM1.2 4/14/2018 11:45 AM Lanie Harvey MD Physician Sign     SM1.1 4/14/2018 11:32 AM Lanie Harvey MD Physician                      Consult Notes       Consults by Lenora Munroe LSW at 4/10/2018  1:58 PM     Author:  Lenora Munroe LSW Service:  (none) Author Type:      Filed:  4/10/2018  1:58 PM Date of Service:  4/10/2018  1:58 PM Creation Time:  4/10/2018  1:55 PM    Status:  Signed :  Lenora Munroe LSW ()     Consult Orders:    1. Social Work IP Consult [[id number]] ordered by Nirmal Serrano MD at 04/09/18 1505                SWS:  SW consult to assist with dc planning and possible TCU placement. SW notified that pt son Luciano is the contact and should be contacted re: TCU placement. SW spoke with Luciano and discussed TCU placement at [place]. Luciano reports that pt has been to [facility] in the past and plans to discuss TCU placement with his siblings tonight and will contact this writer tomorrow with final dc plan. SW will complete consult and will continue to follow.[LH1.1]       Revision History        User Key Date/Time User Provider Type Action    > LH1.1 4/10/2018  1:58 PM Lenora Munroe LSW  Sign            Consults by Elmira Kim LSW at 4/7/2018  2:22 PM     Author:  Elmira Kim LSW Service:  Social Work Author Type:      Filed:  4/7/2018  2:22 PM Date of Service:  4/7/2018  2:22 PM Creation Time:  4/7/2018  2:15 PM    Status:  Signed :  Elmira Kim LSW ()     Consult Orders:    1. Social Work IP Consult [[id number]] ordered by Nirmal Serrano MD at 04/07/18 0830                Care Transition Initial Assessment - SW  Reason For Consult: discharge planning  Met with: PATIENT   Active Problems:    Sepsis (H)       DATA  Lives With: spouse  Living Arrangements: assisted living  Description of Support System: Supportive, Involved  Who is your support system?: Wife, Children  Support Assessment: Adequate family and caregiver support.   Identified issues/concerns regarding health management:  SW will follow to coordinate return to [facility].      Quality Of Family  Relationships: supportive, helpful, involved  Transportation Available: family or friend will provide      ASSESSMENT  Concerns to be addressed: SW to coordinate return to [facility].  SW went to see pt, pt was alone and asleep. SW called pt's wife Kelli. Per Kelli they live on the [facility] side. Pt gets medication management, meals and laundry service.  Pt also has a RN and MD visit once per week from [facility]. Pt and Kelli have the support of their children. Their son will provide transportation at d/c.  AMANDA called [facility] and lvm with the Nurse Meredith to return the call.       PLAN  Patient anticipates discharging to:  [facility][TM1.1]    Elmira Kim[TM1.2], MSW  Casual SW [phone][TM1.1]            Revision History        User Key Date/Time User Provider Type Action    > TM1.2 4/7/2018  2:22 PM Elmira Kim LSW  Sign     TM1.1 4/7/2018  2:15 PM Elmira Kim LSW              Consults signed by Lane Chavez MD at 4/4/2018  4:35 PM      Author:  Lane Chavez MD Service:  Infectious Disease Author Type:  Physician    Filed:  4/4/2018  4:35 PM Date of Service:  4/4/2018  2:09 PM Creation Time:  4/4/2018  2:33 PM    Status:  Signed :  Lane Chavez MD (Physician)         Consult Date:  04/04/2018      LOCATION:  Room [address] [place] [address].        REFERRING PHYSICIAN:  Dr. Harvey.        IMPRESSION:   1.  A 89-year-old male with 2 falls in the last week, some weakness and sleepiness for 2-3 days, cause of this syndrome is now apparent with blood cultures positive for methicillin-sensitive Staph aureus, the patient has methicillin-sensitive Staph aureus bacteremia syndrome, no clear secondary site, possibly left leg as source, although leg does not look like cellulitis to me more stasis with one small wound as a possible entry site.   2.  Low back pain, not obvious tenderness, conceivable secondary site of infection, but also possibly just related to fall.   3.   Diabetes mellitus.   4.  Atrial fibrillation.   5.  Mild dementia.   6.  Venous stasis disease without much history of major infection process, some wound formation in his left leg, recurrently.      RECOMMENDATIONS:   1.  Simplify antibiotics to Ancef, for now reduced dose due to the mild acute renal insufficiency, increase dose as renal function allows.   2.  Follow labs and blood cultures serially, follow pain and other symptoms sites.   3.  Transthoracic echocardiogram abnormal, but no obvious infection, would hold off on a transesophageal echocardiogram, patient will require a 4-week course of antibiotics, regardless would only do a GAVIN if we are not clearing the blood.  No long line for now, get serial blood cultures until clear.   4.  Further workup, tests and evaluation depending on clinical course, culture results, etc.   5.  Discussed in great detail with patient and family members.      HISTORY:  This 89-year-old male seen in consultation due to Staph aureus bacteremia.  The patient has a history of being in his usual state of health until a week before when he had an unexplained fall, in retrospect some degree of weakness and malaise and then over the last 2-3 days at home was sleeping all the time.  He then had another fall in the bathroom without explanation, maybe hit his back and has some slight back pain that was going on just prior to this.  No other obvious pain site.  There has been a focus on his left leg, but the family who have seen him over the years think his leg looks the same as usual and he has chronic severe stasis changes, worse on the left than the right.  There has been some wound formation in the left as well but no infected sites that have been known.  The patient, at presentation, was not immediately being evaluated from an infection perspective, but then lactic acid was elevated, some degree of confusion, etc. and he had blood cultures done.  The blood culture rapidly is positive  for Staph aureus.  The patient has no obvious secondary site of pain or discomfort at the present time except for the back.      PAST MEDICAL HISTORY:  Prior bowel issue in 2017 without recurrent problems, history of gastroesophageal reflux disease, history of mild dementia, hypertension on treatment, history of chronic venous stasis disease without major recurrent cellulitis episodes, history of atrial fibrillation, diabetes, reasonably controlled.      SOCIAL AND FAMILY HISTORY:  He lives in assisted living with his wife.  No particular recent exposures, no known resistant pathogens, no active alcohol or tobacco use.      REVIEW OF SYSTEMS:  Largely as listed above.  Some slight discomfort in the left leg but nothing notably.  No other obvious pain site except for some mild low back pain.  He also has some slight left chest pain where he probably fell.      PHYSICAL EXAMINATION:   GENERAL:  The patient appears his stated age.  He seems reasonably alert and communicative.     VITAL SIGNS:  No significant fever currently, earlier 99s, pulse of 80, respiratory rate 16 and unlabored.   HEENT:  No thrush or intraoral lesions.  Pupils reactive.   NECK:  Supple and nontender without lymphadenopathy.   HEART:  Regular rhythm, no particular murmur, maybe slight irregularity.   LUNGS:  Clear bilaterally, good air movement.   ABDOMEN:  Soft, nontender, slightly obese.   EXTREMITIES:  Stasis disease bilaterally, left worse than right.  Left leg is quite red.  There are some marks of apparently more proximal erythema that has now resolved.  There is a small wound area that does not track deep in the posterior leg.  The leg is cool to the touch.      LABORATORY DATA:  Blood culture positive for Staph aureus by rapid testing, is sensitive Staph aureus.  Creatinine initially normal, now 1.43.  White blood cell count 22,500      Thank you very much for consultation.  I will follow the patient with you.         MARILEE ROWE MD              D: 2018   T: 2018   MT: EMILY      Name:     BRIANA HERNÁNDEZ   MRN:      [id number]        Account:       [id number]   :      1927           Consult Date:  2018      Document: [id number]    [SD1.1]   Revision History        User Key Date/Time User Provider Type Action    > SD1.1 2018  4:35 PM Lane Chavez MD Physician Sign            Consults by Lane Chavez MD at 2018  2:03 PM     Author:  Lane Chavez MD Service:  Infectious Disease Author Type:  Physician    Filed:  2018  2:03 PM Date of Service:  2018  2:03 PM Creation Time:  2018  2:00 PM    Status:  Signed :  Lane Chavez MD (Physician)         ID consult dictated IMP 1 89 yo male fall, several days sleeping, cause now obvious with MSSA bacteremia, possible L leg source but leg looks like stasis not infection, no clear secondary sites    REC 1 ancef, serial BC, further GONZALEZ as cxs/course direct[SD1.1]     Revision History        User Key Date/Time User Provider Type Action    > SD1.1 2018  2:03 PM Lane Chavez MD Physician Sign                     Progress Notes - Physician (Notes from 18 through 04/15/18)      Progress Notes by Saida Javed RN at 4/15/2018 12:25 PM     Author:  Saida Javed RN Service:  Nursing Author Type:  Registered Nurse    Filed:  4/15/2018 12:25 PM Date of Service:  4/15/2018 12:25 PM Creation Time:  4/15/2018 12:25 PM    Status:  Signed :  Saida Javed RN (Registered Nurse)         Hospitalist web paged that [facility] asked for updated discharge orders as well as clarification of code status please.[KM1.1]     Revision History        User Key Date/Time User Provider Type Action    > KM1.1 4/15/2018 12:25 PM Saida Javed, RN Registered Nurse Sign            Progress Notes by Abhijeet Law at 4/15/2018  8:53 AM     Author:  Abhijeet Law Service:  (none) Author Type:  Social  Worker    Filed:  4/15/2018 12:12 PM Date of Service:  4/15/2018  8:53 AM Creation Time:  4/15/2018  8:53 AM    Status:  Addendum :  Abhijeet Law ()         Pc to Rosanne at [facility] regarding what time they will be able to accept pt. Rosanne noted anything after 1300 is fine.   Pc to Mejia pt son regarding transportation. Mejia noted he will transport his dad. SW informed Mejia facility will be able to accept him any time after 1300.[FM1.1]     Pc from son noting that he will pick his dad up between 1300 and 1330.[FM1.2]   Vm to Rosanne at [facility] informing her family will transport and what time they pick him up.[FM1.3]      Revision History        User Key Date/Time User Provider Type Action    > [N/A] 4/15/2018 12:12 PM Abhijeet Law  Addend     FM1.3 4/15/2018 12:11 PM Abhijeet Law  Addend     FM1.2 4/15/2018 12:10 PM Abhijeet Law       FM1.1 4/15/2018  8:56 AM Abhijeet Law  Sign            Progress Notes by Lane Chavez MD at 4/14/2018  8:19 PM     Author:  Lane Chavez MD Service:  Infectious Disease Author Type:  Physician    Filed:  4/14/2018  8:19 PM Date of Service:  4/14/2018  8:19 PM Creation Time:  4/14/2018  8:19 PM    Status:  Signed :  Lane Chavez MD (Physician)         [facility]  Infectious Disease Progress Note          Assessment and Plan:   IMPRESSION:   1.  A 89-year-old male with 2 falls in the last week, some weakness and sleepiness for 2-3 days, cause of this syndrome is now apparent with blood cultures positive for methicillin-sensitive Staph aureus, the patient has methicillin-sensitive Staph aureus bacteremia syndrome, no clear secondary site, possibly left leg as source, although leg does not look like cellulitis to me more stasis with one small wound as a possible entry site.   2.  Low back pain, not obvious tenderness, conceivable secondary site of infection, but also  "possibly just related to fall.   3.  Diabetes mellitus.   4.  Atrial fibrillation.   5.  Mild dementia.   6.  Venous stasis disease without much history of major infection process, some wound formation in his left leg, recurrently.  7 New rash, mainly back, some abd, none of it looks like drug eruprion, ? folliculitis       RECOMMENDATIONS:   1.  Continue Ancef, improved acute renal insufficiency, increased dose   2.   blood cultures  FU neg, no secondary sites  3.  Transthoracic echocardiogram abnormal, but no obvious infection, would hold off on a transesophageal echocardiogram, patient will require a 4-week course of antibiotics, regardless would only do a GAVIN if we are not clearing the blood.  .      4.  Looks better only 1 cx +, no obvious secondary sites  5  PICC, orders in for ancef ? Rehab any time  6 symptomatic and topical rash tx, if becomes more generalized will readress as outpt but doubt allergic, no major generalized spread          Interval History:   no new complaints and doing well; no cp, sob, n/v/d, or abd pain. Looks better cx as above rash              Medications:       furosemide  60 mg Oral BID     sucralfate  1 g Oral 4x Daily AC & HS     sodium chloride (PF)  3 mL Intracatheter Q8H     ceFAZolin  2 g Intravenous Q8H     sodium chloride (PF)  10 mL Intracatheter Q7 Days     lisinopril  5 mg Oral Daily     atenolol  50 mg Oral Daily     gabapentin (NEURONTIN) capsule 300 mg  300 mg Oral BID     glipiZIDE  5 mg Oral BID AC     levothyroxine  100 mcg Oral Daily     omeprazole  20 mg Oral Daily     polyethylene glycol  17 g Oral Daily     senna-docusate  2 tablet Oral BID     simvastatin  10 mg Oral At Bedtime     tamsulosin  0.4 mg Oral QPM                  Physical Exam:   Blood pressure 116/63, pulse 78, temperature 98.6  F (37  C), temperature source Oral, resp. rate 20, height 1.676 m (5' 6\"), weight 101.1 kg (222 lb 14.4 oz), SpO2 93 %.  Wt Readings from Last 2 Encounters:   04/14/18 " 101.1 kg (222 lb 14.4 oz)   05/28/17 99.3 kg (218 lb 14.4 oz)     Vital Signs with Ranges  Temp:  [95.5  F (35.3  C)-98.6  F (37  C)] 98.6  F (37  C)  Heart Rate:  [60-71] 66  Resp:  [18-29] 20  BP: (116-156)/(60-75) 116/63  SpO2:  [87 %-96 %] 93 %    Constitutional: Awake, alert, cooperative, no apparent distress   Lungs: Clear to auscultation bilaterally, no crackles or wheezing   Cardiovascular: Regular rate and rhythm, normal S1 and S2, and no murmur noted   Abdomen: Normal bowel sounds, soft, non-distended, non-tender   Skin:  no cyanosis, same edema no emboli  Mostly stasis   Other:  rash back heat folliculitis, not generaized, R chest ? yeast          Data:   All microbiology laboratory data reviewed.  Recent Labs   Lab Test  04/12/18   0540  04/08/18   0945  04/06/18   0722  04/05/18   0651   WBC   --   8.0  8.7  8.9   HGB   --   12.2*  12.5*  11.6*   HCT   --   38.8*  39.6*  38.0*   MCV   --   101*  100  104*   PLT  202  187  120*  120*     Recent Labs   Lab Test  04/14/18   0615  04/13/18   0515  04/12/18   0540   CR  0.98  0.95  0.92     No lab results found.  Recent Labs   Lab Test  04/06/18   0721  04/05/18   0650  04/04/18   1604  04/03/18   1252  04/03/18   1204  05/14/17   1052  05/13/17   2043   CULT  No growth  No growth  No growth  Cultured on the 1st day of incubation:  Staphylococcus aureus  *  Critical Value/Significant Value, preliminary result only, called to and read back by  Nell Valerio RN at 0952 4/4/18 [email or web address]    Cultured on the 1st day of incubation:  Streptococcus mitis group  *  Critical Value/Significant Value called to and read back by  Tamanna Evans RN at 1015 on [id number]    (Note)  POSITIVE for STAPHYLOCOCCUS AUREUS and NEGATIVE for the mecA gene  (not MRSA) by [employer] multiplex nucleic acid test. The mecA gene was  not detected. Final identification and antimicrobial susceptibility  testing will be verified by standard methods.    Specimen tested with [employer] multiplex,  gram-positive blood culture  nucleic acid test for the following targets: Staph aureus, Staph  epidermidis, Staph lugdunensis, other Staph species, Enterococcus  faecalis, Enterococcus faecium, Streptococcus species, S. agalactiae,  S. anginosus grp., S. pneumoniae, S. pyogenes, Listeria sp., mecA  (methicillin resistance) and Renetta/B (vancomycin resistance).    Critical Value/Significant Value called to and read back by  Komal Mckeon RN @1240 04/04/18 gd    No growth  No anaerobes isolated  No growth  <10,000 colonies/mL mixed urogenital conner Susceptibility testing not routinely   done[SD1.1]            Revision History        User Key Date/Time User Provider Type Action    > SD1.1 4/14/2018  8:19 PM Lane Chavez MD Physician Sign            Progress Notes by Lanie Harvey MD at 4/14/2018  6:55 PM     Author:  Lanie Harvey MD Service:  Hospitalist Author Type:  Physician    Filed:  4/14/2018  6:59 PM Date of Service:  4/14/2018  6:55 PM Creation Time:  4/14/2018  6:55 PM    Status:  Signed :  Lanie Harvey MD (Physician)         [facility]  Hospitalist Progress Note  Lanie Harvey MD 04/14/2018    Reason for Stay (Diagnosis): bacteremia, cellulitis, lymphedema         Assessment and Plan:      Summary of Stay:  Ton Bagley is a 89 year old male with pmhx of A. fib on warfarin, lymphedema, CAD, HTN, DMII, dementia, and HLD admitted on 4/3/2018 after a fall.  Head CT negative for acute bleed.  Labs notable for an PRADEEP and leukocytosis.  He was hypotensive initially and elevated lactate.  He was given fluid boluses and developed some hypoxia in the ED.  He was started on vancomycin and cefepime for sepsis.  Blood cultures grew MSSA and strep mitis.  Antibiotics narrowed to cefazolin per ID who were consulted.  PT and OT consulted due to falls.  Leukocytosis and fevers resolved.  TTE showing aortic stenosis. Persistent hypoxia and weight up so changed to  IV diuretic 4/8.  He is now stable with dryweight of 222lbs..Dc to TCU when available for antibiotics, rehab, wean O2.    Problem List/Assessment and Plan:   Sepsis due to MSSA bacteremia and strep mitis (cx from 4/3) and LLE cellulitis:   --Positive blood cultures for MSSA and strep mitis on 4/3.    --Portal of entry for bacteria is not completely clear but likely skin related.  He has severe venous stasis of his lower legs bilaterally.  His left leg was erythematous so suspect cellulitis.    --Leukocytosis and fever resolved.  Did have elevated lactic acid that has since resolved.  --Continue on cefazolin for 4 weeks total.  Infectious disease team consulting and put in discharge order for patient.  --Patient pulled PICC line 4/10 but was replaced same day  --Repeat blood cultures from 4/4 and 4/5 did not grow anything    Rash: Papular rash on back that is itchy.  Has been on Ancef for some time so doubt that all of a sudden reaction to this would occur, ID in agreement.  No change to antibiotics.  --On topical hydrocortisone 1% cream 3 times daily as needed for rash on back      Fall: Fell when walking to the bathroom.  He denies loss of consciousness.  Has had some other falls.  He is chronically on anticoagulant agent for A. Fib.  -PT and OT consultation    Acute hypoxemic respiratory failure:   Developed hypoxia in the ED following fluid boluses.  Chest x-ray showing some atelectasis versus possible left lower lobe pneumonia.  Suspect this is more volume related as he has significant peripheral edema on exam.  Have been able to wean oxygen with diuresis.  -Wean oxygen, intermittently needing 1 L while here  -Still using supplemental O2      Chronic A. Fib: Continue PTA warfarin, pharmacy to dose.  Atenolol 50 mg daily for rate control.  Telemetry.    Aortic stenosis: TTE showing a significantly decreased area of the aortic valve, the flow gradient is only mild.  Does not sound like his falls are syncopal in  origin.  If he does have syncope or light and is walking around would consider cardiology consultation to see if this could be contributing to his falls.  Does not appear to be outflow obstruction per the echocardiogram read.    Lymphedema: Chronically on Lasix 60 mg morning and 30 mg afternoon.   -- Peak weight while here 237 pounds.  2-3+ tense lower extremity edema bilaterally at worst.  Weight down to 222 pounds with diuresis.  Tends to get a contraction alkalosis when diuresed too quickly.  -Restart diuretics at 60 mg twice daily p.o. Lasix  -Daily weights and strict intake output  -sodium restriction      Type II DM: PTA on glipizide 5 mg twice daily.  Blood glucose well controlled here.  -Continue PTA glipizide  -Can stop frequent blood sugar checks and sliding scale insulin as blood sugars have been 100-150 while here      HTN: PTA on lisinopril 5 mg daily, atenolol 50 mg daily, and Lasix 60 mg morning and 30 mg afternoon.  Lisinopril and Lasix initially held due to PRADEEP.  -continue atenolol and lisinopril   -Lasix restarted 60 mg daily  -Ideal dry weight is 222 pounds    Abdominal pain:   -Improved with trial of Carafate 4 times daily,       Back pain:  Low back pain after fall.    -monitor and if ongoing pain would image      Dementia: Unclear severity, but lives at home with spouse.  Memory seems quite poor here and needs frequent reorientation.  -Avoid Haldol and other meds if possible, reorientation usually works      PRADEPE: Creatinine peak of 1.7.  Baseline appears to be 0.9-1.  Suspect secondary to bacteremia and Lasix/lisinopril use.  Resolved with IV fluids and holding lisinopril and diuretic initially.  -Follow BMP with diuresis      HLD  Continue statin      Hypothyroidism  Continue levothyroxin      GERD  Continue omeprazole    # Pain Assessment:  Current Pain Score 4/14/2018   Patient currently in pain? denies   Pain score (0-10) -   Pain location -   Pain descriptors -   - Ton is experiencing  "pain due to back pain. Pain management was discussed and the plan was created in a collaborative fashion.  Ton's response to the current recommendations: engaged  -Tylenol as needed  -Oxycodone 5 mg every 3 hours as needed, has needed very little of this    DVT Prophylaxis: Warfarin  Code Status: DNR / DNI.  This was discussed on admission  FEN: Consistent carbohydrate medium and 2 g sodium restriction  Discharge Dispo:  PT, OT, social work consult.  Based on weakness, confusion, extended course of IV antibiotics to think patient will need to TCU and that his spouse will not be able to manage all of his cares at home.  Discussed this with social work and family members.    Estimated Disch Date / # of Days until Disch: Received Haldol last night and was unable to discharge to TCU today because of that.  Discharge to TCU tomorrow.  Has PICC in place for extended IV antibiotic course        Interval History (Subjective):        Was sitting up using supplemental O2 about 1 L. Feels better today. No cp, no sob. Review of all other systems negative              Physical Exam:      Last Vital Signs:  /60 (BP Location: Left arm)  Pulse 78  Temp 98.5  F (36.9  C) (Oral)  Resp 20  Ht 1.676 m (5' 6\")  Wt 101.1 kg (222 lb 14.4 oz)  SpO2 91%  BMI 35.98 kg/m2    Intake/Output Summary (Last 24 hours) at 04/12/18 1328  Last data filed at 04/11/18 2251   Gross per 24 hour   Intake              480 ml   Output              100 ml   Net              380 ml     Constitutional: Awake, NAD   HEENT:   MMM  Respiratory: No crackles.  No wheeze  Cardiovascular: Irregularly, irregular rhythm.  Regular rate.  2/6 systolic murmur  GI: Nontender to palpation,  not distended, bowel sounds present  Skin: Chronic venous stasis changes/purple/redness of lower extremities bilaterally   Musculoskeletal/extremities: 1-2+ edema right leg, 2-3+ edema left leg  Neurologic: Alert, confused but conversational, not oriented to place or " date.  Psychiatric: calm, cooperative with cares         Medications:      All current medications were reviewed with changes reflected in problem list.  Current Facility-Administered Medications   Medication     hydrocortisone (CORTAID) 1 % cream     furosemide (LASIX) tablet 60 mg     lidocaine 1 % 0.5-5 mL     lidocaine (LMX4) kit     sodium chloride (PF) 0.9% PF flush 5-50 mL     heparin lock flush 10 UNIT/ML injection 2-5 mL     miconazole (MICATIN; MICRO GUARD) 2 % powder     sucralfate (CARAFATE) tablet 1 g     sodium chloride (PF) 0.9% PF flush 3 mL     sodium chloride (PF) 0.9% PF flush 3 mL     ceFAZolin (ANCEF) intermittent infusion 2 g in 100 mL dextrose PRE-MIX     sodium chloride (PF) 0.9% PF flush 10-20 mL     sodium chloride (PF) 0.9% PF flush 10 mL     lisinopril (PRINIVIL/ZESTRIL) tablet 5 mg     atenolol (TENORMIN) tablet 50 mg     gabapentin (NEURONTIN) capsule 300 mg     glipiZIDE (GLUCOTROL) tablet 5 mg     levothyroxine (SYNTHROID/LEVOTHROID) tablet 100 mcg     omeprazole (priLOSEC) CR capsule 20 mg     polyethylene glycol (MIRALAX/GLYCOLAX) Packet 17 g     senna-docusate (SENOKOT-S;PERICOLACE) 8.6-50 MG per tablet 2 tablet     simethicone (MYLICON) chewable tablet 160 mg     simvastatin (ZOCOR) tablet 10 mg     tamsulosin (FLOMAX) capsule 0.4 mg     glucose 40 % gel 15-30 g    Or     dextrose 50 % injection 25-50 mL    Or     glucagon injection 1 mg     naloxone (NARCAN) injection 0.1-0.4 mg     melatonin tablet 1 mg     potassium chloride SA (K-DUR/KLOR-CON M) CR tablet 20-40 mEq     potassium chloride (KLOR-CON) Packet 20-40 mEq     potassium chloride 10 mEq in 100 mL sterile water intermittent infusion (premix)     potassium chloride 10 mEq in 100 mL intermittent infusion with 10 mg lidocaine     potassium chloride 20 mEq in 50 mL intermittent infusion     magnesium sulfate 4 g in 100 mL sterile water (premade)     acetaminophen (TYLENOL) tablet 650 mg     oxyCODONE IR (ROXICODONE) tablet  5 mg     ondansetron (ZOFRAN-ODT) ODT tab 4 mg    Or     ondansetron (ZOFRAN) injection 4 mg     Warfarin Therapy Reminder (Check START DATE - warfarin may be starting in the FUTURE)          Data:      All new lab and imaging data was reviewed.   Labs:         Lab Results   Component Value Date     04/12/2018    Lab Results   Component Value Date    CHLORIDE 99 04/12/2018    Lab Results   Component Value Date    BUN 20 04/12/2018      Lab Results   Component Value Date    POTASSIUM 3.3 04/12/2018    Lab Results   Component Value Date    CO2 37 04/12/2018    Lab Results   Component Value Date    CR 0.92 04/12/2018          Lab Results   Component Value Date    INR 2.57 (H) 04/14/2018    INR 2.64 (H) 04/13/2018    INR 2.98 (H) 04/12/2018      Imaging:   None today      Lanie Harvey MD[SM1.1]         Revision History        User Key Date/Time User Provider Type Action    > SM1.1 4/14/2018  6:59 PM Lanie Harvey MD Physician Sign            Progress Notes by Abhijeet Law at 4/14/2018 11:56 AM     Author:  Abhijeet Law Service:  (none) Author Type:      Filed:  4/14/2018  1:22 PM Date of Service:  4/14/2018 11:56 AM Creation Time:  4/14/2018 11:56 AM    Status:  Addendum :  Abhijeet Law ()         Vm to [facility] to confirm Bed.  requested a call back   Pc to Son Mejia regarding transport for his father. Son noted he is not able to transport his father. He noted there is no room in his car. SW informed Mejia that is fine. We can schedule transportation but it a cost to the family. Mejia noted that is fine.    Pc from [facility] reporting that there is no bed availible for patient.    Met with pt and son Mejia regarding additional TCU options. Mejia noted if his father can get into [facility] then he would like referral send to [facility].   Referral send [facility].[FM1.1]   Spoke with Rosanne at [facility] who noted she will review referral and get back to SW.[FM1.2]   Pc from Rosanne at [facility]  reporting that she has bed availible for tomorrow.[FM1.3]      Revision History        User Key Date/Time User Provider Type Action    > FM1.3 4/14/2018  1:22 PM Abhijeet Law  Addend     FM1.2 4/14/2018 12:31 PM Abhijeet Law  Addend     FM1.1 4/14/2018 12:26 PM Abhijeet Law  Sign            Progress Notes by Lenora Munroe LSW at 4/13/2018  2:54 PM     Author:  Lenora Munroe LSW Service:  (none) Author Type:      Filed:  4/13/2018  3:02 PM Date of Service:  4/13/2018  2:54 PM Creation Time:  4/13/2018  2:54 PM    Status:  Signed :  Lenora Munroe LSW ()         SWS:  SW notified by MD that pt will not be discharging today. [facility].  Per son Luciano, pt melanie Ba will transport. SW will need to contact Mejia at [phone].[LH1.1]     Revision History        User Key Date/Time User Provider Type Action    > LH1.1 4/13/2018  3:02 PM Lenora Munroe LSW  Sign            Progress Notes by Lanie Harvey MD at 4/13/2018  8:33 AM     Author:  Lanie Harvey MD Service:  Hospitalist Author Type:  Physician    Filed:  4/13/2018  2:55 PM Date of Service:  4/13/2018  8:33 AM Creation Time:  4/13/2018  8:33 AM    Status:  Signed :  Lanie Harvey MD (Physician)         [facility]  Hospitalist Progress Note  Lanie Harvey MD 04/13/2018    Reason for Stay (Diagnosis): bacteremia, cellulitis, lymphedema         Assessment and Plan:      Summary of Stay:  Ton Bagley is a 89 year old male with pmhx of A. fib on warfarin, lymphedema, CAD, HTN, DMII, dementia, and HLD admitted on 4/3/2018 after a fall.  Head CT negative for acute bleed.  Labs notable for an PRADEEP and leukocytosis.  He was hypotensive initially and elevated lactate.  He was given fluid boluses and developed some hypoxia in the ED.  He was started on vancomycin  and cefepime for sepsis.  Blood cultures grew MSSA and strep mitis.  Antibiotics narrowed to cefazolin per ID who were consulted.  PT and OT consulted due to falls.  Leukocytosis and fevers resolved.  TTE showing aortic stenosis. Persistent hypoxia and weight up so changed to IV diuretic 4/8.  Now with signs of contraction alkalosis so slowing down diuresis.  Anticipate patient will need TCU and social work assisting with this process.  Received Haldol overnight for agitation so cannot go to TCU today.  Developed rash on back, not felt to be due to antibiotics per ID so continuing Ancef.    Problem List/Assessment and Plan:   Sepsis due to MSSA bacteremia and strep mitis (cx from 4/3) and LLE cellulitis:   --Positive blood cultures for MSSA and strep mitis on 4/3.    --Portal of entry for bacteria is not completely clear but likely skin related.  He has severe venous stasis of his lower legs bilaterally.  His left leg is erythematous so suspect cellulitis.    --Leukocytosis and fever resolved.  Did have elevated lactic acid that has since resolved.  --Continue on cefazolin for 4 weeks total.  Infectious disease team consulting and put in discharge order for patient.  --Patient pulled PICC line 4/10 but was replaced same day  --Repeat blood cultures from 4/4 and 4/5 did not grow anything    Rash: Papular rash on back that is itchy.  Has been on Ancef for some time so doubt that all of a sudden reaction to this would occur, ID in agreement.  No change to antibiotics.  --On topical hydrocortisone 1% cream 3 times daily as needed for rash on back      Fall: Fell when walking to the bathroom.  He denies loss of consciousness.  Has had some other falls.  He is chronically on anticoagulant agent for A. Fib.  -PT and OT consultation    Acute hypoxemic respiratory failure:   Developed hypoxia in the ED following fluid boluses.  Chest x-ray showing some atelectasis versus possible left lower lobe pneumonia.  Suspect this is  more volume related as he has significant peripheral edema on exam.  Have been able to wean oxygen with diuresis.  -Wean oxygen, intermittently needing 1 L while here[SM1.1]  -Still using supplemental O2[SM1.2]      Chronic A. Fib: Continue PTA warfarin, pharmacy to dose.  Atenolol 50 mg daily for rate control.  Telemetry.    Aortic stenosis: TTE showing a significantly decreased area of the aortic valve, the flow gradient is only mild.  Does not sound like his falls are syncopal in origin.  If he does have syncope or light and is walking around would consider cardiology consultation to see if this could be contributing to his falls.  Does not appear to be outflow obstruction per the echocardiogram read.    Lymphedema: Chronically on Lasix 60 mg morning and 30 mg afternoon.   -- Peak weight while here 237 pounds.  2-3+ tense lower extremity edema bilaterally at worst.  Weight down to 222 pounds with diuresis.  Tends to get a contraction alkalosis when diuresed too quickly.  -Restart diuretics at 60 mg twice daily p.o. Lasix  -Daily weights and strict intake output  -sodium restriction      Type II DM: PTA on glipizide 5 mg twice daily.  Blood glucose well controlled here.  -Continue PTA glipizide  -Can stop frequent blood sugar checks and sliding scale insulin as blood sugars have been 100-150 while here      HTN: PTA on lisinopril 5 mg daily, atenolol[SM1.1] 50 mg[SM1.2] daily, and Lasix 60 mg morning and 30 mg afternoon.  Lisinopril and Lasix initially held due to PRADEEP.  -continue atenolol and lisinopril[SM1.1]   -Lasix restarted 60 mg daily  -Ideal dry weight is 222 pounds[SM1.2]    Abdominal pain:   -[SM1.1]Improved with t[SM1.2]rial of Carafate 4 times daily,       Back pain:  Low back pain after fall.    -monitor and if ongoing pain would image      Dementia: Unclear severity, but lives at home with spouse.  Memory seems quite poor here and needs frequent reorientation.  -Avoid Haldol and other meds if  "possible, reorientation usually works      PRADEEP: Creatinine peak of 1.7.  Baseline appears to be 0.9-1.  Suspect secondary to bacteremia and Lasix/lisinopril use.  Resolved with IV fluids and holding lisinopril and diuretic initially.  -Follow BMP with diuresis      HLD  Continue statin      Hypothyroidism  Continue levothyroxin      GERD  Continue omeprazole    # Pain Assessment:  Current Pain Score 4/12/2018   Patient currently in pain? denies   Pain score (0-10) -   Pain location -   Pain descriptors -   - Ton is experiencing pain due to back pain. Pain management was discussed and the plan was created in a collaborative fashion.  Ton's response to the current recommendations: engaged  -Tylenol as needed  -Oxycodone 5 mg every 3 hours as needed, has needed very little of this    DVT Prophylaxis: Warfarin  Code Status: DNR / DNI.  This was discussed on admission  FEN: Consistent carbohydrate medium and 2 g sodium restriction  Discharge Dispo:  PT, OT, social work consult.  Based on weakness, confusion, extended course of IV antibiotics to think patient will need to TCU and that his spouse will not be able to manage all of his cares at home.  Discussed this with social work and family members.    Estimated Disch Date / # of Days until Disch: Received Haldol last night and was unable to discharge to TCU today because of that.  Discharge to TCU tomorrow.  Has PICC in place for extended IV antibiotic course        Interval History (Subjective):[SM1.1]      Assumed care reviewed chart.  Patient was very somnolent when I examined him offered no complaints.  Was sitting up using supplemental O2 about 1 L.  Per nursing patient was communicative earlier.  Unable to get reliable review of systems as he was very somnolent and sleepy[SM1.2]                Physical Exam:      Last Vital Signs:  /75 (BP Location: Left arm)  Pulse 78  Temp 97.6  F (36.4  C) (Oral)  Resp 20  Ht 1.676 m (5' 6\")  Wt 100.8 kg (222 " lb 3.2 oz)  SpO2 93%  BMI 35.86 kg/m2    Intake/Output Summary (Last 24 hours) at 04/12/18 1328  Last data filed at 04/11/18 2251   Gross per 24 hour   Intake              480 ml   Output              100 ml   Net              380 ml     Constitutional: Awake, NAD   HEENT:   MMM  Respiratory: No crackles.  No wheeze  Cardiovascular: Irregularly, irregular rhythm.  Regular rate.  2/6 systolic murmur  GI: Nontender to palpation,  not distended, bowel sounds present  Skin: Chronic venous stasis changes/purple/redness of lower extremities bilaterally   Musculoskeletal/extremities: 1-2+ edema right leg, 2-3+ edema left leg  Neurologic: Alert, confused but conversational, not oriented to place or date.  Psychiatric: calm, cooperative with cares         Medications:      All current medications were reviewed with changes reflected in problem list.[SM1.1]  Current Facility-Administered Medications   Medication     warfarin (COUMADIN) half-tab 3.5 mg     hydrocortisone (CORTAID) 1 % cream     furosemide (LASIX) tablet 60 mg     lidocaine 1 % 0.5-5 mL     lidocaine (LMX4) kit     sodium chloride (PF) 0.9% PF flush 5-50 mL     heparin lock flush 10 UNIT/ML injection 2-5 mL     miconazole (MICATIN; MICRO GUARD) 2 % powder     sucralfate (CARAFATE) tablet 1 g     sodium chloride (PF) 0.9% PF flush 3 mL     sodium chloride (PF) 0.9% PF flush 3 mL     ceFAZolin (ANCEF) intermittent infusion 2 g in 100 mL dextrose PRE-MIX     sodium chloride (PF) 0.9% PF flush 10-20 mL     sodium chloride (PF) 0.9% PF flush 10 mL     lisinopril (PRINIVIL/ZESTRIL) tablet 5 mg     atenolol (TENORMIN) tablet 50 mg     gabapentin (NEURONTIN) capsule 300 mg     glipiZIDE (GLUCOTROL) tablet 5 mg     levothyroxine (SYNTHROID/LEVOTHROID) tablet 100 mcg     omeprazole (priLOSEC) CR capsule 20 mg     polyethylene glycol (MIRALAX/GLYCOLAX) Packet 17 g     senna-docusate (SENOKOT-S;PERICOLACE) 8.6-50 MG per tablet 2 tablet     simethicone (MYLICON) chewable  tablet 160 mg     simvastatin (ZOCOR) tablet 10 mg     tamsulosin (FLOMAX) capsule 0.4 mg     glucose 40 % gel 15-30 g    Or     dextrose 50 % injection 25-50 mL    Or     glucagon injection 1 mg     naloxone (NARCAN) injection 0.1-0.4 mg     melatonin tablet 1 mg     potassium chloride SA (K-DUR/KLOR-CON M) CR tablet 20-40 mEq     potassium chloride (KLOR-CON) Packet 20-40 mEq     potassium chloride 10 mEq in 100 mL sterile water intermittent infusion (premix)     potassium chloride 10 mEq in 100 mL intermittent infusion with 10 mg lidocaine     potassium chloride 20 mEq in 50 mL intermittent infusion     magnesium sulfate 4 g in 100 mL sterile water (premade)     acetaminophen (TYLENOL) tablet 650 mg     oxyCODONE IR (ROXICODONE) tablet 5 mg     ondansetron (ZOFRAN-ODT) ODT tab 4 mg    Or     ondansetron (ZOFRAN) injection 4 mg     Warfarin Therapy Reminder (Check START DATE - warfarin may be starting in the FUTURE)[SM1.3]          Data:      All new lab and imaging data was reviewed.   Labs:         Lab Results   Component Value Date     04/12/2018    Lab Results   Component Value Date    CHLORIDE 99 04/12/2018    Lab Results   Component Value Date    BUN 20 04/12/2018      Lab Results   Component Value Date    POTASSIUM 3.3 04/12/2018    Lab Results   Component Value Date    CO2 37 04/12/2018    Lab Results   Component Value Date    CR 0.92 04/12/2018          Lab Results   Component Value Date    INR 2.64 (H) 04/13/2018    INR 2.98 (H) 04/12/2018    INR 2.66 (H) 04/11/2018      Imaging:   None today      Lanie Harvey MD[SM1.1]         Revision History        User Key Date/Time User Provider Type Action    > SM1.3 4/13/2018  2:55 PM Lanie Harvey MD Physician Sign     SM1.2 4/13/2018  2:47 PM Lanie Harvey MD Physician      SM1.1 4/13/2018  8:33 AM Lanie Harvey MD Physician             Progress Notes by Lane Chavez MD at 4/13/2018  2:46 PM     Author:  Lane Chavez  MD Service:  Infectious Disease Author Type:  Physician    Filed:  4/13/2018  2:46 PM Date of Service:  4/13/2018  2:46 PM Creation Time:  4/13/2018  2:46 PM    Status:  Signed :  Lane Chavez MD (Physician)         [facility]  Infectious Disease Progress Note          Assessment and Plan:   IMPRESSION:   1.  A 89-year-old male with 2 falls in the last week, some weakness and sleepiness for 2-3 days, cause of this syndrome is now apparent with blood cultures positive for methicillin-sensitive Staph aureus, the patient has methicillin-sensitive Staph aureus bacteremia syndrome, no clear secondary site, possibly left leg as source, although leg does not look like cellulitis to me more stasis with one small wound as a possible entry site.   2.  Low back pain, not obvious tenderness, conceivable secondary site of infection, but also possibly just related to fall.   3.  Diabetes mellitus.   4.  Atrial fibrillation.   5.  Mild dementia.   6.  Venous stasis disease without much history of major infection process, some wound formation in his left leg, recurrently.  7 New rash, mainly back, some abd, none of it looks like drug eruprion, ? folliculitis       RECOMMENDATIONS:   1.  Continue Ancef, improved acute renal insufficiency, increased dose   2.   blood cultures  FU neg, no secondary sites  3.  Transthoracic echocardiogram abnormal, but no obvious infection, would hold off on a transesophageal echocardiogram, patient will require a 4-week course of antibiotics, regardless would only do a GAVIN if we are not clearing the blood.  .      4.  Looks better only 1 cx +, no obvious secondary sites  5 OK PICC, orders in for ancef ? Rehab any time  6 symptomatic and topical rash tx, if becomes more generalized will readress as outpt but doubt allergic, no major generalized spread          Interval History:   no new complaints and doing well; no cp, sob, n/v/d, or abd pain. Looks better cx as above  "rash              Medications:       warfarin  3.5 mg Oral ONCE at 18:00     furosemide  60 mg Oral BID     sucralfate  1 g Oral 4x Daily AC & HS     sodium chloride (PF)  3 mL Intracatheter Q8H     ceFAZolin  2 g Intravenous Q8H     sodium chloride (PF)  10 mL Intracatheter Q7 Days     lisinopril  5 mg Oral Daily     atenolol  50 mg Oral Daily     gabapentin (NEURONTIN) capsule 300 mg  300 mg Oral BID     glipiZIDE  5 mg Oral BID AC     levothyroxine  100 mcg Oral Daily     omeprazole  20 mg Oral Daily     polyethylene glycol  17 g Oral Daily     senna-docusate  2 tablet Oral BID     simvastatin  10 mg Oral At Bedtime     tamsulosin  0.4 mg Oral QPM                  Physical Exam:   Blood pressure 141/70, pulse 78, temperature 98.3  F (36.8  C), temperature source Oral, resp. rate 20, height 1.676 m (5' 6\"), weight 101.4 kg (223 lb 9.6 oz), SpO2 92 %.  Wt Readings from Last 2 Encounters:   04/13/18 101.4 kg (223 lb 9.6 oz)   05/28/17 99.3 kg (218 lb 14.4 oz)     Vital Signs with Ranges  Temp:  [97.6  F (36.4  C)-98.3  F (36.8  C)] 98.3  F (36.8  C)  Heart Rate:  [71-80] 80  Resp:  [18-20] 20  BP: (141-150)/(57-75) 141/70  SpO2:  [87 %-97 %] 92 %    Constitutional: Awake, alert, cooperative, no apparent distress   Lungs: Clear to auscultation bilaterally, no crackles or wheezing   Cardiovascular: Regular rate and rhythm, normal S1 and S2, and no murmur noted   Abdomen: Normal bowel sounds, soft, non-distended, non-tender   Skin:  no cyanosis, same edema no emboli  Mostly stasis   Other:  rash back heat folliculitis, not generaized, R chest ? yeast          Data:   All microbiology laboratory data reviewed.  Recent Labs   Lab Test  04/12/18   0540  04/08/18   0945  04/06/18   0722  04/05/18   0651   WBC   --   8.0  8.7  8.9   HGB   --   12.2*  12.5*  11.6*   HCT   --   38.8*  39.6*  38.0*   MCV   --   101*  100  104*   PLT  202  187  120*  120*     Recent Labs   Lab Test  04/13/18   0515  04/12/18   0540  04/11/18   " 0500   CR  0.95  0.92  0.96     No lab results found.  Recent Labs   Lab Test  04/06/18   0721  04/05/18   0650  04/04/18   1604  04/03/18   1252  04/03/18   1204  05/14/17   1052  05/13/17   2043   CULT  No growth  No growth  No growth  Cultured on the 1st day of incubation:  Staphylococcus aureus  *  Critical Value/Significant Value, preliminary result only, called to and read back by  Nell Valerio RN at 0952 4/4/18 [id number]    Cultured on the 1st day of incubation:  Streptococcus mitis group  *  Critical Value/Significant Value called to and read back by  Tamanna Evans RN at 1015 on [id number]    (Note)  POSITIVE for STAPHYLOCOCCUS AUREUS and NEGATIVE for the mecA gene  (not MRSA) by [employer] multiplex nucleic acid test. The mecA gene was  not detected. Final identification and antimicrobial susceptibility  testing will be verified by standard methods.    Specimen tested with [employer] multiplex, gram-positive blood culture  nucleic acid test for the following targets: Staph aureus, Staph  epidermidis, Staph lugdunensis, other Staph species, Enterococcus  faecalis, Enterococcus faecium, Streptococcus species, S. agalactiae,  S. anginosus grp., S. pneumoniae, S. pyogenes, Listeria sp., mecA  (methicillin resistance) and Renetta/B (vancomycin resistance).    Critical Value/Significant Value called to and read back by  Komal Mckeon RN @1240 04/04/18 gd    No growth  No anaerobes isolated  No growth  <10,000 colonies/mL mixed urogenital conner Susceptibility testing not routinely   done[SD1.1]            Revision History        User Key Date/Time User Provider Type Action    > SD1.1 4/13/2018  2:46 PM Lane Chavez MD Physician Sign            Progress Notes by Austin Staton at 4/13/2018 11:31 AM     Author:  Austin Staton Service:  (none) Author Type:  Coordinator    Filed:  4/13/2018 11:32 AM Date of Service:  4/13/2018 11:31 AM Creation Time:  4/13/2018 11:31 AM    Status:  Signed :  Shemar  Austin RUIZ (Coordinator)         Your information has been submitted on April 13th, 2018 at 11:31:46 AM CDT. The confirmation number is [id number][BB1.1]       Revision History        User Key Date/Time User Provider Type Action    > BB1.1 4/13/2018 11:32 AM Austin Staton Coordinator Sign            Progress Notes by Nirmal Serrano MD at 4/12/2018  1:26 PM     Author:  Nirmal Serrano MD Service:  Hospitalist Author Type:  Physician    Filed:  4/12/2018  1:36 PM Date of Service:  4/12/2018  1:26 PM Creation Time:  4/12/2018  1:26 PM    Status:  Signed :  Nirmal Serrano MD (Physician)         [facility]  Hospitalist Progress Note[DE1.1]  Nirmal Serrano MD[DE1.2] 04/12/18    Reason for Stay (Diagnosis): bacteremia, cellulitis, lymphedema         Assessment and Plan:      Summary of Stay:  Ton Bagley is a 89 year old male with pmhx of A. fib on warfarin, lymphedema, CAD, HTN, DMII, dementia, and HLD admitted on 4/3/2018 after a fall.  Head CT negative for acute bleed.  Labs notable for an PRADEEP and leukocytosis.  He was hypotensive initially and elevated lactate.  He was given fluid boluses and developed some hypoxia in the ED.  He was started on vancomycin and cefepime for sepsis.  Blood cultures grew MSSA and strep mitis.  Antibiotics narrowed to cefazolin per ID who were consulted.  PT and OT consulted due to falls.  Leukocytosis and fevers resolved.  TTE showing aortic stenosis. Persistent hypoxia and weight up so changed to IV diuretic 4/8.  Now with signs of contraction alkalosis so slowing down diuresis.  Anticipate patient will need TCU and social work assisting with this process.  Received Haldol overnight for agitation so cannot go to TCU today.  Developed rash on back, not felt to be due to antibiotics per ID so continuing Ancef.    Problem List/Assessment and Plan:   Sepsis due to MSSA bacteremia and strep mitis (cx from 4/3) and LLE cellulitis: Positive  blood cultures for MSSA and strep mitis on 4/3.  Portal of entry for bacteria is not completely clear but may be skin related.  He has severe venous stasis of his lower legs bilaterally.  His left leg is erythematous so suspect cellulitis.  Leukocytosis and fever resolved.  Did have elevated lactic acid that has since resolved.  -Continue on cefazolin for 4 weeks total.  Infectious disease team consulting and put in discharge order for patient.  -Patient pulled PICC line 4/10 but was replaced same day  -Repeat blood cultures from 4/4 and 4/5 did not grow anything    Rash: Papular rash on back that is itchy.  Has been on Ancef for some time so doubt that all of a sudden reaction to this would occur, ID in agreement.  No change to antibiotics.  -Start topical hydrocortisone 1% cream 3 times daily as needed for rash on back      Fall: Fell when walking to the bathroom.  He denies loss of consciousness.  Has had some other falls.  He is chronically on anticoagulant agent for A. Fib.  -PT and OT consultation    Acute hypoxemic respiratory failure:   Developed hypoxia in the ED following fluid boluses.  Chest x-ray showing some atelectasis versus possible left lower lobe pneumonia.  Suspect this is more volume related as he has significant peripheral edema on exam.  Have been able to wean oxygen with diuresis.  -Wean oxygen, intermittently needing 1 L while here      Chronic A. Fib: Continue PTA warfarin, pharmacy to dose.  Atenolol 50 mg daily for rate control.  Telemetry.    Aortic stenosis: TTE showing a significantly decreased area of the aortic valve, the flow gradient is only mild.  Does not sound like his falls are syncopal in origin.  If he does have syncope or light and is walking around would consider cardiology consultation to see if this could be contributing to his falls.  Does not appear to be outflow obstruction per the echocardiogram read.    Lymphedema: Chronically on Lasix 60 mg morning and 30 mg  "afternoon.  Peak weight while here 237 pounds.  2-3+ tense lower extremity edema bilaterally at worst.  Weight down to 222 pounds with diuresis.  Tends to get a contraction alkalosis when diuresed too quickly.  -Restart diuretics at 60 mg twice daily p.o. Lasix  -Daily weights and strict intake output  -sodium restriction      Type II DM: PTA on glipizide 5 mg twice daily.  Blood glucose well controlled here.  -Continue PTA glipizide  -Can stop frequent blood sugar checks and sliding scale insulin as blood sugars have been 100-150 while here      HTN: PTA on lisinopril 5 mg daily, atenolol grams daily, and Lasix 60 mg morning and 30 mg afternoon.  Lisinopril and Lasix initially held due to PRADEEP.  -continue atenolol and lisinopril    Abdominal pain: Patient endorses diffuse abdominal cramps and often feels like he has to have a bowel movement.  Ongoing issue and son informed nursing that the patient has a \"bum stomach.\"  He is having multiple bowel movements that are formed daily.  -Continue Prilosec  -Trial of Carafate 4 times daily, seems to be working here and can be considered on discharge on an as-needed basis.      Back pain:  Low back pain after fall.    -monitor and if ongoing pain would image      Dementia: Unclear severity, but lives at home with spouse.  Memory seems quite poor here and needs frequent reorientation.  -Avoid Haldol and other meds if possible, reorientation usually works      PRADEEP: Creatinine peak of 1.7.  Baseline appears to be 0.9-1.  Suspect secondary to bacteremia and Lasix/lisinopril use.  Resolved with IV fluids and holding lisinopril and diuretic initially.  -Follow BMP with diuresis      HLD  Continue statin      Hypothyroidism  Continue levothyroxin      GERD  Continue omeprazole    # Pain Assessment:[DE1.1]  Current Pain Score 4/11/2018   Patient currently in pain? -   Pain score (0-10) 4   Pain location -   Pain descriptors -[DE1.2]   - Ton is experiencing pain due to back pain. " "Pain management was discussed and the plan was created in a collaborative fashion.  Ton's response to the current recommendations: engaged  -Tylenol as needed  -Oxycodone 5 mg every 3 hours as needed, has needed very little of this    DVT Prophylaxis: Warfarin  Code Status: DNR / DNI.  This was discussed on admission  FEN: Consistent carbohydrate medium and 2 g sodium restriction  Discharge Dispo:  PT, OT, social work consult.  Based on weakness, confusion, extended course of IV antibiotics to think patient will need to TCU and that his spouse will not be able to manage all of his cares at home.  Discussed this with social work and family members.    Estimated Disch Date / # of Days until Disch: Received Haldol last night and was unable to discharge to TCU today because of that.  Discharge to TCU tomorrow.  Has PICC in place for extended IV antibiotic course        Interval History (Subjective):      Report of some agitation overnight and was given Haldol.  Unclear what his exact behaviors were.  This morning he is calm and cooperative for me.  Confused, but conversational.  Denies any abdominal pain.  Denies shortness of breath.  He is not sure why is in the hospital.                  Physical Exam:      Last Vital Signs:[DE1.1]  /73 (BP Location: Left arm)  Pulse 78  Temp 95.3  F (35.2  C) (Oral)  Resp 18  Ht 1.676 m (5' 6\")  Wt 100.8 kg (222 lb 3.2 oz)  SpO2 92%  BMI 35.86 kg/m2[DE1.2]    Intake/Output Summary (Last 24 hours) at 04/12/18 1328  Last data filed at 04/11/18 2251   Gross per 24 hour   Intake              480 ml   Output              100 ml   Net              380 ml[DE1.3]     Constitutional: Awake, NAD   HEENT:   MMM  Respiratory: No crackles.  No wheeze  Cardiovascular: Irregularly, irregular rhythm.  Regular rate.  2/6 systolic murmur  GI: Nontender to palpation,  not distended, bowel sounds present  Skin: Chronic venous stasis changes/purple/redness of lower extremities " bilaterally   Musculoskeletal/extremities: 1-2+ edema right leg, 2-3+ edema left leg  Neurologic: Alert, confused but conversational, not oriented to place or date.  Psychiatric: calm, cooperative with cares         Medications:      All current medications were reviewed with changes reflected in problem list.         Data:      All new lab and imaging data was reviewed.   Labs:         Lab Results   Component Value Date     04/12/2018    Lab Results   Component Value Date    CHLORIDE 99 04/12/2018    Lab Results   Component Value Date    BUN 20 04/12/2018      Lab Results   Component Value Date    POTASSIUM 3.3 04/12/2018    Lab Results   Component Value Date    CO2 37 04/12/2018    Lab Results   Component Value Date    CR 0.92 04/12/2018[DE1.1]          Lab Results   Component Value Date    INR 2.98 (H) 04/12/2018    INR 2.66 (H) 04/11/2018    INR 2.40 (H) 04/10/2018[DE1.2]      Imaging:   None today[DE1.1]      Nirmal Serrano MD[DE1.2]         Revision History        User Key Date/Time User Provider Type Action    > DE1.3 4/12/2018  1:36 PM Nirmal Serrano MD Physician Sign     DE1.2 4/12/2018  1:27 PM Nirmal Serrano MD Physician      DE1.1 4/12/2018  1:26 PM Nirmal Serrano MD Physician             Progress Notes by Lane Chavez MD at 4/12/2018 12:28 PM     Author:  Lane Chavez MD Service:  Infectious Disease Author Type:  Physician    Filed:  4/12/2018 12:31 PM Date of Service:  4/12/2018 12:28 PM Creation Time:  4/12/2018 12:28 PM    Status:  Signed :  Lane Chavez MD (Physician)         [facility]  Infectious Disease Progress Note          Assessment and Plan:   IMPRESSION:   1.  A 89-year-old male with 2 falls in the last week, some weakness and sleepiness for 2-3 days, cause of this syndrome is now apparent with blood cultures positive for methicillin-sensitive Staph aureus, the patient has methicillin-sensitive Staph aureus bacteremia  syndrome, no clear secondary site, possibly left leg as source, although leg does not look like cellulitis to me more stasis with one small wound as a possible entry site.   2.  Low back pain, not obvious tenderness, conceivable secondary site of infection, but also possibly just related to fall.   3.  Diabetes mellitus.   4.  Atrial fibrillation.   5.  Mild dementia.   6.  Venous stasis disease without much history of major infection process, some wound formation in his left leg, recurrently.  7 New rash, mainly back, some abd, none of it looks like drug eruprion, ? folliculitis       RECOMMENDATIONS:   1.  Continue Ancef, improved acute renal insufficiency, increased dose   2.   blood cultures  FU neg, no secondary sites  3.  Transthoracic echocardiogram abnormal, but no obvious infection, would hold off on a transesophageal echocardiogram, patient will require a 4-week course of antibiotics, regardless would only do a GAVIN if we are not clearing the blood.  .      4.  Looks better only 1 cx +, no obvious secondary sites  5 OK PICC, orders in for ancef ? Rehab any time  6 symptomatic and topical rash tx, if becomes more generalized will readress as outpt but doubt allergic          Interval History:   no new complaints and doing well; no cp, sob, n/v/d, or abd pain. Looks better cx as above rash              Medications:[SD1.1]       sucralfate  1 g Oral 4x Daily AC & HS     sodium chloride (PF)  3 mL Intracatheter Q8H     ceFAZolin  2 g Intravenous Q8H     sodium chloride (PF)  10 mL Intracatheter Q7 Days     lisinopril  5 mg Oral Daily     atenolol  50 mg Oral Daily     gabapentin (NEURONTIN) capsule 300 mg  300 mg Oral BID     glipiZIDE  5 mg Oral BID AC     levothyroxine  100 mcg Oral Daily     omeprazole  20 mg Oral Daily     polyethylene glycol  17 g Oral Daily     senna-docusate  2 tablet Oral BID     simvastatin  10 mg Oral At Bedtime     tamsulosin  0.4 mg Oral QPM[SD1.2]                  Physical  "Exam:[SD1.1]   Blood pressure 151/73, pulse 78, temperature 95.3  F (35.2  C), temperature source Oral, resp. rate 18, height 1.676 m (5' 6\"), weight 100.8 kg (222 lb 3.2 oz), SpO2 92 %.  Wt Readings from Last 2 Encounters:   04/11/18 100.8 kg (222 lb 3.2 oz)   05/28/17 99.3 kg (218 lb 14.4 oz)[SD1.2]     Vital Signs with Ranges[SD1.1]  Temp:  [95.3  F (35.2  C)-97.6  F (36.4  C)] 95.3  F (35.2  C)  Pulse:  [67-78] 78  Heart Rate:  [63] 63  Resp:  [16-18] 18  BP: (120-169)/(57-73) 151/73  SpO2:  [92 %-96 %] 92 %[SD1.2]    Constitutional: Awake, alert, cooperative, no apparent distress   Lungs: Clear to auscultation bilaterally, no crackles or wheezing   Cardiovascular: Regular rate and rhythm, normal S1 and S2, and no murmur noted   Abdomen: Normal bowel sounds, soft, non-distended, non-tender   Skin:  no cyanosis, same edema no emboli  Mostly stasis   Other:  rash back heat folliculitis, not generaized, R chest ? yeast          Data:   All microbiology laboratory data reviewed.[SD1.1]  Recent Labs   Lab Test  04/12/18   0540  04/08/18   0945  04/06/18   0722  04/05/18   0651   WBC   --   8.0  8.7  8.9   HGB   --   12.2*  12.5*  11.6*   HCT   --   38.8*  39.6*  38.0*   MCV   --   101*  100  104*   PLT  202  187  120*  120*     Recent Labs   Lab Test  04/12/18   0540  04/11/18   0500  04/10/18   0437   CR  0.92  0.96  1.01     No lab results found.  Recent Labs   Lab Test  04/06/18   0721  04/05/18   0650  04/04/18   1604  04/03/18   1252  04/03/18   1204  05/14/17   1052  05/13/17 2043   CULT  No growth  No growth  No growth  Cultured on the 1st day of incubation:  Staphylococcus aureus  *  Critical Value/Significant Value, preliminary result only, called to and read back by  Nell Valerio RN at 0952 4/4/18 [id number]    Cultured on the 1st day of incubation:  Streptococcus mitis group  *  Critical Value/Significant Value called to and read back by  Tamanna Evans RN at 1015 on [id number]    (Note)  POSITIVE for " STAPHYLOCOCCUS AUREUS and NEGATIVE for the mecA gene  (not MRSA) by Verigene multiplex nucleic acid test. The mecA gene was  not detected. Final identification and antimicrobial susceptibility  testing will be verified by standard methods.    Specimen tested with Verigene multiplex, gram-positive blood culture  nucleic acid test for the following targets: Staph aureus, Staph  epidermidis, Staph lugdunensis, other Staph species, Enterococcus  faecalis, Enterococcus faecium, Streptococcus species, S. agalactiae,  S. anginosus grp., S. pneumoniae, S. pyogenes, Listeria sp., mecA  (methicillin resistance) and Renetta/B (vancomycin resistance).    Critical Value/Significant Value called to and read back by  Komal Mckeon RN @1240 04/04/18 gd    No growth  No anaerobes isolated  No growth  <10,000 colonies/mL mixed urogenital conner Susceptibility testing not routinely   done[SD1.2]            Revision History        User Key Date/Time User Provider Type Action    > SD1.2 4/12/2018 12:31 PM Lane Chavez MD Physician Sign     SD1.1 4/12/2018 12:28 PM Lane Chavez MD Physician             Progress Notes by Lenora Munroe LSW at 4/12/2018 11:12 AM     Author:  Lenora Munroe LSW Service:  (none) Author Type:      Filed:  4/12/2018 11:18 AM Date of Service:  4/12/2018 11:12 AM Creation Time:  4/12/2018 11:12 AM    Status:  Signed :  Lenora Munroe LSW ()         SWS:  D: discharge planning  A: AMANDA notified that pt has been accepted at [facility] but that pt has received Haldol overnight and unable to dc today. SW spoke with [facility] and they will have bed available tomorrow.  AMANDA left message with  son Mejia at [phone] requesting return call to provide update re: pt dc plan.  SW waiting for a return call.  P: Anticipate dc tomorrow. AMANDA will continue to follow.[LH1.1]     Revision History        User Key Date/Time User Provider Type Action    > LH1.1 4/12/2018 11:18 AM Lenora Munroe LSW   Sign                  Procedure Notes     No notes of this type exist for this encounter.      Progress Notes - Therapies (Notes from 04/12/18 through 04/15/18)     No notes of this type exist for this encounter.

## 2018-04-03 NOTE — PHARMACY-ADMISSION MEDICATION HISTORY
Admission medication history interview status for this patient is complete. See [facility] admission navigator for allergy information, prior to admission medications and immunization status.     Medication history interview source(s):Caregiver  Medication history resources (including written lists, pill bottles, clinic record):Med list from [facility]  Primary pharmacy:    Changes made to [facility] medication list:  Added: Triamcinolone, atenolol, senna S  Deleted: metoprolol, hydrocortisone cream  Changed: Tylenol, warfarin, miralax, gabapentin,     Actions taken by pharmacist (provider contacted, etc):None     Additional medication history information:None    Medication reconciliation/reorder completed by provider prior to medication history? No    Do you take OTC medications (eg tylenol, ibuprofen, fish oil, eye/ear drops, etc)? Y  (Y/N)    For patients on insulin therapy: NA (Y/N)      Prior to Admission medications    Medication Sig Last Dose Taking? Auth Provider   ACETAMINOPHEN PO Take 1,000 mg by mouth 4 times daily 4/3/2018 at Unknown time Yes Unknown, Entered By History   atenolol (TENORMIN) 50 MG tablet Take 50 mg by mouth daily 4/3/2018 at Unknown time Yes Unknown, Entered By History   GABAPENTIN PO Take 300 mg by mouth 2 times daily 4/3/2018 at Unknown time Yes Unknown, Entered By History   polyethylene glycol (MIRALAX/GLYCOLAX) Packet Take 17 g by mouth daily 4/3/2018 at Unknown time Yes Unknown, Entered By History   senna-docusate (SENOKOT-S;PERICOLACE) 8.6-50 MG per tablet Take 2 tablets by mouth 2 times daily 4/3/2018 at Unknown time Yes Unknown, Entered By History   triamcinolone (KENALOG) 0.5 % cream Apply topically three times a week Mon/Wed /Fri  Yes Unknown, Entered By History   WARFARIN SODIUM PO Take by mouth daily 3 mg   MWF, 4 mg Tues,Thur, Sat,Sun 4/2/2018 at Unknown time Yes Unknown, Entered By History   glipiZIDE (GLUCOTROL) 5 MG tablet Take 1 tablet (5 mg) by mouth 2 times daily (before meals) 4/3/2018 at  Unknown time Yes Nirmal Serrano MD   lisinopril (PRINIVIL/ZESTRIL) 5 MG tablet Take 1 tablet (5 mg) by mouth daily 4/3/2018 at Unknown time Yes Nirmal Serrano MD   ferrous gluconate (FERGON) 324 (38 FE) MG tablet Take 1 tablet (324 mg) by mouth daily (with breakfast) 4/2/2018 at noon Yes Nirmal Serrano MD   simethicone (MYLICON) 80 MG chewable tablet Take 2 tablets (160 mg) by mouth every 6 hours as needed for flatulence or cramping Past Week at Unknown time Yes Nirmal Serrano MD   nitroglycerin (NITROSTAT) 0.4 MG sublingual tablet Place 1 tablet (0.4 mg) under the tongue every 5 minutes as needed for chest pain  Yes Nirmal Serrano MD   simvastatin (ZOCOR) 10 MG tablet Take 1 tablet (10 mg) by mouth At Bedtime 4/2/2018 at Unknown time Yes Nirmal Serrano MD   camphor-menthol (DERMASARRA) 0.5-0.5 % LOTN To affected areas on both legs Apply topically daily To affected areas on both legs 4/3/2018 at Unknown time Yes Nirmal Serrano MD   furosemide (LASIX) 20 MG tablet Take 3 tablets (60 mg) by mouth every morning 4/3/2018 at Unknown time Yes Nirmal Serrano MD   furosemide (LASIX) 20 MG tablet Take 1.5 tablets (30 mg) by mouth daily (with lunch) 4/2/2018 at Unknown time Yes Nirmal Serrano MD   Calcium Carb-Cholecalciferol (CALCIUM 500+D) 500-200 MG-UNIT TABS Take 1 tablet by mouth 2 times daily 4/3/2018 at Unknown time Yes Nirmal Serrano MD   tamsulosin (FLOMAX) 0.4 MG capsule Take 1 capsule (0.4 mg) by mouth every evening 4/2/2018 at Unknown time Yes Nirmal Serrano MD   multivitamin (I-REBEL) TABS per tablet Take 1 tablet by mouth daily 4/3/2018 at Unknown time Yes Nirmal Serrano MD   levothyroxine (SYNTHROID/LEVOTHROID) 100 MCG tablet Take 1 tablet (100 mcg) by mouth daily 4/3/2018 at Unknown time Yes Nirmal Serrano MD   omeprazole (PRILOSEC) 20 MG CR capsule Take 1 capsule (20 mg) by mouth daily 4/3/2018 at Unknown  time Yes Nirmal Serrano MD   ACETAMINOPHEN PO Take 650 mg by mouth daily as needed for pain More than a month at Unknown time  Unknown, Entered By History

## 2018-04-04 ENCOUNTER — APPOINTMENT (OUTPATIENT)
Dept: CARDIOLOGY | Facility: CLINIC | Age: 83
DRG: 871 | End: 2018-04-04
Attending: PHYSICIAN ASSISTANT
Payer: MEDICARE

## 2018-04-04 LAB
ANION GAP SERPL CALCULATED.3IONS-SCNC: 5 MMOL/L (ref 3–14)
BUN SERPL-MCNC: 34 MG/DL (ref 7–30)
CALCIUM SERPL-MCNC: 7.6 MG/DL (ref 8.5–10.1)
CHLORIDE SERPL-SCNC: 103 MMOL/L (ref 94–109)
CO2 SERPL-SCNC: 29 MMOL/L (ref 20–32)
CREAT SERPL-MCNC: 1.43 MG/DL (ref 0.66–1.25)
ERYTHROCYTE [DISTWIDTH] IN BLOOD BY AUTOMATED COUNT: 13.5 % (ref 10–15)
GFR SERPL CREATININE-BSD FRML MDRD: 46 ML/MIN/1.7M2
GLUCOSE BLDC GLUCOMTR-MCNC: 179 MG/DL (ref 70–99)
GLUCOSE BLDC GLUCOMTR-MCNC: 234 MG/DL (ref 70–99)
GLUCOSE BLDC GLUCOMTR-MCNC: 74 MG/DL (ref 70–99)
GLUCOSE SERPL-MCNC: 143 MG/DL (ref 70–99)
HCT VFR BLD AUTO: 38.7 % (ref 40–53)
HGB BLD-MCNC: 11.9 G/DL (ref 13.3–17.7)
INR PPP: 2.42 (ref 0.86–1.14)
LACTATE BLD-SCNC: 1.3 MMOL/L (ref 0.7–2)
MAGNESIUM SERPL-MCNC: 2.1 MG/DL (ref 1.6–2.3)
MCH RBC QN AUTO: 31.9 PG (ref 26.5–33)
MCHC RBC AUTO-ENTMCNC: 30.7 G/DL (ref 31.5–36.5)
MCV RBC AUTO: 104 FL (ref 78–100)
PLATELET # BLD AUTO: 121 10E9/L (ref 150–450)
POTASSIUM SERPL-SCNC: 4 MMOL/L (ref 3.4–5.3)
RBC # BLD AUTO: 3.73 10E12/L (ref 4.4–5.9)
SODIUM SERPL-SCNC: 137 MMOL/L (ref 133–144)
TROPONIN I SERPL-MCNC: <0.015 UG/L (ref 0–0.04)
TROPONIN I SERPL-MCNC: <0.015 UG/L (ref 0–0.04)
WBC # BLD AUTO: 11.9 10E9/L (ref 4–11)

## 2018-04-04 PROCEDURE — 36415 COLL VENOUS BLD VENIPUNCTURE: CPT | Performed by: PHYSICIAN ASSISTANT

## 2018-04-04 PROCEDURE — 25000131 ZZH RX MED GY IP 250 OP 636 PS 637: Mod: GY | Performed by: PHYSICIAN ASSISTANT

## 2018-04-04 PROCEDURE — 83605 ASSAY OF LACTIC ACID: CPT | Performed by: PHYSICIAN ASSISTANT

## 2018-04-04 PROCEDURE — 25000132 ZZH RX MED GY IP 250 OP 250 PS 637: Mod: GY | Performed by: INTERNAL MEDICINE

## 2018-04-04 PROCEDURE — 84484 ASSAY OF TROPONIN QUANT: CPT | Performed by: INTERNAL MEDICINE

## 2018-04-04 PROCEDURE — 25000128 H RX IP 250 OP 636: Performed by: INTERNAL MEDICINE

## 2018-04-04 PROCEDURE — 12000007 ZZH R&B INTERMEDIATE

## 2018-04-04 PROCEDURE — 25000132 ZZH RX MED GY IP 250 OP 250 PS 637: Mod: GY | Performed by: PHYSICIAN ASSISTANT

## 2018-04-04 PROCEDURE — 36415 COLL VENOUS BLD VENIPUNCTURE: CPT | Performed by: INTERNAL MEDICINE

## 2018-04-04 PROCEDURE — 85610 PROTHROMBIN TIME: CPT | Performed by: PHYSICIAN ASSISTANT

## 2018-04-04 PROCEDURE — 93306 TTE W/DOPPLER COMPLETE: CPT | Mod: 26 | Performed by: INTERNAL MEDICINE

## 2018-04-04 PROCEDURE — 85027 COMPLETE CBC AUTOMATED: CPT | Performed by: PHYSICIAN ASSISTANT

## 2018-04-04 PROCEDURE — 00000146 ZZHCL STATISTIC GLUCOSE BY METER IP

## 2018-04-04 PROCEDURE — 40000264 ECHO COMPLETE WITH OPTISON

## 2018-04-04 PROCEDURE — A9270 NON-COVERED ITEM OR SERVICE: HCPCS | Mod: GY | Performed by: PHYSICIAN ASSISTANT

## 2018-04-04 PROCEDURE — 87040 BLOOD CULTURE FOR BACTERIA: CPT | Performed by: INTERNAL MEDICINE

## 2018-04-04 PROCEDURE — 83735 ASSAY OF MAGNESIUM: CPT | Performed by: PHYSICIAN ASSISTANT

## 2018-04-04 PROCEDURE — A9270 NON-COVERED ITEM OR SERVICE: HCPCS | Mod: GY | Performed by: INTERNAL MEDICINE

## 2018-04-04 PROCEDURE — 25000128 H RX IP 250 OP 636: Performed by: PHYSICIAN ASSISTANT

## 2018-04-04 PROCEDURE — 84484 ASSAY OF TROPONIN QUANT: CPT | Performed by: PHYSICIAN ASSISTANT

## 2018-04-04 PROCEDURE — 80048 BASIC METABOLIC PNL TOTAL CA: CPT | Performed by: PHYSICIAN ASSISTANT

## 2018-04-04 PROCEDURE — 25500064 ZZH RX 255 OP 636: Performed by: INTERNAL MEDICINE

## 2018-04-04 PROCEDURE — 99233 SBSQ HOSP IP/OBS HIGH 50: CPT | Performed by: INTERNAL MEDICINE

## 2018-04-04 RX ORDER — WARFARIN SODIUM 4 MG/1
4 TABLET ORAL
Status: COMPLETED | OUTPATIENT
Start: 2018-04-04 | End: 2018-04-04

## 2018-04-04 RX ORDER — CEFAZOLIN SODIUM 2 G/100ML
2 INJECTION, SOLUTION INTRAVENOUS EVERY 12 HOURS
Status: DISCONTINUED | OUTPATIENT
Start: 2018-04-04 | End: 2018-04-06 | Stop reason: ALTCHOICE

## 2018-04-04 RX ORDER — HALOPERIDOL 5 MG/ML
2 INJECTION INTRAMUSCULAR EVERY 6 HOURS PRN
Status: DISCONTINUED | OUTPATIENT
Start: 2018-04-04 | End: 2018-04-12

## 2018-04-04 RX ADMIN — POLYETHYLENE GLYCOL 3350 17 G: 17 POWDER, FOR SOLUTION ORAL at 09:06

## 2018-04-04 RX ADMIN — OXYCODONE HYDROCHLORIDE 5 MG: 5 TABLET ORAL at 19:06

## 2018-04-04 RX ADMIN — CEFEPIME 1 G: 1 INJECTION, SOLUTION INTRAVENOUS at 12:38

## 2018-04-04 RX ADMIN — GABAPENTIN 300 MG: 300 CAPSULE ORAL at 21:20

## 2018-04-04 RX ADMIN — SIMVASTATIN 10 MG: 10 TABLET, FILM COATED ORAL at 21:20

## 2018-04-04 RX ADMIN — LEVOTHYROXINE SODIUM 100 MCG: 100 TABLET ORAL at 09:06

## 2018-04-04 RX ADMIN — WARFARIN SODIUM 4 MG: 4 TABLET ORAL at 17:39

## 2018-04-04 RX ADMIN — HUMAN ALBUMIN MICROSPHERES AND PERFLUTREN 7 ML: 10; .22 INJECTION, SOLUTION INTRAVENOUS at 10:00

## 2018-04-04 RX ADMIN — CEFEPIME 1 G: 1 INJECTION, SOLUTION INTRAVENOUS at 00:34

## 2018-04-04 RX ADMIN — CEFAZOLIN SODIUM 2 G: 2 INJECTION, SOLUTION INTRAVENOUS at 14:14

## 2018-04-04 RX ADMIN — INSULIN ASPART 1 UNITS: 100 INJECTION, SOLUTION INTRAVENOUS; SUBCUTANEOUS at 11:29

## 2018-04-04 RX ADMIN — GABAPENTIN 300 MG: 300 CAPSULE ORAL at 09:06

## 2018-04-04 RX ADMIN — HEPARIN SODIUM 5000 UNITS: 5000 INJECTION, SOLUTION INTRAVENOUS; SUBCUTANEOUS at 09:07

## 2018-04-04 RX ADMIN — OMEPRAZOLE 20 MG: 20 CAPSULE, DELAYED RELEASE ORAL at 09:06

## 2018-04-04 RX ADMIN — SENNOSIDES AND DOCUSATE SODIUM 2 TABLET: 8.6; 5 TABLET ORAL at 21:20

## 2018-04-04 RX ADMIN — SENNOSIDES AND DOCUSATE SODIUM 2 TABLET: 8.6; 5 TABLET ORAL at 09:05

## 2018-04-04 RX ADMIN — TAMSULOSIN HYDROCHLORIDE 0.4 MG: 0.4 CAPSULE ORAL at 19:06

## 2018-04-04 RX ADMIN — GLIPIZIDE 5 MG: 5 TABLET ORAL at 17:39

## 2018-04-04 RX ADMIN — GLIPIZIDE 5 MG: 5 TABLET ORAL at 09:06

## 2018-04-04 RX ADMIN — HALOPERIDOL LACTATE 2 MG: 5 INJECTION, SOLUTION INTRAMUSCULAR at 00:29

## 2018-04-04 ASSESSMENT — ACTIVITIES OF DAILY LIVING (ADL)
ADLS_ACUITY_SCORE: 12
ADLS_ACUITY_SCORE: 16
ADLS_ACUITY_SCORE: 12

## 2018-04-04 NOTE — PLAN OF CARE
Problem: Patient Care Overview  Goal: Plan of Care/Patient Progress Review  OT: Orders received and chart reviewed. Pt with another discipline during attempted OT session.

## 2018-04-04 NOTE — PHARMACY-ANTICOAGULATION SERVICE
Clinical Pharmacy - Warfarin Dosing Consult     Pharmacy has been consulted to manage this patient s warfarin therapy.  Indication: Atrial Fibrillation  Therapy Goal: INR 2-3  Warfarin Prior to Admission: Yes  Warfarin PTA Regimen:  (3 mg M W F    4mg ROW)    INR   Date Value Ref Range Status   04/03/2018 2.32 (H) 0.86 - 1.14 Final   05/28/2017 2.01 (H) 0.86 - 1.14 Final       Recommend warfarin 4 mg today.  Pharmacy will monitor Ton Bagley daily and order warfarin doses to achieve specified goal.      Please contact pharmacy as soon as possible if the warfarin needs to be held for a procedure or if the warfarin goals change.

## 2018-04-04 NOTE — PLAN OF CARE
Problem: Patient Care Overview  Goal: Plan of Care/Patient Progress Review  Outcome: Improving  Pt pleasantly confused, did not attempt to get out of bed. Pt uses urinal with assist. +BM. Seen by ID, +BC staph- antibiotic changed to ancef. Daily BC, Review POC with dtr and .

## 2018-04-04 NOTE — CONSULTS
Consult Date:  04/04/2018      LOCATION:  [address] [facility]        REFERRING PHYSICIAN:  Dr. Harvey.        IMPRESSION:   1.  A 89-year-old male with 2 falls in the last week, some weakness and sleepiness for 2-3 days, cause of this syndrome is now apparent with blood cultures positive for methicillin-sensitive Staph aureus, the patient has methicillin-sensitive Staph aureus bacteremia syndrome, no clear secondary site, possibly left leg as source, although leg does not look like cellulitis to me more stasis with one small wound as a possible entry site.   2.  Low back pain, not obvious tenderness, conceivable secondary site of infection, but also possibly just related to fall.   3.  Diabetes mellitus.   4.  Atrial fibrillation.   5.  Mild dementia.   6.  Venous stasis disease without much history of major infection process, some wound formation in his left leg, recurrently.      RECOMMENDATIONS:   1.  Simplify antibiotics to Ancef, for now reduced dose due to the mild acute renal insufficiency, increase dose as renal function allows.   2.  Follow labs and blood cultures serially, follow pain and other symptoms sites.   3.  Transthoracic echocardiogram abnormal, but no obvious infection, would hold off on a transesophageal echocardiogram, patient will require a 4-week course of antibiotics, regardless would only do a GAVIN if we are not clearing the blood.  No long line for now, get serial blood cultures until clear.   4.  Further workup, tests and evaluation depending on clinical course, culture results, etc.   5.  Discussed in great detail with patient and family members.      HISTORY:  This 89-year-old male seen in consultation due to Staph aureus bacteremia.  The patient has a history of being in his usual state of health until a week before when he had an unexplained fall, in retrospect some degree of weakness and malaise and then over the last 2-3 days at home was sleeping all the time.  He  then had another fall in the bathroom without explanation, maybe hit his back and has some slight back pain that was going on just prior to this.  No other obvious pain site.  There has been a focus on his left leg, but the family who have seen him over the years think his leg looks the same as usual and he has chronic severe stasis changes, worse on the left than the right.  There has been some wound formation in the left as well but no infected sites that have been known.  The patient, at presentation, was not immediately being evaluated from an infection perspective, but then lactic acid was elevated, some degree of confusion, etc. and he had blood cultures done.  The blood culture rapidly is positive for Staph aureus.  The patient has no obvious secondary site of pain or discomfort at the present time except for the back.      PAST MEDICAL HISTORY:  Prior bowel issue in 2017 without recurrent problems, history of gastroesophageal reflux disease, history of mild dementia, hypertension on treatment, history of chronic venous stasis disease without major recurrent cellulitis episodes, history of atrial fibrillation, diabetes, reasonably controlled.      SOCIAL AND FAMILY HISTORY:  He lives in assisted living with his wife.  No particular recent exposures, no known resistant pathogens, no active alcohol or tobacco use.      REVIEW OF SYSTEMS:  Largely as listed above.  Some slight discomfort in the left leg but nothing notably.  No other obvious pain site except for some mild low back pain.  He also has some slight left chest pain where he probably fell.      PHYSICAL EXAMINATION:   GENERAL:  The patient appears his stated age.  He seems reasonably alert and communicative.     VITAL SIGNS:  No significant fever currently, earlier 99s, pulse of 80, respiratory rate 16 and unlabored.   HEENT:  No thrush or intraoral lesions.  Pupils reactive.   NECK:  Supple and nontender without lymphadenopathy.   HEART:  Regular  rhythm, no particular murmur, maybe slight irregularity.   LUNGS:  Clear bilaterally, good air movement.   ABDOMEN:  Soft, nontender, slightly obese.   EXTREMITIES:  Stasis disease bilaterally, left worse than right.  Left leg is quite red.  There are some marks of apparently more proximal erythema that has now resolved.  There is a small wound area that does not track deep in the posterior leg.  The leg is cool to the touch.      LABORATORY DATA:  Blood culture positive for Staph aureus by rapid testing, is sensitive Staph aureus.  Creatinine initially normal, now 1.43.  White blood cell count 22,500      Thank you very much for consultation.  I will follow the patient with you.         MARILEE ROWE MD             D: 2018   T: 2018   MT: EMILY      Name:     BRIANA HERNÁNDEZ   MRN:      [id number]        Account:       [id number]   :      1927           Consult Date:  2018      Document: [id number]

## 2018-04-04 NOTE — PROGRESS NOTES
[facility]    Hospitalist Progress Note    Date of Service (when I saw the patient): 04/04/2018    Assessment & Plan   Ton Bagley is a 89 year old male with a PMH significant for atrial fibrillation on warfarin, likely lymphdedema/venous stasis, MI, HTN, memory loss and HLP who presents after a fall.      Sepsis 2/2 L lower leg celluitis  History difficult 2/2 pt memory issues but apparently had a fall when going to the restroom. He did hit his head but ? LOC. On evaluation in the ED he was found to have LLL cellulitis with leukocytosis. Tmax since admission at 101 . CXR on admission with basilar atelectasis vs pna. WBC elevated at 22.5. U/a not grossly infected. CRP elevated to 126  - started on vanco/ cefepme  - blood cultures positive for GPC in clusters-> will have ID consult given concern for staph bacteremiainfec  - leukocytosis improved on 4/4 to 11.9  - leg appears to be within demarcation but there are some areas that appear to be progressive. Appreciate ID input re: treatment, bridger given bacteremia     Falls/ ? Syncope  Hypoxia in the ED  - LE dopplers done (concern for DVT) and negative  - telemetry  - echo today  - serial troponins negative  - head CT with no bleed     H/o a fib on warfarin  H/o CAD  - atenolol with parameters  - pharma consult for warfarin  - ? If pt should be on coumadin given his falls     DM II   - continues on glipizide  - CHO diet  - SSI     HTN  Normally on lisinopril and furosemide but pressure soft so held.      Back pain  Low back pain after. No imaging done after fall.   - monitor and if ongoing pain would image     Dementia     CKD  No recent baseline creatinine, last was 5/2017 at 0.98. Admit creatinine here at mid 1's.   - continue to trend  - avoid nephrotoxins   - U/A rather bland (? Proteinuria), less suspicious re: intrinsic renal process     HLD  Continue statin     Hypothyroidism  Continue levothyroxin      GERD  Continue omeprazole     Dispo   OT,  "PT consults    # Pain Assessment:  Current Pain Score 4/4/2018   Patient currently in pain? no   Pain score (0-10) 0   Pain location -   Pain descriptors -   - Ton is unable to participate in a collaborative plan for pain management due to dementia.   - Please see the plan for pain management as documented above    FEN (fluids, electrolytes and nutrition): CHO diet, holding IV fluids given hypoxia in the ED  Discussed with nursing.  DVT Prophylaxis: warfarin  Code Status: DNR/DNI    Disposition: Expected discharge in 2+ days once cellulitis is improved and on oral meds with safe dispo in place.    Sherman Jewell MD  [phone] (P)  [phone] (C)  Text Page until 6 pm (after call answering service)    Interval History   \"I am I in the hospital?\"    -Data reviewed today: I reviewed all new labs and imaging results over the last 24 hours. I personally reviewed no images or EKG's today.    Physical Exam   Temp: 98  F (36.7  C) Temp src: Oral BP: 108/57   Heart Rate: 82 Resp: 18 SpO2: 93 % O2 Device: Nasal cannula Oxygen Delivery: 2 LPM  Vitals:    04/03/18 1713   Weight: 104.5 kg (230 lb 6.4 oz)     Vital Signs with Ranges  Temp:  [98  F (36.7  C)-101  F (38.3  C)] 98  F (36.7  C)  Heart Rate:  [60-97] 82  Resp:  [18-20] 18  BP: ()/() 108/57  SpO2:  [81 %-97 %] 93 %  I/O last 3 completed shifts:  In: 240 [P.O.:240]  Out: 225 [Urine:225]    Constitutional: Alert, confused, no distress  Respiratory: Lungs clear to auscultation bilaterally, no wheezes, no crackles  Cardiovascular: Regular rate and rhythm, no murmurs  GI: Soft, non-tender, non-disteneded, good bowel sounds  Skin/Integumen: No erythema, cyanosis.edema noted on L leg (celluitis)  Other: Venous changes to bilateral LEs. Cellulitis involving almost the whole of his L leg, demarcated. Some tenderness but able to press on leg without much pain. No crepitus noted on exam    Medications     Warfarin Therapy Reminder         atenolol  50 mg " Oral Daily     gabapentin (NEURONTIN) capsule 300 mg  300 mg Oral BID     glipiZIDE  5 mg Oral BID AC     levothyroxine  100 mcg Oral Daily     omeprazole  20 mg Oral Daily     polyethylene glycol  17 g Oral Daily     senna-docusate  2 tablet Oral BID     simvastatin  10 mg Oral At Bedtime     tamsulosin  0.4 mg Oral QPM     insulin aspart  1-7 Units Subcutaneous TID AC     insulin aspart  1-5 Units Subcutaneous At Bedtime     ceFEPIme (MAXIPIME) IV  1 g Intravenous Q12H     vancomycin (VANCOCIN) IV  2,000 mg Intravenous Q24H       Data     Recent Labs  Lab 04/04/18  0740 04/04/18  0100 04/03/18  1849 04/03/18  1042   WBC 11.9*  --   --  22.5*   HGB 11.9*  --   --  13.3   *  --   --  101*   *  --  157 179   INR 2.42*  --   --  2.32*     --   --  139   POTASSIUM 4.0  --   --  3.9   CHLORIDE 103  --   --  102   CO2 29  --   --  31   BUN 34*  --   --  27   CR 1.43*  --   --  1.56*   ANIONGAP 5  --   --  6   JANN 7.6*  --   --  9.0   *  --   --  165*   ALBUMIN  --   --   --  3.3*   PROTTOTAL  --   --   --  7.2   BILITOTAL  --   --   --  1.7*   ALKPHOS  --   --   --  76   ALT  --   --   --  30   AST  --   --   --  30   LIPASE  --   --   --  66*   TROPI <0.015 <0.015 <0.015 <0.015       Recent Results (from the past 24 hour(s))   CT Chest Abdomen Pelvis w/o Contrast    Narrative    CT CHEST/ABDOMEN/PELVIS WITHOUT CONTRAST April 3, 2018 11:44 AM     HISTORY: Trauma, on blood thinners, left rib pain.      TECHNIQUE: Axial images from the thoracic inlet to the symphysis are  performed with additional coronal reformatted images. No contrast is  utilized. Radiation dose for this scan was reduced using automated  exposure control, adjustment of the mA and/or kV according to patient  size, or iterative reconstruction technique.    FINDINGS:     Chest: Calcified granuloma is noted in the right lower lobe on series  6, image 120. Lungs are otherwise clear without infiltrate,  consolidation or mass. No  pleural or pericardial fluid. Heart is  enlarged. Prior CABG. Coronary artery calcifications are present.  Esophagus is unremarkable. No enlarged lymph nodes in the chest or  axillas.    Abdomen: Prior cholecystectomy changes. The liver, spleen, pancreas  and adrenal glands are unremarkable. Bilateral simple renal cysts are  present. 0.4 cm nonobstructing stone is present in the left renal  collecting system on series 2, image 57. There is no hydronephrosis or  additional urinary tract calculi. Aorta demonstrates calcified plaque  without aneurysm or retroperitoneal hemorrhage. Small left periaortic  lymph nodes are present but none are significantly enlarged by CT  criteria. The bowel is normal in caliber without obstruction or  diverticulitis. Appendix is normal.    Pelvis: The bladder, prostate and rectum are unremarkable. No enlarged  pelvic lymph nodes are appreciated. A few prominent possibly reactive  lymph nodes are noted in the left inguinal region on series 2, image  115. The largest measures approximately 2.4 x 1.4 cm. Degenerative  spine changes are present. No evidence of rib or spine fracture.      Impression    IMPRESSION:  1. No acute changes in the chest, abdomen or pelvis. No evidence of  fractures. Bilateral renal cortical cysts are noted with a  nonobstructing stone left renal collecting system. Prior  cholecystectomy changes. Evidence of old granulomatous disease.  2. Prominent left groin lymph nodes of uncertain etiology. Reactive  adenopathy is suspected.  3. Postoperative changes from coronary artery bypass graft. Calcified  plaque is noted throughout the aorta without evidence of aneurysm.    PRISCILA TINEO MD   CT Head w/o Contrast    Narrative    CT SCAN OF THE HEAD WITHOUT CONTRAST   4/3/2018 11:45 AM     HISTORY: fall;     TECHNIQUE:  Axial images of the head and coronal reformations without  IV contrast material. Radiation dose for this scan was reduced using  automated exposure control,  adjustment of the mA and/or kV according  to patient size, or iterative reconstruction technique.    COMPARISON: None.    FINDINGS:  There is generalized atrophy of the brain.  White matter  changes are present in the cerebral hemispheres that are consistent  with small vessel ischemic disease in this age patient. There is no  evidence of intracranial hemorrhage, mass, acute infarct or anomaly.  The visualized portions of the sinuses and mastoids appear normal. No  intracranial hemorrhage or skull fractures are identified.      Impression    IMPRESSION: No bleed or fractures are identified.      LIONEL VEE MD   US Lower Extremity Venous Duplex Left    Narrative    ULTRASOUND VENOUS LEFT LOWER EXTREMITY  4/3/2018 3:15 PM     HISTORY:  Pain, swelling.     COMPARISON: None.    TECHNIQUE: Ultrasound gray scale, Color Doppler flow, and spectral  Doppler waveform analysis performed.    FINDINGS:  The left common femoral, superficial femoral, popliteal and  posterior tibial veins are patent and fully compressible and  demonstrate normal venous Doppler flow. The visualized greater  saphenous vein is negative for thrombus.       Impression    IMPRESSION: No DVT demonstrated.    SHAGUFTA FLORES MD   XR Chest 2 Views    Narrative    CHEST TWO VIEWS    4/3/2018 4:38 PM     HISTORY: New hypoxia.     COMPARISON: 5/27/2017.    FINDINGS: Sternal wires and mediastinal clips. No pneumothorax. The  heart is enlarged without pulmonary edema. There is a mild left  basilar infiltrate. Lungs are otherwise clear. No pleural effusion.      Impression    IMPRESSION: Mild left basilar atelectasis or pneumonia.    CARL MITCHELL MD

## 2018-04-04 NOTE — PLAN OF CARE
Problem: Patient Care Overview  Goal: Plan of Care/Patient Progress Review  Outcome: No Change  Patient disoriented to place, time, and situation. Assist of 2 with a gait belt. Paged MD around 0015; patient becoming increasingly agitated, trying to get out of bed, and confused; PRN IV haldol ordered and given with good results. Blood sugar this shift: 130. Plan for echo this morning.

## 2018-04-04 NOTE — PROVIDER NOTIFICATION
04/04/18 1400   Significant Event   Significant Event Other (see comments)  (+ Staph Aureus MD & RN notified)        04/04/18 1400   Significant Event   Significant Event Other (see comments)  (+ Staph Aureus MD & RN notified)

## 2018-04-04 NOTE — PHARMACY-VANCOMYCIN DOSING SERVICE
Pharmacy Vancomycin Initial Note  Date of Service April 3, 2018  Patient's  1927  89 year old, male    Indication: Sepsis- LL leg cellulitis    Current estimated CrCl = Estimated Creatinine Clearance: 35.7 mL/min (based on Cr of 1.56).    Creatinine for last 3 days  4/3/2018: 10:42 AM Creatinine 1.56 mg/dL    Recent Vancomycin Level(s) for last 3 days  No results found for requested labs within last 72 hours.      Vancomycin IV Administrations (past 72 hours)                   vancomycin (VANCOCIN) 2,000 mg in sodium chloride 0.9 % 500 mL intermittent infusion (mg) 2,000 mg New Bag 18 1424                Nephrotoxins and other renal medications (Future)    Start     Dose/Rate Route Frequency Ordered Stop    18 1400  vancomycin (VANCOCIN) 2,000 mg in sodium chloride 0.9 % 500 mL intermittent infusion      2,000 mg  over 2 Hours Intravenous EVERY 24 HOURS 18 2125            Contrast Orders - past 72 hours     None                Plan:  1.  Start vancomycin  2000 mg IV q24h.   2.  Goal Trough Level: 15-20 mg/L   3.  Pharmacy will check trough levels as appropriate in 1-3 Days.    4. Serum creatinine levels will be ordered daily for the first week of therapy and at least twice weekly for subsequent weeks.    5. [place] method utilized to dose vancomycin therapy: Method 1    Mariposa Montelongo

## 2018-04-04 NOTE — CONSULTS
ID consult dictated IMP 1 89 yo male fall, several days sleeping, cause now obvious with MSSA bacteremia, possible L leg source but leg looks like stasis not infection, no clear secondary sites    REC 1 ancef, serial BC, further GONZALEZ as cxs/course direct

## 2018-04-05 ENCOUNTER — APPOINTMENT (OUTPATIENT)
Dept: PHYSICAL THERAPY | Facility: CLINIC | Age: 83
DRG: 871 | End: 2018-04-05
Attending: INTERNAL MEDICINE
Payer: MEDICARE

## 2018-04-05 ENCOUNTER — APPOINTMENT (OUTPATIENT)
Dept: OCCUPATIONAL THERAPY | Facility: CLINIC | Age: 83
DRG: 871 | End: 2018-04-05
Attending: INTERNAL MEDICINE
Payer: MEDICARE

## 2018-04-05 LAB
CREAT SERPL-MCNC: 1 MG/DL (ref 0.66–1.25)
ERYTHROCYTE [DISTWIDTH] IN BLOOD BY AUTOMATED COUNT: 13.1 % (ref 10–15)
GFR SERPL CREATININE-BSD FRML MDRD: 70 ML/MIN/1.7M2
GLUCOSE BLDC GLUCOMTR-MCNC: 149 MG/DL (ref 70–99)
GLUCOSE BLDC GLUCOMTR-MCNC: 176 MG/DL (ref 70–99)
GLUCOSE BLDC GLUCOMTR-MCNC: 193 MG/DL (ref 70–99)
GLUCOSE BLDC GLUCOMTR-MCNC: 198 MG/DL (ref 70–99)
GLUCOSE BLDC GLUCOMTR-MCNC: 249 MG/DL (ref 70–99)
HCT VFR BLD AUTO: 38 % (ref 40–53)
HGB BLD-MCNC: 11.6 G/DL (ref 13.3–17.7)
INR PPP: 2.49 (ref 0.86–1.14)
MCH RBC QN AUTO: 31.6 PG (ref 26.5–33)
MCHC RBC AUTO-ENTMCNC: 30.5 G/DL (ref 31.5–36.5)
MCV RBC AUTO: 104 FL (ref 78–100)
PLATELET # BLD AUTO: 120 10E9/L (ref 150–450)
RBC # BLD AUTO: 3.67 10E12/L (ref 4.4–5.9)
WBC # BLD AUTO: 8.9 10E9/L (ref 4–11)

## 2018-04-05 PROCEDURE — 87040 BLOOD CULTURE FOR BACTERIA: CPT | Performed by: INTERNAL MEDICINE

## 2018-04-05 PROCEDURE — 85027 COMPLETE CBC AUTOMATED: CPT | Performed by: PHYSICIAN ASSISTANT

## 2018-04-05 PROCEDURE — 85610 PROTHROMBIN TIME: CPT | Performed by: PHYSICIAN ASSISTANT

## 2018-04-05 PROCEDURE — 12000007 ZZH R&B INTERMEDIATE

## 2018-04-05 PROCEDURE — A9270 NON-COVERED ITEM OR SERVICE: HCPCS | Mod: GY | Performed by: PHYSICIAN ASSISTANT

## 2018-04-05 PROCEDURE — 99233 SBSQ HOSP IP/OBS HIGH 50: CPT | Performed by: INTERNAL MEDICINE

## 2018-04-05 PROCEDURE — 97165 OT EVAL LOW COMPLEX 30 MIN: CPT | Mod: GO | Performed by: OCCUPATIONAL THERAPIST

## 2018-04-05 PROCEDURE — 25000128 H RX IP 250 OP 636: Performed by: INTERNAL MEDICINE

## 2018-04-05 PROCEDURE — 97530 THERAPEUTIC ACTIVITIES: CPT | Mod: GP | Performed by: PHYSICAL THERAPIST

## 2018-04-05 PROCEDURE — 40000193 ZZH STATISTIC PT WARD VISIT: Performed by: PHYSICAL THERAPIST

## 2018-04-05 PROCEDURE — 00000146 ZZHCL STATISTIC GLUCOSE BY METER IP

## 2018-04-05 PROCEDURE — 40000133 ZZH STATISTIC OT WARD VISIT: Performed by: OCCUPATIONAL THERAPIST

## 2018-04-05 PROCEDURE — 25000132 ZZH RX MED GY IP 250 OP 250 PS 637: Mod: GY | Performed by: INTERNAL MEDICINE

## 2018-04-05 PROCEDURE — A9270 NON-COVERED ITEM OR SERVICE: HCPCS | Mod: GY | Performed by: INTERNAL MEDICINE

## 2018-04-05 PROCEDURE — 25000132 ZZH RX MED GY IP 250 OP 250 PS 637: Mod: GY | Performed by: PHYSICIAN ASSISTANT

## 2018-04-05 PROCEDURE — 97116 GAIT TRAINING THERAPY: CPT | Mod: GP | Performed by: PHYSICAL THERAPIST

## 2018-04-05 PROCEDURE — 97535 SELF CARE MNGMENT TRAINING: CPT | Mod: GO | Performed by: OCCUPATIONAL THERAPIST

## 2018-04-05 PROCEDURE — 82565 ASSAY OF CREATININE: CPT | Performed by: PHYSICIAN ASSISTANT

## 2018-04-05 PROCEDURE — 36415 COLL VENOUS BLD VENIPUNCTURE: CPT | Performed by: PHYSICIAN ASSISTANT

## 2018-04-05 PROCEDURE — 97161 PT EVAL LOW COMPLEX 20 MIN: CPT | Mod: GP | Performed by: PHYSICAL THERAPIST

## 2018-04-05 RX ORDER — LISINOPRIL 5 MG/1
5 TABLET ORAL DAILY
Status: DISCONTINUED | OUTPATIENT
Start: 2018-04-05 | End: 2018-04-15 | Stop reason: HOSPADM

## 2018-04-05 RX ORDER — WARFARIN SODIUM 4 MG/1
4 TABLET ORAL
Status: COMPLETED | OUTPATIENT
Start: 2018-04-05 | End: 2018-04-05

## 2018-04-05 RX ADMIN — GABAPENTIN 300 MG: 300 CAPSULE ORAL at 21:04

## 2018-04-05 RX ADMIN — LEVOTHYROXINE SODIUM 100 MCG: 100 TABLET ORAL at 07:36

## 2018-04-05 RX ADMIN — INSULIN ASPART 1 UNITS: 100 INJECTION, SOLUTION INTRAVENOUS; SUBCUTANEOUS at 07:43

## 2018-04-05 RX ADMIN — SENNOSIDES AND DOCUSATE SODIUM 2 TABLET: 8.6; 5 TABLET ORAL at 21:04

## 2018-04-05 RX ADMIN — ATENOLOL 50 MG: 50 TABLET ORAL at 07:36

## 2018-04-05 RX ADMIN — LISINOPRIL 5 MG: 5 TABLET ORAL at 17:08

## 2018-04-05 RX ADMIN — SIMVASTATIN 10 MG: 10 TABLET, FILM COATED ORAL at 21:04

## 2018-04-05 RX ADMIN — INSULIN ASPART 2 UNITS: 100 INJECTION, SOLUTION INTRAVENOUS; SUBCUTANEOUS at 12:05

## 2018-04-05 RX ADMIN — TAMSULOSIN HYDROCHLORIDE 0.4 MG: 0.4 CAPSULE ORAL at 21:04

## 2018-04-05 RX ADMIN — ACETAMINOPHEN 650 MG: 325 TABLET ORAL at 17:09

## 2018-04-05 RX ADMIN — SIMETHICONE CHEW TAB 80 MG 160 MG: 80 TABLET ORAL at 02:21

## 2018-04-05 RX ADMIN — ACETAMINOPHEN 650 MG: 325 TABLET ORAL at 07:35

## 2018-04-05 RX ADMIN — OXYCODONE HYDROCHLORIDE 5 MG: 5 TABLET ORAL at 05:55

## 2018-04-05 RX ADMIN — INSULIN ASPART 3 UNITS: 100 INJECTION, SOLUTION INTRAVENOUS; SUBCUTANEOUS at 18:20

## 2018-04-05 RX ADMIN — GLIPIZIDE 5 MG: 5 TABLET ORAL at 07:36

## 2018-04-05 RX ADMIN — GLIPIZIDE 5 MG: 5 TABLET ORAL at 17:08

## 2018-04-05 RX ADMIN — GABAPENTIN 300 MG: 300 CAPSULE ORAL at 07:35

## 2018-04-05 RX ADMIN — OMEPRAZOLE 20 MG: 20 CAPSULE, DELAYED RELEASE ORAL at 07:36

## 2018-04-05 RX ADMIN — Medication 1 MG: at 02:21

## 2018-04-05 RX ADMIN — CEFAZOLIN SODIUM 2 G: 2 INJECTION, SOLUTION INTRAVENOUS at 02:26

## 2018-04-05 RX ADMIN — CEFAZOLIN SODIUM 2 G: 2 INJECTION, SOLUTION INTRAVENOUS at 14:56

## 2018-04-05 RX ADMIN — POLYETHYLENE GLYCOL 3350 17 G: 17 POWDER, FOR SOLUTION ORAL at 07:37

## 2018-04-05 RX ADMIN — WARFARIN SODIUM 4 MG: 4 TABLET ORAL at 17:08

## 2018-04-05 ASSESSMENT — ACTIVITIES OF DAILY LIVING (ADL)
ADLS_ACUITY_SCORE: 12
ADLS_ACUITY_SCORE: 12
ADLS_ACUITY_SCORE: 14
ADLS_ACUITY_SCORE: 14
ADLS_ACUITY_SCORE: 12
ADLS_ACUITY_SCORE: 14

## 2018-04-05 NOTE — PROGRESS NOTES
04/05/18 1422   Quick Adds   Type of Visit Initial PT Evaluation   Living Environment   Lives With spouse   Living Arrangements assisted living   Home Accessibility no concerns   Transportation Available family or friend will provide   Self-Care   Usual Activity Tolerance moderate   Current Activity Tolerance fair   Regular Exercise no   Equipment Currently Used at Home cane, straight   Activity/Exercise/Self-Care Comment Per son has dementia and wife assists as needed.    Functional Level Prior   Ambulation 1-->assistive equipment   Transferring 1-->assistive equipment   Toileting 0-->independent   Bathing 0-->independent   Dressing 0-->independent   Eating 0-->independent   Communication 0-->understands/communicates without difficulty   Swallowing 0-->swallows foods/liquids without difficulty   Cognition 1 - attention or memory deficits  (per son, this is his usual, gets worse at hospital)   Fall history within last six months yes   Number of times patient has fallen within last six months 2   Prior Functional Level Comment Per son, meal and medication support. Pt has assistance with lymph wrapping.    General Information   Onset of Illness/Injury or Date of Surgery - Date 04/03/18   Referring Physician Dr. Harvey   Patient/Family Goals Statement Pt would like to return home   Pertinent History of Current Problem (include personal factors and/or comorbidities that impact the POC) Pt is a 89 year old male admitted after 2 episodes of falls and sleepiness.  Diagosed with staph aureus infection resulting in left LE cellulitis.  PMH includes mild dementia, DM, Afib, low back pain, venous statis desease, GERD   Precautions/Limitations fall precautions;oxygen therapy device and L/min  (2 liters)   General Info Comments Pt agreeable to PT   Cognitive Status Examination   Orientation person  (needing re-orientation that he is at hospital)   Level of Consciousness alert   Follows Commands and Answers Questions 100% of  "the time;able to follow single-step instructions   Personal Safety and Judgment impulsive   Memory impaired   Pain Assessment   Patient Currently in Pain Yes, see Vital Sign flowsheet   Integumentary/Edema   Integumentary/Edema Comments notable impairements in L LE, redness/swelling   Posture    Posture Forward head position;Protracted shoulders   Range of Motion (ROM)   ROM Comment limited due to soft tissue approximation   Strength   Strength Comments 3-/5 in B LE   Transfer Skills   Transfer Comments min A    Gait   Gait Comments min A with 1 LOB in hallway SOB on 2 liters 45 feet. WBOS. Sats 92% following. HR in 90s following. FWW.   Balance   Balance Comments 1 LOB with turning in hallway.    General Therapy Interventions   Planned Therapy Interventions balance training;bed mobility training;gait training;strengthening;transfer training;risk factor education;home program guidelines;progressive activity/exercise   Clinical Impression   Criteria for Skilled Therapeutic Intervention yes, treatment indicated   PT Diagnosis decreased functional mobility   Influenced by the following impairments decreased strength, impaired skin integrity, decreased balance, poor cognition   Functional limitations due to impairments decreased transfers, ambulation   Clinical Presentation Evolving/Changing   Clinical Presentation Rationale Poor cognition   Clinical Decision Making (Complexity) Low complexity   Therapy Frequency` 5 times/week   Predicted Duration of Therapy Intervention (days/wks) 5 days   Anticipated Discharge Disposition Transitional Care Facility   Risk & Benefits of therapy have been explained Yes   Patient, Family & other staff in agreement with plan of care Yes   [facility] TM \"6 Clicks\"   2016, Trustees of [facility], under license to [employer].  All rights reserved.   6 Clicks Short Forms Basic Mobility Inpatient Short Form   [facility] AM-PAC  \"6 Clicks\" V.2 Basic Mobility " Inpatient Short Form   1. Turning from your back to your side while in a flat bed without using bedrails? 2 - A Lot   2. Moving from lying on your back to sitting on the side of a flat bed without using bedrails? 2 - A Lot   3. Moving to and from a bed to a chair (including a wheelchair)? 3 - A Little   4. Standing up from a chair using your arms (e.g., wheelchair, or bedside chair)? 3 - A Little   5. To walk in hospital room? 3 - A Little   6. Climbing 3-5 steps with a railing? 2 - A Lot   Basic Mobility Raw Score (Score out of 24.Lower scores equate to lower levels of function) 15   Total Evaluation Time   Total Evaluation Time (Minutes) 10

## 2018-04-05 NOTE — PLAN OF CARE
Problem: Patient Care Overview  Goal: Plan of Care/Patient Progress Review  Outcome: Therapy, progress toward functional goals is gradual  OT: Pt is a 89 year old male admitted after 2 episodes of falls and sleepiness.  Diagosed with staph aureus infection resulting in left LE cellulitis.  PMH includes mild dementia, DM, Afib, low back pain, venous statis desease, GERD.  Lives with spouse in an ROSA.  Pt states he is independent with ADLS but is not a reliable historian.    Discharge Planner OT   Patient plan for discharge: Home with assist.  Current status: Initial evaluation complete.  Treatment started for feeding and ADLS.  Pt sitting in chair, breakfast in front of him.  Trying to open top thinking it was still connected to the plate.  Helped remove it for pt and then he was able to use a fork to eat his omelette.  Needed assist with opening containers to drink.  Once he finished his omelette we tried LE dressing.  Pt able to bed to feet without difficulty.  Stood with SBA and verbal cues.  Not able to focus to don socks when handed to him.  Kept wondering why he was here, although he knew he was in the hosptial.  Needed admission concerns repeated to hime 4-5 times during session.  Distractible, would benefit from skilled OT to increase endurance to assist spouse with caregiving needs at his ROSA.  Barriers to return to prior living situation: Confused, spouse may have trouble with cares if this gets worse  Recommendations for discharge: Home with assist and home therapy as appropriate.  Rationale for recommendations: Pt appears to have good overall physical skills.  Anticipate pt getting stronger with therapy and medical recovery to go home with supervision of spouse.       Entered by: William Laguna 04/05/2018 9:16 AM

## 2018-04-05 NOTE — PLAN OF CARE
Problem: Patient Care Overview  Goal: Plan of Care/Patient Progress Review  Outcome: No Change  Alert to self with intermittent confusion, up with A-1 with walker, falls precaution in place, IV Ancef for +BC Staph, daily blood cultures, ID following, VSS, prn Oxycodone for Left rib pain with relief, BG 74, 234, BLE edema, L>R, will continue with POC.

## 2018-04-05 NOTE — PROGRESS NOTES
[facility]  Infectious Disease Progress Note          Assessment and Plan:   IMPRESSION:   1.  A 89-year-old male with 2 falls in the last week, some weakness and sleepiness for 2-3 days, cause of this syndrome is now apparent with blood cultures positive for methicillin-sensitive Staph aureus, the patient has methicillin-sensitive Staph aureus bacteremia syndrome, no clear secondary site, possibly left leg as source, although leg does not look like cellulitis to me more stasis with one small wound as a possible entry site.   2.  Low back pain, not obvious tenderness, conceivable secondary site of infection, but also possibly just related to fall.   3.  Diabetes mellitus.   4.  Atrial fibrillation.   5.  Mild dementia.   6.  Venous stasis disease without much history of major infection process, some wound formation in his left leg, recurrently.       RECOMMENDATIONS:   1.  Continue Ancef, improved acute renal insufficiency, increase dose as renal function allows.   2.  Follow labs and blood cultures serially, follow pain and other symptoms sites. FU neg  3.  Transthoracic echocardiogram abnormal, but no obvious infection, would hold off on a transesophageal echocardiogram, patient will require a 4-week course of antibiotics, regardless would only do a GAVIN if we are not clearing the blood.  No long line for now, get serial blood cultures until clear.   4.  Further workup, tests and evaluation depending on clinical course, culture results, etc.   5.  Looks better only 1 cx +, no obvious secondary sites        Interval History:   no new complaints and doing well; no cp, sob, n/v/d, or abd pain. Looks better cx as above              Medications:       ceFAZolin  2 g Intravenous Q12H     atenolol  50 mg Oral Daily     gabapentin (NEURONTIN) capsule 300 mg  300 mg Oral BID     glipiZIDE  5 mg Oral BID AC     levothyroxine  100 mcg Oral Daily     omeprazole  20 mg Oral Daily     polyethylene glycol   "17 g Oral Daily     senna-docusate  2 tablet Oral BID     simvastatin  10 mg Oral At Bedtime     tamsulosin  0.4 mg Oral QPM     insulin aspart  1-7 Units Subcutaneous TID AC     insulin aspart  1-5 Units Subcutaneous At Bedtime                  Physical Exam:   Blood pressure 154/84, temperature 99.2  F (37.3  C), resp. rate 24, height 1.676 m (5' 6\"), weight 104.5 kg (230 lb 6.4 oz), SpO2 96 %.  Wt Readings from Last 2 Encounters:   04/03/18 104.5 kg (230 lb 6.4 oz)   05/28/17 99.3 kg (218 lb 14.4 oz)     Vital Signs with Ranges  Temp:  [96.6  F (35.9  C)-99.2  F (37.3  C)] 99.2  F (37.3  C)  Heart Rate:  [75-99] 99  Resp:  [18-24] 24  BP: (136-162)/(65-84) 154/84  SpO2:  [95 %-97 %] 96 %    Constitutional: Awake, alert, cooperative, no apparent distress   Lungs: Clear to auscultation bilaterally, no crackles or wheezing   Cardiovascular: Regular rate and rhythm, normal S1 and S2, and no murmur noted   Abdomen: Normal bowel sounds, soft, non-distended, non-tender   Skin: No rashes, no cyanosis, same edema no emboli  Mostly stasis   Other:           Data:   All microbiology laboratory data reviewed.  Recent Labs   Lab Test  04/05/18   0651  04/04/18   0740  04/03/18   1849  04/03/18   1042   WBC  8.9  11.9*   --   22.5*   HGB  11.6*  11.9*   --   13.3   HCT  38.0*  38.7*   --   41.8   MCV  104*  104*   --   101*   PLT  120*  121*  157  179     Recent Labs   Lab Test  04/05/18   0651  04/04/18   0740  04/03/18   1042   CR  1.00  1.43*  1.56*     No lab results found.  Recent Labs   Lab Test  04/05/18   0650  04/04/18   1604  04/03/18   1252  04/03/18   1204  05/14/17   1052  05/13/17   2043   CULT  No growth after 1 hour  No growth after 12 hours  Cultured on the 1st day of incubation:  Staphylococcus aureus  *  Critical Value/Significant Value, preliminary result only, called to and read back by  Nell Valerio RN at 0944 4/4/18 [id number]    Culture in progress  (Note)  POSITIVE for STAPHYLOCOCCUS AUREUS and " NEGATIVE for the mecA gene  (not MRSA) by [place] multiplex nucleic acid test. The mecA gene was  not detected. Final identification and antimicrobial susceptibility  testing will be verified by standard methods.    Specimen tested with [place] multiplex, gram-positive blood culture  nucleic acid test for the following targets: Staph aureus, Staph  epidermidis, Staph lugdunensis, other Staph species, Enterococcus  faecalis, Enterococcus faecium, Streptococcus species, S. agalactiae,  S. anginosus grp., S. pneumoniae, S. pyogenes, Listeria sp., mecA  (methicillin resistance) and Renetta/B (vancomycin resistance).    Critical Value/Significant Value called to and read back by  Komal Mckeon RN @1240 04/04/18 gd    No growth after 2 days  No anaerobes isolated  No growth  <10,000 colonies/mL mixed urogenital conner Susceptibility testing not routinely   done

## 2018-04-05 NOTE — PROGRESS NOTES
[facility]  Hospitalist Progress Note    Danielito Bentley MD 04/05/2018  Text Page      Reason for Stay (Diagnosis):          Assessment and Plan:      Summary of Stay:Ton Bagley is a 89 year old male with a PMH significant for atrial fibrillation on warfarin, likely lymphdedema/venous stasis, MI, HTN, memory loss and HLP who presents after a fall.  History difficult 2/2 pt memory issues but apparently had a fall when going to the restroom. He did hit his head but ? LOC. On evaluation in the ED he was found to have LLL cellulitis with leukocytosis. Tmax since admission at 101 and he was generally weak. CXR on admission with basilar atelectasis vs pna. WBC elevated at 22.5.  Blood culture grew out methicillin sensitive staph aureus      Methicillin sensitive staph aureus bacteremia and possible L leg celluitis  .  Portal of entry for MSSA is not completely clear but may be skin related.  He has severe venous stasis of his lower legs bilaterally.  He has some mild erythema in the right medial thigh  -Continue on cefazolin.  Infectious disease team consulting  - leukocytosis resolved, WBC 8.9  -Fevers resolved  -Per ID will need 4 weeks of antibiotics  -Follow repeat blood cultures for clearing.      Falls/ ? Syncope  Hypoxia in the ED  - LE dopplers done (concern for DVT) and negative  - telemetry  - echo today  - serial troponins negative  - head CT with no bleed      H/o a fib on warfarin  H/o CAD  - atenolol with parameters  - pharma consult for warfarin  - ? If pt should be on coumadin given his falls      DM II   -Blood glucose mildly elevated, 176 this morning but has been variable  - continue on glipizide for now  - CHO diet  - SSI      HTN  -Normally on lisinopril and furosemide but pressure was low upon admission so these were held.  Also had acute kidney injury  -Resume home dosing of Lasix 30 mg a day and lisinopril 5 mg a day  -Continue atenolol 50 mg a day        Back pain  Low  "back pain after. No imaging done after fall.   - monitor and if ongoing pain would image      Dementia      PRADEEP /CKD  No recent baseline creatinine, last was 5/2017 at 0.98.  Peaked at 1.7  -Now back to 1.0 while off lisinopril and furosemide.  -Resuming home dose of lisinopril and furosemide  -Follow renal function      HLD  Continue statin      Hypothyroidism  Continue levothyroxin      GERD  Continue omeprazole      Dispo   OT, PT consults      # Pain Assessment:  Current Pain Score 4/5/2018   Patient currently in pain? yes   Pain score (0-10) 4   Pain location Leg   Pain descriptors -   Ton burgos pain level was assessed and he currently denies pain.        DVT Prophylaxis: Warfarin  Code Status: DNR / DNI  Discharge Dispo: Home when long-term antibiotic plan is established and bacteremia is resolved.  Incidental Findings/ Discharge Issues: None          Interval History (Subjective):      Feels okay.  Seems mildly confused.  Denies pain in the leg to me.  Seen briefly as he needed to use the restroom.                  Physical Exam:      Last Vital Signs:  /84  Temp 99.2  F (37.3  C)  Resp 24  Ht 1.676 m (5' 6\")  Wt 104.5 kg (230 lb 6.4 oz)  SpO2 96%  BMI 37.19 kg/m2    On 2 L oxygen    Vital signs reviewed  General:  Alert, calm, NAD  CV: regular rate and rhythm, no murmurs or rubs  Lungs:  Clear to ascultation bilaterally, normal respiratory effort  HEENT:  Pupil round, equal, conjuctivae, sclerae and lids normal, neck is supple  Abdomen:  Soft, nontender, nondistended, no masses, normal bowel sounds  Extremities: Extensive lymphedema of the legs with venous stasis of the lower legs.  Mild erythema of the right medial thigh  Neuro: normal strength and sensation in all 4 extremities, cranial nerves grossly intact  Psychiatric:  Mood and affect within normal limits             Medications:      All current medications were reviewed with changes reflected in problem list.         Data:      All new " lab and imaging data was reviewed.   Labs:

## 2018-04-05 NOTE — PROGRESS NOTES
04/05/18 0854   Quick Adds   Type of Visit Initial Occupational Therapy Evaluation   Living Environment   Lives With spouse   Living Arrangements assisted living   Home Accessibility no concerns   Transportation Available family or friend will provide   Living Environment Comment pt reports being independent in ADLS prior to admission   Self-Care   Dominant Hand right   Usual Activity Tolerance moderate   Current Activity Tolerance fair   Regular Exercise no   Equipment Currently Used at Home cane, straight   Activity/Exercise/Self-Care Comment pt has mild dementia, not a reliable historian   Functional Level Prior   Ambulation 1-->assistive equipment   Transferring 1-->assistive equipment   Cognition 1 - attention or memory deficits   Fall history within last six months yes   Number of times patient has fallen within last six months 2   Which of the above functional risks had a recent onset or change? ambulation;transferring;bathing;dressing   General Information   Onset of Illness/Injury or Date of Surgery - Date 04/04/18   Referring Physician Sherman Jewell   Patient/Family Goals Statement pt anxious to return home, not sure why he is here   Additional Occupational Profile Info/Pertinent History of Current Problem Pt admitted by family after having 2 falls at home and periods of sleepiness.  Diagosed with staph aureus infection resulting in left LE cellulitis.  PMH includes mild dementia, DM, Afib, low back pain, venous statis desease, GERD   Precautions/Limitations fall precautions   General Observations pt up in chair, getting ready to eat breakfast   Cognitive Status Examination   Orientation person   Level of Consciousness alert   Able to Follow Commands moderate impairment   Personal Safety (Cognitive) moderate impairment   Memory impaired   Cognitive Comment pt knew he was in the hospital but otherwise did not know any details about why he was here.  Needed to repeat the story 4-5 times during session  "  Visual Perception   Visual Perception Wears glasses  (pt stated he wore glasses, glasses not here)   Pain Assessment   Patient Currently in Pain No   Range of Motion (ROM)   ROM Quick Adds No deficits were identified   ROM Comment B UEs   Strength   Manual Muscle Testing Quick Adds No deficits were identified   Strength Comments pt appears to have functional UE strength   Hand Strength   Hand Strength Comments gross grasp fair and equal   Coordination   Upper Extremity Coordination No deficits were identified   Transfer Skill: Sit to Stand   Level of [place]: Sit/Stand stand-by assist   Physical Assist/Nonphysical Assist: Sit/Stand verbal cues;1 person assist   Transfer Skill: Sit to Stand full weight-bearing   General Therapy Interventions   Planned Therapy Interventions ADL retraining;strengthening;transfer training   Clinical Impression   Criteria for Skilled Therapeutic Interventions Met yes, treatment indicated   OT Diagnosis decreased independence with ADLS   Influenced by the following impairments confusion, mild weakness and deconditioning   Assessment of Occupational Performance 3-5 Performance Deficits   Identified Performance Deficits decreased independence in dressing, grooming, bathing, functional mobility around the house   Clinical Decision Making (Complexity) Low complexity   Therapy Frequency daily   Predicted Duration of Therapy Intervention (days/wks) 3 days   Anticipated Discharge Disposition Home with Assist;Home with Home Therapy   Risks and Benefits of Treatment have been explained. Yes   Patient, Family & other staff in agreement with plan of care Yes   [facility] [email or web address] TM \"6 Clicks\"   2016, Trustees of [facility], under license to [employer].  All rights reserved.   6 Clicks Short Forms Daily Activity Inpatient Short Form   [facility] AM-PAC  \"6 Clicks\" Daily Activity Inpatient Short Form   1. Putting on and taking off regular lower body clothing? 3 - A Little "   2. Bathing (including washing, rinsing, drying)? 3 - A Little   3. Toileting, which includes using toilet, bedpan or urinal? 3 - A Little   4. Putting on and taking off regular upper body clothing? 3 - A Little   5. Taking care of personal grooming such as brushing teeth? 3 - A Little   6. Eating meals? 3 - A Little   Daily Activity Raw Score (Score out of 24.Lower scores equate to lower levels of function) 18   Total Evaluation Time   Total Evaluation Time (Minutes) 15

## 2018-04-05 NOTE — PLAN OF CARE
Problem: Patient Care Overview  Goal: Plan of Care/Patient Progress Review  PT: PT eval completed. Pt and wife live in [place], has assistance with lymph wraps, meals, cleaning and medications. Pt here with following 2 falls.  Diagosed with staph aureus infection resulting in left LE cellulitis.  PMH includes mild dementia, DM, Afib, low back pain, venous statis desease, GERD.  Discharge Planner PT   Patient plan for discharge: home  Current status: Pt poor balance, uses SEC at home, per pt. Pt currently needing FWW and had 1 LOB with ambulation 45 feet. Poor insight into deficit and not oriented to place and time consistently. Once re-orientated able to remember why admitted into hospital.   Barriers to return to prior living situation: confusion and poor balance, uses FWW and needing A with safety for transfers. Independent with toileting, dressing and bathing at baseline  Recommendations for discharge: TCU  Rationale for recommendations: Pt does not receive toileting, dressing and bathing. Pt currently needing min A for safety and confused to situation at times. Per pt uses only SEC at baseline, currently needing FWW. Pt would benefit from continued PT to progress to safe ambulation, transfers.        Entered by: Dorie Carver 04/05/2018 3:13 PM

## 2018-04-05 NOTE — PLAN OF CARE
Problem: Diabetes Comorbidity  Goal: Diabetes  Patient comorbidity will be monitored for signs and symptoms of hyperglycemia or hypoglycemia. Problems will be absent, minimized or managed by discharge/transition of care.   Outcome: Improving  Patient alert to name, needs frequently reminders of where he is and why he is here. Calls out for assist. A1 with walker , uses urinal with assist or goes into bathroom. LE edema continues L>R, redness in LLE improving. On Ancef for +staph in blood. PT/OT eval.

## 2018-04-05 NOTE — PLAN OF CARE
Problem: Patient Care Overview  Goal: Plan of Care/Patient Progress Review  Outcome: No Change  Patient confused, disoriented to time and situation. Assist of one with a walker. Frequent bathroom trips with no results this shift, bladder scan completed. Continues on ancef for staph infection. Plan for GAVIN when blood cultures clear. Will continue POC.

## 2018-04-06 ENCOUNTER — APPOINTMENT (OUTPATIENT)
Dept: GENERAL RADIOLOGY | Facility: CLINIC | Age: 83
DRG: 871 | End: 2018-04-06
Attending: INTERNAL MEDICINE
Payer: MEDICARE

## 2018-04-06 LAB
ANION GAP SERPL CALCULATED.3IONS-SCNC: 8 MMOL/L (ref 3–14)
BUN SERPL-MCNC: 21 MG/DL (ref 7–30)
CALCIUM SERPL-MCNC: 8.8 MG/DL (ref 8.5–10.1)
CHLORIDE SERPL-SCNC: 103 MMOL/L (ref 94–109)
CO2 SERPL-SCNC: 25 MMOL/L (ref 20–32)
CREAT SERPL-MCNC: 0.93 MG/DL (ref 0.66–1.25)
ERYTHROCYTE [DISTWIDTH] IN BLOOD BY AUTOMATED COUNT: 13 % (ref 10–15)
GFR SERPL CREATININE-BSD FRML MDRD: 76 ML/MIN/1.7M2
GLUCOSE BLDC GLUCOMTR-MCNC: 124 MG/DL (ref 70–99)
GLUCOSE BLDC GLUCOMTR-MCNC: 140 MG/DL (ref 70–99)
GLUCOSE BLDC GLUCOMTR-MCNC: 148 MG/DL (ref 70–99)
GLUCOSE BLDC GLUCOMTR-MCNC: 152 MG/DL (ref 70–99)
GLUCOSE BLDC GLUCOMTR-MCNC: 161 MG/DL (ref 70–99)
GLUCOSE BLDC GLUCOMTR-MCNC: 190 MG/DL (ref 70–99)
GLUCOSE SERPL-MCNC: 157 MG/DL (ref 70–99)
HCT VFR BLD AUTO: 39.6 % (ref 40–53)
HGB BLD-MCNC: 12.5 G/DL (ref 13.3–17.7)
INR PPP: 2.89 (ref 0.86–1.14)
MCH RBC QN AUTO: 31.6 PG (ref 26.5–33)
MCHC RBC AUTO-ENTMCNC: 31.6 G/DL (ref 31.5–36.5)
MCV RBC AUTO: 100 FL (ref 78–100)
PLATELET # BLD AUTO: 120 10E9/L (ref 150–450)
POTASSIUM SERPL-SCNC: 4.5 MMOL/L (ref 3.4–5.3)
RBC # BLD AUTO: 3.95 10E12/L (ref 4.4–5.9)
SODIUM SERPL-SCNC: 136 MMOL/L (ref 133–144)
WBC # BLD AUTO: 8.7 10E9/L (ref 4–11)

## 2018-04-06 PROCEDURE — 27210209 ZZH KIT VALVED SINGLE LUMEN

## 2018-04-06 PROCEDURE — A9270 NON-COVERED ITEM OR SERVICE: HCPCS | Mod: GY | Performed by: INTERNAL MEDICINE

## 2018-04-06 PROCEDURE — 36415 COLL VENOUS BLD VENIPUNCTURE: CPT | Performed by: INTERNAL MEDICINE

## 2018-04-06 PROCEDURE — 25000132 ZZH RX MED GY IP 250 OP 250 PS 637: Mod: GY | Performed by: INTERNAL MEDICINE

## 2018-04-06 PROCEDURE — 85027 COMPLETE CBC AUTOMATED: CPT | Performed by: INTERNAL MEDICINE

## 2018-04-06 PROCEDURE — 00000146 ZZHCL STATISTIC GLUCOSE BY METER IP

## 2018-04-06 PROCEDURE — 85610 PROTHROMBIN TIME: CPT | Performed by: INTERNAL MEDICINE

## 2018-04-06 PROCEDURE — 40000986 XR CHEST PORT 1 VW

## 2018-04-06 PROCEDURE — 36569 INSJ PICC 5 YR+ W/O IMAGING: CPT

## 2018-04-06 PROCEDURE — A9270 NON-COVERED ITEM OR SERVICE: HCPCS | Mod: GY | Performed by: PHYSICIAN ASSISTANT

## 2018-04-06 PROCEDURE — 99233 SBSQ HOSP IP/OBS HIGH 50: CPT | Performed by: INTERNAL MEDICINE

## 2018-04-06 PROCEDURE — 12000007 ZZH R&B INTERMEDIATE

## 2018-04-06 PROCEDURE — 87040 BLOOD CULTURE FOR BACTERIA: CPT | Performed by: INTERNAL MEDICINE

## 2018-04-06 PROCEDURE — 80048 BASIC METABOLIC PNL TOTAL CA: CPT | Performed by: INTERNAL MEDICINE

## 2018-04-06 PROCEDURE — 25000132 ZZH RX MED GY IP 250 OP 250 PS 637: Mod: GY | Performed by: PHYSICIAN ASSISTANT

## 2018-04-06 PROCEDURE — 25000128 H RX IP 250 OP 636: Performed by: INTERNAL MEDICINE

## 2018-04-06 RX ORDER — CEFAZOLIN SODIUM 1 G/3ML
2 INJECTION, POWDER, FOR SOLUTION INTRAMUSCULAR; INTRAVENOUS EVERY 8 HOURS
Qty: 1 EACH | DISCHARGE
Start: 2018-04-06 | End: 2018-04-30

## 2018-04-06 RX ORDER — WARFARIN SODIUM 3 MG/1
3 TABLET ORAL
Status: COMPLETED | OUTPATIENT
Start: 2018-04-06 | End: 2018-04-06

## 2018-04-06 RX ORDER — CEFAZOLIN SODIUM 2 G/100ML
2 INJECTION, SOLUTION INTRAVENOUS EVERY 8 HOURS
Status: DISCONTINUED | OUTPATIENT
Start: 2018-04-06 | End: 2018-04-15 | Stop reason: HOSPADM

## 2018-04-06 RX ADMIN — GLIPIZIDE 5 MG: 5 TABLET ORAL at 08:00

## 2018-04-06 RX ADMIN — GABAPENTIN 300 MG: 300 CAPSULE ORAL at 20:48

## 2018-04-06 RX ADMIN — ACETAMINOPHEN 650 MG: 325 TABLET ORAL at 17:45

## 2018-04-06 RX ADMIN — Medication 30 MG: at 11:28

## 2018-04-06 RX ADMIN — OMEPRAZOLE 20 MG: 20 CAPSULE, DELAYED RELEASE ORAL at 08:00

## 2018-04-06 RX ADMIN — SENNOSIDES AND DOCUSATE SODIUM 2 TABLET: 8.6; 5 TABLET ORAL at 08:00

## 2018-04-06 RX ADMIN — CEFAZOLIN SODIUM 2 G: 2 INJECTION, SOLUTION INTRAVENOUS at 13:30

## 2018-04-06 RX ADMIN — WARFARIN SODIUM 3 MG: 3 TABLET ORAL at 17:46

## 2018-04-06 RX ADMIN — LEVOTHYROXINE SODIUM 100 MCG: 100 TABLET ORAL at 08:00

## 2018-04-06 RX ADMIN — ATENOLOL 50 MG: 50 TABLET ORAL at 08:00

## 2018-04-06 RX ADMIN — SENNOSIDES AND DOCUSATE SODIUM 2 TABLET: 8.6; 5 TABLET ORAL at 20:48

## 2018-04-06 RX ADMIN — OXYCODONE HYDROCHLORIDE 5 MG: 5 TABLET ORAL at 04:09

## 2018-04-06 RX ADMIN — POLYETHYLENE GLYCOL 3350 17 G: 17 POWDER, FOR SOLUTION ORAL at 08:01

## 2018-04-06 RX ADMIN — TAMSULOSIN HYDROCHLORIDE 0.4 MG: 0.4 CAPSULE ORAL at 20:48

## 2018-04-06 RX ADMIN — SIMVASTATIN 10 MG: 10 TABLET, FILM COATED ORAL at 22:49

## 2018-04-06 RX ADMIN — CEFAZOLIN SODIUM 2 G: 2 INJECTION, SOLUTION INTRAVENOUS at 02:56

## 2018-04-06 RX ADMIN — GLIPIZIDE 5 MG: 5 TABLET ORAL at 17:46

## 2018-04-06 RX ADMIN — CEFAZOLIN SODIUM 2 G: 2 INJECTION, SOLUTION INTRAVENOUS at 22:49

## 2018-04-06 RX ADMIN — INSULIN ASPART 1 UNITS: 100 INJECTION, SOLUTION INTRAVENOUS; SUBCUTANEOUS at 09:14

## 2018-04-06 RX ADMIN — GABAPENTIN 300 MG: 300 CAPSULE ORAL at 08:00

## 2018-04-06 RX ADMIN — LISINOPRIL 5 MG: 5 TABLET ORAL at 08:00

## 2018-04-06 ASSESSMENT — ACTIVITIES OF DAILY LIVING (ADL)
ADLS_ACUITY_SCORE: 18
ADLS_ACUITY_SCORE: 18
ADLS_ACUITY_SCORE: 14
ADLS_ACUITY_SCORE: 18

## 2018-04-06 NOTE — PLAN OF CARE
Problem: Patient Care Overview  Goal: Plan of Care/Patient Progress Review  Outcome: Improving  Patient is alert to self, assist of one with a walker. Denies chest pain and shortness of breath, PRN oxy given for headache. Continues on ancef for MSSA; new IV placed this shift, on coumadin for AFib. Plan for discharge back to assisted living when bacteremia is resolved.

## 2018-04-06 NOTE — PROVIDER NOTIFICATION
Prior to the start of the PICC procedure and with procedural staff participation, I verbally confirmed the patient s identity using two indicators, relevant allergies, that the procedure was appropriate and matched the consent or emergent situation, and that the correct equipment/implants were available. Immediately prior to starting the procedure I conducted the Time Out with the procedural staff and re-confirmed the patient s name, procedure, and site/side. (The Joint Commission universal protocol was followed.)  Yes    Sedation (Moderate or Deep): None    TIme out confirmed with Niurka Valentnie RN

## 2018-04-06 NOTE — PLAN OF CARE
Problem: Sepsis/Septic Shock (Adult)  Goal: Signs and Symptoms of Listed Potential Problems Will be Absent, Minimized or Managed (Sepsis/Septic Shock)  Signs and symptoms of listed potential problems will be absent, minimized or managed by discharge/transition of care (reference Sepsis/Septic Shock (Adult) CPG).   Outcome: Improving  RN Shift Summary:  Diagnosis/Presentation: Pt admitted 4/3 due to fall, leg pain and swelling. Pt diagnosed with sepsis.   History: DM, A-fib (on coumadin), HTN, venous stasis, CAD and memory loss.   Labs/Protocols: K: 4.5, Ma.1, Hgb: 12.5, Plt: 120. Blood cultures from 4/3 positive for staph aureus and strep MD emma aware.   Telemetry: A-fib with CVR and occasional PVCs.   Assessment: Lethargic this shift. Oriented to self only. VSS on 3L O2 per oxymask. PIV to L FA, SL. Intermittent IV ancef. Moderate carb diet, poor appetite. QID accuchecks, sugars this shift: 152 and 161. Ax1 with FWW for transfers and ambulation. Will continue to monitor.   Plan: Monitor blood cultures, await for them to clear.

## 2018-04-06 NOTE — PROGRESS NOTES
[facility]  Infectious Disease Progress Note          Assessment and Plan:   IMPRESSION:   1.  A 89-year-old male with 2 falls in the last week, some weakness and sleepiness for 2-3 days, cause of this syndrome is now apparent with blood cultures positive for methicillin-sensitive Staph aureus, the patient has methicillin-sensitive Staph aureus bacteremia syndrome, no clear secondary site, possibly left leg as source, although leg does not look like cellulitis to me more stasis with one small wound as a possible entry site.   2.  Low back pain, not obvious tenderness, conceivable secondary site of infection, but also possibly just related to fall.   3.  Diabetes mellitus.   4.  Atrial fibrillation.   5.  Mild dementia.   6.  Venous stasis disease without much history of major infection process, some wound formation in his left leg, recurrently.       RECOMMENDATIONS:   1.  Continue Ancef, improved acute renal insufficiency, increase dose as renal function allows.   2.  Follow labs and blood cultures serially, follow pain and other symptoms sites. FU neg  3.  Transthoracic echocardiogram abnormal, but no obvious infection, would hold off on a transesophageal echocardiogram, patient will require a 4-week course of antibiotics, regardless would only do a GAVIN if we are not clearing the blood.  No long line for now, get serial blood cultures until clear.   4.  Further workup, tests and evaluation depending on clinical course, culture results, etc.   5.  Looks better only 1 cx +, no obvious secondary sites  6 OK PICC, orders in for ancef ? Rehab  7 Call if issues this WE        Interval History:   no new complaints and doing well; no cp, sob, n/v/d, or abd pain. Looks better cx as above              Medications:       sodium chloride (PF)  3 mL Intracatheter Q8H     ceFAZolin  2 g Intravenous Q8H     warfarin  3 mg Oral ONCE at 18:00     furosemide  30 mg Oral Daily with lunch     lisinopril  5 mg  "Oral Daily     atenolol  50 mg Oral Daily     gabapentin (NEURONTIN) capsule 300 mg  300 mg Oral BID     glipiZIDE  5 mg Oral BID AC     levothyroxine  100 mcg Oral Daily     omeprazole  20 mg Oral Daily     polyethylene glycol  17 g Oral Daily     senna-docusate  2 tablet Oral BID     simvastatin  10 mg Oral At Bedtime     tamsulosin  0.4 mg Oral QPM     insulin aspart  1-7 Units Subcutaneous TID AC     insulin aspart  1-5 Units Subcutaneous At Bedtime                  Physical Exam:   Blood pressure 127/73, temperature 96.3  F (35.7  C), temperature source Oral, resp. rate 18, height 1.676 m (5' 6\"), weight 104.5 kg (230 lb 6.4 oz), SpO2 94 %.  Wt Readings from Last 2 Encounters:   04/03/18 104.5 kg (230 lb 6.4 oz)   05/28/17 99.3 kg (218 lb 14.4 oz)     Vital Signs with Ranges  Temp:  [96.3  F (35.7  C)-98.2  F (36.8  C)] 96.3  F (35.7  C)  Heart Rate:  [71-99] 73  Resp:  [18-24] 18  BP: (108-158)/(62-90) 127/73  SpO2:  [85 %-96 %] 94 %    Constitutional: Awake, alert, cooperative, no apparent distress   Lungs: Clear to auscultation bilaterally, no crackles or wheezing   Cardiovascular: Regular rate and rhythm, normal S1 and S2, and no murmur noted   Abdomen: Normal bowel sounds, soft, non-distended, non-tender   Skin: No rashes, no cyanosis, same edema no emboli  Mostly stasis   Other:           Data:   All microbiology laboratory data reviewed.  Recent Labs   Lab Test  04/06/18   0722  04/05/18   0651  04/04/18   0740   WBC  8.7  8.9  11.9*   HGB  12.5*  11.6*  11.9*   HCT  39.6*  38.0*  38.7*   MCV  100  104*  104*   PLT  120*  120*  121*     Recent Labs   Lab Test  04/06/18   0722  04/05/18   0651  04/04/18   0740   CR  0.93  1.00  1.43*     No lab results found.  Recent Labs   Lab Test  04/06/18   0721  04/05/18   0650  04/04/18   1604  04/03/18   1252  04/03/18   1204  05/14/17   1052  05/13/17   2043   CULT  PENDING  No growth after 20 hours  No growth after 2 days  Cultured on the 1st day of " incubation:  Staphylococcus aureus  *  Critical Value/Significant Value, preliminary result only, called to and read back by  Nell Valerio RN at 0907 4/4/18 [id number]    Cultured on the 1st day of incubation:  Streptococcus mitis group  Susceptibility testing in progress  *  Critical Value/Significant Value called to and read back by  Tamanna Evans RN at 1015 on [id number]    (Note)  POSITIVE for STAPHYLOCOCCUS AUREUS and NEGATIVE for the mecA gene  (not MRSA) by [employer] multiplex nucleic acid test. The mecA gene was  not detected. Final identification and antimicrobial susceptibility  testing will be verified by standard methods.    Specimen tested with On[employer]vaxig[employer] multiplex, gram-positive blood culture  nucleic acid test for the following targets: Staph aureus, Staph  epidermidis, Staph lugdunensis, other Staph species, Enterococcus  faecalis, Enterococcus faecium, Streptococcus species, S. agalactiae,  S. anginosus grp., S. pneumoniae, S. pyogenes, Listeria sp., mecA  (methicillin resistance) and Renetta/B (vancomycin resistance).    Critical Value/Significant Value called to and read back by  Komal Mckeon RN @1240 04/04/18 gd    No growth after 3 days  No anaerobes isolated  No growth  <10,000 colonies/mL mixed urogenital conner Susceptibility testing not routinely   done

## 2018-04-06 NOTE — PROGRESS NOTES
[facility]  Hospitalist Progress Note    Danielito Bentley MD 04/06/2018  Text Page      Reason for Stay (Diagnosis):          Assessment and Plan:      Summary of Stay:Ton Bagley is a 89 year old male with a PMH significant for atrial fibrillation on warfarin, likely lymphdedema/venous stasis, MI, HTN, memory loss and HLP who presents after a fall.  History difficult 2/2 pt memory issues but apparently had a fall when going to the restroom. He did hit his head but ? LOC. On evaluation in the ED he was found to have LLL cellulitis with leukocytosis. Tmax since admission at 101 and he was generally weak. CXR on admission with basilar atelectasis vs pna. WBC elevated at 22.5.  Blood culture grew out methicillin sensitive staph aureus      Methicillin sensitive staph aureus bacteremia and strep mitis (cx from 4/3) and possible L leg celluitis  -- Portal of entry for bacteria is not completely clear but may be skin related.  He has severe venous stasis of his lower legs bilaterally.  He has some mild erythema in the right medial thigh  -Continue on cefazolin.  Infectious disease team consulting  -Repeat blood cultures from 4/4 and 4/5 are still negative.  - leukocytosis resolved, WBC 8.9  -Fevers resolved  -Per ID will need 4 weeks of antibiotics  -Follow repeat blood cultures for clearing.  -Lost IV access and he is very difficult access.  Will defer to ID if PICC line can now be placed for longer term IV antibiotics.      Falls/ ? Syncope  Hypoxia in the ED  - LE dopplers done (concern for DVT) and negative  - telemetry  - echo today  - serial troponins negative  - head CT with no bleed      H/o a fib on warfarin  H/o CAD  - atenolol with parameters  - pharma consult for warfarin  - ? If pt should be on coumadin given his falls      DM II   -Blood glucose mildly elevated, 176 this morning but has been variable  - continue on glipizide for now  - CHO diet  - SSI      HTN  -Normally on  "lisinopril and furosemide but pressure was low upon admission so these were held.  Also had acute kidney injury  -Resumed home dosing of Lasix 30 mg a day and lisinopril 5 mg a day  -Continue atenolol 50 mg a day  -BP is okay        Back pain  Low back pain after. No imaging done after fall.   - monitor and if ongoing pain would image      Dementia      PRADEEP /CKD  No recent baseline creatinine, last was 5/2017 at 0.98.  Peaked at 1.7  -Now back to 1.0 despite resuming lisinopril and furosemide.  -Follow renal function      HLD  Continue statin      Hypothyroidism  Continue levothyroxin      GERD  Continue omeprazole      Dispo   OT, PT consults      # Pain Assessment:  Current Pain Score 4/6/2018   Patient currently in pain? sleeping: patient not able to self report   Pain score (0-10) -   Pain location -   Pain descriptors -   Ton burgos pain level was assessed and he currently denies pain.        DVT Prophylaxis: Warfarin  Code Status: DNR / DNI  Discharge Dispo: Home when long-term antibiotic plan is established and bacteremia is resolved.  Incidental Findings/ Discharge Issues: None          Interval History (Subjective):      Feels okay.  Seems confused.  Denies pain in the leg to me but somewhat difficult to understand his speech.                  Physical Exam:      Last Vital Signs:  /86 (BP Location: Right arm)  Temp 97.7  F (36.5  C) (Axillary)  Resp 20  Ht 1.676 m (5' 6\")  Wt 104.5 kg (230 lb 6.4 oz)  SpO2 94%  BMI 37.19 kg/m2    On 3 L oxygen    Vital signs reviewed  General:  Alert, calm, NAD  CV: regular rate and rhythm, no murmurs or rubs  Lungs:  Clear to ascultation bilaterally, normal respiratory effort  HEENT:  Pupil round, equal, conjuctivae, sclerae and lids normal, neck is supple  Abdomen:  Soft, nontender, nondistended, no masses, normal bowel sounds  Extremities: Extensive lymphedema of the legs with venous stasis of the lower legs.  Mild erythema of the left medial thigh  Neuro: " normal strength and sensation in all 4 extremities, cranial nerves grossly intact  Psychiatric: Confused, calm           Medications:      All current medications were reviewed with changes reflected in problem list.         Data:      All new lab and imaging data was reviewed.   Labs:    Creatinine  0.9

## 2018-04-06 NOTE — PLAN OF CARE
Problem: Patient Care Overview  Goal: Plan of Care/Patient Progress Review  Outcome: No Change  VS- SpO2 85% on RA, pt frequently removed oxygen, 94% on 2L NC. Pt reports he fell and his left side hurt, PRN tylenol given, pt denied pain during reassessment. Tele Afib CVR. Oriented to self. Continues antibiotics Ancef. ID following. LLE red, hot, edematous. , 193. Incontinent and requesting to use urinal.

## 2018-04-06 NOTE — PROVIDER NOTIFICATION
Notified MD that IV access was lost on pt. Asked if PICC can be placed or if we need another PIV.     MD stated to wait for ID recommendation regarding type of line.

## 2018-04-06 NOTE — PLAN OF CARE
RN Shift Summary:  Diagnosis/Presentation: Pt admitted 4/3 due to fall, leg pain and swelling. Pt diagnosed with sepsis.   History: DM, A-fib (on coumadin), HTN, venous stasis, CAD and memory loss.   Labs/Protocols: K: 4.5, Ma.1, Hgb: 12.5, Plt: 120. Blood cultures from 4/3 positive for staph aureus and strep MD emma aware.   Telemetry: A-fib with CVR and occasional PVCs.   Assessment: Lethargic this shift. Oriented to self only. VSS on 3L O2 per oxymask. PIV to L FA, SL. Intermittent IV ancef. Moderate carb diet, poor appetite. QID accuchecks, sugars this shift: 152 and 161. Ax1 with FWW for transfers and ambulation. Will continue to monitor.   Plan: Monitor blood cultures, await for them to clear.

## 2018-04-07 LAB
GLUCOSE BLDC GLUCOMTR-MCNC: 134 MG/DL (ref 70–99)
GLUCOSE BLDC GLUCOMTR-MCNC: 148 MG/DL (ref 70–99)
GLUCOSE BLDC GLUCOMTR-MCNC: 164 MG/DL (ref 70–99)
INR PPP: 2.76 (ref 0.86–1.14)

## 2018-04-07 PROCEDURE — 25000128 H RX IP 250 OP 636: Performed by: INTERNAL MEDICINE

## 2018-04-07 PROCEDURE — 25000132 ZZH RX MED GY IP 250 OP 250 PS 637: Mod: GY | Performed by: INTERNAL MEDICINE

## 2018-04-07 PROCEDURE — A9270 NON-COVERED ITEM OR SERVICE: HCPCS | Mod: GY | Performed by: INTERNAL MEDICINE

## 2018-04-07 PROCEDURE — 25000132 ZZH RX MED GY IP 250 OP 250 PS 637: Mod: GY | Performed by: PHYSICIAN ASSISTANT

## 2018-04-07 PROCEDURE — A9270 NON-COVERED ITEM OR SERVICE: HCPCS | Mod: GY | Performed by: PHYSICIAN ASSISTANT

## 2018-04-07 PROCEDURE — 85610 PROTHROMBIN TIME: CPT | Performed by: PHYSICIAN ASSISTANT

## 2018-04-07 PROCEDURE — 99233 SBSQ HOSP IP/OBS HIGH 50: CPT | Performed by: INTERNAL MEDICINE

## 2018-04-07 PROCEDURE — 00000146 ZZHCL STATISTIC GLUCOSE BY METER IP

## 2018-04-07 PROCEDURE — 12000007 ZZH R&B INTERMEDIATE

## 2018-04-07 RX ORDER — FUROSEMIDE 10 MG/ML
40 INJECTION INTRAMUSCULAR; INTRAVENOUS
Status: DISCONTINUED | OUTPATIENT
Start: 2018-04-07 | End: 2018-04-09

## 2018-04-07 RX ADMIN — FUROSEMIDE 40 MG: 10 INJECTION, SOLUTION INTRAMUSCULAR; INTRAVENOUS at 11:31

## 2018-04-07 RX ADMIN — POLYETHYLENE GLYCOL 3350 17 G: 17 POWDER, FOR SOLUTION ORAL at 08:53

## 2018-04-07 RX ADMIN — LEVOTHYROXINE SODIUM 100 MCG: 100 TABLET ORAL at 08:53

## 2018-04-07 RX ADMIN — ATENOLOL 50 MG: 50 TABLET ORAL at 08:53

## 2018-04-07 RX ADMIN — INSULIN ASPART 1 UNITS: 100 INJECTION, SOLUTION INTRAVENOUS; SUBCUTANEOUS at 08:58

## 2018-04-07 RX ADMIN — GABAPENTIN 300 MG: 300 CAPSULE ORAL at 08:52

## 2018-04-07 RX ADMIN — Medication 1 MG: at 02:06

## 2018-04-07 RX ADMIN — LISINOPRIL 5 MG: 5 TABLET ORAL at 08:52

## 2018-04-07 RX ADMIN — OMEPRAZOLE 20 MG: 20 CAPSULE, DELAYED RELEASE ORAL at 08:52

## 2018-04-07 RX ADMIN — CEFAZOLIN SODIUM 2 G: 2 INJECTION, SOLUTION INTRAVENOUS at 13:30

## 2018-04-07 RX ADMIN — CEFAZOLIN SODIUM 2 G: 2 INJECTION, SOLUTION INTRAVENOUS at 05:52

## 2018-04-07 RX ADMIN — GLIPIZIDE 5 MG: 5 TABLET ORAL at 08:52

## 2018-04-07 RX ADMIN — TAMSULOSIN HYDROCHLORIDE 0.4 MG: 0.4 CAPSULE ORAL at 21:15

## 2018-04-07 RX ADMIN — GLIPIZIDE 5 MG: 5 TABLET ORAL at 16:54

## 2018-04-07 RX ADMIN — SENNOSIDES AND DOCUSATE SODIUM 2 TABLET: 8.6; 5 TABLET ORAL at 08:52

## 2018-04-07 RX ADMIN — SIMVASTATIN 10 MG: 10 TABLET, FILM COATED ORAL at 21:15

## 2018-04-07 RX ADMIN — CEFAZOLIN SODIUM 2 G: 2 INJECTION, SOLUTION INTRAVENOUS at 22:15

## 2018-04-07 RX ADMIN — FUROSEMIDE 40 MG: 10 INJECTION, SOLUTION INTRAMUSCULAR; INTRAVENOUS at 16:54

## 2018-04-07 RX ADMIN — SENNOSIDES AND DOCUSATE SODIUM 2 TABLET: 8.6; 5 TABLET ORAL at 21:15

## 2018-04-07 RX ADMIN — GABAPENTIN 300 MG: 300 CAPSULE ORAL at 21:15

## 2018-04-07 ASSESSMENT — ACTIVITIES OF DAILY LIVING (ADL)
ADLS_ACUITY_SCORE: 18

## 2018-04-07 NOTE — PLAN OF CARE
Problem: Patient Care Overview  Goal: Plan of Care/Patient Progress Review  Outcome: No Change  Pt VSS, denies pain. o2 sats at 97% on 4L via NC. Lt leg red/warm +3 edema. IV ancef. DM, SS. . Confused, easy to direct. Inc/frequency. Assist x2 w/walker. PICC rt arm patent. PT/OT/ID following. Tele shows Afib CVR. Will continue with current POC. Possible discharge 2 to 3 days.

## 2018-04-07 NOTE — PLAN OF CARE
Problem: Patient Care Overview  Goal: Plan of Care/Patient Progress Review    [place]: alert to self, confused  VSS: afebrile.tele shows Afib CVR  LS: dim, crackles LLL on 4L NC  GI: active, last BM today, on commode  : incontinent at times  Skin: bruised, red patchy lower ext  Activity: x2, walker   Diet: mod carb   Pain: c/o HA, unable to rate, taking tylenol PRN  Plan: IV ancef, PICC placed today  Lab: , 190

## 2018-04-07 NOTE — PLAN OF CARE
Problem: Patient Care Overview  Goal: Plan of Care/Patient Progress Review  OT: Pt eating breakfast at the time of attempted session.

## 2018-04-07 NOTE — PROGRESS NOTES
[facility]  Hospitalist Progress Note  Nirmal Serrano MD 04/07/18    Reason for Stay (Diagnosis): bacteremia         Assessment and Plan:      Summary of Stay: Ton Bagley is a 89 year old male with pmhx of A. fib on warfarin, lymphedema, CAD, HTN, DMII, dementia, and HLD admitted on 4/3/2018 after a fall.  Head CT negative for acute bleed.  Labs notable for an PRADEEP and leukocytosis.  He was hypotensive initially and elevated lactate.  He was given fluid boluses and developed some hypoxia in the ED.  He was started on vancomycin and cefepime for sepsis.  Blood cultures grew MSSA and strep mitis.  Antibiotics narrowed to cefazolin per ID who were consulted.  PT and OT consulted due to falls.  Leukocytosis and fevers resolved.  TTE showing aortic stenosis. Persistent hypoxia and weight up so changing to IV diuretic today.    Problem List/Assessment and Plan:   Sepsis due to methicillin sensitive staph aureus bacteremia and strep mitis (cx from 4/3) and possible L leg cellulitis:  Portal of entry for bacteria is not completely clear but may be skin related.  He has severe venous stasis of his lower legs bilaterally.  Had some mild erythema in the right medial thigh.  Leukocytosis and fever resolved.  Did have elevated lactic acid that has since resolved.  -Continue on cefazolin for 4 weeks total.  Infectious disease team consulting  -PICC line in place  -Repeat blood cultures from 4/4 and 4/5 are still negative.      Fall: Fell when walking to the bathroom.  He denies loss of consciousness.  Has had some other falls.  He is chronically on anticoagulant agent for A. Fib.  -PT and OT consultation, question disposition of home versus TCU    Acute hypoxemic respiratory failure:   Developed hypoxia in the ED following fluid boluses.  Has had ongoing need for 2-4 L of oxygen via nasal cannula.  Chest x-ray showing some atelectasis versus possible left lower lobe pneumonia.  Suspect this is more volume  related as he has significant peripheral edema on exam.  -Wean oxygen  -Change Lasix to IV      Chronic A. Fib: Continue PTA warfarin, pharmacy to dose.  Atenolol 50 mg daily for rate control.  Telemetry.    Aortic stenosis: TTE showing a significantly decreased area of the aortic valve, the flow gradient is only mild.  Does not sound like his falls are syncopal in origin.  If he does have syncope or light and is walking around would consider cardiology consultation to see if this could be contributing to his falls.  Does not appear to be outflow obstruction per the echocardiogram read.    Lymphedema: Chronically on Lasix 60 mg morning and 30 mg afternoon.  Weight up 7 pounds here and 2-3+ tense lower extremity edema bilaterally.  Also, hypoxic and some crackles more on the left which may be pulmonary edema.  -Increase Lasix to 40 mg IV twice daily  -Daily weights and strict intake output  -Add sodium restriction      Type II DM: PTA on glipizide 5 mg twice daily.  Blood glucose well controlled here.  -Continue PTA glipizide  -Sliding scale insulin      HTN: PTA on lisinopril 5 mg daily, atenolol grams daily, and Lasix 60 mg morning and 30 mg afternoon.  Lisinopril and Lasix initially held due to PRADEEP.  -continue atenolol and lisinopril  -increased diuretic      Back pain:  Low back pain after fall.    -monitor and if ongoing pain would image      Dementia: Unclear severity, but lives at home with spouse.  Memory seems quite poor here and needs frequent reorientation.      PRADEEP: Creatinine peak of 1.7.  Baseline appears to be 0.9-1.  Suspect secondary to bacteremia and Lasix/lisinopril use.  Resolved with IV fluids and holding lisinopril and diuretic initially.  -Follow BMP with increased diuretic dose      HLD  Continue statin      Hypothyroidism  Continue levothyroxin      GERD  Continue omeprazole    # Pain Assessment:  Current Pain Score 4/7/2018   Patient currently in pain? sleeping: patient not able to self  "report   Pain score (0-10) -   Pain location -   Pain descriptors -   - Ton is experiencing pain due to back pain. Pain management was discussed and the plan was created in a collaborative fashion.  Ton's response to the current recommendations: engaged  -Tylenol as needed  -Oxycodone 5 mg every 3 hours as needed    DVT Prophylaxis: Warfarin  Code Status: DNR / DNI.  This was discussed on admission  FEN: Consistent carbohydrate medium and 2 g sodium restriction  Discharge Dispo: Home versus TCU, PT, OT, social work consult  Estimated Disch Date / # of Days until Disch: Likely 1-2 days more of diuresis if able to wean off oxygen.  Has PICC in place for extended IV antibiotic course        Interval History (Subjective):      Assume care today.  Patient here for bacteremia.  Afebrile overnight.  Confused for me this morning and did not realize he was in the hospital.  Not shortness of breath but is on oxygen.  Has been coughing some.                  Physical Exam:      Last Vital Signs:  /56 (BP Location: Left arm)  Pulse 90  Temp 96.7  F (35.9  C) (Oral)  Resp 18  Ht 1.676 m (5' 6\")  Wt 107.9 kg (237 lb 14.4 oz)  SpO2 96%  BMI 38.4 kg/m2      Intake/Output Summary (Last 24 hours) at 04/07/18 1605  Last data filed at 04/07/18 1528   Gross per 24 hour   Intake             1166 ml   Output              250 ml   Net              916 ml       Constitutional: Awake, NAD   Eyes: sclera white   HEENT:   MMM  Respiratory: Crackles on the left side greater than right.  No wheeze  Cardiovascular: Irregularly, irregular rhythm.  Regular rate.  2/6 systolic murmur  GI: Mild diffuse tenderness,  not distended, bowel sounds present  Skin: Chronic venous stasis changes of lower extremities bilaterally.  Musculoskeletal/extremities: Tense 2-3+ edema bilateral lower extremity's  Neurologic: Alert, not oriented to place or date.  Psychiatric: calm, cooperative          Medications:      All current medications were " reviewed with changes reflected in problem list.         Data:      All new lab and imaging data was reviewed.   Labs:    Recent Labs  Lab 04/07/18  1520 04/06/18  0722 04/05/18  0651   INR 2.76* 2.89* 2.49*      Imaging:   None today      Nirmal Serrano MD

## 2018-04-07 NOTE — PLAN OF CARE
RN Shift Summary:  Diagnosis/Presentation: Pt admitted 4/3 due to fall, leg pain and swelling. Pt diagnosed with sepsis.   History: DM, A-fib (on coumadin), HTN, venous stasis, CAD and memory loss.   Labs/Protocols: K: 4.5, Ma.1, Hgb: 12.5, Plt: 120. Blood cultures from 4/3 positive for staph aureus and strep emma MD aware. Following cultures remain negative.   Telemetry: A-fib with CVR.  Assessment: Alert, oriented only to self.  VSS on 2L O2 per NC (no O2 at baseline). PICC to R arm,SL. Intermittent IV ancef. Moderate carb diet, poor appetite. QID accuchecks, sugars this shift: 148 and 134. Ax2 with FWW for transfers. Will continue to monitor.   Plan: Monitor blood cultures, await for them to clear. Social work following.

## 2018-04-07 NOTE — CONSULTS
Care Transition Initial Assessment - AMANDA  Reason For Consult: discharge planning  Met with: PATIENT   Active Problems:    Sepsis (H)       DATA  Lives With: spouse  Living Arrangements: assisted living  Description of Support System: Supportive, Involved  Who is your support system?: Wife, Children  Support Assessment: Adequate family and caregiver support.   Identified issues/concerns regarding health management:  SW will follow to coordinate return to [facility].      Quality Of Family Relationships: supportive, helpful, involved  Transportation Available: family or friend will provide      ASSESSMENT  Concerns to be addressed: SW to coordinate return to [facility].  AMANDA went to see pt, pt was alone and asleep. AMANDA called pt's wife Kelli. Per Kelli they live on the [facility] side. Pt gets medication management, meals and laundry service.  Pt also has a RN and MD visit once per week from [facility]. Pt and Kelli have the support of their children. Their son will provide transportation at d/c.  AMANDA called [facility] and lvm with the Nurse Meredith to return the call.       PLAN  Patient anticipates discharging to:  [facility]    MICHELL Raymond  Casual SW [id number]

## 2018-04-08 LAB
ANION GAP SERPL CALCULATED.3IONS-SCNC: 6 MMOL/L (ref 3–14)
BACTERIA SPEC CULT: ABNORMAL
BUN SERPL-MCNC: 32 MG/DL (ref 7–30)
CALCIUM SERPL-MCNC: 8.9 MG/DL (ref 8.5–10.1)
CHLORIDE SERPL-SCNC: 98 MMOL/L (ref 94–109)
CO2 SERPL-SCNC: 33 MMOL/L (ref 20–32)
CREAT SERPL-MCNC: 1.07 MG/DL (ref 0.66–1.25)
ERYTHROCYTE [DISTWIDTH] IN BLOOD BY AUTOMATED COUNT: 13.1 % (ref 10–15)
GFR SERPL CREATININE-BSD FRML MDRD: 65 ML/MIN/1.7M2
GLUCOSE BLDC GLUCOMTR-MCNC: 112 MG/DL (ref 70–99)
GLUCOSE BLDC GLUCOMTR-MCNC: 124 MG/DL (ref 70–99)
GLUCOSE BLDC GLUCOMTR-MCNC: 137 MG/DL (ref 70–99)
GLUCOSE BLDC GLUCOMTR-MCNC: 164 MG/DL (ref 70–99)
GLUCOSE BLDC GLUCOMTR-MCNC: 171 MG/DL (ref 70–99)
GLUCOSE SERPL-MCNC: 171 MG/DL (ref 70–99)
HCT VFR BLD AUTO: 38.8 % (ref 40–53)
HGB BLD-MCNC: 12.2 G/DL (ref 13.3–17.7)
INR PPP: 2.46 (ref 0.86–1.14)
MAGNESIUM SERPL-MCNC: 2 MG/DL (ref 1.6–2.3)
MCH RBC QN AUTO: 31.7 PG (ref 26.5–33)
MCHC RBC AUTO-ENTMCNC: 31.4 G/DL (ref 31.5–36.5)
MCV RBC AUTO: 101 FL (ref 78–100)
PLATELET # BLD AUTO: 187 10E9/L (ref 150–450)
POTASSIUM SERPL-SCNC: 3.7 MMOL/L (ref 3.4–5.3)
RBC # BLD AUTO: 3.85 10E12/L (ref 4.4–5.9)
SODIUM SERPL-SCNC: 137 MMOL/L (ref 133–144)
SPECIMEN SOURCE: ABNORMAL
WBC # BLD AUTO: 8 10E9/L (ref 4–11)

## 2018-04-08 PROCEDURE — 80048 BASIC METABOLIC PNL TOTAL CA: CPT | Performed by: INTERNAL MEDICINE

## 2018-04-08 PROCEDURE — 83735 ASSAY OF MAGNESIUM: CPT | Performed by: INTERNAL MEDICINE

## 2018-04-08 PROCEDURE — 25000132 ZZH RX MED GY IP 250 OP 250 PS 637: Mod: GY | Performed by: INTERNAL MEDICINE

## 2018-04-08 PROCEDURE — A9270 NON-COVERED ITEM OR SERVICE: HCPCS | Mod: GY | Performed by: INTERNAL MEDICINE

## 2018-04-08 PROCEDURE — A9270 NON-COVERED ITEM OR SERVICE: HCPCS | Mod: GY | Performed by: PHYSICIAN ASSISTANT

## 2018-04-08 PROCEDURE — 99233 SBSQ HOSP IP/OBS HIGH 50: CPT | Performed by: INTERNAL MEDICINE

## 2018-04-08 PROCEDURE — 25000128 H RX IP 250 OP 636: Performed by: INTERNAL MEDICINE

## 2018-04-08 PROCEDURE — 12000007 ZZH R&B INTERMEDIATE

## 2018-04-08 PROCEDURE — 85027 COMPLETE CBC AUTOMATED: CPT | Performed by: INTERNAL MEDICINE

## 2018-04-08 PROCEDURE — 85610 PROTHROMBIN TIME: CPT | Performed by: INTERNAL MEDICINE

## 2018-04-08 PROCEDURE — 00000146 ZZHCL STATISTIC GLUCOSE BY METER IP

## 2018-04-08 PROCEDURE — 25000132 ZZH RX MED GY IP 250 OP 250 PS 637: Mod: GY | Performed by: PHYSICIAN ASSISTANT

## 2018-04-08 RX ORDER — WARFARIN SODIUM 5 MG/1
5 TABLET ORAL ONCE
Status: COMPLETED | OUTPATIENT
Start: 2018-04-08 | End: 2018-04-08

## 2018-04-08 RX ORDER — SUCRALFATE 1 G/1
1 TABLET ORAL
Status: DISCONTINUED | OUTPATIENT
Start: 2018-04-08 | End: 2018-04-15 | Stop reason: HOSPADM

## 2018-04-08 RX ADMIN — GLIPIZIDE 5 MG: 5 TABLET ORAL at 07:50

## 2018-04-08 RX ADMIN — OMEPRAZOLE 20 MG: 20 CAPSULE, DELAYED RELEASE ORAL at 07:50

## 2018-04-08 RX ADMIN — INSULIN ASPART 1 UNITS: 100 INJECTION, SOLUTION INTRAVENOUS; SUBCUTANEOUS at 12:49

## 2018-04-08 RX ADMIN — TAMSULOSIN HYDROCHLORIDE 0.4 MG: 0.4 CAPSULE ORAL at 21:32

## 2018-04-08 RX ADMIN — SENNOSIDES AND DOCUSATE SODIUM 2 TABLET: 8.6; 5 TABLET ORAL at 07:51

## 2018-04-08 RX ADMIN — SENNOSIDES AND DOCUSATE SODIUM 2 TABLET: 8.6; 5 TABLET ORAL at 21:32

## 2018-04-08 RX ADMIN — FUROSEMIDE 40 MG: 10 INJECTION, SOLUTION INTRAMUSCULAR; INTRAVENOUS at 15:59

## 2018-04-08 RX ADMIN — ATENOLOL 50 MG: 50 TABLET ORAL at 07:50

## 2018-04-08 RX ADMIN — SUCRALFATE 1 G: 1 TABLET ORAL at 15:59

## 2018-04-08 RX ADMIN — CEFAZOLIN SODIUM 2 G: 2 INJECTION, SOLUTION INTRAVENOUS at 13:26

## 2018-04-08 RX ADMIN — GLIPIZIDE 5 MG: 5 TABLET ORAL at 15:59

## 2018-04-08 RX ADMIN — WARFARIN SODIUM 5 MG: 5 TABLET ORAL at 10:53

## 2018-04-08 RX ADMIN — ACETAMINOPHEN 650 MG: 325 TABLET ORAL at 14:21

## 2018-04-08 RX ADMIN — LISINOPRIL 5 MG: 5 TABLET ORAL at 07:50

## 2018-04-08 RX ADMIN — GABAPENTIN 300 MG: 300 CAPSULE ORAL at 21:32

## 2018-04-08 RX ADMIN — SIMVASTATIN 10 MG: 10 TABLET, FILM COATED ORAL at 21:32

## 2018-04-08 RX ADMIN — CEFAZOLIN SODIUM 2 G: 2 INJECTION, SOLUTION INTRAVENOUS at 21:36

## 2018-04-08 RX ADMIN — Medication 1 MG: at 00:16

## 2018-04-08 RX ADMIN — ACETAMINOPHEN 650 MG: 325 TABLET ORAL at 00:16

## 2018-04-08 RX ADMIN — SUCRALFATE 1 G: 1 TABLET ORAL at 11:48

## 2018-04-08 RX ADMIN — FUROSEMIDE 40 MG: 10 INJECTION, SOLUTION INTRAMUSCULAR; INTRAVENOUS at 07:38

## 2018-04-08 RX ADMIN — GABAPENTIN 300 MG: 300 CAPSULE ORAL at 07:51

## 2018-04-08 RX ADMIN — POLYETHYLENE GLYCOL 3350 17 G: 17 POWDER, FOR SOLUTION ORAL at 07:49

## 2018-04-08 RX ADMIN — SUCRALFATE 1 G: 1 TABLET ORAL at 21:32

## 2018-04-08 RX ADMIN — LEVOTHYROXINE SODIUM 100 MCG: 100 TABLET ORAL at 07:50

## 2018-04-08 RX ADMIN — CEFAZOLIN SODIUM 2 G: 2 INJECTION, SOLUTION INTRAVENOUS at 06:05

## 2018-04-08 ASSESSMENT — ACTIVITIES OF DAILY LIVING (ADL)
ADLS_ACUITY_SCORE: 18

## 2018-04-08 NOTE — PLAN OF CARE
Problem: Patient Care Overview  Goal: Plan of Care/Patient Progress Review  Outcome: No Change  Pt VSS, denies pain. o2 sats at 93% on 2L via NC. Lt leg red/warm +3 edema. IV ancef. DM, SS. . Pleasantly confused, pulling off telemetry leads and gown. Need to reassure pt why he is here. Inc/frequency. Assist x2 w/walker. PICC rt arm patent. PT/OT/ID following. Tele shows Afib CVR. Will continue with current POC. Possible discharge 2 to 3 days.

## 2018-04-08 NOTE — PROGRESS NOTES
[facility]  Hospitalist Progress Note  Nirmal Serrano MD 04/08/18    Reason for Stay (Diagnosis): bacteremia         Assessment and Plan:      Summary of Stay: Ton Bagley is a 89 year old male with pmhx of A. fib on warfarin, lymphedema, CAD, HTN, DMII, dementia, and HLD admitted on 4/3/2018 after a fall.  Head CT negative for acute bleed.  Labs notable for an PRADEEP and leukocytosis.  He was hypotensive initially and elevated lactate.  He was given fluid boluses and developed some hypoxia in the ED.  He was started on vancomycin and cefepime for sepsis.  Blood cultures grew MSSA and strep mitis.  Antibiotics narrowed to cefazolin per ID who were consulted.  PT and OT consulted due to falls.  Leukocytosis and fevers resolved.  TTE showing aortic stenosis. Persistent hypoxia and weight up so changed to IV diuretic 4/8.  Anticipate patient will need TCU.    Problem List/Assessment and Plan:   Sepsis due to methicillin sensitive staph aureus bacteremia and strep mitis (cx from 4/3) and possible L leg cellulitis:  Portal of entry for bacteria is not completely clear but may be skin related.  He has severe venous stasis of his lower legs bilaterally.  Had some mild erythema in the right medial thigh.  Leukocytosis and fever resolved.  Did have elevated lactic acid that has since resolved.  -Continue on cefazolin for 4 weeks total.  Infectious disease team consulting  -PICC line in place  -Repeat blood cultures from 4/4 and 4/5 are still negative.      Fall: Fell when walking to the bathroom.  He denies loss of consciousness.  Has had some other falls.  He is chronically on anticoagulant agent for A. Fib.  -PT and OT consultation.  I anticipate patient will need TCU    Acute hypoxemic respiratory failure:   Developed hypoxia in the ED following fluid boluses.  Has had ongoing need for 2-4 L of oxygen via nasal cannula.  Chest x-ray showing some atelectasis versus possible left lower lobe pneumonia.   "Suspect this is more volume related as he has significant peripheral edema on exam.  -Wean oxygen  -IV lasix      Chronic A. Fib: Continue PTA warfarin, pharmacy to dose.  Atenolol 50 mg daily for rate control.  Telemetry.    Aortic stenosis: TTE showing a significantly decreased area of the aortic valve, the flow gradient is only mild.  Does not sound like his falls are syncopal in origin.  If he does have syncope or light and is walking around would consider cardiology consultation to see if this could be contributing to his falls.  Does not appear to be outflow obstruction per the echocardiogram read.    Lymphedema: Chronically on Lasix 60 mg morning and 30 mg afternoon.  Weight up 7 pounds here and 2-3+ tense lower extremity edema bilaterally.  Also, hypoxic and some crackles more on the left which may be pulmonary edema.  Weight now coming down with IV diuretic, but I suspect we have a ways to go.  -Continue Lasix at 40 mg IV twice daily  -Daily weights and strict intake output  -sodium restriction      Type II DM: PTA on glipizide 5 mg twice daily.  Blood glucose well controlled here.  -Continue PTA glipizide  -Sliding scale insulin      HTN: PTA on lisinopril 5 mg daily, atenolol grams daily, and Lasix 60 mg morning and 30 mg afternoon.  Lisinopril and Lasix initially held due to PRADEEP.  -continue atenolol and lisinopril  -increased diuretic    Abdominal pain: Patient endorses diffuse abdominal cramps and often feels like he has to have a bowel movement.  Ongoing issue and son informed nursing that the patient has a \"bum stomach.\"  He is having multiple bowel movements that are formed daily.  -Continue Prilosec  -Trial of Carafate 4 times daily today      Back pain:  Low back pain after fall.    -monitor and if ongoing pain would image      Dementia: Unclear severity, but lives at home with spouse.  Memory seems quite poor here and needs frequent reorientation.      PRADEEP: Creatinine peak of 1.7.  Baseline " "appears to be 0.9-1.  Suspect secondary to bacteremia and Lasix/lisinopril use.  Resolved with IV fluids and holding lisinopril and diuretic initially.  -Follow BMP with increased diuretic dose      HLD  Continue statin      Hypothyroidism  Continue levothyroxin      GERD  Continue omeprazole    # Pain Assessment:  Current Pain Score 4/8/2018   Patient currently in pain? denies   Pain score (0-10) -   Pain location -   Pain descriptors -   - Ton is experiencing pain due to back pain. Pain management was discussed and the plan was created in a collaborative fashion.  Ton's response to the current recommendations: engaged  -Tylenol as needed  -Oxycodone 5 mg every 3 hours as needed    DVT Prophylaxis: Warfarin  Code Status: DNR / DNI.  This was discussed on admission  FEN: Consistent carbohydrate medium and 2 g sodium restriction  Discharge Dispo:  PT, OT, social work consult.  Based on weakness, confusion, extended course of IV antibiotics to think patient will need to TCU and that his spouse will not be able to manage all of his cares at home.  Discussed this with social work and will try to get in touch with family members as well.  Estimated Disch Date / # of Days until Disch: Likely additional 2-3 days more of diuresis if able to wean off oxygen.  Has PICC in place for extended IV antibiotic course        Interval History (Subjective):      Confused overnight, pulling at lines and on.  Weight down with diuresis.  Continues to be fairly somnolent when I see him during the day.  Does endorse ongoing diffuse abdominal discomfort.                  Physical Exam:      Last Vital Signs:  /71 (BP Location: Left arm)  Pulse 95  Temp 95.7  F (35.4  C) (Oral)  Resp 20  Ht 1.676 m (5' 6\")  Wt 105.3 kg (232 lb 3.2 oz)  SpO2 95%  BMI 37.48 kg/m2      Intake/Output Summary (Last 24 hours) at 04/08/18 1420  Last data filed at 04/08/18 1326   Gross per 24 hour   Intake              896 ml   Output            "   100 ml   Net              796 ml       Constitutional: Awake, NAD   Eyes: sclera white   HEENT:   MMM  Respiratory: No crackles.  No wheeze  Cardiovascular: Irregularly, irregular rhythm.  Regular rate.  2/6 systolic murmur  GI: Nontender to palpation,  not distended, bowel sounds present  Skin: Chronic venous stasis changes of lower extremities bilaterally, left leg somewhat red.  Musculoskeletal/extremities: Tense 2-3+ edema bilateral lower extremity's  Neurologic: Somnolent, not oriented to place or date.  Psychiatric: calm, cooperative          Medications:      All current medications were reviewed with changes reflected in problem list.         Data:      All new lab and imaging data was reviewed.   Labs:       Lab Results   Component Value Date     04/08/2018     04/06/2018     04/04/2018    Lab Results   Component Value Date    CHLORIDE 98 04/08/2018    CHLORIDE 103 04/06/2018    CHLORIDE 103 04/04/2018    Lab Results   Component Value Date    BUN 32 04/08/2018    BUN 21 04/06/2018    BUN 34 04/04/2018      Lab Results   Component Value Date    POTASSIUM 3.7 04/08/2018    POTASSIUM 4.5 04/06/2018    POTASSIUM 4.0 04/04/2018    Lab Results   Component Value Date    CO2 33 04/08/2018    CO2 25 04/06/2018    CO2 29 04/04/2018    Lab Results   Component Value Date    CR 1.07 04/08/2018    CR 0.93 04/06/2018    CR 1.00 04/05/2018        Lab Results   Component Value Date    WBC 8.0 04/08/2018    WBC 8.7 04/06/2018    WBC 8.9 04/05/2018    HGB 12.2 (L) 04/08/2018    HGB 12.5 (L) 04/06/2018    HGB 11.6 (L) 04/05/2018    HCT 38.8 (L) 04/08/2018    HCT 39.6 (L) 04/06/2018    HCT 38.0 (L) 04/05/2018     (H) 04/08/2018     04/06/2018     (H) 04/05/2018     04/08/2018     (L) 04/06/2018     (L) 04/05/2018     Lab Results   Component Value Date    INR 2.46 (H) 04/08/2018    INR 2.76 (H) 04/07/2018    INR 2.89 (H) 04/06/2018        Imaging:   None  today      Nirmal Serrano MD

## 2018-04-08 NOTE — PLAN OF CARE
RN Shift Summary:  Diagnosis/Presentation: Pt admitted 4/3 due to fall, leg pain and swelling. Pt diagnosed with sepsis.   History: DM, A-fib (on coumadin), HTN, venous stasis, CAD and memory loss.   Labs/Protocols: K: 3.7, Hgb: 12.2, Plt: 187, INR: 2.46  Telemetry: A-fib with CVR.  Assessment: Alert, oriented only to self.  VSS on 1L O2 per NC (no O2 at baseline). PICC to R arm,SL. Intermittent IV ancef. Moderate carb diet, poor appetite. QID accuchecks, sugars this shift: 137 and 164. Ax1 with FWW for transfers. Keep feet elevated per MD.  Will continue to monitor.   Plan: Continue diuresis of pt. Wean O2 as tolerated. Discharge to TCU when ready.

## 2018-04-08 NOTE — PLAN OF CARE
Problem: Patient Care Overview  Goal: Plan of Care/Patient Progress Review    [place]: Oriented to self only. Confused.  VSS: afebrile, tele shows Afib, CVR.   LS: dim on 2L NC  GI: active, last BM today, uses commode bedside, some abd discomfort--thinks he needs to have a BM   : urinal and incontinent at times. Incontinent x2.   Skin: left leg redness, patchy, issa, edema. Right leg issa.  Activity: x2, walker   Diet: mod carb   Pain: denies  Plan: IV ancef, lasix, wean O2.  Lab: , 148.

## 2018-04-09 ENCOUNTER — APPOINTMENT (OUTPATIENT)
Dept: OCCUPATIONAL THERAPY | Facility: CLINIC | Age: 83
DRG: 871 | End: 2018-04-09
Payer: MEDICARE

## 2018-04-09 LAB
ANION GAP SERPL CALCULATED.3IONS-SCNC: <1 MMOL/L (ref 3–14)
BACTERIA SPEC CULT: NO GROWTH
BUN SERPL-MCNC: 33 MG/DL (ref 7–30)
CALCIUM SERPL-MCNC: 8.5 MG/DL (ref 8.5–10.1)
CHLORIDE SERPL-SCNC: 99 MMOL/L (ref 94–109)
CO2 SERPL-SCNC: 38 MMOL/L (ref 20–32)
CREAT SERPL-MCNC: 1.03 MG/DL (ref 0.66–1.25)
GFR SERPL CREATININE-BSD FRML MDRD: 68 ML/MIN/1.7M2
GLUCOSE BLDC GLUCOMTR-MCNC: 140 MG/DL (ref 70–99)
GLUCOSE BLDC GLUCOMTR-MCNC: 141 MG/DL (ref 70–99)
GLUCOSE BLDC GLUCOMTR-MCNC: 149 MG/DL (ref 70–99)
GLUCOSE BLDC GLUCOMTR-MCNC: 167 MG/DL (ref 70–99)
GLUCOSE BLDC GLUCOMTR-MCNC: 169 MG/DL (ref 70–99)
GLUCOSE SERPL-MCNC: 141 MG/DL (ref 70–99)
INR PPP: 2.68 (ref 0.86–1.14)
POTASSIUM SERPL-SCNC: 3.6 MMOL/L (ref 3.4–5.3)
SODIUM SERPL-SCNC: 137 MMOL/L (ref 133–144)
SPECIMEN SOURCE: NORMAL

## 2018-04-09 PROCEDURE — A9270 NON-COVERED ITEM OR SERVICE: HCPCS | Mod: GY | Performed by: INTERNAL MEDICINE

## 2018-04-09 PROCEDURE — 25000132 ZZH RX MED GY IP 250 OP 250 PS 637: Mod: GY | Performed by: INTERNAL MEDICINE

## 2018-04-09 PROCEDURE — 00000146 ZZHCL STATISTIC GLUCOSE BY METER IP

## 2018-04-09 PROCEDURE — 80048 BASIC METABOLIC PNL TOTAL CA: CPT | Performed by: INTERNAL MEDICINE

## 2018-04-09 PROCEDURE — A9270 NON-COVERED ITEM OR SERVICE: HCPCS | Mod: GY | Performed by: PHYSICIAN ASSISTANT

## 2018-04-09 PROCEDURE — 12000007 ZZH R&B INTERMEDIATE

## 2018-04-09 PROCEDURE — 25000128 H RX IP 250 OP 636: Performed by: INTERNAL MEDICINE

## 2018-04-09 PROCEDURE — 25000132 ZZH RX MED GY IP 250 OP 250 PS 637: Mod: GY | Performed by: PHYSICIAN ASSISTANT

## 2018-04-09 PROCEDURE — 85610 PROTHROMBIN TIME: CPT | Performed by: INTERNAL MEDICINE

## 2018-04-09 PROCEDURE — 99232 SBSQ HOSP IP/OBS MODERATE 35: CPT | Performed by: INTERNAL MEDICINE

## 2018-04-09 PROCEDURE — 40000133 ZZH STATISTIC OT WARD VISIT

## 2018-04-09 PROCEDURE — 99207 ZZC CDG-MDM COMPONENT: MEETS LOW - DOWN CODED: CPT | Performed by: INTERNAL MEDICINE

## 2018-04-09 PROCEDURE — 40000257 ZZH STATISTIC CONSULT NO CHARGE VASC ACCESS

## 2018-04-09 PROCEDURE — 97535 SELF CARE MNGMENT TRAINING: CPT | Mod: GO

## 2018-04-09 RX ORDER — WARFARIN SODIUM 3 MG/1
3 TABLET ORAL
Status: COMPLETED | OUTPATIENT
Start: 2018-04-09 | End: 2018-04-09

## 2018-04-09 RX ADMIN — GABAPENTIN 300 MG: 300 CAPSULE ORAL at 21:33

## 2018-04-09 RX ADMIN — INSULIN ASPART 1 UNITS: 100 INJECTION, SOLUTION INTRAVENOUS; SUBCUTANEOUS at 17:56

## 2018-04-09 RX ADMIN — GABAPENTIN 300 MG: 300 CAPSULE ORAL at 10:06

## 2018-04-09 RX ADMIN — CEFAZOLIN SODIUM 2 G: 2 INJECTION, SOLUTION INTRAVENOUS at 14:04

## 2018-04-09 RX ADMIN — CEFAZOLIN SODIUM 2 G: 2 INJECTION, SOLUTION INTRAVENOUS at 05:55

## 2018-04-09 RX ADMIN — GLIPIZIDE 5 MG: 5 TABLET ORAL at 10:06

## 2018-04-09 RX ADMIN — SUCRALFATE 1 G: 1 TABLET ORAL at 13:56

## 2018-04-09 RX ADMIN — INSULIN ASPART 1 UNITS: 100 INJECTION, SOLUTION INTRAVENOUS; SUBCUTANEOUS at 10:06

## 2018-04-09 RX ADMIN — POLYETHYLENE GLYCOL 3350 17 G: 17 POWDER, FOR SOLUTION ORAL at 10:07

## 2018-04-09 RX ADMIN — SENNOSIDES AND DOCUSATE SODIUM 2 TABLET: 8.6; 5 TABLET ORAL at 10:09

## 2018-04-09 RX ADMIN — TAMSULOSIN HYDROCHLORIDE 0.4 MG: 0.4 CAPSULE ORAL at 21:33

## 2018-04-09 RX ADMIN — GLIPIZIDE 5 MG: 5 TABLET ORAL at 17:55

## 2018-04-09 RX ADMIN — INSULIN ASPART 1 UNITS: 100 INJECTION, SOLUTION INTRAVENOUS; SUBCUTANEOUS at 13:59

## 2018-04-09 RX ADMIN — ATENOLOL 50 MG: 50 TABLET ORAL at 10:06

## 2018-04-09 RX ADMIN — OMEPRAZOLE 20 MG: 20 CAPSULE, DELAYED RELEASE ORAL at 10:06

## 2018-04-09 RX ADMIN — FUROSEMIDE 60 MG: 40 TABLET ORAL at 17:55

## 2018-04-09 RX ADMIN — SIMVASTATIN 10 MG: 10 TABLET, FILM COATED ORAL at 21:33

## 2018-04-09 RX ADMIN — LEVOTHYROXINE SODIUM 100 MCG: 100 TABLET ORAL at 10:06

## 2018-04-09 RX ADMIN — SUCRALFATE 1 G: 1 TABLET ORAL at 21:33

## 2018-04-09 RX ADMIN — SUCRALFATE 1 G: 1 TABLET ORAL at 10:06

## 2018-04-09 RX ADMIN — SENNOSIDES AND DOCUSATE SODIUM 2 TABLET: 8.6; 5 TABLET ORAL at 21:33

## 2018-04-09 RX ADMIN — SUCRALFATE 1 G: 1 TABLET ORAL at 17:55

## 2018-04-09 RX ADMIN — WARFARIN SODIUM 3 MG: 3 TABLET ORAL at 17:55

## 2018-04-09 RX ADMIN — ACETAMINOPHEN 650 MG: 325 TABLET ORAL at 06:20

## 2018-04-09 RX ADMIN — CEFAZOLIN SODIUM 2 G: 2 INJECTION, SOLUTION INTRAVENOUS at 21:33

## 2018-04-09 RX ADMIN — ACETAMINOPHEN 650 MG: 325 TABLET ORAL at 01:14

## 2018-04-09 RX ADMIN — Medication 1 MG: at 01:14

## 2018-04-09 RX ADMIN — ACETAMINOPHEN 650 MG: 325 TABLET ORAL at 13:56

## 2018-04-09 RX ADMIN — LISINOPRIL 5 MG: 5 TABLET ORAL at 10:05

## 2018-04-09 ASSESSMENT — ACTIVITIES OF DAILY LIVING (ADL)
ADLS_ACUITY_SCORE: 18

## 2018-04-09 NOTE — PLAN OF CARE
"Problem: Patient Care Overview  Goal: Plan of Care/Patient Progress Review    Discharge Planner OT   Patient plan for discharge: Home with assist.  Current status: Stating urinary urgency. Repeating statements throughout session despite reassurance (ex. \"where is my wife? why am I here?\"). Directable but quickly forgetful. Sit>stand CGA, heavy VCs for proper hand placement with 2ww. Ambulated approx. 3 feet to bedside commode, CGA with 2ww. Assist to doff/don brief. Stand<>sit CGA. Ambulated back to bedside chair CGA and VCs, pt demo'ing unsafe use of 2ww but able to correct with heavy VCs. Pt able to follow single-step commands throughout session with frequent cuing.   Barriers to return to prior living situation: Confused, spouse may have trouble with cares if this gets worse; decreased independence in ADLs  Recommendations for discharge: TCU; pending LOS and progress may be able to d/c home with HH aide and home OT  Rationale for recommendations: Pt to benefit from continued skilled OT services to increase independence in ADL and functional mobility tasks prior to safe d/c home.       Entered by: Janna Wynne 04/09/2018 2:07 PM         "

## 2018-04-09 NOTE — PLAN OF CARE
Problem: Patient Care Overview  Goal: Plan of Care/Patient Progress Review    [place]: alert to self only, confused.  VSS: afebrile. Tele shows Afib, CVR  LS: dim on 0.5L NC  GI: active, last BM today, on commode  : urinal, incontinent as well.  Skin: left leg red/issa, 3+ edema. Right leg issa, 2+ edema.  Activity: x2, walker   Diet: mod carb   Pain: denies  Plan: IV ancef, lower ext elevated throughout shift, IV lasix  Lab: , 171

## 2018-04-09 NOTE — PLAN OF CARE
Problem: Patient Care Overview  Goal: Plan of Care/Patient Progress Review  Outcome: No Change  Pt VSS, pain left leg. Tylenol given. Melatonin for sleep. o2 sats at 95% on 0.5L via NC. Lt leg red/warm +3 edema. IV ancef. DM, SS. . Pleasantly confused, pulling off telemetry leads and gown. Need to reassure pt why he is here. Inc/frequency. Assist x2 w/walker. PICC rt arm patent. PT/OT/ID following. Tele shows Afib CVR. Will continue with current POC. Possible discharge 2 to 3 days.

## 2018-04-09 NOTE — PROGRESS NOTES
[facility]  Infectious Disease Progress Note          Assessment and Plan:   IMPRESSION:   1.  A 89-year-old male with 2 falls in the last week, some weakness and sleepiness for 2-3 days, cause of this syndrome is now apparent with blood cultures positive for methicillin-sensitive Staph aureus, the patient has methicillin-sensitive Staph aureus bacteremia syndrome, no clear secondary site, possibly left leg as source, although leg does not look like cellulitis to me more stasis with one small wound as a possible entry site.   2.  Low back pain, not obvious tenderness, conceivable secondary site of infection, but also possibly just related to fall.   3.  Diabetes mellitus.   4.  Atrial fibrillation.   5.  Mild dementia.   6.  Venous stasis disease without much history of major infection process, some wound formation in his left leg, recurrently.       RECOMMENDATIONS:   1.  Continue Ancef, improved acute renal insufficiency, increase dose as renal function allows.   2.   blood cultures  FU neg  3.  Transthoracic echocardiogram abnormal, but no obvious infection, would hold off on a transesophageal echocardiogram, patient will require a 4-week course of antibiotics, regardless would only do a GAVIN if we are not clearing the blood.  No long line for now, get serial blood cultures until clear.   4.  Further workup, tests and evaluation depending on clinical course, culture results, etc.   5.  Looks better only 1 cx +, no obvious secondary sites  6 OK PICC, orders in for ancef ? Rehab          Interval History:   no new complaints and doing well; no cp, sob, n/v/d, or abd pain. Looks better cx as above              Medications:       warfarin  3 mg Oral ONCE at 18:00     sucralfate  1 g Oral 4x Daily AC & HS     sodium chloride (PF)  3 mL Intracatheter Q8H     ceFAZolin  2 g Intravenous Q8H     sodium chloride (PF)  10 mL Intracatheter Q7 Days     lisinopril  5 mg Oral Daily     atenolol  50 mg  "Oral Daily     gabapentin (NEURONTIN) capsule 300 mg  300 mg Oral BID     glipiZIDE  5 mg Oral BID AC     levothyroxine  100 mcg Oral Daily     omeprazole  20 mg Oral Daily     polyethylene glycol  17 g Oral Daily     senna-docusate  2 tablet Oral BID     simvastatin  10 mg Oral At Bedtime     tamsulosin  0.4 mg Oral QPM     insulin aspart  1-7 Units Subcutaneous TID AC     insulin aspart  1-5 Units Subcutaneous At Bedtime                  Physical Exam:   Blood pressure 130/65, pulse 95, temperature 97.3  F (36.3  C), temperature source Oral, resp. rate 20, height 1.676 m (5' 6\"), weight 103.9 kg (229 lb), SpO2 93 %.  Wt Readings from Last 2 Encounters:   04/09/18 103.9 kg (229 lb)   05/28/17 99.3 kg (218 lb 14.4 oz)     Vital Signs with Ranges  Temp:  [95.8  F (35.4  C)-97.6  F (36.4  C)] 97.3  F (36.3  C)  Heart Rate:  [62-83] 62  Resp:  [18-20] 20  BP: (130-155)/(59-90) 130/65  SpO2:  [89 %-95 %] 93 %    Constitutional: Awake, alert, cooperative, no apparent distress   Lungs: Clear to auscultation bilaterally, no crackles or wheezing   Cardiovascular: Regular rate and rhythm, normal S1 and S2, and no murmur noted   Abdomen: Normal bowel sounds, soft, non-distended, non-tender   Skin: No rashes, no cyanosis, same edema no emboli  Mostly stasis   Other:           Data:   All microbiology laboratory data reviewed.  Recent Labs   Lab Test  04/08/18   0945  04/06/18   0722  04/05/18   0651   WBC  8.0  8.7  8.9   HGB  12.2*  12.5*  11.6*   HCT  38.8*  39.6*  38.0*   MCV  101*  100  104*   PLT  187  120*  120*     Recent Labs   Lab Test  04/09/18   0550  04/08/18   0945  04/06/18   0722   CR  1.03  1.07  0.93     No lab results found.  Recent Labs   Lab Test  04/06/18   0721  04/05/18   0650  04/04/18   1604  04/03/18   1252  04/03/18   1204  05/14/17   1052  05/13/17   2043   CULT  No growth after 3 days  No growth after 4 days  No growth after 5 days  Cultured on the 1st day of incubation:  Staphylococcus aureus  *  " Critical Value/Significant Value, preliminary result only, called to and read back by  Nell Valerio RN at 0918 4/4/18 [id number]    Cultured on the 1st day of incubation:  Streptococcus mitis group  *  Critical Value/Significant Value called to and read back by  Tamanna Evans RN at 1015 on [id number]    (Note)  POSITIVE for STAPHYLOCOCCUS AUREUS and NEGATIVE for the mecA gene  (not MRSA) by NPR multiplex nucleic acid test. The mecA gene was  not detected. Final identification and antimicrobial susceptibility  testing will be verified by standard methods.    Specimen tested with Saggeigene multiplex, gram-positive blood culture  nucleic acid test for the following targets: Staph aureus, Staph  epidermidis, Staph lugdunensis, other Staph species, Enterococcus  faecalis, Enterococcus faecium, Streptococcus species, S. agalactiae,  S. anginosus grp., S. pneumoniae, S. pyogenes, Listeria sp., mecA  (methicillin resistance) and Renetta/B (vancomycin resistance).    Critical Value/Significant Value called to and read back by  Komal Mckeon RN @1240 04/04/18 gd    No growth  No anaerobes isolated  No growth  <10,000 colonies/mL mixed urogenital conner Susceptibility testing not routinely   done

## 2018-04-09 NOTE — PROGRESS NOTES
[facility]  Hospitalist Progress Note  Nirmal Serrano MD 04/09/18    Reason for Stay (Diagnosis): bacteremia         Assessment and Plan:      Summary of Stay: Ton Bagley is a 89 year old male with pmhx of A. fib on warfarin, lymphedema, CAD, HTN, DMII, dementia, and HLD admitted on 4/3/2018 after a fall.  Head CT negative for acute bleed.  Labs notable for an PRADEEP and leukocytosis.  He was hypotensive initially and elevated lactate.  He was given fluid boluses and developed some hypoxia in the ED.  He was started on vancomycin and cefepime for sepsis.  Blood cultures grew MSSA and strep mitis.  Antibiotics narrowed to cefazolin per ID who were consulted.  PT and OT consulted due to falls.  Leukocytosis and fevers resolved.  TTE showing aortic stenosis. Persistent hypoxia and weight up so changed to IV diuretic 4/8.  Now with signs of contraction alkalosis so slowing down diuresis.  Anticipate patient will need TCU.    Problem List/Assessment and Plan:   Sepsis due to methicillin sensitive staph aureus bacteremia and strep mitis (cx from 4/3) and possible L leg cellulitis:  Portal of entry for bacteria is not completely clear but may be skin related.  He has severe venous stasis of his lower legs bilaterally.  Had some mild erythema in the right medial thigh.  Leukocytosis and fever resolved.  Did have elevated lactic acid that has since resolved.  -Continue on cefazolin for 4 weeks total.  Infectious disease team consulting  -PICC line in place  -Repeat blood cultures from 4/4 and 4/5 are still negative.      Fall: Fell when walking to the bathroom.  He denies loss of consciousness.  Has had some other falls.  He is chronically on anticoagulant agent for A. Fib.  -PT and OT consultation.  I anticipate patient will need TCU    Acute hypoxemic respiratory failure:   Developed hypoxia in the ED following fluid boluses.  Has had ongoing need for 2-4 L of oxygen via nasal cannula.  Chest x-ray  "showing some atelectasis versus possible left lower lobe pneumonia.  Suspect this is more volume related as he has significant peripheral edema on exam.  -Wean oxygen  -Contraction alkalosis with diuretic.  Decrease to oral dosing 60 mg twice daily p.o.      Chronic A. Fib: Continue PTA warfarin, pharmacy to dose.  Atenolol 50 mg daily for rate control.  Telemetry.    Aortic stenosis: TTE showing a significantly decreased area of the aortic valve, the flow gradient is only mild.  Does not sound like his falls are syncopal in origin.  If he does have syncope or light and is walking around would consider cardiology consultation to see if this could be contributing to his falls.  Does not appear to be outflow obstruction per the echocardiogram read.    Lymphedema: Chronically on Lasix 60 mg morning and 30 mg afternoon.  Weight up 7 pounds here and 2-3+ tense lower extremity edema bilaterally.  Also, hypoxic and some crackles more on the left which may be pulmonary edema.  Weight now coming down with IV diuretic, but I suspect we have a ways to go.  -Showing signs of contraction alkalosis from diuresis so we will change diuretics to 60 mg twice daily p.o.  -Daily weights and strict intake output  -sodium restriction      Type II DM: PTA on glipizide 5 mg twice daily.  Blood glucose well controlled here.  -Continue PTA glipizide  -Sliding scale insulin      HTN: PTA on lisinopril 5 mg daily, atenolol grams daily, and Lasix 60 mg morning and 30 mg afternoon.  Lisinopril and Lasix initially held due to PRADEEP.  -continue atenolol and lisinopril    Abdominal pain: Patient endorses diffuse abdominal cramps and often feels like he has to have a bowel movement.  Ongoing issue and son informed nursing that the patient has a \"bum stomach.\"  He is having multiple bowel movements that are formed daily.  -Continue Prilosec  -Trial of Carafate 4 times daily      Back pain:  Low back pain after fall.    -monitor and if ongoing pain " would image      Dementia: Unclear severity, but lives at home with spouse.  Memory seems quite poor here and needs frequent reorientation.      PRADEEP: Creatinine peak of 1.7.  Baseline appears to be 0.9-1.  Suspect secondary to bacteremia and Lasix/lisinopril use.  Resolved with IV fluids and holding lisinopril and diuretic initially.  -Follow BMP with diuresis      HLD  Continue statin      Hypothyroidism  Continue levothyroxin      GERD  Continue omeprazole    # Pain Assessment:  Current Pain Score 4/9/2018   Patient currently in pain? yes   Pain score (0-10) 6   Pain location Hip   Pain descriptors -   - Ton is experiencing pain due to back pain. Pain management was discussed and the plan was created in a collaborative fashion.  Ton's response to the current recommendations: engaged  -Tylenol as needed  -Oxycodone 5 mg every 3 hours as needed    DVT Prophylaxis: Warfarin  Code Status: DNR / DNI.  This was discussed on admission  FEN: Consistent carbohydrate medium and 2 g sodium restriction  Discharge Dispo:  PT, OT, social work consult.  Based on weakness, confusion, extended course of IV antibiotics to think patient will need to TCU and that his spouse will not be able to manage all of his cares at home.  Discussed this with social work and will try to get in touch with family members as well.  Estimated Disch Date / # of Days until Disch: Likely additional 1-2 days more of diuresis if able to wean off oxygen.  Has PICC in place for extended IV antibiotic course        Interval History (Subjective):      Weight down with diuresis.  No fevers or chills.  Remains confused.    Spoke with Kelli, patient's spouse, on the phone yesterday who said she and family will be coming in today.  I had asked RN to notify me if they arrived, however this did not occur today.                  Physical Exam:      Last Vital Signs:  /65 (BP Location: Left arm)  Pulse 95  Temp 97.3  F (36.3  C) (Oral)  Resp 20   "Ht 1.676 m (5' 6\")  Wt 103.9 kg (229 lb)  SpO2 93%  BMI 36.96 kg/m2      Intake/Output Summary (Last 24 hours) at 04/09/18 1812  Last data filed at 04/09/18 1741   Gross per 24 hour   Intake              443 ml   Output              375 ml   Net               68 ml       Constitutional: Awake, NAD   Eyes: sclera white   HEENT:   MMM  Respiratory: No crackles.  No wheeze  Cardiovascular: Irregularly, irregular rhythm.  Regular rate.  2/6 systolic murmur  GI: Nontender to palpation,  not distended, bowel sounds present  Skin: Chronic venous stasis changes of lower extremities bilaterally, left leg somewhat red laterally  Musculoskeletal/extremities: 2+ edema right leg, 3+ edema left leg  Neurologic: Alert, confused, not oriented to place or date.  Psychiatric: calm          Medications:      All current medications were reviewed with changes reflected in problem list.         Data:      All new lab and imaging data was reviewed.   Labs:       Lab Results   Component Value Date     04/09/2018    Lab Results   Component Value Date    CHLORIDE 99 04/09/2018    Lab Results   Component Value Date    BUN 33 04/09/2018      Lab Results   Component Value Date    POTASSIUM 3.6 04/09/2018    Lab Results   Component Value Date    CO2 38 04/09/2018    Lab Results   Component Value Date    CR 1.03 04/09/2018          Lab Results   Component Value Date    INR 2.68 (H) 04/09/2018    INR 2.46 (H) 04/08/2018    INR 2.76 (H) 04/07/2018        Imaging:   None today      Nirmal Serrano MD      "

## 2018-04-09 NOTE — PLAN OF CARE
Problem: Patient Care Overview  Goal: Plan of Care/Patient Progress Review  Outcome: No Change  VSS on Lisinopril and Atenolol. Tele Afib. On Coumadin, INR 2.68. PICC line in place for IV ABX, On Ancef for positive blood cx. Pt continues to have edema, IV Lasix held see Md's note. Pt SOB/WILLIAM on 0.5L o2. Pt de sats upper 80's when he pulls o2 off. Pt confused, A&Ox1 this is his baseline per [facility] DON. Pt is up A1-2 with walker. Incont of urine at times. Had Bm soft, on bowel meds.

## 2018-04-10 ENCOUNTER — APPOINTMENT (OUTPATIENT)
Dept: GENERAL RADIOLOGY | Facility: CLINIC | Age: 83
DRG: 871 | End: 2018-04-10
Attending: INTERNAL MEDICINE
Payer: MEDICARE

## 2018-04-10 LAB
ANION GAP SERPL CALCULATED.3IONS-SCNC: 5 MMOL/L (ref 3–14)
BACTERIA SPEC CULT: NO GROWTH
BUN SERPL-MCNC: 26 MG/DL (ref 7–30)
CALCIUM SERPL-MCNC: 8.8 MG/DL (ref 8.5–10.1)
CHLORIDE SERPL-SCNC: 96 MMOL/L (ref 94–109)
CO2 SERPL-SCNC: 36 MMOL/L (ref 20–32)
CREAT SERPL-MCNC: 1.01 MG/DL (ref 0.66–1.25)
GFR SERPL CREATININE-BSD FRML MDRD: 69 ML/MIN/1.7M2
GLUCOSE BLDC GLUCOMTR-MCNC: 133 MG/DL (ref 70–99)
GLUCOSE BLDC GLUCOMTR-MCNC: 137 MG/DL (ref 70–99)
GLUCOSE BLDC GLUCOMTR-MCNC: 154 MG/DL (ref 70–99)
GLUCOSE BLDC GLUCOMTR-MCNC: 167 MG/DL (ref 70–99)
GLUCOSE BLDC GLUCOMTR-MCNC: 195 MG/DL (ref 70–99)
GLUCOSE SERPL-MCNC: 125 MG/DL (ref 70–99)
INR PPP: 2.4 (ref 0.86–1.14)
POTASSIUM SERPL-SCNC: 3.4 MMOL/L (ref 3.4–5.3)
SODIUM SERPL-SCNC: 137 MMOL/L (ref 133–144)
SPECIMEN SOURCE: NORMAL

## 2018-04-10 PROCEDURE — 25000128 H RX IP 250 OP 636: Performed by: INTERNAL MEDICINE

## 2018-04-10 PROCEDURE — 27210209 ZZH KIT VALVED SINGLE LUMEN

## 2018-04-10 PROCEDURE — A9270 NON-COVERED ITEM OR SERVICE: HCPCS | Mod: GY | Performed by: INTERNAL MEDICINE

## 2018-04-10 PROCEDURE — 85610 PROTHROMBIN TIME: CPT | Performed by: INTERNAL MEDICINE

## 2018-04-10 PROCEDURE — 25000132 ZZH RX MED GY IP 250 OP 250 PS 637: Mod: GY | Performed by: PHYSICIAN ASSISTANT

## 2018-04-10 PROCEDURE — 12000007 ZZH R&B INTERMEDIATE

## 2018-04-10 PROCEDURE — 80048 BASIC METABOLIC PNL TOTAL CA: CPT | Performed by: INTERNAL MEDICINE

## 2018-04-10 PROCEDURE — 99232 SBSQ HOSP IP/OBS MODERATE 35: CPT | Performed by: INTERNAL MEDICINE

## 2018-04-10 PROCEDURE — 25000132 ZZH RX MED GY IP 250 OP 250 PS 637: Mod: GY | Performed by: INTERNAL MEDICINE

## 2018-04-10 PROCEDURE — 00000146 ZZHCL STATISTIC GLUCOSE BY METER IP

## 2018-04-10 PROCEDURE — A9270 NON-COVERED ITEM OR SERVICE: HCPCS | Mod: GY | Performed by: PHYSICIAN ASSISTANT

## 2018-04-10 PROCEDURE — 40000986 XR CHEST PORT 1 VW

## 2018-04-10 PROCEDURE — 99207 ZZC CDG-MDM COMPONENT: MEETS LOW - DOWN CODED: CPT | Performed by: INTERNAL MEDICINE

## 2018-04-10 PROCEDURE — 71045 X-RAY EXAM CHEST 1 VIEW: CPT

## 2018-04-10 PROCEDURE — 36569 INSJ PICC 5 YR+ W/O IMAGING: CPT

## 2018-04-10 RX ORDER — HEPARIN SODIUM,PORCINE 10 UNIT/ML
2-5 VIAL (ML) INTRAVENOUS
Status: DISCONTINUED | OUTPATIENT
Start: 2018-04-10 | End: 2018-04-15 | Stop reason: HOSPADM

## 2018-04-10 RX ORDER — WARFARIN SODIUM 4 MG/1
4 TABLET ORAL
Status: COMPLETED | OUTPATIENT
Start: 2018-04-10 | End: 2018-04-10

## 2018-04-10 RX ORDER — FUROSEMIDE 40 MG
80 TABLET ORAL DAILY
Status: DISCONTINUED | OUTPATIENT
Start: 2018-04-10 | End: 2018-04-11

## 2018-04-10 RX ORDER — LIDOCAINE 40 MG/G
CREAM TOPICAL
Status: DISCONTINUED | OUTPATIENT
Start: 2018-04-10 | End: 2018-04-15 | Stop reason: HOSPADM

## 2018-04-10 RX ADMIN — HALOPERIDOL LACTATE 2 MG: 5 INJECTION, SOLUTION INTRAMUSCULAR at 04:19

## 2018-04-10 RX ADMIN — ATENOLOL 50 MG: 50 TABLET ORAL at 08:21

## 2018-04-10 RX ADMIN — SENNOSIDES AND DOCUSATE SODIUM 2 TABLET: 8.6; 5 TABLET ORAL at 20:49

## 2018-04-10 RX ADMIN — SUCRALFATE 1 G: 1 TABLET ORAL at 22:17

## 2018-04-10 RX ADMIN — ACETAMINOPHEN 650 MG: 325 TABLET ORAL at 22:40

## 2018-04-10 RX ADMIN — INSULIN ASPART 2 UNITS: 100 INJECTION, SOLUTION INTRAVENOUS; SUBCUTANEOUS at 13:31

## 2018-04-10 RX ADMIN — FUROSEMIDE 80 MG: 40 TABLET ORAL at 08:20

## 2018-04-10 RX ADMIN — GABAPENTIN 300 MG: 300 CAPSULE ORAL at 08:21

## 2018-04-10 RX ADMIN — SUCRALFATE 1 G: 1 TABLET ORAL at 19:04

## 2018-04-10 RX ADMIN — TAMSULOSIN HYDROCHLORIDE 0.4 MG: 0.4 CAPSULE ORAL at 19:44

## 2018-04-10 RX ADMIN — SENNOSIDES AND DOCUSATE SODIUM 2 TABLET: 8.6; 5 TABLET ORAL at 08:20

## 2018-04-10 RX ADMIN — LISINOPRIL 5 MG: 5 TABLET ORAL at 08:21

## 2018-04-10 RX ADMIN — SIMETHICONE CHEW TAB 80 MG 160 MG: 80 TABLET ORAL at 00:33

## 2018-04-10 RX ADMIN — MICONAZOLE NITRATE: 2 POWDER TOPICAL at 08:19

## 2018-04-10 RX ADMIN — Medication 1 MG: at 00:31

## 2018-04-10 RX ADMIN — CEFAZOLIN SODIUM 2 G: 2 INJECTION, SOLUTION INTRAVENOUS at 22:27

## 2018-04-10 RX ADMIN — GABAPENTIN 300 MG: 300 CAPSULE ORAL at 20:49

## 2018-04-10 RX ADMIN — SUCRALFATE 1 G: 1 TABLET ORAL at 08:21

## 2018-04-10 RX ADMIN — SUCRALFATE 1 G: 1 TABLET ORAL at 11:50

## 2018-04-10 RX ADMIN — CEFAZOLIN SODIUM 2 G: 2 INJECTION, SOLUTION INTRAVENOUS at 14:11

## 2018-04-10 RX ADMIN — OMEPRAZOLE 20 MG: 20 CAPSULE, DELAYED RELEASE ORAL at 08:20

## 2018-04-10 RX ADMIN — SIMETHICONE CHEW TAB 80 MG 160 MG: 80 TABLET ORAL at 10:46

## 2018-04-10 RX ADMIN — WARFARIN SODIUM 4 MG: 4 TABLET ORAL at 19:04

## 2018-04-10 RX ADMIN — LEVOTHYROXINE SODIUM 100 MCG: 100 TABLET ORAL at 08:21

## 2018-04-10 RX ADMIN — SIMETHICONE CHEW TAB 80 MG 160 MG: 80 TABLET ORAL at 20:48

## 2018-04-10 RX ADMIN — SIMVASTATIN 10 MG: 10 TABLET, FILM COATED ORAL at 22:17

## 2018-04-10 RX ADMIN — GLIPIZIDE 5 MG: 5 TABLET ORAL at 08:20

## 2018-04-10 RX ADMIN — ACETAMINOPHEN 650 MG: 325 TABLET ORAL at 11:50

## 2018-04-10 RX ADMIN — CEFAZOLIN SODIUM 2 G: 2 INJECTION, SOLUTION INTRAVENOUS at 06:13

## 2018-04-10 RX ADMIN — GLIPIZIDE 5 MG: 5 TABLET ORAL at 19:04

## 2018-04-10 ASSESSMENT — ACTIVITIES OF DAILY LIVING (ADL)
ADLS_ACUITY_SCORE: 18

## 2018-04-10 NOTE — PLAN OF CARE
Problem: Patient Care Overview  Goal: Plan of Care/Patient Progress Review  Outcome: No Change  Pt A&O to self only, up with assist of 1 to bedside commode.  +4-3 edema in lower extremities, lasix changed from IV to oral.  Pt incontient of bowel and bladder.  PICC on right, continue IV Ancef for positive blood cultures.  Tele Afib CVR, pt on coumadin, INR 2.68.

## 2018-04-10 NOTE — PLAN OF CARE
"Problem: Patient Care Overview  Goal: Plan of Care/Patient Progress Review  Outcome: No Change  Orientation: Alert w/cares, disoriented x3, oriented to self only. Awake, alert, cooperative, no apparent distress.     VSS: /66 (BP Location: Left arm)  Pulse 95  Temp 97.8  F (36.6  C) (Oral)  Resp 20  Ht 1.676 m (5' 6\")  Wt 103.9 kg (229 lb)  SpO2 94%  BMI 36.96 kg/m2. Oxygen saturation 94% on 0.5 LPM. On NC.   IVF: Left PIV SL, Right PICC, bruising noted, Pt c/o pain at PICC site, Measures 31.5 cm, no edema noted, Ice applied  Tele: Afib CVR, HR 60s.   LS: clear and equal bilaterally. Clear to auscultation bilaterally but diminished throughout, air movement is fair at best.  No wheezing  GI: Passing gas. No BM. Denies N/V.  Normal bowel sounds, soft, non-distended, non-tender  : incontinent,  Adequate urine output. Clear yellow.   Skin: bruising noted, BLE ruddiness, dry cracked, Left lower leg has scabs, Groin folds nonblanchable redness, barrier cream applied, scrotum non blanchable redness, Skin otherwise intact.  dry to touch  Activity: SBA to assist of 1. Pt slept comfortably throughout shift. Pt confused and agitated, calling out, PRN haldol given  Pain: c/o of pain when changing underpad, scrotum is sore to touch. No PRN interventions utilized. Pt sleeping.   DC Plan: Continue with current cares.         "

## 2018-04-10 NOTE — PROGRESS NOTES
"BRIEF NUTRITION ASSESSMENT    REASON FOR ASSESSMENT:  LOS  Hx of ROXY grissom on warfarin, lymphedema, CAD, HTN, DMII, dementia, and HLD     CURRENT DIET AND NOURISHMENT ORDER:  Information obtained from chart review - patient with PICC team during attempted visit  Patient is on a ?regular diet at home  Patient is consuming the following supplements at home:  Food allergies/intolerances: NKFA  Patient familiar to [facility] nutrition from May 2017 admit - PN reliance    Diet: 2 gram sodium, moderate consistent carb  Current Intake/Tolerance: Per flow sheet review, % intake for documented meals.    ANTHROPOMETRICS  Height: 5' 6\"  Weight: 105.3 kg  Body mass index is 37.48 kg/(m^2).  Weight Status: Obesity Grade II BMI 35-39.9  IBW: 64.5 kg (140 lb 10.5 oz) +/- 10%  % IBW: 163%  Weight History: Weight has increased in last year, as noted below  Wt Readings from Last 10 Encounters:   04/10/18 105.3 kg (232 lb 3.2 oz)   05/28/17 99.3 kg (218 lb 14.4 oz)   10/28/14 104.6 kg (230 lb 11.2 oz)     Dosing Weight: 75 kg AdjBW    ASSESSED NUTRITION NEEDS:  Estimated Energy Needs: 4708-1651 kcals (25-30 Kcal/Kg)  Justification: obese  Estimated Protein Needs: 75-90+ grams protein (1-1.2 g pro/Kg)  Justification: preservation of lean body mass  Estimated Fluid Needs: >1 mL/Kcal  Justification: maintenance    LABS/MEDS/PHYSICAL FINDINGS:  Labs reviewed  Meds reviewed  Unable to complete nutrition focused physical exam    Malnutrition:  Unable to determine with incomplete diet/wt hx, nutrition focused physical exam    INTERVENTION:  Nutrition Diagnosis:  Predicted sub optimal nutrient intake (protein energy) related to prolonged hospitalization, variable PO of % intake for meals in past 7 days    Implementation:  Nutrition Education: Per MD order  Collaboration and Referral of care: Discussed patient during interdisciplinary care rounds this morning    Follow Up/Monitoring:   Progress towards goals will be monitored and evaluated " per protocol and Practice Guidelines        Kenia Larson RDN, LD, CNSC  Pager - 3rd floor/ICU: [phone]  Pager - All other floors: [phone]  Pager - Weekend/holiday: [phone]  Office: [phone]

## 2018-04-10 NOTE — CONSULTS
SWS:  SW consult to assist with dc planning and possible TCU placement. AMANDA notified that pt son Luciano is the contact and should be contacted re: TCU placement. SW spoke with Luciano and discussed TCU placement at dc. Luciano reports that pt has been to [facility] in the past and plans to discuss TCU placement with his siblings tonight and will contact this writer tomorrow with final dc plan. AMANDA will complete consult and will continue to follow.

## 2018-04-10 NOTE — PROVIDER NOTIFICATION
Prior to the start of the PICC procedure and with procedural staff participation, I verbally confirmed the patient s identity using two indicators, relevant allergies, that the procedure was appropriate and matched the consent or emergent situation, and that the correct equipment/implants were available. Immediately prior to starting the procedure I conducted the Time Out with the procedural staff and re-confirmed the patient s name, procedure, and site/side. (The Joint Commission universal protocol was followed.)  Yes    Sedation (Moderate or Deep): None    Time out Saida Florence LPN

## 2018-04-10 NOTE — PROGRESS NOTES
[facility]  Hospitalist Progress Note  Nirmal Serrano MD 04/10/18    Reason for Stay (Diagnosis): bacteremia, cellulitis, lymphedema         Assessment and Plan:      Summary of Stay: Ton Bagley is a 89 year old male with pmhx of A. fib on warfarin, lymphedema, CAD, HTN, DMII, dementia, and HLD admitted on 4/3/2018 after a fall.  Head CT negative for acute bleed.  Labs notable for an PRADEEP and leukocytosis.  He was hypotensive initially and elevated lactate.  He was given fluid boluses and developed some hypoxia in the ED.  He was started on vancomycin and cefepime for sepsis.  Blood cultures grew MSSA and strep mitis.  Antibiotics narrowed to cefazolin per ID who were consulted.  PT and OT consulted due to falls.  Leukocytosis and fevers resolved.  TTE showing aortic stenosis. Persistent hypoxia and weight up so changed to IV diuretic 4/8.  Now with signs of contraction alkalosis so slowing down diuresis.  Anticipate patient will need TCU and social work assisting with this process.  Likely medically stable tomorrow if TCU found.    Problem List/Assessment and Plan:   Sepsis due to MSSA bacteremia and strep mitis (cx from 4/3) and LLE cellulitis: Positive blood cultures for MSSA and strep mitis on 4/3.  Portal of entry for bacteria is not completely clear but may be skin related.  He has severe venous stasis of his lower legs bilaterally.  His left leg is erythematous so suspect cellulitis.  Leukocytosis and fever resolved.  Did have elevated lactic acid that has since resolved.  -Continue on cefazolin for 4 weeks total.  Infectious disease team consulting and put in discharge order for patient  -Patient pulled PICC line, replacing today  -Repeat blood cultures from 4/4 and 4/5 did not grow anything      Fall: Fell when walking to the bathroom.  He denies loss of consciousness.  Has had some other falls.  He is chronically on anticoagulant agent for A. Fib.  -PT and OT consultation.  I  "anticipate patient will need TCU    Acute hypoxemic respiratory failure:   Developed hypoxia in the ED following fluid boluses.  Has had ongoing need for 2-4 L of oxygen via nasal cannula.  Chest x-ray showing some atelectasis versus possible left lower lobe pneumonia.  Suspect this is more volume related as he has significant peripheral edema on exam.  -Wean oxygen, down to 1/2 L  -Change Lasix to 80 mg p.o. twice daily for gentle diuresis      Chronic A. Fib: Continue PTA warfarin, pharmacy to dose.  Atenolol 50 mg daily for rate control.  Telemetry.    Aortic stenosis: TTE showing a significantly decreased area of the aortic valve, the flow gradient is only mild.  Does not sound like his falls are syncopal in origin.  If he does have syncope or light and is walking around would consider cardiology consultation to see if this could be contributing to his falls.  Does not appear to be outflow obstruction per the echocardiogram read.    Lymphedema: Chronically on Lasix 60 mg morning and 30 mg afternoon.  Weight up 7 pounds here and 2-3+ tense lower extremity edema bilaterally.  Also, hypoxic and some crackles more on the left which may be pulmonary edema.  Weight now coming down with IV diuretic, but I suspect we have a ways to go.  -Change Lasix to 80 mg p.o. twice daily for gentle diuresis  -Daily weights and strict intake output  -sodium restriction      Type II DM: PTA on glipizide 5 mg twice daily.  Blood glucose well controlled here.  -Continue PTA glipizide  -Sliding scale insulin      HTN: PTA on lisinopril 5 mg daily, atenolol grams daily, and Lasix 60 mg morning and 30 mg afternoon.  Lisinopril and Lasix initially held due to PRADEEP.  -continue atenolol and lisinopril    Abdominal pain: Patient endorses diffuse abdominal cramps and often feels like he has to have a bowel movement.  Ongoing issue and son informed nursing that the patient has a \"bum stomach.\"  He is having multiple bowel movements that are " formed daily.  -Continue Prilosec  -Trial of Carafate 4 times daily, seems to be working.      Back pain:  Low back pain after fall.    -monitor and if ongoing pain would image      Dementia: Unclear severity, but lives at home with spouse.  Memory seems quite poor here and needs frequent reorientation.      PRADEEP: Creatinine peak of 1.7.  Baseline appears to be 0.9-1.  Suspect secondary to bacteremia and Lasix/lisinopril use.  Resolved with IV fluids and holding lisinopril and diuretic initially.  -Follow BMP with diuresis      HLD  Continue statin      Hypothyroidism  Continue levothyroxin      GERD  Continue omeprazole    # Pain Assessment:  Current Pain Score 4/10/2018   Patient currently in pain? yes   Pain score (0-10) -   Pain location Leg   Pain descriptors Aching   - Ton is experiencing pain due to back pain. Pain management was discussed and the plan was created in a collaborative fashion.  Ton's response to the current recommendations: engaged  -Tylenol as needed  -Oxycodone 5 mg every 3 hours as needed    DVT Prophylaxis: Warfarin  Code Status: DNR / DNI.  This was discussed on admission  FEN: Consistent carbohydrate medium and 2 g sodium restriction  Discharge Dispo:  PT, OT, social work consult.  Based on weakness, confusion, extended course of IV antibiotics to think patient will need to TCU and that his spouse will not be able to manage all of his cares at home.  Discussed this with social work and family members.    Estimated Disch Date / # of Days until Disch: Likely medically ready for discharge tomorrow if TCU found.  Has PICC being replaced for extended IV antibiotic course        Interval History (Subjective):      Confused overnight.  Alert during the day today, but requires frequent reorientation.  He accidentally pulled out his PICC line.  He denies having any pain currently.  No shortness of breath.    Updated family members on plan of care today in room.                  Physical  "Exam:      Last Vital Signs:  /75 (BP Location: Left arm)  Pulse 95  Temp 96.3  F (35.7  C) (Axillary)  Resp 20  Ht 1.676 m (5' 6\")  Wt 105.3 kg (232 lb 3.2 oz)  SpO2 (!) 85%  BMI 37.48 kg/m2      Intake/Output Summary (Last 24 hours) at 04/10/18 1426  Last data filed at 04/10/18 1400   Gross per 24 hour   Intake              540 ml   Output              250 ml   Net              290 ml     Constitutional: Awake, NAD   HEENT:   MMM  Respiratory: No crackles.  No wheeze  Cardiovascular: Irregularly, irregular rhythm.  Regular rate.  2/6 systolic murmur  GI: Nontender to palpation,  not distended, bowel sounds present  Skin: Chronic venous stasis changes of lower extremities bilaterally, left leg somewhat red laterally  Musculoskeletal/extremities: 2+ edema right leg, 3+ edema left leg  Neurologic: Alert, confused, not oriented to place or date.  Psychiatric: calm          Medications:      All current medications were reviewed with changes reflected in problem list.         Data:      All new lab and imaging data was reviewed.   Labs:       Lab Results   Component Value Date     04/10/2018    Lab Results   Component Value Date    CHLORIDE 96 04/10/2018    Lab Results   Component Value Date    BUN 26 04/10/2018      Lab Results   Component Value Date    POTASSIUM 3.4 04/10/2018    Lab Results   Component Value Date    CO2 36 04/10/2018    Lab Results   Component Value Date    CR 1.01 04/10/2018          Lab Results   Component Value Date    INR 2.40 (H) 04/10/2018    INR 2.68 (H) 04/09/2018    INR 2.46 (H) 04/08/2018        Imaging:   None today      Nirmal Serrano MD      "

## 2018-04-10 NOTE — PLAN OF CARE
Problem: Patient Care Overview  Goal: Plan of Care/Patient Progress Review  PT- attempted to see was still soundly sleeping. Will try later as able

## 2018-04-10 NOTE — PLAN OF CARE
Problem: Patient Care Overview  Goal: Plan of Care/Patient Progress Review  Outcome: No Change  RN - VSS. Tele remains Afib with CVR. O2 saturation <90% on room air - maintaining adequate saturation with small amount of supplemental O2. Pt denying SOB - Lung sounds with crackles in bases. A&O to self only. Cooperative with cares. Pt c/o ongoing abdominal cramping. Utilizing PRN tylenol and simethicone - unsure if effective. Pt up to chair x2 with assist of 1. Incontinent of urine and stool x1. Using bedside commode and able to void have a subsequent soft bowel movement. Approximately 1230 pt observed to have removed own PICC line. Catheter intact. Receiving orders to replace. Pt still has PIV in left forearm. Tolerating PO intake with fair/poor appetite. BG controlled via sliding scale. BLE remain edematous and issa. Groin/Abdominal folds also erythemic - nystatin powder applied as ordered. Sleeping between cares throughout the day - receiving IV haldol very early AM. Planning for eventual discharge to TCU for strength building and IV antibiotics.

## 2018-04-10 NOTE — PLAN OF CARE
Problem: Patient Care Overview  Goal: Plan of Care/Patient Progress Review  OT: Attempted session, pt resting in bed, minimally opening eyes, stated he needs to rest more, per chart pt did receive haldol during the night, will re-attempt later in day as schedule allows

## 2018-04-11 LAB
ANION GAP SERPL CALCULATED.3IONS-SCNC: 2 MMOL/L (ref 3–14)
BACTERIA SPEC CULT: NO GROWTH
BUN SERPL-MCNC: 22 MG/DL (ref 7–30)
CALCIUM SERPL-MCNC: 8.7 MG/DL (ref 8.5–10.1)
CHLORIDE SERPL-SCNC: 97 MMOL/L (ref 94–109)
CO2 SERPL-SCNC: 40 MMOL/L (ref 20–32)
CREAT SERPL-MCNC: 0.96 MG/DL (ref 0.66–1.25)
GFR SERPL CREATININE-BSD FRML MDRD: 74 ML/MIN/1.7M2
GLUCOSE BLDC GLUCOMTR-MCNC: 132 MG/DL (ref 70–99)
GLUCOSE BLDC GLUCOMTR-MCNC: 145 MG/DL (ref 70–99)
GLUCOSE SERPL-MCNC: 129 MG/DL (ref 70–99)
INR PPP: 2.66 (ref 0.86–1.14)
Lab: NORMAL
POTASSIUM SERPL-SCNC: 3.5 MMOL/L (ref 3.4–5.3)
SODIUM SERPL-SCNC: 139 MMOL/L (ref 133–144)
SPECIMEN SOURCE: NORMAL

## 2018-04-11 PROCEDURE — A9270 NON-COVERED ITEM OR SERVICE: HCPCS | Mod: GY | Performed by: INTERNAL MEDICINE

## 2018-04-11 PROCEDURE — 25000132 ZZH RX MED GY IP 250 OP 250 PS 637: Mod: GY | Performed by: INTERNAL MEDICINE

## 2018-04-11 PROCEDURE — A9270 NON-COVERED ITEM OR SERVICE: HCPCS | Mod: GY | Performed by: PHYSICIAN ASSISTANT

## 2018-04-11 PROCEDURE — 25000128 H RX IP 250 OP 636: Performed by: INTERNAL MEDICINE

## 2018-04-11 PROCEDURE — 80048 BASIC METABOLIC PNL TOTAL CA: CPT | Performed by: PHYSICIAN ASSISTANT

## 2018-04-11 PROCEDURE — 99232 SBSQ HOSP IP/OBS MODERATE 35: CPT | Performed by: INTERNAL MEDICINE

## 2018-04-11 PROCEDURE — 00000146 ZZHCL STATISTIC GLUCOSE BY METER IP

## 2018-04-11 PROCEDURE — 99207 ZZC CDG-MDM COMPONENT: MEETS LOW - DOWN CODED: CPT | Performed by: INTERNAL MEDICINE

## 2018-04-11 PROCEDURE — 12000007 ZZH R&B INTERMEDIATE

## 2018-04-11 PROCEDURE — 25000132 ZZH RX MED GY IP 250 OP 250 PS 637: Mod: GY | Performed by: PHYSICIAN ASSISTANT

## 2018-04-11 PROCEDURE — 85610 PROTHROMBIN TIME: CPT | Performed by: PHYSICIAN ASSISTANT

## 2018-04-11 RX ORDER — WARFARIN SODIUM 3 MG/1
3 TABLET ORAL
Status: COMPLETED | OUTPATIENT
Start: 2018-04-11 | End: 2018-04-11

## 2018-04-11 RX ADMIN — GABAPENTIN 300 MG: 300 CAPSULE ORAL at 21:26

## 2018-04-11 RX ADMIN — ATENOLOL 50 MG: 50 TABLET ORAL at 09:20

## 2018-04-11 RX ADMIN — SUCRALFATE 1 G: 1 TABLET ORAL at 21:26

## 2018-04-11 RX ADMIN — SUCRALFATE 1 G: 1 TABLET ORAL at 12:19

## 2018-04-11 RX ADMIN — LEVOTHYROXINE SODIUM 100 MCG: 100 TABLET ORAL at 09:20

## 2018-04-11 RX ADMIN — TAMSULOSIN HYDROCHLORIDE 0.4 MG: 0.4 CAPSULE ORAL at 19:34

## 2018-04-11 RX ADMIN — CEFAZOLIN SODIUM 2 G: 2 INJECTION, SOLUTION INTRAVENOUS at 21:25

## 2018-04-11 RX ADMIN — SENNOSIDES AND DOCUSATE SODIUM 2 TABLET: 8.6; 5 TABLET ORAL at 21:26

## 2018-04-11 RX ADMIN — OXYCODONE HYDROCHLORIDE 5 MG: 5 TABLET ORAL at 23:34

## 2018-04-11 RX ADMIN — WARFARIN SODIUM 3 MG: 3 TABLET ORAL at 17:12

## 2018-04-11 RX ADMIN — SENNOSIDES AND DOCUSATE SODIUM 2 TABLET: 8.6; 5 TABLET ORAL at 09:20

## 2018-04-11 RX ADMIN — CEFAZOLIN SODIUM 2 G: 2 INJECTION, SOLUTION INTRAVENOUS at 05:01

## 2018-04-11 RX ADMIN — SUCRALFATE 1 G: 1 TABLET ORAL at 06:10

## 2018-04-11 RX ADMIN — SUCRALFATE 1 G: 1 TABLET ORAL at 15:45

## 2018-04-11 RX ADMIN — GABAPENTIN 300 MG: 300 CAPSULE ORAL at 09:20

## 2018-04-11 RX ADMIN — OMEPRAZOLE 20 MG: 20 CAPSULE, DELAYED RELEASE ORAL at 09:20

## 2018-04-11 RX ADMIN — MICONAZOLE NITRATE: 2 POWDER TOPICAL at 03:23

## 2018-04-11 RX ADMIN — GLIPIZIDE 5 MG: 5 TABLET ORAL at 15:45

## 2018-04-11 RX ADMIN — GLIPIZIDE 5 MG: 5 TABLET ORAL at 09:20

## 2018-04-11 RX ADMIN — LISINOPRIL 5 MG: 5 TABLET ORAL at 09:20

## 2018-04-11 RX ADMIN — CEFAZOLIN SODIUM 2 G: 2 INJECTION, SOLUTION INTRAVENOUS at 13:22

## 2018-04-11 RX ADMIN — Medication 1 MG: at 22:41

## 2018-04-11 RX ADMIN — SIMVASTATIN 10 MG: 10 TABLET, FILM COATED ORAL at 21:26

## 2018-04-11 ASSESSMENT — ACTIVITIES OF DAILY LIVING (ADL)
ADLS_ACUITY_SCORE: 18
ADLS_ACUITY_SCORE: 19
ADLS_ACUITY_SCORE: 18
ADLS_ACUITY_SCORE: 19
ADLS_ACUITY_SCORE: 18
ADLS_ACUITY_SCORE: 18

## 2018-04-11 NOTE — PLAN OF CARE
Problem: Patient Care Overview  Goal: Plan of Care/Patient Progress Review  PT-attempted to see was eating lunch will try to see later.

## 2018-04-11 NOTE — PROGRESS NOTES
SWS:  SW received message from pt son Luciano requesting SW send TCU referral to [facility]. SW sent referral and will continue to follow.

## 2018-04-11 NOTE — PLAN OF CARE
Problem: Patient Care Overview  Goal: Plan of Care/Patient Progress Review  Outcome: No Change  Pt alert to self, up with assist of 1 and walker.  VSS, 92% on 1/2L NC.  Tele afib SVR, HR 58, 9 pauses noted on shift, longest was 2.6, asymptomatic, MD notified via text page.  PT/OT following, plan is to go to TCU with PICC.  Incont. At times.  Was very lethargic for first half of shift and now is very alert.  Will continue POC.

## 2018-04-11 NOTE — PROGRESS NOTES
[facility]  Infectious Disease Progress Note          Assessment and Plan:   IMPRESSION:   1.  A 89-year-old male with 2 falls in the last week, some weakness and sleepiness for 2-3 days, cause of this syndrome is now apparent with blood cultures positive for methicillin-sensitive Staph aureus, the patient has methicillin-sensitive Staph aureus bacteremia syndrome, no clear secondary site, possibly left leg as source, although leg does not look like cellulitis to me more stasis with one small wound as a possible entry site.   2.  Low back pain, not obvious tenderness, conceivable secondary site of infection, but also possibly just related to fall.   3.  Diabetes mellitus.   4.  Atrial fibrillation.   5.  Mild dementia.   6.  Venous stasis disease without much history of major infection process, some wound formation in his left leg, recurrently.       RECOMMENDATIONS:   1.  Continue Ancef, improved acute renal insufficiency, increased dose   2.   blood cultures  FU neg, no secondary sites  3.  Transthoracic echocardiogram abnormal, but no obvious infection, would hold off on a transesophageal echocardiogram, patient will require a 4-week course of antibiotics, regardless would only do a GAVIN if we are not clearing the blood.  .      4.  Looks better only 1 cx +, no obvious secondary sites  5 OK PICC, orders in for ancef ? Rehab any time          Interval History:   no new complaints and doing well; no cp, sob, n/v/d, or abd pain. Looks better cx as above              Medications:       warfarin  3 mg Oral ONCE at 18:00     sucralfate  1 g Oral 4x Daily AC & HS     sodium chloride (PF)  3 mL Intracatheter Q8H     ceFAZolin  2 g Intravenous Q8H     sodium chloride (PF)  10 mL Intracatheter Q7 Days     lisinopril  5 mg Oral Daily     atenolol  50 mg Oral Daily     gabapentin (NEURONTIN) capsule 300 mg  300 mg Oral BID     glipiZIDE  5 mg Oral BID AC     levothyroxine  100 mcg Oral Daily      "omeprazole  20 mg Oral Daily     polyethylene glycol  17 g Oral Daily     senna-docusate  2 tablet Oral BID     simvastatin  10 mg Oral At Bedtime     tamsulosin  0.4 mg Oral QPM                  Physical Exam:   Blood pressure 130/60, pulse 95, temperature 97.4  F (36.3  C), temperature source Oral, resp. rate 18, height 1.676 m (5' 6\"), weight 100.8 kg (222 lb 3.2 oz), SpO2 97 %.  Wt Readings from Last 2 Encounters:   04/11/18 100.8 kg (222 lb 3.2 oz)   05/28/17 99.3 kg (218 lb 14.4 oz)     Vital Signs with Ranges  Temp:  [96.8  F (36  C)-97.8  F (36.6  C)] 97.4  F (36.3  C)  Heart Rate:  [62-68] 68  Resp:  [16-20] 18  BP: (130-146)/(60-66) 130/60  SpO2:  [89 %-97 %] 97 %    Constitutional: Awake, alert, cooperative, no apparent distress   Lungs: Clear to auscultation bilaterally, no crackles or wheezing   Cardiovascular: Regular rate and rhythm, normal S1 and S2, and no murmur noted   Abdomen: Normal bowel sounds, soft, non-distended, non-tender   Skin: No rashes, no cyanosis, same edema no emboli  Mostly stasis   Other:           Data:   All microbiology laboratory data reviewed.  Recent Labs   Lab Test  04/08/18   0945  04/06/18   0722  04/05/18   0651   WBC  8.0  8.7  8.9   HGB  12.2*  12.5*  11.6*   HCT  38.8*  39.6*  38.0*   MCV  101*  100  104*   PLT  187  120*  120*     Recent Labs   Lab Test  04/11/18   0500  04/10/18   0437  04/09/18   0550   CR  0.96  1.01  1.03     No lab results found.  Recent Labs   Lab Test  04/06/18   0721  04/05/18   0650  04/04/18   1604  04/03/18   1252  04/03/18   1204  05/14/17   1052  05/13/17   2043   CULT  No growth after 5 days  No growth  No growth  Cultured on the 1st day of incubation:  Staphylococcus aureus  *  Critical Value/Significant Value, preliminary result only, called to and read back by  Nell Valerio RN at 0952 4/4/18 [id number]    Cultured on the 1st day of incubation:  Streptococcus mitis group  *  Critical Value/Significant Value called to and read back " by  Tamanna Evans RN at 1015 on [id number]    (Note)  POSITIVE for STAPHYLOCOCCUS AUREUS and NEGATIVE for the mecA gene  (not MRSA) by [facility] multiplex nucleic acid test. The mecA gene was  not detected. Final identification and antimicrobial susceptibility  testing will be verified by standard methods.    Specimen tested with [facility] multiplex, gram-positive blood culture  nucleic acid test for the following targets: Staph aureus, Staph  epidermidis, Staph lugdunensis, other Staph species, Enterococcus  faecalis, Enterococcus faecium, Streptococcus species, S. agalactiae,  S. anginosus grp., S. pneumoniae, S. pyogenes, Listeria sp., mecA  (methicillin resistance) and Renetta/B (vancomycin resistance).    Critical Value/Significant Value called to and read back by  Komal Mckeon RN @7868 04/04/18 gd    No growth  No anaerobes isolated  No growth  <10,000 colonies/mL mixed urogenital conner Susceptibility testing not routinely   done

## 2018-04-11 NOTE — PROGRESS NOTES
[facility]  Hospitalist Progress Note  Nirmal Serrano MD 04/11/18    Reason for Stay (Diagnosis): bacteremia, cellulitis, lymphedema         Assessment and Plan:      Summary of Stay: Ton Bagley is a 89 year old male with pmhx of A. fib on warfarin, lymphedema, CAD, HTN, DMII, dementia, and HLD admitted on 4/3/2018 after a fall.  Head CT negative for acute bleed.  Labs notable for an PRADEEP and leukocytosis.  He was hypotensive initially and elevated lactate.  He was given fluid boluses and developed some hypoxia in the ED.  He was started on vancomycin and cefepime for sepsis.  Blood cultures grew MSSA and strep mitis.  Antibiotics narrowed to cefazolin per ID who were consulted.  PT and OT consulted due to falls.  Leukocytosis and fevers resolved.  TTE showing aortic stenosis. Persistent hypoxia and weight up so changed to IV diuretic 4/8.  Now with signs of contraction alkalosis so slowing down diuresis.  Anticipate patient will need TCU and social work assisting with this process.  Medically stable for discharge if TCU found.  Hold diuretics today due to contraction alkalosis.    Problem List/Assessment and Plan:   Sepsis due to MSSA bacteremia and strep mitis (cx from 4/3) and LLE cellulitis: Positive blood cultures for MSSA and strep mitis on 4/3.  Portal of entry for bacteria is not completely clear but may be skin related.  He has severe venous stasis of his lower legs bilaterally.  His left leg is erythematous so suspect cellulitis.  Leukocytosis and fever resolved.  Did have elevated lactic acid that has since resolved.  -Continue on cefazolin for 4 weeks total.  Infectious disease team consulting and put in discharge order for patient  -Patient pulled PICC line 4/10 but was replaced same day  -Repeat blood cultures from 4/4 and 4/5 did not grow anything      Fall: Fell when walking to the bathroom.  He denies loss of consciousness.  Has had some other falls.  He is chronically on  "anticoagulant agent for A. Fib.  -PT and OT consultation.  I anticipate patient will need TCU    Acute hypoxemic respiratory failure:   Developed hypoxia in the ED following fluid boluses.  Chest x-ray showing some atelectasis versus possible left lower lobe pneumonia.  Suspect this is more volume related as he has significant peripheral edema on exam.  Have been able to wean oxygen with diuresis.  -Wean oxygen, on room air during day today  -Hold diuretic due to contraction alkalosis      Chronic A. Fib: Continue PTA warfarin, pharmacy to dose.  Atenolol 50 mg daily for rate control.  Telemetry.    Aortic stenosis: TTE showing a significantly decreased area of the aortic valve, the flow gradient is only mild.  Does not sound like his falls are syncopal in origin.  If he does have syncope or light and is walking around would consider cardiology consultation to see if this could be contributing to his falls.  Does not appear to be outflow obstruction per the echocardiogram read.    Lymphedema: Chronically on Lasix 60 mg morning and 30 mg afternoon.  Peak weight while here 237 pounds.  2-3+ tense lower extremity edema bilaterally at worst.  Weight down to 222 pounds with diuresis.  -Hold diuretic today due to contraction alkalosis  -Daily weights and strict intake output  -sodium restriction      Type II DM: PTA on glipizide 5 mg twice daily.  Blood glucose well controlled here.  -Continue PTA glipizide  -Can stop frequent blood sugar checks and sliding scale insulin as blood sugars have been 100-150 while here      HTN: PTA on lisinopril 5 mg daily, atenolol grams daily, and Lasix 60 mg morning and 30 mg afternoon.  Lisinopril and Lasix initially held due to PRADEEP.  -continue atenolol and lisinopril    Abdominal pain: Patient endorses diffuse abdominal cramps and often feels like he has to have a bowel movement.  Ongoing issue and son informed nursing that the patient has a \"bum stomach.\"  He is having multiple bowel " movements that are formed daily.  -Continue Prilosec  -Trial of Carafate 4 times daily, seems to be working.      Back pain:  Low back pain after fall.    -monitor and if ongoing pain would image      Dementia: Unclear severity, but lives at home with spouse.  Memory seems quite poor here and needs frequent reorientation.      PRADEEP: Creatinine peak of 1.7.  Baseline appears to be 0.9-1.  Suspect secondary to bacteremia and Lasix/lisinopril use.  Resolved with IV fluids and holding lisinopril and diuretic initially.  -Follow BMP with diuresis      HLD  Continue statin      Hypothyroidism  Continue levothyroxin      GERD  Continue omeprazole    # Pain Assessment:  Current Pain Score 4/11/2018   Patient currently in pain? sleeping: patient not able to self report   Pain score (0-10) -   Pain location -   Pain descriptors -   - Ton is experiencing pain due to back pain. Pain management was discussed and the plan was created in a collaborative fashion.  Ton's response to the current recommendations: engaged  -Tylenol as needed  -Oxycodone 5 mg every 3 hours as needed    DVT Prophylaxis: Warfarin  Code Status: DNR / DNI.  This was discussed on admission  FEN: Consistent carbohydrate medium and 2 g sodium restriction  Discharge Dispo:  PT, OT, social work consult.  Based on weakness, confusion, extended course of IV antibiotics to think patient will need to TCU and that his spouse will not be able to manage all of his cares at home.  Discussed this with social work and family members.    Estimated Disch Date / # of Days until Disch: Medically stable for discharge when TCU found.  Has PICC being replaced for extended IV antibiotic course        Interval History (Subjective):      Intermittent confusion needing reorientation.  Weight down with diuretics.  Denies pain for me today.  Denies shortness of breath and was on room air when I saw him.                  Physical Exam:      Last Vital Signs:  /60 (BP  "Location: Left arm)  Pulse 95  Temp 97.4  F (36.3  C) (Oral)  Resp 18  Ht 1.676 m (5' 6\")  Wt 100.8 kg (222 lb 3.2 oz)  SpO2 97%  BMI 35.86 kg/m2    Intake/Output Summary (Last 24 hours) at 04/11/18 1448  Last data filed at 04/10/18 1900   Gross per 24 hour   Intake              240 ml   Output                0 ml   Net              240 ml     Constitutional: Awake, NAD   HEENT:   MMM  Respiratory: No crackles.  No wheeze  Cardiovascular: Irregularly, irregular rhythm.  Regular rate.  2/6 systolic murmur  GI: Nontender to palpation,  not distended, bowel sounds present  Skin: Chronic venous stasis changes of lower extremities bilaterally, left leg somewhat red laterally  Musculoskeletal/extremities: 1-2+ edema right leg, 2-3+ edema left leg  Neurologic: Alert, confused, not oriented to place or date.  Psychiatric: calm          Medications:      All current medications were reviewed with changes reflected in problem list.         Data:      All new lab and imaging data was reviewed.   Labs:       Lab Results   Component Value Date     04/11/2018    Lab Results   Component Value Date    CHLORIDE 97 04/11/2018    Lab Results   Component Value Date    BUN 22 04/11/2018      Lab Results   Component Value Date    POTASSIUM 3.5 04/11/2018    Lab Results   Component Value Date    CO2 40 04/11/2018    Lab Results   Component Value Date    CR 0.96 04/11/2018        Lab Results   Component Value Date    INR 2.66 (H) 04/11/2018    INR 2.40 (H) 04/10/2018    INR 2.68 (H) 04/09/2018      Imaging:   None today      Nirmal Serrano MD      "

## 2018-04-11 NOTE — PLAN OF CARE
Problem: Patient Care Overview  Goal: Plan of Care/Patient Progress Review  Outcome: Improving  Patient appears to have slight improvement in mentation. States I know I am in the hospital because it is written on my cup. Calm cooperative asking frequent questions regarding his hospital stay. Surprised that he has been here a week. Up to toilet with assist of one and walkerbut incontinent of urine. Lower ext red and issa. 2+edema to right and 3+ edema to left lower est. Tele SB Crackles of bilat lung bases. Attempted to wean of O2 this am. Sat held at 92% on room air for about and hour then dropped to 89% so O2 re applied at 1 liter.

## 2018-04-11 NOTE — PROGRESS NOTES
X-cover    Called for AF with SVR and multiple pauses all < 2.3 seconds.  Lowest HR in the 50's    AF is chronic diagnosis.  On atenolol 50 mg for rate control.  No episodes of RVR throughout hospitalization of 7 days.     Cont current atenolol dosing, cont with tele monitoring.  SVR transient on review of telemetry

## 2018-04-11 NOTE — PLAN OF CARE
"Problem: Patient Care Overview  Goal: Plan of Care/Patient Progress Review  Outcome: No Change  Orientation: Alert w/cares disoriented to time, place and situation. Awake, alert, cooperative, no apparent distress.    VSS: /66 (BP Location: Left arm)  Pulse 95  Temp 97.8  F (36.6  C) (Axillary)  Resp 20  Ht 1.676 m (5' 6\")  Wt 105.3 kg (232 lb 3.2 oz)  SpO2 93%  BMI 37.48 kg/m2. Oxygen saturation 93% on 1 LPM NC  IVF:  Left PIC SL, Right PICC SL  Tele: Afib SVR. HR 60s.   LS: clear and equal bilaterally. Clear to auscultation bilaterally but diminished throughout, No wheezing  GI: Passing gas. No BM. Denies N/V.  Normal bowel sounds, soft, non-distended, non-tender  : incontinent,  Adequate urine output. Clear yellow.   Skin: bruising noted, issa BLE, Redness to Bilateral groin folds, Skin otherwise intact. No rashes, no cyanosis, dry to touch  Activity:  assist of 2. Pt slept comfortably throughout shift.   Pain:No c/o pain. No PRN interventions utilized. Pt sleeping.   DC Plan: Continue with current cares.         "

## 2018-04-12 LAB
ANION GAP SERPL CALCULATED.3IONS-SCNC: 2 MMOL/L (ref 3–14)
BACTERIA SPEC CULT: NO GROWTH
BUN SERPL-MCNC: 20 MG/DL (ref 7–30)
CALCIUM SERPL-MCNC: 8.2 MG/DL (ref 8.5–10.1)
CHLORIDE SERPL-SCNC: 99 MMOL/L (ref 94–109)
CO2 SERPL-SCNC: 37 MMOL/L (ref 20–32)
CREAT SERPL-MCNC: 0.92 MG/DL (ref 0.66–1.25)
GFR SERPL CREATININE-BSD FRML MDRD: 77 ML/MIN/1.7M2
GLUCOSE SERPL-MCNC: 138 MG/DL (ref 70–99)
INR PPP: 2.98 (ref 0.86–1.14)
PLATELET # BLD AUTO: 202 10E9/L (ref 150–450)
POTASSIUM SERPL-SCNC: 3.3 MMOL/L (ref 3.4–5.3)
POTASSIUM SERPL-SCNC: 3.9 MMOL/L (ref 3.4–5.3)
SODIUM SERPL-SCNC: 138 MMOL/L (ref 133–144)
SPECIMEN SOURCE: NORMAL

## 2018-04-12 PROCEDURE — 85610 PROTHROMBIN TIME: CPT | Performed by: PHYSICIAN ASSISTANT

## 2018-04-12 PROCEDURE — 84132 ASSAY OF SERUM POTASSIUM: CPT | Performed by: INTERNAL MEDICINE

## 2018-04-12 PROCEDURE — 12000007 ZZH R&B INTERMEDIATE

## 2018-04-12 PROCEDURE — 25000132 ZZH RX MED GY IP 250 OP 250 PS 637: Mod: GY | Performed by: INTERNAL MEDICINE

## 2018-04-12 PROCEDURE — 99207 ZZC CDG-MDM COMPONENT: MEETS LOW - DOWN CODED: CPT | Performed by: INTERNAL MEDICINE

## 2018-04-12 PROCEDURE — 80048 BASIC METABOLIC PNL TOTAL CA: CPT | Performed by: PHYSICIAN ASSISTANT

## 2018-04-12 PROCEDURE — A9270 NON-COVERED ITEM OR SERVICE: HCPCS | Mod: GY | Performed by: PHYSICIAN ASSISTANT

## 2018-04-12 PROCEDURE — 25000132 ZZH RX MED GY IP 250 OP 250 PS 637: Mod: GY | Performed by: PHYSICIAN ASSISTANT

## 2018-04-12 PROCEDURE — 25000128 H RX IP 250 OP 636: Performed by: INTERNAL MEDICINE

## 2018-04-12 PROCEDURE — 99232 SBSQ HOSP IP/OBS MODERATE 35: CPT | Performed by: INTERNAL MEDICINE

## 2018-04-12 PROCEDURE — A9270 NON-COVERED ITEM OR SERVICE: HCPCS | Mod: GY | Performed by: INTERNAL MEDICINE

## 2018-04-12 PROCEDURE — 85049 AUTOMATED PLATELET COUNT: CPT | Performed by: PHYSICIAN ASSISTANT

## 2018-04-12 RX ORDER — WARFARIN SODIUM 3 MG/1
3 TABLET ORAL
Status: COMPLETED | OUTPATIENT
Start: 2018-04-12 | End: 2018-04-12

## 2018-04-12 RX ORDER — BENZOCAINE/MENTHOL 6 MG-10 MG
LOZENGE MUCOUS MEMBRANE 3 TIMES DAILY PRN
Status: DISCONTINUED | OUTPATIENT
Start: 2018-04-12 | End: 2018-04-15 | Stop reason: HOSPADM

## 2018-04-12 RX ADMIN — SUCRALFATE 1 G: 1 TABLET ORAL at 05:31

## 2018-04-12 RX ADMIN — POTASSIUM CHLORIDE 40 MEQ: 1500 TABLET, EXTENDED RELEASE ORAL at 09:31

## 2018-04-12 RX ADMIN — CEFAZOLIN SODIUM 2 G: 2 INJECTION, SOLUTION INTRAVENOUS at 13:56

## 2018-04-12 RX ADMIN — GABAPENTIN 300 MG: 300 CAPSULE ORAL at 09:22

## 2018-04-12 RX ADMIN — ATENOLOL 50 MG: 50 TABLET ORAL at 09:22

## 2018-04-12 RX ADMIN — HALOPERIDOL LACTATE 2 MG: 5 INJECTION, SOLUTION INTRAMUSCULAR at 02:16

## 2018-04-12 RX ADMIN — WARFARIN SODIUM 3 MG: 3 TABLET ORAL at 17:36

## 2018-04-12 RX ADMIN — LISINOPRIL 5 MG: 5 TABLET ORAL at 09:22

## 2018-04-12 RX ADMIN — GABAPENTIN 300 MG: 300 CAPSULE ORAL at 21:39

## 2018-04-12 RX ADMIN — OMEPRAZOLE 20 MG: 20 CAPSULE, DELAYED RELEASE ORAL at 09:22

## 2018-04-12 RX ADMIN — MICONAZOLE NITRATE: 2 POWDER TOPICAL at 21:38

## 2018-04-12 RX ADMIN — CEFAZOLIN SODIUM 2 G: 2 INJECTION, SOLUTION INTRAVENOUS at 05:31

## 2018-04-12 RX ADMIN — GLIPIZIDE 5 MG: 5 TABLET ORAL at 09:22

## 2018-04-12 RX ADMIN — SIMVASTATIN 10 MG: 10 TABLET, FILM COATED ORAL at 21:39

## 2018-04-12 RX ADMIN — SUCRALFATE 1 G: 1 TABLET ORAL at 11:01

## 2018-04-12 RX ADMIN — GLIPIZIDE 5 MG: 5 TABLET ORAL at 17:36

## 2018-04-12 RX ADMIN — SUCRALFATE 1 G: 1 TABLET ORAL at 21:38

## 2018-04-12 RX ADMIN — TAMSULOSIN HYDROCHLORIDE 0.4 MG: 0.4 CAPSULE ORAL at 21:39

## 2018-04-12 RX ADMIN — CEFAZOLIN SODIUM 2 G: 2 INJECTION, SOLUTION INTRAVENOUS at 21:39

## 2018-04-12 RX ADMIN — POTASSIUM CHLORIDE 20 MEQ: 1500 TABLET, EXTENDED RELEASE ORAL at 11:59

## 2018-04-12 RX ADMIN — SENNOSIDES AND DOCUSATE SODIUM 2 TABLET: 8.6; 5 TABLET ORAL at 21:55

## 2018-04-12 RX ADMIN — LEVOTHYROXINE SODIUM 100 MCG: 100 TABLET ORAL at 09:22

## 2018-04-12 RX ADMIN — SUCRALFATE 1 G: 1 TABLET ORAL at 17:36

## 2018-04-12 RX ADMIN — FUROSEMIDE 60 MG: 40 TABLET ORAL at 13:58

## 2018-04-12 ASSESSMENT — ACTIVITIES OF DAILY LIVING (ADL)
ADLS_ACUITY_SCORE: 18
ADLS_ACUITY_SCORE: 18
ADLS_ACUITY_SCORE: 19
ADLS_ACUITY_SCORE: 18

## 2018-04-12 NOTE — PROGRESS NOTES
[facility]  Hospitalist Progress Note  Nirmal Serrano MD 04/12/18    Reason for Stay (Diagnosis): bacteremia, cellulitis, lymphedema         Assessment and Plan:      Summary of Stay:  Ton Bagley is a 89 year old male with pmhx of A. fib on warfarin, lymphedema, CAD, HTN, DMII, dementia, and HLD admitted on 4/3/2018 after a fall.  Head CT negative for acute bleed.  Labs notable for an PRADEEP and leukocytosis.  He was hypotensive initially and elevated lactate.  He was given fluid boluses and developed some hypoxia in the ED.  He was started on vancomycin and cefepime for sepsis.  Blood cultures grew MSSA and strep mitis.  Antibiotics narrowed to cefazolin per ID who were consulted.  PT and OT consulted due to falls.  Leukocytosis and fevers resolved.  TTE showing aortic stenosis. Persistent hypoxia and weight up so changed to IV diuretic 4/8.  Now with signs of contraction alkalosis so slowing down diuresis.  Anticipate patient will need TCU and social work assisting with this process.  Received Haldol overnight for agitation so cannot go to TCU today.  Developed rash on back, not felt to be due to antibiotics per ID so continuing Ancef.    Problem List/Assessment and Plan:   Sepsis due to MSSA bacteremia and strep mitis (cx from 4/3) and LLE cellulitis: Positive blood cultures for MSSA and strep mitis on 4/3.  Portal of entry for bacteria is not completely clear but may be skin related.  He has severe venous stasis of his lower legs bilaterally.  His left leg is erythematous so suspect cellulitis.  Leukocytosis and fever resolved.  Did have elevated lactic acid that has since resolved.  -Continue on cefazolin for 4 weeks total.  Infectious disease team consulting and put in discharge order for patient.  -Patient pulled PICC line 4/10 but was replaced same day  -Repeat blood cultures from 4/4 and 4/5 did not grow anything    Rash: Papular rash on back that is itchy.  Has been on Ancef for some  time so doubt that all of a sudden reaction to this would occur, ID in agreement.  No change to antibiotics.  -Start topical hydrocortisone 1% cream 3 times daily as needed for rash on back      Fall: Fell when walking to the bathroom.  He denies loss of consciousness.  Has had some other falls.  He is chronically on anticoagulant agent for A. Fib.  -PT and OT consultation    Acute hypoxemic respiratory failure:   Developed hypoxia in the ED following fluid boluses.  Chest x-ray showing some atelectasis versus possible left lower lobe pneumonia.  Suspect this is more volume related as he has significant peripheral edema on exam.  Have been able to wean oxygen with diuresis.  -Wean oxygen, intermittently needing 1 L while here      Chronic A. Fib: Continue PTA warfarin, pharmacy to dose.  Atenolol 50 mg daily for rate control.  Telemetry.    Aortic stenosis: TTE showing a significantly decreased area of the aortic valve, the flow gradient is only mild.  Does not sound like his falls are syncopal in origin.  If he does have syncope or light and is walking around would consider cardiology consultation to see if this could be contributing to his falls.  Does not appear to be outflow obstruction per the echocardiogram read.    Lymphedema: Chronically on Lasix 60 mg morning and 30 mg afternoon.  Peak weight while here 237 pounds.  2-3+ tense lower extremity edema bilaterally at worst.  Weight down to 222 pounds with diuresis.  Tends to get a contraction alkalosis when diuresed too quickly.  -Restart diuretics at 60 mg twice daily p.o. Lasix  -Daily weights and strict intake output  -sodium restriction      Type II DM: PTA on glipizide 5 mg twice daily.  Blood glucose well controlled here.  -Continue PTA glipizide  -Can stop frequent blood sugar checks and sliding scale insulin as blood sugars have been 100-150 while here      HTN: PTA on lisinopril 5 mg daily, atenolol grams daily, and Lasix 60 mg morning and 30 mg  "afternoon.  Lisinopril and Lasix initially held due to PRADEEP.  -continue atenolol and lisinopril    Abdominal pain: Patient endorses diffuse abdominal cramps and often feels like he has to have a bowel movement.  Ongoing issue and son informed nursing that the patient has a \"bum stomach.\"  He is having multiple bowel movements that are formed daily.  -Continue Prilosec  -Trial of Carafate 4 times daily, seems to be working here and can be considered on discharge on an as-needed basis.      Back pain:  Low back pain after fall.    -monitor and if ongoing pain would image      Dementia: Unclear severity, but lives at home with spouse.  Memory seems quite poor here and needs frequent reorientation.  -Avoid Haldol and other meds if possible, reorientation usually works      PRADEEP: Creatinine peak of 1.7.  Baseline appears to be 0.9-1.  Suspect secondary to bacteremia and Lasix/lisinopril use.  Resolved with IV fluids and holding lisinopril and diuretic initially.  -Follow BMP with diuresis      HLD  Continue statin      Hypothyroidism  Continue levothyroxin      GERD  Continue omeprazole    # Pain Assessment:  Current Pain Score 4/11/2018   Patient currently in pain? -   Pain score (0-10) 4   Pain location -   Pain descriptors -   - Ton is experiencing pain due to back pain. Pain management was discussed and the plan was created in a collaborative fashion.  Ton's response to the current recommendations: engaged  -Tylenol as needed  -Oxycodone 5 mg every 3 hours as needed, has needed very little of this    DVT Prophylaxis: Warfarin  Code Status: DNR / DNI.  This was discussed on admission  FEN: Consistent carbohydrate medium and 2 g sodium restriction  Discharge Dispo:  PT, OT, social work consult.  Based on weakness, confusion, extended course of IV antibiotics to think patient will need to TCU and that his spouse will not be able to manage all of his cares at home.  Discussed this with social work and family " "members.    Estimated Disch Date / # of Days until Disch: Received Haldol last night and was unable to discharge to TCU today because of that.  Discharge to TCU tomorrow.  Has PICC in place for extended IV antibiotic course        Interval History (Subjective):      Report of some agitation overnight and was given Haldol.  Unclear what his exact behaviors were.  This morning he is calm and cooperative for me.  Confused, but conversational.  Denies any abdominal pain.  Denies shortness of breath.  He is not sure why is in the hospital.                  Physical Exam:      Last Vital Signs:  /73 (BP Location: Left arm)  Pulse 78  Temp 95.3  F (35.2  C) (Oral)  Resp 18  Ht 1.676 m (5' 6\")  Wt 100.8 kg (222 lb 3.2 oz)  SpO2 92%  BMI 35.86 kg/m2    Intake/Output Summary (Last 24 hours) at 04/12/18 1328  Last data filed at 04/11/18 2251   Gross per 24 hour   Intake              480 ml   Output              100 ml   Net              380 ml     Constitutional: Awake, NAD   HEENT:   MMM  Respiratory: No crackles.  No wheeze  Cardiovascular: Irregularly, irregular rhythm.  Regular rate.  2/6 systolic murmur  GI: Nontender to palpation,  not distended, bowel sounds present  Skin: Chronic venous stasis changes/purple/redness of lower extremities bilaterally   Musculoskeletal/extremities: 1-2+ edema right leg, 2-3+ edema left leg  Neurologic: Alert, confused but conversational, not oriented to place or date.  Psychiatric: calm, cooperative with cares         Medications:      All current medications were reviewed with changes reflected in problem list.         Data:      All new lab and imaging data was reviewed.   Labs:         Lab Results   Component Value Date     04/12/2018    Lab Results   Component Value Date    CHLORIDE 99 04/12/2018    Lab Results   Component Value Date    BUN 20 04/12/2018      Lab Results   Component Value Date    POTASSIUM 3.3 04/12/2018    Lab Results   Component Value Date    CO2 " 37 04/12/2018    Lab Results   Component Value Date    CR 0.92 04/12/2018          Lab Results   Component Value Date    INR 2.98 (H) 04/12/2018    INR 2.66 (H) 04/11/2018    INR 2.40 (H) 04/10/2018      Imaging:   None today      Nirmal Serrano MD

## 2018-04-12 NOTE — PROGRESS NOTES
SWS:  D: discharge planning  A: AMANDA notified that pt has been accepted at [facility] but that pt has received Haldol overnight and unable to dc today. SW spoke with [facility] and they will have bed available tomorrow.  AMANDA left message with  son Mejia at [phone] requesting return call to provide update re: pt dc plan.  SW waiting for a return call.  P: Anticipate dc tomorrow. AMANDA will continue to follow.

## 2018-04-12 NOTE — PROGRESS NOTES
[facility]  Infectious Disease Progress Note          Assessment and Plan:   IMPRESSION:   1.  A 89-year-old male with 2 falls in the last week, some weakness and sleepiness for 2-3 days, cause of this syndrome is now apparent with blood cultures positive for methicillin-sensitive Staph aureus, the patient has methicillin-sensitive Staph aureus bacteremia syndrome, no clear secondary site, possibly left leg as source, although leg does not look like cellulitis to me more stasis with one small wound as a possible entry site.   2.  Low back pain, not obvious tenderness, conceivable secondary site of infection, but also possibly just related to fall.   3.  Diabetes mellitus.   4.  Atrial fibrillation.   5.  Mild dementia.   6.  Venous stasis disease without much history of major infection process, some wound formation in his left leg, recurrently.  7 New rash, mainly back, some abd, none of it looks like drug eruprion, ? folliculitis       RECOMMENDATIONS:   1.  Continue Ancef, improved acute renal insufficiency, increased dose   2.   blood cultures  FU neg, no secondary sites  3.  Transthoracic echocardiogram abnormal, but no obvious infection, would hold off on a transesophageal echocardiogram, patient will require a 4-week course of antibiotics, regardless would only do a GAVIN if we are not clearing the blood.  .      4.  Looks better only 1 cx +, no obvious secondary sites  5 OK PICC, orders in for ancef ? Rehab any time  6 symptomatic and topical rash tx, if becomes more generalized will readress as outpt but doubt allergic          Interval History:   no new complaints and doing well; no cp, sob, n/v/d, or abd pain. Looks better cx as above rash              Medications:       sucralfate  1 g Oral 4x Daily AC & HS     sodium chloride (PF)  3 mL Intracatheter Q8H     ceFAZolin  2 g Intravenous Q8H     sodium chloride (PF)  10 mL Intracatheter Q7 Days     lisinopril  5 mg Oral Daily      "atenolol  50 mg Oral Daily     gabapentin (NEURONTIN) capsule 300 mg  300 mg Oral BID     glipiZIDE  5 mg Oral BID AC     levothyroxine  100 mcg Oral Daily     omeprazole  20 mg Oral Daily     polyethylene glycol  17 g Oral Daily     senna-docusate  2 tablet Oral BID     simvastatin  10 mg Oral At Bedtime     tamsulosin  0.4 mg Oral QPM                  Physical Exam:   Blood pressure 151/73, pulse 78, temperature 95.3  F (35.2  C), temperature source Oral, resp. rate 18, height 1.676 m (5' 6\"), weight 100.8 kg (222 lb 3.2 oz), SpO2 92 %.  Wt Readings from Last 2 Encounters:   04/11/18 100.8 kg (222 lb 3.2 oz)   05/28/17 99.3 kg (218 lb 14.4 oz)     Vital Signs with Ranges  Temp:  [95.3  F (35.2  C)-97.6  F (36.4  C)] 95.3  F (35.2  C)  Pulse:  [67-78] 78  Heart Rate:  [63] 63  Resp:  [16-18] 18  BP: (120-169)/(57-73) 151/73  SpO2:  [92 %-96 %] 92 %    Constitutional: Awake, alert, cooperative, no apparent distress   Lungs: Clear to auscultation bilaterally, no crackles or wheezing   Cardiovascular: Regular rate and rhythm, normal S1 and S2, and no murmur noted   Abdomen: Normal bowel sounds, soft, non-distended, non-tender   Skin:  no cyanosis, same edema no emboli  Mostly stasis   Other:  rash back heat folliculitis, not generaized, R chest ? yeast          Data:   All microbiology laboratory data reviewed.  Recent Labs   Lab Test  04/12/18   0540  04/08/18   0945  04/06/18   0722  04/05/18   0651   WBC   --   8.0  8.7  8.9   HGB   --   12.2*  12.5*  11.6*   HCT   --   38.8*  39.6*  38.0*   MCV   --   101*  100  104*   PLT  202  187  120*  120*     Recent Labs   Lab Test  04/12/18   0540  04/11/18   0500  04/10/18   0437   CR  0.92  0.96  1.01     No lab results found.  Recent Labs   Lab Test  04/06/18   0721  04/05/18   0650  04/04/18   1604  04/03/18   1252  04/03/18   1204  05/14/17   1052  05/13/17 2043   CULT  No growth  No growth  No growth  Cultured on the 1st day of incubation:  Staphylococcus aureus  * "  Critical Value/Significant Value, preliminary result only, called to and read back by  Nell Valerio RN at 0930 4/4/18 [id number]    Cultured on the 1st day of incubation:  Streptococcus mitis group  *  Critical Value/Significant Value called to and read back by  Tamanna Evans RN at 1015 on [id number]    (Note)  POSITIVE for STAPHYLOCOCCUS AUREUS and NEGATIVE for the mecA gene  (not MRSA) by [employer] multiplex nucleic acid test. The mecA gene was  not detected. Final identification and antimicrobial susceptibility  testing will be verified by standard methods.    Specimen tested with [employer] multiplex, gram-positive blood culture  nucleic acid test for the following targets: Staph aureus, Staph  epidermidis, Staph lugdunensis, other Staph species, Enterococcus  faecalis, Enterococcus faecium, Streptococcus species, S. agalactiae,  S. anginosus grp., S. pneumoniae, S. pyogenes, Listeria sp., mecA  (methicillin resistance) and Renetta/B (vancomycin resistance).    Critical Value/Significant Value called to and read back by  Komal Mckeon RN @1240 04/04/18 gd    No growth  No anaerobes isolated  No growth  <10,000 colonies/mL mixed urogenital conner Susceptibility testing not routinely   done

## 2018-04-12 NOTE — PLAN OF CARE
PT: Attempted PT session. Pt sound asleep in recliner and requires multiple attempts to awaken with both verbal and tactile cues. Pt reporting feeling very fatigued and declining PT intervention despite encouragement. Pt observed to to be making multiple trips to the bathroom earlier in the day with nursing Ax1 with ambulation in his room with fww. Pt continues to appear to be appropriate for TCU per chart review.

## 2018-04-12 NOTE — PLAN OF CARE
Problem: Patient Care Overview  Goal: Plan of Care/Patient Progress Review  Outcome: Improving    VSS  Cardio: A-fib/flutter (40-90s), occ. 2.2 second pause   Neuro: Disoriented to place, time and situation, needs reoriented frequently, CMS intact   Resp: Crackles on bilateral lower lung bases   GI/: BS +, incontinent of urine x1, passing flatus   Skin: Mild to moderate edema on bilateral extremities w/ issa/redness    Activity: A1 w/ walker, gait belt   Diet: Mod CHO, fair appetite   IVs/lines: SL Left forearm, PICC   Plan: Dc to TCU when placement found

## 2018-04-12 NOTE — PLAN OF CARE
"Problem: Patient Care Overview  Goal: Plan of Care/Patient Progress Review  Outcome: No Change  Orientation: Disoriented to time, place and situation, Awake, alert, cooperative, no apparent distress.  Sitting in chair   VSS: /67 (BP Location: Left arm)  Pulse 74  Temp 97.6  F (36.4  C) (Oral)  Resp 18  Ht 1.676 m (5' 6\")  Wt 100.8 kg (222 lb 3.2 oz)  SpO2 93%  BMI 35.86 kg/m2. Oxygen saturation 96% on 1 LPM.   IVF: SL  Tele: Afib CVR, HR 60s.   LS: clear and equal bilaterally. Clear to auscultation bilaterally but diminished throughout, No wheezing  GI: Passing gas. No BM. Denies N/V.  Normal bowel sounds, soft, non-distended, non-tender  : Adequate urine output. Clear yellow.   Skin: BLE ruddiness, bruising noted, rash developed overnight, c/o back itching, Skin otherwise intact. no cyanosis, dry to touch  Activity: SBA to assist of 1w/walker. Awake and in chair most of night, PRN Haldol given to calm agitation and restlessness.   Pain: 4/10 rib pain. Oxy given, no relief from pain intervention, repositioned to chair, cold pack applied to back, able to rest, in and out of sleep  DC Plan: Continue with current cares.         "

## 2018-04-12 NOTE — PLAN OF CARE
Problem: Patient Care Overview  Goal: Plan of Care/Patient Progress Review  OT: Attempted OT session, pt declined session, reporting fatigue and feeling tired.

## 2018-04-12 NOTE — PLAN OF CARE
Problem: Patient Care Overview  Goal: Plan of Care/Patient Progress Review  Outcome: Improving  Patient more alert today.Orientated to self and place.  Up with assist of one with walker to chair and bathroom. 3 formed BM this shift. Bowel meds held this am. 2+ edema to right lower ext  And 3+ to left lower ext. Rash improving to back. Wife here to visit today. Crackles to bilat bases. O2 1 liter N/C sat 96%.

## 2018-04-13 ENCOUNTER — APPOINTMENT (OUTPATIENT)
Dept: OCCUPATIONAL THERAPY | Facility: CLINIC | Age: 83
DRG: 871 | End: 2018-04-13
Payer: MEDICARE

## 2018-04-13 LAB
ANION GAP SERPL CALCULATED.3IONS-SCNC: 2 MMOL/L (ref 3–14)
BUN SERPL-MCNC: 20 MG/DL (ref 7–30)
CALCIUM SERPL-MCNC: 8.7 MG/DL (ref 8.5–10.1)
CHLORIDE SERPL-SCNC: 97 MMOL/L (ref 94–109)
CO2 SERPL-SCNC: 38 MMOL/L (ref 20–32)
CREAT SERPL-MCNC: 0.95 MG/DL (ref 0.66–1.25)
GFR SERPL CREATININE-BSD FRML MDRD: 74 ML/MIN/1.7M2
GLUCOSE SERPL-MCNC: 145 MG/DL (ref 70–99)
INR PPP: 2.64 (ref 0.86–1.14)
MAGNESIUM SERPL-MCNC: 1.9 MG/DL (ref 1.6–2.3)
POTASSIUM SERPL-SCNC: 3.7 MMOL/L (ref 3.4–5.3)
SODIUM SERPL-SCNC: 137 MMOL/L (ref 133–144)

## 2018-04-13 PROCEDURE — 25000128 H RX IP 250 OP 636: Performed by: INTERNAL MEDICINE

## 2018-04-13 PROCEDURE — 99232 SBSQ HOSP IP/OBS MODERATE 35: CPT | Performed by: INTERNAL MEDICINE

## 2018-04-13 PROCEDURE — 25000132 ZZH RX MED GY IP 250 OP 250 PS 637: Mod: GY | Performed by: INTERNAL MEDICINE

## 2018-04-13 PROCEDURE — A9270 NON-COVERED ITEM OR SERVICE: HCPCS | Mod: GY | Performed by: INTERNAL MEDICINE

## 2018-04-13 PROCEDURE — 12000007 ZZH R&B INTERMEDIATE

## 2018-04-13 PROCEDURE — 97535 SELF CARE MNGMENT TRAINING: CPT | Mod: GO

## 2018-04-13 PROCEDURE — 40000133 ZZH STATISTIC OT WARD VISIT

## 2018-04-13 PROCEDURE — 85610 PROTHROMBIN TIME: CPT | Performed by: PHYSICIAN ASSISTANT

## 2018-04-13 PROCEDURE — 80048 BASIC METABOLIC PNL TOTAL CA: CPT | Performed by: PHYSICIAN ASSISTANT

## 2018-04-13 PROCEDURE — 25000132 ZZH RX MED GY IP 250 OP 250 PS 637: Mod: GY | Performed by: PHYSICIAN ASSISTANT

## 2018-04-13 PROCEDURE — A9270 NON-COVERED ITEM OR SERVICE: HCPCS | Mod: GY | Performed by: PHYSICIAN ASSISTANT

## 2018-04-13 PROCEDURE — 83735 ASSAY OF MAGNESIUM: CPT | Performed by: PHYSICIAN ASSISTANT

## 2018-04-13 PROCEDURE — 99207 ZZC CDG-MDM COMPONENT: MEETS LOW - DOWN CODED: CPT | Performed by: INTERNAL MEDICINE

## 2018-04-13 RX ADMIN — GABAPENTIN 300 MG: 300 CAPSULE ORAL at 21:37

## 2018-04-13 RX ADMIN — TAMSULOSIN HYDROCHLORIDE 0.4 MG: 0.4 CAPSULE ORAL at 21:37

## 2018-04-13 RX ADMIN — CEFAZOLIN SODIUM 2 G: 2 INJECTION, SOLUTION INTRAVENOUS at 05:17

## 2018-04-13 RX ADMIN — CEFAZOLIN SODIUM 2 G: 2 INJECTION, SOLUTION INTRAVENOUS at 21:36

## 2018-04-13 RX ADMIN — LISINOPRIL 5 MG: 5 TABLET ORAL at 08:51

## 2018-04-13 RX ADMIN — Medication 3.5 MG: at 17:00

## 2018-04-13 RX ADMIN — SUCRALFATE 1 G: 1 TABLET ORAL at 11:58

## 2018-04-13 RX ADMIN — CEFAZOLIN SODIUM 2 G: 2 INJECTION, SOLUTION INTRAVENOUS at 14:43

## 2018-04-13 RX ADMIN — LEVOTHYROXINE SODIUM 100 MCG: 100 TABLET ORAL at 08:51

## 2018-04-13 RX ADMIN — GLIPIZIDE 5 MG: 5 TABLET ORAL at 17:00

## 2018-04-13 RX ADMIN — FUROSEMIDE 60 MG: 40 TABLET ORAL at 14:43

## 2018-04-13 RX ADMIN — OMEPRAZOLE 20 MG: 20 CAPSULE, DELAYED RELEASE ORAL at 08:51

## 2018-04-13 RX ADMIN — MICONAZOLE NITRATE: 2 POWDER TOPICAL at 20:17

## 2018-04-13 RX ADMIN — SUCRALFATE 1 G: 1 TABLET ORAL at 05:17

## 2018-04-13 RX ADMIN — GABAPENTIN 300 MG: 300 CAPSULE ORAL at 08:51

## 2018-04-13 RX ADMIN — SUCRALFATE 1 G: 1 TABLET ORAL at 17:00

## 2018-04-13 RX ADMIN — GLIPIZIDE 5 MG: 5 TABLET ORAL at 08:50

## 2018-04-13 RX ADMIN — SUCRALFATE 1 G: 1 TABLET ORAL at 21:37

## 2018-04-13 RX ADMIN — SIMVASTATIN 10 MG: 10 TABLET, FILM COATED ORAL at 21:37

## 2018-04-13 RX ADMIN — ATENOLOL 50 MG: 50 TABLET ORAL at 08:51

## 2018-04-13 RX ADMIN — FUROSEMIDE 60 MG: 40 TABLET ORAL at 08:50

## 2018-04-13 ASSESSMENT — ACTIVITIES OF DAILY LIVING (ADL)
ADLS_ACUITY_SCORE: 19
ADLS_ACUITY_SCORE: 18
ADLS_ACUITY_SCORE: 18
ADLS_ACUITY_SCORE: 19
ADLS_ACUITY_SCORE: 18
ADLS_ACUITY_SCORE: 19

## 2018-04-13 NOTE — PROGRESS NOTES
[facility]  Hospitalist Progress Note  Lanie Harvey MD 04/13/2018    Reason for Stay (Diagnosis): bacteremia, cellulitis, lymphedema         Assessment and Plan:      Summary of Stay:  Ton Bagley is a 89 year old male with pmhx of A. fib on warfarin, lymphedema, CAD, HTN, DMII, dementia, and HLD admitted on 4/3/2018 after a fall.  Head CT negative for acute bleed.  Labs notable for an PRADEEP and leukocytosis.  He was hypotensive initially and elevated lactate.  He was given fluid boluses and developed some hypoxia in the ED.  He was started on vancomycin and cefepime for sepsis.  Blood cultures grew MSSA and strep mitis.  Antibiotics narrowed to cefazolin per ID who were consulted.  PT and OT consulted due to falls.  Leukocytosis and fevers resolved.  TTE showing aortic stenosis. Persistent hypoxia and weight up so changed to IV diuretic 4/8.  Now with signs of contraction alkalosis so slowing down diuresis.  Anticipate patient will need TCU and social work assisting with this process.  Received Haldol overnight for agitation so cannot go to TCU today.  Developed rash on back, not felt to be due to antibiotics per ID so continuing Ancef.    Problem List/Assessment and Plan:   Sepsis due to MSSA bacteremia and strep mitis (cx from 4/3) and LLE cellulitis:   --Positive blood cultures for MSSA and strep mitis on 4/3.    --Portal of entry for bacteria is not completely clear but likely skin related.  He has severe venous stasis of his lower legs bilaterally.  His left leg is erythematous so suspect cellulitis.    --Leukocytosis and fever resolved.  Did have elevated lactic acid that has since resolved.  --Continue on cefazolin for 4 weeks total.  Infectious disease team consulting and put in discharge order for patient.  --Patient pulled PICC line 4/10 but was replaced same day  --Repeat blood cultures from 4/4 and 4/5 did not grow anything    Rash: Papular rash on back that is itchy.  Has been  on Ancef for some time so doubt that all of a sudden reaction to this would occur, ID in agreement.  No change to antibiotics.  --On topical hydrocortisone 1% cream 3 times daily as needed for rash on back      Fall: Fell when walking to the bathroom.  He denies loss of consciousness.  Has had some other falls.  He is chronically on anticoagulant agent for A. Fib.  -PT and OT consultation    Acute hypoxemic respiratory failure:   Developed hypoxia in the ED following fluid boluses.  Chest x-ray showing some atelectasis versus possible left lower lobe pneumonia.  Suspect this is more volume related as he has significant peripheral edema on exam.  Have been able to wean oxygen with diuresis.  -Wean oxygen, intermittently needing 1 L while here  -Still using supplemental O2      Chronic A. Fib: Continue PTA warfarin, pharmacy to dose.  Atenolol 50 mg daily for rate control.  Telemetry.    Aortic stenosis: TTE showing a significantly decreased area of the aortic valve, the flow gradient is only mild.  Does not sound like his falls are syncopal in origin.  If he does have syncope or light and is walking around would consider cardiology consultation to see if this could be contributing to his falls.  Does not appear to be outflow obstruction per the echocardiogram read.    Lymphedema: Chronically on Lasix 60 mg morning and 30 mg afternoon.   -- Peak weight while here 237 pounds.  2-3+ tense lower extremity edema bilaterally at worst.  Weight down to 222 pounds with diuresis.  Tends to get a contraction alkalosis when diuresed too quickly.  -Restart diuretics at 60 mg twice daily p.o. Lasix  -Daily weights and strict intake output  -sodium restriction      Type II DM: PTA on glipizide 5 mg twice daily.  Blood glucose well controlled here.  -Continue PTA glipizide  -Can stop frequent blood sugar checks and sliding scale insulin as blood sugars have been 100-150 while here      HTN: PTA on lisinopril 5 mg daily, atenolol 50  mg daily, and Lasix 60 mg morning and 30 mg afternoon.  Lisinopril and Lasix initially held due to PRADEEP.  -continue atenolol and lisinopril   -Lasix restarted 60 mg daily  -Ideal dry weight is 222 pounds    Abdominal pain:   -Improved with trial of Carafate 4 times daily,       Back pain:  Low back pain after fall.    -monitor and if ongoing pain would image      Dementia: Unclear severity, but lives at home with spouse.  Memory seems quite poor here and needs frequent reorientation.  -Avoid Haldol and other meds if possible, reorientation usually works      PRAEDEP: Creatinine peak of 1.7.  Baseline appears to be 0.9-1.  Suspect secondary to bacteremia and Lasix/lisinopril use.  Resolved with IV fluids and holding lisinopril and diuretic initially.  -Follow BMP with diuresis      HLD  Continue statin      Hypothyroidism  Continue levothyroxin      GERD  Continue omeprazole    # Pain Assessment:  Current Pain Score 4/12/2018   Patient currently in pain? denies   Pain score (0-10) -   Pain location -   Pain descriptors -   - Ton is experiencing pain due to back pain. Pain management was discussed and the plan was created in a collaborative fashion.  Ton's response to the current recommendations: engaged  -Tylenol as needed  -Oxycodone 5 mg every 3 hours as needed, has needed very little of this    DVT Prophylaxis: Warfarin  Code Status: DNR / DNI.  This was discussed on admission  FEN: Consistent carbohydrate medium and 2 g sodium restriction  Discharge Dispo:  PT, OT, social work consult.  Based on weakness, confusion, extended course of IV antibiotics to think patient will need to TCU and that his spouse will not be able to manage all of his cares at home.  Discussed this with social work and family members.    Estimated Disch Date / # of Days until Disch: Received Haldol last night and was unable to discharge to TCU today because of that.  Discharge to TCU tomorrow.  Has PICC in place for extended IV antibiotic  "course        Interval History (Subjective):      Assumed care reviewed chart.  Patient was very somnolent when I examined him offered no complaints.  Was sitting up using supplemental O2 about 1 L.  Per nursing patient was communicative earlier.  Unable to get reliable review of systems as he was very somnolent and sleepy                Physical Exam:      Last Vital Signs:  /75 (BP Location: Left arm)  Pulse 78  Temp 97.6  F (36.4  C) (Oral)  Resp 20  Ht 1.676 m (5' 6\")  Wt 100.8 kg (222 lb 3.2 oz)  SpO2 93%  BMI 35.86 kg/m2    Intake/Output Summary (Last 24 hours) at 04/12/18 1328  Last data filed at 04/11/18 2251   Gross per 24 hour   Intake              480 ml   Output              100 ml   Net              380 ml     Constitutional: Awake, NAD   HEENT:   MMM  Respiratory: No crackles.  No wheeze  Cardiovascular: Irregularly, irregular rhythm.  Regular rate.  2/6 systolic murmur  GI: Nontender to palpation,  not distended, bowel sounds present  Skin: Chronic venous stasis changes/purple/redness of lower extremities bilaterally   Musculoskeletal/extremities: 1-2+ edema right leg, 2-3+ edema left leg  Neurologic: Alert, confused but conversational, not oriented to place or date.  Psychiatric: calm, cooperative with cares         Medications:      All current medications were reviewed with changes reflected in problem list.  Current Facility-Administered Medications   Medication     warfarin (COUMADIN) half-tab 3.5 mg     hydrocortisone (CORTAID) 1 % cream     furosemide (LASIX) tablet 60 mg     lidocaine 1 % 0.5-5 mL     lidocaine (LMX4) kit     sodium chloride (PF) 0.9% PF flush 5-50 mL     heparin lock flush 10 UNIT/ML injection 2-5 mL     miconazole (MICATIN; MICRO GUARD) 2 % powder     sucralfate (CARAFATE) tablet 1 g     sodium chloride (PF) 0.9% PF flush 3 mL     sodium chloride (PF) 0.9% PF flush 3 mL     ceFAZolin (ANCEF) intermittent infusion 2 g in 100 mL dextrose PRE-MIX     sodium " chloride (PF) 0.9% PF flush 10-20 mL     sodium chloride (PF) 0.9% PF flush 10 mL     lisinopril (PRINIVIL/ZESTRIL) tablet 5 mg     atenolol (TENORMIN) tablet 50 mg     gabapentin (NEURONTIN) capsule 300 mg     glipiZIDE (GLUCOTROL) tablet 5 mg     levothyroxine (SYNTHROID/LEVOTHROID) tablet 100 mcg     omeprazole (priLOSEC) CR capsule 20 mg     polyethylene glycol (MIRALAX/GLYCOLAX) Packet 17 g     senna-docusate (SENOKOT-S;PERICOLACE) 8.6-50 MG per tablet 2 tablet     simethicone (MYLICON) chewable tablet 160 mg     simvastatin (ZOCOR) tablet 10 mg     tamsulosin (FLOMAX) capsule 0.4 mg     glucose 40 % gel 15-30 g    Or     dextrose 50 % injection 25-50 mL    Or     glucagon injection 1 mg     naloxone (NARCAN) injection 0.1-0.4 mg     melatonin tablet 1 mg     potassium chloride SA (K-DUR/KLOR-CON M) CR tablet 20-40 mEq     potassium chloride (KLOR-CON) Packet 20-40 mEq     potassium chloride 10 mEq in 100 mL sterile water intermittent infusion (premix)     potassium chloride 10 mEq in 100 mL intermittent infusion with 10 mg lidocaine     potassium chloride 20 mEq in 50 mL intermittent infusion     magnesium sulfate 4 g in 100 mL sterile water (premade)     acetaminophen (TYLENOL) tablet 650 mg     oxyCODONE IR (ROXICODONE) tablet 5 mg     ondansetron (ZOFRAN-ODT) ODT tab 4 mg    Or     ondansetron (ZOFRAN) injection 4 mg     Warfarin Therapy Reminder (Check START DATE - warfarin may be starting in the FUTURE)          Data:      All new lab and imaging data was reviewed.   Labs:         Lab Results   Component Value Date     04/12/2018    Lab Results   Component Value Date    CHLORIDE 99 04/12/2018    Lab Results   Component Value Date    BUN 20 04/12/2018      Lab Results   Component Value Date    POTASSIUM 3.3 04/12/2018    Lab Results   Component Value Date    CO2 37 04/12/2018    Lab Results   Component Value Date    CR 0.92 04/12/2018          Lab Results   Component Value Date    INR 2.64 (H)  04/13/2018    INR 2.98 (H) 04/12/2018    INR 2.66 (H) 04/11/2018      Imaging:   None today      Lanie Harvey MD

## 2018-04-13 NOTE — PROGRESS NOTES
[facility]  Infectious Disease Progress Note          Assessment and Plan:   IMPRESSION:   1.  A 89-year-old male with 2 falls in the last week, some weakness and sleepiness for 2-3 days, cause of this syndrome is now apparent with blood cultures positive for methicillin-sensitive Staph aureus, the patient has methicillin-sensitive Staph aureus bacteremia syndrome, no clear secondary site, possibly left leg as source, although leg does not look like cellulitis to me more stasis with one small wound as a possible entry site.   2.  Low back pain, not obvious tenderness, conceivable secondary site of infection, but also possibly just related to fall.   3.  Diabetes mellitus.   4.  Atrial fibrillation.   5.  Mild dementia.   6.  Venous stasis disease without much history of major infection process, some wound formation in his left leg, recurrently.  7 New rash, mainly back, some abd, none of it looks like drug eruprion, ? folliculitis       RECOMMENDATIONS:   1.  Continue Ancef, improved acute renal insufficiency, increased dose   2.   blood cultures  FU neg, no secondary sites  3.  Transthoracic echocardiogram abnormal, but no obvious infection, would hold off on a transesophageal echocardiogram, patient will require a 4-week course of antibiotics, regardless would only do a GAVIN if we are not clearing the blood.  .      4.  Looks better only 1 cx +, no obvious secondary sites  5 OK PICC, orders in for ancef ? Rehab any time  6 symptomatic and topical rash tx, if becomes more generalized will readress as outpt but doubt allergic, no major generalized spread          Interval History:   no new complaints and doing well; no cp, sob, n/v/d, or abd pain. Looks better cx as above rash              Medications:       warfarin  3.5 mg Oral ONCE at 18:00     furosemide  60 mg Oral BID     sucralfate  1 g Oral 4x Daily AC & HS     sodium chloride (PF)  3 mL Intracatheter Q8H     ceFAZolin  2 g Intravenous  "Q8H     sodium chloride (PF)  10 mL Intracatheter Q7 Days     lisinopril  5 mg Oral Daily     atenolol  50 mg Oral Daily     gabapentin (NEURONTIN) capsule 300 mg  300 mg Oral BID     glipiZIDE  5 mg Oral BID AC     levothyroxine  100 mcg Oral Daily     omeprazole  20 mg Oral Daily     polyethylene glycol  17 g Oral Daily     senna-docusate  2 tablet Oral BID     simvastatin  10 mg Oral At Bedtime     tamsulosin  0.4 mg Oral QPM                  Physical Exam:   Blood pressure 141/70, pulse 78, temperature 98.3  F (36.8  C), temperature source Oral, resp. rate 20, height 1.676 m (5' 6\"), weight 101.4 kg (223 lb 9.6 oz), SpO2 92 %.  Wt Readings from Last 2 Encounters:   04/13/18 101.4 kg (223 lb 9.6 oz)   05/28/17 99.3 kg (218 lb 14.4 oz)     Vital Signs with Ranges  Temp:  [97.6  F (36.4  C)-98.3  F (36.8  C)] 98.3  F (36.8  C)  Heart Rate:  [71-80] 80  Resp:  [18-20] 20  BP: (141-150)/(57-75) 141/70  SpO2:  [87 %-97 %] 92 %    Constitutional: Awake, alert, cooperative, no apparent distress   Lungs: Clear to auscultation bilaterally, no crackles or wheezing   Cardiovascular: Regular rate and rhythm, normal S1 and S2, and no murmur noted   Abdomen: Normal bowel sounds, soft, non-distended, non-tender   Skin:  no cyanosis, same edema no emboli  Mostly stasis   Other:  rash back heat folliculitis, not generaized, R chest ? yeast          Data:   All microbiology laboratory data reviewed.  Recent Labs   Lab Test  04/12/18   0540  04/08/18   0945  04/06/18   0722  04/05/18   0651   WBC   --   8.0  8.7  8.9   HGB   --   12.2*  12.5*  11.6*   HCT   --   38.8*  39.6*  38.0*   MCV   --   101*  100  104*   PLT  202  187  120*  120*     Recent Labs   Lab Test  04/13/18   0515  04/12/18   0540  04/11/18   0500   CR  0.95  0.92  0.96     No lab results found.  Recent Labs   Lab Test  04/06/18   0721  04/05/18   0650  04/04/18   1604  04/03/18   1252  04/03/18   1204  05/14/17   1052  05/13/17   2043   CULT  No growth  No growth  " No growth  Cultured on the 1st day of incubation:  Staphylococcus aureus  *  Critical Value/Significant Value, preliminary result only, called to and read back by  Nell Valerio RN at 0942 4/4/18 [id number]    Cultured on the 1st day of incubation:  Streptococcus mitis group  *  Critical Value/Significant Value called to and read back by  Tamanna Evans RN at 1015 on [id number]    (Note)  POSITIVE for STAPHYLOCOCCUS AUREUS and NEGATIVE for the mecA gene  (not MRSA) by Transcriptic multiplex nucleic acid test. The mecA gene was  not detected. Final identification and antimicrobial susceptibility  testing will be verified by standard methods.    Specimen tested with Second Sightigene multiplex, gram-positive blood culture  nucleic acid test for the following targets: Staph aureus, Staph  epidermidis, Staph lugdunensis, other Staph species, Enterococcus  faecalis, Enterococcus faecium, Streptococcus species, S. agalactiae,  S. anginosus grp., S. pneumoniae, S. pyogenes, Listeria sp., mecA  (methicillin resistance) and Renetta/B (vancomycin resistance).    Critical Value/Significant Value called to and read back by  oKmal Mckeon RN @1240 04/04/18 gd    No growth  No anaerobes isolated  No growth  <10,000 colonies/mL mixed urogenital conner Susceptibility testing not routinely   done

## 2018-04-13 NOTE — PLAN OF CARE
Problem: Patient Care Overview  Goal: Plan of Care/Patient Progress Review  Alert and disoriented to place, time, and situation, forgetful, A1 w/walker, tele Afib CVR, VSS, LS dim, 87% on RA, 92% on 3/4 LPM, BS+, soft stool X2, rash to back, bilat legs red and issa w/2-3+ edema, no c/o pain, CP, or SOB. K+ 3.7 and Mag 1.9 this AM. PICC saline locked. ID, PT and OT following. Plan is possible D/C to TCU tomorrow. POC reviewed with patient, questions answered.

## 2018-04-13 NOTE — PLAN OF CARE
Problem: Patient Care Overview  Goal: Plan of Care/Patient Progress Review  Outcome: No Change  Remains confused. Ate well. Complains of itchy rash on back, powder applied with relief. Complains of needing bowel movement. To bathroom at least a dozen times this shift. Had 3 formed stools today on day shift. Doesn't remember this. No bm this shift. Gets up without calling for nurse. Sets off alarm. Doesn't remember to call for nurse. Continues on iv antibiotic.

## 2018-04-13 NOTE — PROGRESS NOTES
SWS:  AMANDA notified by MD that pt will not be discharging today. SW updated [facility] and they anticipate having bed for pt tomorrow.  Per son Luciano, pt son Mejia will transport. AMANDA will need to contact Mejia at [phone].

## 2018-04-13 NOTE — PLAN OF CARE
Problem: Sepsis/Septic Shock (Adult)  Goal: Signs and Symptoms of Listed Potential Problems Will be Absent, Minimized or Managed (Sepsis/Septic Shock)  Signs and symptoms of listed potential problems will be absent, minimized or managed by discharge/transition of care (reference Sepsis/Septic Shock (Adult) CPG).   Outcome: No Change  VSS on 1.5 LPM. Pt denies pain, CP, SOB/WILLIAM this shift. Up in chair, to bed between cares, A1 with walker. Impulsive, does not use call light for assistance. Tolerating diet and PO meds this AM. Incontinent of urine x2, BM for prior shift. PICC SL between IV abx. Tele in place, Afib CVR; increase in HR with activity. Confused, oriented to self and conversation but frequently forgetful.. Requires constant redirection and queues for safe mobility. Can become agitated. Bed alarms in place, pt utilizing. Continue to monitor.

## 2018-04-13 NOTE — PLAN OF CARE
Problem: Patient Care Overview  Goal: Plan of Care/Patient Progress Review    Discharge Planner OT   Patient plan for discharge: Home with assist.  Current status: Pt went from sit<>stand with minimum assist. Pt ambulated to the bathroom with minimum assist and transferred to the toilet x2 with minimum assist. Pt demonstrated impulsiveness and required verbal cues for walker safety and hand placement. While standing at the sink, pt completed 1 oral hygiene task with CGA and >2 verbal cues for locating items (soap, paper towel).  Barriers to return to prior living situation: Confused,  decreased independence in ADLs  Recommendations for discharge: At this time recommend TCU; if pt returns home would recommend 24 hour supervision/assist for all I/ADLs and home OT  Rationale for recommendations: Pt to benefit from continued skilled OT services to increase independence in ADL and functional mobility tasks prior to safe d/c home.       Entered by: Usman Navarrete 04/13/2018 10:19 AM

## 2018-04-13 NOTE — PROGRESS NOTES
Your information has been submitted on April 13th, 2018 at 11:31:46 AM CDT. The confirmation number is [id number]

## 2018-04-14 LAB
ANION GAP SERPL CALCULATED.3IONS-SCNC: 1 MMOL/L (ref 3–14)
BUN SERPL-MCNC: 21 MG/DL (ref 7–30)
CALCIUM SERPL-MCNC: 8.8 MG/DL (ref 8.5–10.1)
CHLORIDE SERPL-SCNC: 97 MMOL/L (ref 94–109)
CO2 SERPL-SCNC: 40 MMOL/L (ref 20–32)
CREAT SERPL-MCNC: 0.98 MG/DL (ref 0.66–1.25)
GFR SERPL CREATININE-BSD FRML MDRD: 72 ML/MIN/1.7M2
GLUCOSE SERPL-MCNC: 140 MG/DL (ref 70–99)
INR PPP: 2.57 (ref 0.86–1.14)
POTASSIUM SERPL-SCNC: 3.7 MMOL/L (ref 3.4–5.3)
SODIUM SERPL-SCNC: 138 MMOL/L (ref 133–144)

## 2018-04-14 PROCEDURE — 25000132 ZZH RX MED GY IP 250 OP 250 PS 637: Mod: GY | Performed by: INTERNAL MEDICINE

## 2018-04-14 PROCEDURE — 25000128 H RX IP 250 OP 636: Performed by: INTERNAL MEDICINE

## 2018-04-14 PROCEDURE — 99207 ZZC CDG-CODE CATEGORY CHANGED: CPT | Performed by: INTERNAL MEDICINE

## 2018-04-14 PROCEDURE — 25000132 ZZH RX MED GY IP 250 OP 250 PS 637: Mod: GY | Performed by: PHYSICIAN ASSISTANT

## 2018-04-14 PROCEDURE — A9270 NON-COVERED ITEM OR SERVICE: HCPCS | Mod: GY | Performed by: INTERNAL MEDICINE

## 2018-04-14 PROCEDURE — A9270 NON-COVERED ITEM OR SERVICE: HCPCS | Mod: GY | Performed by: PHYSICIAN ASSISTANT

## 2018-04-14 PROCEDURE — 12000007 ZZH R&B INTERMEDIATE

## 2018-04-14 PROCEDURE — 99239 HOSP IP/OBS DSCHRG MGMT >30: CPT | Performed by: INTERNAL MEDICINE

## 2018-04-14 PROCEDURE — 85610 PROTHROMBIN TIME: CPT | Performed by: PHYSICIAN ASSISTANT

## 2018-04-14 PROCEDURE — 80048 BASIC METABOLIC PNL TOTAL CA: CPT | Performed by: PHYSICIAN ASSISTANT

## 2018-04-14 RX ORDER — FUROSEMIDE 20 MG
60 TABLET ORAL
Qty: 30 TABLET | Status: ON HOLD | DISCHARGE
Start: 2018-04-14 | End: 2018-08-01

## 2018-04-14 RX ADMIN — SIMVASTATIN 10 MG: 10 TABLET, FILM COATED ORAL at 21:58

## 2018-04-14 RX ADMIN — ATENOLOL 50 MG: 50 TABLET ORAL at 08:05

## 2018-04-14 RX ADMIN — POLYETHYLENE GLYCOL 3350 17 G: 17 POWDER, FOR SOLUTION ORAL at 08:04

## 2018-04-14 RX ADMIN — GABAPENTIN 300 MG: 300 CAPSULE ORAL at 21:57

## 2018-04-14 RX ADMIN — Medication 3.5 MG: at 17:20

## 2018-04-14 RX ADMIN — SUCRALFATE 1 G: 1 TABLET ORAL at 06:04

## 2018-04-14 RX ADMIN — SUCRALFATE 1 G: 1 TABLET ORAL at 21:57

## 2018-04-14 RX ADMIN — FUROSEMIDE 60 MG: 40 TABLET ORAL at 07:56

## 2018-04-14 RX ADMIN — GLIPIZIDE 5 MG: 5 TABLET ORAL at 16:32

## 2018-04-14 RX ADMIN — TAMSULOSIN HYDROCHLORIDE 0.4 MG: 0.4 CAPSULE ORAL at 21:57

## 2018-04-14 RX ADMIN — SENNOSIDES AND DOCUSATE SODIUM 2 TABLET: 8.6; 5 TABLET ORAL at 08:04

## 2018-04-14 RX ADMIN — CEFAZOLIN SODIUM 2 G: 2 INJECTION, SOLUTION INTRAVENOUS at 15:17

## 2018-04-14 RX ADMIN — SUCRALFATE 1 G: 1 TABLET ORAL at 12:17

## 2018-04-14 RX ADMIN — LEVOTHYROXINE SODIUM 100 MCG: 100 TABLET ORAL at 08:05

## 2018-04-14 RX ADMIN — GLIPIZIDE 5 MG: 5 TABLET ORAL at 07:56

## 2018-04-14 RX ADMIN — Medication 1 MG: at 23:39

## 2018-04-14 RX ADMIN — OMEPRAZOLE 20 MG: 20 CAPSULE, DELAYED RELEASE ORAL at 08:05

## 2018-04-14 RX ADMIN — SUCRALFATE 1 G: 1 TABLET ORAL at 16:32

## 2018-04-14 RX ADMIN — FUROSEMIDE 60 MG: 40 TABLET ORAL at 15:17

## 2018-04-14 RX ADMIN — ACETAMINOPHEN 650 MG: 325 TABLET ORAL at 23:39

## 2018-04-14 RX ADMIN — LISINOPRIL 5 MG: 5 TABLET ORAL at 08:04

## 2018-04-14 RX ADMIN — MICONAZOLE NITRATE: 2 POWDER TOPICAL at 04:08

## 2018-04-14 RX ADMIN — CEFAZOLIN SODIUM 2 G: 2 INJECTION, SOLUTION INTRAVENOUS at 06:13

## 2018-04-14 RX ADMIN — GABAPENTIN 300 MG: 300 CAPSULE ORAL at 08:07

## 2018-04-14 RX ADMIN — CEFAZOLIN SODIUM 2 G: 2 INJECTION, SOLUTION INTRAVENOUS at 21:57

## 2018-04-14 ASSESSMENT — ACTIVITIES OF DAILY LIVING (ADL)
ADLS_ACUITY_SCORE: 19
ADLS_ACUITY_SCORE: 18

## 2018-04-14 NOTE — PROGRESS NOTES
Vm to [facility] to confirm Bed. AMANDA requested a call back   Pc to Son Mejia regarding transport for his father. Son noted he is not able to transport his father. He noted there is no room in his car. SW informed Mejia that is fine. We can schedule transportation but it a cost to the family. Mejia noted that is fine.    Pc from [facility] reporting that there is no bed availible for patient.    Met with pt and son Mejia regarding additional TCU options. Mejia noted if his father can get into [facility] then he would like referral send to [facility].   Referral send [facility].   Spoke with Rosanne at [facility] who noted she will review referral and get back to SW.   Pc from Rosanne at [facility] reporting that she has bed availible for tomorrow.

## 2018-04-14 NOTE — PLAN OF CARE
Problem: Patient Care Overview  Goal: Plan of Care/Patient Progress Review  Outcome: Improving  Alert to self only, forgetful, requires frequent redirection and reorientation. 96% on 3/4L NC, O2 sat drops to high 80s with ambulation, other VSS. Tele Afib CVR. 2344: notified by tele tech of 6 beat run Vtach, tele strip saved in smart disclosure. LS diminished, WILLIAM. On IV ancef. PICC to right arm. Rash to back-powder given. Bilateral legs red/issa with 2+/ 3+ edema. Up with Ax1 with walker. ID, PT, OT following. Plan to discharge to TCU today, continue with POC.

## 2018-04-14 NOTE — PLAN OF CARE
Problem: Patient Care Overview  Goal: Plan of Care/Patient Progress Review  Outcome: Improving  Less confused this evening, but still short term memory deficit. Son Dru called here and asked for and updated on condition. Pt plan to dc to care facility tomorrow. Not complaining about back rash or constipation this evening. Calls for nurse more appropriately this evening. Up in recliner with feet elevated. amb in room with assist of one and walker. VSS afebrile. Continues on iv antibiotic.

## 2018-04-14 NOTE — DISCHARGE SUMMARY
[facility]  Discharge Summary  Hospitalist      Date of Admission:  4/3/2018  Date of Discharge:  4/14/2018  Provider:  Lanie Harvey MD  Date of Service (when I last saw the patient): 04/14/18      Primary Provider: Jose Shell Homeland          Discharge Diagnosis:   Discharge Diagnoses   Sepsis secondary to MSSA bacteremia and strep mitis  Left lower extremity cellulitis  Acute hypoxemic respiratory failure  Papular rash.  Suspect  Dermatitis  History of falls  Chronic atrial fibrillation  Aortic stenosis  Lymphedema  Acute kidney injury      Other medical issues:  Past Medical History:   Diagnosis Date     Atrial fibrillation (H)     cardioversion 2007     CAD (coronary artery disease)     CABG, stent x2 2004     HTN (hypertension)      Hyperlipidemia           History of Present Illness   Ton Bagley is an 89 year old male who presented to ER after a fall.  Please see the admission history and physical for full details.    Hospital Course     Ton Bagley was admitted on 4/3/2018.  He is a 89-year-old male with pmhx of A. fib on warfarin, lymphedema, CAD, HTN, DMII, dementia, and HLD admitted on 4/3/2018 after a fall.  Head CT negative for acute bleed.  Labs notable for an PRADEEP and leukocytosis.  He was hypotensive initially and elevated lactate.  He was given fluid boluses and developed some hypoxia in the ED.  He was started on vancomycin and cefepime for sepsis.  Blood cultures grew MSSA and strep mitis.  Antibiotics narrowed to cefazolin per ID who were consulted.  PT and OT consulted due to falls.  Leukocytosis and fevers resolved.  TTE showing aortic stenosis.  He had persistent hypoxia and weight was up requiring IV diuresis on 4/8.    Subsequently had signs of contraction alkalosis.  Prior to discharge diuretics were adjusted to dry weight of 222  pounds.  Patient will need IV antibiotics for a total of 4 weeks, he will need PT and OT therapies, will need to closely  monitor his weights input intake and output and will need to wean supplemental O2.  He is being admitted to transitional care unit for ongoing cares and rehab.  Clinically patient is stable for discharge vitals are stable weight is stable on current diuretics is afebrile.  Oxygen is down to 1-2 L.  During hospitalization he developed rash on back, not felt to be due to antibiotics per ID so continuing Ancef.     The following problems were addressed during his hospitalization:    Sepsis due to MSSA bacteremia and strep mitis (cx from 4/3) and LLE cellulitis:   --Positive blood cultures for MSSA and strep mitis on 4/3.    --Portal of entry for bacteria is not completely clear but likely skin related.  He has severe venous stasis of his lower legs bilaterally.  His left leg was erythematous so likely cellulitis.    --Leukocytosis and fever resolved.  Did have elevated lactic acid that has since resolved.  --Continue on cefazolin for 4 weeks total.  Infectious disease team consulting and put in discharge order for patient.  --Patient pulled PICC line 4/10 but was replaced same day  --Repeat blood cultures from 4/4 and 4/5 did not grow anything     Rash: Papular rash on back that is itchy.  Has been on Ancef for some time so doubt that all of a sudden reaction to this would occur, ID in agreement.  No change to antibiotics.  --On topical hydrocortisone 1% cream 3 times daily as needed for rash on back      Fall: Fell when walking to the bathroom.  He denies loss of consciousness.  Has had some other falls.  He is chronically on anticoagulant agent for A. Fib.  -PT and OT consultation     Acute hypoxemic respiratory failure:   Developed hypoxia in the ED following fluid boluses.  Chest x-ray showing some atelectasis versus possible left lower lobe pneumonia.  Suspect this is more volume related as he has significant peripheral edema on exam.  Have been able to wean oxygen with diuresis.  -Wean oxygen, intermittently  needing 1 L while here  -Still using supplemental O2  -Wean as able      Chronic A. Fib: Continue PTA warfarin, pharmacy to dose.  Atenolol 50 mg daily for rate control.   --Follow INR     Aortic stenosis: TTE showing a significantly decreased area of the aortic valve, the flow gradient is only mild.  Does not sound like his falls are syncopal in origin.  If he does have syncope or light and is walking around would consider cardiology consultation to see if this could be contributing to his falls.  Does not appear to be outflow obstruction per the echocardiogram read.  --Has remained stable since     Lymphedema: Chronically on Lasix 60 mg morning and 30 mg afternoon.   -- Peak weight while here 237 pounds.  2-3+ tense lower extremity edema bilaterally at worst.  Weight down to 222 pounds with diuresis.  Tends to get a contraction alkalosis when diuresed too quickly.  -Restart diuretics at 60 mg twice daily p.o. Lasix  -Daily weights and strict intake output  -sodium restriction      Type II DM: PTA on glipizide 5 mg twice daily.  Blood glucose well controlled here.  -Continue PTA glipizide  -Can stop frequent blood sugar checks and sliding scale insulin as blood sugars have been 100-150 while here      HTN: PTA on lisinopril 5 mg daily, atenolol 50 mg daily, and Lasix 60 mg morning and 30 mg afternoon.  Lisinopril and Lasix initially held due to PRADEEP.  -continue atenolol and lisinopril   -Lasix restarted 60 mg twice daily  -Ideal dry weight is 222 pounds     Abdominal pain:   -Improved with trial of Carafate 4 times daily,       Back pain:  Low back pain after fall.    -Improved      Dementia: Unclear severity, but lives at home with spouse.  Memory seems quite poor here and needs frequent reorientation.  -Avoid Haldol and other meds if possible, reorientation usually works      PRADEEP: Creatinine peak of 1.7.  Baseline appears to be 0.9-1.  Suspect secondary to bacteremia and Lasix/lisinopril use.  Resolved with IV  fluids and holding lisinopril and diuretic initially.  -Follow BMP with diuresis      HLD  Continue statin      Hypothyroidism  Continue levothyroxin      GERD  Continue omeprazole       Significant Results and Procedures   As noted    Pending Results   Unresulted Labs Ordered in the Past 30 Days of this Admission     No orders found from 2/2/2018 to 4/4/2018.          Code Status   DNR / DNI       Primary Care Physician   [facility]    Physical Exam   Temp: 95.5  F (35.3  C) Temp src: Axillary BP: 156/75   Heart Rate: 71 Resp: 29 SpO2: (!) 87 % O2 Device: None (Room air) Oxygen Delivery: 3/4 LPM  Vitals:    04/11/18 0614 04/13/18 1356 04/14/18 0500   Weight: 100.8 kg (222 lb 3.2 oz) 101.4 kg (223 lb 9.6 oz) 101.1 kg (222 lb 14.4 oz)     Vital Signs with Ranges  Temp:  [95.5  F (35.3  C)-98  F (36.7  C)] 95.5  F (35.3  C)  Heart Rate:  [62-72] 71  Resp:  [18-29] 29  BP: (142-156)/(68-75) 156/75  SpO2:  [87 %-97 %] 87 %  I/O last 3 completed shifts:  In: 823 [P.O.:820; I.V.:3]  Out: 250 [Urine:250]    Constitutional: Awake, NAD   HEENT:   MMM  Respiratory: No crackles.  No wheeze  Cardiovascular: Irregularly, irregular rhythm.  Regular rate.  2/6 systolic murmur  GI: Nontender to palpation,  not distended, bowel sounds present  Skin: Chronic venous stasis changes/purple/redness of lower extremities bilaterally   Musculoskeletal/extremities: 1-2+ edema right leg, 2-3+ edema left leg  Neurologic: Alert, confused but conversational, not oriented to place or date.  Psychiatric: calm, cooperative with cares    Discharge Disposition   Discharged to short-term care facility    Consultations This Hospital Stay   PHARMACY TO DOSE VANCO  PHARMACY TO DOSE WARFARIN  PHYSICAL THERAPY ADULT IP CONSULT  OCCUPATIONAL THERAPY ADULT IP CONSULT  INFECTIOUS DISEASES IP CONSULT  VASCULAR ACCESS ADULT IP CONSULT  SOCIAL WORK IP CONSULT  SOCIAL WORK IP CONSULT  VASCULAR ACCESS ADULT IP CONSULT  PHYSICAL THERAPY ADULT IP  CONSULT  OCCUPATIONAL THERAPY ADULT IP CONSULT    Time Spent on this Encounter   I, Lanie Harvey, personally saw the patient today and spent greater than 30 minutes discharging this patient.    Discharge Orders     General info for SNF   Length of Stay Estimate: Short Term Care: Estimated # of Days <30  Condition at Discharge: Improving  Level of care:skilled   Rehabilitation Potential: Fair  Admission H&P remains valid and up-to-date: Yes  Recent Chemotherapy: N/A  Use Nursing Home Standing Orders: Yes     Mantoux instructions   Give two-step Mantoux (PPD) Per Facility Policy Yes     Reason for your hospital stay   Please refer to discharge summary     Intake and output   Every shift     Daily weights   Call Provider for weight gain of more than 2 pounds per day or 5 pounds per week.     Follow Up and recommended labs and tests   Follow up with snf physician.  The following labs/tests are recommended: Basic metabolic panel and INR in 2-3 days.    Follow-up with infectious disease after completion of IV antibiotics as directed     Activity - Up with nursing assistance     IV access   PICC.  Will need IV antibiotics as ordered by infectious disease     Physical Therapy Adult Consult   Evaluate and treat as clinically indicated.    Reason: Deconditioned     Occupational Therapy Adult Consult   Evaluate and treat as clinically indicated.    Reason: Deconditioned     Oxygen - Nasal cannula   1-2 Lpm by nasal cannula to keep O2 sats 92% or greater.  Wean as able  Continue incentive spirometry     Fall precautions     Advance Diet as Tolerated   Follow this diet upon discharge: Orders Placed This Encounter     Room Service     Combination Diet [id number] Calories: Moderate Consistent CHO (4-6 CHO units/meal); 2 gm NA Diet       Discharge Medications   Current Discharge Medication List      START taking these medications    Details   ceFAZolin (ANCEF) 1 GM vial Inject 2 g into the vein every 8 hours for 24  days CBC with differential, creatinine, SGOT weekly while on this medication to be faxed to Dr. Chavez office.  Qty: 1 each    Associated Diagnoses: Gram-positive bacteremia         CONTINUE these medications which have CHANGED    Details   !! furosemide (LASIX) 20 MG tablet Take 3 tablets (60 mg) by mouth daily (with lunch)  Qty: 30 tablet    Associated Diagnoses: Lymphedema       !! - Potential duplicate medications found. Please discuss with provider.      CONTINUE these medications which have NOT CHANGED    Details   atenolol (TENORMIN) 50 MG tablet Take 50 mg by mouth daily      GABAPENTIN PO Take 300 mg by mouth 2 times daily      polyethylene glycol (MIRALAX/GLYCOLAX) Packet Take 17 g by mouth daily      senna-docusate (SENOKOT-S;PERICOLACE) 8.6-50 MG per tablet Take 2 tablets by mouth 2 times daily      triamcinolone (KENALOG) 0.5 % cream Apply topically three times a week Mon/Wed /Fri      WARFARIN SODIUM PO Take by mouth daily 3 mg   MWF, 4 mg Tues,Thur, Sat,Sun      glipiZIDE (GLUCOTROL) 5 MG tablet Take 1 tablet (5 mg) by mouth 2 times daily (before meals)  Qty: 30 tablet    Associated Diagnoses: Type 2 diabetes mellitus with complication, without long-term current use of insulin (H)      lisinopril (PRINIVIL/ZESTRIL) 5 MG tablet Take 1 tablet (5 mg) by mouth daily    Associated Diagnoses: Essential hypertension      ferrous gluconate (FERGON) 324 (38 FE) MG tablet Take 1 tablet (324 mg) by mouth daily (with breakfast)  Qty: 100 tablet    Associated Diagnoses: Iron deficiency anemia, unspecified iron deficiency anemia type      simethicone (MYLICON) 80 MG chewable tablet Take 2 tablets (160 mg) by mouth every 6 hours as needed for flatulence or cramping  Qty: 180 tablet    Associated Diagnoses: Abdominal pain, generalized      nitroglycerin (NITROSTAT) 0.4 MG sublingual tablet Place 1 tablet (0.4 mg) under the tongue every 5 minutes as needed for chest pain  Qty: 25 tablet    Associated Diagnoses:  Coronary artery disease involving native heart without angina pectoris, unspecified vessel or lesion type      simvastatin (ZOCOR) 10 MG tablet Take 1 tablet (10 mg) by mouth At Bedtime  Qty: 30 tablet    Associated Diagnoses: Coronary artery disease involving native heart without angina pectoris, unspecified vessel or lesion type      camphor-menthol (DERMASARRA) 0.5-0.5 % LOTN To affected areas on both legs Apply topically daily To affected areas on both legs    Associated Diagnoses: Venous stasis      !! furosemide (LASIX) 20 MG tablet Take 3 tablets (60 mg) by mouth every morning  Qty: 30 tablet    Associated Diagnoses: Lymphedema      Calcium Carb-Cholecalciferol (CALCIUM 500+D) 500-200 MG-UNIT TABS Take 1 tablet by mouth 2 times daily    Associated Diagnoses: Vitamin D deficiency      tamsulosin (FLOMAX) 0.4 MG capsule Take 1 capsule (0.4 mg) by mouth every evening  Qty: 60 capsule    Associated Diagnoses: Urinary retention      multivitamin (I-REBEL) TABS per tablet Take 1 tablet by mouth daily  Qty: 30 tablet, Refills: 0    Associated Diagnoses: Small bowel obstruction      levothyroxine (SYNTHROID/LEVOTHROID) 100 MCG tablet Take 1 tablet (100 mcg) by mouth daily  Qty: 30 tablet    Associated Diagnoses: Hypothyroidism, unspecified type      omeprazole (PRILOSEC) 20 MG CR capsule Take 1 capsule (20 mg) by mouth daily  Qty: 30 capsule    Associated Diagnoses: Gastroesophageal reflux disease without esophagitis      ACETAMINOPHEN PO Take 650 mg by mouth daily as needed for pain       !! - Potential duplicate medications found. Please discuss with provider.        Allergies   No Known Allergies  Data   Most Recent 3 CBC's:  Recent Labs   Lab Test  04/12/18   0540  04/08/18   0945  04/06/18   0722  04/05/18   0651   WBC   --   8.0  8.7  8.9   HGB   --   12.2*  12.5*  11.6*   MCV   --   101*  100  104*   PLT  202  187  120*  120*      Most Recent 3 BMP's:  Recent Labs   Lab Test  04/14/18   0615  04/13/18   0515   04/12/18   1350  04/12/18   0540   NA  138  137   --   138   POTASSIUM  3.7  3.7  3.9  3.3*   CHLORIDE  97  97   --   99   CO2  40*  38*   --   37*   BUN  21  20   --   20   CR  0.98  0.95   --   0.92   ANIONGAP  1*  2*   --   2*   JANN  8.8  8.7   --   8.2*   GLC  140*  145*   --   138*     Most Recent 2 LFT's:  Recent Labs   Lab Test  04/03/18   1042  05/22/17   0646   AST  30  28   ALT  30  25   ALKPHOS  76  66   BILITOTAL  1.7*  0.4     Most Recent INR's and Anticoagulation Dosing History:  Anticoagulation Dose History     Recent Dosing and Labs Latest Ref Rng & Units 4/8/2018 4/9/2018 4/10/2018 4/11/2018 4/12/2018 4/13/2018 4/14/2018    ZZ IMS TEMPLATE - - - - - - 3.5 mg -    Warfarin 3 mg - - 3 mg - 3 mg 3 mg - -    Warfarin 4 mg - - - 4 mg - - - -    Warfarin 5 mg - 5 mg - - - - - -    INR 0.86 - 1.14 2.46(H) 2.68(H) 2.40(H) 2.66(H) 2.98(H) 2.64(H) 2.57(H)        Most Recent 3 Troponin's:  Recent Labs   Lab Test  04/04/18   0740  04/04/18   0100  04/03/18   1849   TROPI  <0.015  <0.015  <0.015     Most Recent Cholesterol Panel:  Recent Labs   Lab Test  05/22/17   0646 07/24/14   CHOL   --   126   LDL   --   53   HDL   --   36   TRIG  219*  186     Most Recent 6 Bacteria Isolates From Any Culture (See EPIC Reports for Culture Details):  Recent Labs   Lab Test  04/06/18   0721  04/05/18   0650  04/04/18   1604  04/03/18   1252  04/03/18   1204  05/14/17   1052   CULT  No growth  No growth  No growth  Cultured on the 1st day of incubation:  Staphylococcus aureus  *  Critical Value/Significant Value, preliminary result only, called to and read back by  Nell Valerio RN at 0901 4/4/18 [id number]    Cultured on the 1st day of incubation:  Streptococcus mitis group  *  Critical Value/Significant Value called to and read back by  Tamanna Evans RN at 1015 on [id number]    (Note)  POSITIVE for STAPHYLOCOCCUS AUREUS and NEGATIVE for the mecA gene  (not MRSA) by [employer]igene multiplex nucleic acid test. The mecA gene was  not  detected. Final identification and antimicrobial susceptibility  testing will be verified by standard methods.    Specimen tested with Verigene multiplex, gram-positive blood culture  nucleic acid test for the following targets: Staph aureus, Staph  epidermidis, Staph lugdunensis, other Staph species, Enterococcus  faecalis, Enterococcus faecium, Streptococcus species, S. agalactiae,  S. anginosus grp., S. pneumoniae, S. pyogenes, Listeria sp., mecA  (methicillin resistance) and Renetta/B (vancomycin resistance).    Critical Value/Significant Value called to and read back by  Komal Mckeon RN @1240 04/04/18 gd    No growth  No anaerobes isolated  No growth     Most Recent TSH, T4 and A1c Labs:  Recent Labs   Lab Test  11/21/12   1450   TSH  8.52*     Results for orders placed or performed during the hospital encounter of 04/03/18   CT Head w/o Contrast    Narrative    CT SCAN OF THE HEAD WITHOUT CONTRAST   4/3/2018 11:45 AM     HISTORY: fall;     TECHNIQUE:  Axial images of the head and coronal reformations without  IV contrast material. Radiation dose for this scan was reduced using  automated exposure control, adjustment of the mA and/or kV according  to patient size, or iterative reconstruction technique.    COMPARISON: None.    FINDINGS:  There is generalized atrophy of the brain.  White matter  changes are present in the cerebral hemispheres that are consistent  with small vessel ischemic disease in this age patient. There is no  evidence of intracranial hemorrhage, mass, acute infarct or anomaly.  The visualized portions of the sinuses and mastoids appear normal. No  intracranial hemorrhage or skull fractures are identified.      Impression    IMPRESSION: No bleed or fractures are identified.      LIONEL VEE MD   CT Chest Abdomen Pelvis w/o Contrast    Narrative    CT CHEST/ABDOMEN/PELVIS WITHOUT CONTRAST April 3, 2018 11:44 AM     HISTORY: Trauma, on blood thinners, left rib pain.      TECHNIQUE: Axial  images from the thoracic inlet to the symphysis are  performed with additional coronal reformatted images. No contrast is  utilized. Radiation dose for this scan was reduced using automated  exposure control, adjustment of the mA and/or kV according to patient  size, or iterative reconstruction technique.    FINDINGS:     Chest: Calcified granuloma is noted in the right lower lobe on series  6, image 120. Lungs are otherwise clear without infiltrate,  consolidation or mass. No pleural or pericardial fluid. Heart is  enlarged. Prior CABG. Coronary artery calcifications are present.  Esophagus is unremarkable. No enlarged lymph nodes in the chest or  axillas.    Abdomen: Prior cholecystectomy changes. The liver, spleen, pancreas  and adrenal glands are unremarkable. Bilateral simple renal cysts are  present. 0.4 cm nonobstructing stone is present in the left renal  collecting system on series 2, image 57. There is no hydronephrosis or  additional urinary tract calculi. Aorta demonstrates calcified plaque  without aneurysm or retroperitoneal hemorrhage. Small left periaortic  lymph nodes are present but none are significantly enlarged by CT  criteria. The bowel is normal in caliber without obstruction or  diverticulitis. Appendix is normal.    Pelvis: The bladder, prostate and rectum are unremarkable. No enlarged  pelvic lymph nodes are appreciated. A few prominent possibly reactive  lymph nodes are noted in the left inguinal region on series 2, image  115. The largest measures approximately 2.4 x 1.4 cm. Degenerative  spine changes are present. No evidence of rib or spine fracture.      Impression    IMPRESSION:  1. No acute changes in the chest, abdomen or pelvis. No evidence of  fractures. Bilateral renal cortical cysts are noted with a  nonobstructing stone left renal collecting system. Prior  cholecystectomy changes. Evidence of old granulomatous disease.  2. Prominent left groin lymph nodes of uncertain etiology.  Reactive  adenopathy is suspected.  3. Postoperative changes from coronary artery bypass graft. Calcified  plaque is noted throughout the aorta without evidence of aneurysm.    PRISCILA TINEO MD   US Lower Extremity Venous Duplex Left    Narrative    ULTRASOUND VENOUS LEFT LOWER EXTREMITY  4/3/2018 3:15 PM     HISTORY:  Pain, swelling.     COMPARISON: None.    TECHNIQUE: Ultrasound gray scale, Color Doppler flow, and spectral  Doppler waveform analysis performed.    FINDINGS:  The left common femoral, superficial femoral, popliteal and  posterior tibial veins are patent and fully compressible and  demonstrate normal venous Doppler flow. The visualized greater  saphenous vein is negative for thrombus.       Impression    IMPRESSION: No DVT demonstrated.    SHAGUFTA FLORES MD   XR Chest 2 Views    Narrative    CHEST TWO VIEWS    4/3/2018 4:38 PM     HISTORY: New hypoxia.     COMPARISON: 5/27/2017.    FINDINGS: Sternal wires and mediastinal clips. No pneumothorax. The  heart is enlarged without pulmonary edema. There is a mild left  basilar infiltrate. Lungs are otherwise clear. No pleural effusion.      Impression    IMPRESSION: Mild left basilar atelectasis or pneumonia.    CARL MITCHELL MD   XR Chest Port 1 View    Narrative    PORTABLE CHEST ONE VIEW   4/6/2018 5:41 PM     HISTORY: RN placed PICC, verify tip.    COMPARISON: 4/3/2018.    FINDINGS: Upright portable chest. A right PICC has been placed. The  tip is difficult to see but is probably in the mid SVC in good  position. No pneumothorax. Sternal wires in place. The heart is  enlarged without pulmonary edema. There is mild bibasilar infiltrate.      Impression    IMPRESSION: Right PICC with tip probably in the mid SVC.    CARL MITCHELL MD   XR Chest Port 1 View    Narrative    CHEST ONE VIEW PORTABLE  4/10/2018 2:21 PM     HISTORY: RN placed PICC - verify tip placement.     COMPARISON: Chest x-rays dated 4/8/2018.      Impression    IMPRESSION:  1.  Hypoaeration, sternal cerclage wires, coronary artery markers and  mediastinal clips, cardiomegaly and mild vascular congestion are  essentially stable since the prior study.  2. Right-sided PICC line is again noted. The tip of the PICC line is  difficult to see but may be projected over the upper right atrium.  Contrast injection into the PICC line and repeat imaging may be  helpful to better evaluate the tip of the tube.    RAMSEY NUÑEZ MD   XR Chest Port 1 View    Narrative    CHEST ONE VIEW PORTABLE  4/10/2018 3:23 PM     HISTORY:  RN placed PICC - verify tip placement.    COMPARISON: Chest x-rays dated 4/10/2018 at 2:15 PM.      Impression    IMPRESSION:  1. Right PICC line is again noted. Tip is still difficult to see on  this study due to underpenetration of the mediastinum, but appears to  be in the upper right atrium just distal to the right atrial/superior  vena caval junction.  2. No other changes.    RASMEY NUÑEZ MD   XR Chest Port 1 View    Narrative    CHEST ONE VIEW PORTABLE 4/10/2018 4:33 PM     HISTORY: PICC line retracted 2 cm, verify tip.    COMPARISON: 4/10/2018 at 1516.      Impression    IMPRESSION: No change in position of right-sided PICC line. Prior  median sternotomy again noted. Moderate vascular congestion has  slightly increased since the prior exam. Interval development of a  focus of platelike atelectasis or early infiltrate in the left lung  base. Heart size is unchanged.    JA MCCLURE MD           Disclaimer: This note consists of symbols derived from keyboarding, dictation and/or voice recognition software. As a result, there may be errors in the script that have gone undetected. Please consider this when interpreting information found in this chart.

## 2018-04-14 NOTE — PROGRESS NOTES
[facility]  Hospitalist Progress Note  Lanie Harvey MD 04/14/2018    Reason for Stay (Diagnosis): bacteremia, cellulitis, lymphedema         Assessment and Plan:      Summary of Stay:  Ton Bagley is a 89 year old male with pmhx of A. fib on warfarin, lymphedema, CAD, HTN, DMII, dementia, and HLD admitted on 4/3/2018 after a fall.  Head CT negative for acute bleed.  Labs notable for an PRADEEP and leukocytosis.  He was hypotensive initially and elevated lactate.  He was given fluid boluses and developed some hypoxia in the ED.  He was started on vancomycin and cefepime for sepsis.  Blood cultures grew MSSA and strep mitis.  Antibiotics narrowed to cefazolin per ID who were consulted.  PT and OT consulted due to falls.  Leukocytosis and fevers resolved.  TTE showing aortic stenosis. Persistent hypoxia and weight up so changed to IV diuretic 4/8.  He is now stable with dryweight of 222lbs..Dc to TCU when available for antibiotics, rehab, wean O2.    Problem List/Assessment and Plan:   Sepsis due to MSSA bacteremia and strep mitis (cx from 4/3) and LLE cellulitis:   --Positive blood cultures for MSSA and strep mitis on 4/3.    --Portal of entry for bacteria is not completely clear but likely skin related.  He has severe venous stasis of his lower legs bilaterally.  His left leg was erythematous so suspect cellulitis.    --Leukocytosis and fever resolved.  Did have elevated lactic acid that has since resolved.  --Continue on cefazolin for 4 weeks total.  Infectious disease team consulting and put in discharge order for patient.  --Patient pulled PICC line 4/10 but was replaced same day  --Repeat blood cultures from 4/4 and 4/5 did not grow anything    Rash: Papular rash on back that is itchy.  Has been on Ancef for some time so doubt that all of a sudden reaction to this would occur, ID in agreement.  No change to antibiotics.  --On topical hydrocortisone 1% cream 3 times daily as needed for rash  on back      Fall: Fell when walking to the bathroom.  He denies loss of consciousness.  Has had some other falls.  He is chronically on anticoagulant agent for A. Fib.  -PT and OT consultation    Acute hypoxemic respiratory failure:   Developed hypoxia in the ED following fluid boluses.  Chest x-ray showing some atelectasis versus possible left lower lobe pneumonia.  Suspect this is more volume related as he has significant peripheral edema on exam.  Have been able to wean oxygen with diuresis.  -Wean oxygen, intermittently needing 1 L while here  -Still using supplemental O2      Chronic A. Fib: Continue PTA warfarin, pharmacy to dose.  Atenolol 50 mg daily for rate control.  Telemetry.    Aortic stenosis: TTE showing a significantly decreased area of the aortic valve, the flow gradient is only mild.  Does not sound like his falls are syncopal in origin.  If he does have syncope or light and is walking around would consider cardiology consultation to see if this could be contributing to his falls.  Does not appear to be outflow obstruction per the echocardiogram read.    Lymphedema: Chronically on Lasix 60 mg morning and 30 mg afternoon.   -- Peak weight while here 237 pounds.  2-3+ tense lower extremity edema bilaterally at worst.  Weight down to 222 pounds with diuresis.  Tends to get a contraction alkalosis when diuresed too quickly.  -Restart diuretics at 60 mg twice daily p.o. Lasix  -Daily weights and strict intake output  -sodium restriction      Type II DM: PTA on glipizide 5 mg twice daily.  Blood glucose well controlled here.  -Continue PTA glipizide  -Can stop frequent blood sugar checks and sliding scale insulin as blood sugars have been 100-150 while here      HTN: PTA on lisinopril 5 mg daily, atenolol 50 mg daily, and Lasix 60 mg morning and 30 mg afternoon.  Lisinopril and Lasix initially held due to PRADEEP.  -continue atenolol and lisinopril   -Lasix restarted 60 mg daily  -Ideal dry weight is 222  pounds    Abdominal pain:   -Improved with trial of Carafate 4 times daily,       Back pain:  Low back pain after fall.    -monitor and if ongoing pain would image      Dementia: Unclear severity, but lives at home with spouse.  Memory seems quite poor here and needs frequent reorientation.  -Avoid Haldol and other meds if possible, reorientation usually works      PRADEEP: Creatinine peak of 1.7.  Baseline appears to be 0.9-1.  Suspect secondary to bacteremia and Lasix/lisinopril use.  Resolved with IV fluids and holding lisinopril and diuretic initially.  -Follow BMP with diuresis      HLD  Continue statin      Hypothyroidism  Continue levothyroxin      GERD  Continue omeprazole    # Pain Assessment:  Current Pain Score 4/14/2018   Patient currently in pain? denies   Pain score (0-10) -   Pain location -   Pain descriptors -   - Ton is experiencing pain due to back pain. Pain management was discussed and the plan was created in a collaborative fashion.  Ton's response to the current recommendations: engaged  -Tylenol as needed  -Oxycodone 5 mg every 3 hours as needed, has needed very little of this    DVT Prophylaxis: Warfarin  Code Status: DNR / DNI.  This was discussed on admission  FEN: Consistent carbohydrate medium and 2 g sodium restriction  Discharge Dispo:  PT, OT, social work consult.  Based on weakness, confusion, extended course of IV antibiotics to think patient will need to TCU and that his spouse will not be able to manage all of his cares at home.  Discussed this with social work and family members.    Estimated Disch Date / # of Days until Disch: Received Haldol last night and was unable to discharge to TCU today because of that.  Discharge to TCU tomorrow.  Has PICC in place for extended IV antibiotic course        Interval History (Subjective):        Was sitting up using supplemental O2 about 1 L. Feels better today. No cp, no sob. Review of all other systems negative              Physical  "Exam:      Last Vital Signs:  /60 (BP Location: Left arm)  Pulse 78  Temp 98.5  F (36.9  C) (Oral)  Resp 20  Ht 1.676 m (5' 6\")  Wt 101.1 kg (222 lb 14.4 oz)  SpO2 91%  BMI 35.98 kg/m2    Intake/Output Summary (Last 24 hours) at 04/12/18 1328  Last data filed at 04/11/18 2251   Gross per 24 hour   Intake              480 ml   Output              100 ml   Net              380 ml     Constitutional: Awake, NAD   HEENT:   MMM  Respiratory: No crackles.  No wheeze  Cardiovascular: Irregularly, irregular rhythm.  Regular rate.  2/6 systolic murmur  GI: Nontender to palpation,  not distended, bowel sounds present  Skin: Chronic venous stasis changes/purple/redness of lower extremities bilaterally   Musculoskeletal/extremities: 1-2+ edema right leg, 2-3+ edema left leg  Neurologic: Alert, confused but conversational, not oriented to place or date.  Psychiatric: calm, cooperative with cares         Medications:      All current medications were reviewed with changes reflected in problem list.  Current Facility-Administered Medications   Medication     hydrocortisone (CORTAID) 1 % cream     furosemide (LASIX) tablet 60 mg     lidocaine 1 % 0.5-5 mL     lidocaine (LMX4) kit     sodium chloride (PF) 0.9% PF flush 5-50 mL     heparin lock flush 10 UNIT/ML injection 2-5 mL     miconazole (MICATIN; MICRO GUARD) 2 % powder     sucralfate (CARAFATE) tablet 1 g     sodium chloride (PF) 0.9% PF flush 3 mL     sodium chloride (PF) 0.9% PF flush 3 mL     ceFAZolin (ANCEF) intermittent infusion 2 g in 100 mL dextrose PRE-MIX     sodium chloride (PF) 0.9% PF flush 10-20 mL     sodium chloride (PF) 0.9% PF flush 10 mL     lisinopril (PRINIVIL/ZESTRIL) tablet 5 mg     atenolol (TENORMIN) tablet 50 mg     gabapentin (NEURONTIN) capsule 300 mg     glipiZIDE (GLUCOTROL) tablet 5 mg     levothyroxine (SYNTHROID/LEVOTHROID) tablet 100 mcg     omeprazole (priLOSEC) CR capsule 20 mg     polyethylene glycol (MIRALAX/GLYCOLAX) Packet " 17 g     senna-docusate (SENOKOT-S;PERICOLACE) 8.6-50 MG per tablet 2 tablet     simethicone (MYLICON) chewable tablet 160 mg     simvastatin (ZOCOR) tablet 10 mg     tamsulosin (FLOMAX) capsule 0.4 mg     glucose 40 % gel 15-30 g    Or     dextrose 50 % injection 25-50 mL    Or     glucagon injection 1 mg     naloxone (NARCAN) injection 0.1-0.4 mg     melatonin tablet 1 mg     potassium chloride SA (K-DUR/KLOR-CON M) CR tablet 20-40 mEq     potassium chloride (KLOR-CON) Packet 20-40 mEq     potassium chloride 10 mEq in 100 mL sterile water intermittent infusion (premix)     potassium chloride 10 mEq in 100 mL intermittent infusion with 10 mg lidocaine     potassium chloride 20 mEq in 50 mL intermittent infusion     magnesium sulfate 4 g in 100 mL sterile water (premade)     acetaminophen (TYLENOL) tablet 650 mg     oxyCODONE IR (ROXICODONE) tablet 5 mg     ondansetron (ZOFRAN-ODT) ODT tab 4 mg    Or     ondansetron (ZOFRAN) injection 4 mg     Warfarin Therapy Reminder (Check START DATE - warfarin may be starting in the FUTURE)          Data:      All new lab and imaging data was reviewed.   Labs:         Lab Results   Component Value Date     04/12/2018    Lab Results   Component Value Date    CHLORIDE 99 04/12/2018    Lab Results   Component Value Date    BUN 20 04/12/2018      Lab Results   Component Value Date    POTASSIUM 3.3 04/12/2018    Lab Results   Component Value Date    CO2 37 04/12/2018    Lab Results   Component Value Date    CR 0.92 04/12/2018          Lab Results   Component Value Date    INR 2.57 (H) 04/14/2018    INR 2.64 (H) 04/13/2018    INR 2.98 (H) 04/12/2018      Imaging:   None today      Lanie Harvey MD

## 2018-04-14 NOTE — PLAN OF CARE
Problem: Sepsis/Septic Shock (Adult)  Goal: Signs and Symptoms of Listed Potential Problems Will be Absent, Minimized or Managed (Sepsis/Septic Shock)  Signs and symptoms of listed potential problems will be absent, minimized or managed by discharge/transition of care (reference Sepsis/Septic Shock (Adult) CPG).   Outcome: Improving  Pt alert to self, knows he is in hospital, but not sure which one.  Lung sounds diminished bilaterally, no cough present.  Attempted to wean from oxygen, oxygen saturations drop to 87% on room air at rest.  Oxygen saturation 93% on 3/4 liter oxygen per nasal cannula.  Telemetry afib with CVR.  Denies chest pains or distress.  Bilateral lower extremities red/issa with +2-+3 lower extremity edema.  Tolerating moderate carbohydrate diet po. Magnesium level 1.9, Potassium level 3.7.  Pt to discharge to TCU with oxygen when bed available.

## 2018-04-14 NOTE — PHARMACY-ANTICOAGULATION SERVICE
Clinical Pharmacy- Warfarin Discharge Note  This patient is currently on warfarin for the treatment of Atrial fibrillation.  INR Goal= 2-3  Warfarin PTA Regimen:  (3 mg M W F  -  4mg ROW)     Anticoagulation Dose History     Recent Dosing and Labs Latest Ref Rng & Units 4/8/2018 4/9/2018 4/10/2018 4/11/2018 4/12/2018 4/13/2018 4/14/2018    CHELA IMS TEMPLATE - - - - - - 3.5 mg -    Warfarin 3 mg - - 3 mg - 3 mg 3 mg - -    Warfarin 4 mg - - - 4 mg - - - -    Warfarin 5 mg - 5 mg - - - - - -    INR 0.86 - 1.14 2.46(H) 2.68(H) 2.40(H) 2.66(H) 2.98(H) 2.64(H) 2.57(H)        Agree with Discharge Plan - no change in Warfarin dose.    Follow-up Appointments      Follow Up and recommended labs and tests      Follow up with senior living physician.  The following labs/tests are recommended: Basic metabolic panel and INR in 2-3 days.

## 2018-04-15 ENCOUNTER — RECORDS - HEALTHEAST (OUTPATIENT)
Dept: LAB | Facility: CLINIC | Age: 83
End: 2018-04-15

## 2018-04-15 VITALS
BODY MASS INDEX: 35.84 KG/M2 | TEMPERATURE: 97.3 F | RESPIRATION RATE: 16 BRPM | SYSTOLIC BLOOD PRESSURE: 123 MMHG | DIASTOLIC BLOOD PRESSURE: 57 MMHG | WEIGHT: 223 LBS | OXYGEN SATURATION: 91 % | HEIGHT: 66 IN | HEART RATE: 63 BPM

## 2018-04-15 LAB
INR PPP: 2.57 (ref 0.86–1.14)
PLATELET # BLD AUTO: 214 10E9/L (ref 150–450)

## 2018-04-15 PROCEDURE — 85610 PROTHROMBIN TIME: CPT | Performed by: PHYSICIAN ASSISTANT

## 2018-04-15 PROCEDURE — A9270 NON-COVERED ITEM OR SERVICE: HCPCS | Mod: GY | Performed by: PHYSICIAN ASSISTANT

## 2018-04-15 PROCEDURE — 85049 AUTOMATED PLATELET COUNT: CPT | Performed by: PHYSICIAN ASSISTANT

## 2018-04-15 PROCEDURE — A9270 NON-COVERED ITEM OR SERVICE: HCPCS | Mod: GY | Performed by: INTERNAL MEDICINE

## 2018-04-15 PROCEDURE — 25000132 ZZH RX MED GY IP 250 OP 250 PS 637: Mod: GY | Performed by: PHYSICIAN ASSISTANT

## 2018-04-15 PROCEDURE — 99207 ZZC NON-BILLABLE SERV PER CHARTING: CPT | Performed by: INTERNAL MEDICINE

## 2018-04-15 PROCEDURE — 25000132 ZZH RX MED GY IP 250 OP 250 PS 637: Mod: GY | Performed by: INTERNAL MEDICINE

## 2018-04-15 PROCEDURE — 25000128 H RX IP 250 OP 636: Performed by: INTERNAL MEDICINE

## 2018-04-15 RX ORDER — SUCRALFATE 1 G/1
1 TABLET ORAL
Qty: 120 TABLET | Refills: 0 | Status: SHIPPED | OUTPATIENT
Start: 2018-04-15

## 2018-04-15 RX ORDER — WARFARIN SODIUM 4 MG/1
4 TABLET ORAL
Status: DISCONTINUED | OUTPATIENT
Start: 2018-04-15 | End: 2018-04-15 | Stop reason: HOSPADM

## 2018-04-15 RX ADMIN — SUCRALFATE 1 G: 1 TABLET ORAL at 06:06

## 2018-04-15 RX ADMIN — CEFAZOLIN SODIUM 2 G: 2 INJECTION, SOLUTION INTRAVENOUS at 06:18

## 2018-04-15 RX ADMIN — OMEPRAZOLE 20 MG: 20 CAPSULE, DELAYED RELEASE ORAL at 08:11

## 2018-04-15 RX ADMIN — POLYETHYLENE GLYCOL 3350 17 G: 17 POWDER, FOR SOLUTION ORAL at 08:11

## 2018-04-15 RX ADMIN — SENNOSIDES AND DOCUSATE SODIUM 2 TABLET: 8.6; 5 TABLET ORAL at 08:11

## 2018-04-15 RX ADMIN — FUROSEMIDE 60 MG: 40 TABLET ORAL at 08:09

## 2018-04-15 RX ADMIN — GABAPENTIN 300 MG: 300 CAPSULE ORAL at 08:10

## 2018-04-15 RX ADMIN — LISINOPRIL 5 MG: 5 TABLET ORAL at 08:10

## 2018-04-15 RX ADMIN — GLIPIZIDE 5 MG: 5 TABLET ORAL at 08:10

## 2018-04-15 RX ADMIN — SUCRALFATE 1 G: 1 TABLET ORAL at 12:11

## 2018-04-15 RX ADMIN — ATENOLOL 50 MG: 50 TABLET ORAL at 12:10

## 2018-04-15 RX ADMIN — LEVOTHYROXINE SODIUM 100 MCG: 100 TABLET ORAL at 08:10

## 2018-04-15 ASSESSMENT — ACTIVITIES OF DAILY LIVING (ADL)
ADLS_ACUITY_SCORE: 18
ADLS_ACUITY_SCORE: 19
ADLS_ACUITY_SCORE: 18
ADLS_ACUITY_SCORE: 18

## 2018-04-15 NOTE — PROGRESS NOTES
Pc to Rosanne at [facility] regarding what time they will be able to accept pt. Rosanne noted anything after 1300 is fine.   Pc to [facility] pt son regarding transportation. Mejia noted he will transport his dad. AMANDA informed [facility] facility will be able to accept him any time after 1300.     Pc from son noting that he will pick his dad up between 1300 and 1330.   Vm to Rosanne at [facility] informing her family will transport and what time they pick him up.

## 2018-04-15 NOTE — PLAN OF CARE
Problem: Diabetes Comorbidity  Goal: Diabetes  Patient comorbidity will be monitored for signs and symptoms of hyperglycemia or hypoglycemia. Problems will be absent, minimized or managed by discharge/transition of care.   Outcome: Adequate for Discharge Date Met: 04/15/18  No signs of hyper or hypoglycemia present.    Problem: Sepsis/Septic Shock (Adult)  Goal: Signs and Symptoms of Listed Potential Problems Will be Absent, Minimized or Managed (Sepsis/Septic Shock)  Signs and symptoms of listed potential problems will be absent, minimized or managed by discharge/transition of care (reference Sepsis/Septic Shock (Adult) CPG).   Outcome: Adequate for Discharge Date Met: 04/15/18  Pt alert to self, chair alarm on.  Up with assist of one staff to bedside commode tolerated well.  Denies pain or distress.  Lung sounds diminished bilaterally.  Oxygen saturation 91% on 0.5 L oxygen per nasal cannula.  Pt short of breath on exertion. Telemetry atrial fibrillation with controlled ventriuclar response.  Tolerating moderate carbohydrate diet po.  + 3 bilateral lower extremity edema present, skin issa.  Magnesium level 1.9, Potassium level 3.7.  Pt  discharged to [email or web address] today at 1350.

## 2018-04-15 NOTE — DISCHARGE SUMMARY
[facility]  Discharge Summary  Hospitalist      Date of Admission:  4/3/2018  Date of Discharge:  4/15/2018  Provider:  Lanie Harvey MD  Date of Service (when I last saw the patient): 04/14/18      Primary Provider: Jose Shell [place]          Discharge Diagnosis:   Discharge Diagnoses   Sepsis secondary to MSSA bacteremia and strep mitis  Left lower extremity cellulitis  Acute hypoxemic respiratory failure  Papular rash.  Suspect  Dermatitis  History of falls  Chronic atrial fibrillation  Aortic stenosis  Lymphedema  Acute kidney injury      Other medical issues:  Past Medical History:   Diagnosis Date     Atrial fibrillation (H)     cardioversion 2007     CAD (coronary artery disease)     CABG, stent x2 2004     HTN (hypertension)      Hyperlipidemia           History of Present Illness   Ton Bagley is an 89 year old male who presented to ER after a fall.  Please see the admission history and physical for full details.    Hospital Course     Ton Bagley was admitted on 4/3/2018.  He is a 89-year-old male with pmhx of A. fib on warfarin, lymphedema, CAD, HTN, DMII, dementia, and HLD admitted on 4/3/2018 after a fall.  Head CT negative for acute bleed.  Labs notable for an PRADEEP and leukocytosis.  He was hypotensive initially and elevated lactate.  He was given fluid boluses and developed some hypoxia in the ED.  He was started on vancomycin and cefepime for sepsis.  Blood cultures grew MSSA and strep mitis.  Antibiotics narrowed to cefazolin per ID who were consulted.  PT and OT consulted due to falls.  Leukocytosis and fevers resolved.  TTE showing aortic stenosis.  He had persistent hypoxia and weight was up requiring IV diuresis on 4/8.    Subsequently had signs of contraction alkalosis.  Prior to discharge diuretics were adjusted to dry weight of 222  pounds.  Patient will need IV antibiotics for a total of 4 weeks, he will need PT and OT therapies, will need to closely  monitor his weights input intake and output and will need to wean supplemental O2.  He is being admitted to transitional care unit for ongoing cares and rehab.  Clinically patient is stable for discharge vitals are stable weight is stable on current diuretics is afebrile.  Oxygen is down to 1-2 L.  During hospitalization he developed rash on back, not felt to be due to antibiotics per ID so continuing Ancef.     The following problems were addressed during his hospitalization:    Sepsis due to MSSA bacteremia and strep mitis (cx from 4/3) and LLE cellulitis:   --Positive blood cultures for MSSA and strep mitis on 4/3.    --Portal of entry for bacteria is not completely clear but likely skin related.  He has severe venous stasis of his lower legs bilaterally.  His left leg was erythematous so likely cellulitis.    --Leukocytosis and fever resolved.  Did have elevated lactic acid that has since resolved.  --Continue on cefazolin for 4 weeks total.  Infectious disease team consulting and put in discharge order for patient.  --Patient pulled PICC line 4/10 but was replaced same day  --Repeat blood cultures from 4/4 and 4/5 did not grow anything     Rash: Papular rash on back that is itchy.  Has been on Ancef for some time so doubt that all of a sudden reaction to this would occur, ID in agreement.  No change to antibiotics.  --On topical hydrocortisone 1% cream 3 times daily as needed for rash on back      Fall: Fell when walking to the bathroom.  He denies loss of consciousness.  Has had some other falls.  He is chronically on anticoagulant agent for A. Fib.  -PT and OT consultation     Acute hypoxemic respiratory failure:   Developed hypoxia in the ED following fluid boluses.  Chest x-ray showing some atelectasis versus possible left lower lobe pneumonia.  Suspect this is more volume related as he has significant peripheral edema on exam.  Have been able to wean oxygen with diuresis.  -Wean oxygen, intermittently  needing 1 L while here  -Still using supplemental O2  -Wean as able      Chronic A. Fib: Continue PTA warfarin, pharmacy to dose.  Atenolol 50 mg daily for rate control.   --Follow INR     Aortic stenosis: TTE showing a significantly decreased area of the aortic valve, the flow gradient is only mild.  Does not sound like his falls are syncopal in origin.  If he does have syncope or light and is walking around would consider cardiology consultation to see if this could be contributing to his falls.  Does not appear to be outflow obstruction per the echocardiogram read.  --Has remained stable since     Lymphedema: Chronically on Lasix 60 mg morning and 30 mg afternoon.   -- Peak weight while here 237 pounds.  2-3+ tense lower extremity edema bilaterally at worst.  Weight down to 222 pounds with diuresis.  Tends to get a contraction alkalosis when diuresed too quickly.  -Restart diuretics at 60 mg twice daily p.o. Lasix  -Daily weights and strict intake output  -sodium restriction      Type II DM: PTA on glipizide 5 mg twice daily.  Blood glucose well controlled here.  -Continue PTA glipizide  -Can stop frequent blood sugar checks and sliding scale insulin as blood sugars have been 100-150 while here      HTN: PTA on lisinopril 5 mg daily, atenolol 50 mg daily, and Lasix 60 mg morning and 30 mg afternoon.  Lisinopril and Lasix initially held due to PRADEEP.  -continue atenolol and lisinopril   -Lasix restarted 60 mg twice daily  -Ideal dry weight is 222 pounds     Abdominal pain:   -Improved with trial of Carafate 4 times daily,       Back pain:  Low back pain after fall.    -Improved      Dementia: Unclear severity, but lives at home with spouse.  Memory seems quite poor here and needs frequent reorientation.  -Avoid Haldol and other meds if possible, reorientation usually works      PRADEEP: Creatinine peak of 1.7.  Baseline appears to be 0.9-1.  Suspect secondary to bacteremia and Lasix/lisinopril use.  Resolved with IV  fluids and holding lisinopril and diuretic initially.  -Follow BMP with diuresis      HLD  Continue statin      Hypothyroidism  Continue levothyroxin      GERD  Continue omeprazole  --added carafate as helped during hospitalization       Significant Results and Procedures   As noted    Pending Results   Unresulted Labs Ordered in the Past 30 Days of this Admission     No orders found from 2/2/2018 to 4/4/2018.          Code Status   DNR / DNI       Primary Care Physician   [facility]    Physical Exam   Temp: 97.3  F (36.3  C) Temp src: Oral BP: 123/57 Pulse: 63 Heart Rate: 59 Resp: 16 SpO2: 91 % O2 Device: Nasal cannula Oxygen Delivery: 1/2 LPM  Vitals:    04/13/18 1356 04/14/18 0500 04/15/18 0602   Weight: 101.4 kg (223 lb 9.6 oz) 101.1 kg (222 lb 14.4 oz) 101.2 kg (223 lb)     Vital Signs with Ranges  Temp:  [96.9  F (36.1  C)-98.6  F (37  C)] 97.3  F (36.3  C)  Pulse:  [63] 63  Heart Rate:  [59-66] 59  Resp:  [16-20] 16  BP: (116-150)/(57-65) 123/57  SpO2:  [91 %-95 %] 91 %  I/O last 3 completed shifts:  In: 200 [P.O.:200]  Out: 100 [Urine:100]    Constitutional: Awake, NAD   HEENT:   MMM  Respiratory: No crackles.  No wheeze  Cardiovascular: Irregularly, irregular rhythm.  Regular rate.  2/6 systolic murmur  GI: Nontender to palpation,  not distended, bowel sounds present  Skin: Chronic venous stasis changes/purple/redness of lower extremities bilaterally   Musculoskeletal/extremities: 1-2+ edema right leg, 2-3+ edema left leg  Neurologic: Alert, confused but conversational, not oriented to place or date.  Psychiatric: calm, cooperative with cares    Discharge Disposition   Discharged to short-term care facility    Consultations This Hospital Stay   PHARMACY TO DOSE VANCO  PHARMACY TO DOSE WARFARIN  PHYSICAL THERAPY ADULT IP CONSULT  OCCUPATIONAL THERAPY ADULT IP CONSULT  INFECTIOUS DISEASES IP CONSULT  VASCULAR ACCESS ADULT IP CONSULT  SOCIAL WORK IP CONSULT  SOCIAL WORK IP CONSULT  VASCULAR  ACCESS ADULT IP CONSULT  PHYSICAL THERAPY ADULT IP CONSULT  OCCUPATIONAL THERAPY ADULT IP CONSULT    Time Spent on this Encounter   I, Lanie Harvey, personally saw the patient today and spent greater than 30 minutes discharging this patient.    Discharge Orders     General info for SNF   Length of Stay Estimate: Short Term Care: Estimated # of Days <30  Condition at Discharge: Improving  Level of care:skilled   Rehabilitation Potential: Fair  Admission H&P remains valid and up-to-date: Yes  Recent Chemotherapy: N/A  Use Nursing Home Standing Orders: Yes     Mantoux instructions   Give two-step Mantoux (PPD) Per Facility Policy Yes     Reason for your hospital stay   Please refer to discharge summary     Intake and output   Every shift     Daily weights   Call Provider for weight gain of more than 2 pounds per day or 5 pounds per week.     Follow Up and recommended labs and tests   Follow up with snf physician.  The following labs/tests are recommended: Basic metabolic panel and INR in 2-3 days.    Follow-up with infectious disease after completion of IV antibiotics as directed     Activity - Up with nursing assistance     IV access   PICC.  Will need IV antibiotics as ordered by infectious disease     Physical Therapy Adult Consult   Evaluate and treat as clinically indicated.    Reason: Deconditioned     Occupational Therapy Adult Consult   Evaluate and treat as clinically indicated.    Reason: Deconditioned     Oxygen - Nasal cannula   1-2 Lpm by nasal cannula to keep O2 sats 92% or greater.  Wean as able  Continue incentive spirometry     Fall precautions     Advance Diet as Tolerated   Follow this diet upon discharge: Orders Placed This Encounter     Room Service     Combination Diet 9002-7361 Calories: Moderate Consistent CHO (4-6 CHO units/meal); 2 gm NA Diet       Discharge Medications   Current Discharge Medication List      START taking these medications    Details   sucralfate (CARAFATE) 1  GM tablet Take 1 tablet (1 g) by mouth 4 times daily (before meals and nightly)  Qty: 120 tablet, Refills: 0    Associated Diagnoses: Other acute gastritis without hemorrhage      ceFAZolin (ANCEF) 1 GM vial Inject 2 g into the vein every 8 hours for 24 days CBC with differential, creatinine, SGOT weekly while on this medication to be faxed to Dr. Chavez office.  Qty: 1 each    Associated Diagnoses: Gram-positive bacteremia         CONTINUE these medications which have CHANGED    Details   !! furosemide (LASIX) 20 MG tablet Take 3 tablets (60 mg) by mouth daily (with lunch)  Qty: 30 tablet    Associated Diagnoses: Lymphedema       !! - Potential duplicate medications found. Please discuss with provider.      CONTINUE these medications which have NOT CHANGED    Details   atenolol (TENORMIN) 50 MG tablet Take 50 mg by mouth daily      GABAPENTIN PO Take 300 mg by mouth 2 times daily      polyethylene glycol (MIRALAX/GLYCOLAX) Packet Take 17 g by mouth daily      senna-docusate (SENOKOT-S;PERICOLACE) 8.6-50 MG per tablet Take 2 tablets by mouth 2 times daily      triamcinolone (KENALOG) 0.5 % cream Apply topically three times a week Mon/Wed /Fri      WARFARIN SODIUM PO Take by mouth daily 3 mg   MWF, 4 mg Tues,Thur, Sat,Sun      glipiZIDE (GLUCOTROL) 5 MG tablet Take 1 tablet (5 mg) by mouth 2 times daily (before meals)  Qty: 30 tablet    Associated Diagnoses: Type 2 diabetes mellitus with complication, without long-term current use of insulin (H)      lisinopril (PRINIVIL/ZESTRIL) 5 MG tablet Take 1 tablet (5 mg) by mouth daily    Associated Diagnoses: Essential hypertension      ferrous gluconate (FERGON) 324 (38 FE) MG tablet Take 1 tablet (324 mg) by mouth daily (with breakfast)  Qty: 100 tablet    Associated Diagnoses: Iron deficiency anemia, unspecified iron deficiency anemia type      simethicone (MYLICON) 80 MG chewable tablet Take 2 tablets (160 mg) by mouth every 6 hours as needed for flatulence or  cramping  Qty: 180 tablet    Associated Diagnoses: Abdominal pain, generalized      nitroglycerin (NITROSTAT) 0.4 MG sublingual tablet Place 1 tablet (0.4 mg) under the tongue every 5 minutes as needed for chest pain  Qty: 25 tablet    Associated Diagnoses: Coronary artery disease involving native heart without angina pectoris, unspecified vessel or lesion type      simvastatin (ZOCOR) 10 MG tablet Take 1 tablet (10 mg) by mouth At Bedtime  Qty: 30 tablet    Associated Diagnoses: Coronary artery disease involving native heart without angina pectoris, unspecified vessel or lesion type      camphor-menthol (DERMASARRA) 0.5-0.5 % LOTN To affected areas on both legs Apply topically daily To affected areas on both legs    Associated Diagnoses: Venous stasis      !! furosemide (LASIX) 20 MG tablet Take 3 tablets (60 mg) by mouth every morning  Qty: 30 tablet    Associated Diagnoses: Lymphedema      Calcium Carb-Cholecalciferol (CALCIUM 500+D) 500-200 MG-UNIT TABS Take 1 tablet by mouth 2 times daily    Associated Diagnoses: Vitamin D deficiency      tamsulosin (FLOMAX) 0.4 MG capsule Take 1 capsule (0.4 mg) by mouth every evening  Qty: 60 capsule    Associated Diagnoses: Urinary retention      multivitamin (I-REBEL) TABS per tablet Take 1 tablet by mouth daily  Qty: 30 tablet, Refills: 0    Associated Diagnoses: Small bowel obstruction      levothyroxine (SYNTHROID/LEVOTHROID) 100 MCG tablet Take 1 tablet (100 mcg) by mouth daily  Qty: 30 tablet    Associated Diagnoses: Hypothyroidism, unspecified type      omeprazole (PRILOSEC) 20 MG CR capsule Take 1 capsule (20 mg) by mouth daily  Qty: 30 capsule    Associated Diagnoses: Gastroesophageal reflux disease without esophagitis      ACETAMINOPHEN PO Take 650 mg by mouth daily as needed for pain       !! - Potential duplicate medications found. Please discuss with provider.        Allergies   No Known Allergies  Data   Most Recent 3 CBC's:  Recent Labs   Lab Test  04/15/18    0610  04/12/18   0540  04/08/18   0945  04/06/18   0722  04/05/18   0651   WBC   --    --   8.0  8.7  8.9   HGB   --    --   12.2*  12.5*  11.6*   MCV   --    --   101*  100  104*   PLT  214  202  187  120*  120*      Most Recent 3 BMP's:  Recent Labs   Lab Test  04/14/18   0615  04/13/18   0515  04/12/18   1350  04/12/18   0540   NA  138  137   --   138   POTASSIUM  3.7  3.7  3.9  3.3*   CHLORIDE  97  97   --   99   CO2  40*  38*   --   37*   BUN  21  20   --   20   CR  0.98  0.95   --   0.92   ANIONGAP  1*  2*   --   2*   JANN  8.8  8.7   --   8.2*   GLC  140*  145*   --   138*     Most Recent 2 LFT's:  Recent Labs   Lab Test  04/03/18   1042  05/22/17   0646   AST  30  28   ALT  30  25   ALKPHOS  76  66   BILITOTAL  1.7*  0.4     Most Recent INR's and Anticoagulation Dosing History:  Anticoagulation Dose History     Recent Dosing and Labs Latest Ref Rng & Units 4/8/2018 4/9/2018 4/10/2018 4/11/2018 4/12/2018 4/13/2018 4/14/2018    ZZ IMS TEMPLATE - - - - - - 3.5 mg -    Warfarin 3 mg - - 3 mg - 3 mg 3 mg - -    Warfarin 4 mg - - - 4 mg - - - -    Warfarin 5 mg - 5 mg - - - - - -    INR 0.86 - 1.14 2.46(H) 2.68(H) 2.40(H) 2.66(H) 2.98(H) 2.64(H) 2.57(H)        Most Recent 3 Troponin's:  Recent Labs   Lab Test  04/04/18   0740  04/04/18   0100  04/03/18   1849   TROPI  <0.015  <0.015  <0.015     Most Recent Cholesterol Panel:  Recent Labs   Lab Test  05/22/17   0646 07/24/14   CHOL   --   126   LDL   --   53   HDL   --   36   TRIG  219*  186     Most Recent 6 Bacteria Isolates From Any Culture (See EPIC Reports for Culture Details):  Recent Labs   Lab Test  04/06/18   0721  04/05/18   0650  04/04/18   1604  04/03/18   1252  04/03/18   1204  05/14/17   1052   CULT  No growth  No growth  No growth  Cultured on the 1st day of incubation:  Staphylococcus aureus  *  Critical Value/Significant Value, preliminary result only, called to and read back by  Nell Valerio RN at 0952 4/4/18 [id number]    Cultured on the 1st day  of incubation:  Streptococcus mitis group  *  Critical Value/Significant Value called to and read back by  Tamanna Evans RN at 1015 on [id number]    (Note)  POSITIVE for STAPHYLOCOCCUS AUREUS and NEGATIVE for the mecA gene  (not MRSA) by Zenytimeigene multiplex nucleic acid test. The mecA gene was  not detected. Final identification and antimicrobial susceptibility  testing will be verified by standard methods.    Specimen tested with Verigene multiplex, gram-positive blood culture  nucleic acid test for the following targets: Staph aureus, Staph  epidermidis, Staph lugdunensis, other Staph species, Enterococcus  faecalis, Enterococcus faecium, Streptococcus species, S. agalactiae,  S. anginosus grp., S. pneumoniae, S. pyogenes, Listeria sp., mecA  (methicillin resistance) and Renetta/B (vancomycin resistance).    Critical Value/Significant Value called to and read back by  Komal Mckeon RN @1240 04/04/18 gd    No growth  No anaerobes isolated  No growth     Most Recent TSH, T4 and A1c Labs:  Recent Labs   Lab Test  11/21/12   1450   TSH  8.52*     Results for orders placed or performed during the hospital encounter of 04/03/18   CT Head w/o Contrast    Narrative    CT SCAN OF THE HEAD WITHOUT CONTRAST   4/3/2018 11:45 AM     HISTORY: fall;     TECHNIQUE:  Axial images of the head and coronal reformations without  IV contrast material. Radiation dose for this scan was reduced using  automated exposure control, adjustment of the mA and/or kV according  to patient size, or iterative reconstruction technique.    COMPARISON: None.    FINDINGS:  There is generalized atrophy of the brain.  White matter  changes are present in the cerebral hemispheres that are consistent  with small vessel ischemic disease in this age patient. There is no  evidence of intracranial hemorrhage, mass, acute infarct or anomaly.  The visualized portions of the sinuses and mastoids appear normal. No  intracranial hemorrhage or skull fractures are  identified.      Impression    IMPRESSION: No bleed or fractures are identified.      LIONEL VEE MD   CT Chest Abdomen Pelvis w/o Contrast    Narrative    CT CHEST/ABDOMEN/PELVIS WITHOUT CONTRAST April 3, 2018 11:44 AM     HISTORY: Trauma, on blood thinners, left rib pain.      TECHNIQUE: Axial images from the thoracic inlet to the symphysis are  performed with additional coronal reformatted images. No contrast is  utilized. Radiation dose for this scan was reduced using automated  exposure control, adjustment of the mA and/or kV according to patient  size, or iterative reconstruction technique.    FINDINGS:     Chest: Calcified granuloma is noted in the right lower lobe on series  6, image 120. Lungs are otherwise clear without infiltrate,  consolidation or mass. No pleural or pericardial fluid. Heart is  enlarged. Prior CABG. Coronary artery calcifications are present.  Esophagus is unremarkable. No enlarged lymph nodes in the chest or  axillas.    Abdomen: Prior cholecystectomy changes. The liver, spleen, pancreas  and adrenal glands are unremarkable. Bilateral simple renal cysts are  present. 0.4 cm nonobstructing stone is present in the left renal  collecting system on series 2, image 57. There is no hydronephrosis or  additional urinary tract calculi. Aorta demonstrates calcified plaque  without aneurysm or retroperitoneal hemorrhage. Small left periaortic  lymph nodes are present but none are significantly enlarged by CT  criteria. The bowel is normal in caliber without obstruction or  diverticulitis. Appendix is normal.    Pelvis: The bladder, prostate and rectum are unremarkable. No enlarged  pelvic lymph nodes are appreciated. A few prominent possibly reactive  lymph nodes are noted in the left inguinal region on series 2, image  115. The largest measures approximately 2.4 x 1.4 cm. Degenerative  spine changes are present. No evidence of rib or spine fracture.      Impression    IMPRESSION:  1. No acute  changes in the chest, abdomen or pelvis. No evidence of  fractures. Bilateral renal cortical cysts are noted with a  nonobstructing stone left renal collecting system. Prior  cholecystectomy changes. Evidence of old granulomatous disease.  2. Prominent left groin lymph nodes of uncertain etiology. Reactive  adenopathy is suspected.  3. Postoperative changes from coronary artery bypass graft. Calcified  plaque is noted throughout the aorta without evidence of aneurysm.    PRISCILA TINEO MD   US Lower Extremity Venous Duplex Left    Narrative    ULTRASOUND VENOUS LEFT LOWER EXTREMITY  4/3/2018 3:15 PM     HISTORY:  Pain, swelling.     COMPARISON: None.    TECHNIQUE: Ultrasound gray scale, Color Doppler flow, and spectral  Doppler waveform analysis performed.    FINDINGS:  The left common femoral, superficial femoral, popliteal and  posterior tibial veins are patent and fully compressible and  demonstrate normal venous Doppler flow. The visualized greater  saphenous vein is negative for thrombus.       Impression    IMPRESSION: No DVT demonstrated.    SHAGUFTA FLORES MD   XR Chest 2 Views    Narrative    CHEST TWO VIEWS    4/3/2018 4:38 PM     HISTORY: New hypoxia.     COMPARISON: 5/27/2017.    FINDINGS: Sternal wires and mediastinal clips. No pneumothorax. The  heart is enlarged without pulmonary edema. There is a mild left  basilar infiltrate. Lungs are otherwise clear. No pleural effusion.      Impression    IMPRESSION: Mild left basilar atelectasis or pneumonia.    CARL MITCHELL MD   XR Chest Port 1 View    Narrative    PORTABLE CHEST ONE VIEW   4/6/2018 5:41 PM     HISTORY: RN placed PICC, verify tip.    COMPARISON: 4/3/2018.    FINDINGS: Upright portable chest. A right PICC has been placed. The  tip is difficult to see but is probably in the mid SVC in good  position. No pneumothorax. Sternal wires in place. The heart is  enlarged without pulmonary edema. There is mild bibasilar infiltrate.      Impression     IMPRESSION: Right PICC with tip probably in the mid SVC.    CARL MITCHELL MD   XR Chest Port 1 View    Narrative    CHEST ONE VIEW PORTABLE  4/10/2018 2:21 PM     HISTORY: RN placed PICC - verify tip placement.     COMPARISON: Chest x-rays dated 4/8/2018.      Impression    IMPRESSION:  1. Hypoaeration, sternal cerclage wires, coronary artery markers and  mediastinal clips, cardiomegaly and mild vascular congestion are  essentially stable since the prior study.  2. Right-sided PICC line is again noted. The tip of the PICC line is  difficult to see but may be projected over the upper right atrium.  Contrast injection into the PICC line and repeat imaging may be  helpful to better evaluate the tip of the tube.    RAMSEY NUÑEZ MD   XR Chest Port 1 View    Narrative    CHEST ONE VIEW PORTABLE  4/10/2018 3:23 PM     HISTORY:  RN placed PICC - verify tip placement.    COMPARISON: Chest x-rays dated 4/10/2018 at 2:15 PM.      Impression    IMPRESSION:  1. Right PICC line is again noted. Tip is still difficult to see on  this study due to underpenetration of the mediastinum, but appears to  be in the upper right atrium just distal to the right atrial/superior  vena caval junction.  2. No other changes.    RAMSEY NUÑEZ MD   XR Chest Port 1 View    Narrative    CHEST ONE VIEW PORTABLE 4/10/2018 4:33 PM     HISTORY: PICC line retracted 2 cm, verify tip.    COMPARISON: 4/10/2018 at 1516.      Impression    IMPRESSION: No change in position of right-sided PICC line. Prior  median sternotomy again noted. Moderate vascular congestion has  slightly increased since the prior exam. Interval development of a  focus of platelike atelectasis or early infiltrate in the left lung  base. Heart size is unchanged.    JA MCCLURE MD           Disclaimer: This note consists of symbols derived from keyboarding, dictation and/or voice recognition software. As a result, there may be errors in the script that have gone undetected. Please  consider this when interpreting information found in this chart.

## 2018-04-15 NOTE — PROGRESS NOTES
Hospitalist web paged that [facility] asked for updated discharge orders as well as clarification of code status please.

## 2018-04-15 NOTE — PLAN OF CARE
Problem: Patient Care Overview  Goal: Plan of Care/Patient Progress Review  Physical Therapy Discharge Summary    Reason for therapy discharge:    Discharged to transitional care facility.    Progress towards therapy goal(s). See goals on Care Plan in [facility] electronic health record for goal details.  Goals partially met.  Barriers to achieving goals:   discharge from facility.    Therapy recommendation(s):    Continued therapy is recommended.  Rationale/Recommendations:  continue towards PT goals at the TCU setting.

## 2018-04-15 NOTE — PLAN OF CARE
Problem: Patient Care Overview  Goal: Plan of Care/Patient Progress Review  Outcome: No Change  Pt. Alert to self, up with assist of 1 and walker, Tele: [email or web address], pauses up to 2.5 sec. HR 59-60's. PICC to right arm intact. Lung sounds diminished, room air sat's down to 88-89%. O2 on at 1/2 L with sat's at 93-95%, pt. Does have a nonproductive cough at times. Bilateral lower ext. Bobby, edematous,  Left greater than right. Rash to back. Hydrocortisone cream ordered. Pt. Continues on Abx (ancef) for Cellulitis and Sepsis. Pt. To discharge soon, once stable to [facility] TCU. Pt. Denies any needs at this time. Will continue with POC.

## 2018-04-15 NOTE — PHARMACY-ANTICOAGULATION SERVICE
Clinical Pharmacy- Warfarin Discharge Note  This patient is currently on warfarin for the treatment of Atrial fibrillation.  INR Goal= 2-3  Expected length of therapy undetermined.           Anticoagulation Dose History     Recent Dosing and Labs Latest Ref Rng & Units 4/9/2018 4/10/2018 4/11/2018 4/12/2018 4/13/2018 4/14/2018 4/15/2018    AMINAZ [facility] TEMPLATE - - - - - 3.5 mg 3.5 mg -    Warfarin 3 mg - 3 mg - 3 mg 3 mg - - -    Warfarin 4 mg - - 4 mg - - - - -    INR 0.86 - 1.14 2.68(H) 2.40(H) 2.66(H) 2.98(H) 2.64(H) 2.57(H) 2.57(H)        Agree with discharge orders to continue PTA dosing.   The patient should have an INR checked in 2-3 days.

## 2018-04-15 NOTE — PLAN OF CARE
Problem: Patient Care Overview  Goal: Plan of Care/Patient Progress Review  OT: Per chart review pt is set for transfer to TCU today, family will be transporting pt, will defer further therapy to next level of care    Occupational Therapy Discharge Summary    Reason for therapy discharge:    Discharged to transitional care facility.    Progress towards therapy goal(s). [facility] for goal details.  Goals partially met.  Barriers to achieving goals:   limited tolerance for therapy and discharge from facility.    Therapy recommendation(s):    Continued therapy is recommended.  Rationale/Recommendations:  TCU previously recommended and is the plan for today, son is transporting pt this afternoon.

## 2018-04-15 NOTE — PROGRESS NOTES
[facility]  Infectious Disease Progress Note          Assessment and Plan:   IMPRESSION:   1.  A 89-year-old male with 2 falls in the last week, some weakness and sleepiness for 2-3 days, cause of this syndrome is now apparent with blood cultures positive for methicillin-sensitive Staph aureus, the patient has methicillin-sensitive Staph aureus bacteremia syndrome, no clear secondary site, possibly left leg as source, although leg does not look like cellulitis to me more stasis with one small wound as a possible entry site.   2.  Low back pain, not obvious tenderness, conceivable secondary site of infection, but also possibly just related to fall.   3.  Diabetes mellitus.   4.  Atrial fibrillation.   5.  Mild dementia.   6.  Venous stasis disease without much history of major infection process, some wound formation in his left leg, recurrently.  7 New rash, mainly back, some abd, none of it looks like drug eruprion, ? folliculitis       RECOMMENDATIONS:   1.  Continue Ancef, improved acute renal insufficiency, increased dose   2.   blood cultures  FU neg, no secondary sites  3.  Transthoracic echocardiogram abnormal, but no obvious infection, would hold off on a transesophageal echocardiogram, patient will require a 4-week course of antibiotics, regardless would only do a GAVIN if we are not clearing the blood.  .      4.  Looks better only 1 cx +, no obvious secondary sites  5  PICC, orders in for ancef ? Rehab any time  6 symptomatic and topical rash tx, if becomes more generalized will readress as outpt but doubt allergic, no major generalized spread          Interval History:   no new complaints and doing well; no cp, sob, n/v/d, or abd pain. Looks better cx as above rash              Medications:       furosemide  60 mg Oral BID     sucralfate  1 g Oral 4x Daily AC & HS     sodium chloride (PF)  3 mL Intracatheter Q8H     ceFAZolin  2 g Intravenous Q8H     sodium chloride (PF)  10 mL  "Intracatheter Q7 Days     lisinopril  5 mg Oral Daily     atenolol  50 mg Oral Daily     gabapentin (NEURONTIN) capsule 300 mg  300 mg Oral BID     glipiZIDE  5 mg Oral BID AC     levothyroxine  100 mcg Oral Daily     omeprazole  20 mg Oral Daily     polyethylene glycol  17 g Oral Daily     senna-docusate  2 tablet Oral BID     simvastatin  10 mg Oral At Bedtime     tamsulosin  0.4 mg Oral QPM                  Physical Exam:   Blood pressure 116/63, pulse 78, temperature 98.6  F (37  C), temperature source Oral, resp. rate 20, height 1.676 m (5' 6\"), weight 101.1 kg (222 lb 14.4 oz), SpO2 93 %.  Wt Readings from Last 2 Encounters:   04/14/18 101.1 kg (222 lb 14.4 oz)   05/28/17 99.3 kg (218 lb 14.4 oz)     Vital Signs with Ranges  Temp:  [95.5  F (35.3  C)-98.6  F (37  C)] 98.6  F (37  C)  Heart Rate:  [60-71] 66  Resp:  [18-29] 20  BP: (116-156)/(60-75) 116/63  SpO2:  [87 %-96 %] 93 %    Constitutional: Awake, alert, cooperative, no apparent distress   Lungs: Clear to auscultation bilaterally, no crackles or wheezing   Cardiovascular: Regular rate and rhythm, normal S1 and S2, and no murmur noted   Abdomen: Normal bowel sounds, soft, non-distended, non-tender   Skin:  no cyanosis, same edema no emboli  Mostly stasis   Other:  rash back heat folliculitis, not generaized, R chest ? yeast          Data:   All microbiology laboratory data reviewed.  Recent Labs   Lab Test  04/12/18   0540  04/08/18   0945  04/06/18   0722  04/05/18   0651   WBC   --   8.0  8.7  8.9   HGB   --   12.2*  12.5*  11.6*   HCT   --   38.8*  39.6*  38.0*   MCV   --   101*  100  104*   PLT  202  187  120*  120*     Recent Labs   Lab Test  04/14/18   0615  04/13/18   0515  04/12/18   0540   CR  0.98  0.95  0.92     No lab results found.  Recent Labs   Lab Test  04/06/18   0721  04/05/18   0650  04/04/18   1604  04/03/18   1252  04/03/18   1204  05/14/17   1052  05/13/17 2043   CULT  No growth  No growth  No growth  Cultured on the 1st day of " incubation:  Staphylococcus aureus  *  Critical Value/Significant Value, preliminary result only, called to and read back by  Nell Valerio RN at 0900 4/4/18 [id number]    Cultured on the 1st day of incubation:  Streptococcus mitis group  *  Critical Value/Significant Value called to and read back by  Tamanna Evans RN at 1015 on [id number]    (Note)  POSITIVE for STAPHYLOCOCCUS AUREUS and NEGATIVE for the mecA gene  (not MRSA) by [employer] multiplex nucleic acid test. The mecA gene was  not detected. Final identification and antimicrobial susceptibility  testing will be verified by standard methods.    Specimen tested with SteadyFareigene multiplex, gram-positive blood culture  nucleic acid test for the following targets: Staph aureus, Staph  epidermidis, Staph lugdunensis, other Staph species, Enterococcus  faecalis, Enterococcus faecium, Streptococcus species, S. agalactiae,  S. anginosus grp., S. pneumoniae, S. pyogenes, Listeria sp., mecA  (methicillin resistance) and Renetta/B (vancomycin resistance).    Critical Value/Significant Value called to and read back by  Komal Mckeon RN @1240 04/04/18 gd    No growth  No anaerobes isolated  No growth  <10,000 colonies/mL mixed urogenital conner Susceptibility testing not routinely   done

## 2018-04-15 NOTE — PROGRESS NOTES
Appears DNR/DNI code was updated, and orders were updated earlier this morning.  Writer faxed dc orders via comm management to [facility] at 1:10pm.

## 2018-04-16 ENCOUNTER — RECORDS - HEALTHEAST (OUTPATIENT)
Dept: LAB | Facility: CLINIC | Age: 83
End: 2018-04-16

## 2018-04-16 LAB
ANION GAP SERPL CALCULATED.3IONS-SCNC: 18 MMOL/L (ref 5–18)
AST SERPL W P-5'-P-CCNC: 38 U/L (ref 0–40)
BASOPHILS # BLD AUTO: 0.1 THOU/UL (ref 0–0.2)
BASOPHILS NFR BLD AUTO: 1 % (ref 0–2)
BUN SERPL-MCNC: 26 MG/DL (ref 8–28)
CALCIUM SERPL-MCNC: 9.4 MG/DL (ref 8.5–10.5)
CHLORIDE BLD-SCNC: 96 MMOL/L (ref 98–107)
CO2 SERPL-SCNC: 23 MMOL/L (ref 22–31)
CREAT SERPL-MCNC: 1.17 MG/DL (ref 0.7–1.3)
CREAT SERPL-MCNC: 1.18 MG/DL (ref 0.7–1.3)
EOSINOPHIL # BLD AUTO: 0.2 THOU/UL (ref 0–0.4)
EOSINOPHIL NFR BLD AUTO: 3 % (ref 0–6)
ERYTHROCYTE [DISTWIDTH] IN BLOOD BY AUTOMATED COUNT: 13.1 % (ref 11–14.5)
GFR SERPL CREATININE-BSD FRML MDRD: 58 ML/MIN/1.73M2
GFR SERPL CREATININE-BSD FRML MDRD: 59 ML/MIN/1.73M2
GLUCOSE BLD-MCNC: 156 MG/DL (ref 70–125)
HCT VFR BLD AUTO: 42.1 % (ref 40–54)
HGB BLD-MCNC: 12.7 G/DL (ref 14–18)
LYMPHOCYTES # BLD AUTO: 1.1 THOU/UL (ref 0.8–4.4)
LYMPHOCYTES NFR BLD AUTO: 15 % (ref 20–40)
MCH RBC QN AUTO: 31.8 PG (ref 27–34)
MCHC RBC AUTO-ENTMCNC: 30.2 G/DL (ref 32–36)
MCV RBC AUTO: 106 FL (ref 80–100)
MONOCYTES # BLD AUTO: 0.7 THOU/UL (ref 0–0.9)
MONOCYTES NFR BLD AUTO: 11 % (ref 2–10)
NEUTROPHILS # BLD AUTO: 4.8 THOU/UL (ref 2–7.7)
NEUTROPHILS NFR BLD AUTO: 70 % (ref 50–70)
PLATELET # BLD AUTO: 221 THOU/UL (ref 140–440)
PMV BLD AUTO: 11.3 FL (ref 8.5–12.5)
POTASSIUM BLD-SCNC: 4.3 MMOL/L (ref 3.5–5)
RBC # BLD AUTO: 3.99 MILL/UL (ref 4.4–6.2)
SODIUM SERPL-SCNC: 137 MMOL/L (ref 136–145)
WBC: 7 THOU/UL (ref 4–11)

## 2018-04-20 ENCOUNTER — RECORDS - HEALTHEAST (OUTPATIENT)
Dept: LAB | Facility: CLINIC | Age: 83
End: 2018-04-20

## 2018-04-23 LAB
AST SERPL W P-5'-P-CCNC: 26 U/L (ref 0–40)
BASOPHILS # BLD AUTO: 0 THOU/UL (ref 0–0.2)
BASOPHILS NFR BLD AUTO: 1 % (ref 0–2)
CREAT SERPL-MCNC: 1.07 MG/DL (ref 0.7–1.3)
EOSINOPHIL # BLD AUTO: 0.3 THOU/UL (ref 0–0.4)
EOSINOPHIL NFR BLD AUTO: 4 % (ref 0–6)
ERYTHROCYTE [DISTWIDTH] IN BLOOD BY AUTOMATED COUNT: 12.9 % (ref 11–14.5)
GFR SERPL CREATININE-BSD FRML MDRD: >60 ML/MIN/1.73M2
HCT VFR BLD AUTO: 36.8 % (ref 40–54)
HGB BLD-MCNC: 11.3 G/DL (ref 14–18)
LYMPHOCYTES # BLD AUTO: 1.1 THOU/UL (ref 0.8–4.4)
LYMPHOCYTES NFR BLD AUTO: 17 % (ref 20–40)
MCH RBC QN AUTO: 32 PG (ref 27–34)
MCHC RBC AUTO-ENTMCNC: 30.7 G/DL (ref 32–36)
MCV RBC AUTO: 104 FL (ref 80–100)
MONOCYTES # BLD AUTO: 0.8 THOU/UL (ref 0–0.9)
MONOCYTES NFR BLD AUTO: 12 % (ref 2–10)
NEUTROPHILS # BLD AUTO: 4.2 THOU/UL (ref 2–7.7)
NEUTROPHILS NFR BLD AUTO: 67 % (ref 50–70)
PLATELET # BLD AUTO: 181 THOU/UL (ref 140–440)
PMV BLD AUTO: 11.8 FL (ref 8.5–12.5)
RBC # BLD AUTO: 3.53 MILL/UL (ref 4.4–6.2)
WBC: 6.4 THOU/UL (ref 4–11)

## 2018-04-27 ENCOUNTER — RECORDS - HEALTHEAST (OUTPATIENT)
Dept: LAB | Facility: CLINIC | Age: 83
End: 2018-04-27

## 2018-04-30 ENCOUNTER — RECORDS - HEALTHEAST (OUTPATIENT)
Dept: LAB | Facility: CLINIC | Age: 83
End: 2018-04-30

## 2018-04-30 LAB
AST SERPL W P-5'-P-CCNC: 29 U/L (ref 0–40)
BASOPHILS # BLD AUTO: 0 THOU/UL (ref 0–0.2)
BASOPHILS NFR BLD AUTO: 1 % (ref 0–2)
CREAT SERPL-MCNC: 1.14 MG/DL (ref 0.7–1.3)
EOSINOPHIL # BLD AUTO: 0.6 THOU/UL (ref 0–0.4)
EOSINOPHIL NFR BLD AUTO: 10 % (ref 0–6)
ERYTHROCYTE [DISTWIDTH] IN BLOOD BY AUTOMATED COUNT: 13.2 % (ref 11–14.5)
GFR SERPL CREATININE-BSD FRML MDRD: 60 ML/MIN/1.73M2
HCT VFR BLD AUTO: 35.5 % (ref 40–54)
HGB BLD-MCNC: 10.9 G/DL (ref 14–18)
LYMPHOCYTES # BLD AUTO: 0.7 THOU/UL (ref 0.8–4.4)
LYMPHOCYTES NFR BLD AUTO: 13 % (ref 20–40)
MCH RBC QN AUTO: 31.7 PG (ref 27–34)
MCHC RBC AUTO-ENTMCNC: 30.7 G/DL (ref 32–36)
MCV RBC AUTO: 103 FL (ref 80–100)
MONOCYTES # BLD AUTO: 0.6 THOU/UL (ref 0–0.9)
MONOCYTES NFR BLD AUTO: 10 % (ref 2–10)
NEUTROPHILS # BLD AUTO: 3.7 THOU/UL (ref 2–7.7)
NEUTROPHILS NFR BLD AUTO: 66 % (ref 50–70)
PLATELET # BLD AUTO: 172 THOU/UL (ref 140–440)
PMV BLD AUTO: 11.4 FL (ref 8.5–12.5)
RBC # BLD AUTO: 3.44 MILL/UL (ref 4.4–6.2)
WBC: 5.7 THOU/UL (ref 4–11)

## 2018-05-01 LAB
ANION GAP SERPL CALCULATED.3IONS-SCNC: 7 MMOL/L (ref 5–18)
BUN SERPL-MCNC: 25 MG/DL (ref 8–28)
CALCIUM SERPL-MCNC: 9 MG/DL (ref 8.5–10.5)
CHLORIDE BLD-SCNC: 99 MMOL/L (ref 98–107)
CO2 SERPL-SCNC: 34 MMOL/L (ref 22–31)
CREAT SERPL-MCNC: 1.15 MG/DL (ref 0.7–1.3)
GFR SERPL CREATININE-BSD FRML MDRD: 60 ML/MIN/1.73M2
GLUCOSE BLD-MCNC: 129 MG/DL (ref 70–125)
POTASSIUM BLD-SCNC: 3.7 MMOL/L (ref 3.5–5)
SODIUM SERPL-SCNC: 140 MMOL/L (ref 136–145)

## 2018-05-07 ENCOUNTER — RECORDS - HEALTHEAST (OUTPATIENT)
Dept: LAB | Facility: CLINIC | Age: 83
End: 2018-05-07

## 2018-05-07 LAB
AST SERPL W P-5'-P-CCNC: 26 U/L (ref 0–40)
BASOPHILS # BLD AUTO: 0 THOU/UL (ref 0–0.2)
BASOPHILS NFR BLD AUTO: 1 % (ref 0–2)
CREAT SERPL-MCNC: 1.12 MG/DL (ref 0.7–1.3)
EOSINOPHIL # BLD AUTO: 0.5 THOU/UL (ref 0–0.4)
EOSINOPHIL NFR BLD AUTO: 10 % (ref 0–6)
ERYTHROCYTE [DISTWIDTH] IN BLOOD BY AUTOMATED COUNT: 13.1 % (ref 11–14.5)
GFR SERPL CREATININE-BSD FRML MDRD: >60 ML/MIN/1.73M2
HCT VFR BLD AUTO: 36 % (ref 40–54)
HGB BLD-MCNC: 11 G/DL (ref 14–18)
LYMPHOCYTES # BLD AUTO: 0.6 THOU/UL (ref 0.8–4.4)
LYMPHOCYTES NFR BLD AUTO: 14 % (ref 20–40)
MCH RBC QN AUTO: 31.6 PG (ref 27–34)
MCHC RBC AUTO-ENTMCNC: 30.6 G/DL (ref 32–36)
MCV RBC AUTO: 103 FL (ref 80–100)
MONOCYTES # BLD AUTO: 0.6 THOU/UL (ref 0–0.9)
MONOCYTES NFR BLD AUTO: 13 % (ref 2–10)
NEUTROPHILS # BLD AUTO: 2.9 THOU/UL (ref 2–7.7)
NEUTROPHILS NFR BLD AUTO: 63 % (ref 50–70)
PLATELET # BLD AUTO: 145 THOU/UL (ref 140–440)
PMV BLD AUTO: 11.5 FL (ref 8.5–12.5)
RBC # BLD AUTO: 3.48 MILL/UL (ref 4.4–6.2)
WBC: 4.7 THOU/UL (ref 4–11)

## 2018-05-10 ENCOUNTER — RECORDS - HEALTHEAST (OUTPATIENT)
Dept: LAB | Facility: CLINIC | Age: 83
End: 2018-05-10

## 2018-05-11 LAB
ANION GAP SERPL CALCULATED.3IONS-SCNC: 8 MMOL/L (ref 5–18)
BUN SERPL-MCNC: 33 MG/DL (ref 8–28)
CALCIUM SERPL-MCNC: 9 MG/DL (ref 8.5–10.5)
CHLORIDE BLD-SCNC: 98 MMOL/L (ref 98–107)
CO2 SERPL-SCNC: 35 MMOL/L (ref 22–31)
CREAT SERPL-MCNC: 1.28 MG/DL (ref 0.7–1.3)
GFR SERPL CREATININE-BSD FRML MDRD: 53 ML/MIN/1.73M2
GLUCOSE BLD-MCNC: 152 MG/DL (ref 70–125)
POTASSIUM BLD-SCNC: 3.7 MMOL/L (ref 3.5–5)
SODIUM SERPL-SCNC: 141 MMOL/L (ref 136–145)

## 2018-05-16 ENCOUNTER — RECORDS - HEALTHEAST (OUTPATIENT)
Dept: LAB | Facility: CLINIC | Age: 83
End: 2018-05-16

## 2018-05-21 LAB
AEROBIC BLOOD CULTURE BOTTLE: NO GROWTH
AEROBIC BLOOD CULTURE BOTTLE: NO GROWTH
ANAEROBIC BLOOD CULTURE BOTTLE: NO GROWTH
ANAEROBIC BLOOD CULTURE BOTTLE: NO GROWTH

## 2018-05-30 ENCOUNTER — RECORDS - HEALTHEAST (OUTPATIENT)
Dept: LAB | Facility: CLINIC | Age: 83
End: 2018-05-30

## 2018-05-30 LAB — INR PPP: 2.53 (ref 0.9–1.1)

## 2018-06-10 ENCOUNTER — RECORDS - HEALTHEAST (OUTPATIENT)
Dept: LAB | Facility: CLINIC | Age: 83
End: 2018-06-10

## 2018-06-11 LAB
ANION GAP SERPL CALCULATED.3IONS-SCNC: 10 MMOL/L (ref 5–18)
BASOPHILS # BLD AUTO: 0 THOU/UL (ref 0–0.2)
BASOPHILS NFR BLD AUTO: 1 % (ref 0–2)
BUN SERPL-MCNC: 20 MG/DL (ref 8–28)
CALCIUM SERPL-MCNC: 9.4 MG/DL (ref 8.5–10.5)
CHLORIDE BLD-SCNC: 101 MMOL/L (ref 98–107)
CO2 SERPL-SCNC: 28 MMOL/L (ref 22–31)
CREAT SERPL-MCNC: 1.25 MG/DL (ref 0.7–1.3)
EOSINOPHIL # BLD AUTO: 0.2 THOU/UL (ref 0–0.4)
EOSINOPHIL NFR BLD AUTO: 3 % (ref 0–6)
ERYTHROCYTE [DISTWIDTH] IN BLOOD BY AUTOMATED COUNT: 13.7 % (ref 11–14.5)
GFR SERPL CREATININE-BSD FRML MDRD: 54 ML/MIN/1.73M2
GLUCOSE BLD-MCNC: 100 MG/DL (ref 70–125)
HCT VFR BLD AUTO: 38.2 % (ref 40–54)
HGB BLD-MCNC: 11.6 G/DL (ref 14–18)
INR PPP: 2.14 (ref 0.9–1.1)
LYMPHOCYTES # BLD AUTO: 1.1 THOU/UL (ref 0.8–4.4)
LYMPHOCYTES NFR BLD AUTO: 17 % (ref 20–40)
MCH RBC QN AUTO: 31.1 PG (ref 27–34)
MCHC RBC AUTO-ENTMCNC: 30.4 G/DL (ref 32–36)
MCV RBC AUTO: 102 FL (ref 80–100)
MONOCYTES # BLD AUTO: 0.7 THOU/UL (ref 0–0.9)
MONOCYTES NFR BLD AUTO: 10 % (ref 2–10)
NEUTROPHILS # BLD AUTO: 4.3 THOU/UL (ref 2–7.7)
NEUTROPHILS NFR BLD AUTO: 69 % (ref 50–70)
PLATELET # BLD AUTO: 193 THOU/UL (ref 140–440)
PMV BLD AUTO: 11.5 FL (ref 8.5–12.5)
POTASSIUM BLD-SCNC: 3.9 MMOL/L (ref 3.5–5)
RBC # BLD AUTO: 3.73 MILL/UL (ref 4.4–6.2)
SODIUM SERPL-SCNC: 139 MMOL/L (ref 136–145)
WBC: 6.3 THOU/UL (ref 4–11)

## 2018-06-12 LAB — HBA1C MFR BLD: 6.9 % (ref 4.2–6.1)

## 2018-06-29 ENCOUNTER — RECORDS - HEALTHEAST (OUTPATIENT)
Dept: LAB | Facility: CLINIC | Age: 83
End: 2018-06-29

## 2018-07-02 LAB — INR PPP: 2.09 (ref 0.9–1.1)

## 2018-07-23 ENCOUNTER — HOSPITAL ENCOUNTER (EMERGENCY)
Facility: CLINIC | Age: 83
Discharge: HOME | End: 2018-07-23
Attending: EMERGENCY MEDICINE | Admitting: EMERGENCY MEDICINE
Payer: MEDICARE

## 2018-07-23 VITALS
TEMPERATURE: 98.5 F | DIASTOLIC BLOOD PRESSURE: 82 MMHG | HEART RATE: 73 BPM | SYSTOLIC BLOOD PRESSURE: 150 MMHG | RESPIRATION RATE: 18 BRPM | OXYGEN SATURATION: 94 %

## 2018-07-23 DIAGNOSIS — I87.2 VENOUS STASIS DERMATITIS OF BOTH LOWER EXTREMITIES: ICD-10-CM

## 2018-07-23 PROCEDURE — 99282 EMERGENCY DEPT VISIT SF MDM: CPT

## 2018-07-23 ASSESSMENT — ENCOUNTER SYMPTOMS: FEVER: 0

## 2018-07-23 NOTE — ED AVS SNAPSHOT
[facility]    [address]    [facility] [address]    Phone:  [phone]    Fax:  [phone]                                       Ton Bagley   MRN: [id number]    Department:  [facility]   Date of Visit:  7/23/2018           Patient Information     Date Of Birth          7/18/1927        Your diagnoses for this visit were:     Venous stasis dermatitis of both lower extremities        You were seen by Ramírez Betts MD.      Follow-up Information     [facility]    Contact information:    [address], [address]  [place] [address]  [phone]          Follow up with [facility].    Specialty:  EMERGENCY MEDICINE    Why:  As needed, If symptoms worsen    Contact information:    [address]  [facility] [address]  [phone]        Discharge Instructions         Understanding Chronic Venous Insufficiency  Problems with the veins in the legs may lead to chronic venous insufficiency (CVI). CVI means that there is a long-term problem with the veins not being able to pump blood back to your heart. When this happens, blood stays in the legs and causes swelling and aching.   Two problems that may lead to chronic venous insufficiency are:    Damaged valves. Valves keep blood flowing from the legs through the blood vessels and back to the heart. When the valves are damaged, blood does not flow as well.     Deep vein thrombosis (DVT). Blood clots may form in the deep veins of the legs. This may cause pain, redness, and swelling in the legs. It may also block the flow of blood back to the heart. Seek medical care right away if you have these symptoms.    A blood clot in the leg can also break off and travel to the lungs. This is called pulmonary embolism (PE). In the lungs, the clot can cut off the flow of blood. This may cause chest pain,  trouble breathing, sweating, a fast heartbeat, coughing (may cough up blood), and fainting. It is a medical emergency and may cause death. Call 911 if you have these symptoms.    Healthcare providers call the two conditions, DVT and PE, venous thromboembolism (VTE).  CVI can t be cured, but you can control leg swelling to reduce the likelihood of ulcers (sores).  Recognizing the symptoms  Be aware of the following:    If you stand or sit with your feet down for long periods, your legs may ache or feel heavy.    Swollen ankles are possibly the most common symptom of CVI.    As swelling increases, the skin over your ankles may show red spots or a brownish tinge. The skin may feel leathery or scaly, and may start to itch.    If swelling is not controlled, an ulcer (open wound) may form.  What you can do  Reduce your risk of developing ulcers by doing the following:    Increase blood flow back to your heart by elevating your legs, exercising daily, and wearing elastic stockings.    Boost blood flow in your legs by losing excess weight.    If you must stand or sit in one place for a period of time, keep your blood moving by wiggling your toes, shifting your body position, and rising up on the balls of your feet.    Date Last Reviewed: 5/1/2016 2000-2017 [employer]. [address] [place] [address]. All rights reserved. This information is not intended as a substitute for professional medical care. Always follow your healthcare professional's instructions.          24 Hour Appointment Hotline       To make an appointment at any [facility] clinic, call [phone] ([phone]). If you don't have a family doctor or clinic, we will help you find one. [facility] clinics are conveniently located to serve the needs of you and your family.          ED Discharge Orders     Care Transition RN/SW IP Consult       Pt and family need help transitioning to higher level of care                     Review  of your medicines      Our records show that you are taking the medicines listed below. If these are incorrect, please call your family doctor or clinic.        Dose / Directions Last dose taken    ACETAMINOPHEN PO   Dose:  650 mg        Take 650 mg by mouth daily as needed for pain   Refills:  0        atenolol 50 MG tablet   Commonly known as:  TENORMIN   Dose:  50 mg        Take 50 mg by mouth daily   Refills:  0        Calcium Carb-Cholecalciferol 500-200 MG-UNIT Tabs   Commonly known as:  CALCIUM 500+D   Dose:  1 tablet        Take 1 tablet by mouth 2 times daily   Refills:  0        camphor-menthol 0.5-0.5 % Lotn   Commonly known as:  DERMASARRA        To affected areas on both legs Apply topically daily To affected areas on both legs   Refills:  0        ferrous gluconate 324 (38 Fe) MG tablet   Commonly known as:  FERGON   Dose:  324 mg   Quantity:  100 tablet        Take 1 tablet (324 mg) by mouth daily (with breakfast)   Refills:  0        * furosemide 20 MG tablet   Commonly known as:  LASIX   Dose:  60 mg   Quantity:  30 tablet        Take 3 tablets (60 mg) by mouth every morning   Refills:  0        * furosemide 20 MG tablet   Commonly known as:  LASIX   Dose:  60 mg   Quantity:  30 tablet        Take 3 tablets (60 mg) by mouth daily (with lunch)   Refills:  0        GABAPENTIN PO   Dose:  300 mg        Take 300 mg by mouth 2 times daily   Refills:  0        glipiZIDE 5 MG tablet   Commonly known as:  GLUCOTROL   Dose:  5 mg   Quantity:  30 tablet        Take 1 tablet (5 mg) by mouth 2 times daily (before meals)   Refills:  0        levothyroxine 100 MCG tablet   Commonly known as:  SYNTHROID/LEVOTHROID   Dose:  100 mcg   Quantity:  30 tablet        Take 1 tablet (100 mcg) by mouth daily   Refills:  0        lisinopril 5 MG tablet   Commonly known as:  PRINIVIL/ZESTRIL   Dose:  5 mg        Take 1 tablet (5 mg) by mouth daily   Refills:  0        multivitamin Tabs per tablet   Dose:  1 tablet   Quantity:   30 tablet        Take 1 tablet by mouth daily   Refills:  0        nitroGLYcerin 0.4 MG sublingual tablet   Commonly known as:  NITROSTAT   Dose:  0.4 mg   Quantity:  25 tablet        Place 1 tablet (0.4 mg) under the tongue every 5 minutes as needed for chest pain   Refills:  0        omeprazole 20 MG CR capsule   Commonly known as:  priLOSEC   Dose:  20 mg   Quantity:  30 capsule        Take 1 capsule (20 mg) by mouth daily   Refills:  0        polyethylene glycol Packet   Commonly known as:  MIRALAX/GLYCOLAX   Dose:  17 g        Take 17 g by mouth daily   Refills:  0        senna-docusate 8.6-50 MG per tablet   Commonly known as:  SENOKOT-S;PERICOLACE   Dose:  2 tablet        Take 2 tablets by mouth 2 times daily   Refills:  0        simethicone 80 MG chewable tablet   Commonly known as:  MYLICON   Dose:  160 mg   Quantity:  180 tablet        Take 2 tablets (160 mg) by mouth every 6 hours as needed for flatulence or cramping   Refills:  0        simvastatin 10 MG tablet   Commonly known as:  ZOCOR   Dose:  10 mg   Quantity:  30 tablet        Take 1 tablet (10 mg) by mouth At Bedtime   Refills:  0        sucralfate 1 GM tablet   Commonly known as:  CARAFATE   Dose:  1 g   Quantity:  120 tablet        Take 1 tablet (1 g) by mouth 4 times daily (before meals and nightly)   Refills:  0        tamsulosin 0.4 MG capsule   Commonly known as:  FLOMAX   Dose:  0.4 mg   Quantity:  60 capsule        Take 1 capsule (0.4 mg) by mouth every evening   Refills:  0        triamcinolone 0.5 % cream   Commonly known as:  KENALOG        Apply topically three times a week Mon/Wed /Fri   Refills:  0        WARFARIN SODIUM PO        Take by mouth daily 3 mg   MWF, 4 mg Tues,Thur, Sat,Sun   Refills:  0        * Notice:  This list has 2 medication(s) that are the same as other medications prescribed for you. Read the directions carefully, and ask your doctor or other care provider to review them with you.            Orders Needing  Specimen Collection     None      Pending Results     No orders found from 7/21/2018 to 7/24/2018.            Pending Culture Results     No orders found from 7/21/2018 to 7/24/2018.            Pending Results Instructions     If you had any lab results that were not finalized at the time of your Discharge, you can call the ED Lab Result RN at [phone]. You will be contacted by this team for any positive Lab results or changes in treatment. The nurses are available 7 days a week from 10A to 6:30P.  You can leave a message 24 hours per day and they will return your call.        Test Results From Your Hospital Stay               Clinical Quality Measure: Blood Pressure Screening     Your blood pressure was checked while you were in the emergency department today. The last reading we obtained was  BP: 150/82 . Please read the guidelines below about what these numbers mean and what you should do about them.  If your systolic blood pressure (the top number) is less than 120 and your diastolic blood pressure (the bottom number) is less than 80, then your blood pressure is normal. There is nothing more that you need to do about it.  If your systolic blood pressure (the top number) is 120-139 or your diastolic blood pressure (the bottom number) is 80-89, your blood pressure may be higher than it should be. You should have your blood pressure rechecked within a year by a primary care provider.  If your systolic blood pressure (the top number) is 140 or greater or your diastolic blood pressure (the bottom number) is 90 or greater, you may have high blood pressure. High blood pressure is treatable, but if left untreated over time it can put you at risk for heart attack, stroke, or kidney failure. You should have your blood pressure rechecked by a primary care provider within the next 4 weeks.  If your provider in the emergency department today gave you specific instructions to follow-up with your doctor or provider even  "sooner than that, you should follow that instruction and not wait for up to 4 weeks for your follow-up visit.        Thank you for choosing [facility]       Thank you for choosing [facility] for your care. Our goal is always to provide you with excellent care. Hearing back from our patients is one way we can continue to improve our services. Please take a few minutes to complete the written survey that you may receive in the mail after you visit with us. Thank you!        [employer] Information     [employer] lets you send messages to your doctor, view your test results, renew your prescriptions, schedule appointments and more. To sign up, go to [email or web address] . Click on \"Log in\" on the left side of the screen, which will take you to the Welcome page. Then click on \"Sign up Now\" on the right side of the page.     You will be asked to enter the access code listed below, as well as some personal information. Please follow the directions to create your username and password.     Your access code is: [email or web address]  Expires: 10/21/2018  6:16 PM     Your access code will  in 90 days. If you need help or a new code, please call your [facility] clinic or [phone].        Care EveryWhere ID     This is your Care EveryWhere ID. This could be used by other organizations to access your [facility] medical records  [id number]        Equal Access to Services     TRACI TOLEDO : Hadii ca Jordan, waaxda luqadaha, qaybta kaalmada evert, ursula krishnamurthy. So [facility] [phone].    ATENCIÓN: Si habla español, tiene a redman disposición servicios gratuitos de asistencia lingüística. Gurinder al [phone].    We comply with applicable federal civil rights laws and [place] laws. We do not discriminate on the basis of race, color, national origin, age, disability, sex, sexual orientation, or gender identity.            After Visit Summary       This is your record. Keep this with you and show to " your community pharmacist(s) and doctor(s) at your next visit.

## 2018-07-23 NOTE — ED AVS SNAPSHOT
[facility] Emergency Department    [address]    [facility] [address]    Phone:  [phone]    Fax:  [phone]                                       Ton Bagley   MRN: [id number]    Department:  [facility] Emergency Department   Date of Visit:  7/23/2018           After Visit Summary Signature Page     I have received my discharge instructions, and my questions have been answered. I have discussed any challenges I see with this plan with the nurse or doctor.    ..........................................................................................................................................  Patient/Patient Representative Signature      ..........................................................................................................................................  Patient Representative Print Name and Relationship to Patient    ..................................................               ................................................  Date                                            Time    ..........................................................................................................................................  Reviewed by Signature/Title    ...................................................              ..............................................  Date                                                            Time

## 2018-07-23 NOTE — ED PROVIDER NOTES
History     Chief Complaint:  Leg Swelling    The history is provided by the patient and a relative.      Ton Bagley is a 89 year old male with a history of lymphedema, coronary artery disease, atrial fibrillation and hypertension who presents to the emergency department with his daughter for evaluation of leg and foot swelling. Of note, the patient lives in assisted living and is no longer able to get lymphedema wrappings due to inability to receive medicare benefits. Today at [facility] one the nurses noticed that the patient's legs had been swelling more than baseline and prompted him to seek evaluation at the emergency room. Here, the patient notes that his legs are slightly uncomfortable, but denies any fever or any other symptoms. Of note, the patient's family is currently considering moving from the assisted living facility to a nursing home with a nurse.    Allergies:  NKDA    Medications:    Acetaminophen  Atenolol  Cholecalciferol  Camphor-Menthol  Ferrous Gluconate  Furosemide  Gabapentin  Glipizide  Levothyroxine  Lisinopril  Nitroglycerin  Omeprazole  Polyethylene Glycol  Senna-Docusate  Simethicone  Simvastatin  Sucralfate  Tamsulosin  Triamcinolone  Warfarin    Past Medical History:    Atrial Fibrillation  CAD  Hypertension  Hyperlipidemia  Bowel Obstruction  Sepsis    Past Surgical History:    Coronary Artery Bypass Graft  Cardioversion  Laparotomy  Coronary Stent    Family History:    No past pertinent family history.    Social History:  Marital Status:   [2]  Tobacco Use: Former  Alcohol Use: Yes, Wine Daily    Review of Systems   Constitutional: Negative for fever.   Cardiovascular: Positive for leg swelling.   Skin: Positive for rash.   All other systems reviewed and are negative.      Physical Exam     Patient Vitals for the past 24 hrs:   BP Temp Temp src Pulse Resp SpO2   07/23/18 1745 150/82 - - - - 94 %   07/23/18 1730 160/85 - - - - 93 %   07/23/18 1727 - - - - - 93 %    07/23/18 1600 130/75 98.5  F (36.9  C) Oral 73 18 95 %         Physical Exam  General: Patient is alert and interactive when I enter the room  Head:  The scalp, face, and head appear normal  CV:  Normal rate, regular rhythm. DP pulses normal.   Resp:  No respiratory distress   Musc:  BLE swelling with 3+ pitting edema.   Skin:  No rash. Venous stasis dermatitis present on BLE without erythema/cellulitis.   Neuro: Speech is normal and fluent. Face is symmetric.     Moving all extremities well.   Psych:  Awake. Alert.  Normal affect.  Appropriate interactions.            Emergency Department Course     Emergency Department Course:  1734 Nursing notes and vitals reviewed. I performed an exam of the patient as documented above.     1813 I rechecked the patient and discussed the results of his workup thus far with his daughter.     Findings and plan explained to the patient and daughter. Patient discharged home with instructions regarding supportive care, medications, and reasons to return. The importance of close follow-up was reviewed.    I personally answered all related questions prior to discharge.      Impression & Plan      Medical Decision Making:  Pt presents for evaluation of BLE swelling and skin changes. No signs of cellulitis on exam. Skin changes consistent with venous stasis dermatitis. Chart reviewed, and this has been an ongoing problem that recently worsened after pt has stopped lymphedema wraps. Per chart review, pt's family had initially agreed to help with this, but they've been too busy to help. Social work has been consulted to assist with likely greater need of services, in addition to home health. Pt's family at bedside reports that pt's legs have been like this for some time. There has been no recent worsening. Pt discharged in stable condition.     Diagnosis:    ICD-10-CM    1. Venous stasis dermatitis of both lower extremities I87.2 Care Transition RN/SW IP Consult        Disposition:  discharged to assisted living facility with his daughter    Discharge Orders:   Care Transition RN/SW IP Consult    Scribe Disclosure:  I, Elia Patton, am serving as a scribe on 7/23/2018 at 5:36 PM to personally document services performed by Ramírez Betts MD based on my observations and the provider's statements to me.       Elia Patton  7/23/2018   [facility] EMERGENCY DEPARTMENT       Ramírez Betts MD  07/25/18 0619

## 2018-07-23 NOTE — ED NOTES
"Pt complaining of \"cramps\" and an inability to poop. Pt's daughter noted pt suffers from constipation often. RN notified.   "

## 2018-07-23 NOTE — DISCHARGE INSTRUCTIONS
Understanding Chronic Venous Insufficiency  Problems with the veins in the legs may lead to chronic venous insufficiency (CVI). CVI means that there is a long-term problem with the veins not being able to pump blood back to your heart. When this happens, blood stays in the legs and causes swelling and aching.   Two problems that may lead to chronic venous insufficiency are:    Damaged valves. Valves keep blood flowing from the legs through the blood vessels and back to the heart. When the valves are damaged, blood does not flow as well.     Deep vein thrombosis (DVT). Blood clots may form in the deep veins of the legs. This may cause pain, redness, and swelling in the legs. It may also block the flow of blood back to the heart. Seek medical care right away if you have these symptoms.    A blood clot in the leg can also break off and travel to the lungs. This is called pulmonary embolism (PE). In the lungs, the clot can cut off the flow of blood. This may cause chest pain, trouble breathing, sweating, a fast heartbeat, coughing (may cough up blood), and fainting. It is a medical emergency and may cause death. Call 911 if you have these symptoms.    Healthcare providers call the two conditions, DVT and PE, venous thromboembolism (VTE).  CVI can t be cured, but you can control leg swelling to reduce the likelihood of ulcers (sores).  Recognizing the symptoms  Be aware of the following:    If you stand or sit with your feet down for long periods, your legs may ache or feel heavy.    Swollen ankles are possibly the most common symptom of CVI.    As swelling increases, the skin over your ankles may show red spots or a brownish tinge. The skin may feel leathery or scaly, and may start to itch.    If swelling is not controlled, an ulcer (open wound) may form.  What you can do  Reduce your risk of developing ulcers by doing the following:    Increase blood flow back to your heart by elevating your legs, exercising daily,  and wearing elastic stockings.    Boost blood flow in your legs by losing excess weight.    If you must stand or sit in one place for a period of time, keep your blood moving by wiggling your toes, shifting your body position, and rising up on the balls of your feet.    Date Last Reviewed: 5/1/2016 2000-2017 [employer]. [address] [place] [address]. All rights reserved. This information is not intended as a substitute for professional medical care. Always follow your healthcare professional's instructions.

## 2018-07-30 ENCOUNTER — RECORDS - HEALTHEAST (OUTPATIENT)
Dept: LAB | Facility: CLINIC | Age: 83
End: 2018-07-30

## 2018-07-30 LAB — INR PPP: 2.34 (ref 0.9–1.1)

## 2018-07-31 ENCOUNTER — HOSPITAL ENCOUNTER (INPATIENT)
Facility: CLINIC | Age: 83
LOS: 6 days | Discharge: NURSING, REHAB OR LONG-TERM CARE | DRG: 871 | End: 2018-08-07
Attending: EMERGENCY MEDICINE | Admitting: INTERNAL MEDICINE
Payer: MEDICARE

## 2018-07-31 ENCOUNTER — APPOINTMENT (OUTPATIENT)
Dept: GENERAL RADIOLOGY | Facility: CLINIC | Age: 83
DRG: 871 | End: 2018-07-31
Attending: EMERGENCY MEDICINE
Payer: MEDICARE

## 2018-07-31 DIAGNOSIS — R65.20 SEVERE SEPSIS (H): ICD-10-CM

## 2018-07-31 DIAGNOSIS — J96.01 ACUTE RESPIRATORY FAILURE WITH HYPOXIA (H): ICD-10-CM

## 2018-07-31 DIAGNOSIS — L03.90 CELLULITIS, UNSPECIFIED CELLULITIS SITE: ICD-10-CM

## 2018-07-31 DIAGNOSIS — A41.9 SEVERE SEPSIS (H): ICD-10-CM

## 2018-07-31 LAB
ALBUMIN SERPL-MCNC: 4 G/DL (ref 3.4–5)
ALBUMIN UR-MCNC: NEGATIVE MG/DL
ALP SERPL-CCNC: 94 U/L (ref 40–150)
ALT SERPL W P-5'-P-CCNC: 24 U/L (ref 0–70)
ANION GAP SERPL CALCULATED.3IONS-SCNC: 7 MMOL/L (ref 3–14)
APPEARANCE UR: CLEAR
AST SERPL W P-5'-P-CCNC: 21 U/L (ref 0–45)
BACTERIA #/AREA URNS HPF: ABNORMAL /HPF
BASOPHILS # BLD AUTO: 0 10E9/L (ref 0–0.2)
BASOPHILS NFR BLD AUTO: 0.2 %
BILIRUB SERPL-MCNC: 1.3 MG/DL (ref 0.2–1.3)
BILIRUB UR QL STRIP: NEGATIVE
BUN SERPL-MCNC: 19 MG/DL (ref 7–30)
CALCIUM SERPL-MCNC: 9.1 MG/DL (ref 8.5–10.1)
CHLORIDE SERPL-SCNC: 99 MMOL/L (ref 94–109)
CO2 BLDCOV-SCNC: 30 MMOL/L (ref 21–28)
CO2 BLDCOV-SCNC: 31 MMOL/L (ref 21–28)
CO2 SERPL-SCNC: 30 MMOL/L (ref 20–32)
COLOR UR AUTO: YELLOW
CREAT SERPL-MCNC: 1.05 MG/DL (ref 0.66–1.25)
DIFFERENTIAL METHOD BLD: ABNORMAL
EOSINOPHIL # BLD AUTO: 0 10E9/L (ref 0–0.7)
EOSINOPHIL NFR BLD AUTO: 0.2 %
ERYTHROCYTE [DISTWIDTH] IN BLOOD BY AUTOMATED COUNT: 13.4 % (ref 10–15)
GFR SERPL CREATININE-BSD FRML MDRD: 66 ML/MIN/1.7M2
GLUCOSE SERPL-MCNC: 219 MG/DL (ref 70–99)
GLUCOSE UR STRIP-MCNC: NEGATIVE MG/DL
HCT VFR BLD AUTO: 44.4 % (ref 40–53)
HGB BLD-MCNC: 14 G/DL (ref 13.3–17.7)
HGB UR QL STRIP: ABNORMAL
IMM GRANULOCYTES # BLD: 0.1 10E9/L (ref 0–0.4)
IMM GRANULOCYTES NFR BLD: 0.5 %
INR PPP: 2.36 (ref 0.86–1.14)
KETONES UR STRIP-MCNC: NEGATIVE MG/DL
LACTATE BLD-SCNC: 2.5 MMOL/L (ref 0.7–2.1)
LACTATE BLD-SCNC: 2.5 MMOL/L (ref 0.7–2.1)
LEUKOCYTE ESTERASE UR QL STRIP: NEGATIVE
LYMPHOCYTES # BLD AUTO: 0.4 10E9/L (ref 0.8–5.3)
LYMPHOCYTES NFR BLD AUTO: 2 %
MCH RBC QN AUTO: 30.8 PG (ref 26.5–33)
MCHC RBC AUTO-ENTMCNC: 31.5 G/DL (ref 31.5–36.5)
MCV RBC AUTO: 98 FL (ref 78–100)
MONOCYTES # BLD AUTO: 1.1 10E9/L (ref 0–1.3)
MONOCYTES NFR BLD AUTO: 5.7 %
MUCOUS THREADS #/AREA URNS LPF: PRESENT /LPF
NEUTROPHILS # BLD AUTO: 17.9 10E9/L (ref 1.6–8.3)
NEUTROPHILS NFR BLD AUTO: 91.4 %
NITRATE UR QL: NEGATIVE
NRBC # BLD AUTO: 0 10*3/UL
NRBC BLD AUTO-RTO: 0 /100
NT-PROBNP SERPL-MCNC: 933 PG/ML (ref 0–1800)
PCO2 BLDV: 45 MM HG (ref 40–50)
PCO2 BLDV: 47 MM HG (ref 40–50)
PH BLDV: 7.42 PH (ref 7.32–7.43)
PH BLDV: 7.43 PH (ref 7.32–7.43)
PH UR STRIP: 5 PH (ref 5–7)
PLATELET # BLD AUTO: 223 10E9/L (ref 150–450)
PO2 BLDV: 27 MM HG (ref 25–47)
PO2 BLDV: 33 MM HG (ref 25–47)
POTASSIUM SERPL-SCNC: 3.4 MMOL/L (ref 3.4–5.3)
PROT SERPL-MCNC: 8.5 G/DL (ref 6.8–8.8)
RBC # BLD AUTO: 4.54 10E12/L (ref 4.4–5.9)
RBC #/AREA URNS AUTO: <1 /HPF (ref 0–2)
SAO2 % BLDV FROM PO2: 51 %
SAO2 % BLDV FROM PO2: 64 %
SODIUM SERPL-SCNC: 136 MMOL/L (ref 133–144)
SOURCE: ABNORMAL
SP GR UR STRIP: 1.01 (ref 1–1.03)
TROPONIN I SERPL-MCNC: <0.015 UG/L (ref 0–0.04)
UROBILINOGEN UR STRIP-MCNC: 0 MG/DL (ref 0–2)
WBC # BLD AUTO: 19.6 10E9/L (ref 4–11)
WBC #/AREA URNS AUTO: <1 /HPF (ref 0–5)

## 2018-07-31 PROCEDURE — 25000128 H RX IP 250 OP 636: Performed by: EMERGENCY MEDICINE

## 2018-07-31 PROCEDURE — 87077 CULTURE AEROBIC IDENTIFY: CPT | Performed by: EMERGENCY MEDICINE

## 2018-07-31 PROCEDURE — 85610 PROTHROMBIN TIME: CPT | Performed by: EMERGENCY MEDICINE

## 2018-07-31 PROCEDURE — 99285 EMERGENCY DEPT VISIT HI MDM: CPT | Mod: 25

## 2018-07-31 PROCEDURE — 83880 ASSAY OF NATRIURETIC PEPTIDE: CPT | Performed by: EMERGENCY MEDICINE

## 2018-07-31 PROCEDURE — 83605 ASSAY OF LACTIC ACID: CPT

## 2018-07-31 PROCEDURE — 36415 COLL VENOUS BLD VENIPUNCTURE: CPT | Performed by: EMERGENCY MEDICINE

## 2018-07-31 PROCEDURE — 25000125 ZZHC RX 250: Performed by: EMERGENCY MEDICINE

## 2018-07-31 PROCEDURE — 96367 TX/PROPH/DG ADDL SEQ IV INF: CPT

## 2018-07-31 PROCEDURE — 71046 X-RAY EXAM CHEST 2 VIEWS: CPT

## 2018-07-31 PROCEDURE — 93005 ELECTROCARDIOGRAM TRACING: CPT

## 2018-07-31 PROCEDURE — 96365 THER/PROPH/DIAG IV INF INIT: CPT

## 2018-07-31 PROCEDURE — 96361 HYDRATE IV INFUSION ADD-ON: CPT

## 2018-07-31 PROCEDURE — 81001 URINALYSIS AUTO W/SCOPE: CPT | Performed by: EMERGENCY MEDICINE

## 2018-07-31 PROCEDURE — 82803 BLOOD GASES ANY COMBINATION: CPT

## 2018-07-31 PROCEDURE — 80053 COMPREHEN METABOLIC PANEL: CPT | Performed by: EMERGENCY MEDICINE

## 2018-07-31 PROCEDURE — 84484 ASSAY OF TROPONIN QUANT: CPT | Performed by: EMERGENCY MEDICINE

## 2018-07-31 PROCEDURE — 87800 DETECT AGNT MULT DNA DIREC: CPT | Performed by: EMERGENCY MEDICINE

## 2018-07-31 PROCEDURE — 87040 BLOOD CULTURE FOR BACTERIA: CPT | Performed by: EMERGENCY MEDICINE

## 2018-07-31 PROCEDURE — 96375 TX/PRO/DX INJ NEW DRUG ADDON: CPT

## 2018-07-31 PROCEDURE — 25000132 ZZH RX MED GY IP 250 OP 250 PS 637: Mod: GY | Performed by: EMERGENCY MEDICINE

## 2018-07-31 PROCEDURE — 85025 COMPLETE CBC W/AUTO DIFF WBC: CPT | Performed by: EMERGENCY MEDICINE

## 2018-07-31 PROCEDURE — 87186 SC STD MICRODIL/AGAR DIL: CPT | Performed by: EMERGENCY MEDICINE

## 2018-07-31 PROCEDURE — A9270 NON-COVERED ITEM OR SERVICE: HCPCS | Mod: GY | Performed by: EMERGENCY MEDICINE

## 2018-07-31 RX ORDER — ACETAMINOPHEN 500 MG
500 TABLET ORAL EVERY 4 HOURS PRN
Status: DISCONTINUED | OUTPATIENT
Start: 2018-07-31 | End: 2018-08-01

## 2018-07-31 RX ORDER — CEFAZOLIN SODIUM 1 G/50ML
1750 SOLUTION INTRAVENOUS ONCE
Status: COMPLETED | OUTPATIENT
Start: 2018-07-31 | End: 2018-07-31

## 2018-07-31 RX ORDER — METOPROLOL TARTRATE 1 MG/ML
5 INJECTION, SOLUTION INTRAVENOUS ONCE
Status: COMPLETED | OUTPATIENT
Start: 2018-07-31 | End: 2018-07-31

## 2018-07-31 RX ORDER — LIDOCAINE 40 MG/G
CREAM TOPICAL
Status: DISCONTINUED | OUTPATIENT
Start: 2018-07-31 | End: 2018-07-31

## 2018-07-31 RX ORDER — METOPROLOL TARTRATE 25 MG/1
25 TABLET, FILM COATED ORAL ONCE
Status: COMPLETED | OUTPATIENT
Start: 2018-07-31 | End: 2018-07-31

## 2018-07-31 RX ADMIN — VANCOMYCIN HYDROCHLORIDE 1750 MG: 1 INJECTION, POWDER, LYOPHILIZED, FOR SOLUTION INTRAVENOUS at 22:34

## 2018-07-31 RX ADMIN — ACETAMINOPHEN 500 MG: 500 TABLET, FILM COATED ORAL at 20:48

## 2018-07-31 RX ADMIN — SODIUM CHLORIDE 1000 ML: 9 INJECTION, SOLUTION INTRAVENOUS at 20:49

## 2018-07-31 RX ADMIN — METOPROLOL TARTRATE 25 MG: 25 TABLET ORAL at 22:47

## 2018-07-31 RX ADMIN — TAZOBACTAM SODIUM AND PIPERACILLIN SODIUM 4.5 G: 375; 3 INJECTION, SOLUTION INTRAVENOUS at 22:07

## 2018-07-31 RX ADMIN — METOPROLOL TARTRATE 5 MG: 5 INJECTION, SOLUTION INTRAVENOUS at 22:32

## 2018-07-31 NOTE — IP AVS SNAPSHOT
` `           [place] [address] MEDICAL SURGICAL: [phone]                 INTERAGENCY TRANSFER FORM - NOTES (H&P, Discharge Summary, Consults, Procedures, Therapies)   2018                    Hospital Admission Date: 2018  TON BAGLEY   : 1927  Sex: Male        Patient PCP Information     Provider PCP Type    Jose Shell General      History & Physicals     No notes of this type exist for this encounter.         Discharge Summaries      Discharge Summaries by Lanie Harvey MD at 2018 12:01 PM     Author:  Lanie Harvey MD Service:  Hospitalist Author Type:  Physician    Filed:  2018 12:01 PM Date of Service:  2018 12:01 PM Creation Time:  2018 11:54 AM    Status:  Signed :  Lanie Harvey MD (Physician)             [facility]  Discharge Summary  Hospitalist      Date of Admission:  2018  Date of Discharge:  2018  Provider:  Lanie Harvey MD  Date of Service (when I last saw the patient): 18      Primary Provider: Jose Shell          Discharge Diagnosis:   Discharge Diagnoses   Strep group B bacteremia and sepsis   Left LE Cellulitis in the setting of chronic venous stasis and peripheral edema   Chronic A fib  Diastolic heart failure   mild infectious encephalopathy    Other medical issues:  Past Medical History:   Diagnosis Date     Atrial fibrillation (H)     cardioversion      CAD (coronary artery disease)     CABG, stent x2      HTN (hypertension)      Hyperlipidemia           History of Present Illness   Ton Bagley is an 89 year old male who presented with shortness of breath, hypoxemia and chills.  Please see the admission history and physical for full details.    Hospital Course     Ton Bagley was admitted on 2018.  He is a 89-year-old male presented with chills hypoxemia and shortness of breath.  Patient was admitted for acute sepsis secondary to  "lower extremity cellulitis.     The following problems were addressed during his hospitalization:    Strep group B bacteremia and sepsis   --Presented fever, elevated WBC, elevated lactic acid, cellulitis, left LE. Recurrent LE cellulitis with hx lymphedema and chronic stasis dermatitis.    --Treated with IV cefazolin in the hospital for 7 days and then switched to oral prior to discharge to complete a total of 10 days of antibiotics     Left LE Cellulitis in the setting of chronic venous stasis and peripheral edema.  --Clinically improved with IV antibiotics switched to p.o. prior to discharge  --Supportive care leg elevation  --Doppler negative for DVT        Chronic A fib  --RVR initially driven by acute infection.   --On warfarin and metoprolol.  --Stable prior to the     DM  --Continued on on oral hypoglycemics during hospitalization     Dementia.  --Presented from [facility].      Diastolic heart failure.   --Secondary to aortic stenosis with possible \"low-flow gradient\" aortic stenosis.   --Stable during hospital stay     Urinary frequency related to Prostate cancer and treatment.      Mild infectious encephalopathy.    --Improved and apparently at baseline prior to discharge    Significant Results and Procedures   As noted above    Pending Results   Unresulted Labs Ordered in the Past 30 Days of this Admission     Date and Time Order Name Status Description    8/2/2018 0000 Creatinine In process           Code Status   DNR / DNI       Primary Care Physician   [facility]    Physical Exam   Temp: 98  F (36.7  C) Temp src: Axillary BP: 140/64   Heart Rate: 96 Resp: 16 SpO2: 93 % O2 Device: None (Room air)    Vitals:    07/31/18 2151 08/01/18 0028   Weight: 86.2 kg (190 lb) 100.6 kg (221 lb 12.5 oz)     Vital Signs with Ranges  Temp:  [97  F (36.1  C)-100.4  F (38  C)] 98  F (36.7  C)  Heart Rate:  [] 96  Resp:  [16] 16  BP: (125-140)/(64-77) 140/64  SpO2:  [87 %-94 %] " 93 %  I/O last 3 completed shifts:  In: 830 [P.O.:830]  Out: 100 [Urine:100]    Constitutional: Awake, alert, cooperative, no apparent distress, and appears stated age.  Respiratory: No increased work of breathing, good air exchange, clear to auscultation bilaterally, no crackles or wheezing.  Cardiovascular: Irregularly irregular, soft systolic murmur, S1 plus S2  GI:  normal bowel sounds, soft, non-distended, non-tender  EXT: Chronic stasis changes with significant improvement in leg edema      Discharge Disposition   Discharged to long-term care facility    Consultations This Hospital Stay   PHARMACY TO DOSE VANCO  PHARMACY TO DOSE WARFARIN  SOCIAL WORK IP CONSULT  PHARMACY TO DOSE VANCO  PHYSICAL THERAPY ADULT IP CONSULT  PHYSICAL THERAPY ADULT IP CONSULT    Time Spent on this Encounter   I, Lanie Harvey, personally saw the patient today and spent greater than 30 minutes discharging this patient.    Discharge Orders     General info for SNF   Length of Stay Estimate: Long Term Care  Condition at Discharge: Improving  Level of care:skilled   Rehabilitation Potential: Fair  Admission H&P remains valid and up-to-date: Yes  Recent Chemotherapy: N/A  Use Nursing Home Standing Orders: Yes     Mantoux instructions   Give two-step Mantoux (PPD) Per Facility Policy Yes     Reason for your hospital stay   Please refer to discharge summary.  Briefly admitted and treated for cellulitis     Daily weights   Call Provider for weight gain of more than 2 pounds per day or 5 pounds per week.     Intake and output   Every shift     Follow Up and recommended labs and tests   Follow up with long term physician.  The following labs/tests are recommended: basic metabolic panel and INR in 3- 5 days.  Please recheck INR as patient is on Coumadin was started on antibiotic which can affect INR and will need to adjust anticoagulation accordingly  Please monitor daily blood pressure and heart rate    Continue lymphedema wraps.   Use topical emollient for both lower extremities     Activity - Up with nursing assistance     Glucose monitor nursing POCT   Before meals and at bedtime     DNR/DNI     Physical Therapy Adult Consult   Evaluate and treat as clinically indicated.    Reason: Deconditioning     Fall precautions     Advance Diet as Tolerated   Follow this diet upon discharge: Orders Placed This Encounter     Moderate Consistent CHO Diet       Discharge Medications   Current Discharge Medication List      START taking these medications    Details   cephALEXin (KEFLEX) 500 MG capsule Take 1 capsule (500 mg) by mouth 3 times daily for 4 days  Qty: 12 capsule, Refills: 0    Associated Diagnoses: Cellulitis, unspecified cellulitis site         CONTINUE these medications which have NOT CHANGED    Details   acetaminophen (TYLENOL) 325 MG tablet Take 650 mg by mouth daily as needed for mild pain      atorvastatin (LIPITOR) 10 MG tablet Take 10 mg by mouth daily      Calcium Carb-Cholecalciferol (CALCIUM 500+D) 500-200 MG-UNIT TABS Take 1 tablet by mouth 2 times daily    Associated Diagnoses: Vitamin D deficiency      ferrous gluconate (FERGON) 324 (38 FE) MG tablet Take 1 tablet (324 mg) by mouth daily (with breakfast)  Qty: 100 tablet    Associated Diagnoses: Iron deficiency anemia, unspecified iron deficiency anemia type      FUROSEMIDE PO Take 60 mg by mouth 2 times daily      gabapentin (NEURONTIN) 300 MG capsule Take 300 mg by mouth 2 times daily      glipiZIDE (GLUCOTROL) 5 MG tablet Take 1 tablet (5 mg) by mouth 2 times daily (before meals)  Qty: 30 tablet    Associated Diagnoses: Type 2 diabetes mellitus with complication, without long-term current use of insulin (H)      ipratropium - albuterol 0.5 mg/2.5 mg/3 mL (DUONEB) 0.5-2.5 (3) MG/3ML neb solution Take 1 vial by nebulization 3 times daily as needed      lactulose (CHRONULAC) 10 GM/15ML solution Take 30 mLs daily as needed      levothyroxine (SYNTHROID/LEVOTHROID) 100 MCG tablet  Take 1 tablet (100 mcg) by mouth daily  Qty: 30 tablet    Associated Diagnoses: Hypothyroidism, unspecified type      lisinopril (PRINIVIL/ZESTRIL) 5 MG tablet Take 1 tablet (5 mg) by mouth daily    Associated Diagnoses: Essential hypertension      metoprolol tartrate (LOPRESSOR) 25 MG tablet Take 25 mg by mouth 2 times daily      nitroGLYcerin (NITROSTAT) 0.4 MG sublingual tablet Place 0.4 mg under the tongue 3 times daily as needed      nystatin (MYCOSTATIN) 973219 UNIT/GM POWD Apply to groin area for yeast growth with every pericare      omeprazole (PRILOSEC) 20 MG CR capsule Take 1 capsule (20 mg) by mouth daily  Qty: 30 capsule    Associated Diagnoses: Gastroesophageal reflux disease without esophagitis      polyethylene glycol (MIRALAX/GLYCOLAX) Packet Take 17 g by mouth daily      senna-docusate (SENOKOT-S;PERICOLACE) 8.6-50 MG per tablet Take 2 tablets by mouth 2 times daily      simethicone (MYLICON) 80 MG chewable tablet Take 2 tablets (160 mg) by mouth every 6 hours as needed for flatulence or cramping  Qty: 180 tablet    Associated Diagnoses: Abdominal pain, generalized      sucralfate (CARAFATE) 1 GM tablet Take 1 tablet (1 g) by mouth 4 times daily (before meals and nightly)  Qty: 120 tablet, Refills: 0    Associated Diagnoses: Other acute gastritis without hemorrhage      TAMSULOSIN HCL PO Take 0.4 mg by mouth daily      !! warfarin (COUMADIN) 2 MG tablet Take 4 mg by mouth daily Take 4 mg on Sun, Tues, Thurs, Fri, Sat       !! warfarin (COUMADIN) 5 MG tablet Take 5 mg by mouth daily Take 5 mg on Mon, Wed       !! - Potential duplicate medications found. Please discuss with provider.        Allergies   No Known Allergies  Data   Most Recent 3 CBC's:  Recent Labs   Lab Test  08/06/18   0627  08/01/18   0739  07/31/18   2030   WBC  4.3  23.9*  19.6*   HGB  12.9*  12.9*  14.0   MCV  98  99  98   PLT  200  154  223      Most Recent 3 BMP's:  Recent Labs   Lab Test  08/07/18   0645  08/06/18   0627   08/05/18   0610   08/03/18   0716  08/02/18   0748   NA  135  136   --    --    --   137   POTASSIUM  3.4  3.7   --    --   3.9  3.3*   CHLORIDE  95  97   --    --    --   99   CO2  32  32   --    --    --   30   BUN  26  23   --    --    --   17   CR  1.17  1.22  Canceled, Test credited  1.03   < >  1.05  1.28*   ANIONGAP  8  7   --    --    --   8   JANN  8.5  8.5   --    --    --   7.8*   GLC  149*  161*   --    --    --   138*    < > = values in this interval not displayed.     Most Recent 2 LFT's:  Recent Labs   Lab Test  08/02/18 0748 07/31/18 2030   AST  16  21   ALT  16  24   ALKPHOS  58  94   BILITOTAL  1.1  1.3     Most Recent INR's and Anticoagulation Dosing History:  Anticoagulation Dose History     Recent Dosing and Labs Latest Ref Rng & Units 8/1/2018 8/2/2018 8/3/2018 8/4/2018 8/5/2018 8/6/2018 8/7/2018    Warfarin 4 mg - - 4 mg 4 mg 4 mg 4 mg - -    Warfarin 5 mg - 5 mg - - - - 5 mg -    INR 0.86 - 1.14 2.44(H) 2.64(H) 2.94(H) 2.58(H) 2.25(H) 2.08(H) 2.14(H)        Most Recent 3 Troponin's:  Recent Labs   Lab Test  07/31/18 2030 04/04/18   0740  04/04/18   0100   TROPI  <0.015  <0.015  <0.015     Most Recent Cholesterol Panel:  Recent Labs   Lab Test  05/22/17   0646 07/24/14   CHOL   --   126   LDL   --   53   HDL   --   36   TRIG  219*  186     Most Recent 6 Bacteria Isolates From Any Culture (See EPIC Reports for Culture Details):  Recent Labs   Lab Test  07/31/18 2158 07/31/18   2149  04/06/18   0721  04/05/18   0650  04/04/18   1604  04/03/18   1252   CULT  No growth  Cultured on the 1st day of incubation:  Beta hemolytic Streptococcus group B  *  Critical Value/Significant Value, preliminary result only, called to and read back by  MARY JO SEVERANCE RN ON [facility] 3 AT 1044 ON 08/01/18 AC.    (Note)  POSITIVE for STREPTOCOCCUS AGALACTIAE (Group B Strep) by [facility] Chinaigene  multiplex nucleic acid test. Penicillin and ampicillin are drugs of  choice, nonsusceptible isolates have not  been reported. Final  identification and antimicrobial susceptibility testing will be  verified by standard methods.    Specimen tested with Verigene multiplex, gram-positive blood culture  nucleic acid test for the following targets: Staph aureus, Staph  epidermidis, Staph lugdunensis, other Staph species, Enterococcus  faecalis, Enterococcus faecium, Streptococcus species, S. agalactiae,  S. anginosus grp., S. pneumoniae, S. pyogenes, Listeria sp., mecA  (methicillin resistance) and Renetta/B (vancomycin resistance).    Critical Value/Significant Value called to and read back by Mary Jo Severance, RN on [facility] @1343 on 8/1/18. ch.      No growth  No growth  No growth  Cultured on the 1st day of incubation:  Staphylococcus aureus  *  Critical Value/Significant Value, preliminary result only, called to and read back by  Nell Valerio RN at 0952 4/4/18 [id number]    Cultured on the 1st day of incubation:  Streptococcus mitis group  *  Critical Value/Significant Value called to and read back by  Tamanna Evans RN at 1015 on [id number]    (Note)  POSITIVE for STAPHYLOCOCCUS AUREUS and NEGATIVE for the mecA gene  (not MRSA) by Verigene multiplex nucleic acid test. The mecA gene was  not detected. Final identification and antimicrobial susceptibility  testing will be verified by standard methods.    Specimen tested with Verigene multiplex, gram-positive blood culture  nucleic acid test for the following targets: Staph aureus, Staph  epidermidis, Staph lugdunensis, other Staph species, Enterococcus  faecalis, Enterococcus faecium, Streptococcus species, S. agalactiae,  S. anginosus grp., S. pneumoniae, S. pyogenes, Listeria sp., mecA  (methicillin resistance) and Renetta/B (vancomycin resistance).    Critical Value/Significant Value called to and read back by  Komal Mckeon RN @1240 04/04/18 gd       Most Recent TSH, T4 and A1c Labs:  Recent Labs   Lab Test  11/21/12   1450   TSH  8.52*     Results for orders placed or performed  during the hospital encounter of 07/31/18   XR Chest 2 Views    Narrative    XR CHEST 2 VW   7/31/2018 9:35 PM     HISTORY: left sided chest pain, fever, hypoxia;     COMPARISON: Film dated 4/10/2000    FINDINGS: Midline sternotomy. Cardiac silhouette is enlarged..  Mild  pulmonary vascular congestion. No focal alveolar infiltrate.  The  bones and soft tissues are unremarkable.      Impression    IMPRESSION:  1. Cardiomegaly and mild pulmonary vascular congestion. This could be  due to mild congestive heart failure or fluid overload.  2. No focal alveolar-type infiltrates are seen.        LIONEL VEE MD   US Lower Extremity Venous Duplex Left    Narrative    US LOWER EXTREMITY VENOUS DUPLEX LEFT  8/5/2018 7:09 PM    HISTORY:  ? DVT;     TECHNIQUE:  Venous Doppler US including color flow and Doppler  waveform analysis.    FINDINGS:  The peroneal veins were not seen.  No thrombus was observed  and there was normal compressibility, phasic flow, and augmentation in  the common femoral, femoral, popliteal, and posterior tibial veins.       Impression    IMPRESSION: No evidence of  left lower extremity deep venous  thrombosis.    TANIA HURST MD           Disclaimer: This note consists of symbols derived from keyboarding, dictation and/or voice recognition software. As a result, there may be errors in the script that have gone undetected. Please consider this when interpreting information found in this chart.[SM1.1]       Revision History        User Key Date/Time User Provider Type Action    > SM1.1 8/7/2018 12:01 PM Lanie Harvey MD Physician Sign                     Consult Notes      Consults by Benitez Lepe LSW at 8/1/2018  2:23 PM     Author:  Benitez Lepe LSW Service:  (none) Author Type:      Filed:  8/1/2018  2:23 PM Date of Service:  8/1/2018  2:23 PM Creation Time:  8/1/2018  2:22 PM    Status:  Signed :  Benitez Lepe LSW ()         Discharge Planner   Discharge  Plans in progress: Pt to return to LTC at [facility] where he shares a room with spouse. Has bed hold.  Barriers to discharge plan: N/A  Follow up plan: SW available as needed.       Entered by: Benitez Lepe 08/01/2018 2:22 PM[JB1.1]          Revision History        User Key Date/Time User Provider Type Action    > JB1.1 8/1/2018  2:23 PM Benitez Lepe LSW  Sign            Consults by Benitez Lepe LSW at 8/1/2018  2:22 PM     Author:  Benitez Lepe LSW Service:  (none) Author Type:      Filed:  8/1/2018  2:22 PM Date of Service:  8/1/2018  2:22 PM Creation Time:  8/1/2018  2:16 PM    Status:  Signed :  Benitez Lepe LSW ()     Consult Orders:    1. Social Work IP Consult [[id number]] ordered by Simeon Cordero MD at 08/01/18 0040                Care Transition Initial Assessment - AMANDA  Reason For Consult: discharge planning  Met with: AMANDA spoke with son Luciano as pt was sleeping.  Active Problems:    Cellulitis       DATA  Lives With: spouse  Living Arrangements: extended care facility. LTC at [facility] ([facility]) since 7/30/18. Has a bed Hold per Aimee at [facility].  Description of Support System: Supportive, Involved  Who is your support system?: Wife, Children  Support Assessment: Adequate family and caregiver support.   Identified issues/concerns regarding health management: In need of Lymphedema therapies and [facility] is able to provide this for him.     Quality Of Family Relationships: supportive  Transportation Available: family or friend will provide. AMANDA spoke with pt's son Luciano who reports that pt's son Mejia has a van so [facility] to contact Mejia at [phone]     PLAN  Financial costs for the patient includes N/A .  Patient given options and choices for discharge N/A .  Patient/family is agreeable to the plan?  Yes:   Patient Goals and Preferences: Return to LTC .  Patient anticipates discharging to:  Return to LTC .[JB1.1]           Revision History         User Key Date/Time User Provider Type Action    > JB1.1 8/1/2018  2:22 PM Benitez Lepe LSW  Sign                     Progress Notes - Physician (Notes from 08/04/18 through 08/07/18)      Progress Notes by Trixie Can, RN at 8/7/2018 10:30 AM     Author:  Trixie Can, RN Service:  (none) Author Type:      Filed:  8/7/2018 10:33 AM Date of Service:  8/7/2018 10:30 AM Creation Time:  8/7/2018 10:30 AM    Status:  Signed :  Trixie Can, RN ()         Care Coordination:  Per Dr Harvey, pt will be ready for dc today. Pt is from [facility] LT and has a bed hold. Call placed to [facility] admissions and informed them pt will be returning today. They are able to take him back at anytime once orders are in and faxed. Will fax orders to fax# [phone] when in EPIC. RN to call pt's son for transport when ready.    Trixie Can RN CTS[AH1.1]     Revision History        User Key Date/Time User Provider Type Action    > AH1.1 8/7/2018 10:33 AM Trixie Can, RN Case Manager Sign            Progress Notes by Lanie Harvey MD at 8/6/2018  7:57 AM     Author:  Lanie Harvey MD Service:  Hospitalist Author Type:  Physician    Filed:  8/6/2018  1:17 PM Date of Service:  8/6/2018  7:57 AM Creation Time:  8/6/2018  7:57 AM    Status:  Signed :  Lanie Harvey MD (Physician)         [facility]  Hospitalist Progress Note  Lanie Harvey MD 08/06/2018    Reason for Stay (Diagnosis): sepsis         Assessment and Plan:      Summary of Stay: Ton Bagley is a 89 year old male admitted on 7/31/2018 with sepsis.        Strep group B bacteremia and sepsis   --Presented fever, elevated WBC, elevated lactic acid, cellulitis, left LE. Recurrent LE cellulitis with hx lymphedema and chronic stasis dermatitis.    --Continue on Ancef day[SM1.1] 7 will switch to PO for a total of 10 days[SM1.2]    Left LE Cellulitis in the setting of chronic venous  "stasis and peripheral edema.  --On IV antibiotic[SM1.1] will switch to PO[SM1.2]  --Supportive care leg elevation  --Doppler negative for DVT      Chronic A fib  --RVR initially driven by acute infection.   --On warfarin and metoprolol.    DM  --On glipizide alone.     Dementia.  --Presented from LTC at [facility].     Diastolic heart failure.   --Secondary to aortic stenosis with possible \"low-flow gradient\" aortic stenosis.     Urinary frequency related to Prostate cancer and treatment.     Mild infectious encephalopathy.    --Resolving.        DVT Prophylaxis: Warfarin  Code Status: DNR / DNI  Discharge Dispo: recently moved to SNF.  Anticipate return to skilled nursing facility.  Estimated Disch Date / # of Days until Disch:[SM1.1] tomorrow[SM1.2]        Interval History (Subjective):[SM1.1]      Reviewed chart.  Patient feels increasingly tired and weak today was sleepy.  DVT study was negative p[SM1.2]atient has been afebrile..  Denies any chest pain   Occasional abdominal cramps has had regular bowel movements.  Review of all the other systems negative                  Physical Exam:      Last Vital Signs:  /79 (BP Location: Left arm)  Pulse 83  Temp 96  F (35.6  C) (Axillary)  Resp 16  Wt 100.6 kg (221 lb 12.5 oz)  SpO2 92%  BMI 35.8 kg/m2    I/O last 3 completed shifts:  In: 220 [P.O.:220]  Out: 75 [Urine:75]    Constitutional: NAD. Alert and more interactive than yesterday. Still appears to not recall our conversation from earlier in the morning (though he was up sitting in his chair).   Respiratory: Clear to auscultation bilaterally, no crackles or wheezing   Cardiovascular: IRRIR.   Abdomen: Normal bowel sounds, soft, non-distended, non-tender   Skin: Marked, confluent warmth and erythema involving the entire left LE.  Bilateral venous stasis disease with cobblestoning is noted.   Neuro: Arousable.   Extremities: Edema slightly less, but present bilat.[SM1.1] left more than right. "  Chronic stasis changes. o[SM1.2]ld saphenous vein harvest site scar on left lower extrem noted.  Tenderness mostly over the posteromedial left thigh.   Other(s):        All other systems: Negative          Medications:      All current medications were reviewed with changes reflected in problem list.         Data:      All new lab and imaging data was reviewed.   Labs/Imaging:  Results for orders placed or performed during the hospital encounter of 07/31/18 (from the past 24 hour(s))   US Lower Extremity Venous Duplex Left    Narrative    US LOWER EXTREMITY VENOUS DUPLEX LEFT  8/5/2018 7:09 PM    HISTORY:  ? DVT;     TECHNIQUE:  Venous Doppler US including color flow and Doppler  waveform analysis.    FINDINGS:  The peroneal veins were not seen.  No thrombus was observed  and there was normal compressibility, phasic flow, and augmentation in  the common femoral, femoral, popliteal, and posterior tibial veins.       Impression    IMPRESSION: No evidence of  left lower extremity deep venous  thrombosis.    TANIA HURST MD   INR   Result Value Ref Range    INR 2.08 (H) 0.86 - 1.14   Creatinine   Result Value Ref Range    Creatinine 1.20 0.66 - 1.25 mg/dL    GFR Estimate 57 (L) >60 mL/min/1.7m2    GFR Estimate If Black 69 >60 mL/min/1.7m2         Medications     - MEDICATION INSTRUCTIONS -       Warfarin Therapy Reminder         ceFAZolin  2 g Intravenous Q12H     ferrous gluconate  324 mg Oral Daily with breakfast     furosemide  60 mg Oral BID     gabapentin (NEURONTIN) capsule 300 mg  300 mg Oral BID     glipiZIDE  5 mg Oral BID AC     levothyroxine  100 mcg Oral Daily     omeprazole  20 mg Oral Daily     sodium chloride (PF)  3 mL Intracatheter Q8H     tamsulosin  0.4 mg Oral Daily     Data       Recent Labs  Lab 08/01/18  0739 07/31/18  2030   WBC 23.9* 19.6*   HGB 12.9* 14.0   HCT 40.7 44.4   MCV 99 98    223       Recent Labs  Lab 08/06/18  0627 08/05/18  0610 08/04/18  0557 08/03/18  0716  08/02/18 0748 08/01/18 0739 07/31/18 2030   NA  --   --   --   --  137 140 136   POTASSIUM  --   --   --  3.9 3.3* 4.0 3.4   CHLORIDE  --   --   --   --  99 104 99   CO2  --   --   --   --  30 27 30   ANIONGAP  --   --   --   --  8 9 7   GLC  --   --   --   --  138* 137* 219*   BUN  --   --   --   --  17 19 19   CR 1.20 1.03 1.07 1.05 1.28* 1.26* 1.05   GFRESTIMATED 57* 68 65 66 53* 54* 66   GFRESTBLACK 69 82 78 80 64 65 80   JANN  --   --   --   --  7.8* 8.0* 9.1       Recent Labs  Lab 07/31/18 2158 07/31/18 2149   CULT No growth after 5 days Cultured on the 1st day of incubation:Beta hemolytic Streptococcus group B*  Critical Value/Significant Value, preliminary result only, called to and read back byMARY JO SEVERANCE RN ON [facility] 3 AT 1044 ON 08/01/18 AC.  (Note)POSITIVE for STREPTOCOCCUS AGALACTIAE (Group B Strep) by Verigenemultiplex nucleic acid test. Penicillin and ampicillin are drugs ofchoice, nonsusceptible isolates have not been reported. Finalidentification and antimicrobial susceptibility testing will beverified by standard methods.Specimen tested with Verigene multiplex, gram-positive blood culturenucleic acid test for the following targets: Staph aureus, Staphepidermidis, Staph lugdunensis, other Staph species, Enterococcusfaecalis, Enterococcus faecium, Streptococcus species, S. agalactiae,S. anginosus grp., S. pneumoniae, S. pyogenes, Listeria sp., mecA(methicillin resistance) and Renetta/B (vancomycin resistance).Critical Value/Significant Value called to and read back by Mary JoSeverance, RN on [facility]3 @1343 on 8/1/18. ch.       Recent Labs  Lab 07/31/18 2030   NTBNPI 933       Recent Labs  Lab 08/02/18 0748 07/31/18 2030   AST 16 21   ALT 16 24   ALKPHOS 58 94   BILITOTAL 1.1 1.3       Recent Labs  Lab 08/06/18  0627 08/05/18  0610 08/04/18  0557   INR 2.08* 2.25* 2.58*       Recent Labs  Lab 08/01/18  0739 07/31/18  2257 07/31/18  2035   LACT 2.5* 2.5* 2.5*       Recent Labs  Lab  07/31/18  2030   TROPI <0.015       Recent Labs  Lab 07/31/18  2206   COLOR Yellow   APPEARANCE Clear   URINEGLC Negative   URINEBILI Negative   URINEKETONE Negative   SG 1.011   UBLD Small*   URINEPH 5.0   PROTEIN Negative   NITRITE Negative   LEUKEST Negative   RBCU <1   WBCU <1       Recent Results (from the past 24 hour(s))   US Lower Extremity Venous Duplex Left    Narrative    US LOWER EXTREMITY VENOUS DUPLEX LEFT  8/5/2018 7:09 PM    HISTORY:  ? DVT;     TECHNIQUE:  Venous Doppler US including color flow and Doppler  waveform analysis.    FINDINGS:  The peroneal veins were not seen.  No thrombus was observed  and there was normal compressibility, phasic flow, and augmentation in  the common femoral, femoral, popliteal, and posterior tibial veins.       Impression    IMPRESSION: No evidence of  left lower extremity deep venous  thrombosis.    TANIA HURST MD[SM1.1]                Revision History        User Key Date/Time User Provider Type Action    > SM1.2 8/6/2018  1:17 PM Lanie Harvey MD Physician Sign     SM1.1 8/6/2018  7:57 AM Lanie Harvey MD Physician             Progress Notes by Lanie Harvey MD at 8/5/2018  9:23 AM     Author:  Lanie Harvey MD Service:  Hospitalist Author Type:  Physician    Filed:  8/5/2018  1:49 PM Date of Service:  8/5/2018  9:23 AM Creation Time:  8/5/2018  9:23 AM    Status:  Signed :  Lanie Harvey MD (Physician)         [facility]  Hospitalist Progress Note[SM1.1]  Lanie Harvey MD 08/05/2018[SM1.2]    Reason for Stay (Diagnosis): sepsis         Assessment and Plan:      Summary of Stay: Ton Bagley is a 89 year old male admitted on 7/31/2018 with sepsis.        Strep group B bacteremia and sepsis   --Presented fever, elevated WBC, elevated lactic acid, cellulitis, left LE. Recurrent LE cellulitis with hx lymphedema and chronic stasis dermatitis.    --Continue on Ancef[SM1.1] day 5[SM1.3]    Left LE  "Cellulitis in the setting of chronic venous stasis and peripheral edema.  --On IV antibiotic   --Supportive care leg elevation[SM1.1]  --We will  get lower extremity ultrasound to rule out DVT left leg more swollen than right[SM1.3]    Chronic A fib  --RVR initially driven by acute infection.   --On warfarin and metoprolol.    DM  --On glipizide alone.     Dementia.  --Presented from LTC at [facility].     Diastolic heart failure.   --Secondary to aortic stenosis with possible \"low-flow gradient\" aortic stenosis.     Urinary frequency related to Prostate cancer and treatment.     Mild infectious encephalopathy.    --Resolving.        DVT Prophylaxis: Warfarin  Code Status: DNR / DNI  Discharge Dispo: recently moved to SNF.  Anticipate return to skilled nursing facility.  Estimated Disch Date / # of Days until Disch:[SM1.1] 1-2 days if continues to[SM1.3]        Interval History (Subjective):[SM1.1]      Assumed care reviewed chart.  Patient has been afebrile.  Continues to have left leg swelling more than right.  Has chronic stasis changes.  Denies any chest pain   Occasional abdominal cramps has had regular bowel movements.  Review of all the other systems negative[SM1.3]                  Physical Exam:      Last Vital Signs:[SM1.1]  /79 (BP Location: Left arm)  Pulse 83  Temp 97.8  F (36.6  C) (Oral)  Resp 19  Wt 100.6 kg (221 lb 12.5 oz)  SpO2 90%  BMI 35.8 kg/m2    I/O last 3 completed shifts:  In: 240 [P.O.:240]  Out: -[SM1.2]     Constitutional: NAD. Alert and more interactive than yesterday. Still appears to not recall our conversation from earlier in the morning (though he was up sitting in his chair).   Respiratory: Clear to auscultation bilaterally, no crackles or wheezing   Cardiovascular: IRRIR.   Abdomen: Normal bowel sounds, soft, non-distended, non-tender   Skin: Marked, confluent warmth and erythema involving the entire left LE.  Bilateral venous stasis disease with " cobblestoning is noted.   Neuro: Arousable.   Extremities: Edema slightly less, but present bilat. Old saphenous vein harvest site scar on left lower extrem noted.  Tenderness mostly over the posteromedial left thigh.   Other(s):        All other systems: Negative          Medications:      All current medications were reviewed with changes reflected in problem list.         Data:      All new lab and imaging data was reviewed.   Labs/Imaging:[SM1.1]  Results for orders placed or performed during the hospital encounter of 07/31/18 (from the past 24 hour(s))   INR   Result Value Ref Range    INR 2.25 (H) 0.86 - 1.14   Creatinine   Result Value Ref Range    Creatinine 1.03 0.66 - 1.25 mg/dL    GFR Estimate 68 >60 mL/min/1.7m2    GFR Estimate If Black 82 >60 mL/min/1.7m2[SM1.2]         Medications     - MEDICATION INSTRUCTIONS -       Warfarin Therapy Reminder         ceFAZolin  2 g Intravenous Q12H     ferrous gluconate  324 mg Oral Daily with breakfast     furosemide  60 mg Oral BID     gabapentin (NEURONTIN) capsule 300 mg  300 mg Oral BID     glipiZIDE  5 mg Oral BID AC     levothyroxine  100 mcg Oral Daily     omeprazole  20 mg Oral Daily     sodium chloride (PF)  3 mL Intracatheter Q8H     tamsulosin  0.4 mg Oral Daily     Data[SM1.4]       Recent Labs  Lab 08/01/18  0739 07/31/18  2030   WBC 23.9* 19.6*   HGB 12.9* 14.0   HCT 40.7 44.4   MCV 99 98    223       Recent Labs  Lab 08/05/18  0610 08/04/18  0557 08/03/18  0716 08/02/18  0748 08/01/18  0739 07/31/18  2030   NA  --   --   --  137 140 136   POTASSIUM  --   --  3.9 3.3* 4.0 3.4   CHLORIDE  --   --   --  99 104 99   CO2  --   --   --  30 27 30   ANIONGAP  --   --   --  8 9 7   GLC  --   --   --  138* 137* 219*   BUN  --   --   --  17 19 19   CR 1.03 1.07 1.05 1.28* 1.26* 1.05   GFRESTIMATED 68 65 66 53* 54* 66   GFRESTBLACK 82 78 80 64 65 80   JANN  --   --   --  7.8* 8.0* 9.1       Recent Labs  Lab 07/31/18 2158 07/31/18  2149   CULT No growth  after 4 days Cultured on the 1st day of incubation:Beta hemolytic Streptococcus group B*  Critical Value/Significant Value, preliminary result only, called to and read back byMARY JO SEVERANCE RN ON [facility] 3 AT 1044 ON 08/01/18 .  (Note)POSITIVE for STREPTOCOCCUS AGALACTIAE (Group B Strep) by Verigenemultiplex nucleic acid test. Penicillin and ampicillin are drugs ofchoice, nonsusceptible isolates have not been reported. Finalidentification and antimicrobial susceptibility testing will beverified by standard methods.Specimen tested with Verigene multiplex, gram-positive blood culturenucleic acid test for the following targets: Staph aureus, Staphepidermidis, Staph lugdunensis, other Staph species, Enterococcusfaecalis, Enterococcus faecium, Streptococcus species, S. agalactiae,S. anginosus grp., S. pneumoniae, S. pyogenes, Listeria sp., mecA(methicillin resistance) and Renetta/B (vancomycin resistance).Critical Value/Significant Value called to and read back by Mary JoSeverance, RN on MS3 @1343 on 8/1/18. .       Recent Labs  Lab 07/31/18  2030   NTBNPI 933       Recent Labs  Lab 08/02/18  0748 07/31/18  2030   AST 16 21   ALT 16 24   ALKPHOS 58 94   BILITOTAL 1.1 1.3       Recent Labs  Lab 08/05/18  0610 08/04/18  0557 08/03/18  0716   INR 2.25* 2.58* 2.94*       Recent Labs  Lab 08/01/18  0739 07/31/18  2257 07/31/18  2035   LACT 2.5* 2.5* 2.5*       Recent Labs  Lab 07/31/18  2030   TROPI <0.015       Recent Labs  Lab 07/31/18  2206   COLOR Yellow   APPEARANCE Clear   URINEGLC Negative   URINEBILI Negative   URINEKETONE Negative   SG 1.011   UBLD Small*   URINEPH 5.0   PROTEIN Negative   NITRITE Negative   LEUKEST Negative   RBCU <1   WBCU <1[SM1.5]       No results found for this or any previous visit (from the past 24 hour(s)).[SM1.4]             Revision History        User Key Date/Time User Provider Type Action    > SM1.3 8/5/2018  1:49 PM Lanie Harvey MD Physician Sign     SM1.5  "8/5/2018  9:29 AM Lanie Harvey MD Physician      SM1.4 8/5/2018  9:28 AM Lanie Harvey MD Physician      SM1.2 8/5/2018  9:24 AM Lanie Harvey MD Physician      SM1.1 8/5/2018  9:23 AM Lanie Harvey MD Physician             Progress Notes by Surjit May MD at 8/4/2018  7:23 AM     Author:  Surjit May MD Service:  Hospitalist Author Type:  Physician    Filed:  8/4/2018  4:59 PM Date of Service:  8/4/2018  7:23 AM Creation Time:  8/4/2018  7:23 AM    Status:  Signed :  Surjit May MD (Physician)         [facility]  Hospitalist Progress Note[KP1.1]  Surjit May MD 08/04/2018[KP1.2]    Reason for Stay (Diagnosis): sepsis         Assessment and Plan:      Summary of Stay: Ton Bagley is a 89 year old male admitted on 7/31/2018 with sepsis.      Problem List:   1. Strep group B bacteremia and sepsis syndrome with fever, elevated WBC, elevated lactic acid, cellulitis, left LE. Recurrent LE cellulitis with hx lymphedema and chronic stasis dermatitis.    - systemic symptoms improving.  Pt up in chair eating today.  2. Left LE Cellulitis in the setting of chronic venous stasis and peripheral edema.  3. Chronic A fib, in RVR driven by acute infection. Typically managed with Warfarin and metoprolol.  4. DM, typically on glipizide alone.   5. Dementia. Recently moved to LTC at [facility].   6. Diastolic heart failure. Aortic stenosis with possible \"low-flow gradient\" aortic stenosis.   7. Urinary frequency related to Prostate cancer and treatment.   8. Mild infectious encephalopathy.  Resolving.    PLAN:  1.[KP1.1]  C[KP1.3]ont Ancef alone.   2.  Elevate leg above the level of the heart as much as can be tolerated with thigh abductor pillow.   3.  Ambulate qid with assist.      DVT Prophylaxis: Warfarin  Code Status: DNR / DNI  Discharge Dispo: recently moved to SNF[KP1.1].  Anticipate return to skilled nursing facility.[KP1.3]  Estimated " Disch Date / # of Days until Disch:[KP1.1] Quite possibly tomorrow.[KP1.3]        Interval History (Subjective):[KP1.1]      The patient appears still to be profoundly forgetful.  He is nevertheless very pleasant and in no apparent distress.  He does not seem to have significant discomfort until I palpate directly over the medial thigh of the left leg.  He has resolving cellulitis.[KP1.3]                  Physical Exam:      Last Vital Signs:[KP1.1]  /79 (BP Location: Right arm)  Pulse 83  Temp 96  F (35.6  C) (Axillary)  Resp 19  Wt 100.6 kg (221 lb 12.5 oz)  SpO2 94%  BMI 35.8 kg/m2    I/O last 3 completed shifts:  In: 340 [P.O.:340]  Out: 325 [Urine:325][KP1.2]    Constitutional: NAD. Alert and more interactive than yesterday. Still appears to not recall our conversation from earlier in the morning (though he was up sitting in his chair).   Respiratory: Clear to auscultation bilaterally, no crackles or wheezing   Cardiovascular: IRRIR.   Abdomen: Normal bowel sounds, soft, non-distended, non-tender   Skin: Marked, confluent warmth and erythema involving the entire left LE.[KP1.1]  Bilateral venous stasis disease with cobblestoning is noted.[KP1.3]   Neuro: Arousable.   Extremities: Edema slightly less, but present bilat. Old sa[KP1.1]ph[KP1.3]enous vein harvest site scar on left lower extrem noted.[KP1.1]  Tenderness mostly over the posteromedial left thigh.[KP1.3]   Other(s):        All other systems: Negative          Medications:      All current medications were reviewed with changes reflected in problem list.         Data:      All new lab and imaging data was reviewed.   Labs/Imaging:[KP1.1]  Results for orders placed or performed during the hospital encounter of 07/31/18 (from the past 24 hour(s))   INR   Result Value Ref Range    INR 2.58 (H) 0.86 - 1.14   Creatinine   Result Value Ref Range    Creatinine 1.07 0.66 - 1.25 mg/dL    GFR Estimate 65 >60 mL/min/1.7m2    GFR Estimate If Black 78  >60 mL/min/1.7m2[KP1.2]          Revision History        User Key Date/Time User Provider Type Action    > KP1.2 8/4/2018  4:59 PM Surjit May MD Physician Sign     KP1.3 8/4/2018  4:57 PM Surjit May MD Physician      KP1.1 8/4/2018  7:23 AM Surjit May MD Physician                   Procedure Notes     No notes of this type exist for this encounter.      Progress Notes - Therapies (Notes from 08/04/18 through 08/07/18)     No notes of this type exist for this encounter.

## 2018-07-31 NOTE — IP AVS SNAPSHOT
MRN:[id number]                      After Visit Summary   7/31/2018    Ton Bagley    MRN: [id number]           Thank you!     Thank you for choosing [facility] for your care. Our goal is always to provide you with excellent care. Hearing back from our patients is one way we can continue to improve our services. Please take a few minutes to complete the written survey that you may receive in the mail after you visit. If you would like to speak to someone directly about your visit please contact Patient Relations at [phone]. Thank you!          Patient Information     Date Of Birth          7/18/1927        Designated Caregiver       Most Recent Value    Caregiver    Will someone help with your care after discharge? yes    Name of designated caregiver brandee     Phone number of caregiver [phone]    Caregiver address same as patient      About your hospital stay     You were admitted on:  August 1, 2018 You last received care in the:  [facility] [address] [facility]    You were discharged on:  August 7, 2018        Reason for your hospital stay       Please refer to discharge summary.  Briefly admitted and treated for cellulitis                  Who to Call     For medical emergencies, please call 911.  For non-urgent questions about your medical care, please call your primary care provider or clinic, [phone]          Attending Provider     Provider Specialty    Mariia Navarrete MD Emergency Medicine    Consuelo, Simeon Márquez MD Internal Medicine       Primary Care Provider Office Phone # Fax #    [facility] [phone]      After Care Instructions     Activity - Up with nursing assistance           Advance Diet as Tolerated       Follow this diet upon discharge: Orders Placed This Encounter      Moderate Consistent CHO Diet            Daily weights       Call Provider for weight gain of more than 2 pounds per day or 5 pounds per  week.            Fall precautions           General info for SNF       Length of Stay Estimate: Long Term Care  Condition at Discharge: Improving  Level of care:skilled   Rehabilitation Potential: Fair  Admission H&P remains valid and up-to-date: Yes  Recent Chemotherapy: N/A  Use Nursing Home Standing Orders: Yes            Glucose monitor nursing POCT       Before meals and at bedtime            Intake and output       Every shift            Mantoux instructions       Give two-step Mantoux (PPD) Per Facility Policy Yes                  Follow-up Appointments     Follow Up and recommended labs and tests       Follow up with [facility] physician.  The following labs/tests are recommended: basic metabolic panel and INR in 3- 5 days.  Please recheck INR as patient is on Coumadin was started on antibiotic which can affect INR and will need to adjust anticoagulation accordingly  Please monitor daily blood pressure and heart rate    Continue lymphedema wraps.  Use topical emollient for both lower extremities                  Additional Services     Physical Therapy Adult Consult       Evaluate and treat as clinically indicated.    Reason: Deconditioning                  Warfarin Instruction     You have started taking a medicine called warfarin. This is a blood-thinning medicine (anticoagulant). It helps prevent and treat blood clots.      Before leaving the hospital, make sure you know how much to take and how long to take it.      You will need regular blood tests to make sure your blood is clotting safely. It is very important to see your doctor for regular blood tests.    Talk to your doctor before taking any new medicine (this includes over-the-counter drugs and herbal products). Many medicines can interact with warfarin. This may cause more bleeding or too much clotting.     Eating a lot of vitamin K--found in green, leafy vegetables--can change the way warfarin works in your body. Do NOT avoid these foods.  "Instead, try to eat the same amount each day.     Bleeding is the most common side-effect of warfarin. You may notice bleeding gums, a bloody nose, bruises and bleeding longer when you cut yourself. See a doctor at once if:   o You cough up blood  o You find blood in your stool (poop)  o You have a deep cut, or a cut that bleeds longer than 10 minutes   o You have a bad cut, hard fall, accident or hit your head (go to urgent care or the emergency room).    For women who can get pregnant: This medicine can harm an unborn baby. Be very careful not to get pregnant while taking this medicine. If you think you might be pregnant, call your doctor right away.    For more information, read \"Guide to Warfarin Therapy,  the booklet you received in the hospital.        Pending Results     Date and Time Order Name Status Description    8/2/2018 0000 Creatinine In process             Statement of Approval     Ordered          08/07/18 1154  I have reviewed and agree with all the recommendations and orders detailed in this document.  EFFECTIVE NOW     Approved and electronically signed by:  Lanie Harvey MD             Admission Information     Date & Time Provider Department Dept. Phone    7/31/2018 Simeon Cordero MD [place] [address] [facility] [phone]      Your Vitals Were     Blood Pressure Pulse Temperature Respirations Weight Pulse Oximetry    140/64 (BP Location: Right arm) 83 98  F (36.7  C) (Axillary) 16 100.6 kg (221 lb 12.5 oz) 93%    BMI (Body Mass Index)                   35.8 kg/m2           MyChart Information     [employer] lets you send messages to your doctor, view your test results, renew your prescriptions, schedule appointments and more. To sign up, go to [email or web address] . Click on \"Log in\" on the left side of the screen, which will take you to the Welcome page. Then click on \"Sign up Now\" on the right side of the page.     You will be asked to enter the access code listed below, " as well as some personal information. Please follow the directions to create your username and password.     Your access code is: [email or web address]  Expires: 10/21/2018  6:16 PM     Your access code will  in 90 days. If you need help or a new code, please call your [facility] clinic or [phone].        Care EveryWhere ID     This is your Care EveryWhere ID. This could be used by other organizations to access your [facility] medical records  [id number]        Equal Access to Services     LIANE TOLEDO : Hadii ca dawkins hadasho Soomaali, waaxda luqadaha, qaybta kaalmada adeegyada, ursula pandya . So [facility] [phone].    ATENCIÓN: Si habla español, tiene a redman disposición servicios gratuitos de asistencia lingüística. Llame al [phone].    We comply with applicable federal civil rights laws and [place] laws. We do not discriminate on the basis of race, color, national origin, age, disability, sex, sexual orientation, or gender identity.               Review of your medicines      START taking        Dose / Directions    cephALEXin 500 MG capsule   Commonly known as:  KEFLEX        Dose:  500 mg   Take 1 capsule (500 mg) by mouth 3 times daily for 4 days   Quantity:  12 capsule   Refills:  0         CONTINUE these medicines which have NOT CHANGED        Dose / Directions    acetaminophen 325 MG tablet   Commonly known as:  TYLENOL        Dose:  650 mg   Take 650 mg by mouth daily as needed for mild pain   Refills:  0       atorvastatin 10 MG tablet   Commonly known as:  LIPITOR        Dose:  10 mg   Take 10 mg by mouth daily   Refills:  0       Calcium Carb-Cholecalciferol 500-200 MG-UNIT Tabs   Commonly known as:  CALCIUM 500+D   Used for:  Vitamin D deficiency        Dose:  1 tablet   Take 1 tablet by mouth 2 times daily   Refills:  0       ferrous gluconate 324 (38 Fe) MG tablet   Commonly known as:  FERGON   Used for:  Iron deficiency anemia, unspecified iron deficiency anemia type         Dose:  324 mg   Take 1 tablet (324 mg) by mouth daily (with breakfast)   Quantity:  100 tablet   Refills:  0       FUROSEMIDE PO        Dose:  60 mg   Take 60 mg by mouth 2 times daily   Refills:  0       gabapentin 300 MG capsule   Commonly known as:  NEURONTIN        Dose:  300 mg   Take 300 mg by mouth 2 times daily   Refills:  0       glipiZIDE 5 MG tablet   Commonly known as:  GLUCOTROL   Used for:  Type 2 diabetes mellitus with complication, without long-term current use of insulin (H)        Dose:  5 mg   Take 1 tablet (5 mg) by mouth 2 times daily (before meals)   Quantity:  30 tablet   Refills:  0       ipratropium - albuterol 0.5 mg/2.5 mg/3 mL 0.5-2.5 (3) MG/3ML neb solution   Commonly known as:  DUONEB        Dose:  1 vial   Take 1 vial by nebulization 3 times daily as needed   Refills:  0       lactulose 10 GM/15ML solution   Commonly known as:  CHRONULAC        Take 30 mLs daily as needed   Refills:  0       levothyroxine 100 MCG tablet   Commonly known as:  SYNTHROID/LEVOTHROID   Used for:  Hypothyroidism, unspecified type        Dose:  100 mcg   Take 1 tablet (100 mcg) by mouth daily   Quantity:  30 tablet   Refills:  0       lisinopril 5 MG tablet   Commonly known as:  PRINIVIL/ZESTRIL   Used for:  Essential hypertension        Dose:  5 mg   Take 1 tablet (5 mg) by mouth daily   Refills:  0       metoprolol tartrate 25 MG tablet   Commonly known as:  LOPRESSOR        Dose:  25 mg   Take 25 mg by mouth 2 times daily   Refills:  0       NITROSTAT 0.4 MG sublingual tablet   Generic drug:  nitroGLYcerin        Dose:  0.4 mg   Place 0.4 mg under the tongue 3 times daily as needed   Refills:  0       nystatin 599915 UNIT/GM Powd   Commonly known as:  MYCOSTATIN        Apply to groin area for yeast growth with every pericare   Refills:  0       omeprazole 20 MG CR capsule   Commonly known as:  priLOSEC   Used for:  Gastroesophageal reflux disease without esophagitis        Dose:  20 mg   Take 1 capsule  (20 mg) by mouth daily   Quantity:  30 capsule   Refills:  0       polyethylene glycol Packet   Commonly known as:  MIRALAX/GLYCOLAX        Dose:  17 g   Take 17 g by mouth daily   Refills:  0       senna-docusate 8.6-50 MG per tablet   Commonly known as:  SENOKOT-S;PERICOLACE        Dose:  2 tablet   Take 2 tablets by mouth 2 times daily   Refills:  0       simethicone 80 MG chewable tablet   Commonly known as:  MYLICON   Used for:  Abdominal pain, generalized        Dose:  160 mg   Take 2 tablets (160 mg) by mouth every 6 hours as needed for flatulence or cramping   Quantity:  180 tablet   Refills:  0       sucralfate 1 GM tablet   Commonly known as:  CARAFATE   Used for:  Other acute gastritis without hemorrhage        Dose:  1 g   Take 1 tablet (1 g) by mouth 4 times daily (before meals and nightly)   Quantity:  120 tablet   Refills:  0       TAMSULOSIN HCL PO        Dose:  0.4 mg   Take 0.4 mg by mouth daily   Refills:  0       * warfarin 2 MG tablet   Commonly known as:  COUMADIN        Dose:  4 mg   Take 4 mg by mouth daily Take 4 mg on Sun, Tues, Thurs, Fri, Sat   Refills:  0       * warfarin 5 MG tablet   Commonly known as:  COUMADIN        Dose:  5 mg   Take 5 mg by mouth daily Take 5 mg on Mon, Wed   Refills:  0       * Notice:  This list has 2 medication(s) that are the same as other medications prescribed for you. Read the directions carefully, and ask your doctor or other care provider to review them with you.         Where to get your medicines      Some of these will need a paper prescription and others can be bought over the counter. Ask your nurse if you have questions.     Bring a paper prescription for each of these medications     cephALEXin 500 MG capsule                Protect others around you: Learn how to safely use, store and throw away your medicines at www.disposemymeds.org.        ANTIBIOTIC INSTRUCTION     You've Been Prescribed an Antibiotic - Now What?  Your healthcare team thinks  that you or your loved one might have an infection. Some infections can be treated with antibiotics, which are powerful, life-saving drugs. Like all medications, antibiotics have side effects and should only be used when necessary. There are some important things you should know about your antibiotic treatment.      Your healthcare team may run tests before you start taking an antibiotic.    Your team may take samples (e.g., from your blood, urine or other areas) to run tests to look for bacteria. These test can be important to determine if you need an antibiotic at all and, if you do, which antibiotic will work best.      Within a few days, your healthcare team might change or even stop your antibiotic.    Your team may start you on an antibiotic while they are working to find out what is making you sick.    Your team might change your antibiotic because test results show that a different antibiotic would be better to treat your infection.    In some cases, once your team has more information, they learn that you do not need an antibiotic at all. They may find out that you don't have an infection, or that the antibiotic you're taking won't work against your infection. For example, an infection caused by a virus can't be treated with antibiotics. Staying on an antibiotic when you don't need it is more likely to be harmful than helpful.      You may experience side effects from your antibiotic.    Like all medications, antibiotics have side effects. Some of these can be serious.    Let you healthcare team know if you have any known allergies when you are admitted to the hospital.    One significant side effect of nearly all antibiotics is the risk of severe and sometimes deadly diarrhea caused by Clostridium difficile (C. Difficile). This occurs when a person takes antibiotics because some good germs are destroyed. Antibiotic use allows C. diificile to take over, putting patients at high risk for this serious  infection.    As a patient or caregiver, it is important to understand your or your loved one's antibiotic treatment. It is especially important for caregivers to speak up when patients can't speak for themselves. Here are some important questions to ask your healthcare team.    What infection is this antibiotic treating and how do you know I have that infection?    What side effects might occur from this antibiotic?    How long will I need to take this antibiotic?    Is it safe to take this antibiotic with other medications or supplements (e.g., vitamins) that I am taking?     Are there any special directions I need to know about taking this antibiotic? For example, should I take it with food?    How will I be monitored to know whether my infection is responding to the antibiotic?    What tests may help to make sure the right antibiotic is prescribed for me?      Information provided by:  www.cdc.gov/getsmart  U.S. Department of Health and Human Services  Centers for disease Control and Prevention  National Center for Emerging and Zoonotic Infectious Diseases  Division of Healthcare Quality Promotion             Medication List: This is a list of all your medications and when to take them. Check marks below indicate your daily home schedule. Keep this list as a reference.      Medications           Morning Afternoon Evening Bedtime As Needed    acetaminophen 325 MG tablet   Commonly known as:  TYLENOL   Take 650 mg by mouth daily as needed for mild pain   Last time this was given:  650 mg on 8/7/2018 12:20 AM                                atorvastatin 10 MG tablet   Commonly known as:  LIPITOR   Take 10 mg by mouth daily                                Calcium Carb-Cholecalciferol 500-200 MG-UNIT Tabs   Commonly known as:  CALCIUM 500+D   Take 1 tablet by mouth 2 times daily                                cephALEXin 500 MG capsule   Commonly known as:  KEFLEX   Take 1 capsule (500 mg) by mouth 3 times daily for 4  days   Last time this was given:  250 mg on 8/7/2018  6:15 AM                                ferrous gluconate 324 (38 Fe) MG tablet   Commonly known as:  FERGON   Take 1 tablet (324 mg) by mouth daily (with breakfast)   Last time this was given:  324 mg on 8/7/2018  9:08 AM                                FUROSEMIDE PO   Take 60 mg by mouth 2 times daily   Last time this was given:  60 mg on 8/7/2018  9:08 AM                                gabapentin 300 MG capsule   Commonly known as:  NEURONTIN   Take 300 mg by mouth 2 times daily   Last time this was given:  300 mg on 8/7/2018  9:09 AM                                glipiZIDE 5 MG tablet   Commonly known as:  GLUCOTROL   Take 1 tablet (5 mg) by mouth 2 times daily (before meals)   Last time this was given:  5 mg on 8/7/2018  9:08 AM                                ipratropium - albuterol 0.5 mg/2.5 mg/3 mL 0.5-2.5 (3) MG/3ML neb solution   Commonly known as:  DUONEB   Take 1 vial by nebulization 3 times daily as needed                                lactulose 10 GM/15ML solution   Commonly known as:  CHRONULAC   Take 30 mLs daily as needed                                levothyroxine 100 MCG tablet   Commonly known as:  SYNTHROID/LEVOTHROID   Take 1 tablet (100 mcg) by mouth daily   Last time this was given:  100 mcg on 8/7/2018  6:15 AM                                lisinopril 5 MG tablet   Commonly known as:  PRINIVIL/ZESTRIL   Take 1 tablet (5 mg) by mouth daily                                metoprolol tartrate 25 MG tablet   Commonly known as:  LOPRESSOR   Take 25 mg by mouth 2 times daily   Last time this was given:  25 mg on 7/31/2018 10:47 PM                                NITROSTAT 0.4 MG sublingual tablet   Place 0.4 mg under the tongue 3 times daily as needed   Generic drug:  nitroGLYcerin                                nystatin 936480 UNIT/GM Powd   Commonly known as:  MYCOSTATIN   Apply to groin area for yeast growth with every pericare                                 omeprazole 20 MG CR capsule   Commonly known as:  priLOSEC   Take 1 capsule (20 mg) by mouth daily   Last time this was given:  20 mg on 8/7/2018  9:09 AM                                polyethylene glycol Packet   Commonly known as:  MIRALAX/GLYCOLAX   Take 17 g by mouth daily                                senna-docusate 8.6-50 MG per tablet   Commonly known as:  SENOKOT-S;PERICOLACE   Take 2 tablets by mouth 2 times daily   Last time this was given:  2 tablets on 8/5/2018  6:09 PM                                simethicone 80 MG chewable tablet   Commonly known as:  MYLICON   Take 2 tablets (160 mg) by mouth every 6 hours as needed for flatulence or cramping                                sucralfate 1 GM tablet   Commonly known as:  CARAFATE   Take 1 tablet (1 g) by mouth 4 times daily (before meals and nightly)                                TAMSULOSIN HCL PO   Take 0.4 mg by mouth daily   Last time this was given:  0.4 mg on 8/7/2018  9:08 AM                                * warfarin 2 MG tablet   Commonly known as:  COUMADIN   Take 4 mg by mouth daily Take 4 mg on Sun, Tues, Thurs, Fri, Sat   Last time this was given:  5 mg on 8/6/2018  5:08 PM                                * warfarin 5 MG tablet   Commonly known as:  COUMADIN   Take 5 mg by mouth daily Take 5 mg on Mon, Wed   Last time this was given:  5 mg on 8/6/2018  5:08 PM                                * Notice:  This list has 2 medication(s) that are the same as other medications prescribed for you. Read the directions carefully, and ask your doctor or other care provider to review them with you.

## 2018-07-31 NOTE — IP AVS SNAPSHOT
` ` Patient Information     Patient Name Sex     Ton Bagley ([id number]) Male 1927       Room Bed    [facility] 0350-01      Patient Demographics     Address Phone    [address]   [place] [address] [phone] (Home)  none (Work)  [phone] (Mobile)      Patient Ethnicity & Race     Ethnic Group Patient Race     White      Emergency Contact(s)     Name Relation Home Work Mobile    Kelli Bagley Spouse [phone] none none    Felecia Johnson Daughter [phone] none [phone]    Luciano Bagley Son [phone] none [phone]      Documents on File        Status Date Received Description       Documents for the Patient    Privacy Notice - [place] Received 12     Insurance Card Received 12     External Medication Information Consent Accepted 10/28/14     Patient ID Received () 12     Consent for Services - Hospital/Clinic Received () 12     Consent for EHR Access  13 Copied from existing Consent for services - C/HOD collected on 2012    [facility] Specified Other       Patient ID Received 17 [place] Expires LIfetime    Insurance Card Received 10/28/14 HP    Consent to Communicate Received 10/28/14     Consent for Services - Hospital/Clinic Received () 10/28/14     Consent for Services/Privacy Notice - Hospital/Clinic Received () 17     Insurance Card Received 17     Care Everywhere Prospective Auth Received 18     Consent for EHR Access Received 18     Consent for Services - Hospital and Clinic Received 18     HIE Auth Received 18     Insurance Card  (Deleted)         Documents for the Encounter    CMS IM for Patient Signature Received 18       Admission Information     Attending Provider Admitting Provider Admission Type Admission Date/Time    Simeon Cordero MD Rehman, Tauseef Ur, MD Emergency 18    Discharge Date Hospital Service Auth/Cert Status  Service Area     General Medicine Incomplete [facility]    Unit Room/Bed Admission Status       [facility] 0350/0350-01 Admission (Confirmed)       Admission     Complaint    Cellulitis      Hospital Account     Name Acct ID Class Status Primary Coverage    Ton Bagley [id number] Inpatient Open MEDICARE - MEDICARE FOR HB SUPPLEMENT            Guarantor Account (for Hospital Account #[id number])     Name Relation to Pt Service Area Active? Acct Type    Ton Bagley  FCS Yes Personal/Family    Address Phone          [address] [place] [address] [phone](H)  none(O)              Coverage Information (for Hospital Account #[id number])     1. MEDICARE/MEDICARE FOR HB SUPPLEMENT     F/O Payor/Plan Precert #    MEDICARE/MEDICARE FOR HB SUPPLEMENT     Subscriber Subscriber #    Ton Bagley [id number]    Address Phone    [employer]  [address]  [place] [address] [phone]          2. [employer]ERS/[employer] PLAN     F/O Payor/Plan Precert #    HE[employer]ED[employer] PLAN     Subscriber Subscriber Jann    Ton Bagley [id number]    Address Phone    [address]  [place] [address] [phone]

## 2018-07-31 NOTE — IP AVS SNAPSHOT
` `     [place] [address] MEDICAL SURGICAL: [phone]            Medication Administration Report for Ton Bagley as of 08/07/18 1247   Legend:    Given Hold Not Given Due Canceled Entry Other Actions    Time Time (Time) Time  Time-Action       Inactive    Active    Linked        Medications 08/01/18 08/02/18 08/03/18 08/04/18 08/05/18 08/06/18 08/07/18    acetaminophen (TYLENOL) tablet 650 mg  Dose: 650 mg  Freq: EVERY 4 HOURS PRN Route: PO  PRN Reason: mild pain  Start: 08/01/18 0051   Admin Instructions: Alternate ibuprofen (if ordered) with acetaminophen.  Maximum acetaminophen dose from all sources = 75 mg/kg/day not to exceed 4 grams/day.    Admin. Amount: 2 tablet (2 × 325 mg tablet)  Last Admin: 08/07/18 0020  Dispense Loc: [facility]     1844 (650 mg)-Given         0808 (650 mg)-Given       2057 (650 mg)-Given           0020 (650 mg)-Given           alum & mag hydroxide-simethicone (MYLANTA ES/MAALOX  ES) suspension 15 mL  Dose: 15 mL  Freq: EVERY 4 HOURS PRN Route: PO  PRN Reason: indigestion  Start: 08/03/18 1638   Admin. Amount: 15 mL  Last Admin: 08/03/18 2037  Dispense Loc: [facility]  Volume: 30 mL       2037 (15 mL)-Given               ferrous gluconate (FERGON) tablet 324 mg  Dose: 324 mg  Freq: DAILY WITH BREAKFAST Route: PO  Start: 08/01/18 0900   Admin Instructions: DO NOT CRUSH. Absorbed best on an empty stomach. If stomach upset occurs, can take with meals.    Admin. Amount: 1 tablet (1 × 324 mg tablet)  Last Admin: 08/07/18 0908  Dispense Loc: [facility]     1018 (324 mg)-Given        0844 (324 mg)-Given        0808 (324 mg)-Given        0831 (324 mg)-Given        0852 (324 mg)-Given        0933 (324 mg)-Given        0908 (324 mg)-Given           furosemide (LASIX) tablet 60 mg  Dose: 60 mg  Freq: 2 TIMES DAILY. Route: PO  Start: 08/01/18 0930   Admin. Amount: 1 tablet (1 × 20 mg tablet) + 1 tablet (1 × 40 mg tablet)  Last Admin: 08/07/18 0908  Dispense Loc:  [facility]   Mixture  Administration Information:   Medication Type Amount   furosemide 20 MG TABS Medications 20 mg   furosemide 40 MG TABS Medications 40 mg             1018 (60 mg)-Given       1643 (60 mg)-Given        0844 (60 mg)-Given       1611 (60 mg)-Given        0808 (60 mg)-Given       1631 (60 mg)-Given        0831 (60 mg)-Given       1741 (60 mg)-Given        0852 (60 mg)-Given       1648 (60 mg)-Given        0743 (60 mg)-Given       1654 (60 mg)-Given        0908 (60 mg)-Given       [ ] 1600           gabapentin (NEURONTIN) capsule 300 mg  Dose: 300 mg  Freq: 2 TIMES DAILY Route: PO  Start: 08/01/18 0100   Admin. Amount: 1 capsule (1 × 300 mg capsule)  Last Admin: 08/07/18 0909  Dispense Loc:  [facility]     (0247)-Not Given       1019 (300 mg)-Given       2030 (300 mg)-Given        0844 (300 mg)-Given       2034 (300 mg)-Given        0808 (300 mg)-Given       2036 (300 mg)-Given        0831 (300 mg)-Given       2133 (300 mg)-Given        0852 (300 mg)-Given       2159 (300 mg)-Given        0933 (300 mg)-Given       2148 (300 mg)-Given        0909 (300 mg)-Given       [ ] 2100           glipiZIDE (GLUCOTROL) tablet 5 mg  Dose: 5 mg  Freq: 2 TIMES DAILY BEFORE MEALS Route: PO  Start: 08/01/18 0730   Admin Instructions: Take before or with meals.    Admin. Amount: 1 tablet (1 × 5 mg tablet)  Last Admin: 08/07/18 0908  Dispense Loc:  [facility]     1019 (5 mg)-Given       1643 (5 mg)-Given        0844 (5 mg)-Given       1611 (5 mg)-Given        0808 (5 mg)-Given       1631 (5 mg)-Given        0633 (5 mg)-Given       1741 (5 mg)-Given        0852 (5 mg)-Given       1649 (5 mg)-Given        0743 (5 mg)-Given       1654 (5 mg)-Given        0908 (5 mg)-Given       [ ] 1630           glucose gel 15-30 g  Dose: 15-30 g  Freq: EVERY 15 MIN PRN Route: PO  PRN Reason: low blood sugar  Start: 08/01/18 0927   Admin Instructions: Give 15 g for BG 51 to 69 mg/dL IF patient is conscious and able to swallow. Give 30 g for BG less than or  equal to 50 mg/dL IF patient is conscious and able to swallow. Do NOT give glucose gel via enteral tube.  IF patient has enteral tube: give apple juice 120 mL (4 oz or 15 g of CHO) via enteral tube for BG 51 to 69 mg/dL.  Give apple juice 240 mL (8 oz or 30 g of CHO) via enteral tube for BG less than or equal to 50 mg/dL.    ~Oral gel is preferable for conscious and able to swallow patient.   ~IF gel unavailable or patient refuses may provide apple juice 120 mL (4 oz or 15 g of CHO). Document juice on I and O flowsheet.    Admin. Amount: 15-30 g  Dispense Loc: [facility]  Volume: 93.75 mL              Or  dextrose 50 % injection 25-50 mL  Dose: 25-50 mL  Freq: EVERY 15 MIN PRN Route: IV  PRN Reason: low blood sugar  Start: 08/01/18 0927   Admin Instructions: Use if have IV access, BG less than 70 mg/dL and meet dose criteria below:  Dose if conscious and alert (or disorientated) and NPO = 25 mL  Dose if unconscious / not alert = 50 mL  Vesicant. For ordered doses up to 25 g, give IV Push undiluted. Give each 5g over 1 minute.    Admin. Amount: 25-50 mL  Dispense Loc: [facility]  Infused Over: 1-5 Minutes  Volume: 50 mL              Or  glucagon injection 1 mg  Dose: 1 mg  Freq: EVERY 15 MIN PRN Route: SC  PRN Reason: low blood sugar  PRN Comment: May repeat x 1 only  Start: 08/01/18 0927   Admin Instructions: May give SQ or IM. ONLY use glucagon IF patient has NO IV access AND is UNABLE to swallow AND blood glucose is LESS than or EQUAL to 50 mg/dL.  If ordered IV, give IV Push over 1 minute. Reconstitute with 1mL sterile water.    Admin. Amount: 1 mg  Dispense Loc: [facility]               levothyroxine (SYNTHROID/LEVOTHROID) tablet 100 mcg  Dose: 100 mcg  Freq: DAILY Route: PO  Start: 08/01/18 0930   Admin Instructions: Separate oral administration of iron- or calcium-containing products and levothyroxine by at least 4 hours.    Admin. Amount: 1 tablet (1 × 100 mcg tablet)  Last Admin: 08/07/18 0615  Dispense  "Loc:  [facility]     1018 (100 mcg)-Given        0705 (100 mcg)-Given        0540 (100 mcg)-Given        0633 (100 mcg)-Given        0612 (100 mcg)-Given        0519 (100 mcg)-Given        0615 (100 mcg)-Given           lidocaine (LMX4) kit  Freq: EVERY 1 HOUR PRN Route: Top  PRN Reason: pain  PRN Comment: with VAD insertion or accessing implanted port.  Start: 08/01/18 0051   Admin Instructions: Do NOT give if patient has a history of allergy to any local anesthetic or any \"wilian\" product.   Apply 30 minutes prior to VAD insertion or port access.  MAX Dose:  2.5 g (  of 5 g tube)    Dispense Loc: [facility]               lidocaine 1 % 1 mL  Dose: 1 mL  Freq: EVERY 1 HOUR PRN Route: OTHER  PRN Comment: mild pain with VAD insertion or accessing implanted port  Start: 08/01/18 0051   Admin Instructions: Do NOT give if patient has a history of allergy to any local anesthetic or any \"wilian\" product. MAX dose 1 mL subcutaneous OR intradermal in divided doses.    Admin. Amount: 1 mL  Dispense Loc: [facility]  Volume: 2 mL               magnesium sulfate 4 g in 100 mL sterile water (premade)  Dose: 4 g  Freq: EVERY 4 HOURS PRN Route: IV  PRN Reason: magnesium supplementation  Start: 08/02/18 1423   Admin Instructions: For serum Mg++ less than 1.6 mg/dL  Give 4 g and recheck magnesium level 2 hours after dose, and next AM.    Admin. Amount: 4 g = 100 mL Conc: 4 g/100 mL  Dispense Loc: [facility]  Infused Over: 120 Minutes  Volume: 100 mL               melatonin tablet 1 mg  Dose: 1 mg  Freq: AT BEDTIME PRN Route: PO  PRN Reason: sleep  Start: 08/01/18 0051   Admin Instructions: Do not give unless at least 6 hours of uninterrupted sleep is expected.    Admin. Amount: 1 tablet (1 × 1 mg tablet)  Last Admin: 08/05/18 2202  Dispense Loc:  [facility]        2133 (1 mg)-Given        2202 (1 mg)-Given             naloxone (NARCAN) injection 0.1-0.4 mg  Dose: 0.1-0.4 mg  Freq: EVERY 2 MIN PRN Route: IV  PRN Reason: " opioid reversal  Start: 08/01/18 0051   Admin Instructions: For respiratory rate LESS than or EQUAL to 8.  Partial reversal dose:  0.1 mg titrated q 2 minutes for Analgesia Side Effects Monitoring Sedation Level of 3 (frequently drowsy, arousable, drifts to sleep during conversation).Full reversal dose:  0.4 mg bolus for Analgesia Side Effects Monitoring Sedation Level of 4 (somnolent, minimal or no response to stimulation).  For ordered doses up to 2mg give IVP. Give each 0.4mg over 15 seconds in emergency situations. For non-emergent situations further dilute in 9mL of NS to facilitate titration of response.    Admin. Amount: 0.1-0.4 mg = 0.25-1 mL Conc: 0.4 mg/mL  Dispense Loc: [facility]  Volume: 1 mL               nitroGLYcerin (NITROSTAT) sublingual tablet 0.4 mg  Dose: 0.4 mg  Freq: EVERY 5 MIN PRN Route: SL  PRN Reason: chest pain  Start: 08/01/18 0051   Admin Instructions: Maximum 3 doses in 15 minutes.  Notify provider if no relief after 3 doses.    Do NOT give nitroglycerin SL IF the patient has taken avanafil (STENDRA), sildenafil (VIAGRA) or (REVATIO) within the last 8 hours, vardenafil (LEVITRA) or (STAXYN) within the last 18 hours, tadalafil (CIALIS) or (ADCIRCA) within the last 36 hours. Inform provider if patient has taken one of these medications.  If patient is still having acute angina requiring treatment, an alternative treatment option may be used such as: IV beta-blocker [2.5 mg - 5 mg metoprolol (LOPRESSOR)] if ordered by a provider.    Admin. Amount: 1 tablet (1 × 0.4 mg tablet)  Dispense Loc: [facility]               omeprazole (priLOSEC) CR capsule 20 mg  Dose: 20 mg  Freq: DAILY Route: PO  Start: 08/01/18 0900   Admin. Amount: 1 capsule (1 × 20 mg capsule)  Last Admin: 08/07/18 0909  Dispense Loc: [facility]     1018 (20 mg)-Given        0844 (20 mg)-Given        0543 (20 mg)-Given               0831 (20 mg)-Given        0852 (20 mg)-Given        0933 (20 mg)-Given        0909 (20  mg)-Given           ondansetron (ZOFRAN-ODT) ODT tab 4 mg  Dose: 4 mg  Freq: EVERY 6 HOURS PRN Route: PO  PRN Reasons: nausea,vomiting  Start: 08/01/18 0051   Admin Instructions: This is Step 1 of nausea and vomiting management.  If nausea not resolved in 15 minutes, go to Step 2 prochlorperazine (COMPAZINE). Do not push through foil backing. Peel back foil and gently remove. Place on tongue immediately. Administration with liquid unnecessary  With dry hands, peel back foil backing and gently remove tablet; do not push oral disintegrating tablet through foil backing; administer immediately on tongue and oral disintegrating tablet dissolves in seconds; then swallow with saliva; liquid not required.    Admin. Amount: 1 tablet (1 × 4 mg tablet)  Dispense Loc: [facility]              Or  ondansetron (ZOFRAN) injection 4 mg  Dose: 4 mg  Freq: EVERY 6 HOURS PRN Route: IV  PRN Reasons: nausea,vomiting  Start: 08/01/18 0051   Admin Instructions: This is Step 1 of nausea and vomiting management.  If nausea not resolved in 15 minutes, go to Step 2 prochlorperazine (COMPAZINE).  Irritant. For ordered doses up to 4 mg, give IV Push undiluted over 2-5 minutes.    Admin. Amount: 4 mg = 2 mL Conc: 4 mg/2 mL  Dispense Loc: [facility]  Infused Over: 2-5 Minutes  Volume: 2 mL               Patient is already receiving anticoagulation with heparin, enoxaparin (LOVENOX), warfarin (COUMADIN)  or other anticoagulant medication  Freq: CONTINUOUS PRN Route: XX  Start: 08/01/18 0051   Dispense Loc: Non Rx dispense               polyethylene glycol (MIRALAX/GLYCOLAX) Packet 17 g  Dose: 17 g  Freq: DAILY PRN Route: PO  PRN Reason: constipation  Start: 08/01/18 0051   Admin Instructions: Give in 8oz of  water, juice, or soda. Hold for loose stools.  This is the second step of a three step constipation treatment protocol.  1 Packet = 17 grams. Mixed prescribed dose in 8 ounces of water. Follow with 8 oz. of water.    Admin. Amount: 17  g  Dispense Loc: [facility]               potassium chloride (KLOR-CON) Packet 20-40 mEq  Dose: 20-40 mEq  Freq: EVERY 2 HOURS PRN Route: ORAL OR FEED  PRN Reason: potassium supplementation  Start: 08/02/18 1422   Admin Instructions: Use if unable to tolerate tablets.  If Serum K+ 3.0-3.3, dose = 60 mEq po total dose (40 mEq x1 followed in 2 hours by 20 mEq x1). Recheck K+ level 4 hours after dose and the next AM.  If Serum K+ 2.5-2.9, dose = 80 mEq po total dose (40 mEq Q2H x2). Recheck K+ level 4 hours after dose and the next AM.  If Serum K+ less than 2.5, See IV order.  Dissolve packet contents in 4-8 ounces of cold water or juice.    Admin. Amount: 20-40 mEq  Dispense Loc: [facility]               potassium chloride 10 mEq in 100 mL intermittent infusion with 10 mg lidocaine  Dose: 10 mEq  Freq: EVERY 1 HOUR PRN Route: IV  PRN Reason: potassium supplementation  Start: 08/02/18 1422   Admin Instructions: Infuse via PERIPHERAL LINE. Use potassium with lidocaine for pain with peripheral administration.  If Serum K+ 3.0-3.3, dose = 10 mEq/hr x4 doses (40 mEq IV total dose). Recheck K+ level 2 hours after dose and the next AM.  If Serum K+ less than 3.0, dose = 10 mEq/hr x6 doses (60 mEq IV total dose). Recheck K+ level 2 hours after dose and the next AM.    Admin. Amount: 10 mEq = 100 mL Conc: 10 mEq/100 mL  Dispense Loc:  Main Pharmacy  Infused Over: 1 Hours  Volume: 100 mL               potassium chloride 10 mEq in 100 mL sterile water intermittent infusion (premix)  Dose: 10 mEq  Freq: EVERY 1 HOUR PRN Route: IV  PRN Reason: potassium supplementation  Start: 08/02/18 1422   Admin Instructions: Infuse via PERIPHERAL LINE or CENTRAL LINE. Use for central line replacement if patient weight less than 65 kg, if patient is on TPN with high potassium content or if unit does not stock 20 mEq bags.   If Serum K+ 3.0-3.3, dose = 10 mEq/hr x4 doses (40 mEq IV total dose). Recheck K+ level 2 hours after dose and the  next AM.   If Serum K+ less than 3.0, dose = 10 mEq/hr x6 doses (60 mEq IV total dose). Recheck K+ level 2 hours after dose and the next AM.    Admin. Amount: 10 mEq = 100 mL Conc: 10 mEq/100 mL  Dispense Loc:  Main Pharmacy  Infused Over: 60 Minutes  Volume: 100 mL               potassium chloride 20 mEq in 50 mL intermittent infusion  Dose: 20 mEq  Freq: EVERY 1 HOUR PRN Route: IV  PRN Reason: potassium supplementation  Start: 08/02/18 1422   Admin Instructions: Infuse via CENTRAL LINE Only. May need EKG if less than 65 kg or on TPN - Max rate is 0.3 mEq/kg/hr for patients not on EKG monitoring.   If Serum K+ 3.0-3.3, dose = 20 mEq/hr x2 doses (40 mEq IV total dose). Recheck K+ level 2 hours after dose and the next AM.  If Serum K+ less than 3.0, dose = 20 mEq/hr x3 doses (60 mEq IV total dose). Recheck K+ level 2 hours after dose and the next AM.    Admin. Amount: 20 mEq = 50 mL Conc: 20 mEq/50 mL  Dispense Loc:  Main Pharmacy  Volume: 50 mL               potassium chloride SA (K-DUR/KLOR-CON M) CR tablet 20-40 mEq  Dose: 20-40 mEq  Freq: EVERY 2 HOURS PRN Route: PO  PRN Reason: potassium supplementation  Start: 08/02/18 1422   Admin Instructions: Use if able to take PO.   If Serum K+ 3.0-3.3, dose = 60 mEq po total dose (40 mEq x1 followed in 2 hours by 20 mEq x1). Recheck K+ level 4 hours after dose and the next AM.  If Serum K+ 2.5-2.9, dose = 80 mEq po total dose (40 mEq Q2H x2). Recheck K+ level 4 hours after dose and the next AM.  If Serum K+ less than 2.5, See IV order.  DO NOT CRUSH    Admin. Amount: 1-2 tablet (1-2 × 20 mEq tablet)  Last Admin: 08/02/18 2328  Dispense Loc:  [facility]      2126 (40 mEq)-Given       2328 (20 mEq)-Given                senna-docusate (SENOKOT-S;PERICOLACE) 8.6-50 MG per tablet 1 tablet  Dose: 1 tablet  Freq: 2 TIMES DAILY PRN Route: PO  PRN Reason: constipation  Start: 08/01/18 0051   Admin Instructions: If no bowel movement in 24 hours, increase to 2 tablets PO.  Hold  for loose stools.  This is the first step of a three step constipation treatment.    Admin. Amount: 1 tablet  Dispense Loc: [facility]                     Or  senna-docusate (SENOKOT-S;PERICOLACE) 8.6-50 MG per tablet 2 tablet  Dose: 2 tablet  Freq: 2 TIMES DAILY PRN Route: PO  PRN Reason: constipation  Start: 08/01/18 0051   Admin Instructions: Hold for loose stools.  This is the first step of a three step constipation treatment.    Admin. Amount: 2 tablet  Last Admin: 08/05/18 1809  Dispense Loc: [facility]         1809 (2 tablet)-Given             sodium chloride (PF) 0.9% PF flush 3 mL  Dose: 3 mL  Freq: EVERY 8 HOURS Route: IK  Start: 08/01/18 0100   Admin Instructions: And Q1H PRN, to lock peripheral IV dormant line.    Admin. Amount: 3 mL  Last Admin: 08/07/18 0023  Dispense Loc: [facility]  Volume: 4 mL   Current Line: Peripheral IV 08/03/18 Left Upper forearm    (0107)-Not Given       1151 (3 mL)-Given       1647 (3 mL)-Given        0100 (3 mL)-Given       0844 (3 mL)-Given       1614 (3 mL)-Given        (0316)-Not Given       0812 (3 mL)-Given by Other [C]       1632 (3 mL)-Given        0016 (3 mL)-Given       0831 (3 mL)-Given       1742 (3 mL)-Given        0005 (3 mL)-Given       0853 (3 mL)-Given       1649 (3 mL)-Given        (0231)-Not Given       0933 (3 mL)-Given       1708 (3 mL)-Given        0023 (3 mL)-Given       (0952)-Not Given       [ ] 1700           sodium chloride (PF) 0.9% PF flush 3 mL  Dose: 3 mL  Freq: EVERY 1 HOUR PRN Route: IK  PRN Reason: line flush  PRN Comment: for peripheral IV flush post IV meds  Start: 08/01/18 0051   Admin. Amount: 3 mL  Last Admin: 08/03/18 1050  Dispense Loc: [facility] Stock  Volume: 4 mL   Current Line: Peripheral IV 08/03/18 Left Upper forearm      1050 (3 mL)-Given               tamsulosin (FLOMAX) capsule 0.4 mg  Dose: 0.4 mg  Freq: DAILY Route: PO  Start: 08/01/18 0930   Admin Instructions: Administer 30 minutes after the same meal each day.   Capsules should be swallowed whole; do not crush chew or open.    Admin. Amount: 1 capsule (1 × 0.4 mg capsule)  Last Admin: 18  Dispense Loc: [facility]     0900 (0.4 mg)-Given        0844 (0.4 mg)-Given        0808 (0.4 mg)-Given        0831 (0.4 mg)-Given        0852 (0.4 mg)-Given        0933 (0.4 mg)-Given        0908 (0.4 mg)-Given           Warfarin Therapy Reminder (Check START DATE - warfarin may be starting in the FUTURE)  Dose: 1 each  Freq: CONTINUOUS PRN Route: XX  Start: 18 005   Admin Instructions: *Note to reorder warfarin daily*  Pharmacy Warfarin Dosing Service  Patient is on Warfarin Therapy - check for daily order    Admin. Amount: 1 each  Dispense Loc:  Main Pharmacy              Future Medications  Medications 18       warfarin (COUMADIN) tablet 4 mg  Dose: 4 mg  Freq: ONCE AT 6PM Route: PO  Start: 18 1800   Admin. Amount: 1 tablet (1 × 4 mg tablet)  Dispense Loc: [facility]  Administrations Remainin           [ ] 1800          Completed Medications  Medications 18         Dose: 250 mg  Freq: EVERY 8 HOURS SCHEDULED Route: PO  Indications of Use: SKIN AND SOFT TISSUE INFECTION  Start: 18 1400   End: 18 0615   Admin. Amount: 1 capsule (1 × 250 mg capsule)  Last Admin: 18  Dispense Loc: [facility]  Administrations Remainin          1411 (250 mg)-Given       2148 (250 mg)-Given        0615 (250 mg)-Given             Dose: 4 mg  Freq: ONCE AT 6PM Route: PO  Start: 18 1800   End: 18 181   Admin. Amount: 1 tablet (1 × 4 mg tablet)  Last Admin: 18  Dispense Loc: [facility]  Administrations Remainin         1810 (4 mg)-Given               Dose: 4 mg  Freq: ONCE AT 6PM Route: PO  Start: 18 1800   End: 18 174   Admin. Amount: 1 tablet (1 × 4 mg tablet)  Last Admin: 18  1741  Dispense Loc:  [facility]  Administrations Remainin        1741 (4 mg)-Given                Dose: 5 mg  Freq: ONCE AT 6PM Route: PO  Start: 18 1800   End: 18 170   Admin. Amount: 1 tablet (1 × 5 mg tablet)  Last Admin: 18 1708  Dispense Loc:  [facility]  Administrations Remainin          1708 (5 mg)-Given           Discontinued Medications  Medications 18         Dose: 2 g  Freq: EVERY 12 HOURS Route: IV  Indications of Use: SEPSIS,SKIN AND SOFT TISSUE INFECTION  Start: 18   End: 18 1318   Admin. Amount: 2 g = 100 mL Conc: 2 g/100 mL  Last Admin: 18 1029  Dispense Loc:  Main Pharmacy  Infused Over: 30 Minutes  Volume: 100 mL   Current Line: Peripheral IV 18 Left Upper forearm    2259 (2 g)-Given        1220 (2 g)-Given       2202-Hold        1050 (2 g)-Given       2222 (2 g)-Given        1009 (2 g)-Given       2133 (2 g)-Given        1008 (2 g)-Given       2203 (2 g)-Given        1029 (2 g)-Given       1318-Med Discontinued

## 2018-07-31 NOTE — IP AVS SNAPSHOT
"    [place] [address] MEDICAL SURGICAL: [phone]                                              INTERAGENCY TRANSFER FORM - LAB / IMAGING / EKG / EMG RESULTS   2018                    Hospital Admission Date: 2018  BRIANA HERNÁNDEZ   : 1927  Sex: Male        Attending Provider: Simeon Cordero MD     Allergies:  No Known Allergies    Infection:  None   Service:  GENERAL MEDI    Ht:  1.676 m (5' 6\")   Wt:  100.6 kg (221 lb 12.5 oz)   Admission Wt:  86.2 kg (190 lb)    BMI:  35.8 kg/m 2   BSA:  2.16 m 2            Patient PCP Information     Provider PCP Type    [facility]         Lab Results - 3 Days      Basic metabolic panel [[id number]] (Abnormal)  Resulted: 18, Result status: Final result    Ordering provider: Lanie Harvey MD  18 0000 Resulting lab: [facility]    Specimen Information    Type Source Collected On   Blood  18 0645          Components       Value Reference Range Flag Lab   Sodium 135 133 - 144 mmol/L  FrRdHs   Potassium 3.4 3.4 - 5.3 mmol/L  FrRdHs   Chloride 95 94 - 109 mmol/L  FrRdHs   Carbon Dioxide 32 20 - 32 mmol/L  FrRdHs   Anion Gap 8 3 - 14 mmol/L  FrRdHs   Glucose 149 70 - 99 mg/dL H FrRdHs   Urea Nitrogen 26 7 - 30 mg/dL  FrRdHs   Creatinine 1.17 0.66 - 1.25 mg/dL  FrRdHs   GFR Estimate 58 >60 mL/min/1.7m2 L FrRd   Comment:  Non  GFR Calc   GFR Estimate If Black 71 >60 mL/min/1.7m2  FrRd   Comment:  African American GFR Calc   Calcium 8.5 8.5 - 10.1 mg/dL  FrRdHs            INR [[id number]] (Abnormal)  Resulted: 18 0716, Result status: Final result    Ordering provider: Simeon Cordero MD  18 0000 Resulting lab: [facility]    Specimen Information    Type Source Collected On   Blood  18 0645          Components       Value Reference Range Flag Lab   INR 2.14 0.86 - 1.14 H FrRdHs            Blood culture [[id number]]  Resulted: 18 0549, " Result status: Final result    Ordering provider: Mariia Navarrete MD  07/31/18 2045 Resulting lab: [facility]    Specimen Information    Type Source Collected On   Blood  07/31/18 2158   Comment:  Left Hand          Components       Value Reference Range Flag Lab   Specimen Description Blood Left Hand      Special Requests Received in aerobic bottle only   75   Culture Micro No growth   225            CBC with platelets differential [[id number]] (Abnormal)  Resulted: 08/06/18 0906, Result status: Final result    Ordering provider: Lanie Harvey MD  08/06/18 6532 Resulting lab: [facility]    Specimen Information    Type Source Collected On   Blood  08/06/18 0627          Components       Value Reference Range Flag Lab   WBC 4.3 4.0 - 11.0 10e9/L  FrRdHs   RBC Count 4.23 4.4 - 5.9 10e12/L L FrRdHs   Hemoglobin 12.9 13.3 - 17.7 g/dL L FrRdHs   Hematocrit 41.4 40.0 - 53.0 %  FrRdHs   MCV 98 78 - 100 fl  FrRdHs   MCH 30.5 26.5 - 33.0 pg  FrRdHs   MCHC 31.2 31.5 - 36.5 g/dL L FrRdHs   RDW 13.5 10.0 - 15.0 %  FrRdHs   Platelet Count 200 150 - 450 10e9/L  FrRdHs   Diff Method Manual Differential   FrRdHs   % Neutrophils 61.0 %  FrRdHs   % Lymphocytes 18.0 %  FrRdHs   % Monocytes 14.0 %  FrRdHs   % Eosinophils 2.0 %  FrRdHs   % Basophils 0.0 %  FrRdHs   % Metamyelocytes 2.0 %  FrRdHs   % Myelocytes 3.0 %  FrRdHs   Absolute Neutrophil 2.6 1.6 - 8.3 10e9/L  FrRdHs   Absolute Lymphocytes 0.8 0.8 - 5.3 10e9/L  FrRdHs   Absolute Monocytes 0.6 0.0 - 1.3 10e9/L  FrRdHs   Absolute Eosinophils 0.1 0.0 - 0.7 10e9/L  FrRdHs   Absolute Basophils 0.0 0.0 - 0.2 10e9/L  FrRdHs   Absolute Metamyelocytes 0.1 0 10e9/L H FrRdHs   Absolute Myelocytes 0.1 0 10e9/L H FrRdHs   Reactive Lymphs Present   FrRdHs   RBC Morphology Consistent with reported results   [facility]   Platelet Estimate Automated count confirmed.  Platelet morphology is normal.   [facility]            Basic metabolic panel  [[id number]] (Abnormal)  Resulted: 08/06/18 0820, Result status: Final result    Ordering provider: Lanie Harvey MD  08/06/18 0759 Resulting lab: [facility]    Specimen Information    Type Source Collected On   Blood  08/06/18 0627          Components       Value Reference Range Flag Lab   Sodium 136 133 - 144 mmol/L  FrRdHs   Potassium 3.7 3.4 - 5.3 mmol/L  FrRdHs   Chloride 97 94 - 109 mmol/L  FrRdHs   Carbon Dioxide 32 20 - 32 mmol/L  FrRdHs   Anion Gap 7 3 - 14 mmol/L  FrRdHs   Glucose 161 70 - 99 mg/dL H FrRdHs   Urea Nitrogen 23 7 - 30 mg/dL  FrRdHs   Creatinine 1.22 0.66 - 1.25 mg/dL  FrRdHs   GFR Estimate 56 >60 mL/min/1.7m2 L FrRdHs   Comment:  Non  GFR Calc   GFR Estimate If Black 67 >60 mL/min/1.7m2  FrRdHs   Comment:  African American GFR Calc   Calcium 8.5 8.5 - 10.1 mg/dL  FrRdHs            Creatinine [[id number]]  Resulted: 08/06/18 0818, Result status: Edited Result - FINAL    Ordering provider: Simeon Cordero MD  08/06/18 0000 Resulting lab: [facility]    Specimen Information    Type Source Collected On   Blood  08/06/18 0627          Components       Value Reference Range Flag Lab   Creatinine Canceled, Test credited 0.66 - 1.25 mg/dL  FrRdHs   Comment:         Duplicate request  CORRECTED ON 08/06 AT 0818: PREVIOUSLY REPORTED AS 1.20     GFR Estimate Canceled, Test credited >60 mL/min/1.7m2  FrRdHs   Comment:         Duplicate request  CORRECTED ON 08/06 AT 0818: PREVIOUSLY REPORTED AS 57 Non  GFR   Calc     GFR Estimate If Black Canceled, Test credited >60 mL/min/1.7m2  FrRdHs   Comment:         Duplicate request  CORRECTED ON 08/06 AT 0818: PREVIOUSLY REPORTED AS 69  GFR   Calc              INR [[id number]] (Abnormal)  Resulted: 08/06/18 0704, Result status: Final result    Ordering provider: Simeon Cordero MD  08/06/18 0000 Resulting lab: [facility]    Specimen Information    Type Source  Collected On   Blood  08/06/18 0627          Components       Value Reference Range Flag Lab   INR 2.08 0.86 - 1.14 H FrRdHs            Creatinine [[id number]]  Resulted: 08/05/18 0657, Result status: Final result    Ordering provider: Simeon Cordero MD  08/05/18 0000 Resulting lab: [facility]    Specimen Information    Type Source Collected On   Blood  08/05/18 0610          Components       Value Reference Range Flag Lab   Creatinine 1.03 0.66 - 1.25 mg/dL  FrRdHs   GFR Estimate 68 >60 mL/min/1.7m2  FrRdHs   Comment:  Non  GFR Calc   GFR Estimate If Black 82 >60 mL/min/1.7m2  FrRdHs   Comment:   GFR Calc            INR [[id number]] (Abnormal)  Resulted: 08/05/18 0652, Result status: Final result    Ordering provider: Simeon Cordero MD  08/05/18 0000 Resulting lab: [facility]    Specimen Information    Type Source Collected On   Blood  08/05/18 0610          Components       Value Reference Range Flag Lab   INR 2.25 0.86 - 1.14 H FrRdHs            Creatinine [[id number]]  Resulted: 08/04/18 0626, Result status: Final result    Ordering provider: Simeon Cordero MD  08/04/18 0000 Resulting lab: [facility]    Specimen Information    Type Source Collected On   Blood  08/04/18 0557          Components       Value Reference Range Flag Lab   Creatinine 1.07 0.66 - 1.25 mg/dL  FrRdHs   GFR Estimate 65 >60 mL/min/1.7m2  FrRdHs   Comment:  Non  GFR Calc   GFR Estimate If Black 78 >60 mL/min/1.7m2  FrRdHs   Comment:   GFR Calc            INR [[id number]] (Abnormal)  Resulted: 08/04/18 0616, Result status: Final result    Ordering provider: Simeon Cordero MD  08/04/18 0000 Resulting lab: [facility]    Specimen Information    Type Source Collected On   Blood  08/04/18 0557          Components       Value Reference Range Flag Lab   INR 2.58 0.86 - 1.14 H FrRdHs            Testing Performed By   "   Lab - Abbreviation Name Director Address Valid Date Range    12 - [facility] Unknown [address]  [facility] [address] 05/08/15 1057 - Present    75 - Unknown [facility] Unknown [address] [facility] [address] 01/15/15 1019 - Present    225 - Unknown [facility] Unknown [address] [facility] [address] 12/19/14 0954 - Present            Unresulted Labs (24h ago through future)    Start       Ordered    08/02/18 0600  INR  (warfarin (COUMADIN) Pharmacy Consult-INITIAL ORDER)  DAILY,   Routine      08/01/18 0051    Unscheduled  Potassium  (Potassium Replacement - \"Standard\" - For K levels less than 3.4 mmol/L - UU,UR,UA,RH,SH,PH,WY )  CONDITIONAL (SPECIFY),   Routine     Comments:  Obtain Potassium Level for these conditions:  *IF no potassium result within 24 hours before initiation of order set, draw potassium level with next lab collect.    *2 HOURS AFTER last IV potassium replacement dose and 4 hours after an oral replacement dose.  *Next morning after potassium dose.     Repeat Potassium Replacement if necessary.    08/02/18 1423    Unscheduled  Magnesium  (Magnesium Replacement -  Adult - \"Standard\" - Replacement for all levels less than 1.6 mg/dL )  CONDITIONAL (SPECIFY),   Routine     Comments:  Obtain Magnesium Level for these conditions:  *IF no magnesium result within 24 hrs before initiation of order set, draw magnesium level with next lab collect.    *2 HOURS AFTER last magnesium replacement dose when magnesium replacement given for level less than 1.6   *Next morning after magnesium dose.     Repeat Magnesium Replacement if necessary.    08/02/18 1423         Imaging Results - 3 Days      US Lower Extremity Venous Duplex Left [[id number]]  Resulted: 08/05/18 1910, Result status: Final result    Ordering provider: Lanie Harvey MD  08/05/18 1212 Resulted by: Jonnie Proctor MD    Performed: " 08/05/18 1843 - 08/05/18 1909 Resulting lab: RADIOLOGY RESULTS    Narrative:       US LOWER EXTREMITY VENOUS DUPLEX LEFT  8/5/2018 7:09 PM    HISTORY:  ? DVT;     TECHNIQUE:  Venous Doppler US including color flow and Doppler  waveform analysis.    FINDINGS:  The peroneal veins were not seen.  No thrombus was observed  and there was normal compressibility, phasic flow, and augmentation in  the common femoral, femoral, popliteal, and posterior tibial veins.       Impression:       IMPRESSION: No evidence of  left lower extremity deep venous  thrombosis.    TANIA HURST MD      Testing Performed By     Lab - Abbreviation Name Director Address Valid Date Range    104 - [facility] RADIOLOGY RESULTS Unknown Unknown 02/16/05 1553 - Present            Encounter-Level Documents:     There are no encounter-level documents.      Order-Level Documents:     There are no order-level documents.

## 2018-07-31 NOTE — IP AVS SNAPSHOT
"` `           [place] [address] MEDICAL SURGICAL: [phone]                                              INTERAGENCY TRANSFER FORM - NURSING   2018                    Hospital Admission Date: 2018  BRIANA HERNÁNDEZ   : 1927  Sex: Male        Attending Provider: Simeon Cordero MD     Allergies:  No Known Allergies    Infection:  None   Service:  GENERAL MEDI    Ht:  1.676 m (5' 6\")   Wt:  100.6 kg (221 lb 12.5 oz)   Admission Wt:  86.2 kg (190 lb)    BMI:  35.8 kg/m 2   BSA:  2.16 m 2            Patient PCP Information     Provider PCP Type    [facility] General      Current Code Status     Date Active Code Status Order ID Comments User Context       Prior      Code Status History     Date Active Date Inactive Code Status Order ID Comments User Context    2018 11:53 AM  DNR/DNI [id number]  Lanie Harvey MD Outpatient    2018 12:51 AM 2018 11:53 AM DNR/DNI [id number]  Simeon Cordero MD Inpatient    4/15/2018 12:52 PM 2018 12:51 AM DNR/DNI [id number]  Lanie Harvey MD Outpatient    4/3/2018  5:14 PM 4/15/2018 12:52 PM DNR/DNI [id number]  Micki Ackerman PA-C Inpatient    2017  8:20 AM 4/3/2018  5:14 PM DNR/DNI [id number]  Nirmal Serrano MD Outpatient    2017 11:04 PM 2017  8:20 AM DNR/DNI [id number]  Micki Ackerman PA-C Inpatient      Advance Directives        Scanned docmt in ACP Activity?           Yes, scanned ACP docmt        Hospital Problems as of 2018              Priority Class Noted POA    Cellulitis Medium  2018 Yes      Non-Hospital Problems as of 2018              Priority Class Noted    Cortical senile cataract Medium  2009    Excess skin of eyelid Medium  2009    HTN (hypertension) Medium  Unknown    CAD (coronary artery disease) Medium  Unknown    Atrial fibrillation (H) Medium  Unknown    Hyperlipidemia Medium  Unknown    S/P CABG (coronary artery bypass graft) Medium  " 10/23/2014    Small bowel obstruction Medium  5/13/2017    Sepsis (H) Medium  4/3/2018      Immunizations     None         END      ASSESSMENT     Discharge Profile Flowsheet     EXPECTED DISCHARGE     Resources List  Skilled Nursing Facility 05/26/17 1624    Expected Discharge Date  08/07/18 (/from [facility]) 08/07/18 1034   PAS Number  [id number] 04/13/18 1132    DISCHARGE NEEDS ASSESSMENT     SKIN      Anticipated Changes Related to Illness  inability to care for self 08/01/18 0105   Inspection of bony prominences  Full 08/07/18 0906    Equipment Currently Used at Home  walker, standard 08/01/18 0105   Full except areas not inspected   Sacrum;Coccyx;Buttock, right;Buttock, left;Hip, right;Hip, left 08/04/18 0155    Transportation Available  family or friend will provide 08/01/18 1416   Inspection under devices  Full 08/07/18 0906    # of Referrals Placed by CTS  Post Acute Facilities 05/23/17 0836   Skin WDL  ex 08/07/18 0906    Primary Care Clinic Name  [facility] [phone] 04/07/18 1413   Skin Color/Characteristics  bruised (ecchymotic);issa 08/07/18 0906    GASTROINTESTINAL (ADULT,PEDIATRIC,OB)     Skin Temperature  warm 08/07/18 0906    GI WDL  ex 08/07/18 0906   Skin Moisture  dry 08/07/18 0906    Abdominal Appearance  obese 08/07/18 0906   Skin Elasticity  quick return to original state 08/07/18 0906    Last Bowel Movement  08/06/18 08/06/18 1706   Skin Integrity  bruise(s);scab(s);scar(s);wound(s) 08/07/18 0906    GI Signs/Symptoms  gas discomfort 08/07/18 0906   SAFETY      Passing flatus  yes 08/07/18 0906   Safety WDL  WDL 08/07/18 0906    COMMUNICATION ASSESSMENT     Safety Factors  patient up in chair 08/07/18 0906    Patient's communication style  spoken language (English or Bilingual) 08/01/18 0105   All Alarms  alarm(s) activated and audible 08/07/18 0906    FINAL RESOURCES                        Assessment WDL (Within Defined Limits) Definitions           Safety WDL      "Effective: 09/28/15    Row Information: <b>WDL Definition:</b> Bed in low position, wheels locked; call light in reach; upper side rails up x 2; ID band on<br> <font color=\"gray\"><i>Item=AS safety wdl>>List=AS safety wdl>>Version=F14</i></font>      Skin WDL     Effective: 09/28/15    Row Information: <b>WDL Definition:</b> Warm; dry; intact; elastic; without discoloration; pressure points without redness<br> <font color=\"gray\"><i>Item=AS skin wdl>>List=AS skin wdl>>Version=F14</i></font>      Vitals     Vital Signs Flowsheet     VITAL SIGNS     Side Effects Monitoring: Respiratory Quality  R 08/04/18 0145    Temp  98  F (36.7  C) 08/07/18 0755   Side Effects Monitoring: Respiratory Depth  N 08/04/18 0145    Temp src  Axillary 08/07/18 0755   Side Effects Monitoring: Sedation Level  1 08/04/18 0145    Resp  16 08/07/18 0755   HEIGHT AND WEIGHT      Pulse  83 08/04/18 0023   Weight  100.6 kg (221 lb 12.5 oz) 08/01/18 0053    Heart Rate  96 08/07/18 0755   Weight Method  Bed scale 08/01/18 0053    Pulse/Heart Rate Source  Monitor 08/07/18 0755   EKG MONITORING      BP  140/64 08/07/18 0755   Cardiac Regularity  Irregular 07/31/18 2059    BP Location  Right arm 08/07/18 0755   Cardiac Rhythm  Atrial fibrillation 07/31/18 2059    Pain Score  NO PAIN (0) 08/03/18 1250   [place] COMA SCALE      OXYGEN THERAPY     Best Eye Response  4-->(E4) spontaneous 08/07/18 0906    SpO2  93 % 08/07/18 0755   Best Motor Response  6-->(M6) obeys commands 08/07/18 0906    O2 Device  None (Room air) 08/07/18 0755   Best Verbal Response  4-->(V4) confused 08/07/18 0906    Oxygen Delivery  1 LPM 08/03/18 0538   [place] Coma Scale Score  14 08/07/18 0906    PAIN/COMFORT     POSITIONING      Patient Currently in Pain  yes 08/07/18 0908   Body Position  independently positioning 08/07/18 0141    Preferred Pain Scale  number (Numeric Rating Pain Scale) 08/07/18 0908   Head of Bed (HOB)  HOB at 20-30 degrees 08/07/18 0141    Patient's Stated " Pain Goal  No pain 08/07/18 0908   Positioning/Transfer Devices  pillows;in use 08/07/18 0906    0-10 Pain Scale  2 08/07/18 0908   Chair  Upright in chair 08/07/18 0906    Pain Location  Leg 08/07/18 0908   DAILY CARE      Pain Orientation  Left 08/07/18 0908   Activity Management  ambulated to bathroom 08/07/18 0516    Pain Descriptors  Tender 08/07/18 0908   Activity Assistance Provided  assistance, 1 person 08/07/18 0516    Pain Management Interventions  medication offered but refused 08/07/18 0908   Assistive Device Utilized  walker 08/07/18 0516    Pain Intervention(s)  Medication (See eMAR);Repositioned;Distraction 08/03/18 2100   Additional Documentation  Activity Device Assistance (Row) 08/02/18 1926    Response to Interventions  Absence of nonverbal indicators of pain 08/07/18 0135   Ambulation Distance (Feet)  72 08/03/18 0957    ANALGESIA SIDE EFFECTS MONITORING                   Patient Lines/Drains/Airways Status    Active LINES/DRAINS/AIRWAYS     Name: Placement date: Placement time: Site: Days: Last dressing change:    Peripheral IV 08/03/18 Left Upper forearm 08/03/18   0530   Upper forearm   4     Wound 05/14/17 Bilateral Groin Other (comment) RASH 05/14/17   0800   Groin   450     Wound 05/21/17 Penis Blister to underside of penis 05/21/17   1800   Penis   442     Wound 04/10/18 Left;Right Groin Other (comment) Nonblanchable redness to bilateral groin folds and scrotum area 04/10/18   0246   Groin   119     Wound 08/01/18 Left Leg Other (comment) lower legs cellulitis 08/01/18   0900   Leg   6     Rash 05/19/17 0000 back papule 05/19/17   0000    445     Rash 04/12/18 0415 other (see comments) macular;nodule 04/12/18   0415    117     Incision/Surgical Site 05/14/17 Abdomen 05/14/17   1118    450             Patient Lines/Drains/Airways Status    Active PICC/CVC     None            Intake/Output Detail Report     Date Intake     Output Net    Shift P.O. I.V. IV Piggyback Total Urine Total        Noc 08/05/18 2300 - 08/06/18 0659 -- -- -- -- -- -- 0    Day 08/06/18 0700 - 08/06/18 1459 -- -- -- -- -- -- 0    Tiffanie 08/06/18 1500 - 08/06/18 2259 830 -- -- 830 100 100 730    Noc 08/06/18 2300 - 08/07/18 0659 -- -- -- -- -- -- 0    Day 08/07/18 0700 - 08/07/18 1459 -- -- -- -- -- -- 0      Last Void/BM       Most Recent Value    Urine Occurrence 1 at 08/07/2018 1153    Stool Occurrence 1 at 08/07/2018 0900      Case Management/Discharge Planning     Case Management/Discharge Planning Flowsheet     REFERRAL INFORMATION     Description of Support System  Supportive;Involved 08/01/18 1416    Did the Initial Social Work Assessment result in a Social Work Case?  Yes 08/01/18 1416   Support Assessment  Adequate family and caregiver support 08/01/18 1416    Admission Type  inpatient 08/01/18 1416   COPING/STRESS      Arrived From  long-term care 08/01/18 1416   Major Change/Loss/Stressor  none 08/04/18 1122    Referral Source  physician 08/01/18 1416   EXPECTED DISCHARGE      # of Referrals Placed by CTS  Post Acute Facilities 05/23/17 0836   Expected Discharge Date  08/07/18 (/from [facility]) 08/07/18 1034    Reason For Consult  discharge planning 08/01/18 1416   DISCHARGE PLANNING      Record Reviewed  clinical discipline documentation;history and physical;medical record 08/01/18 1416   Anticipated Changes Related to Illness  inability to care for self 08/01/18 0105    CTS Assigned to Case  Benitez Lepe 08/01/18 1416   Transportation Available  family or friend will provide 08/01/18 1416    Primary Care Clinic Name  [facility] [phone] 04/07/18 1413   FINAL RESOURCES      LIVING ENVIRONMENT     Equipment Currently Used at Home  walker, standard 08/01/18 0105    Lives With  spouse 08/01/18 1416   Resources List  Skilled Nursing Facility 05/26/17 1624    Living Arrangements  extended care facility 08/01/18 1416   PAS Number  [id number] 04/13/18 1132    Provides Primary Care For  no one, unable/limited  ability to care for self 08/01/18 1416   ABUSE RISK SCREEN      Quality Of Family Relationships  supportive 08/01/18 1416   QUESTION TO PATIENT:  Has a member of your family or a partner(now or in the past) intimidated, hurt, manipulated, or controlled you in any way?  no 08/01/18 0105    Able to Return to Prior Living Arrangements  yes 08/01/18 1416   QUESTION TO PATIENT: Do you feel safe going back to the place where you are living?  yes 08/01/18 0105    Living Arrangement Comments  -- (LTC at [facility]) 08/01/18 1416   OBSERVATION: Is there reason to believe there has been maltreatment of a vulnerable adult (ie. Physical/Sexual/Emotional abuse, self neglect, lack of adequate food, shelter, medical care, or financial exploitation)?  no 08/01/18 0105    ASSESSMENT OF FAMILY/SOCIAL SUPPORT     OTHER      Marital Status   08/01/18 1416   Are you depressed or being treated for depression?  No 08/01/18 0105    Who is your support system?  Wife;Children 08/01/18 1416   HOMICIDE RISK      Spouse's Name  Kelli 08/01/18 1416   Feels Like Hurting Others  no 08/01/18 0105

## 2018-07-31 NOTE — IP AVS SNAPSHOT
"    [place] [address] MEDICAL SURGICAL: [phone]                                              INTERAGENCY TRANSFER FORM - PHYSICIAN ORDERS   2018                    Hospital Admission Date: 2018  BRIANA HERNÁNDEZ   : 1927  Sex: Male        Attending Provider: Simeon Cordero MD     Allergies:  No Known Allergies    Infection:  None   Service:  GENERAL MEDI    Ht:  1.676 m (5' 6\")   Wt:  100.6 kg (221 lb 12.5 oz)   Admission Wt:  86.2 kg (190 lb)    BMI:  35.8 kg/m 2   BSA:  2.16 m 2            Patient PCP Information     Provider PCP Type    [facility]      ED Clinical Impression     Diagnosis Description Comment Added By Time Added    Severe sepsis (H) [A41.9, R65.20] Severe sepsis (H) [A41.9, R65.20]  Mariia Navarrete MD 2018 10:45 PM    Acute respiratory failure with hypoxia (H) [J96.01] Acute respiratory failure with hypoxia (H) [J96.01]  Mariia Navarrete MD 2018 10:45 PM    Cellulitis, unspecified cellulitis site [L03.90] Cellulitis, unspecified cellulitis site [L03.90]  Mariia Navarrete MD 2018 11:15 PM      Hospital Problems as of 2018              Priority Class Noted POA    Cellulitis Medium  2018 Yes      Non-Hospital Problems as of 2018              Priority Class Noted    Cortical senile cataract Medium  2009    Excess skin of eyelid Medium  2009    HTN (hypertension) Medium  Unknown    CAD (coronary artery disease) Medium  Unknown    Atrial fibrillation (H) Medium  Unknown    Hyperlipidemia Medium  Unknown    S/P CABG (coronary artery bypass graft) Medium  10/23/2014    Small bowel obstruction Medium  2017    Sepsis (H) Medium  4/3/2018      Code Status History     Date Active Date Inactive Code Status Order ID Comments User Context    2018 11:53 AM  DNR/DNI [id number]  Lanie Harvey MD Outpatient    2018 12:51 AM 2018 11:53 AM DNR/DNI [id number]  Simeon Cordero MD Inpatient    " 4/15/2018 12:52 PM 8/1/2018 12:51 AM DNR/DNI [id number]  Lanie Harvey MD Outpatient    4/3/2018  5:14 PM 4/15/2018 12:52 PM DNR/DNI [id number]  Micki Ackerman PA-C Inpatient    5/28/2017  8:20 AM 4/3/2018  5:14 PM DNR/DNI [id number]  Nirmal Serrano MD Outpatient    5/13/2017 11:04 PM 5/28/2017  8:20 AM DNR/DNI [id number]  Micki Ackerman PA-C Inpatient         Medication Review      START taking        Dose / Directions Comments    cephALEXin 500 MG capsule   Commonly known as:  KEFLEX        Dose:  500 mg   Take 1 capsule (500 mg) by mouth 3 times daily for 4 days   Quantity:  12 capsule   Refills:  0          CONTINUE these medications which have NOT CHANGED        Dose / Directions Comments    acetaminophen 325 MG tablet   Commonly known as:  TYLENOL        Dose:  650 mg   Take 650 mg by mouth daily as needed for mild pain   Refills:  0        atorvastatin 10 MG tablet   Commonly known as:  LIPITOR        Dose:  10 mg   Take 10 mg by mouth daily   Refills:  0        Calcium Carb-Cholecalciferol 500-200 MG-UNIT Tabs   Commonly known as:  CALCIUM 500+D   Used for:  Vitamin D deficiency        Dose:  1 tablet   Take 1 tablet by mouth 2 times daily   Refills:  0        ferrous gluconate 324 (38 Fe) MG tablet   Commonly known as:  FERGON   Used for:  Iron deficiency anemia, unspecified iron deficiency anemia type        Dose:  324 mg   Take 1 tablet (324 mg) by mouth daily (with breakfast)   Quantity:  100 tablet   Refills:  0        FUROSEMIDE PO        Dose:  60 mg   Take 60 mg by mouth 2 times daily   Refills:  0        gabapentin 300 MG capsule   Commonly known as:  NEURONTIN        Dose:  300 mg   Take 300 mg by mouth 2 times daily   Refills:  0        glipiZIDE 5 MG tablet   Commonly known as:  GLUCOTROL   Used for:  Type 2 diabetes mellitus with complication, without long-term current use of insulin (H)        Dose:  5 mg   Take 1 tablet (5 mg) by mouth 2 times daily (before  meals)   Quantity:  30 tablet   Refills:  0        ipratropium - albuterol 0.5 mg/2.5 mg/3 mL 0.5-2.5 (3) MG/3ML neb solution   Commonly known as:  DUONEB        Dose:  1 vial   Take 1 vial by nebulization 3 times daily as needed   Refills:  0        lactulose 10 GM/15ML solution   Commonly known as:  CHRONULAC        Take 30 mLs daily as needed   Refills:  0        levothyroxine 100 MCG tablet   Commonly known as:  SYNTHROID/LEVOTHROID   Used for:  Hypothyroidism, unspecified type        Dose:  100 mcg   Take 1 tablet (100 mcg) by mouth daily   Quantity:  30 tablet   Refills:  0        lisinopril 5 MG tablet   Commonly known as:  PRINIVIL/ZESTRIL   Used for:  Essential hypertension        Dose:  5 mg   Take 1 tablet (5 mg) by mouth daily   Refills:  0        metoprolol tartrate 25 MG tablet   Commonly known as:  LOPRESSOR        Dose:  25 mg   Take 25 mg by mouth 2 times daily   Refills:  0        NITROSTAT 0.4 MG sublingual tablet   Generic drug:  nitroGLYcerin        Dose:  0.4 mg   Place 0.4 mg under the tongue 3 times daily as needed   Refills:  0        nystatin [id number] UNIT/GM Powd   Commonly known as:  MYCOSTATIN        Apply to groin area for yeast growth with every pericare   Refills:  0        omeprazole 20 MG CR capsule   Commonly known as:  priLOSEC   Used for:  Gastroesophageal reflux disease without esophagitis        Dose:  20 mg   Take 1 capsule (20 mg) by mouth daily   Quantity:  30 capsule   Refills:  0        polyethylene glycol Packet   Commonly known as:  MIRALAX/GLYCOLAX        Dose:  17 g   Take 17 g by mouth daily   Refills:  0        senna-docusate 8.6-50 MG per tablet   Commonly known as:  SENOKOT-S;PERICOLACE        Dose:  2 tablet   Take 2 tablets by mouth 2 times daily   Refills:  0        simethicone 80 MG chewable tablet   Commonly known as:  MYLICON   Used for:  Abdominal pain, generalized        Dose:  160 mg   Take 2 tablets (160 mg) by mouth every 6 hours as needed for flatulence  or cramping   Quantity:  180 tablet   Refills:  0        sucralfate 1 GM tablet   Commonly known as:  CARAFATE   Used for:  Other acute gastritis without hemorrhage        Dose:  1 g   Take 1 tablet (1 g) by mouth 4 times daily (before meals and nightly)   Quantity:  120 tablet   Refills:  0        TAMSULOSIN HCL PO        Dose:  0.4 mg   Take 0.4 mg by mouth daily   Refills:  0        * warfarin 2 MG tablet   Commonly known as:  COUMADIN        Dose:  4 mg   Take 4 mg by mouth daily Take 4 mg on Sun, Tues, Thurs, Fri, Sat   Refills:  0        * warfarin 5 MG tablet   Commonly known as:  COUMADIN        Dose:  5 mg   Take 5 mg by mouth daily Take 5 mg on Mon, Wed   Refills:  0        * Notice:  This list has 2 medication(s) that are the same as other medications prescribed for you. Read the directions carefully, and ask your doctor or other care provider to review them with you.            Summary of Visit     Reason for your hospital stay       Please refer to discharge summary.  Briefly admitted and treated for cellulitis             After Care     Activity - Up with nursing assistance           Advance Diet as Tolerated       Follow this diet upon discharge: Orders Placed This Encounter      Moderate Consistent CHO Diet       Daily weights       Call Provider for weight gain of more than 2 pounds per day or 5 pounds per week.       Fall precautions           General info for SNF       Length of Stay Estimate: Long Term Care  Condition at Discharge: Improving  Level of care:skilled   Rehabilitation Potential: Fair  Admission H&P remains valid and up-to-date: Yes  Recent Chemotherapy: N/A  Use Nursing Home Standing Orders: Yes       Glucose monitor nursing POCT       Before meals and at bedtime       Intake and output       Every shift       Mantoux instructions       Give two-step Mantoux (PPD) Per Facility Policy Yes             Referrals     Physical Therapy Adult Consult       Evaluate and treat as clinically  indicated.    Reason: Deconditioning             Follow-Up Appointment Instructions     Future Labs/Procedures    Follow Up and recommended labs and tests     Comments:    Follow up with assisted physician.  The following labs/tests are recommended: basic metabolic panel and INR in 3- 5 days.  Please recheck INR as patient is on Coumadin was started on antibiotic which can affect INR and will need to adjust anticoagulation accordingly  Please monitor daily blood pressure and heart rate    Continue lymphedema wraps.  Use topical emollient for both lower extremities      Follow-Up Appointment Instructions     Follow Up and recommended labs and tests       Follow up with assisted physician.  The following labs/tests are recommended: basic metabolic panel and INR in 3- 5 days.  Please recheck INR as patient is on Coumadin was started on antibiotic which can affect INR and will need to adjust anticoagulation accordingly  Please monitor daily blood pressure and heart rate    Continue lymphedema wraps.  Use topical emollient for both lower extremities             Statement of Approval     Ordered          08/07/18 1154  I have reviewed and agree with all the recommendations and orders detailed in this document.  EFFECTIVE NOW     Approved and electronically signed by:  Lanie Harvey MD

## 2018-08-01 PROBLEM — L03.90 CELLULITIS: Status: ACTIVE | Noted: 2018-08-01

## 2018-08-01 LAB
ANION GAP SERPL CALCULATED.3IONS-SCNC: 9 MMOL/L (ref 3–14)
BASOPHILS # BLD AUTO: 0 10E9/L (ref 0–0.2)
BASOPHILS NFR BLD AUTO: 0.2 %
BUN SERPL-MCNC: 19 MG/DL (ref 7–30)
CALCIUM SERPL-MCNC: 8 MG/DL (ref 8.5–10.1)
CHLORIDE SERPL-SCNC: 104 MMOL/L (ref 94–109)
CO2 SERPL-SCNC: 27 MMOL/L (ref 20–32)
CREAT SERPL-MCNC: 1.26 MG/DL (ref 0.66–1.25)
DIFFERENTIAL METHOD BLD: ABNORMAL
EOSINOPHIL # BLD AUTO: 0 10E9/L (ref 0–0.7)
EOSINOPHIL NFR BLD AUTO: 0 %
ERYTHROCYTE [DISTWIDTH] IN BLOOD BY AUTOMATED COUNT: 13.7 % (ref 10–15)
GFR SERPL CREATININE-BSD FRML MDRD: 54 ML/MIN/1.7M2
GLUCOSE BLDC GLUCOMTR-MCNC: 121 MG/DL (ref 70–99)
GLUCOSE BLDC GLUCOMTR-MCNC: 145 MG/DL (ref 70–99)
GLUCOSE BLDC GLUCOMTR-MCNC: 88 MG/DL (ref 70–99)
GLUCOSE SERPL-MCNC: 137 MG/DL (ref 70–99)
HCT VFR BLD AUTO: 40.7 % (ref 40–53)
HGB BLD-MCNC: 12.9 G/DL (ref 13.3–17.7)
IMM GRANULOCYTES # BLD: 0.3 10E9/L (ref 0–0.4)
IMM GRANULOCYTES NFR BLD: 1.2 %
INR PPP: 2.44 (ref 0.86–1.14)
INTERPRETATION ECG - MUSE: NORMAL
LACTATE BLD-SCNC: 1.8 MMOL/L (ref 0.7–2)
LACTATE BLD-SCNC: 2.5 MMOL/L (ref 0.7–2)
LYMPHOCYTES # BLD AUTO: 0.8 10E9/L (ref 0.8–5.3)
LYMPHOCYTES NFR BLD AUTO: 3.5 %
MCH RBC QN AUTO: 31.2 PG (ref 26.5–33)
MCHC RBC AUTO-ENTMCNC: 31.7 G/DL (ref 31.5–36.5)
MCV RBC AUTO: 99 FL (ref 78–100)
MONOCYTES # BLD AUTO: 1.1 10E9/L (ref 0–1.3)
MONOCYTES NFR BLD AUTO: 4.4 %
NEUTROPHILS # BLD AUTO: 21.6 10E9/L (ref 1.6–8.3)
NEUTROPHILS NFR BLD AUTO: 90.7 %
NRBC # BLD AUTO: 0 10*3/UL
NRBC BLD AUTO-RTO: 0 /100
PLATELET # BLD AUTO: 154 10E9/L (ref 150–450)
POTASSIUM SERPL-SCNC: 4 MMOL/L (ref 3.4–5.3)
RBC # BLD AUTO: 4.13 10E12/L (ref 4.4–5.9)
SODIUM SERPL-SCNC: 140 MMOL/L (ref 133–144)
WBC # BLD AUTO: 23.9 10E9/L (ref 4–11)

## 2018-08-01 PROCEDURE — 85025 COMPLETE CBC W/AUTO DIFF WBC: CPT | Performed by: INTERNAL MEDICINE

## 2018-08-01 PROCEDURE — 25000128 H RX IP 250 OP 636: Performed by: INTERNAL MEDICINE

## 2018-08-01 PROCEDURE — 25000132 ZZH RX MED GY IP 250 OP 250 PS 637: Mod: GY | Performed by: INTERNAL MEDICINE

## 2018-08-01 PROCEDURE — 12000007 ZZH R&B INTERMEDIATE

## 2018-08-01 PROCEDURE — A9270 NON-COVERED ITEM OR SERVICE: HCPCS | Mod: GY | Performed by: INTERNAL MEDICINE

## 2018-08-01 PROCEDURE — 25000125 ZZHC RX 250: Performed by: INTERNAL MEDICINE

## 2018-08-01 PROCEDURE — 83605 ASSAY OF LACTIC ACID: CPT | Performed by: INTERNAL MEDICINE

## 2018-08-01 PROCEDURE — 99223 1ST HOSP IP/OBS HIGH 75: CPT | Mod: AI | Performed by: INTERNAL MEDICINE

## 2018-08-01 PROCEDURE — 85610 PROTHROMBIN TIME: CPT | Performed by: INTERNAL MEDICINE

## 2018-08-01 PROCEDURE — 80048 BASIC METABOLIC PNL TOTAL CA: CPT | Performed by: INTERNAL MEDICINE

## 2018-08-01 PROCEDURE — 00000146 ZZHCL STATISTIC GLUCOSE BY METER IP

## 2018-08-01 PROCEDURE — 36415 COLL VENOUS BLD VENIPUNCTURE: CPT | Performed by: INTERNAL MEDICINE

## 2018-08-01 PROCEDURE — 99207 ZZC APP CREDIT; MD BILLING SHARED VISIT: CPT | Performed by: INTERNAL MEDICINE

## 2018-08-01 RX ORDER — CEFAZOLIN SODIUM 2 G/100ML
2 INJECTION, SOLUTION INTRAVENOUS EVERY 12 HOURS
Status: DISCONTINUED | OUTPATIENT
Start: 2018-08-01 | End: 2018-08-06 | Stop reason: ALTCHOICE

## 2018-08-01 RX ORDER — FERROUS GLUCONATE 324(38)MG
324 TABLET ORAL
Status: DISCONTINUED | OUTPATIENT
Start: 2018-08-01 | End: 2018-08-07 | Stop reason: HOSPADM

## 2018-08-01 RX ORDER — CEFAZOLIN SODIUM 1 G/50ML
2000 SOLUTION INTRAVENOUS EVERY 24 HOURS
Status: DISCONTINUED | OUTPATIENT
Start: 2018-08-01 | End: 2018-08-01

## 2018-08-01 RX ORDER — ACETAMINOPHEN 325 MG/1
650 TABLET ORAL EVERY 4 HOURS PRN
Status: DISCONTINUED | OUTPATIENT
Start: 2018-08-01 | End: 2018-08-07 | Stop reason: HOSPADM

## 2018-08-01 RX ORDER — WARFARIN SODIUM 2 MG/1
4 TABLET ORAL DAILY
Status: ON HOLD | COMMUNITY
End: 2018-09-26

## 2018-08-01 RX ORDER — AMOXICILLIN 250 MG
2 CAPSULE ORAL 2 TIMES DAILY PRN
Status: DISCONTINUED | OUTPATIENT
Start: 2018-08-01 | End: 2018-08-07 | Stop reason: HOSPADM

## 2018-08-01 RX ORDER — SIMVASTATIN 10 MG
10 TABLET ORAL AT BEDTIME
Status: DISCONTINUED | OUTPATIENT
Start: 2018-08-01 | End: 2018-08-01

## 2018-08-01 RX ORDER — GABAPENTIN 300 MG/1
300 CAPSULE ORAL 2 TIMES DAILY
Status: ON HOLD | COMMUNITY
End: 2018-09-26

## 2018-08-01 RX ORDER — ONDANSETRON 4 MG/1
4 TABLET, ORALLY DISINTEGRATING ORAL EVERY 6 HOURS PRN
Status: DISCONTINUED | OUTPATIENT
Start: 2018-08-01 | End: 2018-08-07 | Stop reason: HOSPADM

## 2018-08-01 RX ORDER — LEVOTHYROXINE SODIUM 100 UG/1
100 TABLET ORAL DAILY
Status: DISCONTINUED | OUTPATIENT
Start: 2018-08-01 | End: 2018-08-07 | Stop reason: HOSPADM

## 2018-08-01 RX ORDER — NITROGLYCERIN 0.4 MG/1
0.4 TABLET SUBLINGUAL EVERY 5 MIN PRN
Status: DISCONTINUED | OUTPATIENT
Start: 2018-08-01 | End: 2018-08-07 | Stop reason: HOSPADM

## 2018-08-01 RX ORDER — CLINDAMYCIN PHOSPHATE 900 MG/50ML
900 INJECTION, SOLUTION INTRAVENOUS EVERY 8 HOURS
Status: DISCONTINUED | OUTPATIENT
Start: 2018-08-01 | End: 2018-08-02

## 2018-08-01 RX ORDER — POLYETHYLENE GLYCOL 3350 17 G/17G
17 POWDER, FOR SOLUTION ORAL DAILY PRN
Status: DISCONTINUED | OUTPATIENT
Start: 2018-08-01 | End: 2018-08-07 | Stop reason: HOSPADM

## 2018-08-01 RX ORDER — ATENOLOL 50 MG/1
50 TABLET ORAL DAILY
Status: DISCONTINUED | OUTPATIENT
Start: 2018-08-01 | End: 2018-08-01

## 2018-08-01 RX ORDER — FUROSEMIDE 40 MG
40 TABLET ORAL 2 TIMES DAILY
Status: ON HOLD | COMMUNITY
End: 2018-08-01

## 2018-08-01 RX ORDER — IPRATROPIUM BROMIDE AND ALBUTEROL SULFATE 2.5; .5 MG/3ML; MG/3ML
1 SOLUTION RESPIRATORY (INHALATION) 3 TIMES DAILY PRN
COMMUNITY

## 2018-08-01 RX ORDER — LACTULOSE 10 G/15ML
SOLUTION ORAL
COMMUNITY
End: 2019-01-01

## 2018-08-01 RX ORDER — WARFARIN SODIUM 5 MG/1
5 TABLET ORAL
Status: COMPLETED | OUTPATIENT
Start: 2018-08-01 | End: 2018-08-01

## 2018-08-01 RX ORDER — METOPROLOL TARTRATE 25 MG/1
25 TABLET, FILM COATED ORAL 2 TIMES DAILY
COMMUNITY

## 2018-08-01 RX ORDER — ATORVASTATIN CALCIUM 10 MG/1
10 TABLET, FILM COATED ORAL DAILY
COMMUNITY
End: 2019-01-01

## 2018-08-01 RX ORDER — NALOXONE HYDROCHLORIDE 0.4 MG/ML
.1-.4 INJECTION, SOLUTION INTRAMUSCULAR; INTRAVENOUS; SUBCUTANEOUS
Status: DISCONTINUED | OUTPATIENT
Start: 2018-08-01 | End: 2018-08-07 | Stop reason: HOSPADM

## 2018-08-01 RX ORDER — WARFARIN SODIUM 5 MG/1
5 TABLET ORAL DAILY
Status: ON HOLD | COMMUNITY
End: 2018-09-26

## 2018-08-01 RX ORDER — GLIPIZIDE 5 MG/1
5 TABLET ORAL
Status: DISCONTINUED | OUTPATIENT
Start: 2018-08-01 | End: 2018-08-07 | Stop reason: HOSPADM

## 2018-08-01 RX ORDER — LIDOCAINE 40 MG/G
CREAM TOPICAL
Status: DISCONTINUED | OUTPATIENT
Start: 2018-08-01 | End: 2018-08-01

## 2018-08-01 RX ORDER — NYSTATIN 100000 [USP'U]/G
POWDER TOPICAL 3 TIMES DAILY
COMMUNITY

## 2018-08-01 RX ORDER — NICOTINE POLACRILEX 4 MG
15-30 LOZENGE BUCCAL
Status: DISCONTINUED | OUTPATIENT
Start: 2018-08-01 | End: 2018-08-07 | Stop reason: HOSPADM

## 2018-08-01 RX ORDER — GABAPENTIN 300 MG/1
300 CAPSULE ORAL 2 TIMES DAILY
Status: DISCONTINUED | OUTPATIENT
Start: 2018-08-01 | End: 2018-08-07 | Stop reason: HOSPADM

## 2018-08-01 RX ORDER — LIDOCAINE 40 MG/G
CREAM TOPICAL
Status: DISCONTINUED | OUTPATIENT
Start: 2018-08-01 | End: 2018-08-07 | Stop reason: HOSPADM

## 2018-08-01 RX ORDER — FUROSEMIDE 20 MG
20 TABLET ORAL 2 TIMES DAILY
Status: ON HOLD | COMMUNITY
End: 2018-08-01

## 2018-08-01 RX ORDER — ACETAMINOPHEN 325 MG/1
650 TABLET ORAL DAILY PRN
COMMUNITY

## 2018-08-01 RX ORDER — ONDANSETRON 2 MG/ML
4 INJECTION INTRAMUSCULAR; INTRAVENOUS EVERY 6 HOURS PRN
Status: DISCONTINUED | OUTPATIENT
Start: 2018-08-01 | End: 2018-08-07 | Stop reason: HOSPADM

## 2018-08-01 RX ORDER — NITROGLYCERIN 0.4 MG/1
0.4 TABLET SUBLINGUAL EVERY 5 MIN PRN
COMMUNITY

## 2018-08-01 RX ORDER — DEXTROSE MONOHYDRATE 25 G/50ML
25-50 INJECTION, SOLUTION INTRAVENOUS
Status: DISCONTINUED | OUTPATIENT
Start: 2018-08-01 | End: 2018-08-07 | Stop reason: HOSPADM

## 2018-08-01 RX ORDER — AMOXICILLIN 250 MG
1 CAPSULE ORAL 2 TIMES DAILY PRN
Status: DISCONTINUED | OUTPATIENT
Start: 2018-08-01 | End: 2018-08-07 | Stop reason: HOSPADM

## 2018-08-01 RX ORDER — TAMSULOSIN HYDROCHLORIDE 0.4 MG/1
0.4 CAPSULE ORAL DAILY
Status: DISCONTINUED | OUTPATIENT
Start: 2018-08-01 | End: 2018-08-07 | Stop reason: HOSPADM

## 2018-08-01 RX ADMIN — OMEPRAZOLE 20 MG: 20 CAPSULE, DELAYED RELEASE ORAL at 10:18

## 2018-08-01 RX ADMIN — TAZOBACTAM SODIUM AND PIPERACILLIN SODIUM 4.5 G: 500; 4 INJECTION, SOLUTION INTRAVENOUS at 09:09

## 2018-08-01 RX ADMIN — GLIPIZIDE 5 MG: 5 TABLET ORAL at 10:19

## 2018-08-01 RX ADMIN — TAZOBACTAM SODIUM AND PIPERACILLIN SODIUM 4.5 G: 500; 4 INJECTION, SOLUTION INTRAVENOUS at 16:43

## 2018-08-01 RX ADMIN — FERROUS GLUCONATE 324 MG: 324 TABLET ORAL at 10:18

## 2018-08-01 RX ADMIN — WARFARIN SODIUM 5 MG: 5 TABLET ORAL at 18:33

## 2018-08-01 RX ADMIN — GLIPIZIDE 5 MG: 5 TABLET ORAL at 16:43

## 2018-08-01 RX ADMIN — GABAPENTIN 300 MG: 300 CAPSULE ORAL at 20:30

## 2018-08-01 RX ADMIN — FUROSEMIDE 60 MG: 40 TABLET ORAL at 10:18

## 2018-08-01 RX ADMIN — LEVOTHYROXINE SODIUM 100 MCG: 100 TABLET ORAL at 10:18

## 2018-08-01 RX ADMIN — GABAPENTIN 300 MG: 300 CAPSULE ORAL at 10:19

## 2018-08-01 RX ADMIN — TAMSULOSIN HYDROCHLORIDE 0.4 MG: 0.4 CAPSULE ORAL at 09:00

## 2018-08-01 RX ADMIN — ACETAMINOPHEN 650 MG: 325 TABLET, FILM COATED ORAL at 18:44

## 2018-08-01 RX ADMIN — CEFAZOLIN SODIUM 2 G: 2 INJECTION, SOLUTION INTRAVENOUS at 22:59

## 2018-08-01 RX ADMIN — TAZOBACTAM SODIUM AND PIPERACILLIN SODIUM 4.5 G: 500; 4 INJECTION, SOLUTION INTRAVENOUS at 04:31

## 2018-08-01 RX ADMIN — VANCOMYCIN HYDROCHLORIDE 2000 MG: 5 INJECTION, POWDER, LYOPHILIZED, FOR SOLUTION INTRAVENOUS at 18:34

## 2018-08-01 RX ADMIN — TAZOBACTAM SODIUM AND PIPERACILLIN SODIUM 4.5 G: 500; 4 INJECTION, SOLUTION INTRAVENOUS at 21:01

## 2018-08-01 RX ADMIN — CLINDAMYCIN PHOSPHATE 900 MG: 900 INJECTION, SOLUTION INTRAVENOUS at 23:53

## 2018-08-01 RX ADMIN — FUROSEMIDE 60 MG: 40 TABLET ORAL at 16:43

## 2018-08-01 ASSESSMENT — ACTIVITIES OF DAILY LIVING (ADL)
ADLS_ACUITY_SCORE: 22
ADLS_ACUITY_SCORE: 23
ADLS_ACUITY_SCORE: 26
ADLS_ACUITY_SCORE: 22
ADLS_ACUITY_SCORE: 23

## 2018-08-01 NOTE — ED NOTES
[facility]  ED Nurse Handoff Report    Ton Bagley is a 89 year old male   ED Chief complaint: Shortness of Breath and Altered Mental Status  . ED Diagnosis:   Final diagnoses:   Severe sepsis (H)   Acute respiratory failure with hypoxia (H)     Allergies: No Known Allergies    Code Status: Not on file  Activity level - Baseline/Home:  Stand with Assist. Activity Level - Current:   Unable to Assess. Lift room needed: No. Bariatric: No   Needed: No   Isolation: No. Infection: Not Applicable.     Vital Signs:   Vitals:    07/31/18 2200 07/31/18 2215 07/31/18 2230 07/31/18 2240   BP: 164/76 150/73 147/71    Pulse:    105   Resp:       Temp:       SpO2: 94% 94% 96%    Weight:           Cardiac Rhythm:  ,   Cardiac  Cardiac Rhythm: Atrial fibrillation  Pain level: 0-10 Pain Scale: 0  Patient confused: Yes. Patient Falls Risk: Yes.   Elimination Status: Has voided - incontinent  Patient Report - Initial Complaint: Shortness of Breath - Altered Mental Status. Focused Assessment:  Staff from nursing home states patient as confused earlier this evening.  Pt began becoming shaky.  Staff also reported pt was cyanotic with O2 sats in mid-80's.     Lung sounds clear  Cardiac rhythm afib with RVR   Pt was complaining of chest pain - relieved once rate controlled with metoprolol  On 2L O2  Lower extremities edematous - pt states this is chronic  Lower extremities red and warm - history of cellulitis    Tests Performed:   Labs Ordered and Resulted from Time of ED Arrival Up to the Time of Departure from the ED   COMPREHENSIVE METABOLIC PANEL - Abnormal; Notable for the following:        Result Value    Glucose 219 (*)     All other components within normal limits   CBC WITH PLATELETS DIFFERENTIAL - Abnormal; Notable for the following:     WBC 19.6 (*)     Absolute Neutrophil 17.9 (*)     Absolute Lymphocytes 0.4 (*)     All other components within normal limits   INR - Abnormal; Notable for the  following:     INR 2.36 (*)     All other components within normal limits   ROUTINE UA WITH MICROSCOPIC - Abnormal; Notable for the following:     Blood Urine Small (*)     Bacteria Urine Few (*)     Mucous Urine Present (*)     All other components within normal limits   ISTAT  GASES LACTATE DESIREE POCT - Abnormal; Notable for the following:     Bicarbonate Venous 30 (*)     Lactic Acid 2.5 (*)     All other components within normal limits   ISTAT  GASES LACTATE DESIREE POCT - Abnormal; Notable for the following:     Bicarbonate Venous 31 (*)     Lactic Acid 2.5 (*)     All other components within normal limits   TROPONIN I   NT PROBNP INPATIENT   NT PROBNP INPATIENT   PERIPHERAL IV CATHETER   PULSE OXIMETRY NURSING   CARDIAC CONTINUOUS MONITORING   NURSING DRAW AND HOLD   NURSING DRAW AND HOLD   NURSING DRAW AND HOLD   ISTAT CG4 GASES LACTATE DESIREE NURSING POCT   ISTAT GAS OR ELECTROLYTE NURSING POCT   BLOOD CULTURE   BLOOD CULTURE     XR Chest 2 Views   Final Result   IMPRESSION:   1. Cardiomegaly and mild pulmonary vascular congestion. This could be   due to mild congestive heart failure or fluid overload.   2. No focal alveolar-type infiltrates are seen.          LIONEL VEE MD          Treatments provided: See MAR  Family Comments: None present, son Luciano would like to be called with updates [phone]  OBS brochure/video discussed/provided to patient:  No  ED Medications:   Medications   sodium chloride (PF) 0.9% PF flush 3 mL (not administered)   sodium chloride (PF) 0.9% PF flush 3 mL (not administered)   acetaminophen (TYLENOL) tablet 500 mg (500 mg Oral Given 7/31/18 2048)   vancomycin (VANCOCIN) 1,750 mg in sodium chloride 0.9 % 500 mL intermittent infusion (1,750 mg Intravenous Given 7/31/18 2234)   0.9% sodium chloride BOLUS (0 mLs Intravenous Stopped 7/31/18 2153)   piperacillin-tazobactam (ZOSYN) infusion 4.5 g (0 g Intravenous Stopped 7/31/18 2240)   metoprolol (LOPRESSOR) injection 5 mg (5 mg Intravenous  Given 7/31/18 2232)   metoprolol tartrate (LOPRESSOR) tablet 25 mg (25 mg Oral Given 7/31/18 2247)     Drips infusing:  Yes - abx  For the majority of the shift, the patient's behavior Green. Interventions performed were Rounding.     Severe Sepsis OR Septic Shock Diagnosis Present:   Yes    Per the ED Provider, Time Zero for severe sepsis or septic shock is:  2037    3 Hour Severe Sepsis Bundle Completion:  1. Initial Lactic Acid Result:   Recent Labs   Lab Test  07/31/18 2257  07/31/18 2035 04/04/18   0740   LACT  2.5*  2.5*  1.3     2. Blood Cultures before Antibiotics: Yes  3. Broad Spectrum Antibiotics Administered:     Anti-infectives (Future)    Start     Dose/Rate Route Frequency Ordered Stop    07/31/18 2154  vancomycin (VANCOCIN) 1,750 mg in sodium chloride 0.9 % 500 mL intermittent infusion      1,750 mg  over 2 Hours Intravenous ONCE 07/31/18 2153          4. 1,000 ml of IV fluids have been given so far      6 Hour Severe Sepsis Bundle Completion:    1. Repeat Lactic Acid Level:   Last result   Lab Results   Component Value Date    LACT 2.5 (H) 07/31/2018     2. Patient currently on Vasopressors =  No      ED Nurse Name/Phone Number: Yusra Claros,   11:04 PM    RECEIVING UNIT ED HANDOFF REVIEW    Above ED Nurse Handoff Report was reviewed: Yes  Reviewed by: Lola Jones on August 1, 2018 at 12:23 AM

## 2018-08-01 NOTE — PROGRESS NOTES
"[facility]  Hospitalist Progress Note  Surjit May MD 08/01/2018    Reason for Stay (Diagnosis): sepsis         Assessment and Plan:      Summary of Stay: Ton Bagley is a 89 year old male admitted on 7/31/2018 with sepsis.      Problem List:   1. Sepsis syndrome with fever, elevated WBC, elevated lactic acid, cellulitis, left LE. Recurrent LE cellulitis with hx lymphedema and chronic stasis dermatitis.  2. Left LE Cellulitis in the setting of chronic venous stasis and peripheral edema.  3. Chronic A fib, in RVR driven by acute infection. Typically managed with Warfarin and metoprolol.  4. DM, typically on glipizide alone.   5. Dementia. Recently moved to LTC at [facility].  6. Diastolic heart failure. Aortic stenosis with possible \"low-flow gradient\" aortic stenosis.   7. Urinary frequency related to Prostate cancer and treatment.   8. Mild infectious encephalopathy.    PLAN:  1.  Cont Vanco and Zosyn started initially. Blood cx already positive for GPCs consistent with strep. Will start Clinda and Ancef for now.   2.  Elevate leg above the level of the heart as much as can be tolerated.   3.  Will need to mobilize also when feeling stronger and more oriented.       DVT Prophylaxis: Warfarin  Code Status: DNR / DNI  Discharge Dispo: recently moved to SNF  Estimated Disch Date / # of Days until Disch: likely at least 5 days, given the severity of the infection.        Interval History (Subjective):      Chart reviewed, pt interviewed.      I met with family and pt at the bedside for more than 20 minutes.                   Physical Exam:      Last Vital Signs:  /70  Pulse 98  Temp 98.4  F (36.9  C) (Axillary)  Resp 20  Wt 100.6 kg (221 lb 12.5 oz)  SpO2 90%  BMI 35.8 kg/m2    I/O last 3 completed shifts:  In: 340 [P.O.:340]  Out: 100 [Urine:100]    Constitutional: Somnolent, but arousable. Joking, but unclear how well-oriented he is.   Respiratory: Clear to auscultation " bilaterally, no crackles or wheezing   Cardiovascular: IRRIR.   Abdomen: Normal bowel sounds, soft, non-distended, non-tender   Skin: Marked, confluent warmth and erythema involving the entire left LE.   Neuro: Arousable.   Extremities: Edema noted bilat. Old safenous vein harvest site scar on left lower extrem noted.   Other(s):        All other systems: Negative          Medications:      All current medications were reviewed with changes reflected in problem list.         Data:      All new lab and imaging data was reviewed.   Labs/Imaging:  Results for orders placed or performed during the hospital encounter of 07/31/18 (from the past 24 hour(s))   ISTAT gases lactate christiano POCT   Result Value Ref Range    Ph Venous 7.42 7.32 - 7.43 pH    PCO2 Venous 47 40 - 50 mm Hg    PO2 Venous 33 25 - 47 mm Hg    Bicarbonate Venous 31 (H) 21 - 28 mmol/L    O2 Sat Venous 64 %    Lactic Acid 2.5 (H) 0.7 - 2.1 mmol/L   Social Work IP Consult    Narrative    Benitez Lepe LSW     8/1/2018  2:22 PM  Care Transition Initial Assessment - AMANDA  Reason For Consult: discharge planning  Met with: AMANDA spoke with son Luciano as pt was sleeping.  Active Problems:    Cellulitis       DATA  Lives With: spouse  Living Arrangements: extended care facility. LTC at [facility]) since 7/30/18. Has a bed Hold per [facility].  Description of Support System: Supportive, Involved  Who is your support system?: Wife, Children  Support Assessment: Adequate family and caregiver support.   Identified issues/concerns regarding health management: In need   of Lymphedema therapies and [facility] is able to provide this for him.     Quality Of Family Relationships: supportive  Transportation Available: family or friend will provide. AMANDA spoke   with pt's son Luciano who reports that pt's son Mejia has a van so   [facility] to contact Mejia at [phone]     PLAN  Financial costs for the patient includes N/A .  Patient given options and choices for discharge N/A  .  Patient/family is agreeable to the plan?  Yes:   Patient Goals and Preferences: Return to LTC .  Patient anticipates discharging to:  Return to LTC .         Basic metabolic panel   Result Value Ref Range    Sodium 140 133 - 144 mmol/L    Potassium 4.0 3.4 - 5.3 mmol/L    Chloride 104 94 - 109 mmol/L    Carbon Dioxide 27 20 - 32 mmol/L    Anion Gap 9 3 - 14 mmol/L    Glucose 137 (H) 70 - 99 mg/dL    Urea Nitrogen 19 7 - 30 mg/dL    Creatinine 1.26 (H) 0.66 - 1.25 mg/dL    GFR Estimate 54 (L) >60 mL/min/1.7m2    GFR Estimate If Black 65 >60 mL/min/1.7m2    Calcium 8.0 (L) 8.5 - 10.1 mg/dL   CBC with platelets differential   Result Value Ref Range    WBC 23.9 (H) 4.0 - 11.0 10e9/L    RBC Count 4.13 (L) 4.4 - 5.9 10e12/L    Hemoglobin 12.9 (L) 13.3 - 17.7 g/dL    Hematocrit 40.7 40.0 - 53.0 %    MCV 99 78 - 100 fl    MCH 31.2 26.5 - 33.0 pg    MCHC 31.7 31.5 - 36.5 g/dL    RDW 13.7 10.0 - 15.0 %    Platelet Count 154 150 - 450 10e9/L    Diff Method Automated Method     % Neutrophils 90.7 %    % Lymphocytes 3.5 %    % Monocytes 4.4 %    % Eosinophils 0.0 %    % Basophils 0.2 %    % Immature Granulocytes 1.2 %    Nucleated RBCs 0 0 /100    Absolute Neutrophil 21.6 (H) 1.6 - 8.3 10e9/L    Absolute Lymphocytes 0.8 0.8 - 5.3 10e9/L    Absolute Monocytes 1.1 0.0 - 1.3 10e9/L    Absolute Eosinophils 0.0 0.0 - 0.7 10e9/L    Absolute Basophils 0.0 0.0 - 0.2 10e9/L    Abs Immature Granulocytes 0.3 0 - 0.4 10e9/L    Absolute Nucleated RBC 0.0    Lactic acid whole blood   Result Value Ref Range    Lactic Acid 2.5 (H) 0.7 - 2.0 mmol/L   Glucose by meter   Result Value Ref Range    Glucose 121 (H) 70 - 99 mg/dL   INR   Result Value Ref Range    INR 2.44 (H) 0.86 - 1.14   Glucose by meter   Result Value Ref Range    Glucose 145 (H) 70 - 99 mg/dL   Glucose by meter   Result Value Ref Range    Glucose 88 70 - 99 mg/dL   Lactic acid level STAT for sepsis protocol   Result Value Ref Range    Lactate for Sepsis Protocol 1.8 0.7 - 2.0  mmol/L

## 2018-08-01 NOTE — ED PROVIDER NOTES
"  History     Chief Complaint:    Shortness of Breath and Altered Mental Status      HPI   Ton Bagley is an anticoagulated 89 year old male with a history of atrial fibrillation, CAD, hypertension, and hyperlipidemia who presents to the ED via EMS for evaluation of shortness of breath and altered mental status. The patient's resident report from [facility], where the patient resides, states that the patient was confused earlier this evening. A nurse helped him back to his room and the patient complained for chest and shoulder pain. He was then evaluated for stroke and was negative for signs of stroke. Shortly after, the patient states that he felt cold and began shaking uncontrollably. He has a blood pressure of 163/102, a HR of 122, a temperature of 101.4, RR of 22, BS of 214, O2 saturation of 87% and was cyanotic. Just prior to EMS arriving he began feeling hot and was diaphoretic. He received NC oxygen en route to the ED and his O2 saturation began to rise to the high 90's in conjunction with a rise in mentation.     \"1918 Pt was confused and went to 3rd floor dining room. Writer helped pt back to his room and heart the pt. C/o chest pain and shoulder pain. Writer checked for signs of stroke and pt. Was negative for signs of stroke. Pt. C/o feeling cold and began shivering and couldn't stop. B/P 163/102, hr 122, temp 101.4, RR 22, , oxygen saturation 87%, lips cyanotic. Pt. Now c/o feeling hot and is becoming diaphoretic. \" Resident progress report for [facility].  Per facility notes, patient visited with wife earlier today and was normal at that time.    Allergies:  The patient has no known drug allergies.    Medications:    Acetaminophen  Tenormin  Fergon  Lasix  Gabapentin  Glucotrol  Synthroid  Lisinopril  Nitrostat  Prilosec  Senokot  Mylicon  Zocor  Carafate  Flomax  Warfarin     Past Medical History:    A-fib  CAD  HTN  Hyperlipidemia  CABG S/P stent    Past Surgical " History:    CABG  Cardioversion  Laparotomy exploratory  Stent, coronary    Family History:    No past pertinent family history.    Social History:  Former smoker.  Positive for alcohol use.   Lives at [facility]  Marital Status:   [2]     Review of Systems   Unable to perform ROS: Mental status change         Physical Exam   First Vitals:  BP: (!) 138/107  Pulse: 120  Heart Rate: 120  Temp: 99.6  F (37.6  C)  Resp: 22  SpO2: 92 %      Physical Exam  Gen: Pleasant, appears stated age.  Slightly confused.    Eye:   Pupils are equal, round, and reactive.     Sclera non-injected.    ENT:   Moist mucus membranes.     Normal tongue.    Oropharynx without lesions.    Cardiac:     Tachycardic, irregularly irregular.     No murmurs, gallops, or rubs.    Pulmonary:     Bilateral rales.    Abdomen:     Normal active bowel sounds.     Abdomen is soft and non-distended, without focal tenderness.    Musculoskeletal:     Normal movement of all extremities without evidence for deficit.      Extremities:    No edema.    Skin:   Warm and dry.  Chronic lower extremity redness and swelling. Redness and warmth extending up left leg to proximal thigh.   Blanching, warm, confluent.    Neurologic:    Non-focal exam without asymmetric weakness or numbness.    Normal tone    Psychiatric:     Unable to evaluate.      Emergency Department Course   ECG:  Indication: AMS, SOB  Time: 2024  Vent. Rate 113 bpm. AK interval *. QRS duration 84. QT/QTc 432/592. P-R-T axis * 18 207.  Atrial fibrillation with rapid ventricular response. Possible inferior infarct, age undetermined. ST and T wave abnormality, consider lateral ischemia. Abnormal ECG. Read time: 2044      Imaging:  Radiographic findings were communicated with the patient.  XR Chest 2 views:   1. Cardiomegaly and mild pulmonary vascular congestion. This could be  due to mild congestive heart failure or fluid overload.  2. No focal alveolar-type infiltrates  are seen, as per radiology.       Laboratory:  CBC: WBC: 19.6 (H), HGB: 14.0, PLT: 223  CMP: Glucose 219 (H), o/w WNL (Creatinine: 1.06)    INR: 2.36 (H)  ISTAT VBG: pH 7.43 / PCO2 45 / PO2 27 / Bicarb 30 / Lactic acid 2.5 (H)  2030 Troponin: <0.015  Blood cultures (X2): pending  UA with Microscopic: small blood, few bacteria, o/w WNL    Interventions:  2048 Tylenol 500 mg PO  2049 NS 1,000 mLs IV  2207 Zosyn 3.375 g IV  Medications   sodium chloride (PF) 0.9% PF flush 3 mL (not administered)   sodium chloride (PF) 0.9% PF flush 3 mL (not administered)   acetaminophen (TYLENOL) tablet 500 mg (500 mg Oral Given 7/31/18 2048)   piperacillin-tazobactam (ZOSYN) infusion 4.5 g (4.5 g Intravenous New Bag 7/31/18 2207)   vancomycin (VANCOCIN) 1,750 mg in sodium chloride 0.9 % 500 mL intermittent infusion (not administered)   0.9% sodium chloride BOLUS (0 mLs Intravenous Stopped 7/31/18 2153)         Emergency Department Course:  Nursing notes and vitals reviewed. (2035) I performed an exam of the patient as documented above.     EKG obtained in the ED, see results above.     The patient provided a urine sample here in the emergency department. This was sent for laboratory testing, findings above.     IV inserted. Medicine administered as documented above. Blood drawn. This was sent to the lab for further testing, results above.     The patient was sent for a Chest XR while in the emergency department, findings above.     2214 I rechecked the patient and discussed the results of his workup thus far.   11:11 PM  Patient's heart rate improved to 108.  Vitals otherwise stable.  Appears more comfortable; denies chest pain at this time.      11:14 PM   I consulted with Dr. Cordero of the hospitalist services. They are in agreement to accept the patient for admission.    Findings and plan explained to the Patient who consents to admission. Discussed the patient with Dr. Cordero, who will admit the patient to a medical bed for  further monitoring, evaluation, and treatment.    Impression & Plan      Medical Decision Making:  Ton Bagley is a 89 year old male with history of A-fib, currently on coumadin, who was brought in by ambulance for cyanosis, hypoxia, fever, and unresponsiveness. Upon arrival to the ED, the patient was more alert but still somewhat confused. He is complaining of right and left sided chest pain. EKG shows A-fib with RVR with some rate related ST depressions laterally. WBCs were markedly elevated. Lactate also slightly elevated, and unchanged on recheck.  Chest XR does not show obvious signs of pneumonia, but is consistent with mild volume overload.  Given volume overload and DNI status, as well as stable vitals, I did not order another bolus of fluid. Upon further evaluation, it appears that the patient has LLE cellulitis, which he has a history of in the past.  This appears to be the infectious source.  The patient was started on broad spectrum antibiotics. Rate control was pursued cautiously given underlying sepsis.  Chest pain resolved following improved rate control. At this point, he is stable for admission to inpatient unit for continued antibiotics and resuscitation.    Diagnosis:    ICD-10-CM    1. Severe sepsis (H) A41.9 Nt probnp inpatient    R65.20 Nt probnp inpatient     Lactic acid whole blood     ISTAT gases lactate christiano POCT     ISTAT gases lactate christiano POCT     CANCELED: Nt probnp inpatient     CANCELED: Lactic acid whole blood   2. Acute respiratory failure with hypoxia (H) J96.01    3. Cellulitis, unspecified cellulitis site L03.90        Disposition:  Admitted to inpatient bed    Scribe Disclosure:  I,  Laci Oliver, am serving as a scribe on 7/31/2018 at 8:35 PM to personally document services performed by Mariia Navarrete MD based on my observations and the provider's statements to me.          Laci Oliver  7/31/2018   [facility] EMERGENCY DEPARTMENT       Mariia Navarrete  MD Caroline  07/31/18 6519

## 2018-08-01 NOTE — ED NOTES
Bed: ED12  Expected date: 7/31/18  Expected time: 8:00 PM  Means of arrival: Ambulance  Comments:  Peterson Campbell

## 2018-08-01 NOTE — H&P
Admitted:     07/31/2018      PRIMARY CARE PHYSICIAN:  [facility].      ASSESSMENT AND PLAN:  This is a very pleasant 89-year-old gentleman with a past medical history of dementia, atrial fibrillation, hypertension, chronic lymphedema, who presents to the hospital with concerns for an episode of chills, shortness of breath, hypoxemia.  He was found to have some mild venous congestion on the chest x-ray, lower extremity cellulitis.     1.  In terms of this acute spell of fevers, chills, hypoxemia, I wonder about the presence of bacteremia.  He had a prior hospitalization with a Staph aureus sepsis and bacteremia a few months ago and wonders if that was the cause of his symptoms.  It is also possible that he had some tachycardia driven pulmonary edema causing the mild hypoxemia.  At this time, he looks a lot more comfortable.  His heart rate is better controlled and is doing quite well on just a couple of liters of oxygen without any major respiratory symptoms.  We will watch his respiratory status closely and address underlying issues as below.     2.  Sepsis due to left lower extremity cellulitis.  Based on the patient's tachycardia and leukocytosis of 19.6, his lactic acid was 2.5.  He was given some IV fluids and repeat is still 2.5.  Given the concerns for venous congestion and pulmonary edema, I will hold on giving him further aggressive IV fluids for now. I will cover him broadly with vancomycin and Zosyn for now, although I anticipate this could be narrowed down quickly if his blood cultures stayed negative to a more gram-positive coverage for the cellulitis.  Again, given his prior history of Staphylococcus aureus bacteremia, we will watch his blood cultures closely.     3.  Concerns for mild pulmonary edema.  Suspect this was tachycardia driven due to the atrial fibrillation.  Now that his heart rate is better controlled, his breathing is also better and his earlier complaint of some chest pain is  completely resolved.  I will have the patient on his baseline dose of oral diuretics for now and see how he does with that.  Certainly, based on his response, he might need some IV diuresis if he still has some hypoxemia.   4.  Atrial fibrillation with rapid ventricular rate, likely driven by the acute infection.  He received some IV and then oral metoprolol in the ER and the heart rate is better now.  I will just resume on his baseline medications for now and watch his heart rate.  We will request a pharmacy consult to resume the patient on his anticoagulation.  His INR is currently therapeutic today at 2.36.   5.  Diabetes mellitus.  Resume the patient on his oral medication, on glipizide along with sliding scale insulin.   6.  Hypothyroidism.  Resume Synthroid.   7.  Chronic issues with lower extremity edema.   8.  Underlying history of dementia.  It appears that the patient is currently in a skilled nursing facility.  I will request a  consult.   9.  Known aortic stenosis.   10.  Hypertension.   11.  Suspect mild encephalopathy on top of underlying dementia. Probably due to the acute sepsis. Monitor mentation.     Code status.  DO NOT RESUSCITATE/DO NOT INTUBATE confirmed with the patient and consistent with his prior wishes.   Deep venous thrombosis prophylaxis.  The patient is on warfarin.      Plan of care was discussed with the patient in great detail.  All of his questions were answered.  Unable to reach his wife at this time.  It appears that his son, Luciano, was later updated by the nursing staff in the ER.  It will be helpful to touch base with the family in the morning to get some idea of his baseline mentation status.      CHIEF COMPLAINT:  Episode of confusion and shortness of breath.       HISTORY OF PRESENT ILLNESS:  This is a 89-year-old gentleman with multiple medical co-morbids including atrial fibrillation, hypertension, chronic lower extremity edema, who came to the hospital with  concerns for shortness of breath and altered mental status.  The patient is currently residing in a skilled nursing facility.  Reportedly, he was confused yesterday.  He also had some episode of chest pain and was having some chills and rigors.  The nursing staff assessed him for a stroke and apparently his neurological status was intact; however, he was starting to feel cold and shivering; hence, checked his vitals, which showed that his heart rate was 120, temperature was 101.4 and his O2 sats were in the 80s and he was getting diaphoretic and he was sent to the ER for further evaluation.  In the ER, the patient was still somewhat confused for me.  He keeps on complaining about why he was sent at 2:00 in the morning, somewhere else and seems to be quite upset with nursing home staff.  He mentions that it appears that he was having some chest discomfort initially coming to the ER; however, once he got the IV metoprolol and his heart rate was brought down, that completely resolved.  He denies having any chest pain to me at this time.  Otherwise, somewhat confused and really unable to give much reliable history.      PHYSICAL EXAMINATION:   VITAL SIGNS:  Blood pressure is 108/69, heart rate is currently in the 90s, respiratory rate is 20, oxygen is 96% on 2 liters of oxygen.  Temperature is 99.6 currently in the ER, T-max as above.   GENERAL APPEARANCE:  Elderly male lying in the bed, appears nontoxic, no obvious distress.   HEENT:  Normocephalic, atraumatic.  Mucous membranes moist.   NECK:  Supple.   CARDIOVASCULAR: He has Irregular rate and rhythm, normal S1, S2 with a 3/6 systolic murmur heard over the left upper sternal border.   LUNGS:  Clear to auscultation mostly.  No major crackles are noted on my exam.  No wheezing.   ABDOMEN:  Soft, nontender, nondistended with positive bowel sounds.   EXTREMITIES:  Warm and dry with signs of adequate peripheral perfusion.   SKIN:  The patient had no cyanosis or clubbing.   He does have chronic venous stasis changes of both legs; however, there is increased warmth and redness on the left leg, which is concerning for superimposed cellulitis on the lower leg below the knee.   NEUROLOGIC:  The patient is awake, able to answer questions; however, is somewhat confused, knows that is in the hospital, moving all extremities with good strength.   PSYCHIATRIC:  Somewhat disgruntled on being in the hospital, but otherwise, no acute agitation or hallucinations noted.       Past Medical History    I have reviewed this patient's medical history and updated it with pertinent information if needed.   Past Medical History:   Diagnosis Date     Atrial fibrillation (H)     cardioversion 2007     CAD (coronary artery disease)     CABG, stent x2 2004     HTN (hypertension)      Hyperlipidemia        Past Surgical History   I have reviewed this patient's surgical history and updated it with pertinent information if needed.  Past Surgical History:   Procedure Laterality Date     BYPASS GRAFT ARTERY CORONARY  1994    LIMA to LAD and saphenous vein grafts to Diag 1 OM 1 PDA     CARDIOVERSION  2007     LAPAROTOMY EXPLORATORY N/A 5/14/2017    Procedure: LAPAROTOMY EXPLORATORY;  Exploratory laparotomy with peritoneal washout. ;  Surgeon: Jorge Dias MD;  Location: [facility] OR     STENT, CORONARY, S660 15/18  2004    stent placement of SVG to RCA and OM2       Prior to Admission Medications   Prior to Admission Medications   Prescriptions Last Dose Informant Patient Reported? Taking?   Calcium Carb-Cholecalciferol (CALCIUM 500+D) 500-200 MG-UNIT TABS 7/31/2018 at 8:00am  No Yes   Sig: Take 1 tablet by mouth 2 times daily   FUROSEMIDE PO 7/31/2018 at Unknown time  Yes Yes   Sig: Take 60 mg by mouth 2 times daily   TAMSULOSIN HCL PO 7/31/2018 at Unknown time  Yes Yes   Sig: Take 0.4 mg by mouth daily   acetaminophen (TYLENOL) 325 MG tablet 7/31/2018 at 13:38pm  Yes Yes   Sig: Take 650 mg by mouth daily as  needed for mild pain   atorvastatin (LIPITOR) 10 MG tablet 7/30/2018 at unknown  Yes Yes   Sig: Take 10 mg by mouth daily   ferrous gluconate (FERGON) 324 (38 FE) MG tablet 7/31/2018 at 8:00am  No Yes   Sig: Take 1 tablet (324 mg) by mouth daily (with breakfast)   gabapentin (NEURONTIN) 300 MG capsule 7/31/18 at 8:00am  Yes Yes   Sig: Take 300 mg by mouth 2 times daily   glipiZIDE (GLUCOTROL) 5 MG tablet 7/31/2018 at 8:00am  No Yes   Sig: Take 1 tablet (5 mg) by mouth 2 times daily (before meals)   ipratropium - albuterol 0.5 mg/2.5 mg/3 mL (DUONEB) 0.5-2.5 (3) MG/3ML neb solution  at unknown  Yes Yes   Sig: Take 1 vial by nebulization 3 times daily as needed   lactulose (CHRONULAC) 10 GM/15ML solution  at unknown  Yes Yes   Sig: Take 30 mLs daily as needed   levothyroxine (SYNTHROID/LEVOTHROID) 100 MCG tablet 7/31/2018 at 6:00am  No Yes   Sig: Take 1 tablet (100 mcg) by mouth daily   lisinopril (PRINIVIL/ZESTRIL) 5 MG tablet 7/31/2018 at 8:00am  No Yes   Sig: Take 1 tablet (5 mg) by mouth daily   metoprolol tartrate (LOPRESSOR) 25 MG tablet 7/31/2018 at 8:00am  Yes Yes   Sig: Take 25 mg by mouth 2 times daily   nitroGLYcerin (NITROSTAT) 0.4 MG sublingual tablet  at unknown  Yes Yes   Sig: Place 0.4 mg under the tongue 3 times daily as needed   nystatin (MYCOSTATIN) 277843 UNIT/GM POWD 7/31/2018 at pm  Yes Yes   Sig: Apply to groin area for yeast growth with every pericare   omeprazole (PRILOSEC) 20 MG CR capsule 7/31/2018 at 8:00am  No Yes   Sig: Take 1 capsule (20 mg) by mouth daily   polyethylene glycol (MIRALAX/GLYCOLAX) Packet 7/31/2018 at 8:00am  Yes Yes   Sig: Take 17 g by mouth daily   senna-docusate (SENOKOT-S;PERICOLACE) 8.6-50 MG per tablet 7/31/2018 at 8:00am  Yes Yes   Sig: Take 2 tablets by mouth 2 times daily   simethicone (MYLICON) 80 MG chewable tablet  at unknown  No Yes   Sig: Take 2 tablets (160 mg) by mouth every 6 hours as needed for flatulence or cramping   sucralfate (CARAFATE) 1 GM tablet  7/31/2018 at 17:00pm  No Yes   Sig: Take 1 tablet (1 g) by mouth 4 times daily (before meals and nightly)   warfarin (COUMADIN) 2 MG tablet 7/31/2018 at pm  Yes Yes   Sig: Take 4 mg by mouth daily Take 4 mg on Sun, Tues, Thurs, Fri, Sat    warfarin (COUMADIN) 5 MG tablet 7/30/2018 at pm  Yes Yes   Sig: Take 5 mg by mouth daily Take 5 mg on Mon, Wed      Facility-Administered Medications: None     Allergies   No Known Allergies    Social History   I have reviewed this patient's social history and updated it with pertinent information if needed.   Social History     Social History     Marital status:      Spouse name: N/A     Number of children: N/A     Years of education: N/A     Occupational History     Not on file.     Social History Main Topics     Smoking status: Former Smoker     Smokeless tobacco: Not on file      Comment: 1973     Alcohol use Yes      Comment: wine daily     Drug use: Not on file     Sexual activity: Not on file     Other Topics Concern     Sleep Concern No     Special Diet No     Exercise No     Social History Narrative       Family History   Unable to obtain due to dementia and confusion    Review of Systems   Unable to obtain due to dementia and confusion    Physical Exam   Temp: 97.6  F (36.4  C) Temp src: Oral BP: 116/56 Pulse: 98 Heart Rate: 93 Resp: 16 SpO2: 93 % O2 Device: None (Room air)    Vital Signs with Ranges  Temp:  [97.6  F (36.4  C)-100.6  F (38.1  C)] 97.6  F (36.4  C)  Pulse:  [] 98  Heart Rate:  [] 93  Resp:  [16-22] 16  BP: (103-195)/() 116/56  SpO2:  [90 %-97 %] 93 %  221 lbs 12.52 oz        Data   Data reviewed today:  I personally reviewed   Lab data and imaging results from today have been reviewed.       Recent Labs  Lab 07/31/18 2030 07/31/18 2029   WBC 19.6*  --    HGB 14.0  --    MCV 98  --      --    INR  --  2.36*     --    POTASSIUM 3.4  --    CHLORIDE 99  --    CO2 30  --    BUN 19  --    CR 1.05  --    ANIONGAP 7  --     JANN 9.1  --    *  --    ALBUMIN 4.0  --    PROTTOTAL 8.5  --    BILITOTAL 1.3  --    ALKPHOS 94  --    ALT 24  --    AST 21  --    TROPI <0.015  --        Recent Results (from the past 24 hour(s))   XR Chest 2 Views    Narrative    XR CHEST 2 VW   2018 9:35 PM     HISTORY: left sided chest pain, fever, hypoxia;     COMPARISON: Film dated 4/10/2000    FINDINGS: Midline sternotomy. Cardiac silhouette is enlarged..  Mild  pulmonary vascular congestion. No focal alveolar infiltrate.  The  bones and soft tissues are unremarkable.      Impression    IMPRESSION:  1. Cardiomegaly and mild pulmonary vascular congestion. This could be  due to mild congestive heart failure or fluid overload.  2. No focal alveolar-type infiltrates are seen.        MD MARA CONN MD             D: 2018   T: 2018   MT: MINOR      Name:     BRIANA HERNÁNDEZ   MRN:      [id number]        Account:      [id number]   :      1927        Admitted:     2018                   Document: [id number]

## 2018-08-01 NOTE — CONSULTS
Care Transition Initial Assessment - AMANDA  Reason For Consult: discharge planning  Met with: AMANDA spoke with son Luciano as pt was sleeping.  Active Problems:    Cellulitis       DATA  Lives With: spouse  Living Arrangements: extended care facility. LTC at [facility]) since 7/30/18. Has a bed Hold per [place] at [facility].  Description of Support System: Supportive, Involved  Who is your support system?: Wife, Children  Support Assessment: Adequate family and caregiver support.   Identified issues/concerns regarding health management: In need of Lymphedema therapies and [facility] is able to provide this for him.     Quality Of Family Relationships: supportive  Transportation Available: family or friend will provide. AMANDA spoke with pt's son Luciano who reports that pt's son Mejia has a van so [facility] to contact Mejia at [phone]     PLAN  Financial costs for the patient includes N/A .  Patient given options and choices for discharge N/A .  Patient/family is agreeable to the plan?  Yes:   Patient Goals and Preferences: Return to LTC .  Patient anticipates discharging to:  Return to LTC .

## 2018-08-01 NOTE — CONSULTS
Discharge Planner   Discharge Plans in progress: Pt to return to LTC at [facility] where he shares a room with spouse. Has bed hold.  Barriers to discharge plan: N/A  Follow up plan: SW available as needed.       Entered by: Benitez Lepe 08/01/2018 2:22 PM

## 2018-08-01 NOTE — PHARMACY-ADMISSION MEDICATION HISTORY
Admission medication history interview status for this patient is complete. See [facility] admission navigator for allergy information, prior to admission medications and immunization status.     Medication history interview source(s): None  Medication history resources (including written lists, pill bottles, clinic record): Med list from [facility] and [facility]  Primary pharmacy: [facility] and [facility]    Changes made to PTA medication list:  Added: Atorvastatin, Duoneb, Lopressor, Nystatin powder, Lactulose suspension  Deleted: Atenolol, Camphor-menthol, MVI, Simvastatin, Triamcinolone cream  Changed: Furosemide 60 mg daily with breakfast + Furosemide 60 mg daily with lunch --> Furosemide 20 mg bid + Furosemide 40 mg bid, Warfarin 3 ng MWF + Warfarin 4 mg TThSS --> Warfarin 4 mg on STThFS + Warfarin 5 mg MW    Actions taken by pharmacist (provider contacted, etc): None     Additional medication history information: None    Medication reconciliation/reorder completed by provider prior to medication history? No    Do you take OTC medications (eg tylenol, ibuprofen, fish oil, eye/ear drops, etc)? Y (Y/N)    For patients on insulin therapy: N (Y/N)    Prior to Admission medications    Medication Sig Last Dose Taking? Auth Provider   acetaminophen (TYLENOL) 325 MG tablet Take 650 mg by mouth daily as needed for mild pain 7/31/2018 at 13:38pm Yes Unknown, Entered By History   atorvastatin (LIPITOR) 10 MG tablet Take 10 mg by mouth daily  at unknown Yes Unknown, Entered By History   Calcium Carb-Cholecalciferol (CALCIUM 500+D) 500-200 MG-UNIT TABS Take 1 tablet by mouth 2 times daily 7/31/2018 at 8:00am Yes Nirmal Serrano MD   ferrous gluconate (FERGON) 324 (38 FE) MG tablet Take 1 tablet (324 mg) by mouth daily (with breakfast) 7/31/2018 at 8:00am Yes Nirmal Serrano MD   furosemide (LASIX) 20 MG tablet Take 20 mg by mouth 2 times daily 7/31/2018 at 12pm Yes Unknown, Entered By History   furosemide  (LASIX) 40 MG tablet Take 40 mg by mouth 2 times daily 7/31/2018 at 12pm Yes Unknown, Entered By History   gabapentin (NEURONTIN) 300 MG capsule Take 300 mg by mouth 2 times daily 7/31/18 at 8:00am Yes Unknown, Entered By History   glipiZIDE (GLUCOTROL) 5 MG tablet Take 1 tablet (5 mg) by mouth 2 times daily (before meals) 7/31/2018 at 8:00am Yes Nirmal Serrano MD   ipratropium - albuterol 0.5 mg/2.5 mg/3 mL (DUONEB) 0.5-2.5 (3) MG/3ML neb solution Take 1 vial by nebulization 3 times daily as needed  at unknown Yes Unknown, Entered By History   lactulose (CHRONULAC) 10 GM/15ML solution Take 30 mLs daily as needed  at unknown Yes Unknown, Entered By History   levothyroxine (SYNTHROID/LEVOTHROID) 100 MCG tablet Take 1 tablet (100 mcg) by mouth daily 7/31/2018 at 6:00am Yes Nirmal Serrano MD   lisinopril (PRINIVIL/ZESTRIL) 5 MG tablet Take 1 tablet (5 mg) by mouth daily 7/31/2018 at 8:00am Yes Nirmal Serrano MD   metoprolol tartrate (LOPRESSOR) 25 MG tablet Take 25 mg by mouth 2 times daily 7/31/2018 at 8:00am Yes Unknown, Entered By History   nitroGLYcerin (NITROSTAT) 0.4 MG sublingual tablet Place 0.4 mg under the tongue 3 times daily as needed  at unknown Yes Unknown, Entered By History   nystatin (MYCOSTATIN) 664321 UNIT/GM POWD Apply to groin area for yeast growth with every pericare 7/31/2018 at pm Yes Unknown, Entered By History   omeprazole (PRILOSEC) 20 MG CR capsule Take 1 capsule (20 mg) by mouth daily 7/31/2018 at 8:00am Yes Nirmal Serrano MD   polyethylene glycol (MIRALAX/GLYCOLAX) Packet Take 17 g by mouth daily 7/31/2018 at 8:00am Yes Unknown, Entered By History   senna-docusate (SENOKOT-S;PERICOLACE) 8.6-50 MG per tablet Take 2 tablets by mouth 2 times daily 7/31/2018 at 8:00am Yes Unknown, Entered By History   simethicone (MYLICON) 80 MG chewable tablet Take 2 tablets (160 mg) by mouth every 6 hours as needed for flatulence or cramping  at unknown Yes Nirmal Serrano  MD Mati   sucralfate (CARAFATE) 1 GM tablet Take 1 tablet (1 g) by mouth 4 times daily (before meals and nightly) 7/31/2018 at 17:00pm Yes Lanie Harvey MD   tamsulosin (FLOMAX) 0.4 MG capsule Take 1 capsule (0.4 mg) by mouth every evening 7/31/2018 at 8:00am Yes Nirmal Serrano MD   warfarin (COUMADIN) 2 MG tablet Take 4 mg by mouth daily Take 4 mg on Sun, Tues, Thurs, Fri, Sat  7/31/2018 at pm Yes Unknown, Entered By History   warfarin (COUMADIN) 5 MG tablet Take 5 mg by mouth daily Take 5 mg on Mon, Wed 7/30/2018 at pm Yes Unknown, Entered By History

## 2018-08-01 NOTE — ED NOTES
Tried to call pts wife, Daria, with number that is in demographics tab.  Was unable to reach her or leave a voicemail.

## 2018-08-01 NOTE — PHARMACY-VANCOMYCIN DOSING SERVICE
Pharmacy Vancomycin Note  Date of Service 2018  Patient's  [id number]   89 year old, male    Indication: Sepsis and Skin and Soft Tissue Infection  Goal Trough Level: 15-20 mg/L  Day of Therapy: 1  Current Vancomycin regimen:  1750 mg IV in ER    Current estimated CrCl = Estimated Creatinine Clearance: 50.9 mL/min (based on Cr of 1.05).    Creatinine for last 3 days  2018:  8:30 PM Creatinine 1.05 mg/dL    Recent Vancomycin Levels (past 3 days)  No results found for requested labs within last 72 hours.    Vancomycin IV Administrations (past 72 hours)                   vancomycin (VANCOCIN) 1,750 mg in sodium chloride 0.9 % 500 mL intermittent infusion (mg) 1,750 mg Given 18                Nephrotoxins and other renal medications (Future)    Start     Dose/Rate Route Frequency Ordered Stop    18 1800  vancomycin (VANCOCIN) 2,000 mg in sodium chloride 0.9 % 500 mL intermittent infusion      2,000 mg  over 2 Hours Intravenous EVERY 24 HOURS 18 0103      18 1200  furosemide (LASIX) tablet 60 mg      60 mg Oral DAILY WITH LUNCH 18 0051      18 0900  furosemide (LASIX) tablet 60 mg      60 mg Oral EVERY MORNING 18 0051      18 0400  piperacillin-tazobactam (ZOSYN) intermittent infusion 4.5 g     Comments:  Dose adjusted by pharmacy based on tx of Sepsis and CrCl=50.9ml/min.    4.5 g  200 mL/hr over 30 Minutes Intravenous EVERY 6 HOURS 18 0051               Contrast Orders - past 72 hours     None        Plan:  1.  Vancomycin 2000mg iv q24h  2.  Pharmacy will check trough levels as appropriate in 1-3 Days.    3. Serum creatinine levels will be ordered daily for the first week of therapy and at least twice weekly for subsequent weeks.      Eduar Sommers        .

## 2018-08-01 NOTE — PROGRESS NOTES
Care Coordination:  RN/SW following for dc needs along with daily lymphedema needs. SW verified pt comes from [facility] LT with his wife and receives lymphedema therapy there. He has a bed hold and will return to [facility]TC on dc. Family to transport    Trixie Can RN CTS

## 2018-08-01 NOTE — ED NOTES
Pt incontinent of urine, pt cleaned up, and new brief placed.  Pt repositioned with air mat underneath.

## 2018-08-01 NOTE — PLAN OF CARE
Problem: Patient Care Overview  Goal: Plan of Care/Patient Progress Review  Outcome: No Change  Arrived to unit around 1230    A/Ox4 - forgetful, Asstx1 with walker, SOB with exertion, R PIV SL, Tmax 100.6, /54, HR 98 - tachy at times, 3L NC O2, mod CHO diet, IV vanco and Zosyn, denies pain - states some discomfort to lower extremities however denied interventions, continue with plan of care.

## 2018-08-01 NOTE — PLAN OF CARE
Left leg, red and warm on lower leg and part of thigh.  Elevated each leg on 3 pillows.    Marked.  Confusion at baseline per family.  Appetite fair to good, needing some assistance .  Incontinent and continent of urine.

## 2018-08-01 NOTE — ED TRIAGE NOTES
Pt was found to be cyanotic was having some shortness of breath staff found him with O2. sats in the low 80's EMS called. Gave NC o2 and now satting in the high 90's and with the rise of O2 also was a rise in mentation. ABC's intact A&Ox.4

## 2018-08-02 LAB
ALBUMIN SERPL-MCNC: 2.8 G/DL (ref 3.4–5)
ALP SERPL-CCNC: 58 U/L (ref 40–150)
ALT SERPL W P-5'-P-CCNC: 16 U/L (ref 0–70)
ANION GAP SERPL CALCULATED.3IONS-SCNC: 8 MMOL/L (ref 3–14)
AST SERPL W P-5'-P-CCNC: 16 U/L (ref 0–45)
BILIRUB SERPL-MCNC: 1.1 MG/DL (ref 0.2–1.3)
BUN SERPL-MCNC: 17 MG/DL (ref 7–30)
CALCIUM SERPL-MCNC: 7.8 MG/DL (ref 8.5–10.1)
CHLORIDE SERPL-SCNC: 99 MMOL/L (ref 94–109)
CO2 SERPL-SCNC: 30 MMOL/L (ref 20–32)
CREAT SERPL-MCNC: 1.28 MG/DL (ref 0.66–1.25)
GFR SERPL CREATININE-BSD FRML MDRD: 53 ML/MIN/1.7M2
GLUCOSE BLDC GLUCOMTR-MCNC: 173 MG/DL (ref 70–99)
GLUCOSE SERPL-MCNC: 138 MG/DL (ref 70–99)
INR PPP: 2.64 (ref 0.86–1.14)
POTASSIUM SERPL-SCNC: 3.3 MMOL/L (ref 3.4–5.3)
PROT SERPL-MCNC: 6.9 G/DL (ref 6.8–8.8)
SODIUM SERPL-SCNC: 137 MMOL/L (ref 133–144)

## 2018-08-02 PROCEDURE — 85610 PROTHROMBIN TIME: CPT | Performed by: INTERNAL MEDICINE

## 2018-08-02 PROCEDURE — 36415 COLL VENOUS BLD VENIPUNCTURE: CPT | Performed by: INTERNAL MEDICINE

## 2018-08-02 PROCEDURE — 00000146 ZZHCL STATISTIC GLUCOSE BY METER IP

## 2018-08-02 PROCEDURE — 12000007 ZZH R&B INTERMEDIATE

## 2018-08-02 PROCEDURE — 25000125 ZZHC RX 250: Performed by: INTERNAL MEDICINE

## 2018-08-02 PROCEDURE — 99232 SBSQ HOSP IP/OBS MODERATE 35: CPT | Performed by: INTERNAL MEDICINE

## 2018-08-02 PROCEDURE — A9270 NON-COVERED ITEM OR SERVICE: HCPCS | Mod: GY | Performed by: INTERNAL MEDICINE

## 2018-08-02 PROCEDURE — 25000132 ZZH RX MED GY IP 250 OP 250 PS 637: Mod: GY | Performed by: INTERNAL MEDICINE

## 2018-08-02 PROCEDURE — 80053 COMPREHEN METABOLIC PANEL: CPT | Performed by: INTERNAL MEDICINE

## 2018-08-02 PROCEDURE — 25000128 H RX IP 250 OP 636: Performed by: INTERNAL MEDICINE

## 2018-08-02 RX ORDER — POTASSIUM CHLORIDE 1500 MG/1
20-40 TABLET, EXTENDED RELEASE ORAL
Status: DISCONTINUED | OUTPATIENT
Start: 2018-08-02 | End: 2018-08-07 | Stop reason: HOSPADM

## 2018-08-02 RX ORDER — MAGNESIUM SULFATE HEPTAHYDRATE 40 MG/ML
4 INJECTION, SOLUTION INTRAVENOUS EVERY 4 HOURS PRN
Status: DISCONTINUED | OUTPATIENT
Start: 2018-08-02 | End: 2018-08-07 | Stop reason: HOSPADM

## 2018-08-02 RX ORDER — WARFARIN SODIUM 4 MG/1
4 TABLET ORAL
Status: COMPLETED | OUTPATIENT
Start: 2018-08-02 | End: 2018-08-02

## 2018-08-02 RX ORDER — POTASSIUM CL/LIDO/0.9 % NACL 10MEQ/0.1L
10 INTRAVENOUS SOLUTION, PIGGYBACK (ML) INTRAVENOUS
Status: DISCONTINUED | OUTPATIENT
Start: 2018-08-02 | End: 2018-08-07 | Stop reason: HOSPADM

## 2018-08-02 RX ORDER — POTASSIUM CHLORIDE 1.5 G/1.58G
20-40 POWDER, FOR SOLUTION ORAL
Status: DISCONTINUED | OUTPATIENT
Start: 2018-08-02 | End: 2018-08-07 | Stop reason: HOSPADM

## 2018-08-02 RX ORDER — POTASSIUM CHLORIDE 29.8 MG/ML
20 INJECTION INTRAVENOUS
Status: DISCONTINUED | OUTPATIENT
Start: 2018-08-02 | End: 2018-08-07 | Stop reason: HOSPADM

## 2018-08-02 RX ORDER — POTASSIUM CHLORIDE 7.45 MG/ML
10 INJECTION INTRAVENOUS
Status: DISCONTINUED | OUTPATIENT
Start: 2018-08-02 | End: 2018-08-07 | Stop reason: HOSPADM

## 2018-08-02 RX ADMIN — CLINDAMYCIN PHOSPHATE 900 MG: 900 INJECTION, SOLUTION INTRAVENOUS at 16:13

## 2018-08-02 RX ADMIN — FERROUS GLUCONATE 324 MG: 324 TABLET ORAL at 08:44

## 2018-08-02 RX ADMIN — POTASSIUM CHLORIDE 40 MEQ: 1500 TABLET, EXTENDED RELEASE ORAL at 21:26

## 2018-08-02 RX ADMIN — FUROSEMIDE 60 MG: 40 TABLET ORAL at 08:44

## 2018-08-02 RX ADMIN — WARFARIN SODIUM 4 MG: 4 TABLET ORAL at 17:28

## 2018-08-02 RX ADMIN — GLIPIZIDE 5 MG: 5 TABLET ORAL at 08:44

## 2018-08-02 RX ADMIN — POTASSIUM CHLORIDE 20 MEQ: 1500 TABLET, EXTENDED RELEASE ORAL at 23:28

## 2018-08-02 RX ADMIN — GABAPENTIN 300 MG: 300 CAPSULE ORAL at 20:34

## 2018-08-02 RX ADMIN — TAMSULOSIN HYDROCHLORIDE 0.4 MG: 0.4 CAPSULE ORAL at 08:44

## 2018-08-02 RX ADMIN — CEFAZOLIN SODIUM 2 G: 2 INJECTION, SOLUTION INTRAVENOUS at 12:20

## 2018-08-02 RX ADMIN — LEVOTHYROXINE SODIUM 100 MCG: 100 TABLET ORAL at 07:05

## 2018-08-02 RX ADMIN — FUROSEMIDE 60 MG: 40 TABLET ORAL at 16:11

## 2018-08-02 RX ADMIN — CLINDAMYCIN PHOSPHATE 900 MG: 900 INJECTION, SOLUTION INTRAVENOUS at 07:05

## 2018-08-02 RX ADMIN — OMEPRAZOLE 20 MG: 20 CAPSULE, DELAYED RELEASE ORAL at 08:44

## 2018-08-02 RX ADMIN — GLIPIZIDE 5 MG: 5 TABLET ORAL at 16:11

## 2018-08-02 RX ADMIN — GABAPENTIN 300 MG: 300 CAPSULE ORAL at 08:44

## 2018-08-02 ASSESSMENT — ACTIVITIES OF DAILY LIVING (ADL)
ADLS_ACUITY_SCORE: 22
ADLS_ACUITY_SCORE: 26

## 2018-08-02 NOTE — PLAN OF CARE
Problem: Skin and Soft Tissue Infection (Adult)  Goal: Signs and Symptoms of Listed Potential Problems Will be Absent, Minimized or Managed (Skin and Soft Tissue Infection)  Signs and symptoms of listed potential problems will be absent, minimized or managed by discharge/transition of care (reference Skin and Soft Tissue Infection (Adult) CPG).  Outcome: No Change  VSS. Pt is alert to self. Lung sounds are clear. O2 94% on 1L. Up multiple times to urinate. +2 edema to BLE. LLE is red, issa and blistered. BLE elevated on pillows.  IV cleocin infused.  Tele is Afib CVR.

## 2018-08-02 NOTE — PROGRESS NOTES
"[facility]  Hospitalist Progress Note  Surjit May MD 08/02/2018    Reason for Stay (Diagnosis): sepsis         Assessment and Plan:      Summary of Stay: Ton Bagley is a 89 year old male admitted on 7/31/2018 with sepsis.      Problem List:   1. Strep group B bacteremia and sepsis syndrome with fever, elevated WBC, elevated lactic acid, cellulitis, left LE. Recurrent LE cellulitis with hx lymphedema and chronic stasis dermatitis.    - systemic symptoms improving.  Pt up in chair eating today.  2. Left LE Cellulitis in the setting of chronic venous stasis and peripheral edema.  3. Chronic A fib, in RVR driven by acute infection. Typically managed with Warfarin and metoprolol.  4. DM, typically on glipizide alone.   5. Dementia. Recently moved to LTC at [facility].  6. Diastolic heart failure. Aortic stenosis with possible \"low-flow gradient\" aortic stenosis.   7. Urinary frequency related to Prostate cancer and treatment.   8. Mild infectious encephalopathy.  Resolving.    PLAN:  1.  Stop Clinda, cont Ancef alone.   2.  Elevate leg above the level of the heart as much as can be tolerated with thigh abductor pillow.   3.  Ambulate qid with assist.      DVT Prophylaxis: Warfarin  Code Status: DNR / DNI  Discharge Dispo: recently moved to SNF  Estimated Disch Date / # of Days until Disch: likely at least 5 days, given the severity of the infection.        Interval History (Subjective):      Up and eating breakfast. Seems to not recall much of interaction from yesterday. Quite tender over left LE.                   Physical Exam:      Last Vital Signs:  /68 (BP Location: Left arm)  Pulse 98  Temp 97.2  F (36.2  C) (Oral)  Resp 16  Wt 100.6 kg (221 lb 12.5 oz)  SpO2 97%  BMI 35.8 kg/m2    I/O last 3 completed shifts:  In: 763 [P.O.:760; I.V.:3]  Out: 875 [Urine:875]    Constitutional: NAD. Alert and more interactive than yesterday. Still appears to not recall our " conversation from earlier in the morning (though he was up sitting in his chair).   Respiratory: Clear to auscultation bilaterally, no crackles or wheezing   Cardiovascular: IRRIR.   Abdomen: Normal bowel sounds, soft, non-distended, non-tender   Skin: Marked, confluent warmth and erythema involving the entire left LE.   Neuro: Arousable.   Extremities: Edema slightly less, but present bilat. Old safenous vein harvest site scar on left lower extrem noted.   Other(s):        All other systems: Negative          Medications:      All current medications were reviewed with changes reflected in problem list.         Data:      All new lab and imaging data was reviewed.   Labs/Imaging:  Results for orders placed or performed during the hospital encounter of 07/31/18 (from the past 24 hour(s))   Glucose by meter   Result Value Ref Range    Glucose 88 70 - 99 mg/dL   Lactic acid level STAT for sepsis protocol   Result Value Ref Range    Lactate for Sepsis Protocol 1.8 0.7 - 2.0 mmol/L   INR   Result Value Ref Range    INR 2.64 (H) 0.86 - 1.14   Comprehensive metabolic panel   Result Value Ref Range    Sodium 137 133 - 144 mmol/L    Potassium 3.3 (L) 3.4 - 5.3 mmol/L    Chloride 99 94 - 109 mmol/L    Carbon Dioxide 30 20 - 32 mmol/L    Anion Gap 8 3 - 14 mmol/L    Glucose 138 (H) 70 - 99 mg/dL    Urea Nitrogen 17 7 - 30 mg/dL    Creatinine 1.28 (H) 0.66 - 1.25 mg/dL    GFR Estimate 53 (L) >60 mL/min/1.7m2    GFR Estimate If Black 64 >60 mL/min/1.7m2    Calcium 7.8 (L) 8.5 - 10.1 mg/dL    Bilirubin Total 1.1 0.2 - 1.3 mg/dL    Albumin 2.8 (L) 3.4 - 5.0 g/dL    Protein Total 6.9 6.8 - 8.8 g/dL    Alkaline Phosphatase 58 40 - 150 U/L    ALT 16 0 - 70 U/L    AST 16 0 - 45 U/L   Glucose by meter   Result Value Ref Range    Glucose 173 (H) 70 - 99 mg/dL

## 2018-08-02 NOTE — PLAN OF CARE
Problem: Patient Care Overview  Goal: Plan of Care/Patient Progress Review  Outcome: No Change  Pt alert and oriented to self. VSS, pt denies pain, headache, dizziness, n/v & SOB. Pt reports occasional cramping/blotting feeling in abdomen. Room air. Edema BLE +2, red, issa, and blistered. Encourage pt to elevated his legs while sitting in recliner. MD ordered abductor pillows to elevate pt leg in bed. IV cleocin, Rocephin. Potassium level 3.3, notified MD. INR 2.64. Tele: discontinued. Will continue to monitor pt.

## 2018-08-02 NOTE — PLAN OF CARE
Problem: Patient Care Overview  Goal: Plan of Care/Patient Progress Review  Outcome: No Change  ORIENTATION: Oriented to self and place  VS: Stable  NEURO: Intact  CV: A-fib RVR  LUNGS: Clear/diminished; 92% RA awake; 92% 1L O2 while sleeping  : Frequency w/some incontinence at times  GI: Last BM 7/31/18  IV: Saline Locked  PAIN: Headache managed w/PRN tylenol x 1  PLAN: Pt stable; Assist x 1 w/walker; IV zosyn and vanco; LE Bobby w/edema - some blistering on LLE; triggered sepsis protocol - lactic 1.8; BG 88; continue w/POC

## 2018-08-03 LAB
BACTERIA SPEC CULT: ABNORMAL
CREAT SERPL-MCNC: 1.05 MG/DL (ref 0.66–1.25)
GFR SERPL CREATININE-BSD FRML MDRD: 66 ML/MIN/1.7M2
GLUCOSE BLDC GLUCOMTR-MCNC: 144 MG/DL (ref 70–99)
INR PPP: 2.94 (ref 0.86–1.14)
Lab: ABNORMAL
POTASSIUM SERPL-SCNC: 3.9 MMOL/L (ref 3.4–5.3)
SPECIMEN SOURCE: ABNORMAL

## 2018-08-03 PROCEDURE — 00000146 ZZHCL STATISTIC GLUCOSE BY METER IP

## 2018-08-03 PROCEDURE — 84132 ASSAY OF SERUM POTASSIUM: CPT | Performed by: INTERNAL MEDICINE

## 2018-08-03 PROCEDURE — 25000132 ZZH RX MED GY IP 250 OP 250 PS 637: Mod: GY | Performed by: INTERNAL MEDICINE

## 2018-08-03 PROCEDURE — 99232 SBSQ HOSP IP/OBS MODERATE 35: CPT | Performed by: INTERNAL MEDICINE

## 2018-08-03 PROCEDURE — 25000128 H RX IP 250 OP 636: Performed by: INTERNAL MEDICINE

## 2018-08-03 PROCEDURE — 36415 COLL VENOUS BLD VENIPUNCTURE: CPT | Performed by: INTERNAL MEDICINE

## 2018-08-03 PROCEDURE — A9270 NON-COVERED ITEM OR SERVICE: HCPCS | Mod: GY | Performed by: INTERNAL MEDICINE

## 2018-08-03 PROCEDURE — 85610 PROTHROMBIN TIME: CPT | Performed by: INTERNAL MEDICINE

## 2018-08-03 PROCEDURE — 82565 ASSAY OF CREATININE: CPT | Performed by: INTERNAL MEDICINE

## 2018-08-03 PROCEDURE — 12000007 ZZH R&B INTERMEDIATE

## 2018-08-03 RX ORDER — WARFARIN SODIUM 4 MG/1
4 TABLET ORAL
Status: COMPLETED | OUTPATIENT
Start: 2018-08-03 | End: 2018-08-03

## 2018-08-03 RX ORDER — ALUMINA, MAGNESIA, AND SIMETHICONE 2400; 2400; 240 MG/30ML; MG/30ML; MG/30ML
15 SUSPENSION ORAL EVERY 4 HOURS PRN
Status: DISCONTINUED | OUTPATIENT
Start: 2018-08-03 | End: 2018-08-07 | Stop reason: HOSPADM

## 2018-08-03 RX ADMIN — LEVOTHYROXINE SODIUM 100 MCG: 100 TABLET ORAL at 05:40

## 2018-08-03 RX ADMIN — CEFAZOLIN SODIUM 2 G: 2 INJECTION, SOLUTION INTRAVENOUS at 22:22

## 2018-08-03 RX ADMIN — GABAPENTIN 300 MG: 300 CAPSULE ORAL at 20:36

## 2018-08-03 RX ADMIN — CEFAZOLIN SODIUM 2 G: 2 INJECTION, SOLUTION INTRAVENOUS at 10:50

## 2018-08-03 RX ADMIN — FUROSEMIDE 60 MG: 40 TABLET ORAL at 16:31

## 2018-08-03 RX ADMIN — ALUMINUM HYDROXIDE, MAGNESIUM HYDROXIDE, AND DIMETHICONE 15 ML: 400; 400; 40 SUSPENSION ORAL at 20:37

## 2018-08-03 RX ADMIN — GLIPIZIDE 5 MG: 5 TABLET ORAL at 08:08

## 2018-08-03 RX ADMIN — FERROUS GLUCONATE 324 MG: 324 TABLET ORAL at 08:08

## 2018-08-03 RX ADMIN — OMEPRAZOLE 20 MG: 20 CAPSULE, DELAYED RELEASE ORAL at 05:43

## 2018-08-03 RX ADMIN — FUROSEMIDE 60 MG: 40 TABLET ORAL at 08:08

## 2018-08-03 RX ADMIN — ACETAMINOPHEN 650 MG: 325 TABLET, FILM COATED ORAL at 08:08

## 2018-08-03 RX ADMIN — ACETAMINOPHEN 650 MG: 325 TABLET, FILM COATED ORAL at 20:57

## 2018-08-03 RX ADMIN — WARFARIN SODIUM 4 MG: 4 TABLET ORAL at 17:15

## 2018-08-03 RX ADMIN — GABAPENTIN 300 MG: 300 CAPSULE ORAL at 08:08

## 2018-08-03 RX ADMIN — TAMSULOSIN HYDROCHLORIDE 0.4 MG: 0.4 CAPSULE ORAL at 08:08

## 2018-08-03 RX ADMIN — GLIPIZIDE 5 MG: 5 TABLET ORAL at 16:31

## 2018-08-03 ASSESSMENT — ACTIVITIES OF DAILY LIVING (ADL)
ADLS_ACUITY_SCORE: 22

## 2018-08-03 NOTE — PROGRESS NOTES
"[facility]  Hospitalist Progress Note  Surjit May MD 08/03/2018    Reason for Stay (Diagnosis): sepsis         Assessment and Plan:      Summary of Stay: Ton Bagley is a 89 year old male admitted on 7/31/2018 with sepsis.      Problem List:   1. Strep group B bacteremia and sepsis syndrome with fever, elevated WBC, elevated lactic acid, cellulitis, left LE. Recurrent LE cellulitis with hx lymphedema and chronic stasis dermatitis.    - systemic symptoms improving.  Pt up in chair eating today.  2. Left LE Cellulitis in the setting of chronic venous stasis and peripheral edema.  3. Chronic A fib, in RVR driven by acute infection. Typically managed with Warfarin and metoprolol.  4. DM, typically on glipizide alone.   5. Dementia. Recently moved to LTC at [facility].   6. Diastolic heart failure. Aortic stenosis with possible \"low-flow gradient\" aortic stenosis.   7. Urinary frequency related to Prostate cancer and treatment.   8. Mild infectious encephalopathy.  Resolving.    PLAN:  1.  Stop Clinda, cont Ancef alone.   2.  Elevate leg above the level of the heart as much as can be tolerated with thigh abductor pillow.   3.  Ambulate qid with assist.      DVT Prophylaxis: Warfarin  Code Status: DNR / DNI  Discharge Dispo: recently moved to SNF  Estimated Disch Date / # of Days until Disch: likely at least 5 days, given the severity of the infection.        Interval History (Subjective):      Up and eating breakfast. Seems to not recall much of interaction from yesterday. Quite tender over left LE.     Complained to nurse of abd discomfort, but had two formed BMs earlier today. Reported to me that he was concerned about his wife and couldn't reach her.  (He seems to have been looking for attention from the nurses -- exam is very benign.)                  Physical Exam:      Last Vital Signs:  /51 (BP Location: Right arm)  Pulse 98  Temp 96.9  F (36.1  C) (Oral)  Resp 16  Wt " 100.6 kg (221 lb 12.5 oz)  SpO2 92%  BMI 35.8 kg/m2    I/O last 3 completed shifts:  In: 763 [P.O.:760; I.V.:3]  Out: 1850 [Urine:1850]    Constitutional: NAD. Alert and more interactive than yesterday. Still appears to not recall our conversation from earlier in the morning (though he was up sitting in his chair).   Respiratory: Clear to auscultation bilaterally, no crackles or wheezing   Cardiovascular: IRRIR.   Abdomen: Normal bowel sounds, soft, non-distended, non-tender   Skin: Marked, confluent warmth and erythema involving the entire left LE.   Neuro: Arousable.   Extremities: Edema slightly less, but present bilat. Old safenous vein harvest site scar on left lower extrem noted.   Other(s):        All other systems: Negative          Medications:      All current medications were reviewed with changes reflected in problem list.         Data:      All new lab and imaging data was reviewed.   Labs/Imaging:  Results for orders placed or performed during the hospital encounter of 07/31/18 (from the past 24 hour(s))   Glucose by meter   Result Value Ref Range    Glucose 144 (H) 70 - 99 mg/dL   INR   Result Value Ref Range    INR 2.94 (H) 0.86 - 1.14   Creatinine   Result Value Ref Range    Creatinine 1.05 0.66 - 1.25 mg/dL    GFR Estimate 66 >60 mL/min/1.7m2    GFR Estimate If Black 80 >60 mL/min/1.7m2   Potassium   Result Value Ref Range    Potassium 3.9 3.4 - 5.3 mmol/L

## 2018-08-03 NOTE — PROGRESS NOTES
Pt. Alert to self, pleasantly confused. VSS, afebrile, placed on O2 this am for sats less than 90%. Transfers with assist of x1, walker. . New IV placed. +2-3 edema to BLE, receives daily lasix. LLE cellulitis, extremity red and hot, elevated throughout night. Receiving Abx Ancef for cellulitis.  Plan for possible discharge unknown.

## 2018-08-03 NOTE — PLAN OF CARE
Problem: Patient Care Overview  Goal: Plan of Care/Patient Progress Review  Outcome: Improving  VSS.  No tele.  RA.  Minimal pain to LLE.  Tylenol prn.  Trying to elevate affected leg.  LLE continues to be red/swollen but improving since admission.  Waiting for abductor pillow to arrive.  Voiding and having bowel movements.  C/O rumbling tummy but no N/V/D.  Up with one assist and walker.  Ambulated halls with staff.  Up in chair for meals.  Pleasantly confused man.  Good appetite.  Tolerating IV abx.  PIV SL. Continue to monitor and implement plan of care.  Discharge disposition pending; possible discharge 5 days back to [facility] in [place].    Abductor pillow attempted X3 to keep LLE elevated above heart this afternoon.  Pt confused and forgetful.  He does not like lying in bed.  Staff re directing and re explaining plan of care multiple times per shift.  Continue to redirect pt and attempt to keep LLE elevated.  Utilizing bed pillows when non compliant with abductor pillow.  Bed alarm on for safety.

## 2018-08-04 LAB
CREAT SERPL-MCNC: 1.07 MG/DL (ref 0.66–1.25)
GFR SERPL CREATININE-BSD FRML MDRD: 65 ML/MIN/1.7M2
INR PPP: 2.58 (ref 0.86–1.14)

## 2018-08-04 PROCEDURE — 82565 ASSAY OF CREATININE: CPT | Performed by: INTERNAL MEDICINE

## 2018-08-04 PROCEDURE — 25000132 ZZH RX MED GY IP 250 OP 250 PS 637: Mod: GY | Performed by: INTERNAL MEDICINE

## 2018-08-04 PROCEDURE — 36415 COLL VENOUS BLD VENIPUNCTURE: CPT | Performed by: INTERNAL MEDICINE

## 2018-08-04 PROCEDURE — 99232 SBSQ HOSP IP/OBS MODERATE 35: CPT | Performed by: INTERNAL MEDICINE

## 2018-08-04 PROCEDURE — 85610 PROTHROMBIN TIME: CPT | Performed by: INTERNAL MEDICINE

## 2018-08-04 PROCEDURE — 12000007 ZZH R&B INTERMEDIATE

## 2018-08-04 PROCEDURE — A9270 NON-COVERED ITEM OR SERVICE: HCPCS | Mod: GY | Performed by: INTERNAL MEDICINE

## 2018-08-04 PROCEDURE — 25000128 H RX IP 250 OP 636: Performed by: INTERNAL MEDICINE

## 2018-08-04 RX ORDER — WARFARIN SODIUM 4 MG/1
4 TABLET ORAL
Status: COMPLETED | OUTPATIENT
Start: 2018-08-04 | End: 2018-08-04

## 2018-08-04 RX ADMIN — CEFAZOLIN SODIUM 2 G: 2 INJECTION, SOLUTION INTRAVENOUS at 21:33

## 2018-08-04 RX ADMIN — GLIPIZIDE 5 MG: 5 TABLET ORAL at 06:33

## 2018-08-04 RX ADMIN — TAMSULOSIN HYDROCHLORIDE 0.4 MG: 0.4 CAPSULE ORAL at 08:31

## 2018-08-04 RX ADMIN — GLIPIZIDE 5 MG: 5 TABLET ORAL at 17:41

## 2018-08-04 RX ADMIN — FERROUS GLUCONATE 324 MG: 324 TABLET ORAL at 08:31

## 2018-08-04 RX ADMIN — Medication 1 MG: at 21:33

## 2018-08-04 RX ADMIN — FUROSEMIDE 60 MG: 40 TABLET ORAL at 17:41

## 2018-08-04 RX ADMIN — GABAPENTIN 300 MG: 300 CAPSULE ORAL at 08:31

## 2018-08-04 RX ADMIN — FUROSEMIDE 60 MG: 40 TABLET ORAL at 08:31

## 2018-08-04 RX ADMIN — LEVOTHYROXINE SODIUM 100 MCG: 100 TABLET ORAL at 06:33

## 2018-08-04 RX ADMIN — OMEPRAZOLE 20 MG: 20 CAPSULE, DELAYED RELEASE ORAL at 08:31

## 2018-08-04 RX ADMIN — WARFARIN SODIUM 4 MG: 4 TABLET ORAL at 17:41

## 2018-08-04 RX ADMIN — CEFAZOLIN SODIUM 2 G: 2 INJECTION, SOLUTION INTRAVENOUS at 10:09

## 2018-08-04 RX ADMIN — GABAPENTIN 300 MG: 300 CAPSULE ORAL at 21:33

## 2018-08-04 ASSESSMENT — ACTIVITIES OF DAILY LIVING (ADL)
ADLS_ACUITY_SCORE: 22
WHICH_OF_THE_ABOVE_FUNCTIONAL_RISKS_HAD_A_RECENT_ONSET_OR_CHANGE?: AMBULATION;COGNITION
ADLS_ACUITY_SCORE: 22

## 2018-08-04 NOTE — PLAN OF CARE
Problem: Patient Care Overview  Goal: Plan of Care/Patient Progress Review  Outcome: Improving  VSS. Oriented only to self. Up SBA to bathroom. Continuing Ancef for cellulitis. Short of breath with exertion. Denies pain. Plan: continue ABX, discharge greater than 5 days.

## 2018-08-04 NOTE — PLAN OF CARE
Problem: Patient Care Overview  Goal: Plan of Care/Patient Progress Review  Outcome: No Change  A/O to self, disorientated to time, place, and situation, Asst x1 with walker, L leg site red and warm, pt not tolerating abduction pillow - removes independently several times this shift, afebrile, IV ancef, BM x2 this shift,  SOB with exertion, moderate carb diet, denies pain, continue with plan of care.

## 2018-08-04 NOTE — PROGRESS NOTES
"[facility]  Hospitalist Progress Note  Surjit May MD 08/04/2018    Reason for Stay (Diagnosis): sepsis         Assessment and Plan:      Summary of Stay: Ton Bagley is a 89 year old male admitted on 7/31/2018 with sepsis.      Problem List:   1. Strep group B bacteremia and sepsis syndrome with fever, elevated WBC, elevated lactic acid, cellulitis, left LE. Recurrent LE cellulitis with hx lymphedema and chronic stasis dermatitis.    - systemic symptoms improving.  Pt up in chair eating today.  2. Left LE Cellulitis in the setting of chronic venous stasis and peripheral edema.  3. Chronic A fib, in RVR driven by acute infection. Typically managed with Warfarin and metoprolol.  4. DM, typically on glipizide alone.   5. Dementia. Recently moved to LTC at [facility].   6. Diastolic heart failure. Aortic stenosis with possible \"low-flow gradient\" aortic stenosis.   7. Urinary frequency related to Prostate cancer and treatment.   8. Mild infectious encephalopathy.  Resolving.    PLAN:  1.  Cont Ancef alone.   2.  Elevate leg above the level of the heart as much as can be tolerated with thigh abductor pillow.   3.  Ambulate qid with assist.      DVT Prophylaxis: Warfarin  Code Status: DNR / DNI  Discharge Dispo: recently moved to SNF.  Anticipate return to skilled nursing facility.  Estimated Disch Date / # of Days until Disch: Quite possibly tomorrow.        Interval History (Subjective):      The patient appears still to be profoundly forgetful.  He is nevertheless very pleasant and in no apparent distress.  He does not seem to have significant discomfort until I palpate directly over the medial thigh of the left leg.  He has resolving cellulitis.                  Physical Exam:      Last Vital Signs:  /79 (BP Location: Right arm)  Pulse 83  Temp 96  F (35.6  C) (Axillary)  Resp 19  Wt 100.6 kg (221 lb 12.5 oz)  SpO2 94%  BMI 35.8 kg/m2    I/O last 3 completed " shifts:  In: 340 [P.O.:340]  Out: 325 [Urine:325]    Constitutional: NAD. Alert and more interactive than yesterday. Still appears to not recall our conversation from earlier in the morning (though he was up sitting in his chair).   Respiratory: Clear to auscultation bilaterally, no crackles or wheezing   Cardiovascular: IRRIR.   Abdomen: Normal bowel sounds, soft, non-distended, non-tender   Skin: Marked, confluent warmth and erythema involving the entire left LE.  Bilateral venous stasis disease with cobblestoning is noted.   Neuro: Arousable.   Extremities: Edema slightly less, but present bilat. Old saphenous vein harvest site scar on left lower extrem noted.  Tenderness mostly over the posteromedial left thigh.   Other(s):        All other systems: Negative          Medications:      All current medications were reviewed with changes reflected in problem list.         Data:      All new lab and imaging data was reviewed.   Labs/Imaging:  Results for orders placed or performed during the hospital encounter of 07/31/18 (from the past 24 hour(s))   INR   Result Value Ref Range    INR 2.58 (H) 0.86 - 1.14   Creatinine   Result Value Ref Range    Creatinine 1.07 0.66 - 1.25 mg/dL    GFR Estimate 65 >60 mL/min/1.7m2    GFR Estimate If Black 78 >60 mL/min/1.7m2

## 2018-08-04 NOTE — PLAN OF CARE
Problem: Patient Care Overview  Goal: Plan of Care/Patient Progress Review  Outcome: Improving  Confused, redirectable, VS stable. Walked to BR with walker, SBA. Tylenol for L leg pain given, Mylanta for indigestion. L leg elevated, continue IV ABX.

## 2018-08-05 ENCOUNTER — APPOINTMENT (OUTPATIENT)
Dept: ULTRASOUND IMAGING | Facility: CLINIC | Age: 83
DRG: 871 | End: 2018-08-05
Attending: INTERNAL MEDICINE
Payer: MEDICARE

## 2018-08-05 LAB
CREAT SERPL-MCNC: 1.03 MG/DL (ref 0.66–1.25)
GFR SERPL CREATININE-BSD FRML MDRD: 68 ML/MIN/1.7M2
INR PPP: 2.25 (ref 0.86–1.14)

## 2018-08-05 PROCEDURE — A9270 NON-COVERED ITEM OR SERVICE: HCPCS | Mod: GY | Performed by: INTERNAL MEDICINE

## 2018-08-05 PROCEDURE — 99232 SBSQ HOSP IP/OBS MODERATE 35: CPT | Performed by: INTERNAL MEDICINE

## 2018-08-05 PROCEDURE — 12000007 ZZH R&B INTERMEDIATE

## 2018-08-05 PROCEDURE — 36415 COLL VENOUS BLD VENIPUNCTURE: CPT | Performed by: INTERNAL MEDICINE

## 2018-08-05 PROCEDURE — 25000132 ZZH RX MED GY IP 250 OP 250 PS 637: Mod: GY | Performed by: INTERNAL MEDICINE

## 2018-08-05 PROCEDURE — 25000128 H RX IP 250 OP 636: Performed by: INTERNAL MEDICINE

## 2018-08-05 PROCEDURE — 82565 ASSAY OF CREATININE: CPT | Performed by: INTERNAL MEDICINE

## 2018-08-05 PROCEDURE — 85610 PROTHROMBIN TIME: CPT | Performed by: INTERNAL MEDICINE

## 2018-08-05 PROCEDURE — A9270 NON-COVERED ITEM OR SERVICE: HCPCS | Mod: GY

## 2018-08-05 PROCEDURE — 25000132 ZZH RX MED GY IP 250 OP 250 PS 637: Mod: GY

## 2018-08-05 PROCEDURE — 93971 EXTREMITY STUDY: CPT | Mod: LT

## 2018-08-05 RX ORDER — WARFARIN SODIUM 4 MG/1
4 TABLET ORAL
Status: COMPLETED | OUTPATIENT
Start: 2018-08-05 | End: 2018-08-05

## 2018-08-05 RX ADMIN — GABAPENTIN 300 MG: 300 CAPSULE ORAL at 21:59

## 2018-08-05 RX ADMIN — LEVOTHYROXINE SODIUM 100 MCG: 100 TABLET ORAL at 06:12

## 2018-08-05 RX ADMIN — OMEPRAZOLE 20 MG: 20 CAPSULE, DELAYED RELEASE ORAL at 08:52

## 2018-08-05 RX ADMIN — SENNOSIDES AND DOCUSATE SODIUM 2 TABLET: 8.6; 5 TABLET ORAL at 18:09

## 2018-08-05 RX ADMIN — WARFARIN SODIUM 4 MG: 4 TABLET ORAL at 18:10

## 2018-08-05 RX ADMIN — FUROSEMIDE 60 MG: 40 TABLET ORAL at 16:48

## 2018-08-05 RX ADMIN — GLIPIZIDE 5 MG: 5 TABLET ORAL at 08:52

## 2018-08-05 RX ADMIN — FERROUS GLUCONATE 324 MG: 324 TABLET ORAL at 08:52

## 2018-08-05 RX ADMIN — GLIPIZIDE 5 MG: 5 TABLET ORAL at 16:49

## 2018-08-05 RX ADMIN — CEFAZOLIN SODIUM 2 G: 2 INJECTION, SOLUTION INTRAVENOUS at 10:08

## 2018-08-05 RX ADMIN — FUROSEMIDE 60 MG: 40 TABLET ORAL at 08:52

## 2018-08-05 RX ADMIN — GABAPENTIN 300 MG: 300 CAPSULE ORAL at 08:52

## 2018-08-05 RX ADMIN — CEFAZOLIN SODIUM 2 G: 2 INJECTION, SOLUTION INTRAVENOUS at 22:03

## 2018-08-05 RX ADMIN — Medication 1 MG: at 22:02

## 2018-08-05 RX ADMIN — TAMSULOSIN HYDROCHLORIDE 0.4 MG: 0.4 CAPSULE ORAL at 08:52

## 2018-08-05 ASSESSMENT — ACTIVITIES OF DAILY LIVING (ADL)
ADLS_ACUITY_SCORE: 22
ADLS_ACUITY_SCORE: 26
ADLS_ACUITY_SCORE: 22
ADLS_ACUITY_SCORE: 26

## 2018-08-05 NOTE — PLAN OF CARE
Problem: Patient Care Overview  Goal: Plan of Care/Patient Progress Review  Outcome: No Change  VSS, Senna given for constipation and abd pain, pt states he has not had a BM in 3 days, Ax1 with a walker,  Up in chair, Alert to self, cellulitis on LLE wheeping blood, pt scratched. Cleansed with wound cleanser and applied new socks. INR 2.25 on coumadin. Refuses to elevate legs this shift, US results pending. PIV SL, Continue to monitor.

## 2018-08-05 NOTE — PLAN OF CARE
Problem: Patient Care Overview  Goal: Plan of Care/Patient Progress Review  Outcome: Improving  VSS. Orientated only to self. Continuing Ancef for cellulitis otherwise IV SL.  Up SBA to bathroom. Short of breath with exertion. US of Left lower extremity to rule out DVT - still needs to be completed. Plan: possibly discharge in 1-2 back to SNF on oral antibiotics.

## 2018-08-05 NOTE — PROGRESS NOTES
"[facility]  Hospitalist Progress Note  Lanie Harvey MD 08/05/2018    Reason for Stay (Diagnosis): sepsis         Assessment and Plan:      Summary of Stay: Ton Bagley is a 89 year old male admitted on 7/31/2018 with sepsis.        Strep group B bacteremia and sepsis   --Presented fever, elevated WBC, elevated lactic acid, cellulitis, left LE. Recurrent LE cellulitis with hx lymphedema and chronic stasis dermatitis.    --Continue on Ancef day 5    Left LE Cellulitis in the setting of chronic venous stasis and peripheral edema.  --On IV antibiotic   --Supportive care leg elevation  --We will  get lower extremity ultrasound to rule out DVT left leg more swollen than right    Chronic A fib  --RVR initially driven by acute infection.   --On warfarin and metoprolol.    DM  --On glipizide alone.     Dementia.  --Presented from LTC at [facility].     Diastolic heart failure.   --Secondary to aortic stenosis with possible \"low-flow gradient\" aortic stenosis.     Urinary frequency related to Prostate cancer and treatment.     Mild infectious encephalopathy.    --Resolving.        DVT Prophylaxis: Warfarin  Code Status: DNR / DNI  Discharge Dispo: recently moved to SNF.  Anticipate return to skilled nursing facility.  Estimated Disch Date / # of Days until Disch: 1-2 days if continues to        Interval History (Subjective):      Assumed care reviewed chart.  Patient has been afebrile.  Continues to have left leg swelling more than right.  Has chronic stasis changes.  Denies any chest pain   Occasional abdominal cramps has had regular bowel movements.  Review of all the other systems negative                  Physical Exam:      Last Vital Signs:  /79 (BP Location: Left arm)  Pulse 83  Temp 97.8  F (36.6  C) (Oral)  Resp 19  Wt 100.6 kg (221 lb 12.5 oz)  SpO2 90%  BMI 35.8 kg/m2    I/O last 3 completed shifts:  In: 240 [P.O.:240]  Out: -     Constitutional: NAD. Alert and " more interactive than yesterday. Still appears to not recall our conversation from earlier in the morning (though he was up sitting in his chair).   Respiratory: Clear to auscultation bilaterally, no crackles or wheezing   Cardiovascular: IRRIR.   Abdomen: Normal bowel sounds, soft, non-distended, non-tender   Skin: Marked, confluent warmth and erythema involving the entire left LE.  Bilateral venous stasis disease with cobblestoning is noted.   Neuro: Arousable.   Extremities: Edema slightly less, but present bilat. Old saphenous vein harvest site scar on left lower extrem noted.  Tenderness mostly over the posteromedial left thigh.   Other(s):        All other systems: Negative          Medications:      All current medications were reviewed with changes reflected in problem list.         Data:      All new lab and imaging data was reviewed.   Labs/Imaging:  Results for orders placed or performed during the hospital encounter of 07/31/18 (from the past 24 hour(s))   INR   Result Value Ref Range    INR 2.25 (H) 0.86 - 1.14   Creatinine   Result Value Ref Range    Creatinine 1.03 0.66 - 1.25 mg/dL    GFR Estimate 68 >60 mL/min/1.7m2    GFR Estimate If Black 82 >60 mL/min/1.7m2         Medications     - MEDICATION INSTRUCTIONS -       Warfarin Therapy Reminder         ceFAZolin  2 g Intravenous Q12H     ferrous gluconate  324 mg Oral Daily with breakfast     furosemide  60 mg Oral BID     gabapentin (NEURONTIN) capsule 300 mg  300 mg Oral BID     glipiZIDE  5 mg Oral BID AC     levothyroxine  100 mcg Oral Daily     omeprazole  20 mg Oral Daily     sodium chloride (PF)  3 mL Intracatheter Q8H     tamsulosin  0.4 mg Oral Daily     Data       Recent Labs  Lab 08/01/18  0739 07/31/18 2030   WBC 23.9* 19.6*   HGB 12.9* 14.0   HCT 40.7 44.4   MCV 99 98    223       Recent Labs  Lab 08/05/18  0610 08/04/18  0557 08/03/18  0716 08/02/18  0748 08/01/18  0739 07/31/18 2030   NA  --   --   --  137 140 136   POTASSIUM   --   --  3.9 3.3* 4.0 3.4   CHLORIDE  --   --   --  99 104 99   CO2  --   --   --  30 27 30   ANIONGAP  --   --   --  8 9 7   GLC  --   --   --  138* 137* 219*   BUN  --   --   --  17 19 19   CR 1.03 1.07 1.05 1.28* 1.26* 1.05   GFRESTIMATED 68 65 66 53* 54* 66   GFRESTBLACK 82 78 80 64 65 80   JANN  --   --   --  7.8* 8.0* 9.1       Recent Labs  Lab 07/31/18  2158 07/31/18  2149   CULT No growth after 4 days Cultured on the 1st day of incubation:Beta hemolytic Streptococcus group B*  Critical Value/Significant Value, preliminary result only, called to and read back byMARY JO SEVERANCE RN ON [facility] 3 AT 1044 ON 08/01/18 AC.  (Note)POSITIVE for STREPTOCOCCUS AGALACTIAE (Group B Strep) by Verigenemultiplex nucleic acid test. Penicillin and ampicillin are drugs ofchoice, nonsusceptible isolates have not been reported. Finalidentification and antimicrobial susceptibility testing will beverified by standard methods.Specimen tested with Verigene multiplex, gram-positive blood culturenucleic acid test for the following targets: Staph aureus, Staphepidermidis, Staph lugdunensis, other Staph species, Enterococcusfaecalis, Enterococcus faecium, Streptococcus species, S. agalactiae,S. anginosus grp., S. pneumoniae, S. pyogenes, Listeria sp., mecA(methicillin resistance) and Renetta/B (vancomycin resistance).Critical Value/Significant Value called to and read back by Mary JoSeverance, RN on [facility] @1343 on 8/1/18. .       Recent Labs  Lab 07/31/18 2030   NTBNPI 933       Recent Labs  Lab 08/02/18  0748 07/31/18  2030   AST 16 21   ALT 16 24   ALKPHOS 58 94   BILITOTAL 1.1 1.3       Recent Labs  Lab 08/05/18  0610 08/04/18  0557 08/03/18  0716   INR 2.25* 2.58* 2.94*       Recent Labs  Lab 08/01/18  0739 07/31/18 2257 07/31/18  2035   LACT 2.5* 2.5* 2.5*       Recent Labs  Lab 07/31/18  2030   TROPI <0.015       Recent Labs  Lab 07/31/18  2206   COLOR Yellow   APPEARANCE Clear   URINEGLC Negative   URINEBILI Negative    URINEKETONE Negative   SG 1.011   UBLD Small*   URINEPH 5.0   PROTEIN Negative   NITRITE Negative   LEUKEST Negative   RBCU <1   WBCU <1       No results found for this or any previous visit (from the past 24 hour(s)).

## 2018-08-05 NOTE — PLAN OF CARE
Problem: Patient Care Overview  Goal: Plan of Care/Patient Progress Review  Outcome: No Change  Orientation: Alert, oriented to self. Awake, alert, cooperative, no apparent distress.   VSS. 99% on RA. Afebrile.  IVF: SL  LS: Clear to auscultation bilaterally but diminished throughout, Exp. wheezing  GI: Passing gas. No BM. Denies N/V.    : incontinent at times, Adequate urine output. Clear yellow.   Skin: bruising noted, red issa BLE, wound on LLE,  Skin otherwise intact. No rashes, no cyanosis, dry to touch  Activity: SBA to assist of 1. Pt up frequently to void throughout the night.   Pain: No c/o pain. No PRN interventions utilized. Pt sleeping.   DC Plan: Continue with current cares.

## 2018-08-06 LAB
ANION GAP SERPL CALCULATED.3IONS-SCNC: 7 MMOL/L (ref 3–14)
BASOPHILS # BLD AUTO: 0 10E9/L (ref 0–0.2)
BASOPHILS NFR BLD AUTO: 0 %
BUN SERPL-MCNC: 23 MG/DL (ref 7–30)
CALCIUM SERPL-MCNC: 8.5 MG/DL (ref 8.5–10.1)
CHLORIDE SERPL-SCNC: 97 MMOL/L (ref 94–109)
CO2 SERPL-SCNC: 32 MMOL/L (ref 20–32)
CREAT SERPL-MCNC: 1.22 MG/DL (ref 0.66–1.25)
CREAT SERPL-MCNC: NORMAL MG/DL (ref 0.66–1.25)
DIFFERENTIAL METHOD BLD: ABNORMAL
EOSINOPHIL # BLD AUTO: 0.1 10E9/L (ref 0–0.7)
EOSINOPHIL NFR BLD AUTO: 2 %
ERYTHROCYTE [DISTWIDTH] IN BLOOD BY AUTOMATED COUNT: 13.5 % (ref 10–15)
GFR SERPL CREATININE-BSD FRML MDRD: 56 ML/MIN/1.7M2
GFR SERPL CREATININE-BSD FRML MDRD: NORMAL ML/MIN/1.7M2
GLUCOSE SERPL-MCNC: 161 MG/DL (ref 70–99)
HCT VFR BLD AUTO: 41.4 % (ref 40–53)
HGB BLD-MCNC: 12.9 G/DL (ref 13.3–17.7)
INR PPP: 2.08 (ref 0.86–1.14)
LYMPHOCYTES # BLD AUTO: 0.8 10E9/L (ref 0.8–5.3)
LYMPHOCYTES NFR BLD AUTO: 18 %
MCH RBC QN AUTO: 30.5 PG (ref 26.5–33)
MCHC RBC AUTO-ENTMCNC: 31.2 G/DL (ref 31.5–36.5)
MCV RBC AUTO: 98 FL (ref 78–100)
METAMYELOCYTES # BLD: 0.1 10E9/L
METAMYELOCYTES NFR BLD MANUAL: 2 %
MONOCYTES # BLD AUTO: 0.6 10E9/L (ref 0–1.3)
MONOCYTES NFR BLD AUTO: 14 %
MYELOCYTES # BLD: 0.1 10E9/L
MYELOCYTES NFR BLD MANUAL: 3 %
NEUTROPHILS # BLD AUTO: 2.6 10E9/L (ref 1.6–8.3)
NEUTROPHILS NFR BLD AUTO: 61 %
PLATELET # BLD AUTO: 200 10E9/L (ref 150–450)
PLATELET # BLD EST: ABNORMAL 10*3/UL
POTASSIUM SERPL-SCNC: 3.7 MMOL/L (ref 3.4–5.3)
RBC # BLD AUTO: 4.23 10E12/L (ref 4.4–5.9)
RBC MORPH BLD: ABNORMAL
SODIUM SERPL-SCNC: 136 MMOL/L (ref 133–144)
VARIANT LYMPHS BLD QL SMEAR: PRESENT
WBC # BLD AUTO: 4.3 10E9/L (ref 4–11)

## 2018-08-06 PROCEDURE — 36415 COLL VENOUS BLD VENIPUNCTURE: CPT | Performed by: INTERNAL MEDICINE

## 2018-08-06 PROCEDURE — A9270 NON-COVERED ITEM OR SERVICE: HCPCS | Mod: GY | Performed by: INTERNAL MEDICINE

## 2018-08-06 PROCEDURE — 25000132 ZZH RX MED GY IP 250 OP 250 PS 637: Mod: GY | Performed by: INTERNAL MEDICINE

## 2018-08-06 PROCEDURE — 85610 PROTHROMBIN TIME: CPT | Performed by: INTERNAL MEDICINE

## 2018-08-06 PROCEDURE — 85025 COMPLETE CBC W/AUTO DIFF WBC: CPT | Performed by: INTERNAL MEDICINE

## 2018-08-06 PROCEDURE — 99232 SBSQ HOSP IP/OBS MODERATE 35: CPT | Performed by: INTERNAL MEDICINE

## 2018-08-06 PROCEDURE — 25000128 H RX IP 250 OP 636: Performed by: INTERNAL MEDICINE

## 2018-08-06 PROCEDURE — 12000007 ZZH R&B INTERMEDIATE

## 2018-08-06 PROCEDURE — 80048 BASIC METABOLIC PNL TOTAL CA: CPT | Performed by: INTERNAL MEDICINE

## 2018-08-06 RX ORDER — WARFARIN SODIUM 5 MG/1
5 TABLET ORAL
Status: COMPLETED | OUTPATIENT
Start: 2018-08-06 | End: 2018-08-06

## 2018-08-06 RX ADMIN — FERROUS GLUCONATE 324 MG: 324 TABLET ORAL at 09:33

## 2018-08-06 RX ADMIN — OMEPRAZOLE 20 MG: 20 CAPSULE, DELAYED RELEASE ORAL at 09:33

## 2018-08-06 RX ADMIN — GABAPENTIN 300 MG: 300 CAPSULE ORAL at 21:48

## 2018-08-06 RX ADMIN — GLIPIZIDE 5 MG: 5 TABLET ORAL at 07:43

## 2018-08-06 RX ADMIN — LEVOTHYROXINE SODIUM 100 MCG: 100 TABLET ORAL at 05:19

## 2018-08-06 RX ADMIN — GABAPENTIN 300 MG: 300 CAPSULE ORAL at 09:33

## 2018-08-06 RX ADMIN — CEPHALEXIN 250 MG: 250 CAPSULE ORAL at 14:11

## 2018-08-06 RX ADMIN — CEPHALEXIN 250 MG: 250 CAPSULE ORAL at 21:48

## 2018-08-06 RX ADMIN — GLIPIZIDE 5 MG: 5 TABLET ORAL at 16:54

## 2018-08-06 RX ADMIN — FUROSEMIDE 60 MG: 40 TABLET ORAL at 07:43

## 2018-08-06 RX ADMIN — WARFARIN SODIUM 5 MG: 5 TABLET ORAL at 17:08

## 2018-08-06 RX ADMIN — FUROSEMIDE 60 MG: 40 TABLET ORAL at 16:54

## 2018-08-06 RX ADMIN — TAMSULOSIN HYDROCHLORIDE 0.4 MG: 0.4 CAPSULE ORAL at 09:33

## 2018-08-06 RX ADMIN — CEFAZOLIN SODIUM 2 G: 2 INJECTION, SOLUTION INTRAVENOUS at 10:29

## 2018-08-06 ASSESSMENT — ACTIVITIES OF DAILY LIVING (ADL)
ADLS_ACUITY_SCORE: 26

## 2018-08-06 NOTE — PLAN OF CARE
Problem: Patient Care Overview  Goal: Plan of Care/Patient Progress Review  PT: Orders received. Pt attempted for PT evaluation this PM. Pt sleeping upon initiation, but wakes to voice. Pt initially able to participate in conversation, but after a few sentences falls asleep. This pattern continues where the pt would wake, but fall back to sleep within ~1min. Pt not able to maintain wakeful state for meaningful PT evaluation at this time. Will reschedule as appropriate.

## 2018-08-06 NOTE — PROGRESS NOTES
"[facility]  Hospitalist Progress Note  Lanie Harvey MD 08/06/2018    Reason for Stay (Diagnosis): sepsis         Assessment and Plan:      Summary of Stay: Ton Bagley is a 89 year old male admitted on 7/31/2018 with sepsis.        Strep group B bacteremia and sepsis   --Presented fever, elevated WBC, elevated lactic acid, cellulitis, left LE. Recurrent LE cellulitis with hx lymphedema and chronic stasis dermatitis.    --Continue on Ancef day 7 will switch to PO for a total of 10 days    Left LE Cellulitis in the setting of chronic venous stasis and peripheral edema.  --On IV antibiotic will switch to PO  --Supportive care leg elevation  --Doppler negative for DVT      Chronic A fib  --RVR initially driven by acute infection.   --On warfarin and metoprolol.    DM  --On glipizide alone.     Dementia.  --[facility]     Diastolic heart failure.   --Secondary to aortic stenosis with possible \"low-flow gradient\" aortic stenosis.     Urinary frequency related to Prostate cancer and treatment.     Mild infectious encephalopathy.    --Resolving.        DVT Prophylaxis: Warfarin  Code Status: DNR / DNI  Discharge Dispo: recently moved to SNF.  Anticipate return to skilled nursing facility.  Estimated Disch Date / # of Days until Disch: tomorrow        Interval History (Subjective):      Reviewed chart.  Patient feels increasingly tired and weak today was sleepy.  DVT study was negative patient has been afebrile..  Denies any chest pain   Occasional abdominal cramps has had regular bowel movements.  Review of all the other systems negative                  Physical Exam:      Last Vital Signs:  /79 (BP Location: Left arm)  Pulse 83  Temp 96  F (35.6  C) (Axillary)  Resp 16  Wt 100.6 kg (221 lb 12.5 oz)  SpO2 92%  BMI 35.8 kg/m2    I/O last 3 completed shifts:  In: 220 [P.O.:220]  Out: 75 [Urine:75]    Constitutional: NAD. Alert and more interactive " than yesterday. Still appears to not recall our conversation from earlier in the morning (though he was up sitting in his chair).   Respiratory: Clear to auscultation bilaterally, no crackles or wheezing   Cardiovascular: IRRIR.   Abdomen: Normal bowel sounds, soft, non-distended, non-tender   Skin: Marked, confluent warmth and erythema involving the entire left LE.  Bilateral venous stasis disease with cobblestoning is noted.   Neuro: Arousable.   Extremities: Edema slightly less, but present bilat. left more than right.  Chronic stasis changes. old saphenous vein harvest site scar on left lower extrem noted.  Tenderness mostly over the posteromedial left thigh.   Other(s):        All other systems: Negative          Medications:      All current medications were reviewed with changes reflected in problem list.         Data:      All new lab and imaging data was reviewed.   Labs/Imaging:  Results for orders placed or performed during the hospital encounter of 07/31/18 (from the past 24 hour(s))   US Lower Extremity Venous Duplex Left    Narrative    US LOWER EXTREMITY VENOUS DUPLEX LEFT  8/5/2018 7:09 PM    HISTORY:  ? DVT;     TECHNIQUE:  Venous Doppler US including color flow and Doppler  waveform analysis.    FINDINGS:  The peroneal veins were not seen.  No thrombus was observed  and there was normal compressibility, phasic flow, and augmentation in  the common femoral, femoral, popliteal, and posterior tibial veins.       Impression    IMPRESSION: No evidence of  left lower extremity deep venous  thrombosis.    TANIA HURST MD   INR   Result Value Ref Range    INR 2.08 (H) 0.86 - 1.14   Creatinine   Result Value Ref Range    Creatinine 1.20 0.66 - 1.25 mg/dL    GFR Estimate 57 (L) >60 mL/min/1.7m2    GFR Estimate If Black 69 >60 mL/min/1.7m2         Medications     - MEDICATION INSTRUCTIONS -       Warfarin Therapy Reminder         ceFAZolin  2 g Intravenous Q12H     ferrous gluconate  324 mg Oral Daily with  breakfast     furosemide  60 mg Oral BID     gabapentin (NEURONTIN) capsule 300 mg  300 mg Oral BID     glipiZIDE  5 mg Oral BID AC     levothyroxine  100 mcg Oral Daily     omeprazole  20 mg Oral Daily     sodium chloride (PF)  3 mL Intracatheter Q8H     tamsulosin  0.4 mg Oral Daily     Data       Recent Labs  Lab 08/01/18  0739 07/31/18 2030   WBC 23.9* 19.6*   HGB 12.9* 14.0   HCT 40.7 44.4   MCV 99 98    223       Recent Labs  Lab 08/06/18  0627 08/05/18  0610 08/04/18  0557 08/03/18  0716 08/02/18  0748 08/01/18  0739 07/31/18 2030   NA  --   --   --   --  137 140 136   POTASSIUM  --   --   --  3.9 3.3* 4.0 3.4   CHLORIDE  --   --   --   --  99 104 99   CO2  --   --   --   --  30 27 30   ANIONGAP  --   --   --   --  8 9 7   GLC  --   --   --   --  138* 137* 219*   BUN  --   --   --   --  17 19 19   CR 1.20 1.03 1.07 1.05 1.28* 1.26* 1.05   GFRESTIMATED 57* 68 65 66 53* 54* 66   GFRESTBLACK 69 82 78 80 64 65 80   JANN  --   --   --   --  7.8* 8.0* 9.1       Recent Labs  Lab 07/31/18  2158 07/31/18  2149   CULT No growth after 5 days Cultured on the 1st day of incubation:Beta hemolytic Streptococcus group B*  Critical Value/Significant Value, preliminary result only, called to and read back byMARY JO SEVERANCE RN ON [facility] 3 AT 1044 ON 08/01/18 AC.  (Note)POSITIVE for STREPTOCOCCUS AGALACTIAE (Group B Strep) by Verigenemultiplex nucleic acid test. Penicillin and ampicillin are drugs ofchoice, nonsusceptible isolates have not been reported. Finalidentification and antimicrobial susceptibility testing will beverified by standard methods.Specimen tested with Verigene multiplex, gram-positive blood culturenucleic acid test for the following targets: Staph aureus, Staphepidermidis, Staph lugdunensis, other Staph species, Enterococcusfaecalis, Enterococcus faecium, Streptococcus species, S. agalactiae,S. anginosus grp., S. pneumoniae, S. pyogenes, Listeria sp., mecA(methicillin resistance) and Renetta/B  (vancomycin resistance).Critical Value/Significant Value called to and read back by Mary JoSeverance, RN on [facility] @1343 on 8/1/18. ch.       Recent Labs  Lab 07/31/18 2030   NTBNPI 933       Recent Labs  Lab 08/02/18  0748 07/31/18 2030   AST 16 21   ALT 16 24   ALKPHOS 58 94   BILITOTAL 1.1 1.3       Recent Labs  Lab 08/06/18  0627 08/05/18  0610 08/04/18  0557   INR 2.08* 2.25* 2.58*       Recent Labs  Lab 08/01/18  0739 07/31/18  2257 07/31/18  2035   LACT 2.5* 2.5* 2.5*       Recent Labs  Lab 07/31/18 2030   TROPI <0.015       Recent Labs  Lab 07/31/18 2206   COLOR Yellow   APPEARANCE Clear   URINEGLC Negative   URINEBILI Negative   URINEKETONE Negative   SG 1.011   UBLD Small*   URINEPH 5.0   PROTEIN Negative   NITRITE Negative   LEUKEST Negative   RBCU <1   WBCU <1       Recent Results (from the past 24 hour(s))   US Lower Extremity Venous Duplex Left    Narrative    US LOWER EXTREMITY VENOUS DUPLEX LEFT  8/5/2018 7:09 PM    HISTORY:  ? DVT;     TECHNIQUE:  Venous Doppler US including color flow and Doppler  waveform analysis.    FINDINGS:  The peroneal veins were not seen.  No thrombus was observed  and there was normal compressibility, phasic flow, and augmentation in  the common femoral, femoral, popliteal, and posterior tibial veins.       Impression    IMPRESSION: No evidence of  left lower extremity deep venous  thrombosis.    TANIA HURST MD

## 2018-08-06 NOTE — PLAN OF CARE
Problem: Patient Care Overview  Goal: Plan of Care/Patient Progress Review  Outcome: No Change  Orientation: Alert w/cares, oriented to self, cooperative, no apparent distress.     VSS. 92% on RA. Afebrile.  IVF: SL  LS: Clear to auscultation bilaterally but diminished throughout, No wheezing, dyspneic on exertion  GI: Passing gas. No BM.  Normal bowel sounds, soft, non-distended, non-tender, c/o of abd cramping, heat applied, pt felt better  : Adequate urine output. Clear yellow.   Skin: bruising noted, BLE edema, red/issa, scab/wound on LLE open to air, Skin otherwise intact. No rashes, no cyanosis, dry to touch  Activity: SBA to assist of 1. Pt slept comfortably throughout shift.   Pain: c/o of abd cramping. No PRN pain meds utilized. Pt sleeping.   DC Plan: Continue with current cares.

## 2018-08-06 NOTE — PLAN OF CARE
Problem: Patient Care Overview  Goal: Plan of Care/Patient Progress Review  Outcome: Improving  VSS. IV Abx changed to PO keflex for LLE cellulitis. PT consulted. Oriented only to self. Refused to elevate legs this shift. Plan: discharge back to SNF tomorrow.

## 2018-08-07 VITALS
SYSTOLIC BLOOD PRESSURE: 140 MMHG | RESPIRATION RATE: 16 BRPM | BODY MASS INDEX: 35.8 KG/M2 | WEIGHT: 221.78 LBS | OXYGEN SATURATION: 93 % | HEART RATE: 83 BPM | TEMPERATURE: 98 F | DIASTOLIC BLOOD PRESSURE: 64 MMHG

## 2018-08-07 LAB
ANION GAP SERPL CALCULATED.3IONS-SCNC: 8 MMOL/L (ref 3–14)
BACTERIA SPEC CULT: NO GROWTH
BUN SERPL-MCNC: 26 MG/DL (ref 7–30)
CALCIUM SERPL-MCNC: 8.5 MG/DL (ref 8.5–10.1)
CHLORIDE SERPL-SCNC: 95 MMOL/L (ref 94–109)
CO2 SERPL-SCNC: 32 MMOL/L (ref 20–32)
CREAT SERPL-MCNC: 1.17 MG/DL (ref 0.66–1.25)
GFR SERPL CREATININE-BSD FRML MDRD: 58 ML/MIN/1.7M2
GLUCOSE SERPL-MCNC: 149 MG/DL (ref 70–99)
INR PPP: 2.14 (ref 0.86–1.14)
Lab: NORMAL
POTASSIUM SERPL-SCNC: 3.4 MMOL/L (ref 3.4–5.3)
SODIUM SERPL-SCNC: 135 MMOL/L (ref 133–144)
SPECIMEN SOURCE: NORMAL

## 2018-08-07 PROCEDURE — 85610 PROTHROMBIN TIME: CPT | Performed by: INTERNAL MEDICINE

## 2018-08-07 PROCEDURE — 25000132 ZZH RX MED GY IP 250 OP 250 PS 637: Mod: GY | Performed by: INTERNAL MEDICINE

## 2018-08-07 PROCEDURE — 80048 BASIC METABOLIC PNL TOTAL CA: CPT | Performed by: INTERNAL MEDICINE

## 2018-08-07 PROCEDURE — A9270 NON-COVERED ITEM OR SERVICE: HCPCS | Mod: GY | Performed by: INTERNAL MEDICINE

## 2018-08-07 PROCEDURE — 40000893 ZZH STATISTIC PT IP EVAL DEFER

## 2018-08-07 PROCEDURE — 99239 HOSP IP/OBS DSCHRG MGMT >30: CPT | Performed by: INTERNAL MEDICINE

## 2018-08-07 PROCEDURE — 36415 COLL VENOUS BLD VENIPUNCTURE: CPT | Performed by: INTERNAL MEDICINE

## 2018-08-07 RX ORDER — WARFARIN SODIUM 4 MG/1
4 TABLET ORAL
Status: DISCONTINUED | OUTPATIENT
Start: 2018-08-07 | End: 2018-08-07 | Stop reason: HOSPADM

## 2018-08-07 RX ORDER — CEPHALEXIN 500 MG/1
500 CAPSULE ORAL 3 TIMES DAILY
Qty: 12 CAPSULE | Refills: 0 | Status: SHIPPED | OUTPATIENT
Start: 2018-08-07 | End: 2018-08-11

## 2018-08-07 RX ADMIN — FUROSEMIDE 60 MG: 40 TABLET ORAL at 09:08

## 2018-08-07 RX ADMIN — ACETAMINOPHEN 650 MG: 325 TABLET, FILM COATED ORAL at 00:20

## 2018-08-07 RX ADMIN — LEVOTHYROXINE SODIUM 100 MCG: 100 TABLET ORAL at 06:15

## 2018-08-07 RX ADMIN — OMEPRAZOLE 20 MG: 20 CAPSULE, DELAYED RELEASE ORAL at 09:09

## 2018-08-07 RX ADMIN — CEPHALEXIN 250 MG: 250 CAPSULE ORAL at 06:15

## 2018-08-07 RX ADMIN — GLIPIZIDE 5 MG: 5 TABLET ORAL at 09:08

## 2018-08-07 RX ADMIN — GABAPENTIN 300 MG: 300 CAPSULE ORAL at 09:09

## 2018-08-07 RX ADMIN — TAMSULOSIN HYDROCHLORIDE 0.4 MG: 0.4 CAPSULE ORAL at 09:08

## 2018-08-07 RX ADMIN — FERROUS GLUCONATE 324 MG: 324 TABLET ORAL at 09:08

## 2018-08-07 ASSESSMENT — ACTIVITIES OF DAILY LIVING (ADL)
ADLS_ACUITY_SCORE: 26

## 2018-08-07 NOTE — DISCHARGE SUMMARY
[facility]  Discharge Summary  Hospitalist      Date of Admission:  7/31/2018  Date of Discharge:  8/7/2018  Provider:  Lanie Harvey MD  Date of Service (when I last saw the patient): 08/07/18      Primary Provider: Jose Shell          Discharge Diagnosis:   Discharge Diagnoses   Strep group B bacteremia and sepsis   Left LE Cellulitis in the setting of chronic venous stasis and peripheral edema   Chronic A fib  Diastolic heart failure   mild infectious encephalopathy    Other medical issues:  Past Medical History:   Diagnosis Date     Atrial fibrillation (H)     cardioversion 2007     CAD (coronary artery disease)     CABG, stent x2 2004     HTN (hypertension)      Hyperlipidemia           History of Present Illness   Ton Bagley is an 89 year old male who presented with shortness of breath, hypoxemia and chills.  Please see the admission history and physical for full details.    Hospital Course     Ton Bagley was admitted on 7/31/2018.  He is a 89-year-old male presented with chills hypoxemia and shortness of breath.  Patient was admitted for acute sepsis secondary to lower extremity cellulitis.     The following problems were addressed during his hospitalization:    Strep group B bacteremia and sepsis   --Presented fever, elevated WBC, elevated lactic acid, cellulitis, left LE. Recurrent LE cellulitis with hx lymphedema and chronic stasis dermatitis.    --Treated with IV cefazolin in the hospital for 7 days and then switched to oral prior to discharge to complete a total of 10 days of antibiotics     Left LE Cellulitis in the setting of chronic venous stasis and peripheral edema.  --Clinically improved with IV antibiotics switched to p.o. prior to discharge  --Supportive care leg elevation  --Doppler negative for DVT        Chronic A fib  --RVR initially driven by acute infection.   --On warfarin and metoprolol.  --Stable prior to the     DM  --Continued on on  "oral hypoglycemics during hospitalization     Dementia.  --Presented from LTC at [facility].      Diastolic heart failure.   --Secondary to aortic stenosis with possible \"low-flow gradient\" aortic stenosis.   --Stable during hospital stay     Urinary frequency related to Prostate cancer and treatment.      Mild infectious encephalopathy.    --Improved and apparently at baseline prior to discharge    Significant Results and Procedures   As noted above    Pending Results   Unresulted Labs Ordered in the Past 30 Days of this Admission     Date and Time Order Name Status Description    8/2/2018 0000 Creatinine In process           Code Status   DNR / DNI       Primary Care Physician   [facility]    Physical Exam   Temp: 98  F (36.7  C) Temp src: Axillary BP: 140/64   Heart Rate: 96 Resp: 16 SpO2: 93 % O2 Device: None (Room air)    Vitals:    07/31/18 2151 08/01/18 0028   Weight: 86.2 kg (190 lb) 100.6 kg (221 lb 12.5 oz)     Vital Signs with Ranges  Temp:  [97  F (36.1  C)-100.4  F (38  C)] 98  F (36.7  C)  Heart Rate:  [] 96  Resp:  [16] 16  BP: (125-140)/(64-77) 140/64  SpO2:  [87 %-94 %] 93 %  I/O last 3 completed shifts:  In: 830 [P.O.:830]  Out: 100 [Urine:100]    Constitutional: Awake, alert, cooperative, no apparent distress, and appears stated age.  Respiratory: No increased work of breathing, good air exchange, clear to auscultation bilaterally, no crackles or wheezing.  Cardiovascular: Irregularly irregular, soft systolic murmur, S1 plus S2  GI:  normal bowel sounds, soft, non-distended, non-tender  EXT: Chronic stasis changes with significant improvement in leg edema      Discharge Disposition   Discharged to long-term care facility    Consultations This Hospital Stay   PHARMACY TO DOSE VANCO  PHARMACY TO DOSE WARFARIN  SOCIAL WORK IP CONSULT  PHARMACY TO DOSE VANCO  PHYSICAL THERAPY ADULT IP CONSULT  PHYSICAL THERAPY ADULT IP CONSULT    Time Spent on this Encounter   I, " Lanie Harvey, personally saw the patient today and spent greater than 30 minutes discharging this patient.    Discharge Orders     General info for SNF   Length of Stay Estimate: Long Term Care  Condition at Discharge: Improving  Level of care:skilled   Rehabilitation Potential: Fair  Admission H&P remains valid and up-to-date: Yes  Recent Chemotherapy: N/A  Use Nursing Home Standing Orders: Yes     Mantoux instructions   Give two-step Mantoux (PPD) Per Facility Policy Yes     Reason for your hospital stay   Please refer to discharge summary.  Briefly admitted and treated for cellulitis     Daily weights   Call Provider for weight gain of more than 2 pounds per day or 5 pounds per week.     Intake and output   Every shift     Follow Up and recommended labs and tests   Follow up with residential physician.  The following labs/tests are recommended: basic metabolic panel and INR in 3- 5 days.  Please recheck INR as patient is on Coumadin was started on antibiotic which can affect INR and will need to adjust anticoagulation accordingly  Please monitor daily blood pressure and heart rate    Continue lymphedema wraps.  Use topical emollient for both lower extremities     Activity - Up with nursing assistance     Glucose monitor nursing POCT   Before meals and at bedtime     DNR/DNI     Physical Therapy Adult Consult   Evaluate and treat as clinically indicated.    Reason: Deconditioning     Fall precautions     Advance Diet as Tolerated   Follow this diet upon discharge: Orders Placed This Encounter     Moderate Consistent CHO Diet       Discharge Medications   Current Discharge Medication List      START taking these medications    Details   cephALEXin (KEFLEX) 500 MG capsule Take 1 capsule (500 mg) by mouth 3 times daily for 4 days  Qty: 12 capsule, Refills: 0    Associated Diagnoses: Cellulitis, unspecified cellulitis site         CONTINUE these medications which have NOT CHANGED    Details   acetaminophen  (TYLENOL) 325 MG tablet Take 650 mg by mouth daily as needed for mild pain      atorvastatin (LIPITOR) 10 MG tablet Take 10 mg by mouth daily      Calcium Carb-Cholecalciferol (CALCIUM 500+D) 500-200 MG-UNIT TABS Take 1 tablet by mouth 2 times daily    Associated Diagnoses: Vitamin D deficiency      ferrous gluconate (FERGON) 324 (38 FE) MG tablet Take 1 tablet (324 mg) by mouth daily (with breakfast)  Qty: 100 tablet    Associated Diagnoses: Iron deficiency anemia, unspecified iron deficiency anemia type      FUROSEMIDE PO Take 60 mg by mouth 2 times daily      gabapentin (NEURONTIN) 300 MG capsule Take 300 mg by mouth 2 times daily      glipiZIDE (GLUCOTROL) 5 MG tablet Take 1 tablet (5 mg) by mouth 2 times daily (before meals)  Qty: 30 tablet    Associated Diagnoses: Type 2 diabetes mellitus with complication, without long-term current use of insulin (H)      ipratropium - albuterol 0.5 mg/2.5 mg/3 mL (DUONEB) 0.5-2.5 (3) MG/3ML neb solution Take 1 vial by nebulization 3 times daily as needed      lactulose (CHRONULAC) 10 GM/15ML solution Take 30 mLs daily as needed      levothyroxine (SYNTHROID/LEVOTHROID) 100 MCG tablet Take 1 tablet (100 mcg) by mouth daily  Qty: 30 tablet    Associated Diagnoses: Hypothyroidism, unspecified type      lisinopril (PRINIVIL/ZESTRIL) 5 MG tablet Take 1 tablet (5 mg) by mouth daily    Associated Diagnoses: Essential hypertension      metoprolol tartrate (LOPRESSOR) 25 MG tablet Take 25 mg by mouth 2 times daily      nitroGLYcerin (NITROSTAT) 0.4 MG sublingual tablet Place 0.4 mg under the tongue 3 times daily as needed      nystatin (MYCOSTATIN) 762141 UNIT/GM POWD Apply to groin area for yeast growth with every pericare      omeprazole (PRILOSEC) 20 MG CR capsule Take 1 capsule (20 mg) by mouth daily  Qty: 30 capsule    Associated Diagnoses: Gastroesophageal reflux disease without esophagitis      polyethylene glycol (MIRALAX/GLYCOLAX) Packet Take 17 g by mouth daily       senna-docusate (SENOKOT-S;PERICOLACE) 8.6-50 MG per tablet Take 2 tablets by mouth 2 times daily      simethicone (MYLICON) 80 MG chewable tablet Take 2 tablets (160 mg) by mouth every 6 hours as needed for flatulence or cramping  Qty: 180 tablet    Associated Diagnoses: Abdominal pain, generalized      sucralfate (CARAFATE) 1 GM tablet Take 1 tablet (1 g) by mouth 4 times daily (before meals and nightly)  Qty: 120 tablet, Refills: 0    Associated Diagnoses: Other acute gastritis without hemorrhage      TAMSULOSIN HCL PO Take 0.4 mg by mouth daily      !! warfarin (COUMADIN) 2 MG tablet Take 4 mg by mouth daily Take 4 mg on Sun, Tues, Thurs, Fri, Sat       !! warfarin (COUMADIN) 5 MG tablet Take 5 mg by mouth daily Take 5 mg on Mon, Wed       !! - Potential duplicate medications found. Please discuss with provider.        Allergies   No Known Allergies  Data   Most Recent 3 CBC's:  Recent Labs   Lab Test  08/06/18   0627 08/01/18   0739  07/31/18 2030   WBC  4.3  23.9*  19.6*   HGB  12.9*  12.9*  14.0   MCV  98  99  98   PLT  200  154  223      Most Recent 3 BMP's:  Recent Labs   Lab Test  08/07/18   0645  08/06/18   0627  08/05/18   0610   08/03/18   0716  08/02/18   0748   NA  135  136   --    --    --   137   POTASSIUM  3.4  3.7   --    --   3.9  3.3*   CHLORIDE  95  97   --    --    --   99   CO2  32  32   --    --    --   30   BUN  26  23   --    --    --   17   CR  1.17  1.22  Canceled, Test credited  1.03   < >  1.05  1.28*   ANIONGAP  8  7   --    --    --   8   JANN  8.5  8.5   --    --    --   7.8*   GLC  149*  161*   --    --    --   138*    < > = values in this interval not displayed.     Most Recent 2 LFT's:  Recent Labs   Lab Test  08/02/18   0748  07/31/18   2030   AST  16  21   ALT  16  24   ALKPHOS  58  94   BILITOTAL  1.1  1.3     Most Recent INR's and Anticoagulation Dosing History:  Anticoagulation Dose History     Recent Dosing and Labs Latest Ref Rng & Units 8/1/2018 8/2/2018 8/3/2018  8/4/2018 8/5/2018 8/6/2018 8/7/2018    Warfarin 4 mg - - 4 mg 4 mg 4 mg 4 mg - -    Warfarin 5 mg - 5 mg - - - - 5 mg -    INR 0.86 - 1.14 2.44(H) 2.64(H) 2.94(H) 2.58(H) 2.25(H) 2.08(H) 2.14(H)        Most Recent 3 Troponin's:  Recent Labs   Lab Test  07/31/18   2030  04/04/18   0740  04/04/18   0100   TROPI  <0.015  <0.015  <0.015     Most Recent Cholesterol Panel:  Recent Labs   Lab Test  05/22/17   0646 07/24/14   CHOL   --   126   LDL   --   53   HDL   --   36   TRIG  219*  186     Most Recent 6 Bacteria Isolates From Any Culture (See EPIC Reports for Culture Details):  Recent Labs   Lab Test  07/31/18   2158  07/31/18   2149  04/06/18   0721  04/05/18   0650  04/04/18   1604  04/03/18   1252   CULT  No growth  Cultured on the 1st day of incubation:  Beta hemolytic Streptococcus group B  *  Critical Value/Significant Value, preliminary result only, called to and read back by  MARY JO SEVERANCE RN ON [facility] 3 AT 1044 ON 08/01/18 AC.    (Note)  POSITIVE for STREPTOCOCCUS AGALACTIAE (Group B Strep) by [employer]  multiplex nucleic acid test. Penicillin and ampicillin are drugs of  choice, nonsusceptible isolates have not been reported. Final  identification and antimicrobial susceptibility testing will be  verified by standard methods.    Specimen tested with [employer]ig[employer] multiplex, gram-positive blood culture  nucleic acid test for the following targets: Staph aureus, Staph  epidermidis, Staph lugdunensis, other Staph species, Enterococcus  faecalis, Enterococcus faecium, Streptococcus species, S. agalactiae,  S. anginosus grp., S. pneumoniae, S. pyogenes, Listeria sp., mecA  (methicillin resistance) and Renetta/B (vancomycin resistance).    Critical Value/Significant Value called to and read back by Mary Jo Severance, RN on [facility]3 @1343 on 8/1/18. ch.      No growth  No growth  No growth  Cultured on the 1st day of incubation:  Staphylococcus aureus  *  Critical Value/Significant Value, preliminary result  only, called to and read back by  Nell Valerio RN at 0952 4/4/18 [id number]    Cultured on the 1st day of incubation:  Streptococcus mitis group  *  Critical Value/Significant Value called to and read back by  Tamanna Evans RN at 1015 on [id number]    (Note)  POSITIVE for STAPHYLOCOCCUS AUREUS and NEGATIVE for the mecA gene  (not MRSA) by [employer] multiplex nucleic acid test. The mecA gene was  not detected. Final identification and antimicrobial susceptibility  testing will be verified by standard methods.    Specimen tested with Verigene multiplex, gram-positive blood culture  nucleic acid test for the following targets: Staph aureus, Staph  epidermidis, Staph lugdunensis, other Staph species, Enterococcus  faecalis, Enterococcus faecium, Streptococcus species, S. agalactiae,  S. anginosus grp., S. pneumoniae, S. pyogenes, Listeria sp., mecA  (methicillin resistance) and Renetta/B (vancomycin resistance).    Critical Value/Significant Value called to and read back by  Komal Mckeon RN @1240 04/04/18 gd       Most Recent TSH, T4 and A1c Labs:  Recent Labs   Lab Test  11/21/12   1450   TSH  8.52*     Results for orders placed or performed during the hospital encounter of 07/31/18   XR Chest 2 Views    Narrative    XR CHEST 2 VW   7/31/2018 9:35 PM     HISTORY: left sided chest pain, fever, hypoxia;     COMPARISON: Film dated 4/10/2000    FINDINGS: Midline sternotomy. Cardiac silhouette is enlarged..  Mild  pulmonary vascular congestion. No focal alveolar infiltrate.  The  bones and soft tissues are unremarkable.      Impression    IMPRESSION:  1. Cardiomegaly and mild pulmonary vascular congestion. This could be  due to mild congestive heart failure or fluid overload.  2. No focal alveolar-type infiltrates are seen.        LIONEL VEE MD   US Lower Extremity Venous Duplex Left    Narrative    US LOWER EXTREMITY VENOUS DUPLEX LEFT  8/5/2018 7:09 PM    HISTORY:  ? DVT;     TECHNIQUE:  Venous Doppler US including color  flow and Doppler  waveform analysis.    FINDINGS:  The peroneal veins were not seen.  No thrombus was observed  and there was normal compressibility, phasic flow, and augmentation in  the common femoral, femoral, popliteal, and posterior tibial veins.       Impression    IMPRESSION: No evidence of  left lower extremity deep venous  thrombosis.    TANIA HURST MD           Disclaimer: This note consists of symbols derived from keyboarding, dictation and/or voice recognition software. As a result, there may be errors in the script that have gone undetected. Please consider this when interpreting information found in this chart.

## 2018-08-07 NOTE — PLAN OF CARE
"Problem: Patient Care Overview  Goal: Plan of Care/Patient Progress Review  Outcome: Improving  Pt VSS, left leg is \"tender.\" Declined pain medication. o2 sats at 93% on RA. SBA w/walker. A/O to self and place. Good appetite. DM, glipizide. Will continue with current POC. Orders were faxed to [facility] regarding patient care. Pt discharge at 1506 via wheelchair with volunteer and family.      "

## 2018-08-07 NOTE — PROGRESS NOTES
Care Coordination:  Per Dr Harvey, pt will be ready for dc today. Pt is from [facility] LTC and has a bed hold. Call placed to [facility] admissions and informed them pt will be returning today. They are able to take him back at anytime once orders are in and faxed. Will fax orders to fax# [phone] when in EPIC. RN to call pt's son for transport when ready.    Trixie Can RN CTS

## 2018-08-07 NOTE — PLAN OF CARE
Problem: Patient Care Overview  Goal: Plan of Care/Patient Progress Review  Discharge Planner PT   Patient plan for discharge: Return to LTC  Current status: Received orders for PT eval and treat. Pt admitted on 7/31/18 for sepsis secondary to recurrent L LE cellulitis. Plan is to discharge back to pt's previous LTC today. Per nursing pt is ambulating in room to bathroom using FWW and SBA and is at PLF. No IP PT indicated.  Barriers to return to prior living situation: None  Recommendations for discharge: LTC  Rationale for recommendations: Pt at baseline mobility level and will continue to receive assistance at his previous LTC. Will complete PT order at this time.       Entered by: Gaurav Melchor 08/07/2018 10:31 AM

## 2018-08-07 NOTE — PLAN OF CARE
Problem: Patient Care Overview  Goal: Plan of Care/Patient Progress Review  Outcome: Improving  VSS, RA satting 90-91%, oral Lasix, oral Keflex, SBA/Ax1 with a walker, up chair most of shift, Mod Cho diet, on glipizide, INR 2.08, PIV SL,  Alert to self, refusing feet elevated or hip abductor pillow, PT unable to meet with pt today due to sleepiness, plan to possibly dc tomorrow back to nursing home.

## 2018-08-07 NOTE — PROGRESS NOTES
Care Coordination:  Orders noted for dc. MD DC orders faxed to [facility]. Family will be here at 1500 for transport back to facility. Call placed to [facility] to update them that orders were sent and pt to dc around 1500.    Trixie Can RN CTS

## 2018-08-07 NOTE — PLAN OF CARE
Problem: Patient Care Overview  Goal: Plan of Care/Patient Progress Review  Outcome: Improving  Orientation: Varies. Was oriented to all except time at start of shift, then was disoriented to time and situation. Oriented to person and place consistently.   VS: stable, febrile with max temp 100.4. Gave tylenol x1 and temp fell to 97.5  O2: 94% RA, drops to upper 80s after exertion  Tele: N/A  LS: clear, equal bilaterally. WILLIAM, no shortness of breath or chest pain.  BS:  Audible and active in all quadrants, no BM this shift  : voiding w/o difficulty, on PO Lasix  Skin: BLE red and issa, scars, wounds.  Musculoskeletal: generalized weakness, mildly impaired BLE, hip abductor pillow ordered but patient refuses, will elevate legs on pillows.  Activity: assist of 1 with walker, does not call appropriately and in moments of lucidity will turn off his bed alarm himself (per previous shifts)--posey pad in bed to mitigate this risk.  IVF:  Saline locked, flushes well  Pain: denies  Plan: transfer to [facility] today, still on PO Keflex

## 2018-08-09 ENCOUNTER — RECORDS - HEALTHEAST (OUTPATIENT)
Dept: LAB | Facility: CLINIC | Age: 83
End: 2018-08-09

## 2018-08-10 LAB
ALBUMIN SERPL-MCNC: 3.6 G/DL (ref 3.5–5)
VIT B12 SERPL-MCNC: 367 PG/ML (ref 213–816)

## 2018-08-15 ENCOUNTER — RECORDS - HEALTHEAST (OUTPATIENT)
Dept: LAB | Facility: CLINIC | Age: 83
End: 2018-08-15

## 2018-08-15 LAB — INR PPP: 5.01 (ref 0.9–1.1)

## 2018-08-30 ENCOUNTER — RECORDS - HEALTHEAST (OUTPATIENT)
Dept: LAB | Facility: CLINIC | Age: 83
End: 2018-08-30

## 2018-08-31 LAB — HGB BLD-MCNC: 13.1 G/DL (ref 14–18)

## 2018-09-25 ENCOUNTER — HOSPITAL ENCOUNTER (OUTPATIENT)
Facility: CLINIC | Age: 83
Setting detail: OBSERVATION
Discharge: NURSING, REHAB OR LONG-TERM CARE | End: 2018-09-27
Attending: EMERGENCY MEDICINE | Admitting: HOSPITALIST
Payer: MEDICARE

## 2018-09-25 DIAGNOSIS — K29.00 OTHER ACUTE GASTRITIS WITHOUT HEMORRHAGE: ICD-10-CM

## 2018-09-25 DIAGNOSIS — J96.01 ACUTE RESPIRATORY FAILURE WITH HYPOXIA (H): ICD-10-CM

## 2018-09-25 DIAGNOSIS — R07.89 ATYPICAL CHEST PAIN: ICD-10-CM

## 2018-09-25 PROCEDURE — 93005 ELECTROCARDIOGRAM TRACING: CPT

## 2018-09-25 PROCEDURE — 99285 EMERGENCY DEPT VISIT HI MDM: CPT | Mod: 25

## 2018-09-25 PROCEDURE — 96361 HYDRATE IV INFUSION ADD-ON: CPT

## 2018-09-25 PROCEDURE — 96360 HYDRATION IV INFUSION INIT: CPT | Mod: 59

## 2018-09-25 NOTE — IP AVS SNAPSHOT
MRN:[id number]                      After Visit Summary   9/25/2018    Ton Bagley    MRN: [id number]           Thank you!     Thank you for choosing [facility] for your care. Our goal is always to provide you with excellent care. Hearing back from our patients is one way we can continue to improve our services. Please take a few minutes to complete the written survey that you may receive in the mail after you visit. If you would like to speak to someone directly about your visit please contact Patient Relations at [phone]. Thank you!          Patient Information     Date Of Birth          7/18/1927        About your hospital stay     You were admitted on:  September 26, 2018 You last received care in the:  [facility] Observation Department    You were discharged on:  September 27, 2018        Reason for your hospital stay       Epigastric abdominal pain likely due to GERD. Serial troponins were negative and cardiac monitoring was unremarkable. abd XR was obtained which showed ileus vs possible early small bowel obstruction. You were passing gas and having bowel movements and tolerating oral intake. This will likely continue to improve on its own, recommend full liquid diet until completely resolved.                  Who to Call     For medical emergencies, please call 911.  For non-urgent questions about your medical care, please call your primary care provider or clinic, [phone]          Attending Provider     Provider Specialty    Ramírez Betts MD Emergency Medicine    Benitez Velez MD Internal Medicine       Primary Care Provider Office Phone # Fax #    [facility] [phone]      After Care Instructions     Activity - Up ad yesenia           Advance Diet as Tolerated       Follow this diet upon discharge: Full liquid diet until abdominal symptoms resolve then advance to regular diet as tolerated.             General info for SNF       Length of Stay Estimate: Long Term Care  Condition at Discharge: Stable  Level of care:skilled   Rehabilitation Potential: Fair  Admission H&P remains valid and up-to-date: Yes  Recent Chemotherapy: N/A  Use Nursing Home Standing Orders: Yes            Mantoux instructions       Give two-step Mantoux (PPD) Per Facility Policy Yes                  Follow-up Appointments     Follow Up and recommended labs and tests       Follow up with primary care provider in 1-2 weeks.  No follow up labs or test are needed.                             Warfarin Instruction     You have started taking a medicine called warfarin. This is a blood-thinning medicine (anticoagulant). It helps prevent and treat blood clots.      Before leaving the hospital, make sure you know how much to take and how long to take it.      You will need regular blood tests to make sure your blood is clotting safely. It is very important to see your doctor for regular blood tests.    Talk to your doctor before taking any new medicine (this includes over-the-counter drugs and herbal products). Many medicines can interact with warfarin. This may cause more bleeding or too much clotting.     Eating a lot of vitamin K--found in green, leafy vegetables--can change the way warfarin works in your body. Do NOT avoid these foods. Instead, try to eat the same amount each day.     Bleeding is the most common side-effect of warfarin. You may notice bleeding gums, a bloody nose, bruises and bleeding longer when you cut yourself. See a doctor at once if:   o You cough up blood  o You find blood in your stool (poop)  o You have a deep cut, or a cut that bleeds longer than 10 minutes   o You have a bad cut, hard fall, accident or hit your head (go to urgent care or the emergency room).    For women who can get pregnant: This medicine can harm an unborn baby. Be very careful not to get pregnant while taking this medicine. If you think you might be  "pregnant, call your doctor right away.    For more information, read \"Guide to Warfarin Therapy,  the booklet you received in the hospital.        Pending Results     Date and Time Order Name Status Description    2018 2145 XR Abdomen 2 Views Preliminary     2018 0134 Blood culture Preliminary     2018 0134 Blood culture Preliminary             Statement of Approval     Ordered          18 1142  I have reviewed and agree with all the recommendations and orders detailed in this document.  EFFECTIVE NOW     Approved and electronically signed by:  Rica Rhodes PA-C             Admission Information     Date & Time Provider Department Dept. Phone    2018 Benitez Velez MD [facility] Observation Department [phone]      Your Vitals Were     Blood Pressure Pulse Temperature Respirations Height Weight    115/58 (BP Location: Right arm) 76 98.9  F (37.2  C) (Oral) 18 1.676 m (5' 6\") 106.1 kg (234 lb)    Pulse Oximetry BMI (Body Mass Index)                92% 37.77 kg/m2          [employer] Information     [employer] lets you send messages to your doctor, view your test results, renew your prescriptions, schedule appointments and more. To sign up, go to [email or web address] . Click on \"Log in\" on the left side of the screen, which will take you to the Welcome page. Then click on \"Sign up Now\" on the right side of the page.     You will be asked to enter the access code listed below, as well as some personal information. Please follow the directions to create your username and password.     Your access code is: [email or web address]  Expires: 10/21/2018  6:16 PM     Your access code will  in 90 days. If you need help or a new code, please call your [place] clinic or [phone].        Care EveryWhere ID     This is your Care EveryWhere ID. This could be used by other organizations to access your [place] medical records  [id number]        Equal Access to Services     [facility] " GAAR : Hadii aad ku alberta Jordan, waaxda luqadaha, qaybta kaalmada adeveronica, waxroland lissett hayjennyfer avalosgitaemily pandya . So [facility] [phone].    ATENCIÓN: Si habla judit, tiene a redman disposición servicios gratuitos de asistencia lingüística. Llame al [phone].    We comply with applicable federal civil rights laws and [place] laws. We do not discriminate on the basis of race, color, national origin, age, disability, sex, sexual orientation, or gender identity.               Review of your medicines      CONTINUE these medicines which have NOT CHANGED        Dose / Directions    acetaminophen 325 MG tablet   Commonly known as:  TYLENOL        Dose:  650 mg   Take 650 mg by mouth daily as needed for mild pain   Refills:  0       atorvastatin 10 MG tablet   Commonly known as:  LIPITOR        Dose:  10 mg   Take 10 mg by mouth daily   Refills:  0       CALCIUM 500 + D 500-200 MG-UNIT Tabs   Generic drug:  Calcium Carb-Cholecalciferol        Dose:  1 tablet   Take 1 tablet by mouth daily   Refills:  0       camphor-menthol 0.5-0.5 % Lotn   Commonly known as:  DERMASARRA        Apply topically 2 times daily To both lower legs to dry skin where no drainage present   Refills:  0       cholecalciferol 2000 units tablet        Dose:  2000 Units   Take 2,000 Units by mouth daily   Refills:  0       ferrous gluconate 324 (38 Fe) MG tablet   Commonly known as:  FERGON   Used for:  Iron deficiency anemia, unspecified iron deficiency anemia type        Dose:  324 mg   Take 1 tablet (324 mg) by mouth daily (with breakfast)   Quantity:  100 tablet   Refills:  0       FUROSEMIDE PO        Dose:  60 mg   Take 60 mg by mouth 2 times daily   Refills:  0       gabapentin 100 MG capsule   Commonly known as:  NEURONTIN        Dose:  100 mg   Take 100 mg by mouth 2 times daily   Refills:  0       glipiZIDE 5 MG tablet   Commonly known as:  GLUCOTROL   Used for:  Type 2 diabetes mellitus with complication, without long-term  current use of insulin (H)        Dose:  5 mg   Take 1 tablet (5 mg) by mouth 2 times daily (before meals)   Quantity:  30 tablet   Refills:  0       ipratropium - albuterol 0.5 mg/2.5 mg/3 mL 0.5-2.5 (3) MG/3ML neb solution   Commonly known as:  DUONEB        Dose:  1 vial   Take 1 vial by nebulization 3 times daily as needed   Refills:  0       lactulose 10 GM/15ML solution   Commonly known as:  CHRONULAC        Take 30 mLs daily as needed   Refills:  0       levothyroxine 100 MCG tablet   Commonly known as:  SYNTHROID/LEVOTHROID   Used for:  Hypothyroidism, unspecified type        Dose:  100 mcg   Take 1 tablet (100 mcg) by mouth daily   Quantity:  30 tablet   Refills:  0       metFORMIN 500 MG tablet   Commonly known as:  GLUCOPHAGE        Dose:  500 mg   Take 500 mg by mouth 2 times daily   Refills:  0       metoprolol tartrate 25 MG tablet   Commonly known as:  LOPRESSOR        Dose:  25 mg   Take 25 mg by mouth 2 times daily   Refills:  0       NITROSTAT 0.4 MG sublingual tablet   Generic drug:  nitroGLYcerin        Dose:  0.4 mg   Place 0.4 mg under the tongue 3 times daily as needed   Refills:  0       nystatin 793273 UNIT/GM Powd   Commonly known as:  MYCOSTATIN        Apply topically 3 times daily Apply to groin area for yeast growth with every pericare   Refills:  0       omeprazole 20 MG CR capsule   Commonly known as:  priLOSEC   Used for:  Gastroesophageal reflux disease without esophagitis        Dose:  20 mg   Take 1 capsule (20 mg) by mouth daily   Quantity:  30 capsule   Refills:  0       polyethylene glycol Packet   Commonly known as:  MIRALAX/GLYCOLAX        Dose:  17 g   Take 17 g by mouth daily   Refills:  0       senna-docusate 8.6-50 MG per tablet   Commonly known as:  SENOKOT-S;PERICOLACE        Dose:  2 tablet   Take 2 tablets by mouth 2 times daily   Refills:  0       simethicone 80 MG chewable tablet   Commonly known as:  MYLICON   Used for:  Abdominal pain, generalized        Dose:  160  mg   Take 2 tablets (160 mg) by mouth every 6 hours as needed for flatulence or cramping   Quantity:  180 tablet   Refills:  0       sucralfate 1 GM tablet   Commonly known as:  CARAFATE   Used for:  Other acute gastritis without hemorrhage        Dose:  1 g   Take 1 tablet (1 g) by mouth 4 times daily (before meals and nightly)   Quantity:  120 tablet   Refills:  0       TAMSULOSIN HCL PO        Dose:  0.4 mg   Take 0.4 mg by mouth daily   Refills:  0       triamcinolone 0.5 % cream   Commonly known as:  KENALOG        Apply topically three times a week Mon, Wed, Fri - apply to affected areas   Refills:  0       valsartan 40 MG tablet   Commonly known as:  DIOVAN        Dose:  20 mg   Take 20 mg by mouth 2 times daily   Refills:  0       * warfarin 3 MG tablet   Commonly known as:  COUMADIN        Dose:  3 mg   Take 3 mg by mouth 3mg on Sun, Mon, Tue, Wed, Fri, Sat   Refills:  0       * WARFARIN SODIUM PO        Dose:  3.5 mg   Take 3.5 mg by mouth On Thu   Refills:  0       * Notice:  This list has 2 medication(s) that are the same as other medications prescribed for you. Read the directions carefully, and ask your doctor or other care provider to review them with you.             Protect others around you: Learn how to safely use, store and throw away your medicines at www.disposemymeds.org.             Medication List: This is a list of all your medications and when to take them. Check marks below indicate your daily home schedule. Keep this list as a reference.      Medications           Morning Afternoon Evening Bedtime As Needed    acetaminophen 325 MG tablet   Commonly known as:  TYLENOL   Take 650 mg by mouth daily as needed for mild pain                                atorvastatin 10 MG tablet   Commonly known as:  LIPITOR   Take 10 mg by mouth daily                                CALCIUM 500 + D 500-200 MG-UNIT Tabs   Take 1 tablet by mouth daily   Generic drug:  Calcium Carb-Cholecalciferol                                 camphor-menthol 0.5-0.5 % Lotn   Commonly known as:  DERMASARRA   Apply topically 2 times daily To both lower legs to dry skin where no drainage present                                cholecalciferol 2000 units tablet   Take 2,000 Units by mouth daily                                ferrous gluconate 324 (38 Fe) MG tablet   Commonly known as:  FERGON   Take 1 tablet (324 mg) by mouth daily (with breakfast)                                FUROSEMIDE PO   Take 60 mg by mouth 2 times daily   Last time this was given:  60 mg on 9/27/2018  8:46 AM                                gabapentin 100 MG capsule   Commonly known as:  NEURONTIN   Take 100 mg by mouth 2 times daily   Last time this was given:  100 mg on 9/27/2018  8:45 AM                                glipiZIDE 5 MG tablet   Commonly known as:  GLUCOTROL   Take 1 tablet (5 mg) by mouth 2 times daily (before meals)   Last time this was given:  5 mg on 9/27/2018  8:47 AM                                ipratropium - albuterol 0.5 mg/2.5 mg/3 mL 0.5-2.5 (3) MG/3ML neb solution   Commonly known as:  DUONEB   Take 1 vial by nebulization 3 times daily as needed                                lactulose 10 GM/15ML solution   Commonly known as:  CHRONULAC   Take 30 mLs daily as needed   Last time this was given:  10 g on 9/26/2018  2:00 PM                                levothyroxine 100 MCG tablet   Commonly known as:  SYNTHROID/LEVOTHROID   Take 1 tablet (100 mcg) by mouth daily   Last time this was given:  100 mcg on 9/27/2018  8:46 AM                                metFORMIN 500 MG tablet   Commonly known as:  GLUCOPHAGE   Take 500 mg by mouth 2 times daily   Last time this was given:  500 mg on 9/27/2018 10:39 AM                                metoprolol tartrate 25 MG tablet   Commonly known as:  LOPRESSOR   Take 25 mg by mouth 2 times daily   Last time this was given:  25 mg on 9/27/2018  8:46 AM                                NITROSTAT 0.4 MG  sublingual tablet   Place 0.4 mg under the tongue 3 times daily as needed   Generic drug:  nitroGLYcerin                                nystatin [id number] UNIT/GM Powd   Commonly known as:  MYCOSTATIN   Apply topically 3 times daily Apply to groin area for yeast growth with every pericare                                omeprazole 20 MG CR capsule   Commonly known as:  priLOSEC   Take 1 capsule (20 mg) by mouth daily   Last time this was given:  20 mg on 9/26/2018 10:30 AM                                polyethylene glycol Packet   Commonly known as:  MIRALAX/GLYCOLAX   Take 17 g by mouth daily   Last time this was given:  17 g on 9/27/2018  8:44 AM                                senna-docusate 8.6-50 MG per tablet   Commonly known as:  SENOKOT-S;PERICOLACE   Take 2 tablets by mouth 2 times daily                                simethicone 80 MG chewable tablet   Commonly known as:  MYLICON   Take 2 tablets (160 mg) by mouth every 6 hours as needed for flatulence or cramping   Last time this was given:  80 mg on 9/26/2018  9:41 PM                                sucralfate 1 GM tablet   Commonly known as:  CARAFATE   Take 1 tablet (1 g) by mouth 4 times daily (before meals and nightly)   Last time this was given:  1 g on 9/27/2018  8:45 AM                                TAMSULOSIN HCL PO   Take 0.4 mg by mouth daily                                triamcinolone 0.5 % cream   Commonly known as:  KENALOG   Apply topically three times a week Mon, Wed, Fri - apply to affected areas                                valsartan 40 MG tablet   Commonly known as:  DIOVAN   Take 20 mg by mouth 2 times daily                                * warfarin 3 MG tablet   Commonly known as:  COUMADIN   Take 3 mg by mouth 3mg on Sun, Mon, Tue, Wed, Fri, Sat   Last time this was given:  5 mg on 9/26/2018  6:13 PM                                * WARFARIN SODIUM PO   Take 3.5 mg by mouth On Thu   Last time this was given:  5 mg on 9/26/2018  6:13  PM                                * Notice:  This list has 2 medication(s) that are the same as other medications prescribed for you. Read the directions carefully, and ask your doctor or other care provider to review them with you.

## 2018-09-25 NOTE — IP AVS SNAPSHOT
Ton Bagley #[id number] (CSN:[id number])  (89 year old M)  (Adm: 18)     [id number]               [facility] OBSERVATION DEPARTMENT: [phone]            Patient Demographics     Patient Name Sex          Age SSN Address Phone    Ton Bagley Male 1927 (89 year old) [id number]   [place] [address] [phone] (Home)  none (Work)  [phone] (Mobile)      Emergency Contact(s)     Name Relation Home Work Mobile    Kelli Bagley Spouse [phone] none none    Felecia Johnson Daughter [phone] none [phone]    Luciano Bagley Son [phone] none [phone]      Admission Information     Attending Provider Admitting Provider Admission Type Admission Date/Time    Benitez Velez MD Young, Benitez Weston MD Emergency 18  2359    Discharge Date Hospital Service Auth/Cert Status Service Area     Observation Incomplete [facility]    Unit Room/Bed Admission Status        OBSERVATION DEPT  Admission (Confirmed)       Admission     Complaint    Chest pain      Hospital Account     Name Acct ID Class Status Primary Coverage    Ton Bagley NEHA [id number] Observation Open MEDICARE - MEDICARE FOR HB SUPPLEMENT            Guarantor Account (for Hospital Account #[id number])     Name Relation to Pt Service Area Active? Acct Type    Ton Bagley  FCS Yes Personal/Family    Address Phone          [address]   [place] [address] [phone](H)  none(O)              Coverage Information (for Hospital Account #[id number])     1. MEDICARE/MEDICARE FOR HB SUPPLEMENT     F/O Payor/Plan Precert #    MEDICARE/MEDICARE FOR HB SUPPLEMENT     Subscriber Subscriber #    Ton Bagley NEHA [id number]    Address Phone    ATTN CLAIMS  [address]  [place] [address] [phone]          2. [employer] FREEDOM PLAN     F/O Payor/Plan Precert #     "[employer] FREEDOM PLAN     Subscriber Subscriber #    Ton Bagley [id number]    Address Phone    [address]  [place] [address] [phone]                                                      INTERAGENCY TRANSFER FORM - PHYSICIAN ORDERS   9/25/2018                       [facility] OBSERVATION DEPARTMENT: [phone]            Attending Provider: Benitez Velez MD     Allergies:  No Known Allergies    Infection:  None   Service:  Observation    Ht:  1.676 m (5' 6\")   Wt:  106.1 kg (234 lb)   Admission Wt:  106.1 kg (234 lb)    BMI:  37.77 kg/m 2   BSA:  2.22 m 2            ED Clinical Impression     Diagnosis Description Comment Added By Time Added    Atypical chest pain [R07.89] Atypical chest pain [R07.89]  Ramírez Betts MD 9/26/2018  1:53 AM    Acute respiratory failure with hypoxia (H) [J96.01] Acute respiratory failure with hypoxia (H) [J96.01]  Ramírez Betts MD 9/26/2018  1:53 AM      Hospital Problems as of 9/27/2018              Priority Class Noted POA    Chest pain Medium  9/26/2018 Yes      Non-Hospital Problems as of 9/27/2018              Priority Class Noted    Cortical senile cataract Medium  9/8/2009    Excess skin of eyelid Medium  9/8/2009    HTN (hypertension) Medium  Unknown    CAD (coronary artery disease) Medium  Unknown    Atrial fibrillation (H) Medium  Unknown    Hyperlipidemia Medium  Unknown    S/P CABG (coronary artery bypass graft) Medium  10/23/2014    Small bowel obstruction Medium  5/13/2017    Sepsis (H) Medium  4/3/2018    Cellulitis Medium  8/1/2018      Code Status History     Date Active Date Inactive Code Status Order ID Comments User Context    9/27/2018 12:14 PM  DNR/DNI [id number]  Rica Rhoeds PA-C Outpatient    9/27/2018 11:41 AM 9/27/2018 12:14 PM DNR/DNI [id number]  Rica Rhodes PA-C Outpatient    8/7/2018 11:53 AM 9/27/2018 11:41 AM DNR/DNI [id number]  Lanie Harvey MD Outpatient "    8/1/2018 12:51 AM 8/7/2018 11:53 AM DNR/DNI [id number]  Simeon Cordero MD Inpatient    4/15/2018 12:52 PM 8/1/2018 12:51 AM DNR/DNI [id number]  Lanie Harvey MD Outpatient    4/3/2018  5:14 PM 4/15/2018 12:52 PM DNR/DNI [id number]  Micki Ackerman PA-C Inpatient    5/28/2017  8:20 AM 4/3/2018  5:14 PM DNR/DNI [id number]  Nirmal Serrano MD Outpatient    5/13/2017 11:04 PM 5/28/2017  8:20 AM DNR/DNI [id number]  Micki Ackerman PA-C Inpatient      Current Code Status     Date Active Code Status Order ID Comments User Context       Prior      Summary of Visit     Reason for your hospital stay       Epigastric abdominal pain likely due to GERD. Serial troponins were negative and cardiac monitoring was unremarkable. abd XR was obtained which showed ileus vs possible early small bowel obstruction. You were passing gas and having bowel movements and tolerating oral intake. This will likely continue to improve on its own, recommend full liquid diet until completely resolved.                Medication Review      CONTINUE these medications which have NOT CHANGED        Dose / Directions Comments    acetaminophen 325 MG tablet   Commonly known as:  TYLENOL        Dose:  650 mg   Take 650 mg by mouth daily as needed for mild pain   Refills:  0        atorvastatin 10 MG tablet   Commonly known as:  LIPITOR        Dose:  10 mg   Take 10 mg by mouth daily   Refills:  0        CALCIUM 500 + D 500-200 MG-UNIT Tabs   Generic drug:  Calcium Carb-Cholecalciferol        Dose:  1 tablet   Take 1 tablet by mouth daily   Refills:  0        camphor-menthol 0.5-0.5 % Lotn   Commonly known as:  DERMASARRA        Apply topically 2 times daily To both lower legs to dry skin where no drainage present   Refills:  0        cholecalciferol 2000 units tablet        Dose:  2000 Units   Take 2,000 Units by mouth daily   Refills:  0        ferrous gluconate 324 (38 Fe) MG tablet   Commonly known as:  FERGON   Used  for:  Iron deficiency anemia, unspecified iron deficiency anemia type        Dose:  324 mg   Take 1 tablet (324 mg) by mouth daily (with breakfast)   Quantity:  100 tablet   Refills:  0        FUROSEMIDE PO        Dose:  60 mg   Take 60 mg by mouth 2 times daily   Refills:  0        gabapentin 100 MG capsule   Commonly known as:  NEURONTIN        Dose:  100 mg   Take 100 mg by mouth 2 times daily   Refills:  0        glipiZIDE 5 MG tablet   Commonly known as:  GLUCOTROL   Used for:  Type 2 diabetes mellitus with complication, without long-term current use of insulin (H)        Dose:  5 mg   Take 1 tablet (5 mg) by mouth 2 times daily (before meals)   Quantity:  30 tablet   Refills:  0        ipratropium - albuterol 0.5 mg/2.5 mg/3 mL 0.5-2.5 (3) MG/3ML neb solution   Commonly known as:  DUONEB        Dose:  1 vial   Take 1 vial by nebulization 3 times daily as needed   Refills:  0        lactulose 10 GM/15ML solution   Commonly known as:  CHRONULAC        Take 30 mLs daily as needed   Refills:  0        levothyroxine 100 MCG tablet   Commonly known as:  SYNTHROID/LEVOTHROID   Used for:  Hypothyroidism, unspecified type        Dose:  100 mcg   Take 1 tablet (100 mcg) by mouth daily   Quantity:  30 tablet   Refills:  0        metFORMIN 500 MG tablet   Commonly known as:  GLUCOPHAGE        Dose:  500 mg   Take 500 mg by mouth 2 times daily   Refills:  0        metoprolol tartrate 25 MG tablet   Commonly known as:  LOPRESSOR        Dose:  25 mg   Take 25 mg by mouth 2 times daily   Refills:  0        NITROSTAT 0.4 MG sublingual tablet   Generic drug:  nitroGLYcerin        Dose:  0.4 mg   Place 0.4 mg under the tongue 3 times daily as needed   Refills:  0        nystatin [id number] UNIT/GM Powd   Commonly known as:  MYCOSTATIN        Apply topically 3 times daily Apply to groin area for yeast growth with every pericare   Refills:  0        omeprazole 20 MG CR capsule   Commonly known as:  priLOSEC   Used for:   Gastroesophageal reflux disease without esophagitis        Dose:  20 mg   Take 1 capsule (20 mg) by mouth daily   Quantity:  30 capsule   Refills:  0        polyethylene glycol Packet   Commonly known as:  MIRALAX/GLYCOLAX        Dose:  17 g   Take 17 g by mouth daily   Refills:  0        senna-docusate 8.6-50 MG per tablet   Commonly known as:  SENOKOT-S;PERICOLACE        Dose:  2 tablet   Take 2 tablets by mouth 2 times daily   Refills:  0        simethicone 80 MG chewable tablet   Commonly known as:  MYLICON   Used for:  Abdominal pain, generalized        Dose:  160 mg   Take 2 tablets (160 mg) by mouth every 6 hours as needed for flatulence or cramping   Quantity:  180 tablet   Refills:  0        sucralfate 1 GM tablet   Commonly known as:  CARAFATE   Used for:  Other acute gastritis without hemorrhage        Dose:  1 g   Take 1 tablet (1 g) by mouth 4 times daily (before meals and nightly)   Quantity:  120 tablet   Refills:  0        TAMSULOSIN HCL PO        Dose:  0.4 mg   Take 0.4 mg by mouth daily   Refills:  0        triamcinolone 0.5 % cream   Commonly known as:  KENALOG        Apply topically three times a week Mon, Wed, Fri - apply to affected areas   Refills:  0        valsartan 40 MG tablet   Commonly known as:  DIOVAN        Dose:  20 mg   Take 20 mg by mouth 2 times daily   Refills:  0        * warfarin 3 MG tablet   Commonly known as:  COUMADIN        Dose:  3 mg   Take 3 mg by mouth 3mg on Sun, Mon, Tue, Wed, Fri, Sat   Refills:  0        * WARFARIN SODIUM PO        Dose:  3.5 mg   Take 3.5 mg by mouth On Thu   Refills:  0        * Notice:  This list has 2 medication(s) that are the same as other medications prescribed for you. Read the directions carefully, and ask your doctor or other care provider to review them with you.            After Care     Activity - Up ad yesenia           Advance Diet as Tolerated       Follow this diet upon discharge: Full liquid diet until abdominal symptoms resolve then  "advance to regular diet as tolerated.       General info for SNF       Length of Stay Estimate: Long Term Care  Condition at Discharge: Stable  Level of care:skilled   Rehabilitation Potential: Fair  Admission H&P remains valid and up-to-date: Yes  Recent Chemotherapy: N/A  Use Nursing Home Standing Orders: Yes       Mantoux instructions       Give two-step Mantoux (PPD) Per Facility Policy Yes             Follow-Up Appointment Instructions     Follow Up and recommended labs and tests       Follow up with primary care provider in 1-2 weeks.  No follow up labs or test are needed.             Statement of Approval     Ordered          09/27/18 1142  I have reviewed and agree with all the recommendations and orders detailed in this document.  EFFECTIVE NOW     Approved and electronically signed by:  Rica Rhodes PA-C                                                 INTERAGENCY TRANSFER FORM - NURSING   9/25/2018                       [facility] OBSERVATION DEPARTMENT: [phone]            Attending Provider: Benitez Velez MD     Allergies:  No Known Allergies    Infection:  None   Service:  Observation    Ht:  1.676 m (5' 6\")   Wt:  106.1 kg (234 lb)   Admission Wt:  106.1 kg (234 lb)    BMI:  37.77 kg/m 2   BSA:  2.22 m 2            Advance Directives        Scanned docmt in ACP Activity?           No scanned doc        Immunizations     None      ASSESSMENT     Discharge Profile Flowsheet     EXPECTED DISCHARGE     FINAL RESOURCES      Expected Discharge Date  09/26/18 (/[facility] LTC) 09/26/18 1054   Resources List  Skilled Nursing Facility 05/26/17 1624    DISCHARGE NEEDS ASSESSMENT     PAS Number  [id number] 04/13/18 1132    Concerns To Be Addressed  all concerns addressed in this encounter 08/07/18 1321   SKIN      Equipment Currently Used at Home  walker, standard 08/01/18 0105   Inspection of bony prominences  Full 09/27/18 1035    # of Referrals Placed by CTS  Post Acute Facilities " "05/23/17 0836   Skin WDL  ex 09/27/18 1035    New Steerage to  Clinics?  No 09/26/18 1053   Skin Color/Characteristics  issa 09/27/18 1035    [facility] Name  [facility] 09/26/18 1053   Skin Temperature  warm 09/27/18 1035    GASTROINTESTINAL (ADULT,PEDIATRIC,OB)     Skin Moisture  dry 09/27/18 1035    GI WDL  ex 09/27/18 1035   Skin Elasticity  quick return to original state 09/27/18 1035    Abdominal Appearance  rounded 09/27/18 1035   Skin Integrity  bruise(s);scab(s) 09/27/18 1035    Last Bowel Movement  09/26/18 09/27/18 1035   SAFETY      GI Signs/Symptoms  abdominal fullness;belching 09/27/18 1035   Safety WDL  WDL 09/27/18 1109    Passing flatus  yes 09/27/18 1035   All Alarms  alarm(s) activated and audible 09/27/18 1109    COMMUNICATION ASSESSMENT     Additional Documentation  -- 09/27/18 0640    Patient's communication style  spoken language (English or Bilingual) 09/26/18 0340                      Assessment WDL (Within Defined Limits) Definitions           Safety WDL     Effective: 09/28/15    Row Information: <b>WDL Definition:</b> Bed in low position, wheels locked; call light in reach; upper side rails up x 2; ID band on<br> <font color=\"gray\"><i>Item=AS safety wdl>>List=AS safety wdl>>Version=F14</i></font>      Skin WDL     Effective: 09/28/15    Row Information: <b>WDL Definition:</b> Warm; dry; intact; elastic; without discoloration; pressure points without redness<br> <font color=\"gray\"><i>Item=AS skin wdl>>List=AS skin wdl>>Version=F14</i></font>      Vitals     Vital Signs Flowsheet     VITAL SIGNS     Best Eye Response  4-->(E4) spontaneous 09/27/18 0144    Temp  97.2  F (36.2  C) 09/27/18 0747   Best Motor Response  6-->(M6) obeys commands 09/27/18 0144    Temp src  Oral 09/27/18 0747   Best Verbal Response  4-->(V4) confused 09/27/18 0144    Resp  18 09/27/18 0747   [place] Coma Scale Score  14 09/27/18 0144    Pulse  76 09/26/18 1311   HEIGHT AND WEIGHT      Heart Rate  82 " "09/27/18 0846   Height  1.676 m (5' 6\") 09/26/18 0326    Pulse/Heart Rate Source  Monitor 09/27/18 0747   Height Method  Stated 09/26/18 0326    BP  128/62 09/27/18 0846   Weight  106.1 kg (234 lb) 09/26/18 0326    BP Location  Right arm 09/27/18 0747   Weight Method  Bed scale 09/26/18 0326    OXYGEN THERAPY     BSA (Calculated - sq m)  2.22 09/26/18 0326    SpO2  93 % 09/27/18 0747   BMI (Calculated)  37.85 09/26/18 0326    O2 Device  Nasal cannula 09/27/18 0747   EKG MONITORING      Oxygen Delivery  1 LPM 09/27/18 0747   Cardiac Regularity  Irregular 09/26/18 0010    PAIN/COMFORT     Cardiac Rhythm  Atrial fibrillation (HR in the 90s) 09/27/18 0426    Patient Currently in Pain  yes 09/27/18 0122   POSITIONING      Preferred Pain Scale  PAINAD (Pain Assessment in Advance Dementia Scale) 09/27/18 0122   Body Position  independently positioning 09/27/18 1109    0-10 Pain Scale  0 09/26/18 0046   Head of Bed (HOB)  HOB at 20-30 degrees 09/27/18 0748    PAINAD Breathing  0-->normal 09/27/18 0122   Chair  Upright in chair 09/27/18 1109    PAINAD Negative Vocalization  1-->occasional moan/groan: low speech, negative/disapproving quality 09/27/18 0122   DAILY CARE      PAINAD Facial Expression  1-->sad, frightened, frown 09/27/18 0122   Activity Management  activity adjusted per tolerance 09/27/18 1109    PAINAD Body Language  1-->tense: distressed pacing, fidgeting 09/27/18 0122   Activity Assistance Provided  assistance, 1 person 09/27/18 1109    PAINAD Consolability  1-->distracted or reassured by voice/touch 09/27/18 0122   Assistive Device Utilized  gait belt;walker 09/27/18 1109    PAINAD Score  4 09/27/18 0122   POINT OF CARE TESTING      Pain Intervention(s)  Ambulation/increased activity 09/27/18 0122   Puncture Site  fingertip 09/26/18 1751    JACINTO COMA SCALE     Bedside Glucose (mg/dl )   132 mg/dl 09/26/18 9841            Patient Lines/Drains/Airways Status    Active LINES/DRAINS/AIRWAYS     Name: " Placement date: Placement time: Site: Days: Last dressing change:    Peripheral IV 09/26/18 Right Upper forearm 09/26/18   0009   Upper forearm   1     Wound 05/14/17 Bilateral Groin Other (comment) RASH 05/14/17   0800   Groin   501     Wound 05/21/17 Penis Blister to underside of penis 05/21/17   1800   Penis   493     Wound 04/10/18 Left;Right Groin Other (comment) Nonblanchable redness to bilateral groin folds and scrotum area 04/10/18   0246   Groin   170     Wound 08/01/18 Left Leg Other (comment) lower legs cellulitis 08/01/18   0900   Leg   57     Rash 05/19/17 0000 back papule 05/19/17   0000    496     Rash 04/12/18 0415 other (see comments) macular;nodule 04/12/18   0415    168     Incision/Surgical Site 05/14/17 Abdomen 05/14/17   1118    501             Patient Lines/Drains/Airways Status    Active PICC/CVC     None            Intake/Output Detail Report     None      Last Void/BM       Most Recent Value    Urine Occurrence 1 at 09/27/2018 0933    Stool Occurrence 1 at 09/27/2018 0000      Case Management/Discharge Planning     Case Management/Discharge Planning Flowsheet     REFERRAL INFORMATION     Description of Support System  Supportive;Involved 09/26/18 1053    Did the Initial Social Work Assessment result in a Social Work Case?  Yes 09/26/18 1053   Support Assessment  Adequate family and caregiver support 09/26/18 1053    Admission Type  observation 09/26/18 1053   COPING/STRESS      Arrived From  long-term care 09/26/18 1053   Major Change/Loss/Stressor  none 08/04/18 1122    Referral Source  physician 09/26/18 1053   EXPECTED DISCHARGE      New Steerage to  Clinics?  No 09/26/18 1053   Expected Discharge Date  09/26/18 (/[facility] LTC) 09/26/18 1054    # of Referrals Placed by CTS  Post Acute Facilities 05/23/17 0836   ASSESSMENT/CONCERNS TO BE ADDRESSED      Reason For Consult  discharge planning 09/26/18 1053   Concerns To Be Addressed  all concerns addressed in this encounter 08/07/18  1321    Record Reviewed  clinical discipline documentation;history and physical;medical record;patient profile;plan of care 09/26/18 1053   FINAL RESOURCES      CTS Assigned to Case  Pavithra WASHINGTON CTS 09/26/18 1053   Equipment Currently Used at Home  walker, standard 08/01/18 0105    [facility] Name  [facility] 09/26/18 1053   Resources List  Skilled Nursing Facility 05/26/17 1624    LIVING ENVIRONMENT     PAS Number  [id number] 04/13/18 1132    Lives With  spouse 09/26/18 1053   ABUSE RISK SCREEN      Living Arrangements  extended care facility 09/26/18 1053   QUESTION TO PATIENT:  Has a member of your family or a partner(now or in the past) intimidated, hurt, manipulated, or controlled you in any way?  no 09/26/18 0007    Able to Return to Prior Living Arrangements  yes 09/26/18 1053   QUESTION TO PATIENT: Do you feel safe going back to the place where you are living?  yes 09/26/18 0007    HOME SAFETY     OBSERVATION: Is there reason to believe there has been maltreatment of a vulnerable adult (ie. Physical/Sexual/Emotional abuse, self neglect, lack of adequate food, shelter, medical care, or financial exploitation)?  no 09/26/18 0007    Patient Feels Safe Living in Home?  yes 09/26/18 1053   OTHER      ASSESSMENT OF FAMILY/SOCIAL SUPPORT     Are you depressed or being treated for depression?  No 09/26/18 0340    Marital Status   09/26/18 1053   HOMICIDE RISK      Who is your support system?  Facility resident(s)/Staff 09/26/18 1053   Feels Like Hurting Others  no 09/26/18 0007                  [facility] OBSERVATION DEPARTMENT: [phone]            Medication Administration Report for Ton Bagley as of 09/27/18 1251   Legend:    Given Hold Not Given Due Canceled Entry Other Actions    Time Time (Time) Time  Time-Action       Inactive    Active    Linked        Medications 09/21/18 09/22/18 09/23/18 09/24/18 09/25/18 09/26/18 09/27/18    furosemide (LASIX) tablet 60 mg  Dose: 60  mg  Freq: 2 TIMES DAILY. Route: PO  Start: 09/26/18 0800   Admin. Amount: 1 tablet (1 × 20 mg tablet) + 1 tablet (1 × 40 mg tablet)  Last Admin: 09/27/18 0846  Dispense Loc: RH ADS MS2A OBS   Mixture Administration Information:   Medication Type Amount   furosemide 20 MG TABS Medications 20 mg   furosemide 40 MG TABS Medications 40 mg                  1038 (60 mg)-Given       1603 (60 mg)-Given        0846 (60 mg)-Given       [ ] 1600           gabapentin (NEURONTIN) capsule 100 mg  Dose: 100 mg  Freq: 2 TIMES DAILY Route: PO  Start: 09/26/18 2000   Admin. Amount: 1 capsule (1 × 100 mg capsule)  Last Admin: 09/27/18 0845  Dispense Loc: RH ADS MS2A OBS          2021 (100 mg)-Given        0845 (100 mg)-Given       [ ] 2000           glipiZIDE (GLUCOTROL) tablet 5 mg  Dose: 5 mg  Freq: 2 TIMES DAILY BEFORE MEALS Route: PO  Start: 09/26/18 0730   Admin Instructions: Take before or with meals.    Admin. Amount: 1 tablet (1 × 5 mg tablet)  Last Admin: 09/27/18 0847  Dispense Loc: RH ADS MS2A OBS          1038 (5 mg)-Given       1603 (5 mg)-Given        0847 (5 mg)-Given       [ ] 1630           glucose gel 15-30 g  Dose: 15-30 g  Freq: EVERY 15 MIN PRN Route: PO  PRN Reason: low blood sugar  Start: 09/26/18 0509   Admin Instructions: Give 15 g for BG 51 to 69 mg/dL IF patient is conscious and able to swallow. Give 30 g for BG less than or equal to 50 mg/dL IF patient is conscious and able to swallow. Do NOT give glucose gel via enteral tube.  IF patient has enteral tube: give apple juice 120 mL (4 oz or 15 g of CHO) via enteral tube for BG 51 to 69 mg/dL.  Give apple juice 240 mL (8 oz or 30 g of CHO) via enteral tube for BG less than or equal to 50 mg/dL.    ~Oral gel is preferable for conscious and able to swallow patient.   ~IF gel unavailable or patient refuses may provide apple juice 120 mL (4 oz or 15 g of CHO). Document juice on I and O flowsheet.    Admin. Amount: 15-30 g  Dispense Loc: RH ADS MS2A OBS  Volume:  93.75 mL              Or  dextrose 50 % injection 25-50 mL  Dose: 25-50 mL  Freq: EVERY 15 MIN PRN Route: IV  PRN Reason: low blood sugar  Start: 09/26/18 0509   Admin Instructions: Use if have IV access, BG less than 70 mg/dL and meet dose criteria below:  Dose if conscious and alert (or disorientated) and NPO = 25 mL  Dose if unconscious / not alert = 50 mL  Vesicant. For ordered doses up to 25 g, give IV Push undiluted. Give each 5g over 1 minute.    Admin. Amount: 25-50 mL  Dispense Loc:  ADS MS2A OBS  Infused Over: 1-5 Minutes  Volume: 50 mL              Or  glucagon injection 1 mg  Dose: 1 mg  Freq: EVERY 15 MIN PRN Route: SC  PRN Reason: low blood sugar  PRN Comment: May repeat x 1 only  Start: 09/26/18 0509   Admin Instructions: May give SQ or IM. ONLY use glucagon IF patient has NO IV access AND is UNABLE to swallow AND blood glucose is LESS than or EQUAL to 50 mg/dL.  If ordered IV, give IV Push over 1 minute. Reconstitute with 1mL sterile water.    Admin. Amount: 1 mg  Dispense Loc:  Main Pharmacy               insulin aspart (NovoLOG) inj (RAPID ACTING)  Dose: 1-5 Units  Freq: AT BEDTIME Route: SC  Start: 09/26/18 0511   Admin Instructions: MEDIUM INSULIN RESISTANCE DOSING    Do Not give Bedtime Correction Insulin if BG less than  200.   For  - 249 give 1 units.   For  - 299 give 2 units.   For  - 349 give 3 units.   For  -399 give 4 units.   For BG greater than or equal to 400 give 5 units.  Notify provider if glucose greater than or equal to 350 mg/dL after administration of correction dose.  If given at mealtime, administer within 30 minutes of start of meal    Admin. Amount: 1-5 Units  Dispense Loc: Contact Rx for dose  Volume: 3 mL          (2011)-Not Given [C]       (7882)-Not Given [C]        [ ] 2200           insulin aspart (NovoLOG) inj (RAPID ACTING)  Dose: 1-7 Units  Freq: 3 TIMES DAILY BEFORE MEALS Route: SC  Start: 09/26/18 0730   Admin Instructions: Correction  Scale - MEDIUM INSULIN RESISTANCE DOSING     Do Not give Correction Insulin if Pre-Meal BG less than 140.   For Pre-Meal  - 189 give 1 unit.   For Pre-Meal  - 239 give 2 units.   For Pre-Meal  - 289 give 3 units.   For Pre-Meal  - 339 give 4 units.   For Pre-Meal - 399 give 5 units.   For Pre-Meal -449 give 6 units  For Pre-Meal BG greater than or equal to 450 give 7 units.   To be given with prandial insulin, and based on pre-meal blood glucose.    Notify provider if glucose greater than or equal to 350 mg/dL after administration of correction dose.  If given at mealtime, administer within 30 minutes of start of meal    Admin. Amount: 1-7 Units  Last Admin: 09/27/18 1041  Dispense Loc: Contact Rx for dose  Volume: 3 mL          1050 (2 Units)-Given       1358 (1 Units)-Given       (1752)-Not Given [C]        1041 (1 Units)-Given       [ ] 1200       [ ] 1700           ipratropium - albuterol 0.5 mg/2.5 mg/3 mL (DUONEB) neb solution 3 mL  Dose: 1 vial  Freq: EVERY 4 HOURS PRN Route: NEBULIZATION  PRN Reasons: wheezing,shortness of breath / dyspnea  Start: 09/26/18 0507   Admin. Amount: 3 mL  Dispense Loc: [facility] ADS MS2A OBS  Volume: 3 mL               levothyroxine (SYNTHROID/LEVOTHROID) tablet 100 mcg  Dose: 100 mcg  Freq: DAILY Route: PO  Start: 09/26/18 0800   Admin Instructions: Separate oral administration of iron- or calcium-containing products and levothyroxine by at least 4 hours.    Admin. Amount: 1 tablet (1 × 100 mcg tablet)  Last Admin: 09/27/18 0846  Dispense Loc: [facility] ADS MS2A OBS          1030 (100 mcg)-Given        0846 (100 mcg)-Given           lidocaine (viscous) (XYLOCAINE) 2 % 15 mL, alum & mag hydroxide-simethicone (MYLANTA ES/MAALOX  ES) 15 mL GI Cocktail  Dose: 30 mL  Freq: 3 TIMES DAILY PRN Route: PO  PRN Reason: moderate pain  Start: 09/26/18 1511   Admin. Amount: 30 mL  Dispense Loc: [facility] ADS MS2A OBS  Volume: 30 mL   Mixture Administration Information:    Medication Type Amount   lidocaine (viscous) 2 % SOLN Additives 15 mL   alum & mag hydroxide-simethicone 400-400-40 MG/5ML SUSP Additives 15 mL                       losartan (COZAAR) half-tab 12.5 mg  Dose: 12.5 mg  Freq: 2 TIMES DAILY Route: PO  Start: 09/26/18 2000   Admin Instructions: Hold for SBP < 120    Autosub for Valsartan 20 mg bid due to July 2018 recall    Admin. Amount: 1 half-tab (1 × 12.5 mg half-tab)  Last Admin: 09/27/18 0845  Dispense Loc: [facility] ADS MS2A OBS          2021 (12.5 mg)-Given        0845 (12.5 mg)-Given       [ ] 2000           metFORMIN (GLUCOPHAGE) tablet 500 mg  Dose: 500 mg  Freq: 2 TIMES DAILY WITH MEALS Route: PO  Start: 09/26/18 1800   Admin Instructions: If the patient receives intravenous, iodinated contrast and patient GFR is greater than 60 mL/min/1.7m2, continue metformin.  Contact provider for 'hold' or 'no hold' instructions if no GFR or if GFR is less than 60 mL/min/1.7m2.    Admin. Amount: 1 tablet (1 × 500 mg tablet)  Last Admin: 09/27/18 1039  Dispense Loc: [facility] ADS MS2A OBS          1813 (500 mg)-Given        1039 (500 mg)-Given       [ ] 1800           metoprolol tartrate (LOPRESSOR) tablet 25 mg  Dose: 25 mg  Freq: 2 TIMES DAILY Route: PO  Start: 09/26/18 0800   Admin. Amount: 1 tablet (1 × 25 mg tablet)  Last Admin: 09/27/18 0846  Dispense Loc: [facility] ADS MS2A OBS          1038 (25 mg)-Given       2021 (25 mg)-Given        0846 (25 mg)-Given       [ ] 2000           pantoprazole (PROTONIX) 40 mg IV push injection  Dose: 40 mg  Freq: DAILY WITH BREAKFAST Route: IV  Start: 09/26/18 1511   Admin Instructions: Irritant. For ordered IV doses 1-40 mg, give IV Push over 2 minutes when reconstituted with 10mL NS.    Admin. Amount: 40 mg  Last Admin: 09/27/18 0845  Dispense Loc: [facility] ADS MS2A OBS  Volume: 10 mL   Current Line: Peripheral IV 09/26/18 Right Upper forearm         1602 (40 mg)-Given        0845 (40 mg)-Given           polyethylene glycol (MIRALAX/GLYCOLAX) Packet 17  g  Dose: 17 g  Freq: 2 TIMES DAILY Route: PO  Start: 09/26/18 2000   Admin Instructions: 1 Packet = 17 grams. Mixed prescribed dose in 8 ounces of water. Follow with 8 oz. of water.    Admin. Amount: 17 g  Last Admin: 09/27/18 0844  Dispense Loc:  ADS MS2A OBS          2021 (17 g)-Given        0844 (17 g)-Given       [ ] 2000           sennosides (SENOKOT) tablet 1-2 tablet  Dose: 1-2 tablet  Freq: 2 TIMES DAILY Route: PO  Start: 09/26/18 2000   Admin. Amount: 1-2 tablet  Last Admin: 09/27/18 0846  Dispense Loc:  ADS MS2A OBS          2021 (1 tablet)-Given        0846 (1 tablet)-Given       [ ] 2000           simethicone (MYLICON) chewable tablet 80 mg  Dose: 80 mg  Freq: EVERY 6 HOURS PRN Route: PO  PRN Reason: cramping  Start: 09/26/18 1243   Admin. Amount: 1 tablet (1 × 80 mg tablet)  Last Admin: 09/26/18 2141  Dispense Loc:  ADS MS2A OBS          2141 (80 mg)-Given            sucralfate (CARAFATE) tablet 1 g  Dose: 1 g  Freq: 4 TIMES DAILY BEFORE MEALS & NIGHTLY Route: PO  Start: 09/26/18 0730   Admin Instructions: Recommended to take before meals.    Admin. Amount: 1 tablet (1 × 1 g tablet)  Last Admin: 09/27/18 0845  Dispense Loc:  ADS MS2A OBS          1039 (1 g)-Given              1603 (1 g)-Given       2135 (1 g)-Given        0845 (1 g)-Given       [ ] 1130       [ ] 1630       [ ] 2200           Warfarin Therapy Reminder (Check START DATE - warfarin may be starting in the FUTURE)  Dose: 1 each  Freq: CONTINUOUS PRN Route: XX  Start: 09/26/18 0508   Admin Instructions: *Note to reorder warfarin daily*  Pharmacy Warfarin Dosing Service  Patient is on Warfarin Therapy - check for daily order    Admin. Amount: 1 each  Dispense Loc:  Main Pharmacy              Future Medications  Medications 09/21/18 09/22/18 09/23/18 09/24/18 09/25/18 09/26/18 09/27/18       warfarin (COUMADIN) tablet 5 mg  Dose: 5 mg  Freq: ONCE AT 6PM Route: PO  Start: 09/27/18 1800   Admin. Amount: 1 tablet (1 × 5 mg  tablet)  Dispense Loc: [facility] ADS MS2A OBS  Administrations Remainin           [ ] 1800          Completed Medications  Medications 18         Dose: 100 mL  Freq: ONCE Route: IV  Last Dose: Stopped (18)  Start: 18   End: 18   Admin. Amount: 100 mL  Last Admin: 18  Dispense Loc: [facility] ADS ERA  Administrations Remainin  Volume: 100 mL   Current Line: Peripheral IV 18 Right Upper forearm         0222 (100 mL)-New Bag [C]       0231-Stopped              Dose: 500 mL  Freq: ONCE Route: IV  Last Dose: Stopped (18)  Start: 18   End: 18   Admin. Amount: 500 mL  Last Admin: 18  Dispense Loc: [facility] FS ER  Infused Over: 30 Minutes  Administrations Remainin  Volume: 500 mL   Current Line: Peripheral IV 18 Right Upper forearm         0135 (500 mL)-New Bag       023-Stopped              Dose: 500 mL  Freq: ONCE Route: IV  Start: 18   End: 18   Admin. Amount: 500 mL  Last Admin: 18  Dispense Loc: [facility] Floor Stock  Administrations Remainin  Volume: 500 mL   Current Line: Peripheral IV 18 Right Upper forearm         0222 (83 mL)-Given [C]              Dose: 10 g  Freq: ONCE Route: PO  Start: 18 1252   End: 18 1400   Admin. Amount: 10 g = 15 mL Conc: 10 g/15 mL  Last Admin: 18 1400  Dispense Loc: [facility] ADS MS2A OBS  Administrations Remainin  Volume: 30 mL          1400 (10 g)-Given              Dose: 5 mg  Freq: ONCE AT 6PM Route: PO  Start: 18 1800   End: 18   Admin. Amount: 1 tablet (1 × 5 mg tablet)  Last Admin: 18  Dispense Loc: [facility] ADS MS2A OBS  Administrations Remainin          1813 (5 mg)-Given           Discontinued Medications  Medications 18         Dose: 30 mL  Freq: 3 TIMES DAILY PRN Route: PO  PRN Comment:  heartburn  Start: 09/26/18 0508   End: 09/26/18 1714   Admin. Amount: 30 mL  Last Admin: 09/26/18 1050  Dispense Loc: [facility] ADS MS2A OBS  Volume: 30 mL   Mixture Administration Information:   Medication Type Amount   lidocaine (viscous) 2 % SOLN Additives 15 mL   alum & mag hydroxide-simethicone 400-400-40 MG/5ML SUSP Additives 15 mL                  1050 (30 mL)-Given       1714-Med Discontinued          Dose: 5 mg  Freq: DAILY Route: PO  Start: 09/26/18 0800   End: 09/26/18 0936   Admin. Amount: 1 tablet (1 × 5 mg tablet)                 0936-Med Discontinued          Dose: 20 mg  Freq: DAILY Route: PO  Start: 09/26/18 0800   End: 09/26/18 1510   Admin. Amount: 1 capsule (1 × 20 mg capsule)  Last Admin: 09/26/18 1030  Dispense Loc:  ADS MS2A OBS          1030 (20 mg)-Given       1510-Med Discontinued          Dose: 20 mg  Freq: 2 TIMES DAILY Route: PO  Start: 09/26/18 2000   End: 09/26/18 1615   Admin Instructions: Hold for SBP < 120    Admin. Amount: 1 half-tab (1 × 20 mg half-tab)  Dispense Loc:  Main Pharmacy          1615-Med Discontinued                 INTERAGENCY TRANSFER FORM - NOTES (H&P, Discharge Summary, Consults, Procedures, Therapies)   9/25/2018                       [facility] OBSERVATION DEPARTMENT: [phone]               History & Physicals      H&P by Lindsey Dao MD at 9/26/2018  6:10 AM     Author:  Lindsey Dao MD Service:  Hospitalist Author Type:  Physician    Filed:  9/26/2018  6:10 AM Date of Service:  9/26/2018  6:10 AM Creation Time:  9/26/2018  6:01 AM    Status:  Signed :  Lindsey Dao MD (Physician)                        [facility]  Hospitalist Admission Note  September 26, 2018  Name: Ton Bagley    MRN: [id number]  YOB: 1927    Age: 89 year old  Date of admission: 9/25/2018  Primary care provider: Jose Shell      Summary:  Patient is a 89-year-old gentleman with a known history of coronary disease,  hypertension, chronic atrial fibrillation on anticoagulation, type 2 diabetes, and hyperlipidemia who presented here with some chest pain.    Problem list/Plan:  1. Chest pain: Atypical for angina and likely GI related.  More so epigastric in nature per nursing staff report.  INR is somewhat subtherapeutic but CT PE protocol was negative.  Will get 2 sets of troponins.  Continue usual chronic cardiac medications which includes lisinopril and metoprolol.  Will be holding his statin while under observation.  We will continue his omeprazole and sulcrafate.  As needed GI cocktail.  Do not suspect that he will need a stress test for further risk stratification as symptoms is unlikely cardiac in nature.  He was mildly hypoxic in the ED but currently oxygen saturations are okay on room air.  2. Type 2 diabetes: Continue glipizide and will start him on a medium intensity sliding scale  3. Hypothyroidism: Continue Synthroid  4. Hypertension: Continue lisinopril and metoprolol  5. Chronic atrial fibrillation: Rate currently controlled.  INR bit subtherapeutic.  If patient is observed overnight, will have pharmacy dose Coumadin per protocol.    All lab work and imaging data independently reviewed by myself    Prophylaxis;  Coumadin/Ambulation.     Code status: Previously and consistently DNR/DNI but unable to discuss at this time    Discharge: Back to care facility    Chief Complaint:   Chest pain   HPI history is limited as patient is quite somnolent at this point in time and assisted by signout report with the ED physician.  Patient is a 89-year-old gentleman with a known history of coronary disease, hypertension, chronic atrial fibrillation on anticoagulation, type 2 diabetes, and hyperlipidemia who presented here with some chest pain.  He reports that he was eating a pizza last evening and developed some sharp chest pain across his chest which prompted his facility to call 911.  Shortly after calling, the patient reports  that his symptoms have subsided and has been somewhat intermittent.  Nitroglycerin was given en route with no change in his symptoms.  His symptoms have somewhat abated but on arrival to the floor, it was more epigastric in nature. He denies any shortness of breath, nausea, vomiting, or diarrhea.  Initial workup in the ED was fairly unremarkable in which a troponin was negative.  D-dimer was borderline and CT of the chest PE protocol was negative for a PE or acute cardiopulmonary disease.  Patient will be admitted under observation.    I did discuss the case in detail with the ED physician.     Past Medical History:     Past Medical History:   Diagnosis Date     Atrial fibrillation (H)     cardioversion 2007     CAD (coronary artery disease)     CABG, stent x2 2004     HTN (hypertension)      Hyperlipidemia      Past Surgical History:     Past Surgical History:   Procedure Laterality Date     BYPASS GRAFT ARTERY CORONARY  1994    LIMA to LAD and saphenous vein grafts to Diag 1 OM 1 PDA     CARDIOVERSION  2007     LAPAROTOMY EXPLORATORY N/A 5/14/2017    Procedure: LAPAROTOMY EXPLORATORY;  Exploratory laparotomy with peritoneal washout. ;  Surgeon: Jorge Dias MD;  Location: [facility] OR     STENT, CORONARY, S660 15/18  2004    stent placement of SVG to RCA and OM2     Social History:     Social History   Substance Use Topics     Smoking status: Former Smoker     Smokeless tobacco: Not on file      Comment: 1973     Alcohol use Yes      Comment: wine daily     Family History:  Family history reviewed. NO pertinent family history     Allergies:   No Known Allergies  Medications:     Prescriptions Prior to Admission   Medication Sig Dispense Refill Last Dose     acetaminophen (TYLENOL) 325 MG tablet Take 650 mg by mouth daily as needed for mild pain   7/31/2018 at 13:38pm     atorvastatin (LIPITOR) 10 MG tablet Take 10 mg by mouth daily   7/30/2018 at unknown     Calcium Carb-Cholecalciferol (CALCIUM 500+D) 500-200  MG-UNIT TABS Take 1 tablet by mouth 2 times daily   7/31/2018 at 8:00am     ferrous gluconate (FERGON) 324 (38 FE) MG tablet Take 1 tablet (324 mg) by mouth daily (with breakfast) 100 tablet  7/31/2018 at 8:00am     FUROSEMIDE PO Take 60 mg by mouth 2 times daily   7/31/2018 at Unknown time     gabapentin (NEURONTIN) 300 MG capsule Take 300 mg by mouth 2 times daily   7/31/18 at 8:00am     glipiZIDE (GLUCOTROL) 5 MG tablet Take 1 tablet (5 mg) by mouth 2 times daily (before meals) 30 tablet  7/31/2018 at 8:00am     ipratropium - albuterol 0.5 mg/2.5 mg/3 mL (DUONEB) 0.5-2.5 (3) MG/3ML neb solution Take 1 vial by nebulization 3 times daily as needed    at unknown     lactulose (CHRONULAC) 10 GM/15ML solution Take 30 mLs daily as needed    at unknown     levothyroxine (SYNTHROID/LEVOTHROID) 100 MCG tablet Take 1 tablet (100 mcg) by mouth daily 30 tablet  7/31/2018 at 6:00am     lisinopril (PRINIVIL/ZESTRIL) 5 MG tablet Take 1 tablet (5 mg) by mouth daily   7/31/2018 at 8:00am     metoprolol tartrate (LOPRESSOR) 25 MG tablet Take 25 mg by mouth 2 times daily   7/31/2018 at 8:00am     nitroGLYcerin (NITROSTAT) 0.4 MG sublingual tablet Place 0.4 mg under the tongue 3 times daily as needed    at unknown     nystatin (MYCOSTATIN) 063711 UNIT/GM POWD Apply to groin area for yeast growth with every pericare   7/31/2018 at pm     omeprazole (PRILOSEC) 20 MG CR capsule Take 1 capsule (20 mg) by mouth daily 30 capsule  7/31/2018 at 8:00am     polyethylene glycol (MIRALAX/GLYCOLAX) Packet Take 17 g by mouth daily   7/31/2018 at 8:00am     senna-docusate (SENOKOT-S;PERICOLACE) 8.6-50 MG per tablet Take 2 tablets by mouth 2 times daily   7/31/2018 at 8:00am     simethicone (MYLICON) 80 MG chewable tablet Take 2 tablets (160 mg) by mouth every 6 hours as needed for flatulence or cramping 180 tablet   at unknown     sucralfate (CARAFATE) 1 GM tablet Take 1 tablet (1 g) by mouth 4 times daily (before meals and nightly) 120 tablet 0  "7/31/2018 at 17:00pm     TAMSULOSIN HCL PO Take 0.4 mg by mouth daily   7/31/2018 at Unknown time     warfarin (COUMADIN) 2 MG tablet Take 4 mg by mouth daily Take 4 mg on Sun, Tues, Thurs, Fri, Sat    7/31/2018 at pm     warfarin (COUMADIN) 5 MG tablet Take 5 mg by mouth daily Take 5 mg on Mon, Wed 7/30/2018 at pm       Review of Systems:   A Comprehensive greater than 10 system review of systems was carried out.  Pertinent positives and negatives are noted above.  Otherwise negative for contributory information.        Physical Exam:  Blood pressure 125/55, pulse 91, temperature 96  F (35.6  C), temperature source Oral, resp. rate 16, height 1.676 m (5' 6\"), weight 106.1 kg (234 lb), SpO2 93 %.  Gen: Pleasant, but somnolent.  Otherwise, in no acute distress.  HEENT: NCAT. EOMI. PERRL.  Neck: Normal inspection. No bruit, JVD or thyromegaly.  Lungs: Normal respiratory effort. Clear to auscultation bilaterally with no crackles or wheezes.  Card: N s1s2. RRR. No M/R/G.  Peripheral pulses present and symmetric.   Abd: Soft NT ND. No mass. Normal bowel sounds.  Skin: No rash. Warm to the touch  Extr: No edema. CMS intact  Psychiatric: Patient alert oriented ×3.  Normal affect  Neurologic: Cranial nerves II-XII are intact.     Data:[DH1.1]       Recent Labs  Lab 09/26/18  0014   WBC 9.9   HGB 12.1*   HCT 38.4*   MCV 96          Recent Labs  Lab 09/26/18  0014      POTASSIUM 3.7   CHLORIDE 95   CO2 30   ANIONGAP 8   *   BUN 23   CR 1.16   GFRESTIMATED 59*   GFRESTBLACK 71   JANN 8.7   PROTTOTAL 7.8   ALBUMIN 3.4   BILITOTAL 1.2   ALKPHOS 83   AST 14   ALT 17       Recent Labs  Lab 09/26/18  0014   TROPI <0.015[DH1.2]       Imaging:[DH1.1]   Recent Results (from the past 24 hour(s))   Chest XR,  PA & LAT    Narrative    CHEST 2 VIEWS  9/26/2018 12:38 AM     HISTORY: Chest pain. Cough.    COMPARISON: 7/31/2018.    FINDINGS: Hypoinflated lungs. No convincing pulmonary opacities.  Borderline cardiomegaly " again noted. Atherosclerotic calcification in  the thoracic aorta. Prior median sternotomy.      Impression    IMPRESSION: No convincing evidence of active cardiopulmonary disease.    CASTRO JOHNSON MD   Chest CT, IV contrast only - PE protocol    Narrative    CT CHEST WITH CONTRAST  9/26/2018 2:38 AM     HISTORY: Hypoxic. Chest pain.    COMPARISON: 4/3/2018 - CT chest, abdomen and pelvis.    TECHNIQUE: Following the uneventful administration of 83 mL Isovue-370  intravenous contrast, helical sections were acquired through the lungs  according to the pulmonary embolism protocol. Coronal reconstructions  were generated. Radiation dose for this scan was reduced using  automated exposure control, adjustment of the mA and/or kV according  to the patient's size, or iterative reconstruction technique.    FINDINGS: No visualized pulmonary embolus. The thoracic aorta is  normal in caliber without dissection. Atherosclerotic calcification in  the thoracic aorta and coronary arteries. Mild cardiomegaly. Prior  median sternotomy and coronary artery bypass surgery.    A few small hazy opacities in bilateral lung bases most likely  represent atelectasis. Small calcified granuloma in the right lung  base. No pleural or pericardial effusion. No enlarged lymph nodes in  the chest.    Scan through the upper abdomen is significant for partial  visualization of a few left renal cysts, the largest measuring 6.5 cm.  Prior cholecystectomy.      Impression    IMPRESSION:  1. No visualized pulmonary embolus.  2. No convincing evidence of active pulmonary disease.    CASTRO JOHNSON MD[DH1.2]       Lindsey Dao MD Pager [phone][DH1.1]         Revision History        User Key Date/Time User Provider Type Action    > DH1.2 9/26/2018  6:10 AM Lindsey Dao MD Physician Sign     DH1.1 9/26/2018  6:01 AM Lindsey Dao MD Physician                   Discharge Summaries     No notes of this type exist for this encounter.         Consult Notes       Consults by Abimbola Lin RN at 9/26/2018 11:01 AM     Author:  Abimbola Lin RN Service:  (none) Author Type:      Filed:  9/26/2018 11:01 AM Date of Service:  9/26/2018 11:01 AM Creation Time:  9/26/2018 10:55 AM    Status:  Signed :  Abimbola Lin RN ()     Consult Orders:    1. Social Work IP Consult [[id number]] ordered by Juliana Chapin PA-C at 09/26/18 0632                 consult for return to facility. Pt resides at [facility] with his wife. CM confirmed bedhold at [facility] [phone] with Aimee in admissions. CM will fax discharge orders to [facility] when discharge orders are in. Family will provided transportation at discharge. CM spoke with son Luciano who could provided transport this morning or pts daughter Felecia can provide transport in the afternoon.    CM will continue to follow with discharge planning.    Pavithra Lin RN, BSN CTS  Care Coordinator  [phone][GG1.1]         Revision History        User Key Date/Time User Provider Type Action    > GG1.1 9/26/2018 11:01 AM Abimbola Lin RN Case Manager Sign                     Progress Notes - Physician (Notes for yesterday and today)      Progress Notes by Juliana Chapin PA-C at 9/26/2018  6:19 PM     Author:  Juliana Chapin PA-C Service:  Hospitalist Author Type:  Physician Assistant - C    Filed:  9/26/2018  6:48 PM Date of Service:  9/26/2018  6:19 PM Creation Time:  9/26/2018  6:19 PM    Status:  Addendum :  Juliana Chapin PA-C (Physician Assistant - C)         [facility]  Hospitalist Progress Note  Juliana Chapin PA-C 09/26/2018    Reason for Stay (Diagnosis): epigastric pain         Assessment and Plan:      Summary of Stay: Ton Bagley is a 89 year old male admitted on 9/25/2018 with dementia, CAD (4-vessel CABG 1994), HTN, chronic atrial fibrillation on warfarin, DM II, and HLD with epigastric discomfort and frequent belching after  eating pizza.    Problem List:   1. Epigastric pain and belching: Patient reports as chest pain but then points to epigastrium and extends straight down mid-abdomen. Started after he ate pizza per report from his son, who notes that similar episode happened the last time he ate pizza and that he probably shouldn't eat that anymore. ECG on admission showed his chronic a fib w/ CVR. D dimer in ER was mildly elevated but CT PE protocol negative. Serial troponins undetectable. His discomfort does seem more GI related- wonder if he also has a component of constipation. He is frequently belching and feels moderately distended despite po omeprazole, sucralfate, and GI cocktail. He does not recall when his last bowel movement was.  - IV Protonix 40 mg daily  - Continue QID carafate and prn GI cocktail  - BID sennakot and miralax  - Gave lactulose x 1 this afternoon  - ADAT  - Called and updated son    2. CAD: He has a history of documented coronary disease, having had coronary artery bypass surgery in 1994 with a mammary artery to the LAD and individual vein grafts to the 1st diagonal, the 1st obtuse marginal and the distal RCA.  His LV function has been normal and he has not had a history of heart failure. He has followed with Dr. Conde in the past (most recent appointment was 10/2014) and has been instructed to follow up as needed as he has been stable for many years.    3. Dementia: Son confirms that he has this diagnosis. He is oriented to self. Very forgetful and very pleasant, not sure of the year but can tell me he is in the hospital.    4. DM II: Resumed PTA glipizide and metformin (lactic acid recheck was normalized, initially elevated in ER). Also has moderate sliding scale insulin in place. Continue scheduled glucose checks.    5. Hypothyroidism: Continue synthroid.    6. Hypertension: Continue lisinopril and metoprolol.    7. Chronic a fib: Rate controlled. Pharmacy dosing coumadin.    DVT Prophylaxis:  "Pneumatic Compression Devices  Code Status: DNR / DNI  Discharge Dispo: Anticipate discharge tomorrow      Interval History (Subjective):      Patient frequently belching. He reports discomfort in epigastrium that radiates down into his abdomen generally. No shortness of breath, gets pain with deep inspiration (PE ruled out in ER). No palpitations. No fever or chills. He has a cold, per his son, and has been coughing up yellow phlegm today. He denies nausea, vomiting, or diarrhea. He does not recall when his last BM was. He does not recall if there are any sick contacts.                  Physical Exam:      Last Vital Signs:  /75 (BP Location: Right arm)  Pulse 76  Temp 97.6  F (36.4  C) (Oral)  Resp 16  Ht 1.676 m (5' 6\")  Wt 106.1 kg (234 lb)  SpO2 92%  BMI 37.77 kg/m2    Constitutional: Awake, alert, cooperative, no apparent distress   Respiratory: Clear to auscultation bilaterally, no crackles or wheezing   Cardiovascular: Regular rate and rhythm, normal S1 and S2, and no murmur noted   Abdomen: Normal bowel sounds, soft, moderately distended, moderately tender over epigastrium, otherwise seems to be rather non-tender   Skin: No rashes, no cyanosis, dry to touch   Neuro: Alert and oriented x3, no weakness or numbness, does have memory loss   Extremities: No edema, normal range of motion   Other(s):        All other systems: Negative          Medications:      All current medications were reviewed with changes reflected in problem list.         Data:      All new lab and imaging data was reviewed.   Labs & Imaging:[EM1.1]  Results for orders placed or performed during the hospital encounter of 09/25/18   Chest XR,  PA & LAT    Narrative    CHEST 2 VIEWS  9/26/2018 12:38 AM     HISTORY: Chest pain. Cough.    COMPARISON: 7/31/2018.    FINDINGS: Hypoinflated lungs. No convincing pulmonary opacities.  Borderline cardiomegaly again noted. Atherosclerotic calcification in  the thoracic aorta. Prior median " sternotomy.      Impression    IMPRESSION: No convincing evidence of active cardiopulmonary disease.    CASTRO JOHNSON MD   Chest CT, IV contrast only - PE protocol    Narrative    CT CHEST WITH CONTRAST  9/26/2018 2:38 AM     HISTORY: Hypoxic. Chest pain.    COMPARISON: 4/3/2018 - CT chest, abdomen and pelvis.    TECHNIQUE: Following the uneventful administration of 83 mL Isovue-370  intravenous contrast, helical sections were acquired through the lungs  according to the pulmonary embolism protocol. Coronal reconstructions  were generated. Radiation dose for this scan was reduced using  automated exposure control, adjustment of the mA and/or kV according  to the patient's size, or iterative reconstruction technique.    FINDINGS: No visualized pulmonary embolus. The thoracic aorta is  normal in caliber without dissection. Atherosclerotic calcification in  the thoracic aorta and coronary arteries. Mild cardiomegaly. Prior  median sternotomy and coronary artery bypass surgery.    A few small hazy opacities in bilateral lung bases most likely  represent atelectasis. Small calcified granuloma in the right lung  base. No pleural or pericardial effusion. No enlarged lymph nodes in  the chest.    Scan through the upper abdomen is significant for partial  visualization of a few left renal cysts, the largest measuring 6.5 cm.  Prior cholecystectomy.      Impression    IMPRESSION:  1. No visualized pulmonary embolus.  2. No convincing evidence of active pulmonary disease.    CASTRO JOHNSON MD   CBC with platelets differential   Result Value Ref Range    WBC 9.9 4.0 - 11.0 10e9/L    RBC Count 4.02 (L) 4.4 - 5.9 10e12/L    Hemoglobin 12.1 (L) 13.3 - 17.7 g/dL    Hematocrit 38.4 (L) 40.0 - 53.0 %    MCV 96 78 - 100 fl    MCH 30.1 26.5 - 33.0 pg    MCHC 31.5 31.5 - 36.5 g/dL    RDW 13.2 10.0 - 15.0 %    Platelet Count 218 150 - 450 10e9/L    Diff Method Automated Method     % Neutrophils 76.2 %    % Lymphocytes 10.4 %    %  Monocytes 12.0 %    % Eosinophils 0.6 %    % Basophils 0.2 %    % Immature Granulocytes 0.6 %    Nucleated RBCs 0 0 /100    Absolute Neutrophil 7.5 1.6 - 8.3 10e9/L    Absolute Lymphocytes 1.0 0.8 - 5.3 10e9/L    Absolute Monocytes 1.2 0.0 - 1.3 10e9/L    Absolute Eosinophils 0.1 0.0 - 0.7 10e9/L    Absolute Basophils 0.0 0.0 - 0.2 10e9/L    Abs Immature Granulocytes 0.1 0 - 0.4 10e9/L    Absolute Nucleated RBC 0.0    Basic metabolic panel   Result Value Ref Range    Sodium 133 133 - 144 mmol/L    Potassium 3.7 3.4 - 5.3 mmol/L    Chloride 95 94 - 109 mmol/L    Carbon Dioxide 30 20 - 32 mmol/L    Anion Gap 8 3 - 14 mmol/L    Glucose 281 (H) 70 - 99 mg/dL    Urea Nitrogen 23 7 - 30 mg/dL    Creatinine 1.16 0.66 - 1.25 mg/dL    GFR Estimate 59 (L) >60 mL/min/1.7m2    GFR Estimate If Black 71 >60 mL/min/1.7m2    Calcium 8.7 8.5 - 10.1 mg/dL   Troponin I   Result Value Ref Range    Troponin I ES <0.015 0.000 - 0.045 ug/L   Lactic acid whole blood   Result Value Ref Range    Lactic Acid 2.4 (H) 0.7 - 2.0 mmol/L   Hepatic panel   Result Value Ref Range    Bilirubin Direct 0.2 0.0 - 0.2 mg/dL    Bilirubin Total 1.2 0.2 - 1.3 mg/dL    Albumin 3.4 3.4 - 5.0 g/dL    Protein Total 7.8 6.8 - 8.8 g/dL    Alkaline Phosphatase 83 40 - 150 U/L    ALT 17 0 - 70 U/L    AST 14 0 - 45 U/L   Lipase   Result Value Ref Range    Lipase 157 73 - 393 U/L   Nt probnp inpatient   Result Value Ref Range    N-Terminal Pro BNP Inpatient 1602 0 - 1800 pg/mL   UA with Microscopic reflex to Culture   Result Value Ref Range    Color Urine Yellow     Appearance Urine Clear     Glucose Urine 50 (A) NEG^Negative mg/dL    Bilirubin Urine Negative NEG^Negative    Ketones Urine Negative NEG^Negative mg/dL    Specific Gravity Urine 1.024 1.003 - 1.035    Blood Urine Negative NEG^Negative    pH Urine 5.0 5.0 - 7.0 pH    Protein Albumin Urine Negative NEG^Negative mg/dL    Urobilinogen mg/dL 0.0 0.0 - 2.0 mg/dL    Nitrite Urine Negative NEG^Negative     Leukocyte Esterase Urine Negative NEG^Negative    Source Catheterized Urine     WBC Urine 1 0 - 5 /HPF    RBC Urine <1 0 - 2 /HPF    Mucous Urine Present (A) NEG^Negative /LPF    Hyaline Casts 18 (H) 0 - 2 /LPF   D dimer quantitative   Result Value Ref Range    D Dimer 0.6 (H) 0.0 - 0.50 ug/ml FEU   INR   Result Value Ref Range    INR 1.43 (H) 0.86 - 1.14   Glucose by meter   Result Value Ref Range    Glucose 245 (H) 70 - 99 mg/dL   Troponin I   Result Value Ref Range    Troponin I ES <0.015 0.000 - 0.045 ug/L   Troponin I   Result Value Ref Range    Troponin I ES <0.015 0.000 - 0.045 ug/L   Glucose by meter   Result Value Ref Range    Glucose 211 (H) 70 - 99 mg/dL   Comprehensive metabolic panel   Result Value Ref Range    Sodium 137 133 - 144 mmol/L    Potassium 3.6 3.4 - 5.3 mmol/L    Chloride 99 94 - 109 mmol/L    Carbon Dioxide 31 20 - 32 mmol/L    Anion Gap 7 3 - 14 mmol/L    Glucose 219 (H) 70 - 99 mg/dL    Urea Nitrogen 20 7 - 30 mg/dL    Creatinine 1.10 0.66 - 1.25 mg/dL    GFR Estimate 63 >60 mL/min/1.7m2    GFR Estimate If Black 76 >60 mL/min/1.7m2    Calcium 8.4 (L) 8.5 - 10.1 mg/dL    Bilirubin Total 1.3 0.2 - 1.3 mg/dL    Albumin 3.2 (L) 3.4 - 5.0 g/dL    Protein Total 7.3 6.8 - 8.8 g/dL    Alkaline Phosphatase 75 40 - 150 U/L    ALT 12 0 - 70 U/L    AST 12 0 - 45 U/L   Lactic acid whole blood   Result Value Ref Range    Lactic Acid 0.6 (L) 0.7 - 2.0 mmol/L   CBC with platelets   Result Value Ref Range    WBC 7.9 4.0 - 11.0 10e9/L    RBC Count 3.86 (L) 4.4 - 5.9 10e12/L    Hemoglobin 11.6 (L) 13.3 - 17.7 g/dL    Hematocrit 36.9 (L) 40.0 - 53.0 %    MCV 96 78 - 100 fl    MCH 30.1 26.5 - 33.0 pg    MCHC 31.4 (L) 31.5 - 36.5 g/dL    RDW 13.2 10.0 - 15.0 %    Platelet Count 182 150 - 450 10e9/L   Glucose by meter   Result Value Ref Range    Glucose 220 (H) 70 - 99 mg/dL   Glucose by meter   Result Value Ref Range    Glucose 179 (H) 70 - 99 mg/dL   Glucose by meter   Result Value Ref Range    Glucose 143  (H) 70 - 99 mg/dL   Glucose by meter   Result Value Ref Range    Glucose 132 (H) 70 - 99 mg/dL   EKG 12 lead   Result Value Ref Range    Interpretation ECG Click View Image link to view waveform and result    Social Work IP Consult    Narrative    Abimbola Lin RN     9/26/2018 11:01 AM  SW consult for return to facility. Pt resides at [facility] with   his wife. CM confirmed bedhold at [facility] [phone] with   Aimee in admissions. CM will fax discharge orders to [facility] when   discharge orders are in. Family will provided transportation at   discharge. CM spoke with son Luciano who could provided transport   this morning or pts daughter eFlecia can provide transport in the   afternoon.    CM will continue to follow with discharge planning.    Pavithra Lin RN, BSN CTS  Care Coordinator  [phone]       Blood culture   Result Value Ref Range    Specimen Description Blood Left Hand     Special Requests Aerobic and anaerobic bottles received     Culture Micro No growth after 5 hours    Blood culture   Result Value Ref Range    Specimen Description Blood Right Hand     Special Requests Aerobic and anaerobic bottles received     Culture Micro No growth after 5 hours[EM1.2]         Revision History        User Key Date/Time User Provider Type Action    > [N/A] 9/26/2018  6:48 PM Juliana Chapin PA-C Physician Assistant - C Addend     EM1.2 9/26/2018  6:47 PM Juliana Chpain PA-C Physician Assistant - C Sign     EM1.1 9/26/2018  6:19 PM Juliana Chapin PA-C Physician Assistant - C             ED Provider Notes by Ramírez Betts MD at 9/25/2018 11:59 PM     Author:  Ramírez Betts MD Service:  Emergency Medicine Author Type:  Physician    Filed:  9/26/2018  8:22 AM Date of Service:  9/25/2018 11:59 PM Creation Time:  9/25/2018 11:59 PM    Status:  Signed :  Ramírez Betts MD (Physician)           History     Chief Complaint:[MH1.1]  Chest Pain[MH1.2]    HPI   Ton Bagley  "is a 89 year old male with a history of CAD, hypertension, atrial fibrillation, hyperlipidemia, diabetes, among others,[MH1.1] on coumadin,[MH1.3] who presents vis EMS for evaluation of acute chest pain.[MH1.1] Per patient and EMS report, after eating a pizza this evening, the patient developed a sharp pain along his chest, prompting him to call EMS. Immediately after calling, however, the patient's pain subsided, and has since been intermittent. He was given nitroglycerin en route with no change in pain, and an EKG was obtained and unremarkable at this time. However, due to the patient's extensive history, and the acute nature of his symptoms, the patient presents for further evaluation and work up. While in the ED, the patient states that \"he feels great,\" and that this pain is merely gas pain located along his chest. The patient also endorses the need to belch, and an acute cough is also appreciated. His pain worsens with deep breathing, he otherwise denies shortness of breath.     Notably, the patient is on O2 while in the ED, but reports that he does not use this at home.[MH1.3]     CARDIAC RISK FACTORS:  Sex:    Male  Tobacco:   Former  Hypertension:   Yes  Hyperlipidemia:  Yes  Diabetes:[MH1.1]   Yes[MH1.4]  Family History:  Diabetes, hypertension, stroke    Allergies:  No known drug allergies  Adhesive tape  Latex    Medications:    Lipitor  Fergon  Furosemide  Neurontin  Glipizide  DuoNeb  Lactulose  Lisinopril  Synthroid  Lopressor  Nitrostat  Omeprazole  Mylicon  Carafate  Tamsulosin  Coumadin  Atenolol  Zocor    Past Medical History:    Hypertension  Hyperlipidemia  CAD  Atrial fibrillation  Small bowel obstruction  Cataracts  Osteoarthritis  GERD  CKD  Hearing loss  LUTS  Diabetes mellitus     Past Surgical History:    Bypass graft coronary artery  Cardioversion  Laparotomy exploratory  Stent, coronary  Tonsillectomy  Cataract removal    Family History:    Hypertension  Diabetes   Stroke    Social " "History:  The patient was accompanied to the ED by EMS.  Smoking Status: Former  Smokeless Tobacco: No  Alcohol Use: Yes  Marital Status:        PCP:Saida Luque DNP, APRN, CNP    Review of Systems   Constitutional: Negative for[MH1.1] fever[MH1.3].   Respiratory: Positive for[MH1.1] cough[MH1.3]. Negative for[MH1.1] shortness of breath[MH1.3].    Cardiovascular: Positive for[MH1.1] chest pain[MH1.3].[MH1.1]   All other systems reviewed and are negative[MH1.3].    Physical Exam[MH1.1]     Patient Vitals for the past 24 hrs:   BP Temp Temp src Pulse Heart Rate Resp SpO2 Height Weight   09/26/18 0324 125/55 96  F (35.6  C) Oral - 84 16 93 % 1.676 m (5' 6\") 106.1 kg (234 lb)   09/26/18 0300 99/57 - - - 80 - 95 % - -   09/26/18 0245 - - - - 75 - 98 % - -   09/26/18 0200 112/60 - - - 81 - 97 % - -   09/26/18 0145 - - - - 85 - 93 % - -   09/26/18 0130 116/59 - - - 75 - 93 % - -   09/26/18 0115 - - - - 77 - 95 % - -   09/26/18 0100 121/79 - - - 77 - 98 % - -   09/26/18 0008 135/68 - - - 86 - 99 % - -   09/26/18 0001 135/68 98  F (36.7  C) - 91 91 16 (!) 88 % - -[MH1.5]       Physical Exam[MH1.1]  Nursing note and vitals reviewed.  Constitutional: Cooperative.   Pleasantly confused.  HENT:   Mouth/Throat: Moist mucous membranes.   Eyes: EOMI, nonicteric sclera  Cardiovascular: Normal rate, irregularly irregular rhythm, no murmurs, rubs, or gallops  Pulmonary/Chest: Effort normal and breath sounds normal. No respiratory distress. No wheezes. No rales.   Abdominal: Soft. Nontender, mild distention, no guarding or rigidity. BS present.   Musculoskeletal: Normal range of motion.   Neurological: Alert. Moves all extremities spontaneously.   Skin: Skin is warm and dry. No rash noted.[LH1.1]     Emergency Department Course[MH1.1]     ECG (00:03:21):  Rate 94 bpm. WI interval *. QRS duration 86. QT/QTc 358/447. P-R-T axes * 16 -5. Atrial Fibrillation. Possible inferior infarct. Abnormal ECG. Interpreted at 0013 by " Ramírez Betts MD.[MH1.3]    Imaging:  Radiology findings were communicated with the[MH1.1] patient[MH1.3] who voiced understanding of the findings.[MH1.1]    Chest XR, PA & LAT:[MH1.3]  No convincing evidence of active cardiopulmonary disease.    As read by radiology[MH1.6]    Chest CT, IV Contrast only - PE Protocol:[MH1.7]  1. No visualized pulmonary embolus.  2. No convincing evidence of active pulmonary disease.    CASTRO JOHNSON MD[MH1.4]    Laboratory:  Laboratory findings were communicated with the[MH1.1] patient[MH1.3] who voiced understanding of the findings.[MH1.1]    CBC:  HGB 12.1 (L), o/w WNL (WBC 9.9, )  BMP:[MH1.3] Glucose 281 (H), GFR 59 (L), o/w[MH1.6]  WNL (Creatinine[MH1.3] 1.16[MH1.6])    Lactic Acid:[MH1.3] 2.4 (H)[MH1.6]  Lipase:[MH1.3] 157[MH1.6]  Hepatic panel:[MH1.3] WNL[MH1.8]    Troponin (Collected[MH1.3] 0014[MH1.6]):[MH1.3] <0.015  BNP:[MH1.6] 1602  D-dimer: 0.6 (H)[MH1.8]  INR:[MH1.9]1.43 (H)[MH1.10]    Blood Culture x2: Pending[MH1.11]    Interventions:[MH1.1]  0135 - NS Bolus 1,000mL IV[MH1.8]  0222 - Isovue solution 500 mL, 83 mLs IV[MH1.12]    Emergency Department Course:  Nursing notes and vitals reviewed.[MH1.1]    IV was inserted and blood was drawn for laboratory testing, results above.    The patient was sent for a chest x-ray[MH1.6] and CT[MH1.7] while in the emergency department, results above.   Imaging independently reviewed and agree with radiologist interpretation.[MH1.6]     0014[MH1.13]: I performed an exam of the patient as documented above.[MH1.1]   0147: I spoke with [MH1.14] Her[MH1.15] of the[MH1.14] hospitalist[MH1.15] service regarding patient's presentation, findings, and plan of care.[MH1.14]    Findings and plan explained to the Patient and family who consents to admission.     Discussed the patient with [MH1.16] Her[MH1.4], who will admit the patient to a[MH1.16] obs[MH1.4] bed for further monitoring, evaluation, and treatment.[MH1.16]      Impression & Plan      Medical Decision Making:[MH1.1]  Patient presents after having called EMS for chest pain.  On arrival, patient is found to be hypoxic with sats in the high 80s on room air.  This came up quickly with 2 L of nasal cannula oxygen.  Broad differential considered including ACS, PE, gastrointestinal pain, pneumothorax, pneumonia, among many other etiologies.  Patient does appear to be having some URI symptoms.  Chest x-ray is negative for acute etiology.  Labs are notable for a mildly elevated lactic acid, hyperglycemia, and mild leukocytosis.  Patient's d-dimer is measured at 0.6.  Using an age-adjusted cut off, this is considered negative.  Discussed with Dr. Dao from our hospitalist service who accepts for admission.  Given patient's hypoxia and unexplained pain, he requests that we carry out a CT pulmonary embolism protocol which he states he will follow-up on the results on.  I did review CT and found no PE and no pneumonia.  Suspect that patient's chest pain is more abdominal in nature.  However, I have no explanation for patient's hypoxia apart from possible hypoventilation related to gastric distention.  Ultimately, patient requires admission for further evaluation.  Patient admitted in stable condition.[LH1.1]    Diagnosis:[MH1.1]    ICD-10-CM    1. Atypical chest pain R07.89 UA with Microscopic reflex to Culture   2. Acute respiratory failure with hypoxia (H) J96.01[MH1.4]        Disposition:[MH1.1]  Admitted to[MH1.16] obs[MH1.4]      Marcia Rawls  9/25/2018   [facility] EMERGENCY DEPARTMENT[MH1.1]  I, Marcia Rawls, am serving as a scribe at 12:14 AM on 9/26/2018 to document services personally performed by Ramírez Betts MD based on my observations and the provider's statements to me.[MH1.3]       Ramírez Betts MD  09/26/18 0822  [LH1.2]     Revision History        User Key Date/Time User Provider Type Action    > LH1.2 9/26/2018  8:22 AM  Ramírez Betts MD Physician Sign     LH1.1 9/26/2018  8:17 AM Ramírez Betts MD Physician      [N/A] 9/26/2018  5:07 AM Rawls, Marcia Scribe Share     MH1.5 9/26/2018  4:16 AM Rawls, Marcia Scribe Share     MH1.4 9/26/2018  4:14 AM Rawls, Marcia Scribe      [N/A] 9/26/2018  3:19 AM Rawls, Marcia Scribe Share     [N/A] 9/26/2018  2:56 AM Rawls, Marcia Scribe Share     MH1.16 9/26/2018  2:50 AM Rawls, Marcia Scribe Share     MH1.12 9/26/2018  2:45 AM Rawls, Marcia Scribe Share     MH1.10 9/26/2018  2:35 AM Rawls, Marcia Scribe Share     MH1.2 9/26/2018  1:59 AM Rawls, Marcia Scribe Share     MH1.7 9/26/2018  1:58 AM Rawls, Marcia Scribe      MH1.9 9/26/2018  1:50 AM Rawls, Marcia Scribe Share     [N/A] 9/26/2018  1:48 AM Rawls, Marcia Scribe Share     MH1.15 9/26/2018  1:48 AM Rawls, Marcia Scribe Share     MH1.14 9/26/2018  1:47 AM Rawls, Marcia Scribe      MH1.11 9/26/2018  1:41 AM Rawls, Marcia Scribe Share     MH1.8 9/26/2018  1:37 AM Rawls, Marcia Scribe Share     MH1.13 9/26/2018  1:01 AM Rawls, Marcia Scribe Share     MH1.6 9/26/2018 12:54 AM Rawls, Marcia Scribe Share     MH1.3 9/26/2018 12:27 AM Rawls, Marcia Scribe Share     MH1.1 9/26/2018 12:13 AM Rawls, Marcia Scribe Share                  Procedure Notes     No notes of this type exist for this encounter.      Progress Notes - Therapies (Notes from 09/24/18 through 09/27/18)     No notes of this type exist for this encounter.                                          INTERAGENCY TRANSFER FORM - LAB / IMAGING / EKG / EMG RESULTS   9/25/2018                       [facility] OBSERVATION DEPARTMENT: [phone]            Unresulted Labs (24h ago through future)    Start       Ordered    09/27/18 0600  INR  (warfarin (COUMADIN) Pharmacy Consult-INITIAL ORDER)  DAILY,   Routine      09/26/18 0509         Lab Results - 3 Days      Glucose by meter  [[id number]] (Abnormal)  Resulted: 09/27/18 0845, Result status: Final result    Ordering provider: Benitez Velez MD  09/27/18 0832 Resulting lab: POINT OF CARE TEST, GLUCOSE    Specimen Information    Type Source Collected On     09/27/18 0832          Components       Value Reference Range Flag Lab   Glucose 146 70 - 99 mg/dL H 170            Blood culture [[id number]]  Resulted: 09/27/18 0655, Result status: Preliminary result    Ordering provider: Ramírez Betts MD  09/26/18 0134 Resulting lab: INFECTIOUS DISEASE DIAGNOSTIC LABORATORY    Specimen Information    Type Source Collected On   Blood  09/26/18 0154   Comment:  Left Hand          Components       Value Reference Range Flag Lab   Specimen Description Blood Left Hand      Special Requests Aerobic and anaerobic bottles received   75   Culture Micro No growth after 22 hours   225            Blood culture [[id number]]  Resulted: 09/27/18 0655, Result status: Preliminary result    Ordering provider: Ramírez Betts MD  09/26/18 0134 Resulting lab: INFECTIOUS DISEASE DIAGNOSTIC LABORATORY    Specimen Information    Type Source Collected On   Blood  09/26/18 0200   Comment:  Right Hand          Components       Value Reference Range Flag Lab   Specimen Description Blood Right Hand      Special Requests Aerobic and anaerobic bottles received   75   Culture Micro No growth after 22 hours   225            INR [[id number]] (Abnormal)  Resulted: 09/27/18 0637, Result status: Final result    Ordering provider: Lindsey Dao MD  09/27/18 0000 Resulting lab: [facility]    Specimen Information    Type Source Collected On   Blood  09/27/18 0608          Components       Value Reference Range Flag Lab   INR 1.59 0.86 - 1.14 H [facility]            Glucose by meter [[id number]] (Abnormal)  Resulted: 09/27/18 0229, Result status: Final result    Ordering provider: Benitez Velez MD  09/27/18 0216 Resulting lab: POINT OF CARE TEST, GLUCOSE    Specimen  Information    Type Source Collected On     09/27/18 0216          Components       Value Reference Range Flag Lab   Glucose 160 70 - 99 mg/dL H 170            Glucose by meter [[id number]] (Abnormal)  Resulted: 09/26/18 2244, Result status: Final result    Ordering provider: Benitez Velez MD  09/26/18 2231 Resulting lab: POINT OF CARE TEST, GLUCOSE    Specimen Information    Type Source Collected On     09/26/18 2231          Components       Value Reference Range Flag Lab   Glucose 188 70 - 99 mg/dL H 170            Glucose by meter [[id number]] (Abnormal)  Resulted: 09/26/18 1800, Result status: Final result    Ordering provider: Benitez Velez MD  09/26/18 1741 Resulting lab: POINT OF CARE TEST, GLUCOSE    Specimen Information    Type Source Collected On     09/26/18 1741          Components       Value Reference Range Flag Lab   Glucose 132 70 - 99 mg/dL H 170            Glucose by meter [[id number]] (Abnormal)  Resulted: 09/26/18 1416, Result status: Final result    Ordering provider: Benitez Velez MD  09/26/18 1351 Resulting lab: POINT OF CARE TEST, GLUCOSE    Specimen Information    Type Source Collected On     09/26/18 1351          Components       Value Reference Range Flag Lab   Glucose 143 70 - 99 mg/dL H 170            Glucose by meter [[id number]] (Abnormal)  Resulted: 09/26/18 1311, Result status: Final result    Ordering provider: Benitez Velez MD  09/26/18 1209 Resulting lab: POINT OF CARE TEST, GLUCOSE    Specimen Information    Type Source Collected On     09/26/18 1209          Components       Value Reference Range Flag Lab   Glucose 179 70 - 99 mg/dL H 170            Glucose by meter [[id number]] (Abnormal)  Resulted: 09/26/18 1056, Result status: Final result    Ordering provider: Benitez Velez MD  09/26/18 1032 Resulting lab: POINT OF CARE TEST, GLUCOSE    Specimen Information    Type Source Collected On     09/26/18 1032          Components       Value Reference Range Flag Lab    Glucose 220 70 - 99 mg/dL H 170            Troponin I [[id number]]  Resulted: 09/26/18 1029, Result status: Final result    Ordering provider: Lindsey Dao MD  09/26/18 0509 Resulting lab: [facility]    Specimen Information    Type Source Collected On   Blood  09/26/18 1000          Components       Value Reference Range Flag Lab   Troponin I ES <0.015 0.000 - 0.045 ug/L  FrRdHs   Comment:         The 99th percentile for upper reference range is 0.045 ug/L.  Troponin values   in the range of 0.045 - 0.120 ug/L may be associated with risks of adverse   clinical events.              Comprehensive metabolic panel [[id number]] (Abnormal)  Resulted: 09/26/18 1027, Result status: Final result    Ordering provider: Juliana Chapin PA-C  09/26/18 0900 Resulting lab: [facility]    Specimen Information    Type Source Collected On   Blood  09/26/18 1000          Components       Value Reference Range Flag Lab   Sodium 137 133 - 144 mmol/L  FrRdHs   Potassium 3.6 3.4 - 5.3 mmol/L  FrRdHs   Chloride 99 94 - 109 mmol/L  FrRdHs   Carbon Dioxide 31 20 - 32 mmol/L  FrRdHs   Anion Gap 7 3 - 14 mmol/L  FrRdHs   Glucose 219 70 - 99 mg/dL H FrRdHs   Urea Nitrogen 20 7 - 30 mg/dL  FrRdHs   Creatinine 1.10 0.66 - 1.25 mg/dL  FrRdHs   GFR Estimate 63 >60 mL/min/1.7m2  FrRdHs   Comment:  Non  GFR Calc   GFR Estimate If Black 76 >60 mL/min/1.7m2  FrRdHs   Comment:  African American GFR Calc   Calcium 8.4 8.5 - 10.1 mg/dL L FrRdHs   Bilirubin Total 1.3 0.2 - 1.3 mg/dL  FrRdHs   Albumin 3.2 3.4 - 5.0 g/dL L FrRdHs   Protein Total 7.3 6.8 - 8.8 g/dL  FrRdHs   Alkaline Phosphatase 75 40 - 150 U/L  FrRdHs   ALT 12 0 - 70 U/L  FrRdHs   AST 12 0 - 45 U/L  FrRdHs            Lactic acid whole blood [[id number]] (Abnormal)  Resulted: 09/26/18 1011, Result status: Final result    Ordering provider: Juliana Chapin PA-C  09/26/18 0900 Resulting lab: [facility]    Specimen Information    Type  Source Collected On   Blood  09/26/18 1000          Components       Value Reference Range Flag Lab   Lactic Acid 0.6 0.7 - 2.0 mmol/L L FrRdHs            CBC with platelets [[id number]] (Abnormal)  Resulted: 09/26/18 1010, Result status: Final result    Ordering provider: Juliana Chapin PA-C  09/26/18 0900 Resulting lab: [facility]    Specimen Information    Type Source Collected On   Blood  09/26/18 1000          Components       Value Reference Range Flag Lab   WBC 7.9 4.0 - 11.0 10e9/L  FrRdHs   RBC Count 3.86 4.4 - 5.9 10e12/L L FrRdHs   Hemoglobin 11.6 13.3 - 17.7 g/dL L FrRdHs   Hematocrit 36.9 40.0 - 53.0 % L FrRdHs   MCV 96 78 - 100 fl  FrRdHs   MCH 30.1 26.5 - 33.0 pg  FrRdHs   MCHC 31.4 31.5 - 36.5 g/dL L FrRdHs   RDW 13.2 10.0 - 15.0 %  FrRdHs   Platelet Count 182 150 - 450 10e9/L  FrRdHs            Glucose by meter [[id number]] (Abnormal)  Resulted: 09/26/18 0811, Result status: Final result    Ordering provider: Lindsey Dao MD  09/26/18 0759 Resulting lab: POINT OF CARE TEST, GLUCOSE    Specimen Information    Type Source Collected On     09/26/18 0759          Components       Value Reference Range Flag Lab   Glucose 211 70 - 99 mg/dL H 170            Troponin I [[id number]]  Resulted: 09/26/18 0653, Result status: Final result    Ordering provider: Lindsey Dao MD  09/26/18 0509 Resulting lab: [facility]    Specimen Information    Type Source Collected On   Blood  09/26/18 0559          Components       Value Reference Range Flag Lab   Troponin I ES <0.015 0.000 - 0.045 ug/L  [facility]   Comment:         The 99th percentile for upper reference range is 0.045 ug/L.  Troponin values   in the range of 0.045 - 0.120 ug/L may be associated with risks of adverse   clinical events.              Glucose by meter [[id number]] (Abnormal)  Resulted: 09/26/18 0400, Result status: Final result    Ordering provider: Lindsey Dao MD  09/26/18 0348 Resulting lab: POINT OF CARE TEST, GLUCOSE     Specimen Information    Type Source Collected On     09/26/18 0348          Components       Value Reference Range Flag Lab   Glucose 245 70 - 99 mg/dL H 170            UA with Microscopic reflex to Culture [[id number]] (Abnormal)  Resulted: 09/26/18 0306, Result status: Final result    Ordering provider: Ramírez Betts MD  09/26/18 0100 Resulting lab: [facility]    Specimen Information    Type Source Collected On   Catheterized Urine  09/26/18 0254          Components       Value Reference Range Flag Lab   Color Urine Yellow   FrRdHs   Appearance Urine Clear   FrRd[facility]   Glucose Urine 50 NEG^Negative mg/dL A FrRd[facility]   Bilirubin Urine Negative NEG^Negative  FrRd[facility]   Ketones Urine Negative NEG^Negative mg/dL  FrRd[facility]   Specific Gravity Urine 1.024 1.003 - 1.035  FrRd[facility]   Blood Urine Negative NEG^Negative  FrRd[facility]   pH Urine 5.0 5.0 - 7.0 pH  FrRdHs   Protein Albumin Urine Negative NEG^Negative mg/dL  FrRd[facility]   Urobilinogen mg/dL 0.0 0.0 - 2.0 mg/dL  FrRd[facility]   Nitrite Urine Negative NEG^Negative  FrRd[facility]   Leukocyte Esterase Urine Negative NEG^Negative  FrRd[facility]   Source Catheterized Urine   FrRdHs   WBC Urine 1 0 - 5 /HPF  FrRd[facility]   RBC Urine <1 0 - 2 /HPF  FrRd[facility]   Mucous Urine Present NEG^Negative /LPF A FrRd[facility]   Hyaline Casts 18 0 - 2 /LPF H FrRdHs            INR [[id number]] (Abnormal)  Resulted: 09/26/18 0211, Result status: Final result    Ordering provider: Ramírez Betts MD  09/26/18 0014 Resulting lab: [facility]    Specimen Information    Type Source Collected On     09/26/18 0014          Components       Value Reference Range Flag Lab   INR 1.43 0.86 - 1.14 H FrRdHs            D dimer quantitative [[id number]] (Abnormal)  Resulted: 09/26/18 0115, Result status: Final result    Ordering provider: Ramírez Betts MD  09/26/18 0014 Resulting lab: [facility]    Specimen Information    Type Source Collected On     09/26/18 0014          Components       Value  Reference Range Flag Lab   D Dimer 0.6 0.0 - 0.50 ug/ml FEU H FrRdHs   Comment:         This D-dimer assay is intended for use in conjunction with a clinical pretest   probability assessment model to exclude pulmonary embolism (PE) and deep   venous thrombosis (DVT) in outpatients suspected of PE or DVT. The cut-off   value is 0.5 ug/mL FEU.              Nt probnp inpatient [[id number]]  Resulted: 09/26/18 0059, Result status: Final result    Ordering provider: Ramírez Betts MD  09/26/18 0014 Resulting lab: [facility]    Specimen Information    Type Source Collected On     09/26/18 0014          Components       Value Reference Range Flag Lab   N-Terminal Pro BNP Inpatient 1602 0 - 1800 pg/mL  [facility]   Comment:            Reference range shown and results flagged as abnormal are suggested inpatient   cut points for confirming diagnosis if CHF in an acute setting. Establishing a   baseline value for each individual patient is useful for follow-up. An   inpatient or emergency department NT-proPBNP <300 pg/mL effectively rules out   acute CHF, with 99% negative predictive value.  The outpatient non-acute reference range for ruling out CHF is:   0-125 pg/mL (age 18 to less than 75)   0-450 pg/mL (age 75 yrs and older)              Hepatic panel [[id number]]  Resulted: 09/26/18 0058, Result status: Final result    Ordering provider: Ramírez Betts MD  09/26/18 0014 Resulting lab: [facility]    Specimen Information    Type Source Collected On     09/26/18 0014          Components       Value Reference Range Flag Lab   Bilirubin Direct 0.2 0.0 - 0.2 mg/dL  FrRd[facility]   Bilirubin Total 1.2 0.2 - 1.3 mg/dL  FrRd[facility]   Albumin 3.4 3.4 - 5.0 g/dL  FrStHsLb   Protein Total 7.8 6.8 - 8.8 g/dL  FrRd[facility]   Alkaline Phosphatase 83 40 - 150 U/L  FrRdHs   ALT 17 0 - 70 U/L  FrStHsLb   AST 14 0 - 45 U/L  FrStHsLb            Basic metabolic panel [[id number]] (Abnormal)  Resulted: 09/26/18 0037,  Result status: Final result    Ordering provider: Ramírez Betts MD  09/26/18 0011 Resulting lab: [facility]    Specimen Information    Type Source Collected On   Blood  09/26/18 0014          Components       Value Reference Range Flag Lab   Sodium 133 133 - 144 mmol/L  FrRdHs   Potassium 3.7 3.4 - 5.3 mmol/L  Fr[facility]   Chloride 95 94 - 109 mmol/L  Fr[facility]   Carbon Dioxide 30 20 - 32 mmol/L  FrRd[facility]   Anion Gap 8 3 - 14 mmol/L  FrRd[facility]   Glucose 281 70 - 99 mg/dL H FrRdHs   Urea Nitrogen 23 7 - 30 mg/dL  FrRdHs   Creatinine 1.16 0.66 - 1.25 mg/dL  FrRd[facility]   GFR Estimate 59 >60 mL/min/1.7m2 L FrRdHs   Comment:  Non  GFR Calc   GFR Estimate If Black 71 >60 mL/min/1.7m2  FrRd   Comment:  African American GFR Calc   Calcium 8.7 8.5 - 10.1 mg/dL  FrRd            Troponin I [[id number]]  Resulted: 09/26/18 0037, Result status: Final result    Ordering provider: Ramírez Betts MD  09/26/18 0011 Resulting lab: [facility]    Specimen Information    Type Source Collected On   Blood  09/26/18 0014          Components       Value Reference Range Flag Lab   Troponin I ES <0.015 0.000 - 0.045 ug/L  FrRd   Comment:         The 99th percentile for upper reference range is 0.045 ug/L.  Troponin values   in the range of 0.045 - 0.120 ug/L may be associated with risks of adverse   clinical events.              Lipase [[id number]]  Resulted: 09/26/18 0037, Result status: Final result    Ordering provider: Ramírez Betts MD  09/26/18 0014 Resulting lab: [facility]    Specimen Information    Type Source Collected On     09/26/18 0014          Components       Value Reference Range Flag Lab   Lipase 157 73 - 393 U/L  FrRdHs            Lactic acid whole blood [[id number]] (Abnormal)  Resulted: 09/26/18 0029, Result status: Final result    Ordering provider: Ramírez Betts MD  09/26/18 0020 Resulting lab: [facility]    Specimen Information     Type Source Collected On   Blood  09/26/18 0014          Components       Value Reference Range Flag Lab   Lactic Acid 2.4 0.7 - 2.0 mmol/L H FrRdHs            CBC with platelets differential [[id number]] (Abnormal)  Resulted: 09/26/18 0019, Result status: Final result    Ordering provider: Ramírez Betts MD  09/26/18 0011 Resulting lab: [facility]    Specimen Information    Type Source Collected On   Blood  09/26/18 0014          Components       Value Reference Range Flag Lab   WBC 9.9 4.0 - 11.0 10e9/L  FrRdHs   RBC Count 4.02 4.4 - 5.9 10e12/L L FrRdHs   Hemoglobin 12.1 13.3 - 17.7 g/dL L FrRd[facility]   Hematocrit 38.4 40.0 - 53.0 % L FrRdHs   MCV 96 78 - 100 fl  FrRdHs   MCH 30.1 26.5 - 33.0 pg  FrRdHs   MCHC 31.5 31.5 - 36.5 g/dL  Fr[facility]   RDW 13.2 10.0 - 15.0 %  FrRdHs   Platelet Count 218 150 - 450 10e9/L  FrRdHs   Diff Method Automated Method   FrRdHs   % Neutrophils 76.2 %  FrRdHs   % Lymphocytes 10.4 %  FrRdHs   % Monocytes 12.0 %  FrRdHs   % Eosinophils 0.6 %  FrRdHs   % Basophils 0.2 %  FrRdHs   % Immature Granulocytes 0.6 %  FrRdHs   Nucleated RBCs 0 0 /100  FrRdHs   Absolute Neutrophil 7.5 1.6 - 8.3 10e9/L  FrRd[facility]   Absolute Lymphocytes 1.0 0.8 - 5.3 10e9/L  Fr[facility]   Absolute Monocytes 1.2 0.0 - 1.3 10e9/L  Fr[facility]   Absolute Eosinophils 0.1 0.0 - 0.7 10e9/L  Fr[facility]   Absolute Basophils 0.0 0.0 - 0.2 10e9/L  [facility]   Abs Immature Granulocytes 0.1 0 - 0.4 10e9/L  Fr[facility]   Absolute Nucleated RBC 0.0   FrRdHs            Testing Performed By     Lab - Abbreviation Name Director Address Valid Date Range    12 - FrRd[facility] Unknown [address] [facility] [address] 05/08/15 1057 - Present    14 - FrStHsLb [facility] Unknown [address]  [place] [address] 05/08/15 1057 - Present    75 - Unknown [facility] Unknown [address] [facility] [address] 01/15/15 1019 - Present    170 - Unknown POINT OF CARE TEST, GLUCOSE  Unknown Unknown 10/31/11 1114 - Present    225 - Unknown [facility] Unknown [address] [facility] [address] 12/19/14 0954 - Present               Imaging Results - 3 Days      XR Abdomen 2 Views [[id number]]  Resulted: 09/27/18 0730, Result status: Preliminary result    Ordering provider: Dawna Carrera PA-C  09/26/18 2145 Resulted by: Rosales Candelario MD    Performed: 09/26/18 2204 - 09/26/18 2214 Resulting lab: RADIOLOGY RESULTS    Narrative:       ABDOMEN TWO VIEWS September 26, 2018 10:14 PM     HISTORY: Abdominal pain and distension.    COMPARISON: CT abdomen/pelvis dated 4/3/2018 abdominal x-rays dated  5/14/2017.    FINDINGS: Gas-filled distended loops of small bowel with what appear  to be differential air-fluid levels on the upright study. There is  also apparent gas filled prominence of the ascending and transverse  colon. The stomach is gas and fluid-filled and mildly distended.  Surgical clips are projected over the left upper thigh.  No free air  is seen under the hemidiaphragms on the upright study. No abnormal  calcifications to suggest renal or ureteral calculi. There are  cholecystectomy clips in the right upper abdomen. Sternal cerclage  wires are partially imaged.      Impression:       IMPRESSION: Abnormal bowel gas pattern with gas seen in mildly  distended loops of small bowel. Gas also appears to distend the colon.  This could represent an early complete or partial small bowel  obstruction versus ileus versus a more distal colonic obstruction. No  free air under the hemidiaphragms on the upright study to suggest  viscus perforation. Recommend clinical correlation.      Chest CT, IV contrast only - PE protocol [[id number]]  Resulted: 09/26/18 0329, Result status: Final result    Ordering provider: Ramírez Betts MD  09/26/18 0150 Resulted by: Jeff Nicolas MD    Performed: 09/26/18 0221 - 09/26/18 0246 Resulting lab: RADIOLOGY RESULTS     Narrative:       CT CHEST WITH CONTRAST  9/26/2018 2:38 AM     HISTORY: Hypoxic. Chest pain.    COMPARISON: 4/3/2018 - CT chest, abdomen and pelvis.    TECHNIQUE: Following the uneventful administration of 83 mL Isovue-370  intravenous contrast, helical sections were acquired through the lungs  according to the pulmonary embolism protocol. Coronal reconstructions  were generated. Radiation dose for this scan was reduced using  automated exposure control, adjustment of the mA and/or kV according  to the patient's size, or iterative reconstruction technique.    FINDINGS: No visualized pulmonary embolus. The thoracic aorta is  normal in caliber without dissection. Atherosclerotic calcification in  the thoracic aorta and coronary arteries. Mild cardiomegaly. Prior  median sternotomy and coronary artery bypass surgery.    A few small hazy opacities in bilateral lung bases most likely  represent atelectasis. Small calcified granuloma in the right lung  base. No pleural or pericardial effusion. No enlarged lymph nodes in  the chest.    Scan through the upper abdomen is significant for partial  visualization of a few left renal cysts, the largest measuring 6.5 cm.  Prior cholecystectomy.      Impression:       IMPRESSION:  1. No visualized pulmonary embolus.  2. No convincing evidence of active pulmonary disease.    JEFF NICOLAS MD      Chest XR,  PA & LAT [[id number]]  Resulted: 09/26/18 0056, Result status: Final result    Ordering provider: Ramírez Betts MD  09/26/18 0020 Resulted by: Jeff Nicolas MD    Performed: 09/26/18 0030 - 09/26/18 0038 Resulting lab: RADIOLOGY RESULTS    Narrative:       CHEST 2 VIEWS  9/26/2018 12:38 AM     HISTORY: Chest pain. Cough.    COMPARISON: 7/31/2018.    FINDINGS: Hypoinflated lungs. No convincing pulmonary opacities.  Borderline cardiomegaly again noted. Atherosclerotic calcification in  the thoracic aorta. Prior median sternotomy.      Impression:        IMPRESSION: No convincing evidence of active cardiopulmonary disease.    CASTRO JOHNSON MD      Testing Performed By     Lab - Abbreviation Name Director Address Valid Date Range    104 - Rad Rslts RADIOLOGY RESULTS Unknown Unknown 02/16/05 1553 - Present            Encounter-Level Documents:     There are no encounter-level documents.      Order-Level Documents:     There are no order-level documents.

## 2018-09-25 NOTE — IP AVS SNAPSHOT
[facility] Observation Department    [address]    [facility] [address]    Phone:  [phone]                                       After Visit Summary   9/25/2018    Ton Bagley    MRN: [id number]           After Visit Summary Signature Page     I have received my discharge instructions, and my questions have been answered. I have discussed any challenges I see with this plan with the nurse or doctor.    ..........................................................................................................................................  Patient/Patient Representative Signature      ..........................................................................................................................................  Patient Representative Print Name and Relationship to Patient    ..................................................               ................................................  Date                                   Time    ..........................................................................................................................................  Reviewed by Signature/Title    ...................................................              ..............................................  Date                                               Time          22EPIC Rev 08/18

## 2018-09-26 ENCOUNTER — APPOINTMENT (OUTPATIENT)
Dept: GENERAL RADIOLOGY | Facility: CLINIC | Age: 83
End: 2018-09-26
Attending: PHYSICIAN ASSISTANT
Payer: MEDICARE

## 2018-09-26 ENCOUNTER — APPOINTMENT (OUTPATIENT)
Dept: GENERAL RADIOLOGY | Facility: CLINIC | Age: 83
End: 2018-09-26
Attending: EMERGENCY MEDICINE
Payer: MEDICARE

## 2018-09-26 ENCOUNTER — APPOINTMENT (OUTPATIENT)
Dept: CT IMAGING | Facility: CLINIC | Age: 83
End: 2018-09-26
Attending: EMERGENCY MEDICINE
Payer: MEDICARE

## 2018-09-26 PROBLEM — R07.9 CHEST PAIN: Status: ACTIVE | Noted: 2018-09-26

## 2018-09-26 LAB
ALBUMIN SERPL-MCNC: 3.2 G/DL (ref 3.4–5)
ALBUMIN SERPL-MCNC: 3.4 G/DL (ref 3.4–5)
ALBUMIN UR-MCNC: NEGATIVE MG/DL
ALP SERPL-CCNC: 75 U/L (ref 40–150)
ALP SERPL-CCNC: 83 U/L (ref 40–150)
ALT SERPL W P-5'-P-CCNC: 12 U/L (ref 0–70)
ALT SERPL W P-5'-P-CCNC: 17 U/L (ref 0–70)
ANION GAP SERPL CALCULATED.3IONS-SCNC: 7 MMOL/L (ref 3–14)
ANION GAP SERPL CALCULATED.3IONS-SCNC: 8 MMOL/L (ref 3–14)
APPEARANCE UR: CLEAR
AST SERPL W P-5'-P-CCNC: 12 U/L (ref 0–45)
AST SERPL W P-5'-P-CCNC: 14 U/L (ref 0–45)
BASOPHILS # BLD AUTO: 0 10E9/L (ref 0–0.2)
BASOPHILS NFR BLD AUTO: 0.2 %
BILIRUB DIRECT SERPL-MCNC: 0.2 MG/DL (ref 0–0.2)
BILIRUB SERPL-MCNC: 1.2 MG/DL (ref 0.2–1.3)
BILIRUB SERPL-MCNC: 1.3 MG/DL (ref 0.2–1.3)
BILIRUB UR QL STRIP: NEGATIVE
BUN SERPL-MCNC: 20 MG/DL (ref 7–30)
BUN SERPL-MCNC: 23 MG/DL (ref 7–30)
CALCIUM SERPL-MCNC: 8.4 MG/DL (ref 8.5–10.1)
CALCIUM SERPL-MCNC: 8.7 MG/DL (ref 8.5–10.1)
CHLORIDE SERPL-SCNC: 95 MMOL/L (ref 94–109)
CHLORIDE SERPL-SCNC: 99 MMOL/L (ref 94–109)
CO2 SERPL-SCNC: 30 MMOL/L (ref 20–32)
CO2 SERPL-SCNC: 31 MMOL/L (ref 20–32)
COLOR UR AUTO: YELLOW
CREAT SERPL-MCNC: 1.1 MG/DL (ref 0.66–1.25)
CREAT SERPL-MCNC: 1.16 MG/DL (ref 0.66–1.25)
D DIMER PPP FEU-MCNC: 0.6 UG/ML FEU (ref 0–0.5)
DIFFERENTIAL METHOD BLD: ABNORMAL
EOSINOPHIL # BLD AUTO: 0.1 10E9/L (ref 0–0.7)
EOSINOPHIL NFR BLD AUTO: 0.6 %
ERYTHROCYTE [DISTWIDTH] IN BLOOD BY AUTOMATED COUNT: 13.2 % (ref 10–15)
ERYTHROCYTE [DISTWIDTH] IN BLOOD BY AUTOMATED COUNT: 13.2 % (ref 10–15)
GFR SERPL CREATININE-BSD FRML MDRD: 59 ML/MIN/1.7M2
GFR SERPL CREATININE-BSD FRML MDRD: 63 ML/MIN/1.7M2
GLUCOSE BLDC GLUCOMTR-MCNC: 132 MG/DL (ref 70–99)
GLUCOSE BLDC GLUCOMTR-MCNC: 143 MG/DL (ref 70–99)
GLUCOSE BLDC GLUCOMTR-MCNC: 179 MG/DL (ref 70–99)
GLUCOSE BLDC GLUCOMTR-MCNC: 188 MG/DL (ref 70–99)
GLUCOSE BLDC GLUCOMTR-MCNC: 211 MG/DL (ref 70–99)
GLUCOSE BLDC GLUCOMTR-MCNC: 220 MG/DL (ref 70–99)
GLUCOSE BLDC GLUCOMTR-MCNC: 245 MG/DL (ref 70–99)
GLUCOSE SERPL-MCNC: 219 MG/DL (ref 70–99)
GLUCOSE SERPL-MCNC: 281 MG/DL (ref 70–99)
GLUCOSE UR STRIP-MCNC: 50 MG/DL
HCT VFR BLD AUTO: 36.9 % (ref 40–53)
HCT VFR BLD AUTO: 38.4 % (ref 40–53)
HGB BLD-MCNC: 11.6 G/DL (ref 13.3–17.7)
HGB BLD-MCNC: 12.1 G/DL (ref 13.3–17.7)
HGB UR QL STRIP: NEGATIVE
HYALINE CASTS #/AREA URNS LPF: 18 /LPF (ref 0–2)
IMM GRANULOCYTES # BLD: 0.1 10E9/L (ref 0–0.4)
IMM GRANULOCYTES NFR BLD: 0.6 %
INR PPP: 1.43 (ref 0.86–1.14)
INTERPRETATION ECG - MUSE: NORMAL
KETONES UR STRIP-MCNC: NEGATIVE MG/DL
LACTATE BLD-SCNC: 0.6 MMOL/L (ref 0.7–2)
LACTATE BLD-SCNC: 2.4 MMOL/L (ref 0.7–2)
LEUKOCYTE ESTERASE UR QL STRIP: NEGATIVE
LIPASE SERPL-CCNC: 157 U/L (ref 73–393)
LYMPHOCYTES # BLD AUTO: 1 10E9/L (ref 0.8–5.3)
LYMPHOCYTES NFR BLD AUTO: 10.4 %
MCH RBC QN AUTO: 30.1 PG (ref 26.5–33)
MCH RBC QN AUTO: 30.1 PG (ref 26.5–33)
MCHC RBC AUTO-ENTMCNC: 31.4 G/DL (ref 31.5–36.5)
MCHC RBC AUTO-ENTMCNC: 31.5 G/DL (ref 31.5–36.5)
MCV RBC AUTO: 96 FL (ref 78–100)
MCV RBC AUTO: 96 FL (ref 78–100)
MONOCYTES # BLD AUTO: 1.2 10E9/L (ref 0–1.3)
MONOCYTES NFR BLD AUTO: 12 %
MUCOUS THREADS #/AREA URNS LPF: PRESENT /LPF
NEUTROPHILS # BLD AUTO: 7.5 10E9/L (ref 1.6–8.3)
NEUTROPHILS NFR BLD AUTO: 76.2 %
NITRATE UR QL: NEGATIVE
NRBC # BLD AUTO: 0 10*3/UL
NRBC BLD AUTO-RTO: 0 /100
NT-PROBNP SERPL-MCNC: 1602 PG/ML (ref 0–1800)
PH UR STRIP: 5 PH (ref 5–7)
PLATELET # BLD AUTO: 182 10E9/L (ref 150–450)
PLATELET # BLD AUTO: 218 10E9/L (ref 150–450)
POTASSIUM SERPL-SCNC: 3.6 MMOL/L (ref 3.4–5.3)
POTASSIUM SERPL-SCNC: 3.7 MMOL/L (ref 3.4–5.3)
PROT SERPL-MCNC: 7.3 G/DL (ref 6.8–8.8)
PROT SERPL-MCNC: 7.8 G/DL (ref 6.8–8.8)
RBC # BLD AUTO: 3.86 10E12/L (ref 4.4–5.9)
RBC # BLD AUTO: 4.02 10E12/L (ref 4.4–5.9)
RBC #/AREA URNS AUTO: <1 /HPF (ref 0–2)
SODIUM SERPL-SCNC: 133 MMOL/L (ref 133–144)
SODIUM SERPL-SCNC: 137 MMOL/L (ref 133–144)
SOURCE: ABNORMAL
SP GR UR STRIP: 1.02 (ref 1–1.03)
TROPONIN I SERPL-MCNC: <0.015 UG/L (ref 0–0.04)
UROBILINOGEN UR STRIP-MCNC: 0 MG/DL (ref 0–2)
WBC # BLD AUTO: 7.9 10E9/L (ref 4–11)
WBC # BLD AUTO: 9.9 10E9/L (ref 4–11)
WBC #/AREA URNS AUTO: 1 /HPF (ref 0–5)

## 2018-09-26 PROCEDURE — A9270 NON-COVERED ITEM OR SERVICE: HCPCS | Mod: GY | Performed by: PHYSICIAN ASSISTANT

## 2018-09-26 PROCEDURE — 80076 HEPATIC FUNCTION PANEL: CPT | Performed by: EMERGENCY MEDICINE

## 2018-09-26 PROCEDURE — 00000146 ZZHCL STATISTIC GLUCOSE BY METER IP

## 2018-09-26 PROCEDURE — 85025 COMPLETE CBC W/AUTO DIFF WBC: CPT | Performed by: EMERGENCY MEDICINE

## 2018-09-26 PROCEDURE — 99220 ZZC INITIAL OBSERVATION CARE,LEVL III: CPT | Performed by: INTERNAL MEDICINE

## 2018-09-26 PROCEDURE — 83690 ASSAY OF LIPASE: CPT | Performed by: EMERGENCY MEDICINE

## 2018-09-26 PROCEDURE — A9270 NON-COVERED ITEM OR SERVICE: HCPCS | Mod: GY | Performed by: HOSPITALIST

## 2018-09-26 PROCEDURE — 25000125 ZZHC RX 250: Performed by: INTERNAL MEDICINE

## 2018-09-26 PROCEDURE — 25000128 H RX IP 250 OP 636: Performed by: EMERGENCY MEDICINE

## 2018-09-26 PROCEDURE — 83880 ASSAY OF NATRIURETIC PEPTIDE: CPT | Performed by: EMERGENCY MEDICINE

## 2018-09-26 PROCEDURE — 25000131 ZZH RX MED GY IP 250 OP 636 PS 637: Mod: GY | Performed by: INTERNAL MEDICINE

## 2018-09-26 PROCEDURE — 25000132 ZZH RX MED GY IP 250 OP 250 PS 637: Mod: GY | Performed by: INTERNAL MEDICINE

## 2018-09-26 PROCEDURE — 84484 ASSAY OF TROPONIN QUANT: CPT | Performed by: EMERGENCY MEDICINE

## 2018-09-26 PROCEDURE — 96372 THER/PROPH/DIAG INJ SC/IM: CPT | Mod: 59

## 2018-09-26 PROCEDURE — 81001 URINALYSIS AUTO W/SCOPE: CPT | Performed by: EMERGENCY MEDICINE

## 2018-09-26 PROCEDURE — G0378 HOSPITAL OBSERVATION PER HR: HCPCS

## 2018-09-26 PROCEDURE — 71260 CT THORAX DX C+: CPT

## 2018-09-26 PROCEDURE — 74019 RADEX ABDOMEN 2 VIEWS: CPT

## 2018-09-26 PROCEDURE — 85610 PROTHROMBIN TIME: CPT | Performed by: EMERGENCY MEDICINE

## 2018-09-26 PROCEDURE — 96374 THER/PROPH/DIAG INJ IV PUSH: CPT | Mod: 59

## 2018-09-26 PROCEDURE — 84484 ASSAY OF TROPONIN QUANT: CPT | Performed by: INTERNAL MEDICINE

## 2018-09-26 PROCEDURE — 25000132 ZZH RX MED GY IP 250 OP 250 PS 637: Mod: GY | Performed by: HOSPITALIST

## 2018-09-26 PROCEDURE — 36415 COLL VENOUS BLD VENIPUNCTURE: CPT | Performed by: EMERGENCY MEDICINE

## 2018-09-26 PROCEDURE — 80048 BASIC METABOLIC PNL TOTAL CA: CPT | Performed by: EMERGENCY MEDICINE

## 2018-09-26 PROCEDURE — 25000132 ZZH RX MED GY IP 250 OP 250 PS 637: Mod: GY | Performed by: PHYSICIAN ASSISTANT

## 2018-09-26 PROCEDURE — 80053 COMPREHEN METABOLIC PANEL: CPT | Performed by: PHYSICIAN ASSISTANT

## 2018-09-26 PROCEDURE — 36415 COLL VENOUS BLD VENIPUNCTURE: CPT | Performed by: INTERNAL MEDICINE

## 2018-09-26 PROCEDURE — 83605 ASSAY OF LACTIC ACID: CPT | Performed by: EMERGENCY MEDICINE

## 2018-09-26 PROCEDURE — 85027 COMPLETE CBC AUTOMATED: CPT | Mod: 91 | Performed by: PHYSICIAN ASSISTANT

## 2018-09-26 PROCEDURE — 36415 COLL VENOUS BLD VENIPUNCTURE: CPT | Performed by: PHYSICIAN ASSISTANT

## 2018-09-26 PROCEDURE — 83605 ASSAY OF LACTIC ACID: CPT | Mod: 91 | Performed by: PHYSICIAN ASSISTANT

## 2018-09-26 PROCEDURE — 85379 FIBRIN DEGRADATION QUANT: CPT | Performed by: EMERGENCY MEDICINE

## 2018-09-26 PROCEDURE — 71046 X-RAY EXAM CHEST 2 VIEWS: CPT

## 2018-09-26 PROCEDURE — 99207 ZZC CDG-MDM COMPONENT: MEETS MODERATE - UP CODED: CPT | Performed by: INTERNAL MEDICINE

## 2018-09-26 PROCEDURE — A9270 NON-COVERED ITEM OR SERVICE: HCPCS | Mod: GY | Performed by: INTERNAL MEDICINE

## 2018-09-26 PROCEDURE — 87040 BLOOD CULTURE FOR BACTERIA: CPT | Performed by: EMERGENCY MEDICINE

## 2018-09-26 PROCEDURE — C9113 INJ PANTOPRAZOLE SODIUM, VIA: HCPCS | Performed by: PHYSICIAN ASSISTANT

## 2018-09-26 PROCEDURE — 25000128 H RX IP 250 OP 636: Performed by: PHYSICIAN ASSISTANT

## 2018-09-26 RX ORDER — METOPROLOL TARTRATE 25 MG/1
25 TABLET, FILM COATED ORAL 2 TIMES DAILY
Status: DISCONTINUED | OUTPATIENT
Start: 2018-09-26 | End: 2018-09-27 | Stop reason: HOSPADM

## 2018-09-26 RX ORDER — TRIAMCINOLONE ACETONIDE 5 MG/G
CREAM TOPICAL
COMMUNITY

## 2018-09-26 RX ORDER — GABAPENTIN 100 MG/1
100 CAPSULE ORAL 2 TIMES DAILY
COMMUNITY
End: 2019-01-01

## 2018-09-26 RX ORDER — SUCRALFATE 1 G/1
1 TABLET ORAL
Status: DISCONTINUED | OUTPATIENT
Start: 2018-09-26 | End: 2018-09-27 | Stop reason: HOSPADM

## 2018-09-26 RX ORDER — VALSARTAN 40 MG/1
10 TABLET ORAL DAILY
COMMUNITY

## 2018-09-26 RX ORDER — LEVOTHYROXINE SODIUM 100 UG/1
100 TABLET ORAL DAILY
Status: DISCONTINUED | OUTPATIENT
Start: 2018-09-26 | End: 2018-09-27 | Stop reason: HOSPADM

## 2018-09-26 RX ORDER — LACTULOSE 10 G/15ML
10 SOLUTION ORAL ONCE
Status: COMPLETED | OUTPATIENT
Start: 2018-09-26 | End: 2018-09-26

## 2018-09-26 RX ORDER — WARFARIN SODIUM 2.5 MG/1
2.5 TABLET ORAL
Status: ON HOLD | COMMUNITY
End: 2018-09-26

## 2018-09-26 RX ORDER — SIMETHICONE 80 MG
80 TABLET,CHEWABLE ORAL EVERY 6 HOURS PRN
Status: DISCONTINUED | OUTPATIENT
Start: 2018-09-26 | End: 2018-09-27 | Stop reason: HOSPADM

## 2018-09-26 RX ORDER — NICOTINE POLACRILEX 4 MG
15-30 LOZENGE BUCCAL
Status: DISCONTINUED | OUTPATIENT
Start: 2018-09-26 | End: 2018-09-27 | Stop reason: HOSPADM

## 2018-09-26 RX ORDER — GLIPIZIDE 5 MG/1
5 TABLET ORAL
Status: DISCONTINUED | OUTPATIENT
Start: 2018-09-26 | End: 2018-09-27 | Stop reason: HOSPADM

## 2018-09-26 RX ORDER — SENNOSIDES 8.6 MG
1-2 TABLET ORAL 2 TIMES DAILY
Status: DISCONTINUED | OUTPATIENT
Start: 2018-09-26 | End: 2018-09-27 | Stop reason: HOSPADM

## 2018-09-26 RX ORDER — IPRATROPIUM BROMIDE AND ALBUTEROL SULFATE 2.5; .5 MG/3ML; MG/3ML
1 SOLUTION RESPIRATORY (INHALATION) EVERY 4 HOURS PRN
Status: DISCONTINUED | OUTPATIENT
Start: 2018-09-26 | End: 2018-09-27 | Stop reason: HOSPADM

## 2018-09-26 RX ORDER — GABAPENTIN 100 MG/1
100 CAPSULE ORAL 2 TIMES DAILY
Status: DISCONTINUED | OUTPATIENT
Start: 2018-09-26 | End: 2018-09-27 | Stop reason: HOSPADM

## 2018-09-26 RX ORDER — LISINOPRIL 5 MG/1
5 TABLET ORAL DAILY
Status: DISCONTINUED | OUTPATIENT
Start: 2018-09-26 | End: 2018-09-26

## 2018-09-26 RX ORDER — VALSARTAN 40 MG/1
20 TABLET ORAL 2 TIMES DAILY
Status: DISCONTINUED | OUTPATIENT
Start: 2018-09-26 | End: 2018-09-26 | Stop reason: RX

## 2018-09-26 RX ORDER — IOPAMIDOL 755 MG/ML
500 INJECTION, SOLUTION INTRAVASCULAR ONCE
Status: COMPLETED | OUTPATIENT
Start: 2018-09-26 | End: 2018-09-26

## 2018-09-26 RX ORDER — DEXTROSE MONOHYDRATE 25 G/50ML
25-50 INJECTION, SOLUTION INTRAVENOUS
Status: DISCONTINUED | OUTPATIENT
Start: 2018-09-26 | End: 2018-09-27 | Stop reason: HOSPADM

## 2018-09-26 RX ORDER — WARFARIN SODIUM 3 MG/1
3 TABLET ORAL
COMMUNITY
End: 2019-01-01

## 2018-09-26 RX ORDER — POLYETHYLENE GLYCOL 3350 17 G/17G
17 POWDER, FOR SOLUTION ORAL 2 TIMES DAILY
Status: DISCONTINUED | OUTPATIENT
Start: 2018-09-26 | End: 2018-09-27 | Stop reason: HOSPADM

## 2018-09-26 RX ORDER — WARFARIN SODIUM 5 MG/1
5 TABLET ORAL
Status: COMPLETED | OUTPATIENT
Start: 2018-09-26 | End: 2018-09-26

## 2018-09-26 RX ADMIN — METFORMIN HYDROCHLORIDE 500 MG: 500 TABLET, FILM COATED ORAL at 18:13

## 2018-09-26 RX ADMIN — LOSARTAN POTASSIUM 12.5 MG: 25 TABLET ORAL at 20:21

## 2018-09-26 RX ADMIN — SUCRALFATE 1 G: 1 TABLET ORAL at 16:03

## 2018-09-26 RX ADMIN — GABAPENTIN 100 MG: 100 CAPSULE ORAL at 20:21

## 2018-09-26 RX ADMIN — IOPAMIDOL 83 ML: 755 INJECTION, SOLUTION INTRAVENOUS at 02:22

## 2018-09-26 RX ADMIN — GLIPIZIDE 5 MG: 5 TABLET ORAL at 10:38

## 2018-09-26 RX ADMIN — INSULIN ASPART 2 UNITS: 100 INJECTION, SOLUTION INTRAVENOUS; SUBCUTANEOUS at 10:50

## 2018-09-26 RX ADMIN — INSULIN ASPART 1 UNITS: 100 INJECTION, SOLUTION INTRAVENOUS; SUBCUTANEOUS at 13:58

## 2018-09-26 RX ADMIN — SENNOSIDES 1 TABLET: 8.6 TABLET, FILM COATED ORAL at 20:21

## 2018-09-26 RX ADMIN — OMEPRAZOLE 20 MG: 20 CAPSULE, DELAYED RELEASE ORAL at 10:30

## 2018-09-26 RX ADMIN — SODIUM CHLORIDE 500 ML: 9 INJECTION, SOLUTION INTRAVENOUS at 01:35

## 2018-09-26 RX ADMIN — METOPROLOL TARTRATE 25 MG: 25 TABLET ORAL at 20:21

## 2018-09-26 RX ADMIN — WARFARIN SODIUM 5 MG: 5 TABLET ORAL at 18:13

## 2018-09-26 RX ADMIN — SUCRALFATE 1 G: 1 TABLET ORAL at 10:39

## 2018-09-26 RX ADMIN — PANTOPRAZOLE SODIUM 40 MG: 40 INJECTION, POWDER, FOR SOLUTION INTRAVENOUS at 16:02

## 2018-09-26 RX ADMIN — METOPROLOL TARTRATE 25 MG: 25 TABLET ORAL at 10:38

## 2018-09-26 RX ADMIN — GLIPIZIDE 5 MG: 5 TABLET ORAL at 16:03

## 2018-09-26 RX ADMIN — SODIUM CHLORIDE 100 ML: 9 INJECTION, SOLUTION INTRAVENOUS at 02:22

## 2018-09-26 RX ADMIN — FUROSEMIDE 60 MG: 40 TABLET ORAL at 16:03

## 2018-09-26 RX ADMIN — FUROSEMIDE 60 MG: 40 TABLET ORAL at 10:38

## 2018-09-26 RX ADMIN — SUCRALFATE 1 G: 1 TABLET ORAL at 21:35

## 2018-09-26 RX ADMIN — POLYETHYLENE GLYCOL 3350 17 G: 17 POWDER, FOR SOLUTION ORAL at 20:21

## 2018-09-26 RX ADMIN — LACTULOSE 10 G: 20 SOLUTION ORAL at 14:00

## 2018-09-26 RX ADMIN — SIMETHICONE CHEW TAB 80 MG 80 MG: 80 TABLET ORAL at 21:41

## 2018-09-26 RX ADMIN — LEVOTHYROXINE SODIUM 100 MCG: 100 TABLET ORAL at 10:30

## 2018-09-26 RX ADMIN — LIDOCAINE HYDROCHLORIDE 30 ML: 20 SOLUTION ORAL; TOPICAL at 10:50

## 2018-09-26 ASSESSMENT — ENCOUNTER SYMPTOMS
FEVER: 0
COUGH: 1
SHORTNESS OF BREATH: 0

## 2018-09-26 NOTE — ED TRIAGE NOTES
PT WITH ONSET OF CHEST PAIN THIS EVENING, PER STAFF AT [facility] THEY WERE UNAWARE HE HAD PAIN.  PT CALLED 911 ON OWN.  STAFF DID STATE HE HAD PIZZA AND HAD BEEN GASSY AND BELCHING A LOT.  EMS GAVE ASA AND NITROGLYCERIN WITH NO CHANGE IN PAIN.

## 2018-09-26 NOTE — PROGRESS NOTES
Discharge Planner   Discharge Plans in progress: [facility]  Barriers to discharge plan: abdominal pain  Follow up plan: [facility] orders need to be faxed at discharge. Family to provide transportation.       Entered by: Abimbola Lin 09/26/2018 11:02 AM

## 2018-09-26 NOTE — CONSULTS
SW consult for return to facility. Pt resides at [facility] with his wife. CM confirmed bedhold at [facility] [phone] with Aimee in admissions. CM will fax discharge orders to LTC when discharge orders are in. Family will provided transportation at discharge. CM spoke with son Luciano who could provided transport this morning or pts daughter Felecia can provide transport in the afternoon.    CM will continue to follow with discharge planning.    Pavithra Lin RN, BSN CTS  Care Coordinator  [phone]

## 2018-09-26 NOTE — PHARMACY-ANTICOAGULATION SERVICE
Clinical Pharmacy - Warfarin Dosing Consult     Pharmacy has been consulted to manage this patient s warfarin therapy.  Indication: Atrial Fibrillation  Therapy Goal: INR 2-3  Warfarin Prior to Admission: Yes  Warfarin PTA Regimen: 3.5 mg on Thur, 3 mg ROW  Significant drug interactions: omeprazole, levothyroxine    INR   Date Value Ref Range Status   09/26/2018 1.43 (H) 0.86 - 1.14 Final   08/07/2018 2.14 (H) 0.86 - 1.14 Final       Recommend warfarin 5 mg today.  Pharmacy will monitor Ton Bagley daily and order warfarin doses to achieve specified goal.      Please contact pharmacy as soon as possible if the warfarin needs to be held for a procedure or if the warfarin goals change.

## 2018-09-26 NOTE — PROGRESS NOTES
ROOM # 213-1    Living Situation (if not independent, order SW consult):[facility] living   Facility name:[facility]  : Kelli (wife)    Activity level at baseline: ind.  Activity level on admit: SBA      Patient registered to observation; given Patient Bill of Rights; given the opportunity to ask questions about observation status and their plan of care.  Patient has been oriented to the observation room, bathroom and call light is in place.    Discussed discharge goals and expectations with patient/family.

## 2018-09-26 NOTE — ED PROVIDER NOTES
"  History     Chief Complaint:  Chest Pain    HPI   Ton Bagley is a 89 year old male with a history of CAD, hypertension, atrial fibrillation, hyperlipidemia, diabetes, among others, on coumadin, who presents vis EMS for evaluation of acute chest pain. Per patient and EMS report, after eating a pizza this evening, the patient developed a sharp pain along his chest, prompting him to call EMS. Immediately after calling, however, the patient's pain subsided, and has since been intermittent. He was given nitroglycerin en route with no change in pain, and an EKG was obtained and unremarkable at this time. However, due to the patient's extensive history, and the acute nature of his symptoms, the patient presents for further evaluation and work up. While in the ED, the patient states that \"he feels great,\" and that this pain is merely gas pain located along his chest. The patient also endorses the need to belch, and an acute cough is also appreciated. His pain worsens with deep breathing, he otherwise denies shortness of breath.     Notably, the patient is on O2 while in the ED, but reports that he does not use this at home.     CARDIAC RISK FACTORS:  Sex:    Male  Tobacco:   Former  Hypertension:   Yes  Hyperlipidemia:  Yes  Diabetes:   Yes  Family History:  Diabetes, hypertension, stroke    Allergies:  No known drug allergies  Adhesive tape  Latex    Medications:    Lipitor  Fergon  Furosemide  Neurontin  Glipizide  DuoNeb  Lactulose  Lisinopril  Synthroid  Lopressor  Nitrostat  Omeprazole  Mylicon  Carafate  Tamsulosin  Coumadin  Atenolol  Zocor    Past Medical History:    Hypertension  Hyperlipidemia  CAD  Atrial fibrillation  Small bowel obstruction  Cataracts  Osteoarthritis  GERD  CKD  Hearing loss  LUTS  Diabetes mellitus     Past Surgical History:    Bypass graft coronary artery  Cardioversion  Laparotomy exploratory  Stent, coronary  Tonsillectomy  Cataract removal    Family History:  " "  Hypertension  Diabetes   Stroke    Social History:  The patient was accompanied to the ED by EMS.  Smoking Status: Former  Smokeless Tobacco: No  Alcohol Use: Yes  Marital Status:        PCP:Saida Luque DNP, APRN, CNP    Review of Systems   Constitutional: Negative for fever.   Respiratory: Positive for cough. Negative for shortness of breath.    Cardiovascular: Positive for chest pain.   All other systems reviewed and are negative.    Physical Exam     Patient Vitals for the past 24 hrs:   BP Temp Temp src Pulse Heart Rate Resp SpO2 Height Weight   09/26/18 0324 125/55 96  F (35.6  C) Oral - 84 16 93 % 1.676 m (5' 6\") 106.1 kg (234 lb)   09/26/18 0300 99/57 - - - 80 - 95 % - -   09/26/18 0245 - - - - 75 - 98 % - -   09/26/18 0200 112/60 - - - 81 - 97 % - -   09/26/18 0145 - - - - 85 - 93 % - -   09/26/18 0130 116/59 - - - 75 - 93 % - -   09/26/18 0115 - - - - 77 - 95 % - -   09/26/18 0100 121/79 - - - 77 - 98 % - -   09/26/18 0008 135/68 - - - 86 - 99 % - -   09/26/18 0001 135/68 98  F (36.7  C) - 91 91 16 (!) 88 % - -       Physical Exam  Nursing note and vitals reviewed.  Constitutional: Cooperative.   Pleasantly confused.  HENT:   Mouth/Throat: Moist mucous membranes.   Eyes: EOMI, nonicteric sclera  Cardiovascular: Normal rate, irregularly irregular rhythm, no murmurs, rubs, or gallops  Pulmonary/Chest: Effort normal and breath sounds normal. No respiratory distress. No wheezes. No rales.   Abdominal: Soft. Nontender, mild distention, no guarding or rigidity. BS present.   Musculoskeletal: Normal range of motion.   Neurological: Alert. Moves all extremities spontaneously.   Skin: Skin is warm and dry. No rash noted.     Emergency Department Course     ECG (00:03:21):  Rate 94 bpm. NV interval *. QRS duration 86. QT/QTc 358/447. P-R-T axes * 16 -5. Atrial Fibrillation. Possible inferior infarct. Abnormal ECG. Interpreted at 0013 by Ramírez Betts MD.    Imaging:  Radiology findings were " communicated with the patient who voiced understanding of the findings.    Chest XR, PA & LAT:  No convincing evidence of active cardiopulmonary disease.    As read by radiology    Chest CT, IV Contrast only - PE Protocol:  1. No visualized pulmonary embolus.  2. No convincing evidence of active pulmonary disease.    CASTRO JOHNSON MD    Laboratory:  Laboratory findings were communicated with the patient who voiced understanding of the findings.    CBC:  HGB 12.1 (L), o/w WNL (WBC 9.9, )  BMP: Glucose 281 (H), GFR 59 (L), o/w  WNL (Creatinine 1.16)    Lactic Acid: 2.4 (H)  Lipase: 157  Hepatic panel: WNL    Troponin (Collected 0014): <0.015  BNP: 1602  D-dimer: 0.6 (H)  INR:1.43 (H)    Blood Culture x2: Pending    Interventions:  0135 - NS Bolus 1,000mL IV  0222 - Isovue solution 500 mL, 83 mLs IV    Emergency Department Course:  Nursing notes and vitals reviewed.    IV was inserted and blood was drawn for laboratory testing, results above.    The patient was sent for a chest x-ray and CT while in the emergency department, results above.   Imaging independently reviewed and agree with radiologist interpretation.     0014: I performed an exam of the patient as documented above.   0147: I spoke with Dr. Dao of the hospitalist service regarding patient's presentation, findings, and plan of care.    Findings and plan explained to the Patient and family who consents to admission.     Discussed the patient with Dr. Dao, who will admit the patient to a obs bed for further monitoring, evaluation, and treatment.     Impression & Plan      Medical Decision Making:  Patient presents after having called EMS for chest pain.  On arrival, patient is found to be hypoxic with sats in the high 80s on room air.  This came up quickly with 2 L of nasal cannula oxygen.  Broad differential considered including ACS, PE, gastrointestinal pain, pneumothorax, pneumonia, among many other etiologies.  Patient does appear to be having  some URI symptoms.  Chest x-ray is negative for acute etiology.  Labs are notable for a mildly elevated lactic acid, hyperglycemia, and mild leukocytosis.  Patient's d-dimer is measured at 0.6.  Using an age-adjusted cut off, this is considered negative.  Discussed with Dr. Dao from our hospitalist service who accepts for admission.  Given patient's hypoxia and unexplained pain, he requests that we carry out a CT pulmonary embolism protocol which he states he will follow-up on the results on.  I did review CT and found no PE and no pneumonia.  Suspect that patient's chest pain is more abdominal in nature.  However, I have no explanation for patient's hypoxia apart from possible hypoventilation related to gastric distention.  Ultimately, patient requires admission for further evaluation.  Patient admitted in stable condition.    Diagnosis:    ICD-10-CM    1. Atypical chest pain R07.89 UA with Microscopic reflex to Culture   2. Acute respiratory failure with hypoxia (H) J96.01        Disposition:  Admitted to obs      Marcia Rawls  9/25/2018   [facility] EMERGENCY DEPARTMENT  IMarcia, am serving as a scribe at 12:14 AM on 9/26/2018 to document services personally performed by Ramírez Betts MD based on my observations and the provider's statements to me.       Ramírez Betts MD  09/26/18 0806

## 2018-09-26 NOTE — PLAN OF CARE
Problem: Patient Care Overview  Goal: Plan of Care/Patient Progress Review  Outcome: No Change  PRIMARY DIAGNOSIS: CHEST PAIN  OUTPATIENT/OBSERVATION GOALS TO BE MET BEFORE DISCHARGE:  1. Ruled out acute coronary syndrome (negative or stable Troponin):  Yes  2. Pain Status: Improved-controlled with oral pain medications.  3. Appropriate provocative testing performed: Yes  - Stress Test Procedure: N/A  - Interpretation of cardiac rhythm per telemetry tech: afib CVR/PVCs 81     4. Cleared by Consultants (if applicable):N/A  5. Return to near baseline physical activity: Yes  Discharge Planner Nurse   Safe discharge environment identified: Yes  Barriers to discharge: No       Entered by: Loyda Lundberg 09/26/2018 9:50 AM     Please review provider order for any additional goals.   Nurse to notify provider when observation goals have been met and patient is ready for discharge.    VSS, Confused, alert to self, Assist x1 with walker and gait belt.  LS-dim, productive cough.  Pt on 3L/NC, sats 97%.  Tele-afib CVR/PVCs. Trops negative so far. Blood cultures drawn, no growth so far.  Chest CT and PE negative.  Pt c/o abdominal discomfort/fullness.  Passing gas and belching.

## 2018-09-26 NOTE — PLAN OF CARE
Problem: Patient Care Overview  Goal: Plan of Care/Patient Progress Review  Outcome: No Change  PRIMARY DIAGNOSIS: CHEST PAIN  OUTPATIENT/OBSERVATION GOALS TO BE MET BEFORE DISCHARGE:  1. Ruled out acute coronary syndrome (negative or stable Troponin):  Trops neg x1  2. Pain Status: Improved with use of alternative comfort measures i.e.: Rest  3. Appropriate provocative testing performed: Yes Chest X-ray neg  - Interpretation of cardiac rhythm per telemetry tech: A-fib controled 80s    4. Cleared by Consultants (if applicable):N/A  5. Return to near baseline physical activity: Yes  Discharge Planner Nurse   Safe discharge environment identified: Yes  Barriers to discharge: No       Entered by: Neeta Mayen 09/26/2018 5:54 AM     Please review provider order for any additional goals.   Nurse to notify provider when observation goals have been met and patient is ready for discharge.  Patient is Disorientated to, Time and Pt confused and tired. Vitals are Temp: 96  F (35.6  C) Temp src: Oral BP: 125/55 Pulse: 91 Heart Rate: 84 Resp: 16 SpO2: 93 % Nasal Cannual 3L  complaining of 3/10 pain in their Chest.  Nothing given for pain, pt was able to fall asleep.  They are 1 Assist with Activity and Gait Belt.    Pt is a No diet order at the moment, to be addressed in AM.  Patient is Saline locked. Pt denied SOB. Pt hard of hearing. Pt resting. . No insulin given, to be taken again before breakfast. Plan: trops at 0600 and 1000. Monitor pain and SOB.

## 2018-09-26 NOTE — PROGRESS NOTES
[facility]  Hospitalist Progress Note  Juliana Chapin PA-C 09/26/2018    Reason for Stay (Diagnosis): epigastric pain         Assessment and Plan:      Summary of Stay: Ton Bagley is a 89 year old male admitted on 9/25/2018 with dementia, CAD (4-vessel CABG 1994), HTN, chronic atrial fibrillation on warfarin, DM II, and HLD with epigastric discomfort and frequent belching after eating pizza.    Problem List:   1. Epigastric pain and belching: Patient reports as chest pain but then points to epigastrium and extends straight down mid-abdomen. Started after he ate pizza per report from his son, who notes that similar episode happened the last time he ate pizza and that he probably shouldn't eat that anymore. ECG on admission showed his chronic a fib w/ CVR. D dimer in ER was mildly elevated but CT PE protocol negative. Serial troponins undetectable. His discomfort does seem more GI related- wonder if he also has a component of constipation. He is frequently belching and feels moderately distended despite po omeprazole, sucralfate, and GI cocktail. He does not recall when his last bowel movement was.  - IV Protonix 40 mg daily  - Continue QID carafate and prn GI cocktail  - BID sennakot and miralax  - Gave lactulose x 1 this afternoon  - ADAT  - Called and updated son    2. CAD: He has a history of documented coronary disease, having had coronary artery bypass surgery in 1994 with a mammary artery to the LAD and individual vein grafts to the 1st diagonal, the 1st obtuse marginal and the distal RCA.  His LV function has been normal and he has not had a history of heart failure. He has followed with Dr. Conde in the past (most recent appointment was 10/2014) and has been instructed to follow up as needed as he has been stable for many years.    3. Dementia: Son confirms that he has this diagnosis. He is oriented to self. Very forgetful and very pleasant, not sure of the year but can tell me he is  "in the hospital.    4. DM II: Resumed PTA glipizide and metformin (lactic acid recheck was normalized, initially elevated in ER). Also has moderate sliding scale insulin in place. Continue scheduled glucose checks.    5. Hypothyroidism: Continue synthroid.    6. Hypertension: Continue lisinopril and metoprolol.    7. Chronic a fib: Rate controlled. Pharmacy dosing coumadin.    DVT Prophylaxis: Pneumatic Compression Devices  Code Status: DNR / DNI  Discharge Dispo: Anticipate discharge tomorrow      Interval History (Subjective):      Patient frequently belching. He reports discomfort in epigastrium that radiates down into his abdomen generally. No shortness of breath, gets pain with deep inspiration (PE ruled out in ER). No palpitations. No fever or chills. He has a cold, per his son, and has been coughing up yellow phlegm today. He denies nausea, vomiting, or diarrhea. He does not recall when his last BM was. He does not recall if there are any sick contacts.                  Physical Exam:      Last Vital Signs:  /75 (BP Location: Right arm)  Pulse 76  Temp 97.6  F (36.4  C) (Oral)  Resp 16  Ht 1.676 m (5' 6\")  Wt 106.1 kg (234 lb)  SpO2 92%  BMI 37.77 kg/m2    Constitutional: Awake, alert, cooperative, no apparent distress   Respiratory: Clear to auscultation bilaterally, no crackles or wheezing   Cardiovascular: Regular rate and rhythm, normal S1 and S2, and no murmur noted   Abdomen: Normal bowel sounds, soft, moderately distended, moderately tender over epigastrium, otherwise seems to be rather non-tender   Skin: No rashes, no cyanosis, dry to touch   Neuro: Alert and oriented x3, no weakness or numbness, does have memory loss   Extremities: No edema, normal range of motion   Other(s):        All other systems: Negative          Medications:      All current medications were reviewed with changes reflected in problem list.         Data:      All new lab and imaging data was reviewed.   Labs & " Imaging:  Results for orders placed or performed during the hospital encounter of 09/25/18   Chest XR,  PA & LAT    Narrative    CHEST 2 VIEWS  9/26/2018 12:38 AM     HISTORY: Chest pain. Cough.    COMPARISON: 7/31/2018.    FINDINGS: Hypoinflated lungs. No convincing pulmonary opacities.  Borderline cardiomegaly again noted. Atherosclerotic calcification in  the thoracic aorta. Prior median sternotomy.      Impression    IMPRESSION: No convincing evidence of active cardiopulmonary disease.    CASTRO JOHNSON MD   Chest CT, IV contrast only - PE protocol    Narrative    CT CHEST WITH CONTRAST  9/26/2018 2:38 AM     HISTORY: Hypoxic. Chest pain.    COMPARISON: 4/3/2018 - CT chest, abdomen and pelvis.    TECHNIQUE: Following the uneventful administration of 83 mL Isovue-370  intravenous contrast, helical sections were acquired through the lungs  according to the pulmonary embolism protocol. Coronal reconstructions  were generated. Radiation dose for this scan was reduced using  automated exposure control, adjustment of the mA and/or kV according  to the patient's size, or iterative reconstruction technique.    FINDINGS: No visualized pulmonary embolus. The thoracic aorta is  normal in caliber without dissection. Atherosclerotic calcification in  the thoracic aorta and coronary arteries. Mild cardiomegaly. Prior  median sternotomy and coronary artery bypass surgery.    A few small hazy opacities in bilateral lung bases most likely  represent atelectasis. Small calcified granuloma in the right lung  base. No pleural or pericardial effusion. No enlarged lymph nodes in  the chest.    Scan through the upper abdomen is significant for partial  visualization of a few left renal cysts, the largest measuring 6.5 cm.  Prior cholecystectomy.      Impression    IMPRESSION:  1. No visualized pulmonary embolus.  2. No convincing evidence of active pulmonary disease.    CASTRO JHONSON MD   CBC with platelets differential   Result  Value Ref Range    WBC 9.9 4.0 - 11.0 10e9/L    RBC Count 4.02 (L) 4.4 - 5.9 10e12/L    Hemoglobin 12.1 (L) 13.3 - 17.7 g/dL    Hematocrit 38.4 (L) 40.0 - 53.0 %    MCV 96 78 - 100 fl    MCH 30.1 26.5 - 33.0 pg    MCHC 31.5 31.5 - 36.5 g/dL    RDW 13.2 10.0 - 15.0 %    Platelet Count 218 150 - 450 10e9/L    Diff Method Automated Method     % Neutrophils 76.2 %    % Lymphocytes 10.4 %    % Monocytes 12.0 %    % Eosinophils 0.6 %    % Basophils 0.2 %    % Immature Granulocytes 0.6 %    Nucleated RBCs 0 0 /100    Absolute Neutrophil 7.5 1.6 - 8.3 10e9/L    Absolute Lymphocytes 1.0 0.8 - 5.3 10e9/L    Absolute Monocytes 1.2 0.0 - 1.3 10e9/L    Absolute Eosinophils 0.1 0.0 - 0.7 10e9/L    Absolute Basophils 0.0 0.0 - 0.2 10e9/L    Abs Immature Granulocytes 0.1 0 - 0.4 10e9/L    Absolute Nucleated RBC 0.0    Basic metabolic panel   Result Value Ref Range    Sodium 133 133 - 144 mmol/L    Potassium 3.7 3.4 - 5.3 mmol/L    Chloride 95 94 - 109 mmol/L    Carbon Dioxide 30 20 - 32 mmol/L    Anion Gap 8 3 - 14 mmol/L    Glucose 281 (H) 70 - 99 mg/dL    Urea Nitrogen 23 7 - 30 mg/dL    Creatinine 1.16 0.66 - 1.25 mg/dL    GFR Estimate 59 (L) >60 mL/min/1.7m2    GFR Estimate If Black 71 >60 mL/min/1.7m2    Calcium 8.7 8.5 - 10.1 mg/dL   Troponin I   Result Value Ref Range    Troponin I ES <0.015 0.000 - 0.045 ug/L   Lactic acid whole blood   Result Value Ref Range    Lactic Acid 2.4 (H) 0.7 - 2.0 mmol/L   Hepatic panel   Result Value Ref Range    Bilirubin Direct 0.2 0.0 - 0.2 mg/dL    Bilirubin Total 1.2 0.2 - 1.3 mg/dL    Albumin 3.4 3.4 - 5.0 g/dL    Protein Total 7.8 6.8 - 8.8 g/dL    Alkaline Phosphatase 83 40 - 150 U/L    ALT 17 0 - 70 U/L    AST 14 0 - 45 U/L   Lipase   Result Value Ref Range    Lipase 157 73 - 393 U/L   Nt probnp inpatient   Result Value Ref Range    N-Terminal Pro BNP Inpatient 1602 0 - 1800 pg/mL   UA with Microscopic reflex to Culture   Result Value Ref Range    Color Urine Yellow     Appearance Urine  Clear     Glucose Urine 50 (A) NEG^Negative mg/dL    Bilirubin Urine Negative NEG^Negative    Ketones Urine Negative NEG^Negative mg/dL    Specific Gravity Urine 1.024 1.003 - 1.035    Blood Urine Negative NEG^Negative    pH Urine 5.0 5.0 - 7.0 pH    Protein Albumin Urine Negative NEG^Negative mg/dL    Urobilinogen mg/dL 0.0 0.0 - 2.0 mg/dL    Nitrite Urine Negative NEG^Negative    Leukocyte Esterase Urine Negative NEG^Negative    Source Catheterized Urine     WBC Urine 1 0 - 5 /HPF    RBC Urine <1 0 - 2 /HPF    Mucous Urine Present (A) NEG^Negative /LPF    Hyaline Casts 18 (H) 0 - 2 /LPF   D dimer quantitative   Result Value Ref Range    D Dimer 0.6 (H) 0.0 - 0.50 ug/ml FEU   INR   Result Value Ref Range    INR 1.43 (H) 0.86 - 1.14   Glucose by meter   Result Value Ref Range    Glucose 245 (H) 70 - 99 mg/dL   Troponin I   Result Value Ref Range    Troponin I ES <0.015 0.000 - 0.045 ug/L   Troponin I   Result Value Ref Range    Troponin I ES <0.015 0.000 - 0.045 ug/L   Glucose by meter   Result Value Ref Range    Glucose 211 (H) 70 - 99 mg/dL   Comprehensive metabolic panel   Result Value Ref Range    Sodium 137 133 - 144 mmol/L    Potassium 3.6 3.4 - 5.3 mmol/L    Chloride 99 94 - 109 mmol/L    Carbon Dioxide 31 20 - 32 mmol/L    Anion Gap 7 3 - 14 mmol/L    Glucose 219 (H) 70 - 99 mg/dL    Urea Nitrogen 20 7 - 30 mg/dL    Creatinine 1.10 0.66 - 1.25 mg/dL    GFR Estimate 63 >60 mL/min/1.7m2    GFR Estimate If Black 76 >60 mL/min/1.7m2    Calcium 8.4 (L) 8.5 - 10.1 mg/dL    Bilirubin Total 1.3 0.2 - 1.3 mg/dL    Albumin 3.2 (L) 3.4 - 5.0 g/dL    Protein Total 7.3 6.8 - 8.8 g/dL    Alkaline Phosphatase 75 40 - 150 U/L    ALT 12 0 - 70 U/L    AST 12 0 - 45 U/L   Lactic acid whole blood   Result Value Ref Range    Lactic Acid 0.6 (L) 0.7 - 2.0 mmol/L   CBC with platelets   Result Value Ref Range    WBC 7.9 4.0 - 11.0 10e9/L    RBC Count 3.86 (L) 4.4 - 5.9 10e12/L    Hemoglobin 11.6 (L) 13.3 - 17.7 g/dL    Hematocrit  36.9 (L) 40.0 - 53.0 %    MCV 96 78 - 100 fl    MCH 30.1 26.5 - 33.0 pg    MCHC 31.4 (L) 31.5 - 36.5 g/dL    RDW 13.2 10.0 - 15.0 %    Platelet Count 182 150 - 450 10e9/L   Glucose by meter   Result Value Ref Range    Glucose 220 (H) 70 - 99 mg/dL   Glucose by meter   Result Value Ref Range    Glucose 179 (H) 70 - 99 mg/dL   Glucose by meter   Result Value Ref Range    Glucose 143 (H) 70 - 99 mg/dL   Glucose by meter   Result Value Ref Range    Glucose 132 (H) 70 - 99 mg/dL   EKG 12 lead   Result Value Ref Range    Interpretation ECG Click View Image link to view waveform and result    Social Work IP Consult    Narrative    Abimbola Lin RN     9/26/2018 11:01 AM  SW consult for return to facility. Pt resides at [facility] with   his wife. CM confirmed bedhold at [facility] [phone] with   Aimee in admissions. CM will fax discharge orders to LT when   discharge orders are in. Family will provided transportation at   discharge. CM spoke with son Luciano who could provided transport   this morning or pts daughter Felecia can provide transport in the   afternoon.    CM will continue to follow with discharge planning.    Pavithra Lin RN, BSN CTS  Care Coordinator  [phone]       Blood culture   Result Value Ref Range    Specimen Description Blood Left Hand     Special Requests Aerobic and anaerobic bottles received     Culture Micro No growth after 5 hours    Blood culture   Result Value Ref Range    Specimen Description Blood Right Hand     Special Requests Aerobic and anaerobic bottles received     Culture Micro No growth after 5 hours

## 2018-09-26 NOTE — PHARMACY-ADMISSION MEDICATION HISTORY
Admission medication history interview status for this patient is complete. See [facility] admission navigator for allergy information, prior to admission medications and immunization status.     Medication history interview source(s):[place] [facility]/med list  Medication history resources (including written lists, pill bottles, clinic record):see above    Changes made to PTA medication list:  Added: vit D, metformin, valsartan, sarna  Deleted: lisinopril  Changed: gabapentin, calcium, warfarin    Actions taken by pharmacist (provider contacted, etc):None     Additional medication history information:None    Medication reconciliation/reorder completed by provider prior to medication history? Yes    Do you take OTC medications (eg tylenol, ibuprofen, fish oil, eye/ear drops, etc)? Y(Y/N)    For patients on insulin therapy: N (Y/N)  Lantus/levemir/NPH/Mix 70/30 dose:   (Y/N) (see Med list for doses)   Sliding scale Novolog Y/N  If Yes, do you have a baseline novolog pre-meal dose:  units with meals  Patients eat three meals a day:   Y/N    How many episodes of hypoglycemia do you have per week: _______  How many missed doses do you have per week: ______  How many times do you check your blood glucose per day: _______   Any Barriers to therapy - Be specific :  cost of medications, comfortable with giving injections (if applicable), comfortable and confident with current diabetes regimen: Y/N ______________      Prior to Admission medications    Medication Sig Last Dose Taking? Auth Provider   acetaminophen (TYLENOL) 325 MG tablet Take 650 mg by mouth daily as needed for mild pain  Yes Unknown, Entered By History   atorvastatin (LIPITOR) 10 MG tablet Take 10 mg by mouth daily 9/25/2018 at Unknown time Yes Unknown, Entered By History   Calcium Carb-Cholecalciferol (CALCIUM 500 + D) 500-200 MG-UNIT TABS Take 1 tablet by mouth daily 9/25/2018 at Unknown time Yes Unknown, Entered By History   camphor-menthol (DERMASARRA) 0.5-0.5 %  LOTN Apply topically 2 times daily To both lower legs to dry skin where no drainage present 9/25/2018 at Unknown time Yes Unknown, Entered By History   cholecalciferol 2000 units tablet Take 2,000 Units by mouth daily 9/25/2018 at Unknown time Yes Unknown, Entered By History   ferrous gluconate (FERGON) 324 (38 FE) MG tablet Take 1 tablet (324 mg) by mouth daily (with breakfast) 9/25/2018 at Unknown time Yes Nirmal Serrano MD   FUROSEMIDE PO Take 60 mg by mouth 2 times daily 9/25/2018 at Unknown time Yes Unknown, Entered By History   gabapentin (NEURONTIN) 100 MG capsule Take 100 mg by mouth 2 times daily 9/25/2018 at Unknown time Yes Unknown, Entered By History   ipratropium - albuterol 0.5 mg/2.5 mg/3 mL (DUONEB) 0.5-2.5 (3) MG/3ML neb solution Take 1 vial by nebulization 3 times daily as needed  Yes Unknown, Entered By History   lactulose (CHRONULAC) 10 GM/15ML solution Take 30 mLs daily as needed  Yes Unknown, Entered By History   levothyroxine (SYNTHROID/LEVOTHROID) 100 MCG tablet Take 1 tablet (100 mcg) by mouth daily 9/25/2018 at Unknown time Yes Nirmal Serrano MD   metFORMIN (GLUCOPHAGE) 500 MG tablet Take 500 mg by mouth 2 times daily 9/25/2018 at Unknown time Yes Unknown, Entered By History   metoprolol tartrate (LOPRESSOR) 25 MG tablet Take 25 mg by mouth 2 times daily 9/25/2018 at Unknown time Yes Unknown, Entered By History   nitroGLYcerin (NITROSTAT) 0.4 MG sublingual tablet Place 0.4 mg under the tongue 3 times daily as needed  Yes Unknown, Entered By History   nystatin (MYCOSTATIN) 013173 UNIT/GM POWD Apply topically 3 times daily Apply to groin area for yeast growth with every pericare  9/25/2018 at Unknown time Yes Unknown, Entered By History   omeprazole (PRILOSEC) 20 MG CR capsule Take 1 capsule (20 mg) by mouth daily 9/25/2018 at Unknown time Yes Nirmal Serrano MD   polyethylene glycol (MIRALAX/GLYCOLAX) Packet Take 17 g by mouth daily 9/25/2018 at Unknown time Yes  Unknown, Entered By History   senna-docusate (SENOKOT-S;PERICOLACE) 8.6-50 MG per tablet Take 2 tablets by mouth 2 times daily 9/25/2018 at Unknown time Yes Unknown, Entered By History   simethicone (MYLICON) 80 MG chewable tablet Take 2 tablets (160 mg) by mouth every 6 hours as needed for flatulence or cramping  Yes Nirmal Serrano MD   sucralfate (CARAFATE) 1 GM tablet Take 1 tablet (1 g) by mouth 4 times daily (before meals and nightly) 9/25/2018 at Unknown time Yes Lanie Harvye MD   TAMSULOSIN HCL PO Take 0.4 mg by mouth daily 9/25/2018 at Unknown time Yes Unknown, Entered By History   triamcinolone (KENALOG) 0.5 % cream Apply topically three times a week Mon, Wed, Fri - apply to affected areas 9/24/2018 at Unknown time Yes Unknown, Entered By History   valsartan (DIOVAN) 40 MG tablet Take 20 mg by mouth 2 times daily 9/25/2018 at Unknown time Yes Unknown, Entered By History   warfarin (COUMADIN) 3 MG tablet Take 3 mg by mouth 3mg on Sun, Mon, Tue, Wed, Fri, Sat 9/25/2018 at Unknown time Yes Unknown, Entered By History   WARFARIN SODIUM PO Take 3.5 mg by mouth On Thu  Yes Unknown, Entered By History   glipiZIDE (GLUCOTROL) 5 MG tablet Take 1 tablet (5 mg) by mouth 2 times daily (before meals)   Nirmal Serrano MD

## 2018-09-26 NOTE — H&P
[facility]  Hospitalist Admission Note  September 26, 2018  Name: Ton Bagley    MRN: [id number]  YOB: 1927    Age: 89 year old  Date of admission: 9/25/2018  Primary care provider: Jose Shell      Summary:  Patient is a 89-year-old gentleman with a known history of coronary disease, hypertension, chronic atrial fibrillation on anticoagulation, type 2 diabetes, and hyperlipidemia who presented here with some chest pain.    Problem list/Plan:  1. Chest pain: Atypical for angina and likely GI related.  More so epigastric in nature per nursing staff report.  INR is somewhat subtherapeutic but CT PE protocol was negative.  Will get 2 sets of troponins.  Continue usual chronic cardiac medications which includes lisinopril and metoprolol.  Will be holding his statin while under observation.  We will continue his omeprazole and sulcrafate.  As needed GI cocktail.  Do not suspect that he will need a stress test for further risk stratification as symptoms is unlikely cardiac in nature.  He was mildly hypoxic in the ED but currently oxygen saturations are okay on room air.  2. Type 2 diabetes: Continue glipizide and will start him on a medium intensity sliding scale  3. Hypothyroidism: Continue Synthroid  4. Hypertension: Continue lisinopril and metoprolol  5. Chronic atrial fibrillation: Rate currently controlled.  INR bit subtherapeutic.  If patient is observed overnight, will have pharmacy dose Coumadin per protocol.    All lab work and imaging data independently reviewed by myself    Prophylaxis;  Coumadin/Ambulation.     Code status: Previously and consistently DNR/DNI but unable to discuss at this time    Discharge: Back to care facility    Chief Complaint:   Chest pain   HPI history is limited as patient is quite somnolent at this point in time and assisted by signout report with the ED physician.  Patient is a 89-year-old gentleman with a known  history of coronary disease, hypertension, chronic atrial fibrillation on anticoagulation, type 2 diabetes, and hyperlipidemia who presented here with some chest pain.  He reports that he was eating a pizza last evening and developed some sharp chest pain across his chest which prompted his facility to call 911.  Shortly after calling, the patient reports that his symptoms have subsided and has been somewhat intermittent.  Nitroglycerin was given en route with no change in his symptoms.  His symptoms have somewhat abated but on arrival to the floor, it was more epigastric in nature. He denies any shortness of breath, nausea, vomiting, or diarrhea.  Initial workup in the ED was fairly unremarkable in which a troponin was negative.  D-dimer was borderline and CT of the chest PE protocol was negative for a PE or acute cardiopulmonary disease.  Patient will be admitted under observation.    I did discuss the case in detail with the ED physician.     Past Medical History:     Past Medical History:   Diagnosis Date     Atrial fibrillation (H)     cardioversion 2007     CAD (coronary artery disease)     CABG, stent x2 2004     HTN (hypertension)      Hyperlipidemia      Past Surgical History:     Past Surgical History:   Procedure Laterality Date     BYPASS GRAFT ARTERY CORONARY  1994    LIMA to LAD and saphenous vein grafts to Diag 1 OM 1 PDA     CARDIOVERSION  2007     LAPAROTOMY EXPLORATORY N/A 5/14/2017    Procedure: LAPAROTOMY EXPLORATORY;  Exploratory laparotomy with peritoneal washout. ;  Surgeon: Jorge Dias MD;  Location: [facility] OR     STENT, CORONARY, S660 15/18  2004    stent placement of SVG to RCA and OM2     Social History:     Social History   Substance Use Topics     Smoking status: Former Smoker     Smokeless tobacco: Not on file      Comment: 1973     Alcohol use Yes      Comment: wine daily     Family History:  Family history reviewed. NO pertinent family history     Allergies:   No Known  Allergies  Medications:     Prescriptions Prior to Admission   Medication Sig Dispense Refill Last Dose     acetaminophen (TYLENOL) 325 MG tablet Take 650 mg by mouth daily as needed for mild pain   7/31/2018 at 13:38pm     atorvastatin (LIPITOR) 10 MG tablet Take 10 mg by mouth daily   7/30/2018 at unknown     Calcium Carb-Cholecalciferol (CALCIUM 500+D) 500-200 MG-UNIT TABS Take 1 tablet by mouth 2 times daily   7/31/2018 at 8:00am     ferrous gluconate (FERGON) 324 (38 FE) MG tablet Take 1 tablet (324 mg) by mouth daily (with breakfast) 100 tablet  7/31/2018 at 8:00am     FUROSEMIDE PO Take 60 mg by mouth 2 times daily   7/31/2018 at Unknown time     gabapentin (NEURONTIN) 300 MG capsule Take 300 mg by mouth 2 times daily   7/31/18 at 8:00am     glipiZIDE (GLUCOTROL) 5 MG tablet Take 1 tablet (5 mg) by mouth 2 times daily (before meals) 30 tablet  7/31/2018 at 8:00am     ipratropium - albuterol 0.5 mg/2.5 mg/3 mL (DUONEB) 0.5-2.5 (3) MG/3ML neb solution Take 1 vial by nebulization 3 times daily as needed    at unknown     lactulose (CHRONULAC) 10 GM/15ML solution Take 30 mLs daily as needed    at unknown     levothyroxine (SYNTHROID/LEVOTHROID) 100 MCG tablet Take 1 tablet (100 mcg) by mouth daily 30 tablet  7/31/2018 at 6:00am     lisinopril (PRINIVIL/ZESTRIL) 5 MG tablet Take 1 tablet (5 mg) by mouth daily   7/31/2018 at 8:00am     metoprolol tartrate (LOPRESSOR) 25 MG tablet Take 25 mg by mouth 2 times daily   7/31/2018 at 8:00am     nitroGLYcerin (NITROSTAT) 0.4 MG sublingual tablet Place 0.4 mg under the tongue 3 times daily as needed    at unknown     nystatin (MYCOSTATIN) 583731 UNIT/GM POWD Apply to groin area for yeast growth with every pericare   7/31/2018 at pm     omeprazole (PRILOSEC) 20 MG CR capsule Take 1 capsule (20 mg) by mouth daily 30 capsule  7/31/2018 at 8:00am     polyethylene glycol (MIRALAX/GLYCOLAX) Packet Take 17 g by mouth daily   7/31/2018 at 8:00am     senna-docusate  "(SENOKOT-S;PERICOLACE) 8.6-50 MG per tablet Take 2 tablets by mouth 2 times daily   7/31/2018 at 8:00am     simethicone (MYLICON) 80 MG chewable tablet Take 2 tablets (160 mg) by mouth every 6 hours as needed for flatulence or cramping 180 tablet   at unknown     sucralfate (CARAFATE) 1 GM tablet Take 1 tablet (1 g) by mouth 4 times daily (before meals and nightly) 120 tablet 0 7/31/2018 at 17:00pm     TAMSULOSIN HCL PO Take 0.4 mg by mouth daily   7/31/2018 at Unknown time     warfarin (COUMADIN) 2 MG tablet Take 4 mg by mouth daily Take 4 mg on Sun, Tues, Thurs, Fri, Sat    7/31/2018 at pm     warfarin (COUMADIN) 5 MG tablet Take 5 mg by mouth daily Take 5 mg on Mon, Wed 7/30/2018 at pm       Review of Systems:   A Comprehensive greater than 10 system review of systems was carried out.  Pertinent positives and negatives are noted above.  Otherwise negative for contributory information.        Physical Exam:  Blood pressure 125/55, pulse 91, temperature 96  F (35.6  C), temperature source Oral, resp. rate 16, height 1.676 m (5' 6\"), weight 106.1 kg (234 lb), SpO2 93 %.  Gen: Pleasant, but somnolent.  Otherwise, in no acute distress.  HEENT: NCAT. EOMI. PERRL.  Neck: Normal inspection. No bruit, JVD or thyromegaly.  Lungs: Normal respiratory effort. Clear to auscultation bilaterally with no crackles or wheezes.  Card: N s1s2. RRR. No M/R/G.  Peripheral pulses present and symmetric.   Abd: Soft NT ND. No mass. Normal bowel sounds.  Skin: No rash. Warm to the touch  Extr: No edema. CMS intact  Psychiatric: Patient alert oriented ×3.  Normal affect  Neurologic: Cranial nerves II-XII are intact.     Data:       Recent Labs  Lab 09/26/18  0014   WBC 9.9   HGB 12.1*   HCT 38.4*   MCV 96          Recent Labs  Lab 09/26/18  0014      POTASSIUM 3.7   CHLORIDE 95   CO2 30   ANIONGAP 8   *   BUN 23   CR 1.16   GFRESTIMATED 59*   GFRESTBLACK 71   JANN 8.7   PROTTOTAL 7.8   ALBUMIN 3.4   BILITOTAL 1.2 "   ALKPHOS 83   AST 14   ALT 17       Recent Labs  Lab 09/26/18  0014   TROPI <0.015       Imaging:   Recent Results (from the past 24 hour(s))   Chest XR,  PA & LAT    Narrative    CHEST 2 VIEWS  9/26/2018 12:38 AM     HISTORY: Chest pain. Cough.    COMPARISON: 7/31/2018.    FINDINGS: Hypoinflated lungs. No convincing pulmonary opacities.  Borderline cardiomegaly again noted. Atherosclerotic calcification in  the thoracic aorta. Prior median sternotomy.      Impression    IMPRESSION: No convincing evidence of active cardiopulmonary disease.    CASTRO JOHNSON MD   Chest CT, IV contrast only - PE protocol    Narrative    CT CHEST WITH CONTRAST  9/26/2018 2:38 AM     HISTORY: Hypoxic. Chest pain.    COMPARISON: 4/3/2018 - CT chest, abdomen and pelvis.    TECHNIQUE: Following the uneventful administration of 83 mL Isovue-370  intravenous contrast, helical sections were acquired through the lungs  according to the pulmonary embolism protocol. Coronal reconstructions  were generated. Radiation dose for this scan was reduced using  automated exposure control, adjustment of the mA and/or kV according  to the patient's size, or iterative reconstruction technique.    FINDINGS: No visualized pulmonary embolus. The thoracic aorta is  normal in caliber without dissection. Atherosclerotic calcification in  the thoracic aorta and coronary arteries. Mild cardiomegaly. Prior  median sternotomy and coronary artery bypass surgery.    A few small hazy opacities in bilateral lung bases most likely  represent atelectasis. Small calcified granuloma in the right lung  base. No pleural or pericardial effusion. No enlarged lymph nodes in  the chest.    Scan through the upper abdomen is significant for partial  visualization of a few left renal cysts, the largest measuring 6.5 cm.  Prior cholecystectomy.      Impression    IMPRESSION:  1. No visualized pulmonary embolus.  2. No convincing evidence of active pulmonary disease.    CASTRO  MD Lindsey JOHNSON MD Pager [phone]

## 2018-09-26 NOTE — PLAN OF CARE
Problem: Patient Care Overview  Goal: Plan of Care/Patient Progress Review  Outcome: No Change  PRIMARY DIAGNOSIS: CHEST PAIN  OUTPATIENT/OBSERVATION GOALS TO BE MET BEFORE DISCHARGE:  1. Ruled out acute coronary syndrome (negative or stable Troponin):  Yes  2. Pain Status:Pt continues to c/o abdominal plain (epigastric)/discomfort  3. Appropriate provocative testing performed: Yes  - Stress Test Procedure: N/A  - Interpretation of cardiac rhythm per telemetry tech: afib CVR w/PVCs    4. Cleared by Consultants (if applicable):N/A  5. Return to near baseline physical activity: Yes  Discharge Planner Nurse   Safe discharge environment identified: Yes  Barriers to discharge: No       Entered by: Loyda Lundberg 09/26/2018 1:08 PM     Please review provider order for any additional goals.   Nurse to notify provider when observation goals have been met and patient is ready for discharge.    VSS. Alert, oriented to self. Assist x1 with walker and gait belt.  Tele-afib. LS-dim, productive cough, yellow, thick sputum.  Pt on RA with good sats.  Bed hold confirmed at [facility], resides there with wife. Family will transport at time of discharge.  Pt ambulated in way, was up in chair today.  GI cocktail x1 for c/o epigastric discomfort, not able to report if improved. Active bowel sounds, passing gas and belching.   Lactulose ordered.

## 2018-09-26 NOTE — PLAN OF CARE
Problem: Patient Care Overview  Goal: Plan of Care/Patient Progress Review  Outcome: No Change  PRIMARY DIAGNOSIS: CHEST PAIN  OUTPATIENT/OBSERVATION GOALS TO BE MET BEFORE DISCHARGE:  1. Ruled out acute coronary syndrome (negative or stable Troponin):  Yes  2. Pain Status:Pt continues to c/o abdominal plain (epigastric)/discomfort  3. Appropriate provocative testing performed: Yes  - Stress Test Procedure: N/A  - Interpretation of cardiac rhythm per telemetry tech: afib CVR w/PVCs     4. Cleared by Consultants (if applicable):N/A  5. Return to near baseline physical activity: Yes    Temp: 97.6  F (36.4  C) Temp src: Oral BP: 130/75 Pulse: 76 Heart Rate: 87 Resp: 16 SpO2: 92 % O2 Device: None (Room air)   VSS  Lung Sounds clear., adequate sats on room air.  Patient has a frequent productive cough. Secretions thick yellow.  EKG per tele tech:  Atrial Fib.  Patient c/o of intermittent epigastric pain;  Feels that he needs to have a BM, results pending from previous shift's intervention of Lactulose. .  Patient ambulating to the bathroom with assist of one and walker prn.  PLAN:  Continue to provide supportive care.      Discharge Planner Nurse   Safe discharge environment identified: Yes  Barriers to discharge: yes, unless medically cleared       Entered by: Karen Lesch 09/26/2018 16:00 PM  Please review provider order for any additional goals.   Nurse to notify provider when observation goals have been met and patient is ready for discharge.

## 2018-09-26 NOTE — ED NOTES
[facility]  ED Nurse Handoff Report    Ton Bagley is a 89 year old male   ED Chief complaint: Chest Pain  . ED Diagnosis:   Final diagnoses:   Atypical chest pain   Acute respiratory failure with hypoxia (H)     Allergies: No Known Allergies    Code Status: DNR  Activity level - Baseline/Home:  Independent. Activity Level - Current:   Stand with Assist. Lift room needed: No. Bariatric: No   Needed: No   Isolation: No. Infection: Not Applicable.     Vital Signs:   Vitals:    09/26/18 0115 09/26/18 0130 09/26/18 0145 09/26/18 0200   BP:  116/59  112/60   Pulse:       Resp:       Temp:       SpO2: 95% 93% 93% 97%       Cardiac Rhythm:  ,   Cardiac  Cardiac Rhythm: Atrial fibrillation  Pain level: 0-10 Pain Scale: 0  Patient confused: Yes. Patient Falls Risk: Yes.   Elimination Status: Has voided   Patient Report - Initial Complaint: CHEST PAIN AND GAS. Focused Assessment: BELCHING,HYPOXIA   Tests Performed: LABS,CT. Abnormal Results:   Labs Ordered and Resulted from Time of ED Arrival Up to the Time of Departure from the ED   CBC WITH PLATELETS DIFFERENTIAL - Abnormal; Notable for the following:        Result Value    RBC Count 4.02 (*)     Hemoglobin 12.1 (*)     Hematocrit 38.4 (*)     All other components within normal limits   BASIC METABOLIC PANEL - Abnormal; Notable for the following:     Glucose 281 (*)     GFR Estimate 59 (*)     All other components within normal limits   LACTIC ACID WHOLE BLOOD - Abnormal; Notable for the following:     Lactic Acid 2.4 (*)     All other components within normal limits   D DIMER QUANTITATIVE - Abnormal; Notable for the following:     D Dimer 0.6 (*)     All other components within normal limits   INR - Abnormal; Notable for the following:     INR 1.43 (*)     All other components within normal limits   TROPONIN I   HEPATIC PANEL   LIPASE   NT PROBNP INPATIENT   ROUTINE UA WITH MICROSCOPIC REFLEX TO CULTURE   BLOOD CULTURE   BLOOD CULTURE     .    Treatments provided: FLUID  Family Comments: WILL COME IN AM  OBS brochure/video discussed/provided to patient:  Yes  ED Medications:   Medications   0.9% sodium chloride BOLUS (0 mLs Intravenous Stopped 9/26/18 0231)   0.9% sodium chloride BOLUS (0 mLs Intravenous Stopped 9/26/18 0231)   iopamidol (ISOVUE-370) solution 500 mL (83 mLs Intravenous Given 9/26/18 0222)     Drips infusing:  No  For the majority of the shift, the patient's behavior Green. Interventions performed were FLUIDS.     Severe Sepsis OR Septic Shock Diagnosis Present: No      ED Nurse Name/Phone Number: Issa Dillondsley,   2:36 AM    RECEIVING UNIT ED HANDOFF REVIEW    Above ED Nurse Handoff Report was reviewed: Yes  Reviewed by: Neeta Mayen on September 26, 2018 at 2:59 AM

## 2018-09-27 VITALS
HEART RATE: 76 BPM | OXYGEN SATURATION: 92 % | HEIGHT: 66 IN | RESPIRATION RATE: 18 BRPM | TEMPERATURE: 97.3 F | DIASTOLIC BLOOD PRESSURE: 61 MMHG | BODY MASS INDEX: 37.61 KG/M2 | WEIGHT: 234 LBS | SYSTOLIC BLOOD PRESSURE: 118 MMHG

## 2018-09-27 LAB
GLUCOSE BLDC GLUCOMTR-MCNC: 137 MG/DL (ref 70–99)
GLUCOSE BLDC GLUCOMTR-MCNC: 146 MG/DL (ref 70–99)
GLUCOSE BLDC GLUCOMTR-MCNC: 160 MG/DL (ref 70–99)
INR PPP: 1.59 (ref 0.86–1.14)

## 2018-09-27 PROCEDURE — A9270 NON-COVERED ITEM OR SERVICE: HCPCS | Mod: GY | Performed by: HOSPITALIST

## 2018-09-27 PROCEDURE — C9113 INJ PANTOPRAZOLE SODIUM, VIA: HCPCS | Performed by: PHYSICIAN ASSISTANT

## 2018-09-27 PROCEDURE — 00000146 ZZHCL STATISTIC GLUCOSE BY METER IP

## 2018-09-27 PROCEDURE — A9270 NON-COVERED ITEM OR SERVICE: HCPCS | Mod: GY | Performed by: INTERNAL MEDICINE

## 2018-09-27 PROCEDURE — 36415 COLL VENOUS BLD VENIPUNCTURE: CPT | Performed by: INTERNAL MEDICINE

## 2018-09-27 PROCEDURE — G0378 HOSPITAL OBSERVATION PER HR: HCPCS

## 2018-09-27 PROCEDURE — 96376 TX/PRO/DX INJ SAME DRUG ADON: CPT

## 2018-09-27 PROCEDURE — 85610 PROTHROMBIN TIME: CPT | Performed by: INTERNAL MEDICINE

## 2018-09-27 PROCEDURE — 25000128 H RX IP 250 OP 636: Performed by: PHYSICIAN ASSISTANT

## 2018-09-27 PROCEDURE — 25000132 ZZH RX MED GY IP 250 OP 250 PS 637: Mod: GY | Performed by: INTERNAL MEDICINE

## 2018-09-27 PROCEDURE — 25000132 ZZH RX MED GY IP 250 OP 250 PS 637: Mod: GY | Performed by: PHYSICIAN ASSISTANT

## 2018-09-27 PROCEDURE — 25000132 ZZH RX MED GY IP 250 OP 250 PS 637: Mod: GY | Performed by: HOSPITALIST

## 2018-09-27 PROCEDURE — 96372 THER/PROPH/DIAG INJ SC/IM: CPT

## 2018-09-27 PROCEDURE — A9270 NON-COVERED ITEM OR SERVICE: HCPCS | Mod: GY | Performed by: PHYSICIAN ASSISTANT

## 2018-09-27 PROCEDURE — 99217 ZZC OBSERVATION CARE DISCHARGE: CPT | Performed by: PHYSICIAN ASSISTANT

## 2018-09-27 RX ORDER — WARFARIN SODIUM 5 MG/1
5 TABLET ORAL
Status: DISCONTINUED | OUTPATIENT
Start: 2018-09-27 | End: 2018-09-27 | Stop reason: HOSPADM

## 2018-09-27 RX ADMIN — METOPROLOL TARTRATE 25 MG: 25 TABLET ORAL at 08:46

## 2018-09-27 RX ADMIN — LEVOTHYROXINE SODIUM 100 MCG: 100 TABLET ORAL at 08:46

## 2018-09-27 RX ADMIN — SENNOSIDES 1 TABLET: 8.6 TABLET, FILM COATED ORAL at 08:46

## 2018-09-27 RX ADMIN — SUCRALFATE 1 G: 1 TABLET ORAL at 08:45

## 2018-09-27 RX ADMIN — GABAPENTIN 100 MG: 100 CAPSULE ORAL at 08:45

## 2018-09-27 RX ADMIN — PANTOPRAZOLE SODIUM 40 MG: 40 INJECTION, POWDER, FOR SOLUTION INTRAVENOUS at 08:45

## 2018-09-27 RX ADMIN — INSULIN ASPART 1 UNITS: 100 INJECTION, SOLUTION INTRAVENOUS; SUBCUTANEOUS at 10:41

## 2018-09-27 RX ADMIN — POLYETHYLENE GLYCOL 3350 17 G: 17 POWDER, FOR SOLUTION ORAL at 08:44

## 2018-09-27 RX ADMIN — FUROSEMIDE 60 MG: 40 TABLET ORAL at 08:46

## 2018-09-27 RX ADMIN — LOSARTAN POTASSIUM 12.5 MG: 25 TABLET ORAL at 08:45

## 2018-09-27 RX ADMIN — GLIPIZIDE 5 MG: 5 TABLET ORAL at 08:47

## 2018-09-27 RX ADMIN — METFORMIN HYDROCHLORIDE 500 MG: 500 TABLET, FILM COATED ORAL at 10:39

## 2018-09-27 NOTE — DISCHARGE SUMMARY
[facility] Outpatient / Observation Unit  Discharge Summary        Ton Bagley MRN# [id number]   YOB: 1927 Age: 89 year old     Date of Admission:  9/25/2018  Date of Discharge:  9/27/2018  Admitting Physician:  Benitez Velez MD  Discharge Physician: Rica Rhodes PA-C  Discharging Service: Hospitalist      [facility]  Primary Care Physician Phone Number: [phone]         Primary Discharge Diagnoses:    Ton Bagley was admitted on 9/25/2018 for concerns of acute chest pain.     1. Chest pain: Ruled out ACS. Serial troponins are negative. -Suspect suspect acid reflux or GI related. Continued to have mild fullness and belching, abd XR showed findings concerning for ileus vs early SBO. Pt complained of constipation so prior to XR received lactulose, now having diarrhea and is tolerating PO intake. Continues to belch but denies nausea or vomiting. Ok to discharge home, recommend full liquid diet until abd sx completely resolve. Continue PPI and stool softeners          Secondary Discharge Diagnoses:     Past Medical History:   Diagnosis Date     Atrial fibrillation (H)     cardioversion 2007     CAD (coronary artery disease)     CABG, stent x2 2004     HTN (hypertension)      Hyperlipidemia                 Code Status:      DNR / DNI        Brief Hospital Summary:        Reason for your hospital stay       Epigastric abdominal pain likely due to GERD. Serial troponins were   negative and cardiac monitoring was unremarkable. abd XR was obtained   which showed ileus vs possible early small bowel obstruction. You were   passing gas and having bowel movements and tolerating oral intake. This   will likely continue to improve on its own, recommend full liquid diet   until completely resolved.                    Please refer to initial admission history and physical for further details.   Briefly, Ton Bagley was admitted on 9/25/2018 for concerns of  acute chest pain.   Initial work up in the ED did not reveal evidence of STEMI or findings consistent with unstable angina or acute coronary ischemia. Pt was registered to the Observation Unit for further evaluation.   Pt ruled out with serial troponin. Symptoms were more epigastric in nature and likely related to reflux or possibly ileus. Pt also complained of constipation and stool softeners were given. Epigastric pain and fullness persisted so an abdominal XR was obtained, see findings below. Labs were reviewed and significant results addressed. On the day of discharge, pt was pain was stable, he was having loose stools after lactulose and was tolerating a regular diet. Medications were reviewed and adjustments made as necessary. Pt is instructed to follow up as below.           Significant Lab During Hospitalization:        Recent Labs  Lab 09/26/18  1000 09/26/18  0014   WBC 7.9 9.9   HGB 11.6* 12.1*   HCT 36.9* 38.4*   MCV 96 96    218       Recent Labs  Lab 09/26/18  1000 09/26/18  0014    133   POTASSIUM 3.6 3.7   CHLORIDE 99 95   CO2 31 30   ANIONGAP 7 8   * 281*   BUN 20 23   CR 1.10 1.16   GFRESTIMATED 63 59*   GFRESTBLACK 76 71   JANN 8.4* 8.7   PROTTOTAL 7.3 7.8   ALBUMIN 3.2* 3.4   BILITOTAL 1.3 1.2   ALKPHOS 75 83   AST 12 14   ALT 12 17       Recent Labs  Lab 09/26/18  0014   NTBNPI 1602       Recent Labs  Lab 09/26/18  0014   DD 0.6*       Recent Labs  Lab 09/27/18  0832 09/27/18  0216 09/26/18  2231 09/26/18  1741 09/26/18  1351  09/26/18  1000  09/26/18  0014   GLC  --   --   --   --   --   --  219*  --  281*   * 160* 188* 132* 143*  < >  --   < >  --    < > = values in this interval not displayed.    Recent Labs  Lab 09/27/18  0608 09/26/18  0014   INR 1.59* 1.43*       Recent Labs  Lab 09/26/18  1000 09/26/18  0014   LACT 0.6* 2.4*       Recent Labs  Lab 09/26/18  0014   LIPASE 157       Recent Labs  Lab 09/26/18  1000 09/26/18  0559 09/26/18  0014   TROPI <0.015 <0.015  <0.015       Recent Labs  Lab 09/26/18  0254   COLOR Yellow   APPEARANCE Clear   URINEGLC 50*   URINEBILI Negative   URINEKETONE Negative   SG 1.024   UBLD Negative   URINEPH 5.0   PROTEIN Negative   NITRITE Negative   LEUKEST Negative   RBCU <1   WBCU 1                Significant Imaging During Hospitalization:      Recent Results (from the past 48 hour(s))   Chest XR,  PA & LAT    Narrative    CHEST 2 VIEWS  9/26/2018 12:38 AM     HISTORY: Chest pain. Cough.    COMPARISON: 7/31/2018.    FINDINGS: Hypoinflated lungs. No convincing pulmonary opacities.  Borderline cardiomegaly again noted. Atherosclerotic calcification in  the thoracic aorta. Prior median sternotomy.      Impression    IMPRESSION: No convincing evidence of active cardiopulmonary disease.    CASTRO JOHNSON MD   Chest CT, IV contrast only - PE protocol    Narrative    CT CHEST WITH CONTRAST  9/26/2018 2:38 AM     HISTORY: Hypoxic. Chest pain.    COMPARISON: 4/3/2018 - CT chest, abdomen and pelvis.    TECHNIQUE: Following the uneventful administration of 83 mL Isovue-370  intravenous contrast, helical sections were acquired through the lungs  according to the pulmonary embolism protocol. Coronal reconstructions  were generated. Radiation dose for this scan was reduced using  automated exposure control, adjustment of the mA and/or kV according  to the patient's size, or iterative reconstruction technique.    FINDINGS: No visualized pulmonary embolus. The thoracic aorta is  normal in caliber without dissection. Atherosclerotic calcification in  the thoracic aorta and coronary arteries. Mild cardiomegaly. Prior  median sternotomy and coronary artery bypass surgery.    A few small hazy opacities in bilateral lung bases most likely  represent atelectasis. Small calcified granuloma in the right lung  base. No pleural or pericardial effusion. No enlarged lymph nodes in  the chest.    Scan through the upper abdomen is significant for partial  visualization of  a few left renal cysts, the largest measuring 6.5 cm.  Prior cholecystectomy.      Impression    IMPRESSION:  1. No visualized pulmonary embolus.  2. No convincing evidence of active pulmonary disease.    CASTRO JOHNSON MD   XR Abdomen 2 Views    Narrative    ABDOMEN TWO VIEWS September 26, 2018 10:14 PM     HISTORY: Abdominal pain and distension.    COMPARISON: CT abdomen/pelvis dated 4/3/2018 abdominal x-rays dated  5/14/2017.    FINDINGS: Gas-filled distended loops of small bowel with what appear  to be differential air-fluid levels on the upright study. There is  also apparent gas filled prominence of the ascending and transverse  colon. The stomach is gas and fluid-filled and mildly distended.  Surgical clips are projected over the left upper thigh.  No free air  is seen under the hemidiaphragms on the upright study. No abnormal  calcifications to suggest renal or ureteral calculi. There are  cholecystectomy clips in the right upper abdomen. Sternal cerclage  wires are partially imaged.      Impression    IMPRESSION: Abnormal bowel gas pattern with gas seen in mildly  distended loops of small bowel. Gas also appears to distend the colon.  This could represent an early complete or partial small bowel  obstruction versus ileus versus a more distal colonic obstruction. No  free air under the hemidiaphragms on the upright study to suggest  viscus perforation. Recommend clinical correlation.              Pending Results:        Unresulted Labs Ordered in the Past 30 Days of this Admission     Date and Time Order Name Status Description    9/26/2018 0134 Blood culture Preliminary     9/26/2018 0134 Blood culture Preliminary     8/2/2018 0000 Creatinine In process               Consultations This Hospital Stay:      No consultations were requested during this admission         Discharge Instructions and Follow-Up:      Follow-up Appointments     Follow-up and recommended labs and tests        Follow up with primary  care provider, [facility], within   7 days for hospital follow- up.  No follow up labs or test are needed.  Recommend full liquid diet until epigastric pain completely resolves.                  Pt instructed to follow up with PCP in 7 days  Follow-up Labs None        Discharge Disposition:      Discharged to home         Discharge Medications:        Current Discharge Medication List      CONTINUE these medications which have NOT CHANGED    Details   acetaminophen (TYLENOL) 325 MG tablet Take 650 mg by mouth daily as needed for mild pain      atorvastatin (LIPITOR) 10 MG tablet Take 10 mg by mouth daily      Calcium Carb-Cholecalciferol (CALCIUM 500 + D) 500-200 MG-UNIT TABS Take 1 tablet by mouth daily      camphor-menthol (DERMASARRA) 0.5-0.5 % LOTN Apply topically 2 times daily To both lower legs to dry skin where no drainage present      cholecalciferol 2000 units tablet Take 2,000 Units by mouth daily      ferrous gluconate (FERGON) 324 (38 FE) MG tablet Take 1 tablet (324 mg) by mouth daily (with breakfast)  Qty: 100 tablet    Associated Diagnoses: Iron deficiency anemia, unspecified iron deficiency anemia type      FUROSEMIDE PO Take 60 mg by mouth 2 times daily      gabapentin (NEURONTIN) 100 MG capsule Take 100 mg by mouth 2 times daily      ipratropium - albuterol 0.5 mg/2.5 mg/3 mL (DUONEB) 0.5-2.5 (3) MG/3ML neb solution Take 1 vial by nebulization 3 times daily as needed      lactulose (CHRONULAC) 10 GM/15ML solution Take 30 mLs daily as needed      levothyroxine (SYNTHROID/LEVOTHROID) 100 MCG tablet Take 1 tablet (100 mcg) by mouth daily  Qty: 30 tablet    Associated Diagnoses: Hypothyroidism, unspecified type      metFORMIN (GLUCOPHAGE) 500 MG tablet Take 500 mg by mouth 2 times daily      metoprolol tartrate (LOPRESSOR) 25 MG tablet Take 25 mg by mouth 2 times daily      nitroGLYcerin (NITROSTAT) 0.4 MG sublingual tablet Place 0.4 mg under the tongue 3 times daily as needed     "  nystatin (MYCOSTATIN) 699203 UNIT/GM POWD Apply topically 3 times daily Apply to groin area for yeast growth with every pericare       omeprazole (PRILOSEC) 20 MG CR capsule Take 1 capsule (20 mg) by mouth daily  Qty: 30 capsule    Associated Diagnoses: Gastroesophageal reflux disease without esophagitis      polyethylene glycol (MIRALAX/GLYCOLAX) Packet Take 17 g by mouth daily      senna-docusate (SENOKOT-S;PERICOLACE) 8.6-50 MG per tablet Take 2 tablets by mouth 2 times daily      simethicone (MYLICON) 80 MG chewable tablet Take 2 tablets (160 mg) by mouth every 6 hours as needed for flatulence or cramping  Qty: 180 tablet    Associated Diagnoses: Abdominal pain, generalized      sucralfate (CARAFATE) 1 GM tablet Take 1 tablet (1 g) by mouth 4 times daily (before meals and nightly)  Qty: 120 tablet, Refills: 0    Associated Diagnoses: Other acute gastritis without hemorrhage      TAMSULOSIN HCL PO Take 0.4 mg by mouth daily      triamcinolone (KENALOG) 0.5 % cream Apply topically three times a week Mon, Wed, Fri - apply to affected areas      valsartan (DIOVAN) 40 MG tablet Take 20 mg by mouth 2 times daily      !! warfarin (COUMADIN) 3 MG tablet Take 3 mg by mouth 3mg on Sun, Mon, Tue, Wed, Fri, Sat      !! WARFARIN SODIUM PO Take 3.5 mg by mouth On Thu      glipiZIDE (GLUCOTROL) 5 MG tablet Take 1 tablet (5 mg) by mouth 2 times daily (before meals)  Qty: 30 tablet    Associated Diagnoses: Type 2 diabetes mellitus with complication, without long-term current use of insulin (H)       !! - Potential duplicate medications found. Please discuss with provider.              Allergies:       No Known Allergies        Condition and Physical on Discharge:      Discharge condition: Stable   Vitals: Blood pressure 128/62, pulse 76, temperature 97.2  F (36.2  C), temperature source Oral, resp. rate 18, height 1.676 m (5' 6\"), weight 106.1 kg (234 lb), SpO2 93 %.  234 lbs 0 oz      GENERAL:  Comfortable.  PSYCH: pleasant, " oriented, No acute distress.  HEART:  Normal S1, S2 with no murmur, no pericardial rub, gallops or S3 or S4.  LUNGS:  Clear to auscultation, normal Respiratory effort. No wheezing, rales or ronchi.  ABDOMEN:  Soft, normal bowel sounds. Mild diffuse tenderness, very mild distension  SKIN:  Dry to touch, No rash, wound or ulcerations.  NEUROLOGIC: grossly intact    Rica Rhodes PA-C

## 2018-09-27 NOTE — PLAN OF CARE
Problem: Patient Care Overview  Goal: Plan of Care/Patient Progress Review  Outcome: No Change  PRIMARY DIAGNOSIS: CHEST PAIN  OUTPATIENT/OBSERVATION GOALS TO BE MET BEFORE DISCHARGE:  1. Ruled out acute coronary syndrome (negative or stable Troponin):  Yes  2. Pain Status:Pt continues to c/o abdominal plain (epigastric)/discomfort  3. Appropriate provocative testing performed: Yes  - Stress Test Procedure: N/A  - Interpretation of cardiac rhythm per telemetry tech: afib CVR w/PVCs      4. Cleared by Consultants (if applicable):N/A  5. Return to near baseline physical activity: Yes      LS remain clear.  Patient has a frequent productive cough,  Secretions yellow in color. Adequate sats on room air.  Patient orientated to self only; making frequent telephone calls to family members and verbalizing that he cannot find his wife. Patient is cooperative with cares.  Patient frequently belching.  Abdomen round, appears distended.  Awaiting further results from Lactulose given in earlier in shift.  Patient up with assist of one and walker to bathroom prn.  Plan:  Continue to provide supportive cares.    Discharge Planner Nurse   Safe discharge environment identified: Yes  Barriers to discharge: yes, unless medically cleared       Entered by: Karen Lesch 09/26/2018 21:00 PM  Please review provider order for any additional goals.   Nurse to notify provider when observation goals have been met and patient is ready for discharge.

## 2018-09-27 NOTE — PLAN OF CARE
Problem: Patient Care Overview  Goal: Plan of Care/Patient Progress Review  PRIMARY DIAGNOSIS: Constipation  OUTPATIENT/OBSERVATION GOALS TO BE MET BEFORE DISCHARGE:  1. ADLs back to baseline: Yes     2. Activity and level of assistance: A1 with walker and gait belt     3. Pain status: No improvement noted. Consider adjustment in pain regimen.     4. Return to near baseline physical activity: Yes      Vitals are Temp: 97.7  F (36.5  C) Temp src: Oral BP: 141/72 Pulse: 76 Heart Rate: 99 Resp: 18 SpO2: 91 %  Patient is Alert and Oriented x4. They are 1 Assist with Gait Belt and Walker.  Pt is a Full Liquid diet and showing nonverbal signs of pain by clutching stomach.  Patient is Saline locked.  Patient is having diarrhea and gas.  Tele is A-fib with HR in the 90.  Plan of care is a pharmacy consult in the morning for coumadin.       Discharge Planner Nurse   Safe discharge environment identified: Yes  Barriers to discharge: Yes         Please review provider order for any additional goals.   Nurse to notify provider when observation goals have been met and patient is ready for discharge.

## 2018-09-27 NOTE — PROGRESS NOTES
Discharge Planner   Discharge Plans in progress: Per provider, pt appropriate for discharge back to [facility]. Orders in place and faxed to facility. Called son Luciano [phone] to discuss transport, unable to reach or LM. Called dtr Felecia [phone], she works until 1500 but will work on contacting Luciano to transport pt before then. She will call back to writer w/ transport time.   Barriers to discharge plan: transport back to facility.   Follow up plan: will call [facility] once transport time arranged w/ family.        Entered by: Chelsea Sarmiento 09/27/2018 12:26 PM       Update 1245: Received call back from pato Izquierdo, she will be able to leave work and pick-up pt at 1330. Updated bedside RN and charge RN. Called [facility] and LM w/ this information. NST preparing packet for discharge.     CM will continue to follow patient for any additional discharge needs.     Chelsea Sarmiento RN BSN CTS   [phone]  Care Transitions Team  [facility]

## 2018-09-27 NOTE — PHARMACY-AMINOGLYCOSIDE DOSING SERVICE
Clinical Pharmacy- Warfarin Discharge Note  This patient is currently on warfarin for the treatment of Atrial fibrillation.  INR Goal= 2-3  Expected length of therapy undetermined.    Warfarin PTA Regimen: 3.5 mg on Thur, 3 mg ROW      Anticoagulation Dose History     Recent Dosing and Labs Latest Ref Rng & Units 8/3/2018 8/4/2018 8/5/2018 8/6/2018 8/7/2018 9/26/2018 9/27/2018    Warfarin 4 mg - 4 mg 4 mg 4 mg - - - -    Warfarin 5 mg - - - - 5 mg - 5 mg -    INR 0.86 - 1.14 2.94(H) 2.58(H) 2.25(H) 2.08(H) 2.14(H) 1.43(H) 1.59(H)          Ok with MD discharging the patient on a warfarin home regimen of 3.5 mg on Thursdays and 3 mg rest of week.  This writer would prefer today's dose of 5 mg as ordered for inpatient.      The patient should have an INR checked in 4-5 days days.

## 2018-09-27 NOTE — PLAN OF CARE
Problem: Patient Care Overview  Goal: Plan of Care/Patient Progress Review  Outcome: Declining  Patient c/o of increased abdominal pain and cramping.  Patient has received Lactulose, Miralax, Senokot, and prn simethicone with no improvement in complaints; no bowel movement   Discussed with Dawna Carrera PA-C.  Patient to have abdominal X ray.

## 2018-09-27 NOTE — PLAN OF CARE
Problem: Patient Care Overview  Goal: Plan of Care/Patient Progress Review  Outcome: No Change  Problem: Patient Care Overview  Goal: Plan of Care/Patient Progress Review  PRIMARY DIAGNOSIS: Constipation  OUTPATIENT/OBSERVATION GOALS TO BE MET BEFORE DISCHARGE:  1. ADLs back to baseline: Yes     2. Activity and level of assistance: A1 with walker and gait belt     3. Pain status: No improvement noted. Consider adjustment in pain regimen.     4. Return to near baseline physical activity: Yes      Assumed care at 0500. A to self. A1GBW. Full liquids. PIV SL. Abd pain and passing gas. No episodes of diarrhea. Tele: A-fib 90s.   pharmacy consult in AM. Possible d.c today.      Discharge Planner Nurse   Safe discharge environment identified: Yes  Barriers to discharge: Yes         Please review provider order for any additional goals.   Nurse to notify provider when observation goals have been met and patient is ready for discharge.

## 2018-09-27 NOTE — PLAN OF CARE
Problem: Patient Care Overview  Goal: Plan of Care/Patient Progress Review  PRIMARY DIAGNOSIS: Constipation  OUTPATIENT/OBSERVATION GOALS TO BE MET BEFORE DISCHARGE:  1. ADLs back to baseline: Yes    2. Activity and level of assistance: A1 with walker and gait belt    3. Pain status: No improvement noted. Consider adjustment in pain regimen.    4. Return to near baseline physical activity: Yes     Vitals are Temp: 97.8  F (36.6  C) Temp src: Oral BP: 131/66 Pulse: 76 Heart Rate: 73 Resp: 18 SpO2: 91 %.    Patient is Alert and Oriented x4. They are 1 Assist with Gait Belt and Walker.  Pt is a Full Liquid diet and showing nonverbal signs of pain by clutching stomach.  Patient is Saline locked.  Patient is having diarrhea currently.  Tele is A-fib with CVR and PVC and HR 90.  Plan of care is a pharmacy consult in the morning for coumadin.      Discharge Planner Nurse   Safe discharge environment identified: Yes  Barriers to discharge: Yes         Please review provider order for any additional goals.   Nurse to notify provider when observation goals have been met and patient is ready for discharge.

## 2018-09-27 NOTE — PLAN OF CARE
Problem: Patient Care Overview  Goal: Plan of Care/Patient Progress Review  Outcome: Adequate for Discharge Date Met: 09/27/18  Patient's After Visit Summary was reviewed with patient and/or daughter.   Patient verbalized understanding of After Visit Summary, recommended follow up and was given an opportunity to ask questions.   Discharge medications sent home with patient/family: Not applicable, no new medications   Discharged with daughter      OBSERVATION patient END time: 1406

## 2018-10-02 LAB
BACTERIA SPEC CULT: NO GROWTH
BACTERIA SPEC CULT: NO GROWTH
Lab: NORMAL
Lab: NORMAL
SPECIMEN SOURCE: NORMAL
SPECIMEN SOURCE: NORMAL

## 2018-10-05 ENCOUNTER — RECORDS - HEALTHEAST (OUTPATIENT)
Dept: LAB | Facility: CLINIC | Age: 83
End: 2018-10-05

## 2018-10-05 LAB — TSH SERPL DL<=0.005 MIU/L-ACNC: 3.65 UIU/ML (ref 0.3–5)

## 2018-11-30 NOTE — PLAN OF CARE
"Problem: Bowel Obstruction (Adult)  Goal: Signs and Symptoms of Listed Potential Problems Will be Absent or Manageable (Bowel Obstruction)  Signs and symptoms of listed potential problems will be absent or manageable by discharge/transition of care (reference Bowel Obstruction (Adult) CPG).   Outcome: No Change  Orientation:Alert and oriented to self only.  Vss afebrile.  02: attempted to wean o2,79%, 88%. Pt left on 2lnc.  LS:clear, diminished  GI:bs hypo, - gas. ngt with scant ouput.  : kiran d/c'd, dtv. periarea red and \"itchy\". powder applied.  Skin:inc with old drainage marked.  Activity:up to chair with assist of 1. abd binder on  Pain: pain this morning with coughing. 2mg morphine given with adequate relief.  Plan: ivf,npo, ng tube lis.          " Med: ventolin hfa inhaler  Dosage: 90 mcg  Sig:  inhale 2 puffs by mouth every 4 hours as needed for wheeze or shortness of breath  Quantity requested:  4    Preferred pharmacy has been set up and verified.

## 2019-01-01 ENCOUNTER — APPOINTMENT (OUTPATIENT)
Dept: CT IMAGING | Facility: CLINIC | Age: 84
End: 2019-01-01
Attending: EMERGENCY MEDICINE
Payer: MEDICARE

## 2019-01-01 ENCOUNTER — HOSPITAL ENCOUNTER (OUTPATIENT)
Facility: CLINIC | Age: 84
Setting detail: OBSERVATION
Discharge: NURSING, REHAB OR LONG-TERM CARE | End: 2019-07-19
Attending: EMERGENCY MEDICINE | Admitting: INTERNAL MEDICINE
Payer: MEDICARE

## 2019-01-01 ENCOUNTER — RECORDS - HEALTHEAST (OUTPATIENT)
Dept: LAB | Facility: CLINIC | Age: 84
End: 2019-01-01

## 2019-01-01 ENCOUNTER — DOCUMENTATION ONLY (OUTPATIENT)
Dept: OTHER | Facility: CLINIC | Age: 84
End: 2019-01-01

## 2019-01-01 ENCOUNTER — AMBULATORY - HEALTHEAST (OUTPATIENT)
Dept: OTHER | Facility: CLINIC | Age: 84
End: 2019-01-01

## 2019-01-01 ENCOUNTER — APPOINTMENT (OUTPATIENT)
Dept: GENERAL RADIOLOGY | Facility: CLINIC | Age: 84
End: 2019-01-01
Attending: EMERGENCY MEDICINE
Payer: MEDICARE

## 2019-01-01 VITALS
DIASTOLIC BLOOD PRESSURE: 57 MMHG | TEMPERATURE: 97.6 F | RESPIRATION RATE: 16 BRPM | OXYGEN SATURATION: 92 % | HEART RATE: 84 BPM | SYSTOLIC BLOOD PRESSURE: 116 MMHG

## 2019-01-01 DIAGNOSIS — M62.89 MUSCLE HEMORRHAGE: ICD-10-CM
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
## 2019-01-01 DIAGNOSIS — M79.605 LEFT LEG PAIN: Primary | ICD-10-CM

## 2019-01-01 LAB
ALBUMIN UR-MCNC: 10 MG/DL
ANION GAP SERPL CALCULATED.3IONS-SCNC: 10 MMOL/L (ref 5–18)
ANION GAP SERPL CALCULATED.3IONS-SCNC: 10 MMOL/L (ref 5–18)
ANION GAP SERPL CALCULATED.3IONS-SCNC: 12 MMOL/L (ref 5–18)
ANION GAP SERPL CALCULATED.3IONS-SCNC: 5 MMOL/L (ref 3–14)
ANION GAP SERPL CALCULATED.3IONS-SCNC: 5 MMOL/L (ref 3–14)
ANION GAP SERPL CALCULATED.3IONS-SCNC: 8 MMOL/L (ref 5–18)
ANION GAP SERPL CALCULATED.3IONS-SCNC: 9 MMOL/L (ref 5–18)
APPEARANCE UR: CLEAR
ARUP MISCELLANEOUS TEST: NORMAL
BASOPHILS # BLD AUTO: 0 10E9/L (ref 0–0.2)
BASOPHILS # BLD AUTO: 0 THOU/UL (ref 0–0.2)
BASOPHILS NFR BLD AUTO: 0 % (ref 0–2)
BASOPHILS NFR BLD AUTO: 0.3 %
BILIRUB UR QL STRIP: NEGATIVE
BUN SERPL-MCNC: 21 MG/DL (ref 7–30)
BUN SERPL-MCNC: 21 MG/DL (ref 8–28)
BUN SERPL-MCNC: 23 MG/DL (ref 8–28)
BUN SERPL-MCNC: 25 MG/DL (ref 7–30)
BUN SERPL-MCNC: 25 MG/DL (ref 8–28)
BUN SERPL-MCNC: 25 MG/DL (ref 8–28)
BUN SERPL-MCNC: 32 MG/DL (ref 8–28)
CALCIUM SERPL-MCNC: 8.7 MG/DL (ref 8.5–10.1)
CALCIUM SERPL-MCNC: 8.8 MG/DL (ref 8.5–10.1)
CALCIUM SERPL-MCNC: 8.8 MG/DL (ref 8.5–10.5)
CALCIUM SERPL-MCNC: 9 MG/DL (ref 8.5–10.5)
CALCIUM SERPL-MCNC: 9 MG/DL (ref 8.5–10.5)
CALCIUM SERPL-MCNC: 9.2 MG/DL (ref 8.5–10.5)
CALCIUM SERPL-MCNC: 9.5 MG/DL (ref 8.5–10.5)
CHLORIDE BLD-SCNC: 102 MMOL/L (ref 98–107)
CHLORIDE BLD-SCNC: 104 MMOL/L (ref 98–107)
CHLORIDE BLD-SCNC: 96 MMOL/L (ref 98–107)
CHLORIDE BLD-SCNC: 96 MMOL/L (ref 98–107)
CHLORIDE BLD-SCNC: 98 MMOL/L (ref 98–107)
CHLORIDE SERPL-SCNC: 98 MMOL/L (ref 94–109)
CHLORIDE SERPL-SCNC: 99 MMOL/L (ref 94–109)
CK SERPL-CCNC: 195 U/L (ref 30–300)
CO2 SERPL-SCNC: 26 MMOL/L (ref 22–31)
CO2 SERPL-SCNC: 29 MMOL/L (ref 22–31)
CO2 SERPL-SCNC: 30 MMOL/L (ref 20–32)
CO2 SERPL-SCNC: 30 MMOL/L (ref 20–32)
CO2 SERPL-SCNC: 31 MMOL/L (ref 22–31)
CO2 SERPL-SCNC: 31 MMOL/L (ref 22–31)
CO2 SERPL-SCNC: 32 MMOL/L (ref 22–31)
COLOR UR AUTO: ABNORMAL
CREAT SERPL-MCNC: 0.95 MG/DL (ref 0.66–1.25)
CREAT SERPL-MCNC: 0.99 MG/DL (ref 0.7–1.3)
CREAT SERPL-MCNC: 1.1 MG/DL (ref 0.66–1.25)
CREAT SERPL-MCNC: 1.13 MG/DL (ref 0.7–1.3)
CREAT SERPL-MCNC: 1.16 MG/DL (ref 0.7–1.3)
CREAT SERPL-MCNC: 1.2 MG/DL (ref 0.7–1.3)
CREAT SERPL-MCNC: 1.35 MG/DL (ref 0.7–1.3)
DIFFERENTIAL METHOD BLD: ABNORMAL
EOSINOPHIL # BLD AUTO: 0.1 10E9/L (ref 0–0.7)
EOSINOPHIL # BLD AUTO: 0.1 THOU/UL (ref 0–0.4)
EOSINOPHIL NFR BLD AUTO: 0.5 %
EOSINOPHIL NFR BLD AUTO: 1 % (ref 0–6)
ERYTHROCYTE [DISTWIDTH] IN BLOOD BY AUTOMATED COUNT: 12.7 % (ref 11–14.5)
ERYTHROCYTE [DISTWIDTH] IN BLOOD BY AUTOMATED COUNT: 16.3 % (ref 11–14.5)
ERYTHROCYTE [DISTWIDTH] IN BLOOD BY AUTOMATED COUNT: 18.2 % (ref 10–15)
ERYTHROCYTE [DISTWIDTH] IN BLOOD BY AUTOMATED COUNT: 18.3 % (ref 10–15)
ERYTHROCYTE [DISTWIDTH] IN BLOOD BY AUTOMATED COUNT: 18.4 % (ref 11–14.5)
ERYTHROCYTE [DISTWIDTH] IN BLOOD BY AUTOMATED COUNT: 20.5 % (ref 11–14.5)
FERRITIN SERPL-MCNC: 24 NG/ML (ref 27–300)
FERRITIN SERPL-MCNC: 37 NG/ML (ref 27–300)
GFR SERPL CREATININE-BSD FRML MDRD: 49 ML/MIN/1.73M2
GFR SERPL CREATININE-BSD FRML MDRD: 57 ML/MIN/1.73M2
GFR SERPL CREATININE-BSD FRML MDRD: 58 ML/MIN/{1.73_M2}
GFR SERPL CREATININE-BSD FRML MDRD: 59 ML/MIN/1.73M2
GFR SERPL CREATININE-BSD FRML MDRD: 70 ML/MIN/{1.73_M2}
GFR SERPL CREATININE-BSD FRML MDRD: >60 ML/MIN/1.73M2
GFR SERPL CREATININE-BSD FRML MDRD: >60 ML/MIN/1.73M2
GLUCOSE BLD-MCNC: 127 MG/DL (ref 70–125)
GLUCOSE BLD-MCNC: 136 MG/DL (ref 70–125)
GLUCOSE BLD-MCNC: 143 MG/DL (ref 70–125)
GLUCOSE BLD-MCNC: 154 MG/DL (ref 70–125)
GLUCOSE BLD-MCNC: 175 MG/DL (ref 70–125)
GLUCOSE BLDC GLUCOMTR-MCNC: 141 MG/DL (ref 70–99)
GLUCOSE BLDC GLUCOMTR-MCNC: 143 MG/DL (ref 70–99)
GLUCOSE SERPL-MCNC: 183 MG/DL (ref 70–99)
GLUCOSE SERPL-MCNC: 193 MG/DL (ref 70–99)
GLUCOSE UR STRIP-MCNC: NEGATIVE MG/DL
HBA1C MFR BLD: 7.4 % (ref 4.2–6.1)
HBA1C MFR BLD: 9 % (ref 4.2–6.1)
HCT VFR BLD AUTO: 29.2 % (ref 40–54)
HCT VFR BLD AUTO: 30.1 % (ref 40–53)
HCT VFR BLD AUTO: 31.2 % (ref 40–54)
HCT VFR BLD AUTO: 31.7 % (ref 40–54)
HCT VFR BLD AUTO: 31.9 % (ref 40–54)
HCT VFR BLD AUTO: 32 % (ref 40–53)
HGB BLD-MCNC: 8.2 G/DL (ref 14–18)
HGB BLD-MCNC: 8.5 G/DL (ref 14–18)
HGB BLD-MCNC: 8.6 G/DL (ref 14–18)
HGB BLD-MCNC: 8.7 G/DL (ref 13.3–17.7)
HGB BLD-MCNC: 8.7 G/DL (ref 14–18)
HGB BLD-MCNC: 9.2 G/DL (ref 13.3–17.7)
HGB BLD-MCNC: 9.8 G/DL (ref 14–18)
HGB UR QL STRIP: NEGATIVE
IMM GRANULOCYTES # BLD: 0.1 10E9/L (ref 0–0.4)
IMM GRANULOCYTES NFR BLD: 0.7 %
INR PPP: 1.6 (ref 0.86–1.14)
INR PPP: 1.61 (ref 0.86–1.14)
INTERPRETATION ECG - MUSE: NORMAL
IRON SATN MFR SERPL: 3 % (ref 20–50)
IRON SERPL-MCNC: 11 UG/DL (ref 42–175)
IRON SERPL-MCNC: 11 UG/DL (ref 42–175)
IRON SERPL-MCNC: 18 UG/DL (ref 42–175)
KETONES UR STRIP-MCNC: NEGATIVE MG/DL
LEUKOCYTE ESTERASE UR QL STRIP: NEGATIVE
LYMPHOCYTES # BLD AUTO: 0.9 10E9/L (ref 0.8–5.3)
LYMPHOCYTES # BLD AUTO: 1.2 THOU/UL (ref 0.8–4.4)
LYMPHOCYTES NFR BLD AUTO: 15 % (ref 20–40)
LYMPHOCYTES NFR BLD AUTO: 9 %
MCH RBC QN AUTO: 23.2 PG (ref 27–34)
MCH RBC QN AUTO: 23.8 PG (ref 26.5–33)
MCH RBC QN AUTO: 24 PG (ref 26.5–33)
MCH RBC QN AUTO: 24.4 PG (ref 27–34)
MCH RBC QN AUTO: 24.5 PG (ref 27–34)
MCH RBC QN AUTO: 28.9 PG (ref 27–34)
MCHC RBC AUTO-ENTMCNC: 27.3 G/DL (ref 32–36)
MCHC RBC AUTO-ENTMCNC: 27.6 G/DL (ref 32–36)
MCHC RBC AUTO-ENTMCNC: 28.8 G/DL (ref 31.5–36.5)
MCHC RBC AUTO-ENTMCNC: 28.9 G/DL (ref 31.5–36.5)
MCHC RBC AUTO-ENTMCNC: 29.1 G/DL (ref 32–36)
MCHC RBC AUTO-ENTMCNC: 30.9 G/DL (ref 32–36)
MCV RBC AUTO: 82 FL (ref 78–100)
MCV RBC AUTO: 83 FL (ref 78–100)
MCV RBC AUTO: 84 FL (ref 80–100)
MCV RBC AUTO: 85 FL (ref 80–100)
MCV RBC AUTO: 89 FL (ref 80–100)
MCV RBC AUTO: 94 FL (ref 80–100)
METHYLMALONATE SERPL-SCNC: 0.47 UMOL/L (ref 0–0.4)
MISCELLANEOUS TEST DEPT. - HE HISTORICAL: NORMAL
MONOCYTES # BLD AUTO: 1 10E9/L (ref 0–1.3)
MONOCYTES # BLD AUTO: 1 THOU/UL (ref 0–0.9)
MONOCYTES NFR BLD AUTO: 10 %
MONOCYTES NFR BLD AUTO: 12 % (ref 2–10)
NEUTROPHILS # BLD AUTO: 5.6 THOU/UL (ref 2–7.7)
NEUTROPHILS # BLD AUTO: 7.5 10E9/L (ref 1.6–8.3)
NEUTROPHILS NFR BLD AUTO: 71 % (ref 50–70)
NEUTROPHILS NFR BLD AUTO: 79.5 %
NITRATE UR QL: NEGATIVE
NRBC # BLD AUTO: 0 10*3/UL
NRBC BLD AUTO-RTO: 0 /100
PERFORMING LAB: NORMAL
PH UR STRIP: 6 PH (ref 5–7)
PLATELET # BLD AUTO: 201 THOU/UL (ref 140–440)
PLATELET # BLD AUTO: 244 THOU/UL (ref 140–440)
PLATELET # BLD AUTO: 261 THOU/UL (ref 140–440)
PLATELET # BLD AUTO: 292 THOU/UL (ref 140–440)
PLATELET # BLD AUTO: 299 10E9/L (ref 150–450)
PLATELET # BLD AUTO: 321 10E9/L (ref 150–450)
PMV BLD AUTO: 10.4 FL (ref 8.5–12.5)
PMV BLD AUTO: 10.7 FL (ref 8.5–12.5)
PMV BLD AUTO: 10.8 FL (ref 8.5–12.5)
PMV BLD AUTO: 11.3 FL (ref 8.5–12.5)
POTASSIUM BLD-SCNC: 3.5 MMOL/L (ref 3.5–5)
POTASSIUM BLD-SCNC: 3.8 MMOL/L (ref 3.5–5)
POTASSIUM BLD-SCNC: 4.1 MMOL/L (ref 3.5–5)
POTASSIUM BLD-SCNC: 4.3 MMOL/L (ref 3.5–5)
POTASSIUM BLD-SCNC: 4.5 MMOL/L (ref 3.5–5)
POTASSIUM SERPL-SCNC: 4 MMOL/L (ref 3.4–5.3)
POTASSIUM SERPL-SCNC: 4.9 MMOL/L (ref 3.4–5.3)
RBC # BLD AUTO: 3.39 MILL/UL (ref 4.4–6.2)
RBC # BLD AUTO: 3.49 MILL/UL (ref 4.4–6.2)
RBC # BLD AUTO: 3.51 MILL/UL (ref 4.4–6.2)
RBC # BLD AUTO: 3.66 10E12/L (ref 4.4–5.9)
RBC # BLD AUTO: 3.75 MILL/UL (ref 4.4–6.2)
RBC # BLD AUTO: 3.84 10E12/L (ref 4.4–5.9)
RBC #/AREA URNS AUTO: <1 /HPF (ref 0–2)
SODIUM SERPL-SCNC: 133 MMOL/L (ref 133–144)
SODIUM SERPL-SCNC: 134 MMOL/L (ref 133–144)
SODIUM SERPL-SCNC: 136 MMOL/L (ref 136–145)
SODIUM SERPL-SCNC: 137 MMOL/L (ref 136–145)
SODIUM SERPL-SCNC: 139 MMOL/L (ref 136–145)
SODIUM SERPL-SCNC: 140 MMOL/L (ref 136–145)
SODIUM SERPL-SCNC: 142 MMOL/L (ref 136–145)
SOURCE: ABNORMAL
SP GR UR STRIP: 1.01 (ref 1–1.03)
SPECIMEN STATUS: NORMAL
SQUAMOUS #/AREA URNS AUTO: <1 /HPF (ref 0–1)
TEST NAME: NORMAL
TIBC SERPL-MCNC: 392 UG/DL (ref 313–563)
TRANSFERRIN SERPL-MCNC: 314 MG/DL (ref 212–360)
UROBILINOGEN UR STRIP-MCNC: NORMAL MG/DL (ref 0–2)
VIT B12 SERPL-MCNC: 249 PG/ML (ref 213–816)
WBC # BLD AUTO: 8.6 10E9/L (ref 4–11)
WBC # BLD AUTO: 9.5 10E9/L (ref 4–11)
WBC #/AREA URNS AUTO: 1 /HPF (ref 0–5)
WBC: 5.7 THOU/UL (ref 4–11)
WBC: 7 THOU/UL (ref 4–11)
WBC: 7.4 THOU/UL (ref 4–11)
WBC: 7.9 THOU/UL (ref 4–11)

## 2019-01-01 PROCEDURE — 80048 BASIC METABOLIC PNL TOTAL CA: CPT | Performed by: PHYSICIAN ASSISTANT

## 2019-01-01 PROCEDURE — 73502 X-RAY EXAM HIP UNI 2-3 VIEWS: CPT

## 2019-01-01 PROCEDURE — 99285 EMERGENCY DEPT VISIT HI MDM: CPT | Mod: 25

## 2019-01-01 PROCEDURE — 85610 PROTHROMBIN TIME: CPT | Performed by: EMERGENCY MEDICINE

## 2019-01-01 PROCEDURE — 74177 CT ABD & PELVIS W/CONTRAST: CPT

## 2019-01-01 PROCEDURE — 25000132 ZZH RX MED GY IP 250 OP 250 PS 637: Mod: GY | Performed by: PHYSICIAN ASSISTANT

## 2019-01-01 PROCEDURE — G0378 HOSPITAL OBSERVATION PER HR: HCPCS

## 2019-01-01 PROCEDURE — 36415 COLL VENOUS BLD VENIPUNCTURE: CPT | Performed by: PHYSICIAN ASSISTANT

## 2019-01-01 PROCEDURE — 85025 COMPLETE CBC W/AUTO DIFF WBC: CPT | Performed by: EMERGENCY MEDICINE

## 2019-01-01 PROCEDURE — 25000132 ZZH RX MED GY IP 250 OP 250 PS 637: Mod: GY | Performed by: INTERNAL MEDICINE

## 2019-01-01 PROCEDURE — 93005 ELECTROCARDIOGRAM TRACING: CPT

## 2019-01-01 PROCEDURE — 00000146 ZZHCL STATISTIC GLUCOSE BY METER IP

## 2019-01-01 PROCEDURE — 25000128 H RX IP 250 OP 636: Performed by: EMERGENCY MEDICINE

## 2019-01-01 PROCEDURE — 36415 COLL VENOUS BLD VENIPUNCTURE: CPT | Performed by: INTERNAL MEDICINE

## 2019-01-01 PROCEDURE — 73140 X-RAY EXAM OF FINGER(S): CPT | Mod: RT

## 2019-01-01 PROCEDURE — 25000132 ZZH RX MED GY IP 250 OP 250 PS 637: Mod: GY

## 2019-01-01 PROCEDURE — 99217 ZZC OBSERVATION CARE DISCHARGE: CPT | Performed by: INTERNAL MEDICINE

## 2019-01-01 PROCEDURE — 29125 APPL SHORT ARM SPLINT STATIC: CPT | Mod: RT

## 2019-01-01 PROCEDURE — 12011 RPR F/E/E/N/L/M 2.5 CM/<: CPT

## 2019-01-01 PROCEDURE — 96374 THER/PROPH/DIAG INJ IV PUSH: CPT | Mod: 59

## 2019-01-01 PROCEDURE — 72125 CT NECK SPINE W/O DYE: CPT

## 2019-01-01 PROCEDURE — 85027 COMPLETE CBC AUTOMATED: CPT | Performed by: PHYSICIAN ASSISTANT

## 2019-01-01 PROCEDURE — 99220 ZZC INITIAL OBSERVATION CARE,LEVL III: CPT | Performed by: PHYSICIAN ASSISTANT

## 2019-01-01 PROCEDURE — 80048 BASIC METABOLIC PNL TOTAL CA: CPT | Performed by: EMERGENCY MEDICINE

## 2019-01-01 PROCEDURE — 81001 URINALYSIS AUTO W/SCOPE: CPT | Performed by: PHYSICIAN ASSISTANT

## 2019-01-01 PROCEDURE — 82550 ASSAY OF CK (CPK): CPT | Performed by: INTERNAL MEDICINE

## 2019-01-01 PROCEDURE — 73110 X-RAY EXAM OF WRIST: CPT | Mod: RT

## 2019-01-01 PROCEDURE — 71260 CT THORAX DX C+: CPT

## 2019-01-01 PROCEDURE — 85610 PROTHROMBIN TIME: CPT | Performed by: PHYSICIAN ASSISTANT

## 2019-01-01 PROCEDURE — 70450 CT HEAD/BRAIN W/O DYE: CPT

## 2019-01-01 RX ORDER — POLYETHYLENE GLYCOL 3350 17 G/17G
17 POWDER, FOR SOLUTION ORAL DAILY PRN
Status: DISCONTINUED | OUTPATIENT
Start: 2019-01-01 | End: 2019-01-01 | Stop reason: HOSPADM

## 2019-01-01 RX ORDER — LIDOCAINE 40 MG/G
CREAM TOPICAL
Status: DISCONTINUED | OUTPATIENT
Start: 2019-01-01 | End: 2019-01-01 | Stop reason: HOSPADM

## 2019-01-01 RX ORDER — POTASSIUM CHLORIDE 750 MG/1
10 TABLET, EXTENDED RELEASE ORAL DAILY
COMMUNITY

## 2019-01-01 RX ORDER — ACETAMINOPHEN 325 MG/1
650 TABLET ORAL ONCE
Status: COMPLETED | OUTPATIENT
Start: 2019-01-01 | End: 2019-01-01

## 2019-01-01 RX ORDER — LANOLIN ALCOHOL/MO/W.PET/CERES
3 CREAM (GRAM) TOPICAL AT BEDTIME
COMMUNITY

## 2019-01-01 RX ORDER — IOPAMIDOL 755 MG/ML
500 INJECTION, SOLUTION INTRAVASCULAR ONCE
Status: COMPLETED | OUTPATIENT
Start: 2019-01-01 | End: 2019-01-01

## 2019-01-01 RX ORDER — ONDANSETRON 2 MG/ML
4 INJECTION INTRAMUSCULAR; INTRAVENOUS EVERY 6 HOURS PRN
Status: DISCONTINUED | OUTPATIENT
Start: 2019-01-01 | End: 2019-01-01 | Stop reason: HOSPADM

## 2019-01-01 RX ORDER — LIDOCAINE 4 G/G
1 PATCH TOPICAL
Status: DISCONTINUED | OUTPATIENT
Start: 2019-01-01 | End: 2019-01-01 | Stop reason: HOSPADM

## 2019-01-01 RX ORDER — SIMETHICONE 80 MG
160 TABLET,CHEWABLE ORAL DAILY
COMMUNITY

## 2019-01-01 RX ORDER — POLYETHYLENE GLYCOL 3350 17 G/17G
17 POWDER, FOR SOLUTION ORAL DAILY
Status: DISCONTINUED | OUTPATIENT
Start: 2019-01-01 | End: 2019-01-01 | Stop reason: HOSPADM

## 2019-01-01 RX ORDER — HYDROCODONE BITARTRATE AND ACETAMINOPHEN 5; 325 MG/1; MG/1
1 TABLET ORAL EVERY 4 HOURS PRN
Status: DISCONTINUED | OUTPATIENT
Start: 2019-01-01 | End: 2019-01-01 | Stop reason: HOSPADM

## 2019-01-01 RX ORDER — PROCHLORPERAZINE MALEATE 5 MG
5 TABLET ORAL EVERY 6 HOURS PRN
Status: DISCONTINUED | OUTPATIENT
Start: 2019-01-01 | End: 2019-01-01 | Stop reason: HOSPADM

## 2019-01-01 RX ORDER — HYDROCODONE BITARTRATE AND ACETAMINOPHEN 5; 325 MG/1; MG/1
1 TABLET ORAL EVERY 6 HOURS PRN
Qty: 20 TABLET | Refills: 0 | Status: SHIPPED | OUTPATIENT
Start: 2019-01-01

## 2019-01-01 RX ORDER — FENTANYL CITRATE 50 UG/ML
25 INJECTION, SOLUTION INTRAMUSCULAR; INTRAVENOUS ONCE
Status: COMPLETED | OUTPATIENT
Start: 2019-01-01 | End: 2019-01-01

## 2019-01-01 RX ORDER — DEXTROSE MONOHYDRATE 25 G/50ML
25 INJECTION, SOLUTION INTRAVENOUS ONCE
Status: DISCONTINUED | OUTPATIENT
Start: 2019-01-01 | End: 2019-01-01

## 2019-01-01 RX ORDER — AMOXICILLIN 250 MG
1 CAPSULE ORAL 2 TIMES DAILY PRN
Status: DISCONTINUED | OUTPATIENT
Start: 2019-01-01 | End: 2019-01-01 | Stop reason: HOSPADM

## 2019-01-01 RX ORDER — PROCHLORPERAZINE 25 MG
12.5 SUPPOSITORY, RECTAL RECTAL EVERY 12 HOURS PRN
Status: DISCONTINUED | OUTPATIENT
Start: 2019-01-01 | End: 2019-01-01 | Stop reason: HOSPADM

## 2019-01-01 RX ORDER — TRIAMCINOLONE ACETONIDE 5 MG/G
CREAM TOPICAL
Status: DISCONTINUED | OUTPATIENT
Start: 2019-01-01 | End: 2019-01-01 | Stop reason: HOSPADM

## 2019-01-01 RX ORDER — FERROUS SULFATE 324(65)MG
324 TABLET, DELAYED RELEASE (ENTERIC COATED) ORAL EVERY OTHER DAY
COMMUNITY

## 2019-01-01 RX ORDER — METOPROLOL TARTRATE 25 MG/1
25 TABLET, FILM COATED ORAL 2 TIMES DAILY
Status: DISCONTINUED | OUTPATIENT
Start: 2019-01-01 | End: 2019-01-01 | Stop reason: HOSPADM

## 2019-01-01 RX ORDER — ACETAMINOPHEN 650 MG/1
650 SUPPOSITORY RECTAL EVERY 4 HOURS PRN
Status: DISCONTINUED | OUTPATIENT
Start: 2019-01-01 | End: 2019-01-01 | Stop reason: HOSPADM

## 2019-01-01 RX ORDER — ACETAMINOPHEN 325 MG/1
TABLET ORAL
Status: COMPLETED
Start: 2019-01-01 | End: 2019-01-01

## 2019-01-01 RX ORDER — LIDOCAINE 4 G/G
1 PATCH TOPICAL EVERY 24 HOURS
Qty: 30 PATCH | Refills: 0 | Status: SHIPPED | OUTPATIENT
Start: 2019-01-01

## 2019-01-01 RX ORDER — LEVOTHYROXINE SODIUM 100 UG/1
100 TABLET ORAL DAILY
Status: DISCONTINUED | OUTPATIENT
Start: 2019-01-01 | End: 2019-01-01 | Stop reason: HOSPADM

## 2019-01-01 RX ORDER — AMOXICILLIN 250 MG
2 CAPSULE ORAL 2 TIMES DAILY PRN
Status: DISCONTINUED | OUTPATIENT
Start: 2019-01-01 | End: 2019-01-01 | Stop reason: HOSPADM

## 2019-01-01 RX ORDER — POTASSIUM CHLORIDE 750 MG/1
10 TABLET, EXTENDED RELEASE ORAL DAILY
Status: DISCONTINUED | OUTPATIENT
Start: 2019-01-01 | End: 2019-01-01 | Stop reason: HOSPADM

## 2019-01-01 RX ORDER — LIDOCAINE HYDROCHLORIDE AND EPINEPHRINE 10; 10 MG/ML; UG/ML
INJECTION, SOLUTION INFILTRATION; PERINEURAL
Status: DISCONTINUED
Start: 2019-01-01 | End: 2019-01-01 | Stop reason: HOSPADM

## 2019-01-01 RX ORDER — SUCRALFATE 1 G/1
1 TABLET ORAL
Status: DISCONTINUED | OUTPATIENT
Start: 2019-01-01 | End: 2019-01-01 | Stop reason: HOSPADM

## 2019-01-01 RX ORDER — SIMETHICONE 80 MG
160 TABLET,CHEWABLE ORAL DAILY
Status: DISCONTINUED | OUTPATIENT
Start: 2019-01-01 | End: 2019-01-01 | Stop reason: HOSPADM

## 2019-01-01 RX ORDER — ESCITALOPRAM OXALATE 5 MG/1
5 TABLET ORAL EVERY EVENING
COMMUNITY

## 2019-01-01 RX ORDER — LANOLIN ALCOHOL/MO/W.PET/CERES
3 CREAM (GRAM) TOPICAL AT BEDTIME
Status: DISCONTINUED | OUTPATIENT
Start: 2019-01-01 | End: 2019-01-01 | Stop reason: HOSPADM

## 2019-01-01 RX ORDER — NALOXONE HYDROCHLORIDE 0.4 MG/ML
.1-.4 INJECTION, SOLUTION INTRAMUSCULAR; INTRAVENOUS; SUBCUTANEOUS
Status: DISCONTINUED | OUTPATIENT
Start: 2019-01-01 | End: 2019-01-01 | Stop reason: HOSPADM

## 2019-01-01 RX ORDER — AMOXICILLIN 250 MG
2 CAPSULE ORAL 2 TIMES DAILY
Status: DISCONTINUED | OUTPATIENT
Start: 2019-01-01 | End: 2019-01-01 | Stop reason: HOSPADM

## 2019-01-01 RX ORDER — ONDANSETRON 4 MG/1
4 TABLET, ORALLY DISINTEGRATING ORAL EVERY 6 HOURS PRN
Status: DISCONTINUED | OUTPATIENT
Start: 2019-01-01 | End: 2019-01-01 | Stop reason: HOSPADM

## 2019-01-01 RX ORDER — SIMETHICONE 80 MG
160 TABLET,CHEWABLE ORAL EVERY 6 HOURS PRN
Status: DISCONTINUED | OUTPATIENT
Start: 2019-01-01 | End: 2019-01-01 | Stop reason: HOSPADM

## 2019-01-01 RX ORDER — ESCITALOPRAM OXALATE 5 MG/1
5 TABLET ORAL EVERY EVENING
Status: DISCONTINUED | OUTPATIENT
Start: 2019-01-01 | End: 2019-01-01 | Stop reason: HOSPADM

## 2019-01-01 RX ORDER — ACETAMINOPHEN 325 MG/1
650 TABLET ORAL EVERY 4 HOURS PRN
Status: DISCONTINUED | OUTPATIENT
Start: 2019-01-01 | End: 2019-01-01 | Stop reason: HOSPADM

## 2019-01-01 RX ORDER — FERROUS SULFATE 325(65) MG
325 TABLET ORAL
Status: DISCONTINUED | OUTPATIENT
Start: 2019-01-01 | End: 2019-01-01 | Stop reason: HOSPADM

## 2019-01-01 RX ORDER — FERROUS SULFATE 325(65) MG
324 TABLET ORAL EVERY OTHER DAY
Status: DISCONTINUED | OUTPATIENT
Start: 2019-01-01 | End: 2019-01-01

## 2019-01-01 RX ADMIN — MELATONIN TAB 3 MG 3 MG: 3 TAB at 22:10

## 2019-01-01 RX ADMIN — FENTANYL CITRATE 25 MCG: 50 INJECTION INTRAMUSCULAR; INTRAVENOUS at 13:09

## 2019-01-01 RX ADMIN — METOPROLOL TARTRATE 25 MG: 25 TABLET, FILM COATED ORAL at 09:08

## 2019-01-01 RX ADMIN — FUROSEMIDE 60 MG: 40 TABLET ORAL at 17:46

## 2019-01-01 RX ADMIN — LEVOTHYROXINE SODIUM 100 MCG: 100 TABLET ORAL at 09:07

## 2019-01-01 RX ADMIN — SUCRALFATE 1 G: 1 TABLET ORAL at 20:05

## 2019-01-01 RX ADMIN — HYDROCODONE BITARTRATE AND ACETAMINOPHEN 1 TABLET: 5; 325 TABLET ORAL at 00:40

## 2019-01-01 RX ADMIN — SENNOSIDES AND DOCUSATE SODIUM 2 TABLET: 8.6; 5 TABLET ORAL at 09:07

## 2019-01-01 RX ADMIN — FERROUS SULFATE TAB 325 MG (65 MG ELEMENTAL FE) 325 MG: 325 (65 FE) TAB at 13:27

## 2019-01-01 RX ADMIN — HYDROCODONE BITARTRATE AND ACETAMINOPHEN 1 TABLET: 5; 325 TABLET ORAL at 06:12

## 2019-01-01 RX ADMIN — SIMETHICONE CHEW TAB 80 MG 160 MG: 80 TABLET ORAL at 09:06

## 2019-01-01 RX ADMIN — HYDROCODONE BITARTRATE AND ACETAMINOPHEN 1 TABLET: 5; 325 TABLET ORAL at 10:55

## 2019-01-01 RX ADMIN — LIDOCAINE 1 PATCH: 560 PATCH PERCUTANEOUS; TOPICAL; TRANSDERMAL at 10:58

## 2019-01-01 RX ADMIN — METFORMIN HYDROCHLORIDE 500 MG: 500 TABLET ORAL at 17:46

## 2019-01-01 RX ADMIN — METOPROLOL TARTRATE 25 MG: 25 TABLET, FILM COATED ORAL at 20:05

## 2019-01-01 RX ADMIN — ESCITALOPRAM 5 MG: 5 TABLET, FILM COATED ORAL at 20:06

## 2019-01-01 RX ADMIN — FUROSEMIDE 60 MG: 40 TABLET ORAL at 09:07

## 2019-01-01 RX ADMIN — SIMETHICONE CHEW TAB 80 MG 160 MG: 80 TABLET ORAL at 17:46

## 2019-01-01 RX ADMIN — POTASSIUM CHLORIDE 10 MEQ: 750 TABLET, FILM COATED, EXTENDED RELEASE ORAL at 09:08

## 2019-01-01 RX ADMIN — SENNOSIDES AND DOCUSATE SODIUM 2 TABLET: 8.6; 5 TABLET ORAL at 20:04

## 2019-01-01 RX ADMIN — SUCRALFATE 1 G: 1 TABLET ORAL at 09:08

## 2019-01-01 RX ADMIN — ACETAMINOPHEN 650 MG: 325 TABLET ORAL at 10:51

## 2019-01-01 RX ADMIN — HYDROCODONE BITARTRATE AND ACETAMINOPHEN 1 TABLET: 5; 325 TABLET ORAL at 18:54

## 2019-01-01 RX ADMIN — IOPAMIDOL 100 ML: 755 INJECTION, SOLUTION INTRAVENOUS at 11:44

## 2019-01-01 RX ADMIN — POLYETHYLENE GLYCOL 3350 17 G: 17 POWDER, FOR SOLUTION ORAL at 09:09

## 2019-01-01 RX ADMIN — LOSARTAN POTASSIUM 6.25 MG: 25 TABLET, FILM COATED ORAL at 09:09

## 2019-01-01 RX ADMIN — METFORMIN HYDROCHLORIDE 500 MG: 500 TABLET ORAL at 09:07

## 2019-01-01 RX ADMIN — OMEPRAZOLE 20 MG: 20 CAPSULE, DELAYED RELEASE ORAL at 13:19

## 2019-01-01 RX ADMIN — MICONAZOLE NITRATE: 20 POWDER TOPICAL at 20:08

## 2019-01-01 RX ADMIN — ACETAMINOPHEN 650 MG: 325 TABLET, FILM COATED ORAL at 10:51

## 2019-01-01 RX ADMIN — SUCRALFATE 1 G: 1 TABLET ORAL at 13:19

## 2019-01-01 ASSESSMENT — ENCOUNTER SYMPTOMS
HEADACHES: 1
ARTHRALGIAS: 1
WOUND: 1
NECK PAIN: 0
BACK PAIN: 0

## 2019-01-01 ASSESSMENT — COLUMBIA-SUICIDE SEVERITY RATING SCALE - C-SSRS
1. IN THE PAST MONTH, HAVE YOU WISHED YOU WERE DEAD OR WISHED YOU COULD GO TO SLEEP AND NOT WAKE UP?: NO
2. HAVE YOU ACTUALLY HAD ANY THOUGHTS OF KILLING YOURSELF IN THE PAST MONTH?: NO

## 2019-07-18 PROBLEM — M79.605 LEFT LEG PAIN: Status: ACTIVE | Noted: 2019-01-01

## 2019-07-18 NOTE — PHARMACY-ADMISSION MEDICATION HISTORY
Admission medication history interview status for this patient is complete. See [facility] admission navigator for allergy information, prior to admission medications and immunization status.     Medication history interview source(s):Caregiver  Medication history resources (including written lists, pill bottles, clinic record):Med List/MAR from [facility]   Primary pharmacy: n/a     Changes made to PTA medication list:  Added: robitussin , ferrous sulfate, simethicone daily, lexapro, melatonin, potassium   Deleted: tamsulosin, atorvastatin, calcium + d, vitamin D, ferrous gluconate, glipizide, gabapentin, lactulose   Changed: nitrostat, warfarin, omeprazole, valsartan     Actions taken by pharmacist (provider contacted, etc):None     Additional medication history information:None    Medication reconciliation/reorder completed by provider prior to medication history? No    Do you take OTC medications (eg tylenol, ibuprofen, fish oil, eye/ear drops, etc)? Y (Y/N)    For patients on insulin therapy: N (Y/N)    Prior to Admission medications    Medication Sig Last Dose Taking? Auth Provider   camphor-menthol (DERMASARRA) 0.5-0.5 % LOTN Apply topically 2 times daily To both lower legs to dry skin where no drainage present 7/17/2019 at x2 Yes Unknown, Entered By History   escitalopram (LEXAPRO) 5 MG tablet Take 5 mg by mouth every evening With a snack 7/17/2019 at 2000 Yes Unknown, Entered By History   Ferrous Sulfate 324 (65 Fe) MG TBEC Take 324 mg by mouth every other day 7/17/2019 at 1200 Yes Unknown, Entered By History   FUROSEMIDE PO Take 60 mg by mouth 2 times daily 7/17/2019 at x2 Yes Unknown, Entered By History   levothyroxine (SYNTHROID/LEVOTHROID) 100 MCG tablet Take 1 tablet (100 mcg) by mouth daily 7/17/2019 at 0700 Yes Nirmal Serrano MD   melatonin 3 MG tablet Take 3 mg by mouth At Bedtime 7/17/2019 at 2000 Yes Unknown, Entered By History   metFORMIN (GLUCOPHAGE) 500 MG tablet Take 500 mg by mouth 2 times  daily 7/17/2019 at x2 Yes Unknown, Entered By History   metoprolol tartrate (LOPRESSOR) 25 MG tablet Take 25 mg by mouth 2 times daily 7/17/2019 at x2 Yes Unknown, Entered By History   nystatin (MYCOSTATIN) 975838 UNIT/GM POWD Apply topically 3 times daily Apply to groin area for yeast growth with every pericare  7/17/2019 at x3 Yes Unknown, Entered By History   omeprazole (PRILOSEC) 20 MG DR capsule Take 20 mg by mouth three times a week Monday, Wednesday, Friday 7/17/2019 at am Yes Unknown, Entered By History   polyethylene glycol (MIRALAX/GLYCOLAX) Packet Take 17 g by mouth daily 7/17/2019 at am Yes Unknown, Entered By History   potassium chloride ER (K-TAB/KLOR-CON) 10 MEQ CR tablet Take 10 mEq by mouth daily 7/17/2019 at 0800 Yes Unknown, Entered By History   senna-docusate (SENOKOT-S;PERICOLACE) 8.6-50 MG per tablet Take 2 tablets by mouth 2 times daily 7/17/2019 at x2 Yes Unknown, Entered By History   simethicone (MYLICON) 80 MG chewable tablet Take 160 mg by mouth daily 7/17/2019 at Unknown time Yes Unknown, Entered By History   sucralfate (CARAFATE) 1 GM tablet Take 1 tablet (1 g) by mouth 4 times daily (before meals and nightly) 7/17/2019 at x4 Yes Lanie Harvey MD   triamcinolone (KENALOG) 0.5 % cream Apply topically three times a week Mon, Wed, Fri - apply to affected areas 7/17/2019 at 0700 Yes Unknown, Entered By History   valsartan (DIOVAN) 40 MG tablet Take 10 mg by mouth daily  7/17/2019 at 0800 Yes Unknown, Entered By History   WARFARIN SODIUM PO Take 3.5-5 mg by mouth every evening 3.5 mg On Tuesdays, 5 mg all other days. 7/17/2019 at pm Yes Unknown, Entered By History   acetaminophen (TYLENOL) 325 MG tablet Take 650 mg by mouth daily as needed for mild pain Unknown at Unknown time  Unknown, Entered By History   guaiFENesin (ROBITUSSIN) 100 MG/5ML SYRP Take 5 mLs by mouth every 4 hours as needed for cough Unknown at Unknown time  Unknown, Entered By History   ipratropium - albuterol 0.5  mg/2.5 mg/3 mL (DUONEB) 0.5-2.5 (3) MG/3ML neb solution Take 1 vial by nebulization 3 times daily as needed Unknown at Unknown time  Unknown, Entered By History   nitroGLYcerin (NITROSTAT) 0.4 MG sublingual tablet Place 0.4 mg under the tongue every 5 minutes as needed for chest pain (x3 doses)  Unknown at Unknown time  Unknown, Entered By History   simethicone (MYLICON) 80 MG chewable tablet Take 2 tablets (160 mg) by mouth every 6 hours as needed for flatulence or cramping Unknown at Unknown time  Nirmal Serrano MD

## 2019-07-18 NOTE — ED NOTES
Called and left son Luciano ROJAS [phone] a message to call back to discuss possible admission.

## 2019-07-18 NOTE — PLAN OF CARE
ROOM # 215    Living Situation (if not independent, order SW consult):Memory care  Facility name:[facility]  : [facility] [phone]    Activity level at baseline: Assist with one, can walk short distances  Activity level on admit: lift, 2 assist      Patient registered to observation; given Patient Bill of Rights; given the opportunity to ask questions about observation status and their plan of care.  Patient has been oriented to the observation room, bathroom and call light is in place.    Discussed discharge goals and expectations with patient/family.

## 2019-07-18 NOTE — H&P
History and Physical     Ton Bagley MRN# [id number]   YOB: 1927 Age: 89 year old      Date of Admission:  7/18/2019    Primary care provider: Jose Shell          Assessment and Plan:   Ton Bagley is a 89 year old male with a PMH significant for dementia living in memory care, CAD, A fib on warfarin, HTN, and HLP who presents after an unwitnessed fall with scalp laceration, right wrist pain and left hip pain.    Patient was discussed with Dr. Gonzalez, who was provider in ED. Chart review of ED work up was reviewed as well as chart review of Care Everywhere, previous visits and admissions.    # Unwitnessed fall   Fell sometime in the middle of the night and found at 6 am complaining of pain in the right arm, left leg and head. X ray imaging of Pelvis, left hip, right wrist, and right fingers are negative.  Creatinine is 1.1 which is baseline so unlikely to have rhabdo. CT head and CT cervical spine negative for fracture. No real history from patient given dementia so unclear if mechanical or syncopal but had echo in 4/2018 showing significantly decreased area of the aortic valve with mild flow gradient. No recent complaints of syncope or lightheadedness at care facility to leaning towards mechanical fall.   -Will not pursue cardiac work up at this time unless hx suggest complaints of dizziness/lightheadedness or syncope reports. Family would also have to be on board with potential of aortic valve replacement.  -Obtain UA and CK level    # Left thigh muscular hemorrhage  Patient complaining of left groin pain. X ray imaging of thigh negative for fracture but CT of chest/abdomen/pelvis shows a left psoas and iliacus muscle enlargement likely due to muscular hemorrhage. No fracture or hematoma noted. Hemoglobin 9.2 with most recent comparison in 9/2018 at 11.6. Thigh does not look visibly swollen.  He is anticoagulated on Warfarin with INR of 1.6. Left thigh was measured 6  inches above the superior patella and is 22 and 1/4 inches around.  -Measure left mid thigh  -Elevate and ice left leg  -Tylenol and norco for pain  -Hold Warfarin  -INR and Hgb check in AM  -PT assessment  -Continue iron supplement    # Left scalp laceration  Sutures were placed in ED  -Removal in 7-10 days    # Chronic A fib  -Continue PTA Metoprolol  -Hold warfarin, consider discussion about discontinuing indefinitely    # Lymphedema  -Continue PTA Lasix 60 mg BID and potassium replacement  -Monitor daily weights  -2 gram sodium diet    # Type II diabetes  -Continue PTA Metformin  -BID/HS glucose checks    # HTN  -Continue PTA Valsartan and Metoprolol    # GERD  -Continue PTA Omeprazole MWF, Carafate and Mylicon    # Hypothyroidism  -Continue Levothyroxine    Attempted to call emergency contact listed as wife but number disconnected. Per ED his son was in the ED but I don't have phone number.     Social: Lives in memory   Code: Reviewed POLST  And placed DNR/DNI  VTE prophylaxis: Hold anticoagulation  Disposition: Observation                    Chief Complaint:   Unwitnessed fall with left hip/groin pain, right arm pain and head laceration         History of Present Illness:   History limited due to patient's dementia    Ton Bagely is a 89 year old male who presents by EMS from his memory care where he is primarily wheelchair bound and had an unwitnessed fall last night. He was found at 6 am this morning with a bleeding head and complaints of right arm pain and left leg with possible hip displacement. He was given 2 doses of Fentanyl IV (one by EMS and one in the ED) and is now very tired/lethargic. He is unable to give me any history but states his left leg hurts (pointing to his mid groin area) and his right lower arm hurts. He has no complaints of cough, chest pain, head or neck pain.              Past Medical History:     Past Medical History:   Diagnosis Date     Atrial fibrillation (H)      cardioversion 2007     CAD (coronary artery disease)     CABG, stent x2 2004     HTN (hypertension)      Hyperlipidemia                Past Surgical History:     Past Surgical History:   Procedure Laterality Date     BYPASS GRAFT ARTERY CORONARY  1994    LIMA to LAD and saphenous vein grafts to Diag 1 OM 1 PDA     CARDIOVERSION  2007     LAPAROTOMY EXPLORATORY N/A 5/14/2017    Procedure: LAPAROTOMY EXPLORATORY;  Exploratory laparotomy with peritoneal washout. ;  Surgeon: Jorge Dias MD;  Location: [facility] OR     STENT, CORONARY, S660 15/18  2004    stent placement of SVG to RCA and OM2               Social History:     Social History     Socioeconomic History     Marital status:      Spouse name: Not on file     Number of children: Not on file     Years of education: Not on file     Highest education level: Not on file   Occupational History     Not on file   Social Needs     Financial resource strain: Not on file     Food insecurity:     Worry: Not on file     Inability: Not on file     Transportation needs:     Medical: Not on file     Non-medical: Not on file   Tobacco Use     Smoking status: Former Smoker     Tobacco comment: 1973   Substance and Sexual Activity     Alcohol use: Yes     Comment: wine daily     Drug use: Not on file     Sexual activity: Not on file   Lifestyle     Physical activity:     Days per week: Not on file     Minutes per session: Not on file     Stress: Not on file   Relationships     Social connections:     Talks on phone: Not on file     Gets together: Not on file     Attends Jain service: Not on file     Active member of club or organization: Not on file     Attends meetings of clubs or organizations: Not on file     Relationship status: Not on file     Intimate partner violence:     Fear of current or ex partner: Not on file     Emotionally abused: Not on file     Physically abused: Not on file     Forced sexual activity: Not on file   Other Topics Concern       Service Not Asked     Blood Transfusions Not Asked     Caffeine Concern Not Asked     Occupational Exposure Not Asked     Hobby Hazards Not Asked     Sleep Concern No     Stress Concern Not Asked     Weight Concern Not Asked     Special Diet No     Back Care Not Asked     Exercise No     Bike Helmet Not Asked     Seat Belt Not Asked     Self-Exams Not Asked   Social History Narrative     Not on file               Family History:   Unable to obtain family history         Allergies:    No Known Allergies            Medications:     Prior to Admission medications    Medication Sig Last Dose Taking? Auth Provider   camphor-menthol (DERMASARRA) 0.5-0.5 % LOTN Apply topically 2 times daily To both lower legs to dry skin where no drainage present 7/17/2019 at x2 Yes Unknown, Entered By History   escitalopram (LEXAPRO) 5 MG tablet Take 5 mg by mouth every evening With a snack 7/17/2019 at 2000 Yes Unknown, Entered By History   Ferrous Sulfate 324 (65 Fe) MG TBEC Take 324 mg by mouth every other day 7/17/2019 at 1200 Yes Unknown, Entered By History   FUROSEMIDE PO Take 60 mg by mouth 2 times daily 7/17/2019 at x2 Yes Unknown, Entered By History   levothyroxine (SYNTHROID/LEVOTHROID) 100 MCG tablet Take 1 tablet (100 mcg) by mouth daily 7/17/2019 at 0700 Yes Nirmal Serrano MD   melatonin 3 MG tablet Take 3 mg by mouth At Bedtime 7/17/2019 at 2000 Yes Unknown, Entered By History   metFORMIN (GLUCOPHAGE) 500 MG tablet Take 500 mg by mouth 2 times daily 7/17/2019 at x2 Yes Unknown, Entered By History   metoprolol tartrate (LOPRESSOR) 25 MG tablet Take 25 mg by mouth 2 times daily 7/17/2019 at x2 Yes Unknown, Entered By History   nystatin (MYCOSTATIN) 766587 UNIT/GM POWD Apply topically 3 times daily Apply to groin area for yeast growth with every pericare  7/17/2019 at x3 Yes Unknown, Entered By History   omeprazole (PRILOSEC) 20 MG DR capsule Take 20 mg by mouth three times a week Monday, Wednesday, Friday  7/17/2019 at am Yes Unknown, Entered By History   polyethylene glycol (MIRALAX/GLYCOLAX) Packet Take 17 g by mouth daily 7/17/2019 at am Yes Unknown, Entered By History   potassium chloride ER (K-TAB/KLOR-CON) 10 MEQ CR tablet Take 10 mEq by mouth daily 7/17/2019 at 0800 Yes Unknown, Entered By History   senna-docusate (SENOKOT-S;PERICOLACE) 8.6-50 MG per tablet Take 2 tablets by mouth 2 times daily 7/17/2019 at x2 Yes Unknown, Entered By History   simethicone (MYLICON) 80 MG chewable tablet Take 160 mg by mouth daily 7/17/2019 at Unknown time Yes Unknown, Entered By History   sucralfate (CARAFATE) 1 GM tablet Take 1 tablet (1 g) by mouth 4 times daily (before meals and nightly) 7/17/2019 at x4 Yes Lanie Harvey MD   triamcinolone (KENALOG) 0.5 % cream Apply topically three times a week Mon, Wed, Fri - apply to affected areas 7/17/2019 at 0700 Yes Unknown, Entered By History   valsartan (DIOVAN) 40 MG tablet Take 10 mg by mouth daily  7/17/2019 at 0800 Yes Unknown, Entered By History   WARFARIN SODIUM PO Take 3.5-5 mg by mouth every evening 3.5 mg On Tuesdays, 5 mg all other days. 7/17/2019 at pm Yes Unknown, Entered By History   acetaminophen (TYLENOL) 325 MG tablet Take 650 mg by mouth daily as needed for mild pain Unknown at Unknown time  Unknown, Entered By History   guaiFENesin (ROBITUSSIN) 100 MG/5ML SYRP Take 5 mLs by mouth every 4 hours as needed for cough Unknown at Unknown time  Unknown, Entered By History   ipratropium - albuterol 0.5 mg/2.5 mg/3 mL (DUONEB) 0.5-2.5 (3) MG/3ML neb solution Take 1 vial by nebulization 3 times daily as needed Unknown at Unknown time  Unknown, Entered By History   nitroGLYcerin (NITROSTAT) 0.4 MG sublingual tablet Place 0.4 mg under the tongue every 5 minutes as needed for chest pain (x3 doses)  Unknown at Unknown time  Unknown, Entered By History   simethicone (MYLICON) 80 MG chewable tablet Take 2 tablets (160 mg) by mouth every 6 hours as needed for flatulence or  cramping Unknown at Unknown time  Maggie, Nirmal Thorne MD              Review of Systems:   A Comprehensive greater than 10 system review of systems was carried out.  Pertinent positives and negatives are noted above.  Otherwise negative for contributory information.            Physical Exam:   Blood pressure 148/89, pulse 95, temperature 98.3  F (36.8  C), temperature source Temporal, resp. rate 16, SpO2 100 %.  Exam:  GENERAL:  Comfortable.  PSYCH: pleasant, not orientated, No acute distress.  HEENT:  PERRLA. Normal conjunctiva, normal hearing, nasal mucosa and Oropharynx are normal. Left forehead head lac with stitches.  NECK:  Supple, no neck vein distention, adenopathy or bruits, normal thyroid.  HEART:  Normal S1, S2 with no murmur, no pericardial rub, gallops or S3 or S4.  LUNGS:  Clear to auscultation, normal Respiratory effort. No wheezing, rales or ronchi.  ABDOMEN:  Soft, no hepatosplenomegaly, normal bowel sounds. Non-tender, non distended.   EXTREMITIES:  1+ pitting edema of bilateral lower extremities with venous stasis changes noted. No visible difference in size of left thigh vs right thigh and left thigh measure 22 1/4 inches (measured 6 inches above superior patella).   SKIN:  Dry to touch, No rash, wound or ulcerations but multiple bruises on extremities.  NEUROLOGIC:  CN 2-12 grossly intact, sensation is intact with no focal deficits.               Data:     Recent Labs   Lab 07/18/19  0747   WBC 9.5   HGB 9.2*   HCT 32.0*   MCV 83        Recent Labs   Lab 07/18/19  0747      POTASSIUM 4.9   CHLORIDE 98   CO2 30   ANIONGAP 5   *   BUN 25   CR 1.10   GFRESTIMATED 58*   GFRESTBLACK 67   JANN 8.7     No results for input(s): COLOR, APPEARANCE, URINEGLC, URINEBILI, URINEKETONE, SG, UBLD, URINEPH, PROTEIN, UROBILINOGEN, NITRITE, LEUKEST, RBCU, WBCU in the last 168 hours.      Recent Results (from the past 24 hour(s))   CT Head w/o Contrast    Narrative    CT SCAN OF THE HEAD  WITHOUT CONTRAST   7/18/2019 8:49 AM     HISTORY: Fall.    TECHNIQUE:  Axial images of the head and coronal reformations without  IV contrast material.  Radiation dose for this scan was reduced using  automated exposure control, adjustment of the mA and/or kV according  to patient size, or iterative reconstruction technique.    COMPARISON: 4/3/2018.    FINDINGS: There is a left anterior frontal scalp hematoma. There is no  evidence for any underlying fracture. There are, however, thickened  secretions in the left frontal sinus. There is no evidence for  intracranial hemorrhage. Cerebral atrophy and patchy white matter  changes without mass effect are noted. There is no evidence for acute  infarct.      Impression    IMPRESSION:  1. Left anterior frontal scalp hematoma. No evidence for any  underlying fracture or intracranial hemorrhage.  2. Thickened secretions noted in the left frontal sinus.  3. Cerebral atrophy with chronic-appearing white matter changes most  likely due to chronic small vessel ischemic disease.    BRAYAN SEWELL MD   CT Cervical Spine w/o Contrast    Narrative    CT CERVICAL SPINE WITHOUT CONTRAST   7/18/2019 8:52 AM     HISTORY: Fall.     TECHNIQUE: Axial images of the cervical spine were obtained without  intravenous contrast. Multiplanar reformations were performed.  Radiation dose for this scan was reduced using automated exposure  control, adjustment of the mA and/or kV according to patient size, or  iterative reconstruction technique.     COMPARISON: None.    FINDINGS: Sagittal images demonstrate normal posterior alignment.  There is moderate rightward lateral curvature. There is no evidence  for fracture. Multilevel degenerative disc and facet disease is  present with multilevel mild-to-moderate central canal stenoses.  Paraspinal soft tissues are unremarkable as visualized.      Impression    IMPRESSION: Degenerative changes. No evidence for fracture or any  posterior  malalignment.    BRAYAN SEWELL MD   XR Pelvis w Hip Left 1 View    Narrative    PELVIS AND HIP LEFT ONE VIEW   7/18/2019 9:09 AM     HISTORY: Fall.    FINDINGS: Mild widening of the symphysis pubis; this may well be  long-standing, but clinical correlation is recommended. Left groin  surgical clips. Mild right hip osteoarthritis.      Impression    IMPRESSION: No fracture identified.     TUCKER TREJO MD   XR Wrist Right G/E 3 Views    Narrative    RIGHT WRIST THREE OR MORE VIEWS   7/18/2019 9:10 AM     HISTORY:  Fall.    FINDINGS: Thumb MCP osteoarthritis. Some degenerative arthrosis at the  triscaphe (STT) joint.        Impression    IMPRESSION:  No fracture identified.     TUCKER TREJO MD   XR Finger Right G/E 2 Views    Narrative    RIGHT FINGER TWO OR MORE VIEWS   7/18/2019 9:11 AM     HISTORY:  Fall.    FINDINGS: Osteopenia. Scattered osteoarthritic changes, including at  the first and third AP joints.      Impression    IMPRESSION:  No fracture identified.     TUCKER TREJO MD   CT Chest/Abdomen/Pelvis w Contrast    Narrative    CT CHEST/ABDOMEN/PELVIS WITH CONTRAST  7/18/2019 11:57 AM    HISTORY:  trauma, fall, abdominal pain, chest pain    TECHNIQUE: CT scan obtained of the chest, abdomen, and pelvis with  oral and IV contrast. 100mL [id number]-[id number] IV injected. Radiation dose for  this scan was reduced using automated exposure control, adjustment of  the mA and/or kV according to patient size, or iterative  reconstruction technique.    COMPARISON:  9/26/2018 CT chest, 4/3/2018 CT abdomen and pelvis    FINDINGS:  Chest: No evidence for mediastinal hematoma, pleural effusion, or  pneumothorax. Sternotomy wires, surgical clips, and coronary artery  calcification. Borderline lower right prominent subcarinal node,  otherwise no convincing evidence for adenopathy. Mild opacities left  lung base may be atelectasis or mild infectious/inflammatory process.    Abdomen/pelvis: Multiple bilateral renal cysts. Cholecystectomy  clips.  No evidence for solid upper abdominal organ laceration or acute  abnormality.  Prominent atherosclerotic vascular calcification without aneurysm. No  periaortic or pelvic adenopathy.    There is muscular enlargement of inferior left psoas and iliacus  muscle. New since 4/3/2018 and would be consistent with muscular  hemorrhage or edema. Clinical correlation. No discrete hematoma  identified. There is mild soft tissue stranding in the left pelvis.    No free fluid. No acute appearing bowel abnormality. No acute fracture  identified.      Impression    IMPRESSION:  1. Left psoas and iliacus muscle enlargement. Given clinical history  of trauma this is most likely due to muscular hemorrhage. No discrete  hematoma or acute fracture identified.  2. No acute abnormality of the chest. Minimal opacities suggesting  atelectasis or minimal inflammatory change at the left lung base.     MD Micki SKINNER PA-C

## 2019-07-18 NOTE — ED NOTES
Assisted with patient triage (ambulance). Applied monitoring equipment onto Patient (Pulse ox and BP). Icepack applied to Rt. hand

## 2019-07-18 NOTE — ED PROVIDER NOTES
History     Chief Complaint:  Fall      HPI   History limited secondary to the patient's dementia. History supplemented by EMS report.     Ton Bagley is an anticoagulated 89 year old male with a history of hypertension, hyperlipidemia, CKD stage III, CAD, and chronic atrial fibrillation on Coumadin who presents to the ED via EMS for evaluation after a fall. Per EMS report, the patient was getting out of bed to use the bathroom at his [facility] sometime last night, when he suddenly fell and hit his head. The patient is not exactly sure how he fell, although he sustained a laceration to the forehead. Staff found him on the floor at shift exchange around 0600 this morning, and thus they called EMS to transport him to the ED. The patient was complaining of right wrist pain with movement upon EMS arrival at the scene, and EMS also noted a possible left hip displacement. Initial glucose was 204, with stable blood pressure and heart rate showing atrial fibrillation between 70 and 100 bpm. Fentanyl 50 mcg IV was administered prior to arrival. Here in the ED, the patient endorses bilateral wrist pain and shoulder pain, along with a headache. He denies any neck pain, back pain, or other injuries from the fall. Of note, the patient does have lymphedema and erythema in his bilateral lower extremities at baseline. However, staff note that the swelling has been increasing over the past several weeks. Staff have been trying to contact the patient's family today without success.     Allergies:  Latex  Adhesive tape    Medications:    Lipitor  Dermasarra  Fergon  Furosemide  Gabapentin  Glipizide  Duoneb  Lactulose  Levothyroxine  Metformin  Lopressor  Nitrostat  Mycostatin  Prilosec  Miralax  Senokot  Mylicon  Carafate  Tamsulosin  Kenalog  Diovan  Coumadin    Past Medical History:    Hypertension  CAD  Atrial fibrillation  Hyperlipidemia  Cortical senile cataract  SBO  Sepsis  Cellulitis  Diabetes  mellitus, type II  CKD, stage III  MI  GERD    Past Surgical History:    CABG  Cardioversion  Exploratory laparotomy  Coronary stent placement    Family History:    No past pertinent family history.    Social History:  Smoking Status: Former smoker  Alcohol Use: Yes (wine daily)  Marital Status:   [2]  Currently resides at the [facility].     Review of Systems   Unable to perform ROS: Dementia   Musculoskeletal: Positive for arthralgias (bilateral wrist and shoulder). Negative for back pain and neck pain.   Skin: Positive for wound.   Neurological: Positive for headaches.       Physical Exam     Patient Vitals for the past 24 hrs:   BP Temp Temp src Pulse Heart Rate Resp SpO2   07/18/19 1300 150/82 -- -- 87 -- -- --   07/18/19 1245 (!) 164/143 -- -- 93 -- -- --   07/18/19 1220 154/90 -- -- 111 -- -- 98 %   07/18/19 0910 133/76 -- -- 90 -- -- 95 %   07/18/19 0800 130/74 -- -- 86 96 19 96 %   07/18/19 0750 -- 98.3  F (36.8  C) Temporal -- -- -- --   07/18/19 0736 (!) 141/100 -- Temporal -- 94 18 98 %        Physical Exam  General: Patient is alert and interactive when I enter the room  Head:  The scalp, face, and head appear normal, 2 cm laceration to forehead   Eyes:  Conjunctivae are normal, PERRL  ENT:    The nose is normal    Pinnae are normal    External acoustic canals are normal  Neck:  Trachea midline, no cervical spine tenderness  CV:  Pulses are normal, RRR    Resp:  No respiratory distress, CTAB   Abdomen:      Soft, non-tender, non-distended  Musc:  Normal muscular tone    No major joint effusions    No asymmetric leg swelling, bilateral lymphedema present    Left groin and pelvic tenderness    No thoracic or lumbar tenderness    No chest wall tenderness    Tenderness to right wrist but no deformity   Skin:  No rash or lesions noted  Neuro:  Speech is normal and fluent. Face is symmetric.     Moving all extremities well.   Psych: Awake. Alert.  Normal affect.  Appropriate  interactions.    Emergency Department Course   ECG:  Indication: Chest pain  Time: 1114  Vent. Rate 90 bpm. IN interval *. QRS duration 102. QT/QTc 382/467. P-R-T axis * 39 206.  Atrial fibrillation. Inferior infarct, age undetermined. ST & T wave abnormality, consider anterolateral ischemia. Abnormal ECG. Read time: 1125     Imaging:  Radiographic findings were communicated with the patient and admitting MD who voiced understanding of the findings.  CT Head without contrast:   1. Left anterior frontal scalp hematoma. No evidence for any  underlying fracture or intracranial hemorrhage.  2. Thickened secretions noted in the left frontal sinus.  3. Cerebral atrophy with chronic-appearing white matter changes most  likely due to chronic small vessel ischemic disease, as per radiology.    CT Cervical Spine without contrast:   Degenerative changes. No evidence for fracture or any  posterior malalignment, as per radiology.    CT Chest/Abdomen/Pelvis w/ IV contrast:   1. Left psoas and iliacus muscle enlargement. Given clinical history  of trauma this is most likely due to muscular hemorrhage. No discrete  hematoma or acute fracture identified.  2. No acute abnormality of the chest. Minimal opacities suggesting  atelectasis or minimal inflammatory change at the left lung base.  As per radiology.      XR Wrist 3 views, Right:   No fracture identified, as per radiology.     XR Pelvis and Hip 1 View, Left:   No fracture identified, as per radiology.      XR Finger 2 views, Right:   No fracture identified, as per radiology.     Laboratory:  CBC: WBC: 9.5, HGB: 9.2 (L), PLT: 299  BMP: Glucose 183 (H), GFR 58 (L), o/w WNL (Creatinine: 1.10)    INR: 1.60 (H)    Procedures:    Laceration Repair        LACERATION:  A simple clean 2 cm laceration.      LOCATION:  Left forehead      FUNCTION:  Sensation and circulation function are intact.      ANESTHESIA:  Local using 1% Lidocaine with Epinephrine total of 2 mLs      PREPARATION:   Irrigation with Normal Saline      DEBRIDEMENT:  no debridement      CLOSURE:  Wound was closed with One Layer.  Skin closed with 7 x 6.0 Nylon using interrupted sutures.    Interventions:  1051 Tylenol 650 mg PO  1309 Fentanyl 25 mcg IV  Please see MAR for full list of medications administered in the ED.     Emergency Department Course:  (0726) EMS report obtained and vitals reviewed.     (0730) I performed an exam of the patient as documented above.     (0734) Precautionary c-collar was applied secondary to the patient being an unreliable historian.     IV inserted. Medicine administered as documented above. Blood drawn. This was sent to the lab for further testing, results above.    The patient was sent for a CT of the head and cervical spine, along with x-rays of the right finger, wrist, and left hip while in the emergency department, findings above.     (0909) I rechecked the patient and discussed the results of his workup thus far.     (0937) I applied anesthesia as documented above.    (1044) I performed a laceration repair, as noted above. There were no complications of the procedure. At this time the patient was complaining of continued pain and had developed some chest pain.    EKG obtained in the ED, see results above.      The patient was sent for a CT of the chest, abdomen, and pelvis, findings above.      (1243) RN staff informed me that the patient was unable to ambulate without significant pain.     (1250) I reevaluated the patient and provided an update in regards to his ED course.  We discussed the likely plan for admission, and he felt comfortable with this.     (1326)  I consulted with Fabiola Ackerman PA-C of the hospitalist services. They are in agreement to accept the patient for admission to Dr. Zuluaga.    Findings and plan explained to the Patient who consents to admission. We tried to contact the patient's son but were not successful. Discussed the patient with Fabiola Ackerman for Dr. Zuluaga, who  will admit the patient to an observation bed for further monitoring, evaluation, and treatment.    Impression & Plan    Medical Decision Making:  Ton Bagley is a 89 year old male who presents after an unwitnessed fall. He had signs of head trauma with a laceration to his forehead. He clearly has dementia and is a poor historian, so we are unsure exactly how he fell, but it looked as though he was going to the bathroom. He is on blood thinners so will need CT imaging. We did place him in a c-collar as he is a poor historian. He is complaining of right wrist pain and left hip pain. We got CTs of the head and neck and x-rays of the hip and wrist, which was all unremarkable. I then removed the c-collar. I repaired his laceration. Interestingly, on his blood work his HGB is low at 9.2, and before it was in the 11-12 range in 2018. However, in 2016 he had a low blood count of 9, so it is unclear if this is baseline. He continued to have pain in his hip, so we did a CT of the chest/abdomen/pelvis with contrast. This revealed enlargement of the left psoas and iliacus muscle, but there is no active hemorrhage and certainly no fracture. He likely has some bleeding into his muscle and there is no further workup to be done aside from holding his Coumadin as he is stable. Patient usually uses a walker and a wheelchair. He was having significant pain. We did give him IV pain medication. He is certainly a concern for falls, so I think at this point he should be admitted. We tried to contact the son but were unable to do so. Patient remains stable here. Patient admitted to observation in stable condition.     Diagnosis:    ICD-10-CM    1. Fall, initial encounter W19.XXXA    2. Muscle hemorrhage M62.89        Disposition:  Admitted to Dr. Zuluaga      Scribe Disclosure:  I, Juliana Rodarte, am serving as a scribe on 7/18/2019 at 11:46 AM to personally document services performed by Jaz Gonzalez MD based on my observations and  the provider's statements to me.      Juliana Rodarte  7/18/2019   [facility] EMERGENCY DEPARTMENT       Jaz Gonzalez MD  07/18/19 3964

## 2019-07-18 NOTE — ED TRIAGE NOTES
Pt arrives via EMS from [facility] unit d/t unwitnessed fall. Pt found on the floor with head in the bathroom and body in the room. Pt c/o L hip and R wrist/thumb pain. CMS intact. Laceration to forehead. Pt on warfrin for afib. . . 50 mcg fentanyl given IM. Pt A&O to baseline to self and birthday. ABC intact.

## 2019-07-18 NOTE — ED NOTES
Called and [facility]

## 2019-07-18 NOTE — ED NOTES
Bed: ED02  Expected date: 7/18/19  Expected time:   Means of arrival: Ambulance  Comments:  Peterson Soler3

## 2019-07-18 NOTE — ED NOTES
[facility]  ED Nurse Handoff Report    Ton Bagley is a 89 year old male   ED Chief complaint: Fall  . ED Diagnosis:   Final diagnoses:   Fall, initial encounter   Muscle hemorrhage     Allergies: No Known Allergies    Code Status: DNR / DNI  Activity level - Baseline/Home:  Assist X 1. Activity Level - Current:   Assist X 1. Lift room needed: No. Bariatric: No   Needed: No   Isolation: No. Infection: Not Applicable.     Vital Signs:   Vitals:    07/18/19 0910 07/18/19 1220 07/18/19 1245 07/18/19 1300   BP: 133/76 154/90 (!) 164/143 150/82   Pulse: 90 111 93 87   Resp:       Temp:       TempSrc:       SpO2: 95% 98%         Cardiac Rhythm:  ,      Pain level: 0-10 Pain Scale: 6  Patient confused: Yes. Baseline dementia Patient Falls Risk: Yes.   Elimination Status: Has voided   Patient Report - Initial Complaint: Fall unknown found on floor at [facility] . Focused Assessment: Pt confused hx dementia. Laceration to forehead bleeding controlled on arrival. Pt has multiple complaints of back pain, right arm pain bilateral leg pain.    Tests Performed: Labs/imaging CT and xray. Abnormal Results:   CT Chest/Abdomen/Pelvis w Contrast   Final Result   IMPRESSION:   1. Left psoas and iliacus muscle enlargement. Given clinical history   of trauma this is most likely due to muscular hemorrhage. No discrete   hematoma or acute fracture identified.   2. No acute abnormality of the chest. Minimal opacities suggesting   atelectasis or minimal inflammatory change at the left lung base.       JAIR SOUZA MD      XR Finger Right G/E 2 Views   Final Result   IMPRESSION:  No fracture identified.       TUCKER TREJO MD      XR Wrist Right G/E 3 Views   Final Result   IMPRESSION:  No fracture identified.       TUCKER TREJO MD      XR Pelvis w Hip Left 1 View   Final Result   IMPRESSION: No fracture identified.       TUCKER TREJO MD      CT Cervical Spine w/o Contrast   Final Result   IMPRESSION: Degenerative  changes. No evidence for fracture or any   posterior malalignment.      BRAYAN SEWELL MD      CT Head w/o Contrast   Final Result   IMPRESSION:   1. Left anterior frontal scalp hematoma. No evidence for any   underlying fracture or intracranial hemorrhage.   2. Thickened secretions noted in the left frontal sinus.   3. Cerebral atrophy with chronic-appearing white matter changes most   likely due to chronic small vessel ischemic disease.      BRAYAN SEWELL MD        Labs Ordered and Resulted from Time of ED Arrival Up to the Time of Departure from the ED   CBC WITH PLATELETS DIFFERENTIAL - Abnormal; Notable for the following components:       Result Value    RBC Count 3.84 (*)     Hemoglobin 9.2 (*)     Hematocrit 32.0 (*)     MCH 24.0 (*)     MCHC 28.8 (*)     RDW 18.3 (*)     All other components within normal limits   INR - Abnormal; Notable for the following components:    INR 1.60 (*)     All other components within normal limits   BASIC METABOLIC PANEL - Abnormal; Notable for the following components:    Glucose 183 (*)     GFR Estimate 58 (*)     All other components within normal limits   .   Treatments provided: Pain management, brace to right wrist ice applied  Family Comments: Left message  OBS brochure/video discussed/provided to patient:  N/A  ED Medications:   Medications   lidocaine 1% with EPINEPHrine 1:100,000 1 %-1:217988 injection (has no administration in time range)   0.9% sodium chloride BOLUS (0 mLs Intravenous Stopped 7/18/19 1145)   acetaminophen (TYLENOL) tablet 650 mg (650 mg Oral Given 7/18/19 1051)   iopamidol (ISOVUE-370) solution 500 mL (100 mLs Intravenous Given 7/18/19 1144)   fentaNYL (PF) (SUBLIMAZE) injection 25 mcg (25 mcg Intravenous Given 7/18/19 1309)     Drips infusing:  No  For the majority of the shift, the patient's behavior Green. Interventions performed were N/A.     Severe Sepsis OR Septic Shock Diagnosis Present: No      ED Nurse Name/Phone Number: Drea Patel    1:27 PM    RECEIVING UNIT ED HANDOFF REVIEW    Above ED Nurse Handoff Report was reviewed: Yes  Reviewed by: Dot Robertson on July 18, 2019 at 3:10 PM

## 2019-07-18 NOTE — PLAN OF CARE
Acute Traumatic Pain / FALL    1.  Pain controlled with oral analgesia: none given      2.  Vital signs stable: Yes      3.  Diagnotic testing complete: Yes      4.  Cleared from consultants (if applicable): No      5. Return to near baseline physical activity: No    Pt alert and oriented to self only. Lift two assist.   Pain in left hip, head and right wrist.

## 2019-07-19 NOTE — DISCHARGE SUMMARY
[facility]  Discharge Summary  Name: Ton Bagley    MRN: [id number]  YOB: 1927    Age: 89 year old  Date of Discharge:  7/19/2019  Date of Admission: 7/18/2019  Primary Care Provider: Jose Shell  Discharge Physician:  Amanda Zuluaga MD  Discharging Service:  Hospitalist      Discharge Diagnoses:  1.  Unwitnessed fall  2.  Left thigh muscular hemorrhage  3.  Left frontal scalp laceration status post repair  4.  Chronic atrial fibrillation  5.  Lymphedema  6.  Type 2 diabetes  7.  Hypertension  8.  GERD  9.  Hypothyroidism     Follow-ups Needed After Discharge   1.  Hold Coumadin for 7 days.  At that time MD can reassess whether Coumadin can be resumed.    Unresulted Labs Ordered in the Past 30 Days of this Admission     No orders found from 10/16/2018 to 12/16/2018.        Hospital Course:  Ton Bagley is a 89 year old male who resides in LT with a PMH significant for dementia, CAD, A fib on warfarin, HTN, GERD and HLP who was admitted 7/18/19 after an unwitnessed fall with scalp laceration, right wrist pain and left hip pain.  Patient was found to have left thigh with muscle hemorrhage.  His Coumadin is currently on hold.  He will discharge back to long-term care with physical therapy.     Unwitnessed fall: Fell sometime in the middle of the night and found at 6 am complaining of pain in the right arm, left leg and head. X ray imaging of Pelvis, left hip, right wrist, and right fingers are negative.  CT head and CT cervical spine negative for fracture. No real history from patient given dementia so unclear if mechanical or syncopal but had echo in 4/2018 showing significantly decreased area of the aortic valve with mild flow gradient. No recent complaints of syncope or lightheadedness at care facility to leaning towards mechanical fall.  Patient is currently at an assist of 2.  Social work spoke with his care facility and he can return at this level of activity.   I have also ordered physical therapy.     Left thigh muscular hemorrhage: Patient complaining of left groin pain. X ray imaging of thigh negative for fracture but CT of chest/abdomen/pelvis shows a left psoas and iliacus muscle enlargement likely due to muscular hemorrhage. No fracture or hematoma noted. Hemoglobin 9.2 with most recent comparison in 9/2018 at 11.6. Thigh does not look visibly swollen and remained nontender to palpation without evidence of compartment syndrome.  He is anticoagulated on Warfarin with INR of 1.6.  His Coumadin will be held for 7 days after discharge.  At that time MD will need to reassess if Coumadin can be resumed.  I did discuss this with patient's son, Luciano, by phone.    Left scalp laceration: Sutures were placed in ED, these will need to be removed in 7 days.     Chronic A fib: Continued on prior to admission metoprolol for rate control.  He had been on Coumadin but as above this is being held for 7 days at time of discharge.  This can then be reevaluated by MD whether or not it should be restarted.     Lymphedema: Chronic, continue prior to admission Lasix and potassium.     Type II diabetes: Continue prior to admission metformin.     HTN: Continue PTA Valsartan and Metoprolol     GERD: Continue PTA Omeprazole MWF, Carafate and Mylicon     Hypothyroidism: Continue Levothyroxine   Discharge Disposition:  Discharged to long-term care facility     Allergies:  No Known Allergies     Condition on Discharge:  Discharge condition: Stable   Discharge vitals: Blood pressure 146/84, pulse 85, temperature 97.7  F (36.5  C), temperature source Oral, resp. rate 16, SpO2 92 %.   Code status on discharge: DNR / DNI     History of Illness:  See detailed admission note for full details.    Physical Exam:  Blood pressure 146/84, pulse 85, temperature 97.7  F (36.5  C), temperature source Oral, resp. rate 16, SpO2 92 %.  Wt Readings from Last 1 Encounters:   09/26/18 106.1 kg (234 lb)     General:  Alert, awake, no acute distress. Sitting up in a chair  HEENT: Normocephalic, repaired scalp laceration on the left frontal region, eyes anicteric and without scleral injection, EOMI, MMM.  Cardiac: Irregularly irregular without RVR, normal S1, S2.  No m/g/r.  1+ bilateral LE edema.  Pulmonary: Normal chest rise, normal work of breathing.  Lungs CTAB without crackles or wheezing  Abdomen: soft, non-tender, non-distended.  Normoactive BS.  No guarding or rebound tenderness.  Extremities: no deformities.  Warm, well perfused.  No tenderness to palpation over left thigh  Skin: no rashes or lesions noted.  Warm and Dry.  Neuro: No focal deficits noted.  Speech clear.  Moving all extremities while sitting in a chair but requires assistance to move from the chair.  Psych: Appropriate affect.  Alert only to self, baseline dementia.    Procedures other than Imaging:  Phlebotomy     Imaging:  Results for orders placed or performed during the hospital encounter of 07/18/19   CT Head w/o Contrast    Narrative    CT SCAN OF THE HEAD WITHOUT CONTRAST   7/18/2019 8:49 AM     HISTORY: Fall.    TECHNIQUE:  Axial images of the head and coronal reformations without  IV contrast material.  Radiation dose for this scan was reduced using  automated exposure control, adjustment of the mA and/or kV according  to patient size, or iterative reconstruction technique.    COMPARISON: 4/3/2018.    FINDINGS: There is a left anterior frontal scalp hematoma. There is no  evidence for any underlying fracture. There are, however, thickened  secretions in the left frontal sinus. There is no evidence for  intracranial hemorrhage. Cerebral atrophy and patchy white matter  changes without mass effect are noted. There is no evidence for acute  infarct.      Impression    IMPRESSION:  1. Left anterior frontal scalp hematoma. No evidence for any  underlying fracture or intracranial hemorrhage.  2. Thickened secretions noted in the left frontal sinus.  3.  Cerebral atrophy with chronic-appearing white matter changes most  likely due to chronic small vessel ischemic disease.    BRAYAN SEWELL MD   CT Cervical Spine w/o Contrast    Narrative    CT CERVICAL SPINE WITHOUT CONTRAST   7/18/2019 8:52 AM     HISTORY: Fall.     TECHNIQUE: Axial images of the cervical spine were obtained without  intravenous contrast. Multiplanar reformations were performed.  Radiation dose for this scan was reduced using automated exposure  control, adjustment of the mA and/or kV according to patient size, or  iterative reconstruction technique.     COMPARISON: None.    FINDINGS: Sagittal images demonstrate normal posterior alignment.  There is moderate rightward lateral curvature. There is no evidence  for fracture. Multilevel degenerative disc and facet disease is  present with multilevel mild-to-moderate central canal stenoses.  Paraspinal soft tissues are unremarkable as visualized.      Impression    IMPRESSION: Degenerative changes. No evidence for fracture or any  posterior malalignment.    BRAYAN SEWELL MD   XR Finger Right G/E 2 Views    Narrative    RIGHT FINGER TWO OR MORE VIEWS   7/18/2019 9:11 AM     HISTORY:  Fall.    FINDINGS: Osteopenia. Scattered osteoarthritic changes, including at  the first and third AP joints.      Impression    IMPRESSION:  No fracture identified.     TUCKER TREJO MD   XR Pelvis w Hip Left 1 View    Narrative    PELVIS AND HIP LEFT ONE VIEW   7/18/2019 9:09 AM     HISTORY: Fall.    FINDINGS: Mild widening of the symphysis pubis; this may well be  long-standing, but clinical correlation is recommended. Left groin  surgical clips. Mild right hip osteoarthritis.      Impression    IMPRESSION: No fracture identified.     TUCKER TREJO MD   XR Wrist Right G/E 3 Views    Narrative    RIGHT WRIST THREE OR MORE VIEWS   7/18/2019 9:10 AM     HISTORY:  Fall.    FINDINGS: Thumb MCP osteoarthritis. Some degenerative arthrosis at the  triscaphe (STT) joint.         Impression    IMPRESSION:  No fracture identified.     TUCKER TREJO MD   CT Chest/Abdomen/Pelvis w Contrast    Narrative    CT CHEST/ABDOMEN/PELVIS WITH CONTRAST  7/18/2019 11:57 AM    HISTORY:  trauma, fall, abdominal pain, chest pain    TECHNIQUE: CT scan obtained of the chest, abdomen, and pelvis with  oral and IV contrast. 100mL Isovue-370 IV injected. Radiation dose for  this scan was reduced using automated exposure control, adjustment of  the mA and/or kV according to patient size, or iterative  reconstruction technique.    COMPARISON:  9/26/2018 CT chest, 4/3/2018 CT abdomen and pelvis    FINDINGS:  Chest: No evidence for mediastinal hematoma, pleural effusion, or  pneumothorax. Sternotomy wires, surgical clips, and coronary artery  calcification. Borderline lower right prominent subcarinal node,  otherwise no convincing evidence for adenopathy. Mild opacities left  lung base may be atelectasis or mild infectious/inflammatory process.    Abdomen/pelvis: Multiple bilateral renal cysts. Cholecystectomy clips.  No evidence for solid upper abdominal organ laceration or acute  abnormality.  Prominent atherosclerotic vascular calcification without aneurysm. No  periaortic or pelvic adenopathy.    There is muscular enlargement of inferior left psoas and iliacus  muscle. New since 4/3/2018 and would be consistent with muscular  hemorrhage or edema. Clinical correlation. No discrete hematoma  identified. There is mild soft tissue stranding in the left pelvis.    No free fluid. No acute appearing bowel abnormality. No acute fracture  identified.      Impression    IMPRESSION:  1. Left psoas and iliacus muscle enlargement. Given clinical history  of trauma this is most likely due to muscular hemorrhage. No discrete  hematoma or acute fracture identified.  2. No acute abnormality of the chest. Minimal opacities suggesting  atelectasis or minimal inflammatory change at the left lung base.     JAIR SOUZA MD         Consultations:  Consultations This Hospital Stay   SOCIAL WORK IP CONSULT  PHYSICAL THERAPY ADULT IP CONSULT     Recent Lab Results:  Recent Labs   Lab 07/19/19  0540 07/18/19  0747   WBC 8.6 9.5   HGB 8.7* 9.2*   HCT 30.1* 32.0*   MCV 82 83    299     Recent Labs   Lab 07/19/19  0540 07/18/19  0747    133   POTASSIUM 4.0 4.9   CHLORIDE 99 98   CO2 30 30   ANIONGAP 5 5   * 183*   BUN 21 25   CR 0.95 1.10   GFRESTIMATED 70 58*   GFRESTBLACK 81 67   JANN 8.8 8.7     Recent Labs   Lab 07/19/19  0540 07/18/19  0747   INR 1.61* 1.60*          Pending Results:    Unresulted Labs Ordered in the Past 30 Days of this Admission     No orders found for last 31 day(s).           Discharge Instructions and Follow-Up:   Discharge Orders      Mantoux instructions    Give two-step Mantoux (PPD) Per Facility Policy Yes     General info for SNF    Length of Stay Estimate: Long Term Care  Condition at Discharge: Stable  Level of care:skilled   Rehabilitation Potential: Good  Admission H&P remains valid and up-to-date: Yes  Recent Chemotherapy: N/A  Use Nursing Home Standing Orders: Yes     Reason for your hospital stay    You were hospitalized for a fall and resultant pain.  You have a laceration on your forehead which was repaired.  You also had a muscle injury with bleeding into the muscle on the left leg.     Daily weights    Call Provider for weight gain of more than 2 pounds per day or 5 pounds per week.     Wound care (specify)    Forehead sutures to be removed in 7 days.     Follow Up and recommended labs and tests    Coumadin needs to be held for 7 days due to fall and hemorrhage into left thigh muscle.  After one week this should be reassessed by MD to determine if Coumadin can be resumed.     Activity - Up with nursing assistance     DNR/DNI     Physical Therapy Adult Consult    Evaluate and treat as clinically indicated.    Reason:  Falls, weakness     Fall precautions     Advance Diet as Tolerated     Follow this diet upon discharge: Orders Placed This Encounter      Combination Diet 2 gm NA Diet     Discharge Medications   Current Discharge Medication List      START taking these medications    Details   HYDROcodone-acetaminophen (NORCO) 5-325 MG tablet Take 1 tablet by mouth every 6 hours as needed for severe pain  Qty: 20 tablet, Refills: 0    Associated Diagnoses: Left leg pain      Lidocaine (LIDOCARE) 4 % Patch Place 1 patch onto the skin every 24 hours To prevent lidocaine toxicity, patient should be patch free for 12 hrs daily.  Qty: 30 patch, Refills: 0    Associated Diagnoses: Left leg pain         CONTINUE these medications which have NOT CHANGED    Details   camphor-menthol (DERMASARRA) 0.5-0.5 % LOTN Apply topically 2 times daily To both lower legs to dry skin where no drainage present      escitalopram (LEXAPRO) 5 MG tablet Take 5 mg by mouth every evening With a snack      Ferrous Sulfate 324 (65 Fe) MG TBEC Take 324 mg by mouth every other day      FUROSEMIDE PO Take 60 mg by mouth 2 times daily      levothyroxine (SYNTHROID/LEVOTHROID) 100 MCG tablet Take 1 tablet (100 mcg) by mouth daily  Qty: 30 tablet    Associated Diagnoses: Hypothyroidism, unspecified type      melatonin 3 MG tablet Take 3 mg by mouth At Bedtime      metFORMIN (GLUCOPHAGE) 500 MG tablet Take 500 mg by mouth 2 times daily      metoprolol tartrate (LOPRESSOR) 25 MG tablet Take 25 mg by mouth 2 times daily      nystatin (MYCOSTATIN) 608062 UNIT/GM POWD Apply topically 3 times daily Apply to groin area for yeast growth with every pericare       omeprazole (PRILOSEC) 20 MG DR capsule Take 20 mg by mouth three times a week Monday, Wednesday, Friday      polyethylene glycol (MIRALAX/GLYCOLAX) Packet Take 17 g by mouth daily      potassium chloride ER (K-TAB/KLOR-CON) 10 MEQ CR tablet Take 10 mEq by mouth daily      senna-docusate (SENOKOT-S;PERICOLACE) 8.6-50 MG per tablet Take 2 tablets by mouth 2 times daily      !! simethicone  (MYLICON) 80 MG chewable tablet Take 160 mg by mouth daily      sucralfate (CARAFATE) 1 GM tablet Take 1 tablet (1 g) by mouth 4 times daily (before meals and nightly)  Qty: 120 tablet, Refills: 0    Associated Diagnoses: Other acute gastritis without hemorrhage      triamcinolone (KENALOG) 0.5 % cream Apply topically three times a week Mon, Wed, Fri - apply to affected areas      valsartan (DIOVAN) 40 MG tablet Take 10 mg by mouth daily       acetaminophen (TYLENOL) 325 MG tablet Take 650 mg by mouth daily as needed for mild pain      guaiFENesin (ROBITUSSIN) 100 MG/5ML SYRP Take 5 mLs by mouth every 4 hours as needed for cough      ipratropium - albuterol 0.5 mg/2.5 mg/3 mL (DUONEB) 0.5-2.5 (3) MG/3ML neb solution Take 1 vial by nebulization 3 times daily as needed      nitroGLYcerin (NITROSTAT) 0.4 MG sublingual tablet Place 0.4 mg under the tongue every 5 minutes as needed for chest pain (x3 doses)       !! simethicone (MYLICON) 80 MG chewable tablet Take 2 tablets (160 mg) by mouth every 6 hours as needed for flatulence or cramping  Qty: 180 tablet    Associated Diagnoses: Abdominal pain, generalized       !! - Potential duplicate medications found. Please discuss with provider.      STOP taking these medications       WARFARIN SODIUM PO Comments:   Reason for Stopping:               Time Spent on this Encounter   IAmanda, personally saw the patient today and spent greater than 30 minutes discharging this patient.    Amanda Zuluaga MD

## 2019-07-19 NOTE — PHARMACY-ANTICOAGULATION SERVICE
Clinical Pharmacy- Warfarin Discharge Note  This patient is currently on warfarin for the treatment of Atrial fibrillation.  INR Goal= 2-3             Anticoagulation Dose History     Recent Dosing and Labs Latest Ref Rng & Units 8/5/2018 8/6/2018 8/7/2018 9/26/2018 9/27/2018 7/18/2019 7/19/2019    Warfarin 4 mg - 4 mg - - - - - -    Warfarin 5 mg - - 5 mg - 5 mg - - -    INR 0.86 - 1.14 2.25(H) 2.08(H) 2.14(H) 1.43(H) 1.59(H) 1.60(H) 1.61(H)        Patient was admitted for unwitnessed fall with scalp laceration, coumadin has been on hold since admission.  Agree with discharge orders to hold his Coumadin for 7 days and have PCP reevaluate if it should be restarted.

## 2019-07-19 NOTE — PROGRESS NOTES
Patient's After Visit Summary packet sent with son and patient for [facility].  Was given an opportunity to ask questions.   Discharge medications sent home with patient/family: Not applicable   Discharged with son to transport to [facility].  Patient wheeled to exit with belongings.

## 2019-07-19 NOTE — PROGRESS NOTES
Discharge Planner   Discharge Plans in progress: Return to [facility].  Barriers to discharge plan: None anticipated.   Follow up plan: MD to order RN & PT. Updated facility on pt's discharge today. Spoke with son Luciano via phone who plans to transport pt between [phone]. SW to continue following. Orders and scripts to be faxed.        Entered by: Breonna Coronado 07/19/2019 12:21 PM

## 2019-07-19 NOTE — PROGRESS NOTES
PRIMARY DIAGNOSIS: Leg Pain  OUTPATIENT/OBSERVATION GOALS TO BE MET BEFORE DISCHARGE:  1. ADLs back to baseline: Yes, noted assist of 1-2 at baseline    2. Activity and level of assistance: Assist of 2 be steady    3. Pain status: Improved-controlled with oral pain medications.    4. Return to near baseline physical activity: Yes    Patient alert, oriented to self.  Up with assist of 2 be steady.  Lung sounds clear.  Bowel sounds active.  Sutures to forehead WNL, CDI.  VSS.  Pain managed with PRN Norco.  Patient possible to discharge back to [facility] this afternoon.       Discharge Planner Nurse   Safe discharge environment identified: Yes  Barriers to discharge: Yes       Entered by: Brionna Hodge 07/19/2019 3:11 PM     Please review provider order for any additional goals.   Nurse to notify provider when observation goals have been met and patient is ready for discharge.

## 2019-07-19 NOTE — PLAN OF CARE
PRIMARY DIAGNOSIS: ACUTE PAIN  OUTPATIENT/OBSERVATION GOALS TO BE MET BEFORE DISCHARGE:  1. Pain Status: Improved-controlled with oral pain medications.    2. Return to near baseline physical activity: No    3. Cleared for discharge by consultants (if involved): No- PT to see     Discharge Planner Nurse   Safe discharge environment identified: Yes  Barriers to discharge: Yes- x2(not baseline), pain control        Entered by: Criss Chapa 07/18/2019 10:35 PM     Please review provider order for any additional goals.   Nurse to notify provider when observation goals have been met and patient is ready for discharge.    Patient has left leg and right wrist pain- unable to do pain scale as patient has dementia. Norco given at 7 pm. Patient very itchy after norco. R wrist in splint but patient taking off often.  Patient x2 with be rankin From memory care. PT and SW consulted. Redness in groin- nystatin powder ordered. Pt confused- alarms in place.

## 2019-07-19 NOTE — PLAN OF CARE
PRIMARY DIAGNOSIS: ACUTE PAIN  OUTPATIENT/OBSERVATION GOALS TO BE MET BEFORE DISCHARGE:  1. Pain Status: Improved-controlled with oral pain medications.    2. Return to near baseline physical activity: No    3. Cleared for discharge by consultants (if involved): No- PT to see     Discharge Planner Nurse   Safe discharge environment identified: Yes  Barriers to discharge: Yes- x2(not baseline), pain control        Entered by: Danii BRADSHAW Che 07/19/2019 5:34 AM     Please review provider order for any additional goals.   Nurse to notify provider when observation goals have been met and patient is ready for discharge.    Patient has left leg and right wrist pain-  Has dementia so rating pain is difficult. Norco for pain.R wrist in splint on. Patient x 2 with be pineda. From memory care. PT and SW consulted. Redness in groin- nystatin powder ordered. Patient awake most of the night.  Pt confused- alarms in place.

## 2019-07-19 NOTE — PLAN OF CARE
PRIMARY DIAGNOSIS: ACUTE PAIN  OUTPATIENT/OBSERVATION GOALS TO BE MET BEFORE DISCHARGE:  1. Pain Status: Improved-controlled with oral pain medications.    2. Return to near baseline physical activity: No    3. Cleared for discharge by consultants (if involved): No- PT to see     Discharge Planner Nurse   Safe discharge environment identified: Yes  Barriers to discharge: Yes- x2(not baseline), pain control        Entered by: Danii BRADSHAW Che 07/19/2019 2:10 AM     Please review provider order for any additional goals.   Nurse to notify provider when observation goals have been met and patient is ready for discharge.    Patient has left leg and right wrist pain-  Has dementia so rating pain is difficult. Norco for pain.R wrist in splint on. Patient x 2 with be pineda. From memory care. PT and SW consulted. Redness in groin- nystatin powder ordered. Pt confused- alarms in place.

## 2019-07-19 NOTE — CONSULTS
Care Transition Initial Assessment - SW     Met with: Spoke with RN and RN manager at [facility].  Active Problems:    Left leg pain       DATA  Lives With: facility resident      Quality of Family Relationships: helpful, involved  Description of Support System: Supportive, Involved  Who is your support system?: Children, Facility resident(s)/Staff  Support Assessment: Adequate family and caregiver support.   Identified issues/concerns regarding health management: Pt resides at [facility] in [place]. Per facility, pt is primarily WC bound but can transfer and ambulate short distances with Ax1. He receives assistance with all ADL's. Pt has Dementia and is disoriented x4. Pt is currently Ax2 with a Nicole Steady. PT eval at 1330 today. Per RN Manager Shayna, since pt is in the Care Center, they can adjust his level of need and provide Ax2 if needed. No barriers to pt returning from an ambulation standpoint.      Quality of Family Relationships: helpful, involved     ASSESSMENT  Cognitive Status:  Disoriented and confused  Concerns to be addressed: Discharge planning, return to [facility].     PLAN  Financial costs for the patient includes: TBD-none anticipated.  Patient given options and choices for discharge: Yes.  Patient/family is agreeable to the plan?  Yes.   Transportation/person available to transport on day of discharge  is TBD.  Patient Goals and Preferences: Return to facility.  Patient anticipates discharging to:  Return to  facility, [phone]. Orders to be faxed to (f) [phone]. Transport TBD. PT eval pending. SW to continue following.

## 2021-11-27 NOTE — ED TRIAGE NOTES
"Patient has bilateral sores on his lower legs. Sent from assisted living for evaluation for cellulitis per patient's daughter. Pain in left leg and states that \"skin is purple and swollen\".  "
no

## 2022-01-01 NOTE — PHARMACY-ANTICOAGULATION SERVICE
Clinical Pharmacy - Warfarin Dosing Consult     Pharmacy has been consulted to manage this patient s warfarin therapy.  Indication: Atrial Fibrillation  Therapy Goal: INR 2-3  Warfarin Prior to Admission: Yes  Warfarin PTA Regimen: 4 mg Sunday, Tuesday, Thursday, Friday, Saturday and 5 mg Monday and Wednesday  Significant drug interactions: Acetaminophen prn, Atovastatin, Calcium with Vit D, Levothyroxine, Omeprazole    INR   Date Value Ref Range Status   08/05/2018 2.25 (H) 0.86 - 1.14 Final   08/04/2018 2.58 (H) 0.86 - 1.14 Final     .  Pharmacy will monitor Tno SOLOMON Guialexys daily and order warfarin doses to achieve specified goal.      Please contact pharmacy as soon as possible if the warfarin needs to be held for a procedure or if the warfarin goals change.      
Clinical Pharmacy- Warfarin Discharge Note  This patient is currently on warfarin for the treatment of Atrial fibrillation.  INR Goal= 2-3  Expected length of therapy lifetime.    Warfarin PTA Regimen: 4 mg Sunday, Tuesday, Thursday, Friday, Saturday and 5 mg Monday and Wednesday    Warfarin discharge reconciliation complete per pharmacist.      Anticoagulation Dose History     Recent Dosing and Labs Latest Ref Rng & Units 8/1/2018 8/2/2018 8/3/2018 8/4/2018 8/5/2018 8/6/2018 8/7/2018    Warfarin 4 mg - - 4 mg 4 mg 4 mg 4 mg - -    Warfarin 5 mg - 5 mg - - - - 5 mg -    INR 0.86 - 1.14 2.44(H) 2.64(H) 2.94(H) 2.58(H) 2.25(H) 2.08(H) 2.14(H)          Agree with plan to continue PTA dosing of warfarin 5mg Mon & Wed, 4mg ROW.  Patient discharging to long-term care facility - INR to be followed per their policy.      John Navarrete, PharmD./PGY-1 Resident    
Iain Cadena)  Pediatrics  [address] [facility], [facility] [address]  [place] [address]  Phone: [phone]  Fax: [phone]  Follow Up Time:

## 2024-01-01 NOTE — PROGRESS NOTES
"[facility]   General Surgery Progress Note           Assessment and Plan:   Assessment:   POD#9 s/p ex lap for apparent closed loop SBO, negative findings.  H/o Afib  Ileus as expected  Afebrile      Plan:   -Continue PPN, full liquid diet  -Pain control:  PO Tylenol  -GI prophylaxis:  Protonix    -VTE prophylaxis: PCDs, coumadin  -await resolution of ileus         Interval History:   C/o \"ache\" in low abdomen.  Per nursing, pt complained of penile and rectal pain overnight. Up walking with assistance.  Tolerating water today, has poor appetite.  + having BMs.         Physical Exam:   Blood pressure 144/62, pulse 84, temperature 97.9  F (36.6  C), temperature source Oral, resp. rate 18, height 1.702 m (5' 7.01\"), weight 107 kg (235 lb 14.4 oz), SpO2 95 %.    I/O last 3 completed shifts:  In: 3483 [P.O.:1260; I.V.:865]  Out: 2275 [Urine:2275]    Abdomen:   firm, distended, denies tenderness, hypoactive bowel sounds   Inc(s) - clean, dry, intact.  + staples.           Data:       Recent Labs  Lab 05/22/17  0646 05/19/17  0700    287     No results for input(s): HGB in the last 168 hours.      Adri Thrasher PA-C     Sleepy and a bit confused after awakening  No change in abdomen, rounded and obese  ? If pro kinetic meds would be of any benefit  Sergio Marcus MD  5/23/2017 11:34 AM    " How Severe Are Your Spot(S)?: moderate Hpi Title: Evaluation of Skin Lesions Additional History: Patient presents today for tbse, has a hx of SCC, would like all moles to be evaluated. \\n\\nPatient reports no concerns today. Strong peripheral pulses

## (undated) DEVICE — DRSG GAUZE 4X4" 8044

## (undated) DEVICE — SU VICRYL 4-0 PS-2 18" UND J496H

## (undated) DEVICE — SU PDS II 1 CTX 36" Z371T

## (undated) DEVICE — ESU GROUND PAD ADULT W/CORD E7507

## (undated) DEVICE — STPL SKIN 35W APPOSE 8886803712

## (undated) DEVICE — LINEN TOWEL PACK X10 5473

## (undated) DEVICE — Device

## (undated) DEVICE — PREP CHLORAPREP 26ML TINTED ORANGE  260815

## (undated) DEVICE — PACK MAJOR SBA15MAFSI

## (undated) DEVICE — NDL 22GA 1.5"

## (undated) DEVICE — SUCTION MANIFOLD NEPTUNE 2 SYS 4 PORT 0702-020-000

## (undated) DEVICE — GOWN IMPERVIOUS SPECIALTY XLG/XLONG 32474

## (undated) DEVICE — SU VICRYL 2-0 CT-1 27" UND J259H

## (undated) DEVICE — SUCTION TIP POOLE K770

## (undated) DEVICE — BAG CLEAR TRASH 1.3M 39X33" P4040C

## (undated) DEVICE — ESU ELEC BLADE 2.75" COATED/INSULATED E1455

## (undated) DEVICE — GLOVE PROTEXIS POWDER FREE 7.5 ORTHOPEDIC 2D73ET75

## (undated) DEVICE — SOL NACL 0.9% IRRIG 1000ML BOTTLE 2F7124

## (undated) DEVICE — SU VICRYL 3-0 SH-1 27" J311H

## (undated) DEVICE — LINEN HALF SHEET 5512

## (undated) DEVICE — SU VICRYL 2-0 TIE 12X18" J905T

## (undated) DEVICE — GLOVE PROTEXIS BLUE W/NEU-THERA 7.5  2D73EB75

## (undated) DEVICE — TUBE CULTURE ANAEROBIC PORT-A-CUL 11ML

## (undated) DEVICE — DRAPE LAP W/ARMBOARD 29410

## (undated) DEVICE — GLOVE PROTEXIS BLUE W/NEU-THERA 6.5  2D73EB65

## (undated) DEVICE — LINEN TOWEL PACK X5 5464

## (undated) DEVICE — SPONGE LAP 18X18" X8435

## (undated) DEVICE — GLOVE PROTEXIS POWDER FREE SMT 7.0  2D72PT70X

## (undated) DEVICE — CATH TRAY FOLEY COUDE SURESTEP 16FR W/DRN BAG LATEX A304416A

## (undated) DEVICE — LINEN FULL SHEET 5511

## (undated) DEVICE — SYR 10ML FINGER CONTROL W/O NDL 309695

## (undated) DEVICE — GLOVE PROTEXIS BLUE W/NEU-THERA 8.0  2D73EB80

## (undated) RX ORDER — ONDANSETRON 2 MG/ML
INJECTION INTRAMUSCULAR; INTRAVENOUS
Status: DISPENSED
Start: 2017-05-14

## (undated) RX ORDER — PROPOFOL 10 MG/ML
INJECTION, EMULSION INTRAVENOUS
Status: DISPENSED
Start: 2017-05-14

## (undated) RX ORDER — LIDOCAINE HYDROCHLORIDE 10 MG/ML
INJECTION, SOLUTION EPIDURAL; INFILTRATION; INTRACAUDAL; PERINEURAL
Status: DISPENSED
Start: 2017-05-14

## (undated) RX ORDER — DEXAMETHASONE SODIUM PHOSPHATE 4 MG/ML
INJECTION, SOLUTION INTRA-ARTICULAR; INTRALESIONAL; INTRAMUSCULAR; INTRAVENOUS; SOFT TISSUE
Status: DISPENSED
Start: 2017-05-14

## (undated) RX ORDER — EPHEDRINE SULFATE 50 MG/ML
INJECTION, SOLUTION INTRAMUSCULAR; INTRAVENOUS; SUBCUTANEOUS
Status: DISPENSED
Start: 2017-05-14

## (undated) RX ORDER — FENTANYL CITRATE 50 UG/ML
INJECTION, SOLUTION INTRAMUSCULAR; INTRAVENOUS
Status: DISPENSED
Start: 2017-05-14

## (undated) RX ORDER — NEOSTIGMINE METHYLSULFATE 5 MG/5 ML
SYRINGE (ML) INTRAVENOUS
Status: DISPENSED
Start: 2017-05-14

## (undated) RX ORDER — BUPIVACAINE HYDROCHLORIDE 5 MG/ML
INJECTION, SOLUTION EPIDURAL; INTRACAUDAL
Status: DISPENSED
Start: 2017-05-14

## (undated) RX ORDER — PHENYLEPHRINE HCL IN 0.9% NACL 1 MG/10 ML
SYRINGE (ML) INTRAVENOUS
Status: DISPENSED
Start: 2017-05-14

## (undated) RX ORDER — GLYCOPYRROLATE 0.2 MG/ML
INJECTION INTRAMUSCULAR; INTRAVENOUS
Status: DISPENSED
Start: 2017-05-14

## (undated) RX ORDER — PIPERACILLIN SODIUM, TAZOBACTAM SODIUM 3; .375 G/15ML; G/15ML
INJECTION, POWDER, LYOPHILIZED, FOR SOLUTION INTRAVENOUS
Status: DISPENSED
Start: 2017-05-14